# Patient Record
Sex: FEMALE | Race: BLACK OR AFRICAN AMERICAN | Employment: OTHER | URBAN - METROPOLITAN AREA
[De-identification: names, ages, dates, MRNs, and addresses within clinical notes are randomized per-mention and may not be internally consistent; named-entity substitution may affect disease eponyms.]

---

## 2018-03-12 ENCOUNTER — HOSPITAL ENCOUNTER (OUTPATIENT)
Dept: CT IMAGING | Age: 68
Discharge: HOME | End: 2018-03-14
Payer: MEDICARE

## 2018-03-12 ENCOUNTER — HOSPITAL ENCOUNTER (OUTPATIENT)
Age: 68
Discharge: HOME | End: 2018-03-12
Payer: MEDICARE

## 2018-03-12 DIAGNOSIS — C34.11 MALIGNANT NEOPLASM OF UPPER LOBE OF RIGHT LUNG (HCC): ICD-10-CM

## 2018-03-12 DIAGNOSIS — Z87.891 EX-SMOKER: Chronic | ICD-10-CM

## 2018-03-12 DIAGNOSIS — N18.1 STAGE 1 CHRONIC KIDNEY DISEASE: ICD-10-CM

## 2018-03-12 DIAGNOSIS — I26.99 OTHER ACUTE PULMONARY EMBOLISM WITHOUT ACUTE COR PULMONALE (HCC): ICD-10-CM

## 2018-03-12 DIAGNOSIS — J43.1 PANLOBULAR EMPHYSEMA (HCC): Chronic | ICD-10-CM

## 2018-03-12 DIAGNOSIS — I10 ESSENTIAL HYPERTENSION: Chronic | ICD-10-CM

## 2018-03-12 LAB
ANION GAP SERPL CALCULATED.3IONS-SCNC: 12 MMOL/L (ref 7–16)
BUN BLDV-MCNC: 24 MG/DL (ref 8–23)
CALCIUM SERPL-MCNC: 9.6 MG/DL (ref 8.6–10.2)
CHLORIDE BLD-SCNC: 104 MMOL/L (ref 98–107)
CO2: 27 MMOL/L (ref 22–29)
CREAT SERPL-MCNC: 1 MG/DL (ref 0.5–1)
GFR AFRICAN AMERICAN: >60
GFR NON-AFRICAN AMERICAN: >60 ML/MIN/1.73
GLUCOSE BLD-MCNC: 85 MG/DL (ref 74–109)
POTASSIUM SERPL-SCNC: 4 MMOL/L (ref 3.5–5)
SODIUM BLD-SCNC: 143 MMOL/L (ref 132–146)

## 2018-03-12 PROCEDURE — 36415 COLL VENOUS BLD VENIPUNCTURE: CPT

## 2018-03-12 PROCEDURE — 6360000004 HC RX CONTRAST MEDICATION: Performed by: RADIOLOGY

## 2018-03-12 PROCEDURE — 80048 BASIC METABOLIC PNL TOTAL CA: CPT

## 2018-03-12 PROCEDURE — 71260 CT THORAX DX C+: CPT

## 2018-03-12 RX ADMIN — IOPAMIDOL 90 ML: 755 INJECTION, SOLUTION INTRAVENOUS at 13:01

## 2018-04-23 ENCOUNTER — OFFICE VISIT (OUTPATIENT)
Dept: ONCOLOGY | Age: 68
End: 2018-04-23
Payer: MEDICARE

## 2018-04-23 VITALS
WEIGHT: 150.9 LBS | DIASTOLIC BLOOD PRESSURE: 65 MMHG | HEART RATE: 76 BPM | TEMPERATURE: 98.9 F | HEIGHT: 61 IN | RESPIRATION RATE: 20 BRPM | BODY MASS INDEX: 28.49 KG/M2 | SYSTOLIC BLOOD PRESSURE: 138 MMHG

## 2018-04-23 DIAGNOSIS — C34.11 MALIGNANT NEOPLASM OF UPPER LOBE OF RIGHT LUNG (HCC): Primary | ICD-10-CM

## 2018-04-23 PROCEDURE — 99214 OFFICE O/P EST MOD 30 MIN: CPT | Performed by: INTERNAL MEDICINE

## 2018-04-23 PROCEDURE — 1090F PRES/ABSN URINE INCON ASSESS: CPT | Performed by: INTERNAL MEDICINE

## 2018-04-23 PROCEDURE — 99212 OFFICE O/P EST SF 10 MIN: CPT

## 2018-04-23 PROCEDURE — G8417 CALC BMI ABV UP PARAM F/U: HCPCS | Performed by: INTERNAL MEDICINE

## 2018-04-23 PROCEDURE — G8399 PT W/DXA RESULTS DOCUMENT: HCPCS | Performed by: INTERNAL MEDICINE

## 2018-04-23 PROCEDURE — 4040F PNEUMOC VAC/ADMIN/RCVD: CPT | Performed by: INTERNAL MEDICINE

## 2018-04-23 PROCEDURE — G8427 DOCREV CUR MEDS BY ELIG CLIN: HCPCS | Performed by: INTERNAL MEDICINE

## 2018-04-23 PROCEDURE — 1036F TOBACCO NON-USER: CPT | Performed by: INTERNAL MEDICINE

## 2018-04-23 PROCEDURE — 3017F COLORECTAL CA SCREEN DOC REV: CPT | Performed by: INTERNAL MEDICINE

## 2018-04-23 PROCEDURE — 1123F ACP DISCUSS/DSCN MKR DOCD: CPT | Performed by: INTERNAL MEDICINE

## 2018-04-26 ENCOUNTER — TELEPHONE (OUTPATIENT)
Dept: ONCOLOGY | Age: 68
End: 2018-04-26

## 2018-04-26 NOTE — TELEPHONE ENCOUNTER
CALLED [facility] TO INITIATE PRIOR AUTH FOR PTS CT ABD/PELVIS ON 18, [employer] CUST[employer]ER REP WAS FAXING OVER A CLINICAL INFO SHEET ON WHAT WE NEEDED TO SEND TO THEM, THAT I NEVER RECEIVED. CALLED [facility] TODAY AND SPOKE WITH TONG YI, HE IS SUPPOSE TO BE FAXING THIS INFO OVER TO ME. HE ALSO GAVE ME INSTRUCTIONS THAT I CAN SEND ALL CLINICALS AND INCLUDE THE PTS ID 3, NAME,  AND TACKING NUMBER, WHICH I DID FAX TO [phone]. PTS TRACKING NUMBER IS [id number]    Electronically signed by Yumiko Romano MA on 2018 at 10:29 AM      18 I FAXED ALL INFO OVER TO DAVID ON , CALLED THE [phone] NUMBER TO SEE IF THE SCANS HAVE BEEN APPROVED, AS OF 18 THEY ARE STILL IN CLINICAL REVIEW. I WILL LEAVE A NOTE FOR MANUEL NARANJO TO CALL THE ABOVE NUMBER ALONG WITH THE TRACKING NUMBER SO SHE CAN KEEP UPDATED AS TO WHEN THEY ARE APPROVED.    Electronically signed by Yumiko Romano MA on 2018 at 10:02 AM

## 2018-04-30 ENCOUNTER — TELEPHONE (OUTPATIENT)
Dept: ONCOLOGY | Age: 68
End: 2018-04-30

## 2018-05-08 ENCOUNTER — HOSPITAL ENCOUNTER (OUTPATIENT)
Dept: CT IMAGING | Age: 68
Discharge: HOME | End: 2018-05-10
Payer: MEDICARE

## 2018-05-08 DIAGNOSIS — C34.11 MALIGNANT NEOPLASM OF UPPER LOBE OF RIGHT LUNG (HCC): ICD-10-CM

## 2018-05-08 PROCEDURE — 74177 CT ABD & PELVIS W/CONTRAST: CPT

## 2018-05-08 PROCEDURE — 6360000004 HC RX CONTRAST MEDICATION: Performed by: RADIOLOGY

## 2018-05-08 PROCEDURE — 2580000003 HC RX 258: Performed by: RADIOLOGY

## 2018-05-08 RX ORDER — SODIUM CHLORIDE 0.9 % (FLUSH) 0.9 %
10 SYRINGE (ML) INJECTION
Status: COMPLETED | OUTPATIENT
Start: 2018-05-08 | End: 2018-05-08

## 2018-05-08 RX ADMIN — Medication 10 ML: at 08:16

## 2018-05-08 RX ADMIN — IOPAMIDOL 110 ML: 755 INJECTION, SOLUTION INTRAVENOUS at 08:16

## 2018-05-08 RX ADMIN — IOHEXOL 50 ML: 240 INJECTION, SOLUTION INTRATHECAL; INTRAVASCULAR; INTRAVENOUS; ORAL at 08:16

## 2018-05-16 ENCOUNTER — OFFICE VISIT (OUTPATIENT)
Dept: ONCOLOGY | Age: 68
End: 2018-05-16
Payer: MEDICARE

## 2018-05-16 ENCOUNTER — HOSPITAL ENCOUNTER (OUTPATIENT)
Dept: INFUSION THERAPY | Age: 68
Discharge: HOME | End: 2018-05-16
Payer: MEDICARE

## 2018-05-16 VITALS
DIASTOLIC BLOOD PRESSURE: 70 MMHG | RESPIRATION RATE: 20 BRPM | WEIGHT: 149.4 LBS | BODY MASS INDEX: 28.21 KG/M2 | HEIGHT: 61 IN | SYSTOLIC BLOOD PRESSURE: 143 MMHG | TEMPERATURE: 98.7 F | HEART RATE: 72 BPM

## 2018-05-16 DIAGNOSIS — C34.11 MALIGNANT NEOPLASM OF UPPER LOBE OF RIGHT LUNG (HCC): Primary | ICD-10-CM

## 2018-05-16 DIAGNOSIS — C34.11 MALIGNANT NEOPLASM OF UPPER LOBE OF RIGHT LUNG (HCC): ICD-10-CM

## 2018-05-16 LAB
ALBUMIN SERPL-MCNC: 3.9 G/DL (ref 3.5–5.2)
ALP BLD-CCNC: 110 U/L (ref 35–104)
ALT SERPL-CCNC: 40 U/L (ref 0–32)
ANION GAP SERPL CALCULATED.3IONS-SCNC: 11 MMOL/L (ref 7–16)
AST SERPL-CCNC: 38 U/L (ref 0–31)
BASOPHILS ABSOLUTE: 0.02 E9/L (ref 0–0.2)
BASOPHILS RELATIVE PERCENT: 0.4 % (ref 0–2)
BILIRUB SERPL-MCNC: 0.4 MG/DL (ref 0–1.2)
BUN BLDV-MCNC: 20 MG/DL (ref 8–23)
CALCIUM SERPL-MCNC: 9.9 MG/DL (ref 8.6–10.2)
CHLORIDE BLD-SCNC: 105 MMOL/L (ref 98–107)
CO2: 30 MMOL/L (ref 22–29)
CREAT SERPL-MCNC: 1.1 MG/DL (ref 0.5–1)
EOSINOPHILS ABSOLUTE: 0.08 E9/L (ref 0.05–0.5)
EOSINOPHILS RELATIVE PERCENT: 1.5 % (ref 0–6)
GFR AFRICAN AMERICAN: 60
GFR NON-AFRICAN AMERICAN: 60 ML/MIN/1.73
GLUCOSE BLD-MCNC: 93 MG/DL (ref 74–109)
HCT VFR BLD CALC: 31.3 % (ref 34–48)
HEMOGLOBIN: 10.3 G/DL (ref 11.5–15.5)
IMMATURE GRANULOCYTES #: 0.02 E9/L
IMMATURE GRANULOCYTES %: 0.4 % (ref 0–5)
LYMPHOCYTES ABSOLUTE: 1.27 E9/L (ref 1.5–4)
LYMPHOCYTES RELATIVE PERCENT: 24.3 % (ref 20–42)
MCH RBC QN AUTO: 30.1 PG (ref 26–35)
MCHC RBC AUTO-ENTMCNC: 32.9 % (ref 32–34.5)
MCV RBC AUTO: 91.5 FL (ref 80–99.9)
MONOCYTES ABSOLUTE: 0.62 E9/L (ref 0.1–0.95)
MONOCYTES RELATIVE PERCENT: 11.9 % (ref 2–12)
NEUTROPHILS ABSOLUTE: 3.22 E9/L (ref 1.8–7.3)
NEUTROPHILS RELATIVE PERCENT: 61.5 % (ref 43–80)
PDW BLD-RTO: 13.6 FL (ref 11.5–15)
PLATELET # BLD: 171 E9/L (ref 130–450)
PMV BLD AUTO: 10.5 FL (ref 7–12)
POTASSIUM SERPL-SCNC: 4 MMOL/L (ref 3.5–5)
RBC # BLD: 3.42 E12/L (ref 3.5–5.5)
SODIUM BLD-SCNC: 146 MMOL/L (ref 132–146)
TOTAL PROTEIN: 7.7 G/DL (ref 6.4–8.3)
WBC # BLD: 5.2 E9/L (ref 4.5–11.5)

## 2018-05-16 PROCEDURE — 1090F PRES/ABSN URINE INCON ASSESS: CPT | Performed by: INTERNAL MEDICINE

## 2018-05-16 PROCEDURE — G8399 PT W/DXA RESULTS DOCUMENT: HCPCS | Performed by: INTERNAL MEDICINE

## 2018-05-16 PROCEDURE — 36415 COLL VENOUS BLD VENIPUNCTURE: CPT | Performed by: INTERNAL MEDICINE

## 2018-05-16 PROCEDURE — 99212 OFFICE O/P EST SF 10 MIN: CPT | Performed by: INTERNAL MEDICINE

## 2018-05-16 PROCEDURE — 85025 COMPLETE CBC W/AUTO DIFF WBC: CPT

## 2018-05-16 PROCEDURE — 1036F TOBACCO NON-USER: CPT | Performed by: INTERNAL MEDICINE

## 2018-05-16 PROCEDURE — G8417 CALC BMI ABV UP PARAM F/U: HCPCS | Performed by: INTERNAL MEDICINE

## 2018-05-16 PROCEDURE — 1123F ACP DISCUSS/DSCN MKR DOCD: CPT | Performed by: INTERNAL MEDICINE

## 2018-05-16 PROCEDURE — 3017F COLORECTAL CA SCREEN DOC REV: CPT | Performed by: INTERNAL MEDICINE

## 2018-05-16 PROCEDURE — G8427 DOCREV CUR MEDS BY ELIG CLIN: HCPCS | Performed by: INTERNAL MEDICINE

## 2018-05-16 PROCEDURE — 80053 COMPREHEN METABOLIC PANEL: CPT

## 2018-05-16 PROCEDURE — 99214 OFFICE O/P EST MOD 30 MIN: CPT | Performed by: INTERNAL MEDICINE

## 2018-05-16 PROCEDURE — 4040F PNEUMOC VAC/ADMIN/RCVD: CPT | Performed by: INTERNAL MEDICINE

## 2018-06-01 ENCOUNTER — HOSPITAL ENCOUNTER (OUTPATIENT)
Dept: CT IMAGING | Age: 68
Discharge: HOME | End: 2018-06-03
Payer: MEDICARE

## 2018-06-01 DIAGNOSIS — C34.11 MALIGNANT NEOPLASM OF UPPER LOBE OF RIGHT LUNG (HCC): ICD-10-CM

## 2018-06-01 DIAGNOSIS — C34.12 MALIGNANT NEOPLASM OF UPPER LOBE OF LEFT LUNG (HCC): ICD-10-CM

## 2018-06-01 DIAGNOSIS — C34.91 BILATERAL LUNG CANCER (HCC): ICD-10-CM

## 2018-06-01 DIAGNOSIS — C34.90 ADENOCARCINOMA OF LUNG, UNSPECIFIED LATERALITY (HCC): Primary | ICD-10-CM

## 2018-06-01 DIAGNOSIS — C34.90 ADENOCARCINOMA OF LUNG, UNSPECIFIED LATERALITY (HCC): ICD-10-CM

## 2018-06-01 DIAGNOSIS — C34.92 BILATERAL LUNG CANCER (HCC): ICD-10-CM

## 2018-06-01 PROCEDURE — 6360000004 HC RX CONTRAST MEDICATION: Performed by: RADIOLOGY

## 2018-06-01 PROCEDURE — 2580000003 HC RX 258: Performed by: RADIOLOGY

## 2018-06-01 PROCEDURE — 71260 CT THORAX DX C+: CPT

## 2018-06-01 RX ORDER — SODIUM CHLORIDE 0.9 % (FLUSH) 0.9 %
10 SYRINGE (ML) INJECTION
Status: COMPLETED | OUTPATIENT
Start: 2018-06-01 | End: 2018-06-01

## 2018-06-01 RX ADMIN — Medication 10 ML: at 11:27

## 2018-06-01 RX ADMIN — IOPAMIDOL 90 ML: 755 INJECTION, SOLUTION INTRAVENOUS at 11:27

## 2018-06-05 ENCOUNTER — HOSPITAL ENCOUNTER (OUTPATIENT)
Dept: NUCLEAR MEDICINE | Age: 68
Discharge: HOME | End: 2018-06-07
Payer: MEDICARE

## 2018-06-05 DIAGNOSIS — C34.11 MALIGNANT NEOPLASM OF UPPER LOBE OF RIGHT LUNG (HCC): ICD-10-CM

## 2018-06-05 PROCEDURE — 3430000000 HC RX DIAGNOSTIC RADIOPHARMACEUTICAL: Performed by: RADIOLOGY

## 2018-06-05 PROCEDURE — 78815 PET IMAGE W/CT SKULL-THIGH: CPT

## 2018-06-05 PROCEDURE — A9552 F18 FDG: HCPCS | Performed by: RADIOLOGY

## 2018-06-05 RX ORDER — FLUDEOXYGLUCOSE F 18 200 MCI/ML
10 INJECTION, SOLUTION INTRAVENOUS
Status: COMPLETED | OUTPATIENT
Start: 2018-06-05 | End: 2018-06-05

## 2018-06-05 RX ADMIN — FLUDEOXYGLUCOSE F 18 13 MILLICURIE: 200 INJECTION, SOLUTION INTRAVENOUS at 09:02

## 2018-06-07 ENCOUNTER — OFFICE VISIT (OUTPATIENT)
Dept: ONCOLOGY | Age: 68
End: 2018-06-07
Payer: MEDICARE

## 2018-06-07 VITALS
WEIGHT: 149 LBS | HEIGHT: 61 IN | HEART RATE: 75 BPM | BODY MASS INDEX: 28.13 KG/M2 | TEMPERATURE: 97.4 F | DIASTOLIC BLOOD PRESSURE: 70 MMHG | RESPIRATION RATE: 20 BRPM | SYSTOLIC BLOOD PRESSURE: 134 MMHG

## 2018-06-07 DIAGNOSIS — C34.90 ADENOCARCINOMA OF LUNG, UNSPECIFIED LATERALITY (HCC): Primary | ICD-10-CM

## 2018-06-07 PROCEDURE — 4040F PNEUMOC VAC/ADMIN/RCVD: CPT | Performed by: INTERNAL MEDICINE

## 2018-06-07 PROCEDURE — G8399 PT W/DXA RESULTS DOCUMENT: HCPCS | Performed by: INTERNAL MEDICINE

## 2018-06-07 PROCEDURE — G8417 CALC BMI ABV UP PARAM F/U: HCPCS | Performed by: INTERNAL MEDICINE

## 2018-06-07 PROCEDURE — 1123F ACP DISCUSS/DSCN MKR DOCD: CPT | Performed by: INTERNAL MEDICINE

## 2018-06-07 PROCEDURE — 1090F PRES/ABSN URINE INCON ASSESS: CPT | Performed by: INTERNAL MEDICINE

## 2018-06-07 PROCEDURE — 3017F COLORECTAL CA SCREEN DOC REV: CPT | Performed by: INTERNAL MEDICINE

## 2018-06-07 PROCEDURE — 99214 OFFICE O/P EST MOD 30 MIN: CPT | Performed by: INTERNAL MEDICINE

## 2018-06-07 PROCEDURE — 1036F TOBACCO NON-USER: CPT | Performed by: INTERNAL MEDICINE

## 2018-06-07 PROCEDURE — G8427 DOCREV CUR MEDS BY ELIG CLIN: HCPCS | Performed by: INTERNAL MEDICINE

## 2018-06-15 ENCOUNTER — OFFICE VISIT (OUTPATIENT)
Dept: PULMONOLOGY | Age: 68
End: 2018-06-15
Payer: MEDICARE

## 2018-06-15 VITALS
SYSTOLIC BLOOD PRESSURE: 134 MMHG | RESPIRATION RATE: 17 BRPM | WEIGHT: 149 LBS | BODY MASS INDEX: 27.42 KG/M2 | DIASTOLIC BLOOD PRESSURE: 64 MMHG | HEART RATE: 76 BPM | OXYGEN SATURATION: 99 % | HEIGHT: 62 IN

## 2018-06-15 DIAGNOSIS — J43.1 PANLOBULAR EMPHYSEMA (HCC): Primary | Chronic | ICD-10-CM

## 2018-06-15 DIAGNOSIS — F51.02 ADJUSTMENT INSOMNIA: ICD-10-CM

## 2018-06-15 DIAGNOSIS — C34.90 ADENOCARCINOMA OF LUNG, UNSPECIFIED LATERALITY (HCC): ICD-10-CM

## 2018-06-15 LAB
DLCO %PRED: NORMAL
DLCO/VA %PRED: NORMAL
DLCO: NORMAL ML/MMHG SEC
FEF 25-75% PRE PRED: 1.67
FEF 25-75%-PRE: 1.66
FEV1 %PRED-PRE: 113
FEV1/FVC PRED: 79
FEV1/FVC: 74 %
FEV1: 2.1 LITERS
FEV3 PRE: 2.75
FVC %PRED-PRE: 120
FVC: 2.83 LITERS
MEP: NORMAL
MIP: NORMAL
PEF %PRED-PRE: 4.76
PEF-PRE: 3.95 L/SEC
TLC %PRED: NORMAL
TLC: NORMAL LITERS

## 2018-06-15 PROCEDURE — 94010 BREATHING CAPACITY TEST: CPT | Performed by: INTERNAL MEDICINE

## 2018-06-15 PROCEDURE — 3017F COLORECTAL CA SCREEN DOC REV: CPT | Performed by: INTERNAL MEDICINE

## 2018-06-15 PROCEDURE — G8427 DOCREV CUR MEDS BY ELIG CLIN: HCPCS | Performed by: INTERNAL MEDICINE

## 2018-06-15 PROCEDURE — 99213 OFFICE O/P EST LOW 20 MIN: CPT | Performed by: INTERNAL MEDICINE

## 2018-06-15 PROCEDURE — 1036F TOBACCO NON-USER: CPT | Performed by: INTERNAL MEDICINE

## 2018-06-15 PROCEDURE — G8925 SPIR FEV1/FVC>=60% & NO COPD: HCPCS | Performed by: INTERNAL MEDICINE

## 2018-06-15 PROCEDURE — 1090F PRES/ABSN URINE INCON ASSESS: CPT | Performed by: INTERNAL MEDICINE

## 2018-06-15 PROCEDURE — 4040F PNEUMOC VAC/ADMIN/RCVD: CPT | Performed by: INTERNAL MEDICINE

## 2018-06-15 PROCEDURE — G8399 PT W/DXA RESULTS DOCUMENT: HCPCS | Performed by: INTERNAL MEDICINE

## 2018-06-15 PROCEDURE — 99214 OFFICE O/P EST MOD 30 MIN: CPT | Performed by: INTERNAL MEDICINE

## 2018-06-15 PROCEDURE — G8417 CALC BMI ABV UP PARAM F/U: HCPCS | Performed by: INTERNAL MEDICINE

## 2018-06-15 PROCEDURE — 3023F SPIROM DOC REV: CPT | Performed by: INTERNAL MEDICINE

## 2018-06-15 PROCEDURE — 1123F ACP DISCUSS/DSCN MKR DOCD: CPT | Performed by: INTERNAL MEDICINE

## 2018-06-15 RX ORDER — TEMAZEPAM 7.5 MG/1
7.5 CAPSULE ORAL NIGHTLY PRN
Qty: 7 CAPSULE | Refills: 0 | Status: SHIPPED | OUTPATIENT
Start: 2018-06-15 | End: 2018-06-28

## 2018-06-15 ASSESSMENT — PULMONARY FUNCTION TESTS
FEV1_PERCENT_PREDICTED_PRE: 113
FVC_PERCENT_PREDICTED_PRE: 120
FEV1/FVC_PREDICTED: 79
FVC: 2.83
FEV1/FVC: 74
FEV1: 2.10

## 2018-06-28 ENCOUNTER — APPOINTMENT (OUTPATIENT)
Dept: CT IMAGING | Age: 68
DRG: 419 | End: 2018-06-28
Payer: MEDICARE

## 2018-06-28 ENCOUNTER — HOSPITAL ENCOUNTER (INPATIENT)
Age: 68
LOS: 3 days | Discharge: HOME | DRG: 419 | End: 2018-07-01
Attending: EMERGENCY MEDICINE | Admitting: SURGERY
Payer: MEDICARE

## 2018-06-28 ENCOUNTER — APPOINTMENT (OUTPATIENT)
Dept: ULTRASOUND IMAGING | Age: 68
DRG: 419 | End: 2018-06-28
Payer: MEDICARE

## 2018-06-28 ENCOUNTER — APPOINTMENT (OUTPATIENT)
Dept: GENERAL RADIOLOGY | Age: 68
DRG: 419 | End: 2018-06-28
Payer: MEDICARE

## 2018-06-28 DIAGNOSIS — K80.50 BILIARY COLIC: Primary | ICD-10-CM

## 2018-06-28 PROBLEM — K81.0 ACUTE CHOLECYSTITIS: Status: ACTIVE | Noted: 2018-06-28

## 2018-06-28 LAB
ALBUMIN SERPL-MCNC: 4 G/DL (ref 3.5–5.2)
ALP BLD-CCNC: 155 U/L (ref 35–104)
ALT SERPL-CCNC: 42 U/L (ref 0–32)
ANION GAP SERPL CALCULATED.3IONS-SCNC: 13 MMOL/L (ref 7–16)
APTT: 29.3 SEC (ref 24.5–35.1)
AST SERPL-CCNC: 41 U/L (ref 0–31)
BACTERIA: ABNORMAL /HPF
BASOPHILS ABSOLUTE: 0.02 E9/L (ref 0–0.2)
BASOPHILS RELATIVE PERCENT: 0.3 % (ref 0–2)
BILIRUB SERPL-MCNC: 0.3 MG/DL (ref 0–1.2)
BILIRUBIN URINE: NEGATIVE
BLOOD, URINE: ABNORMAL
BUN BLDV-MCNC: 23 MG/DL (ref 8–23)
CALCIUM SERPL-MCNC: 9.5 MG/DL (ref 8.6–10.2)
CHLORIDE BLD-SCNC: 101 MMOL/L (ref 98–107)
CLARITY: CLEAR
CO2: 25 MMOL/L (ref 22–29)
COLOR: YELLOW
CREAT SERPL-MCNC: 1.1 MG/DL (ref 0.5–1)
EOSINOPHILS ABSOLUTE: 0 E9/L (ref 0.05–0.5)
EOSINOPHILS RELATIVE PERCENT: 0 % (ref 0–6)
EPITHELIAL CELLS, UA: ABNORMAL /HPF
GFR AFRICAN AMERICAN: 60
GFR NON-AFRICAN AMERICAN: 60 ML/MIN/1.73
GLUCOSE BLD-MCNC: 164 MG/DL (ref 74–109)
GLUCOSE URINE: NEGATIVE MG/DL
HCT VFR BLD CALC: 30.4 % (ref 34–48)
HEMOGLOBIN: 10.6 G/DL (ref 11.5–15.5)
IMMATURE GRANULOCYTES #: 0.03 E9/L
IMMATURE GRANULOCYTES %: 0.4 % (ref 0–5)
INR BLD: 1
KETONES, URINE: NEGATIVE MG/DL
LACTIC ACID: 0.8 MMOL/L (ref 0.5–2.2)
LEUKOCYTE ESTERASE, URINE: NEGATIVE
LIPASE: 86 U/L (ref 13–60)
LYMPHOCYTES ABSOLUTE: 1 E9/L (ref 1.5–4)
LYMPHOCYTES RELATIVE PERCENT: 12.9 % (ref 20–42)
MCH RBC QN AUTO: 30.7 PG (ref 26–35)
MCHC RBC AUTO-ENTMCNC: 34.9 % (ref 32–34.5)
MCV RBC AUTO: 88.1 FL (ref 80–99.9)
MONOCYTES ABSOLUTE: 0.39 E9/L (ref 0.1–0.95)
MONOCYTES RELATIVE PERCENT: 5 % (ref 2–12)
NEUTROPHILS ABSOLUTE: 6.3 E9/L (ref 1.8–7.3)
NEUTROPHILS RELATIVE PERCENT: 81.4 % (ref 43–80)
NITRITE, URINE: NEGATIVE
PDW BLD-RTO: 13.2 FL (ref 11.5–15)
PH UA: 7 (ref 5–9)
PLATELET # BLD: 169 E9/L (ref 130–450)
PMV BLD AUTO: 10.5 FL (ref 7–12)
POTASSIUM SERPL-SCNC: 3.4 MMOL/L (ref 3.5–5)
PROTEIN UA: NEGATIVE MG/DL
PROTHROMBIN TIME: 11.5 SEC (ref 9.3–12.4)
RBC # BLD: 3.45 E12/L (ref 3.5–5.5)
RBC UA: ABNORMAL /HPF (ref 0–2)
SODIUM BLD-SCNC: 139 MMOL/L (ref 132–146)
SPECIFIC GRAVITY UA: 1.01 (ref 1–1.03)
TOTAL PROTEIN: 8.3 G/DL (ref 6.4–8.3)
TROPONIN: <0.01 NG/ML (ref 0–0.03)
UROBILINOGEN, URINE: 0.2 E.U./DL
WBC # BLD: 7.7 E9/L (ref 4.5–11.5)
WBC UA: ABNORMAL /HPF (ref 0–5)

## 2018-06-28 PROCEDURE — 85025 COMPLETE CBC W/AUTO DIFF WBC: CPT

## 2018-06-28 PROCEDURE — 94761 N-INVAS EAR/PLS OXIMETRY MLT: CPT

## 2018-06-28 PROCEDURE — 36415 COLL VENOUS BLD VENIPUNCTURE: CPT

## 2018-06-28 PROCEDURE — 81001 URINALYSIS AUTO W/SCOPE: CPT

## 2018-06-28 PROCEDURE — 85610 PROTHROMBIN TIME: CPT

## 2018-06-28 PROCEDURE — 80053 COMPREHEN METABOLIC PANEL: CPT

## 2018-06-28 PROCEDURE — 2500000003 HC RX 250 WO HCPCS: Performed by: STUDENT IN AN ORGANIZED HEALTH CARE EDUCATION/TRAINING PROGRAM

## 2018-06-28 PROCEDURE — 99285 EMERGENCY DEPT VISIT HI MDM: CPT

## 2018-06-28 PROCEDURE — 2580000003 HC RX 258: Performed by: RADIOLOGY

## 2018-06-28 PROCEDURE — 83605 ASSAY OF LACTIC ACID: CPT

## 2018-06-28 PROCEDURE — 6360000002 HC RX W HCPCS: Performed by: EMERGENCY MEDICINE

## 2018-06-28 PROCEDURE — 6360000002 HC RX W HCPCS: Performed by: STUDENT IN AN ORGANIZED HEALTH CARE EDUCATION/TRAINING PROGRAM

## 2018-06-28 PROCEDURE — 2580000003 HC RX 258: Performed by: EMERGENCY MEDICINE

## 2018-06-28 PROCEDURE — 84484 ASSAY OF TROPONIN QUANT: CPT

## 2018-06-28 PROCEDURE — 2580000003 HC RX 258: Performed by: STUDENT IN AN ORGANIZED HEALTH CARE EDUCATION/TRAINING PROGRAM

## 2018-06-28 PROCEDURE — 6360000004 HC RX CONTRAST MEDICATION: Performed by: RADIOLOGY

## 2018-06-28 PROCEDURE — 1200000000 HC SEMI PRIVATE

## 2018-06-28 PROCEDURE — 74177 CT ABD & PELVIS W/CONTRAST: CPT

## 2018-06-28 PROCEDURE — 83690 ASSAY OF LIPASE: CPT

## 2018-06-28 PROCEDURE — 96375 TX/PRO/DX INJ NEW DRUG ADDON: CPT

## 2018-06-28 PROCEDURE — 76705 ECHO EXAM OF ABDOMEN: CPT

## 2018-06-28 PROCEDURE — 85730 THROMBOPLASTIN TIME PARTIAL: CPT

## 2018-06-28 PROCEDURE — 71045 X-RAY EXAM CHEST 1 VIEW: CPT

## 2018-06-28 PROCEDURE — 96376 TX/PRO/DX INJ SAME DRUG ADON: CPT

## 2018-06-28 PROCEDURE — 96374 THER/PROPH/DIAG INJ IV PUSH: CPT

## 2018-06-28 PROCEDURE — 99222 1ST HOSP IP/OBS MODERATE 55: CPT | Performed by: SURGERY

## 2018-06-28 RX ORDER — MORPHINE SULFATE 2 MG/ML
2 INJECTION, SOLUTION INTRAMUSCULAR; INTRAVENOUS
Status: DISCONTINUED | OUTPATIENT
Start: 2018-06-28 | End: 2018-06-29

## 2018-06-28 RX ORDER — FENTANYL CITRATE 50 UG/ML
50 INJECTION, SOLUTION INTRAMUSCULAR; INTRAVENOUS ONCE
Status: COMPLETED | OUTPATIENT
Start: 2018-06-28 | End: 2018-06-28

## 2018-06-28 RX ORDER — SODIUM CHLORIDE 0.9 % (FLUSH) 0.9 %
10 SYRINGE (ML) INJECTION PRN
Status: DISCONTINUED | OUTPATIENT
Start: 2018-06-28 | End: 2018-07-01 | Stop reason: HOSPADM

## 2018-06-28 RX ORDER — SODIUM CHLORIDE 0.9 % (FLUSH) 0.9 %
10 SYRINGE (ML) INJECTION
Status: COMPLETED | OUTPATIENT
Start: 2018-06-28 | End: 2018-06-28

## 2018-06-28 RX ORDER — HYDROCODONE BITARTRATE AND ACETAMINOPHEN 5; 325 MG/1; MG/1
1 TABLET ORAL EVERY 4 HOURS PRN
Status: DISCONTINUED | OUTPATIENT
Start: 2018-06-28 | End: 2018-06-29

## 2018-06-28 RX ORDER — HYDROCHLOROTHIAZIDE 25 MG/1
25 TABLET ORAL DAILY
Status: DISCONTINUED | OUTPATIENT
Start: 2018-06-28 | End: 2018-06-29

## 2018-06-28 RX ORDER — ONDANSETRON 2 MG/ML
8 INJECTION INTRAMUSCULAR; INTRAVENOUS ONCE
Status: COMPLETED | OUTPATIENT
Start: 2018-06-28 | End: 2018-06-28

## 2018-06-28 RX ORDER — 0.9 % SODIUM CHLORIDE 0.9 %
1000 INTRAVENOUS SOLUTION INTRAVENOUS ONCE
Status: COMPLETED | OUTPATIENT
Start: 2018-06-28 | End: 2018-06-28

## 2018-06-28 RX ORDER — SODIUM CHLORIDE 0.9 % (FLUSH) 0.9 %
10 SYRINGE (ML) INJECTION EVERY 12 HOURS SCHEDULED
Status: DISCONTINUED | OUTPATIENT
Start: 2018-06-28 | End: 2018-07-01 | Stop reason: HOSPADM

## 2018-06-28 RX ORDER — ACETAMINOPHEN 325 MG/1
650 TABLET ORAL EVERY 4 HOURS PRN
Status: DISCONTINUED | OUTPATIENT
Start: 2018-06-28 | End: 2018-07-01 | Stop reason: HOSPADM

## 2018-06-28 RX ORDER — LISINOPRIL 20 MG/1
40 TABLET ORAL DAILY
Status: DISCONTINUED | OUTPATIENT
Start: 2018-06-28 | End: 2018-07-01 | Stop reason: HOSPADM

## 2018-06-28 RX ORDER — MORPHINE SULFATE 4 MG/ML
4 INJECTION, SOLUTION INTRAMUSCULAR; INTRAVENOUS ONCE
Status: COMPLETED | OUTPATIENT
Start: 2018-06-28 | End: 2018-06-28

## 2018-06-28 RX ORDER — CARVEDILOL 3.12 MG/1
3.12 TABLET ORAL 2 TIMES DAILY
Status: DISCONTINUED | OUTPATIENT
Start: 2018-06-28 | End: 2018-06-29

## 2018-06-28 RX ADMIN — Medication 10 ML: at 08:35

## 2018-06-28 RX ADMIN — FENTANYL CITRATE 50 MCG: 50 INJECTION, SOLUTION INTRAMUSCULAR; INTRAVENOUS at 10:18

## 2018-06-28 RX ADMIN — WATER 1 G: 1 INJECTION INTRAMUSCULAR; INTRAVENOUS; SUBCUTANEOUS at 20:48

## 2018-06-28 RX ADMIN — FENTANYL CITRATE 50 MCG: 50 INJECTION, SOLUTION INTRAMUSCULAR; INTRAVENOUS at 07:33

## 2018-06-28 RX ADMIN — IOPAMIDOL 110 ML: 755 INJECTION, SOLUTION INTRAVENOUS at 08:35

## 2018-06-28 RX ADMIN — METRONIDAZOLE 500 MG: 500 INJECTION, SOLUTION INTRAVENOUS at 20:53

## 2018-06-28 RX ADMIN — MORPHINE SULFATE 4 MG: 4 INJECTION INTRAVENOUS at 13:48

## 2018-06-28 RX ADMIN — ONDANSETRON 8 MG: 2 INJECTION INTRAMUSCULAR; INTRAVENOUS at 07:00

## 2018-06-28 RX ADMIN — SODIUM CHLORIDE 1000 ML: 9 INJECTION, SOLUTION INTRAVENOUS at 06:59

## 2018-06-28 RX ADMIN — Medication 10 ML: at 20:49

## 2018-06-28 RX ADMIN — FENTANYL CITRATE 50 MCG: 50 INJECTION, SOLUTION INTRAMUSCULAR; INTRAVENOUS at 07:00

## 2018-06-28 ASSESSMENT — ENCOUNTER SYMPTOMS
VOMITING: 1
ABDOMINAL PAIN: 1
SHORTNESS OF BREATH: 0
NAUSEA: 1
BACK PAIN: 1

## 2018-06-28 ASSESSMENT — PAIN DESCRIPTION - PAIN TYPE
TYPE: ACUTE PAIN

## 2018-06-28 ASSESSMENT — PAIN SCALES - GENERAL
PAINLEVEL_OUTOF10: 9
PAINLEVEL_OUTOF10: 8
PAINLEVEL_OUTOF10: 9
PAINLEVEL_OUTOF10: 8
PAINLEVEL_OUTOF10: 9
PAINLEVEL_OUTOF10: 0
PAINLEVEL_OUTOF10: 0

## 2018-06-28 ASSESSMENT — PAIN DESCRIPTION - LOCATION
LOCATION: ABDOMEN
LOCATION: CHEST

## 2018-06-28 ASSESSMENT — PAIN DESCRIPTION - DESCRIPTORS
DESCRIPTORS: ACHING;THROBBING
DESCRIPTORS: OTHER (COMMENT)

## 2018-06-28 ASSESSMENT — PAIN DESCRIPTION - ORIENTATION
ORIENTATION: MID
ORIENTATION: RIGHT;MID
ORIENTATION: RIGHT;MID
ORIENTATION: MID;RIGHT

## 2018-06-28 ASSESSMENT — PAIN DESCRIPTION - FREQUENCY
FREQUENCY: INTERMITTENT
FREQUENCY: CONTINUOUS

## 2018-06-28 NOTE — ED NOTES
Still c/o midsternal CP with no relief from Fentanyl. Dr. Kassi Walter informed, orders received and initiated.       Oneida Chino RN  06/28/18 7391

## 2018-06-28 NOTE — ED NOTES
Pt states that she last ate around 5-6 PM which was [employer].      Franklin Wills, DINA  06/28/18 4628

## 2018-06-28 NOTE — ED NOTES
Pt ambulates to restroom without difficulty in upright position with steady gait     Iris Petersen RN  06/28/18 1000

## 2018-06-28 NOTE — ED PROVIDER NOTES
---------------------------------------------  Past Medical History:  has a past medical history of Abnormal chest x-ray with multiple lung nodules; Abnormal Pap smear; Adrenal adenoma; Bilateral lung cancer ([facility] Cholelithiasis; Fibroids; Hemangioma of spleen; Hepatitis C; Hyperlipidemia; Hypertension; Insomnia; Lung nodule; Lymphadenopathy; Malignant neoplasm of upper lobe of right lung ([facility] Osteoarthritis; Panlobular emphysema ([facility] PPD negative; Pulmonary embolism without acute cor pulmonale (HCC); PVD (peripheral vascular disease) ([facility] Scoliosis; and Uterine fibroid. Past Surgical History:  has a past surgical history that includes Appendectomy (1965); Endometrial biopsy; Breast lumpectomy (4/28/1999); Colonoscopy (12/21/2009); bronchoscopy (2/1/2012); Colonoscopy (8/22/2014); bronchoscopy (10/15/15); Thoracoscopy (Right, 11/11/2015); Tunneled venous port placement (Right, 12/7/15); lobectomy (Left, 3/29/2016); and other surgical history (Right, 11/04/2016). Social History:  reports that she quit smoking about 3 years ago. She smoked 0.00 packs per day for 30.00 years. She has never used smokeless tobacco. She reports that she drinks about 1.2 oz of alcohol per week . She reports that she does not use drugs. Family History: family history includes Cancer in her mother; Diabetes in her brother and sister; Heart Disease in her father and mother; High Blood Pressure in her father and mother; Kidney Disease in her father. The patients home medications have been reviewed.     Allergies: Ibuprofen    -------------------------------------------------- RESULTS -------------------------------------------------    LABS:  Results for orders placed or performed during the hospital encounter of 06/28/18   Comprehensive Metabolic Panel   Result Value Ref Range    Sodium 139 132 - 146 mmol/L    Potassium 3.4 (L) 3.5 - 5.0 mmol/L    Chloride 101 98 - 107 mmol/L    CO2 25 22 - 29 mmol/L    Anion Gap 13 7 - 16 hrs:   BP Temp Temp src Pulse Resp SpO2 Height Weight   06/28/18 1244 (!) 211/84 - - 72 17 98 % - -   06/28/18 0914 (!) 181/71 - - 69 18 100 % - -   06/28/18 0736 (!) 188/80 - - 66 15 99 % - -   06/28/18 6148 (!) 204/85 97.4 °F (36.3 °C) Oral 72 18 100 % 5' 2\" (1.575 m) 150 lb (68 kg)       Oxygen Saturation Interpretation: Normal    ------------------------------------------ PROGRESS NOTES ------------------------------------------  Re-evaluation(s):  Time: 132  Patients symptoms are improving  Repeat physical examination is improved    Counseling:  I have spoken with the patient and discussed todays results, in addition to providing specific details for the plan of care and counseling regarding the diagnosis and prognosis. Their questions are answered at this time and they are agreeable with the plan of admission.    --------------------------------- ADDITIONAL PROVIDER NOTES ---------------------------------  Consultations:  Spoke with Surgery Team Discussed case. They will admit the patient. This patient's ED course included: a personal history and physicial examination, re-evaluation prior to disposition, multiple bedside re-evaluations, IV medications, cardiac monitoring, continuous pulse oximetry and complex medical decision making and emergency management    This patient has remained hemodynamically stable during their ED course. Diagnosis:  1. Biliary colic        Disposition:  Patient's disposition: Admit to telemetry  Patient's condition is stable. EKG Interpretation. EKG: This EKG is signed and interpreted by me.     Rate: 70  Rhythm: Sinus  Interpretation: right bundle branch block  Comparison: changes compared to previous EKG       Evelio Maier DO  Resident  06/28/18 9010

## 2018-06-28 NOTE — H&P
GENERAL SURGERY  HISTORY AND PHYSICAL  6/28/2018    Chief Complaint   Patient presents with    Chest Pain     midsternal that goes straight to her back. started at 0100. also epigastric pain with N/V       HPI  Tanika Dyson is a 80 y.o. female who presents for evaluation of abdominal pain. She states it started last night after she had [employer]. She started having some sharp epigastric pain and then started having nausea and vomiting. She started having increasing pain in the epigastric area and RUQ and had diarrhea sp she came into the ED. CT scan showed distended GB with cholelithiasis and RUQ US showed pericholecystic fluid. She denies fevers or chills. Has a history of Hep C which was treated she states and had a previous appendectomy. She has a significant medical history of previous right lung cancer s/p VATS lobectomy.        Past Medical History:   Diagnosis Date    Abnormal chest x-ray with multiple lung nodules 9/8/2015    Abnormal Pap smear 1/3/2011    Alpha-1 negative    Adrenal adenoma     7 mm    Bilateral lung cancer ([address] 5/12/2016    Cholelithiasis 6/6/2011    Fibroids     Hemangioma of spleen     Hepatitis C 1/3/2011    Hyperlipidemia     Hypertension 6/6/2011    Insomnia     Lung nodule     ROMELIA     Lymphadenopathy     Cervical (-) ENT    Malignant neoplasm of upper lobe of right lung ([address] 11/18/2015    Osteoarthritis     Panlobular emphysema ([address] 11/12/2015    PPD negative 1/18/12    Pulmonary embolism without acute cor pulmonale ([address] 5/12/2016    PVD (peripheral vascular disease) ([address] 5/6/2014    Scoliosis     Uterine fibroid 5/6/2014       Past Surgical History:   Procedure Laterality Date    APPENDECTOMY  1965    BREAST LUMPECTOMY  4/28/1999    left, benign, Dr. Ti Goldman, [address]  2/1/2012    Dr. Ralf Meade, [address]  10/15/15    with EBUS - DR Antonette Taylor    COLONOSCOPY  12/21/2009    partial to distal ascending colon normal (completion BE same day normal), Dr. Nhi Amaya, [facility] COLONOSCOPY  8/22/2014    done under fluoro with sigmoid straightening device so was able to get to cecum, very poor prep, moderate proctitis, repeat 5 years,  Dr. Nhi Amaya, [facility] Left 3/29/2016    VATS [facility]    OTHER SURGICAL HISTORY Right 11/04/2016    REMOVAL MEDI-PORT - DR SHI    THORACOSCOPY Right 11/11/2015    VATS, BRONCH,RT UPPER LOBECTOMY; NODE DISECTION    TUNNELED VENOUS PORT PLACEMENT Right 12/7/15    right chest       Prior to Admission medications    Medication Sig Start Date End Date Taking? Authorizing Provider   tiotropium ([employer]) 18 MCG inhalation capsule Inhale 1 capsule into the lungs daily 2/26/18 6/28/18 Yes Juanito Rojo, DO   lisinopril (PRINIVIL;ZESTRIL) 40 MG tablet take 1 tablet by mouth once daily 2/16/18  Yes Juana Rojo, DO   carvedilol (COREG) 3.125 MG tablet Take 1 tablet by mouth 2 times daily 2/16/18 6/28/18 Yes Ye Aviles, DO   calcium citrate-vitamin D (CITRICAL + D) 315-250 MG-UNIT TABS per tablet Take 2 tablets by mouth 2 times daily 2/16/18 6/28/18 Yes Juanito Rojo, DO   hydrochlorothiazide (HYDRODIURIL) 25 MG tablet Take 1 tablet by mouth daily 2/8/18  Yes Africa Keating MD   aspirin EC 81 MG EC tablet Take 1 tablet by mouth daily 2/8/18  Yes Africa Keating MD   Multiple Vitamins-Minerals (THERAPEUTIC MULTIVITAMIN-MINERALS) tablet Take 1 tablet by mouth daily 4/18/17  Yes Carlos Alberto Jimenez MD   [facility] [place].  Devices KIT BP monitoring KIT 4/15/16   Ree Vang MD       Allergies   Allergen Reactions    Ibuprofen Palpitations and Rash       Family History   Problem Relation Age of Onset    Heart Disease Mother     High Blood Pressure Mother     Cancer Mother     Heart Disease Father     High Blood Pressure Father     Kidney Disease Father     Diabetes Sister     Diabetes Brother        Social History Substance Use Topics    Smoking status: Former Smoker     Packs/day: 0.00     Years: 30.00     Quit date: 2015    Smokeless tobacco: Never Used    Alcohol use 1.2 oz/week     2 Glasses of wine per week      Comment: x2 week         Review of Systems - History obtained from the patient  General ROS: negative  Respiratory ROS: positive for - cough  Cardiovascular ROS: negative  Gastrointestinal ROS nausea, vomiting, abdominal pain, diarrhea  Genito-Urinary ROS: negative      PHYSICAL EXAM:    Vitals:    18 1558   BP: (!) 177/59   Pulse: 77   Resp: 15   Temp: 98.2 °F (36.8 °C)   SpO2:        General Appearance:  in no acute distress  Lungs:  No chest wall tenderness. Heart:  Heart regular rate and rhythm  Abdomen:  Soft, moderate RUQ and epigastric tenderness, positive murphys sign      LABS:  CBC  Recent Labs      18   0655   WBC  7.7   HGB  10.6*   HCT  30.4*   PLT  169     BMP  Recent Labs      18   0655   NA  139   K  3.4*   CL  101   CO2  25   BUN  23   CREATININE  1.1*   CALCIUM  9.5     Liver Function  Recent Labs      18   0655   LIPASE  86*   BILITOT  0.3   AST  41*   ALT  42*   ALKPHOS  155*   PROT  8.3   LABALBU  4.0     No results for input(s): LACTATE in the last 72 hours. Recent Labs      18   0655   INR  1.0       RADIOLOGY    Ct Abdomen Pelvis W Iv Contrast Additional Contrast? None    Result Date: 2018  Patient MRN:  [id number] : 1950 Age: 80 years Gender: Female Order Date:  2018 7:00 AM EXAM: CT ABDOMEN PELVIS W IV CONTRAST NUMBER OF IMAGES:  366 INDICATION: Abdominal pain. COMPARISON: CT of the abdomen and pelvis dated 2018. PET/CT dated 2018. PET/CT dated 3/14/2016. TECHNIQUE:  Computed tomographic images of the abdomen and pelvis were acquired Images were reconstructed in the axial plane. Multiplanar reconstruction was performed in sagittal and coronal planes.  Low-dose CT acquisition technique included one of following options: (1)

## 2018-06-29 ENCOUNTER — ANESTHESIA EVENT (OUTPATIENT)
Dept: OPERATING ROOM | Age: 68
DRG: 419 | End: 2018-06-29
Payer: MEDICARE

## 2018-06-29 ENCOUNTER — ANESTHESIA (OUTPATIENT)
Dept: OPERATING ROOM | Age: 68
DRG: 419 | End: 2018-06-29
Payer: MEDICARE

## 2018-06-29 VITALS — TEMPERATURE: 97.3 F | SYSTOLIC BLOOD PRESSURE: 137 MMHG | DIASTOLIC BLOOD PRESSURE: 68 MMHG | OXYGEN SATURATION: 100 %

## 2018-06-29 PROBLEM — Z85.118 HISTORY OF LUNG CANCER: Chronic | Status: ACTIVE | Noted: 2018-06-29

## 2018-06-29 PROBLEM — I45.10 RBBB: Chronic | Status: ACTIVE | Noted: 2018-06-29

## 2018-06-29 LAB
ALBUMIN SERPL-MCNC: 3.6 G/DL (ref 3.5–5.2)
ALP BLD-CCNC: 145 U/L (ref 35–104)
ALT SERPL-CCNC: 82 U/L (ref 0–32)
ANION GAP SERPL CALCULATED.3IONS-SCNC: 15 MMOL/L (ref 7–16)
AST SERPL-CCNC: 107 U/L (ref 0–31)
BASOPHILS ABSOLUTE: 0.02 E9/L (ref 0–0.2)
BASOPHILS RELATIVE PERCENT: 0.2 % (ref 0–2)
BILIRUB SERPL-MCNC: 0.4 MG/DL (ref 0–1.2)
BUN BLDV-MCNC: 15 MG/DL (ref 8–23)
CALCIUM SERPL-MCNC: 9.2 MG/DL (ref 8.6–10.2)
CHLORIDE BLD-SCNC: 101 MMOL/L (ref 98–107)
CO2: 23 MMOL/L (ref 22–29)
CREAT SERPL-MCNC: 1.1 MG/DL (ref 0.5–1)
EOSINOPHILS ABSOLUTE: 0.03 E9/L (ref 0.05–0.5)
EOSINOPHILS RELATIVE PERCENT: 0.3 % (ref 0–6)
GFR AFRICAN AMERICAN: 60
GFR NON-AFRICAN AMERICAN: 60 ML/MIN/1.73
GLUCOSE BLD-MCNC: 108 MG/DL (ref 74–109)
HCT VFR BLD CALC: 33.2 % (ref 34–48)
HEMOGLOBIN: 11.7 G/DL (ref 11.5–15.5)
IMMATURE GRANULOCYTES #: 0.12 E9/L
IMMATURE GRANULOCYTES %: 1.2 % (ref 0–5)
LYMPHOCYTES ABSOLUTE: 1.16 E9/L (ref 1.5–4)
LYMPHOCYTES RELATIVE PERCENT: 11.5 % (ref 20–42)
MCH RBC QN AUTO: 31 PG (ref 26–35)
MCHC RBC AUTO-ENTMCNC: 35.2 % (ref 32–34.5)
MCV RBC AUTO: 87.8 FL (ref 80–99.9)
MONOCYTES ABSOLUTE: 0.9 E9/L (ref 0.1–0.95)
MONOCYTES RELATIVE PERCENT: 8.9 % (ref 2–12)
NEUTROPHILS ABSOLUTE: 7.88 E9/L (ref 1.8–7.3)
NEUTROPHILS RELATIVE PERCENT: 77.9 % (ref 43–80)
PDW BLD-RTO: 13.2 FL (ref 11.5–15)
PLATELET # BLD: 182 E9/L (ref 130–450)
PMV BLD AUTO: 10.3 FL (ref 7–12)
POTASSIUM REFLEX MAGNESIUM: 3.8 MMOL/L (ref 3.5–5)
RBC # BLD: 3.78 E12/L (ref 3.5–5.5)
SODIUM BLD-SCNC: 139 MMOL/L (ref 132–146)
TOTAL PROTEIN: 8.3 G/DL (ref 6.4–8.3)
WBC # BLD: 10.1 E9/L (ref 4.5–11.5)

## 2018-06-29 PROCEDURE — 2580000003 HC RX 258: Performed by: INTERNAL MEDICINE

## 2018-06-29 PROCEDURE — 0FT44ZZ RESECTION OF GALLBLADDER, PERCUTANEOUS ENDOSCOPIC APPROACH: ICD-10-PCS | Performed by: SURGERY

## 2018-06-29 PROCEDURE — 6360000002 HC RX W HCPCS: Performed by: ANESTHESIOLOGY

## 2018-06-29 PROCEDURE — 7100000001 HC PACU RECOVERY - ADDTL 15 MIN: Performed by: SURGERY

## 2018-06-29 PROCEDURE — 2709999900 HC NON-CHARGEABLE SUPPLY: Performed by: SURGERY

## 2018-06-29 PROCEDURE — 2580000003 HC RX 258: Performed by: STUDENT IN AN ORGANIZED HEALTH CARE EDUCATION/TRAINING PROGRAM

## 2018-06-29 PROCEDURE — 6370000000 HC RX 637 (ALT 250 FOR IP): Performed by: INTERNAL MEDICINE

## 2018-06-29 PROCEDURE — C9113 INJ PANTOPRAZOLE SODIUM, VIA: HCPCS | Performed by: INTERNAL MEDICINE

## 2018-06-29 PROCEDURE — 3600000004 HC SURGERY LEVEL 4 BASE: Performed by: SURGERY

## 2018-06-29 PROCEDURE — 6370000000 HC RX 637 (ALT 250 FOR IP): Performed by: STUDENT IN AN ORGANIZED HEALTH CARE EDUCATION/TRAINING PROGRAM

## 2018-06-29 PROCEDURE — 3700000001 HC ADD 15 MINUTES (ANESTHESIA): Performed by: SURGERY

## 2018-06-29 PROCEDURE — 2500000003 HC RX 250 WO HCPCS: Performed by: STUDENT IN AN ORGANIZED HEALTH CARE EDUCATION/TRAINING PROGRAM

## 2018-06-29 PROCEDURE — 1200000000 HC SEMI PRIVATE

## 2018-06-29 PROCEDURE — 3700000000 HC ANESTHESIA ATTENDED CARE: Performed by: SURGERY

## 2018-06-29 PROCEDURE — 47562 LAPAROSCOPIC CHOLECYSTECTOMY: CPT | Performed by: SURGERY

## 2018-06-29 PROCEDURE — 6360000002 HC RX W HCPCS: Performed by: INTERNAL MEDICINE

## 2018-06-29 PROCEDURE — 3600000014 HC SURGERY LEVEL 4 ADDTL 15MIN: Performed by: SURGERY

## 2018-06-29 PROCEDURE — 36415 COLL VENOUS BLD VENIPUNCTURE: CPT

## 2018-06-29 PROCEDURE — 85025 COMPLETE CBC W/AUTO DIFF WBC: CPT

## 2018-06-29 PROCEDURE — 7100000000 HC PACU RECOVERY - FIRST 15 MIN: Performed by: SURGERY

## 2018-06-29 PROCEDURE — 2500000003 HC RX 250 WO HCPCS: Performed by: NURSE ANESTHETIST, CERTIFIED REGISTERED

## 2018-06-29 PROCEDURE — 6360000002 HC RX W HCPCS: Performed by: NURSE ANESTHETIST, CERTIFIED REGISTERED

## 2018-06-29 PROCEDURE — 88304 TISSUE EXAM BY PATHOLOGIST: CPT

## 2018-06-29 PROCEDURE — 6360000002 HC RX W HCPCS: Performed by: STUDENT IN AN ORGANIZED HEALTH CARE EDUCATION/TRAINING PROGRAM

## 2018-06-29 PROCEDURE — 80053 COMPREHEN METABOLIC PANEL: CPT

## 2018-06-29 RX ORDER — DEXAMETHASONE SODIUM PHOSPHATE 10 MG/ML
INJECTION, SOLUTION INTRAMUSCULAR; INTRAVENOUS PRN
Status: DISCONTINUED | OUTPATIENT
Start: 2018-06-29 | End: 2018-06-29 | Stop reason: SDUPTHER

## 2018-06-29 RX ORDER — LIDOCAINE HYDROCHLORIDE 20 MG/ML
INJECTION, SOLUTION INFILTRATION; PERINEURAL PRN
Status: DISCONTINUED | OUTPATIENT
Start: 2018-06-29 | End: 2018-06-29 | Stop reason: SDUPTHER

## 2018-06-29 RX ORDER — GLYCOPYRROLATE 1 MG/5 ML
SYRINGE (ML) INTRAVENOUS PRN
Status: DISCONTINUED | OUTPATIENT
Start: 2018-06-29 | End: 2018-06-29 | Stop reason: SDUPTHER

## 2018-06-29 RX ORDER — FENTANYL CITRATE 50 UG/ML
25 INJECTION, SOLUTION INTRAMUSCULAR; INTRAVENOUS EVERY 5 MIN PRN
Status: DISCONTINUED | OUTPATIENT
Start: 2018-06-29 | End: 2018-07-01 | Stop reason: HOSPADM

## 2018-06-29 RX ORDER — OXYCODONE HYDROCHLORIDE AND ACETAMINOPHEN 5; 325 MG/1; MG/1
1 TABLET ORAL EVERY 4 HOURS PRN
Status: DISCONTINUED | OUTPATIENT
Start: 2018-06-29 | End: 2018-07-01 | Stop reason: HOSPADM

## 2018-06-29 RX ORDER — OXYCODONE HYDROCHLORIDE AND ACETAMINOPHEN 5; 325 MG/1; MG/1
2 TABLET ORAL EVERY 4 HOURS PRN
Status: DISCONTINUED | OUTPATIENT
Start: 2018-06-29 | End: 2018-07-01 | Stop reason: HOSPADM

## 2018-06-29 RX ORDER — POTASSIUM CHLORIDE 20 MEQ/1
20 TABLET, EXTENDED RELEASE ORAL ONCE
Status: COMPLETED | OUTPATIENT
Start: 2018-06-29 | End: 2018-06-29

## 2018-06-29 RX ORDER — MIDAZOLAM HYDROCHLORIDE 1 MG/ML
INJECTION INTRAMUSCULAR; INTRAVENOUS PRN
Status: DISCONTINUED | OUTPATIENT
Start: 2018-06-29 | End: 2018-06-29 | Stop reason: SDUPTHER

## 2018-06-29 RX ORDER — MORPHINE SULFATE 2 MG/ML
2 INJECTION, SOLUTION INTRAMUSCULAR; INTRAVENOUS
Status: DISCONTINUED | OUTPATIENT
Start: 2018-06-29 | End: 2018-07-01 | Stop reason: HOSPADM

## 2018-06-29 RX ORDER — MEPERIDINE HYDROCHLORIDE 50 MG/ML
12.5 INJECTION INTRAMUSCULAR; INTRAVENOUS; SUBCUTANEOUS EVERY 5 MIN PRN
Status: DISCONTINUED | OUTPATIENT
Start: 2018-06-29 | End: 2018-07-01 | Stop reason: HOSPADM

## 2018-06-29 RX ORDER — FENTANYL CITRATE 50 UG/ML
50 INJECTION, SOLUTION INTRAMUSCULAR; INTRAVENOUS EVERY 5 MIN PRN
Status: DISCONTINUED | OUTPATIENT
Start: 2018-06-29 | End: 2018-07-01 | Stop reason: HOSPADM

## 2018-06-29 RX ORDER — PROPOFOL 10 MG/ML
INJECTION, EMULSION INTRAVENOUS PRN
Status: DISCONTINUED | OUTPATIENT
Start: 2018-06-29 | End: 2018-06-29 | Stop reason: SDUPTHER

## 2018-06-29 RX ORDER — LABETALOL HYDROCHLORIDE 5 MG/ML
5 INJECTION, SOLUTION INTRAVENOUS EVERY 10 MIN PRN
Status: DISCONTINUED | OUTPATIENT
Start: 2018-06-29 | End: 2018-07-01 | Stop reason: HOSPADM

## 2018-06-29 RX ORDER — ONDANSETRON 2 MG/ML
4 INJECTION INTRAMUSCULAR; INTRAVENOUS
Status: ACTIVE | OUTPATIENT
Start: 2018-06-29 | End: 2018-06-29

## 2018-06-29 RX ORDER — FENTANYL CITRATE 50 UG/ML
INJECTION, SOLUTION INTRAMUSCULAR; INTRAVENOUS PRN
Status: DISCONTINUED | OUTPATIENT
Start: 2018-06-29 | End: 2018-06-29 | Stop reason: SDUPTHER

## 2018-06-29 RX ORDER — PANTOPRAZOLE SODIUM 40 MG/10ML
40 INJECTION, POWDER, LYOPHILIZED, FOR SOLUTION INTRAVENOUS DAILY
Status: DISCONTINUED | OUTPATIENT
Start: 2018-06-29 | End: 2018-07-01 | Stop reason: HOSPADM

## 2018-06-29 RX ORDER — METOPROLOL TARTRATE 5 MG/5ML
INJECTION INTRAVENOUS PRN
Status: DISCONTINUED | OUTPATIENT
Start: 2018-06-29 | End: 2018-06-29 | Stop reason: SDUPTHER

## 2018-06-29 RX ORDER — DIPHENHYDRAMINE HYDROCHLORIDE 50 MG/ML
12.5 INJECTION INTRAMUSCULAR; INTRAVENOUS
Status: ACTIVE | OUTPATIENT
Start: 2018-06-29 | End: 2018-06-29

## 2018-06-29 RX ORDER — 0.9 % SODIUM CHLORIDE 0.9 %
10 VIAL (ML) INJECTION DAILY
Status: DISCONTINUED | OUTPATIENT
Start: 2018-06-29 | End: 2018-07-01 | Stop reason: HOSPADM

## 2018-06-29 RX ORDER — ONDANSETRON 2 MG/ML
INJECTION INTRAMUSCULAR; INTRAVENOUS PRN
Status: DISCONTINUED | OUTPATIENT
Start: 2018-06-29 | End: 2018-06-29 | Stop reason: SDUPTHER

## 2018-06-29 RX ORDER — HYDRALAZINE HYDROCHLORIDE 20 MG/ML
INJECTION INTRAMUSCULAR; INTRAVENOUS PRN
Status: DISCONTINUED | OUTPATIENT
Start: 2018-06-29 | End: 2018-06-29 | Stop reason: SDUPTHER

## 2018-06-29 RX ORDER — MORPHINE SULFATE 4 MG/ML
4 INJECTION, SOLUTION INTRAMUSCULAR; INTRAVENOUS
Status: DISCONTINUED | OUTPATIENT
Start: 2018-06-29 | End: 2018-07-01 | Stop reason: HOSPADM

## 2018-06-29 RX ORDER — SODIUM CHLORIDE 9 MG/ML
INJECTION, SOLUTION INTRAVENOUS CONTINUOUS
Status: DISCONTINUED | OUTPATIENT
Start: 2018-06-29 | End: 2018-07-01 | Stop reason: HOSPADM

## 2018-06-29 RX ORDER — ROCURONIUM BROMIDE 10 MG/ML
INJECTION, SOLUTION INTRAVENOUS PRN
Status: DISCONTINUED | OUTPATIENT
Start: 2018-06-29 | End: 2018-06-29 | Stop reason: SDUPTHER

## 2018-06-29 RX ADMIN — WATER 1 G: 1 INJECTION INTRAMUSCULAR; INTRAVENOUS; SUBCUTANEOUS at 20:12

## 2018-06-29 RX ADMIN — FENTANYL CITRATE 50 MCG: 50 INJECTION, SOLUTION INTRAMUSCULAR; INTRAVENOUS at 15:21

## 2018-06-29 RX ADMIN — TIOTROPIUM BROMIDE 18 MCG: 18 CAPSULE ORAL; RESPIRATORY (INHALATION) at 11:23

## 2018-06-29 RX ADMIN — PROPOFOL 150 MG: 10 INJECTION, EMULSION INTRAVENOUS at 15:01

## 2018-06-29 RX ADMIN — FENTANYL CITRATE 50 MCG: 50 INJECTION INTRAMUSCULAR; INTRAVENOUS at 18:10

## 2018-06-29 RX ADMIN — MORPHINE SULFATE 4 MG: 4 INJECTION INTRAVENOUS at 20:12

## 2018-06-29 RX ADMIN — ONDANSETRON 4 MG: 2 INJECTION INTRAMUSCULAR; INTRAVENOUS at 17:17

## 2018-06-29 RX ADMIN — METOPROLOL TARTRATE 5 MG: 1 INJECTION, SOLUTION INTRAVENOUS at 15:38

## 2018-06-29 RX ADMIN — Medication 0.4 MG: at 17:18

## 2018-06-29 RX ADMIN — FENTANYL CITRATE 50 MCG: 50 INJECTION, SOLUTION INTRAMUSCULAR; INTRAVENOUS at 15:29

## 2018-06-29 RX ADMIN — ROCURONIUM BROMIDE 50 MG: 10 INJECTION, SOLUTION INTRAVENOUS at 15:01

## 2018-06-29 RX ADMIN — FENTANYL CITRATE 75 MCG: 50 INJECTION, SOLUTION INTRAMUSCULAR; INTRAVENOUS at 15:01

## 2018-06-29 RX ADMIN — HYDRALAZINE HYDROCHLORIDE 5 MG: 20 INJECTION INTRAMUSCULAR; INTRAVENOUS at 15:55

## 2018-06-29 RX ADMIN — MIDAZOLAM HYDROCHLORIDE 1 MG: 1 INJECTION, SOLUTION INTRAMUSCULAR; INTRAVENOUS at 14:55

## 2018-06-29 RX ADMIN — HYDROMORPHONE HYDROCHLORIDE 0.25 MG: 1 INJECTION, SOLUTION INTRAMUSCULAR; INTRAVENOUS; SUBCUTANEOUS at 18:30

## 2018-06-29 RX ADMIN — FENTANYL CITRATE 50 MCG: 50 INJECTION INTRAMUSCULAR; INTRAVENOUS at 18:20

## 2018-06-29 RX ADMIN — PANTOPRAZOLE SODIUM 40 MG: 40 INJECTION, POWDER, FOR SOLUTION INTRAVENOUS at 10:18

## 2018-06-29 RX ADMIN — FENTANYL CITRATE 25 MCG: 50 INJECTION, SOLUTION INTRAMUSCULAR; INTRAVENOUS at 16:55

## 2018-06-29 RX ADMIN — POTASSIUM CHLORIDE 20 MEQ: 20 TABLET, EXTENDED RELEASE ORAL at 05:01

## 2018-06-29 RX ADMIN — LIDOCAINE HYDROCHLORIDE 80 MG: 20 INJECTION, SOLUTION INFILTRATION; PERINEURAL at 15:01

## 2018-06-29 RX ADMIN — METRONIDAZOLE 500 MG: 500 INJECTION, SOLUTION INTRAVENOUS at 12:36

## 2018-06-29 RX ADMIN — SODIUM CHLORIDE: 9 INJECTION, SOLUTION INTRAVENOUS at 20:12

## 2018-06-29 RX ADMIN — FENTANYL CITRATE 50 MCG: 50 INJECTION, SOLUTION INTRAMUSCULAR; INTRAVENOUS at 17:28

## 2018-06-29 RX ADMIN — METRONIDAZOLE 500 MG: 500 INJECTION, SOLUTION INTRAVENOUS at 05:00

## 2018-06-29 RX ADMIN — METRONIDAZOLE 500 MG: 500 INJECTION, SOLUTION INTRAVENOUS at 20:11

## 2018-06-29 RX ADMIN — DEXAMETHASONE SODIUM PHOSPHATE 10 MG: 10 INJECTION, SOLUTION INTRAMUSCULAR; INTRAVENOUS at 15:01

## 2018-06-29 RX ADMIN — SODIUM CHLORIDE: 9 INJECTION, SOLUTION INTRAVENOUS at 09:44

## 2018-06-29 RX ADMIN — NEOSTIGMINE METHYLSULFATE 2 MG: 0.5 INJECTION, SOLUTION INTRAMUSCULAR; INTRAVENOUS; SUBCUTANEOUS at 17:18

## 2018-06-29 RX ADMIN — LISINOPRIL 40 MG: 20 TABLET ORAL at 10:18

## 2018-06-29 RX ADMIN — MIDAZOLAM HYDROCHLORIDE 1 MG: 1 INJECTION, SOLUTION INTRAMUSCULAR; INTRAVENOUS at 14:52

## 2018-06-29 RX ADMIN — SODIUM CHLORIDE: 9 INJECTION, SOLUTION INTRAVENOUS at 15:43

## 2018-06-29 ASSESSMENT — PULMONARY FUNCTION TESTS
PIF_VALUE: 26
PIF_VALUE: 26
PIF_VALUE: 20
PIF_VALUE: 22
PIF_VALUE: 0
PIF_VALUE: 20
PIF_VALUE: 26
PIF_VALUE: 25
PIF_VALUE: 27
PIF_VALUE: 27
PIF_VALUE: 17
PIF_VALUE: 27
PIF_VALUE: 25
PIF_VALUE: 25
PIF_VALUE: 0
PIF_VALUE: 28
PIF_VALUE: 21
PIF_VALUE: 17
PIF_VALUE: 20
PIF_VALUE: 3
PIF_VALUE: 26
PIF_VALUE: 22
PIF_VALUE: 21
PIF_VALUE: 26
PIF_VALUE: 25
PIF_VALUE: 27
PIF_VALUE: 27
PIF_VALUE: 19
PIF_VALUE: 21
PIF_VALUE: 24
PIF_VALUE: 27
PIF_VALUE: 26
PIF_VALUE: 27
PIF_VALUE: 23
PIF_VALUE: 25
PIF_VALUE: 26
PIF_VALUE: 21
PIF_VALUE: 25
PIF_VALUE: 4
PIF_VALUE: 22
PIF_VALUE: 2
PIF_VALUE: 20
PIF_VALUE: 0
PIF_VALUE: 1
PIF_VALUE: 26
PIF_VALUE: 19
PIF_VALUE: 26
PIF_VALUE: 18
PIF_VALUE: 0
PIF_VALUE: 26
PIF_VALUE: 23
PIF_VALUE: 22
PIF_VALUE: 0
PIF_VALUE: 18
PIF_VALUE: 4
PIF_VALUE: 23
PIF_VALUE: 26
PIF_VALUE: 17
PIF_VALUE: 0
PIF_VALUE: 25
PIF_VALUE: 0
PIF_VALUE: 26
PIF_VALUE: 26
PIF_VALUE: 22
PIF_VALUE: 24
PIF_VALUE: 22
PIF_VALUE: 18
PIF_VALUE: 25
PIF_VALUE: 22
PIF_VALUE: 26
PIF_VALUE: 15
PIF_VALUE: 0
PIF_VALUE: 25
PIF_VALUE: 25
PIF_VALUE: 26
PIF_VALUE: 20
PIF_VALUE: 20
PIF_VALUE: 26
PIF_VALUE: 27
PIF_VALUE: 6
PIF_VALUE: 22
PIF_VALUE: 20
PIF_VALUE: 25
PIF_VALUE: 25
PIF_VALUE: 1
PIF_VALUE: 17
PIF_VALUE: 24
PIF_VALUE: 25
PIF_VALUE: 25
PIF_VALUE: 27
PIF_VALUE: 24
PIF_VALUE: 25
PIF_VALUE: 24
PIF_VALUE: 21
PIF_VALUE: 26
PIF_VALUE: 20
PIF_VALUE: 26
PIF_VALUE: 0
PIF_VALUE: 21
PIF_VALUE: 20
PIF_VALUE: 21
PIF_VALUE: 26
PIF_VALUE: 26
PIF_VALUE: 4
PIF_VALUE: 21
PIF_VALUE: 26
PIF_VALUE: 26
PIF_VALUE: 24
PIF_VALUE: 27
PIF_VALUE: 17
PIF_VALUE: 0
PIF_VALUE: 26
PIF_VALUE: 19
PIF_VALUE: 26
PIF_VALUE: 18
PIF_VALUE: 23
PIF_VALUE: 26
PIF_VALUE: 20
PIF_VALUE: 0
PIF_VALUE: 24
PIF_VALUE: 25
PIF_VALUE: 0
PIF_VALUE: 25
PIF_VALUE: 26
PIF_VALUE: 20
PIF_VALUE: 26
PIF_VALUE: 0
PIF_VALUE: 23
PIF_VALUE: 27
PIF_VALUE: 26
PIF_VALUE: 27
PIF_VALUE: 17
PIF_VALUE: 26
PIF_VALUE: 25
PIF_VALUE: 0
PIF_VALUE: 17
PIF_VALUE: 19
PIF_VALUE: 19
PIF_VALUE: 23
PIF_VALUE: 25
PIF_VALUE: 17
PIF_VALUE: 26
PIF_VALUE: 25
PIF_VALUE: 20
PIF_VALUE: 24
PIF_VALUE: 24
PIF_VALUE: 26
PIF_VALUE: 19
PIF_VALUE: 3
PIF_VALUE: 25
PIF_VALUE: 26
PIF_VALUE: 26
PIF_VALUE: 28
PIF_VALUE: 20
PIF_VALUE: 18
PIF_VALUE: 24
PIF_VALUE: 0
PIF_VALUE: 25
PIF_VALUE: 20
PIF_VALUE: 0
PIF_VALUE: 27
PIF_VALUE: 20
PIF_VALUE: 27
PIF_VALUE: 18
PIF_VALUE: 24
PIF_VALUE: 26
PIF_VALUE: 26
PIF_VALUE: 0
PIF_VALUE: 0
PIF_VALUE: 26
PIF_VALUE: 19
PIF_VALUE: 0
PIF_VALUE: 5
PIF_VALUE: 21
PIF_VALUE: 0
PIF_VALUE: 0
PIF_VALUE: 23
PIF_VALUE: 17

## 2018-06-29 ASSESSMENT — PAIN DESCRIPTION - PAIN TYPE
TYPE: SURGICAL PAIN

## 2018-06-29 ASSESSMENT — PAIN DESCRIPTION - ORIENTATION: ORIENTATION: MID

## 2018-06-29 ASSESSMENT — PAIN SCALES - GENERAL
PAINLEVEL_OUTOF10: 8
PAINLEVEL_OUTOF10: 7
PAINLEVEL_OUTOF10: 6
PAINLEVEL_OUTOF10: 0
PAINLEVEL_OUTOF10: 7
PAINLEVEL_OUTOF10: 4
PAINLEVEL_OUTOF10: 7

## 2018-06-29 ASSESSMENT — PAIN DESCRIPTION - FREQUENCY
FREQUENCY: CONTINUOUS

## 2018-06-29 ASSESSMENT — PAIN DESCRIPTION - LOCATION
LOCATION: ABDOMEN

## 2018-06-29 ASSESSMENT — PAIN DESCRIPTION - ONSET: ONSET: ON-GOING

## 2018-06-29 ASSESSMENT — PAIN DESCRIPTION - DESCRIPTORS
DESCRIPTORS: ACHING;DISCOMFORT
DESCRIPTORS: SHARP
DESCRIPTORS: BURNING

## 2018-06-29 ASSESSMENT — LIFESTYLE VARIABLES: SMOKING_STATUS: 0

## 2018-06-30 LAB
ALBUMIN SERPL-MCNC: 3.3 G/DL (ref 3.5–5.2)
ALP BLD-CCNC: 113 U/L (ref 35–104)
ALT SERPL-CCNC: 93 U/L (ref 0–32)
ANION GAP SERPL CALCULATED.3IONS-SCNC: 14 MMOL/L (ref 7–16)
AST SERPL-CCNC: 139 U/L (ref 0–31)
BASOPHILS ABSOLUTE: 0.01 E9/L (ref 0–0.2)
BASOPHILS RELATIVE PERCENT: 0.1 % (ref 0–2)
BILIRUB SERPL-MCNC: 0.4 MG/DL (ref 0–1.2)
BUN BLDV-MCNC: 22 MG/DL (ref 8–23)
CALCIUM SERPL-MCNC: 8.2 MG/DL (ref 8.6–10.2)
CHLORIDE BLD-SCNC: 103 MMOL/L (ref 98–107)
CO2: 22 MMOL/L (ref 22–29)
CREAT SERPL-MCNC: 1.1 MG/DL (ref 0.5–1)
EOSINOPHILS ABSOLUTE: 0 E9/L (ref 0.05–0.5)
EOSINOPHILS RELATIVE PERCENT: 0 % (ref 0–6)
GFR AFRICAN AMERICAN: 60
GFR NON-AFRICAN AMERICAN: 60 ML/MIN/1.73
GLUCOSE BLD-MCNC: 114 MG/DL (ref 74–109)
HCT VFR BLD CALC: 30 % (ref 34–48)
HEMOGLOBIN: 10.2 G/DL (ref 11.5–15.5)
IMMATURE GRANULOCYTES #: 0.05 E9/L
IMMATURE GRANULOCYTES %: 0.5 % (ref 0–5)
LYMPHOCYTES ABSOLUTE: 0.63 E9/L (ref 1.5–4)
LYMPHOCYTES RELATIVE PERCENT: 6.5 % (ref 20–42)
MCH RBC QN AUTO: 30.9 PG (ref 26–35)
MCHC RBC AUTO-ENTMCNC: 34 % (ref 32–34.5)
MCV RBC AUTO: 90.9 FL (ref 80–99.9)
MONOCYTES ABSOLUTE: 0.93 E9/L (ref 0.1–0.95)
MONOCYTES RELATIVE PERCENT: 9.6 % (ref 2–12)
NEUTROPHILS ABSOLUTE: 8.07 E9/L (ref 1.8–7.3)
NEUTROPHILS RELATIVE PERCENT: 83.3 % (ref 43–80)
PDW BLD-RTO: 13.5 FL (ref 11.5–15)
PLATELET # BLD: 162 E9/L (ref 130–450)
PMV BLD AUTO: 10.8 FL (ref 7–12)
POTASSIUM REFLEX MAGNESIUM: 4.3 MMOL/L (ref 3.5–5)
RBC # BLD: 3.3 E12/L (ref 3.5–5.5)
SODIUM BLD-SCNC: 139 MMOL/L (ref 132–146)
TOTAL PROTEIN: 7.1 G/DL (ref 6.4–8.3)
WBC # BLD: 9.7 E9/L (ref 4.5–11.5)

## 2018-06-30 PROCEDURE — 6360000002 HC RX W HCPCS: Performed by: STUDENT IN AN ORGANIZED HEALTH CARE EDUCATION/TRAINING PROGRAM

## 2018-06-30 PROCEDURE — 99024 POSTOP FOLLOW-UP VISIT: CPT | Performed by: SURGERY

## 2018-06-30 PROCEDURE — 85025 COMPLETE CBC W/AUTO DIFF WBC: CPT

## 2018-06-30 PROCEDURE — 2500000003 HC RX 250 WO HCPCS: Performed by: STUDENT IN AN ORGANIZED HEALTH CARE EDUCATION/TRAINING PROGRAM

## 2018-06-30 PROCEDURE — 80053 COMPREHEN METABOLIC PANEL: CPT

## 2018-06-30 PROCEDURE — 6370000000 HC RX 637 (ALT 250 FOR IP): Performed by: STUDENT IN AN ORGANIZED HEALTH CARE EDUCATION/TRAINING PROGRAM

## 2018-06-30 PROCEDURE — 36415 COLL VENOUS BLD VENIPUNCTURE: CPT

## 2018-06-30 PROCEDURE — 1200000000 HC SEMI PRIVATE

## 2018-06-30 PROCEDURE — 2580000003 HC RX 258: Performed by: STUDENT IN AN ORGANIZED HEALTH CARE EDUCATION/TRAINING PROGRAM

## 2018-06-30 RX ADMIN — MORPHINE SULFATE 2 MG: 2 INJECTION, SOLUTION INTRAMUSCULAR; INTRAVENOUS at 19:38

## 2018-06-30 RX ADMIN — METRONIDAZOLE 500 MG: 500 INJECTION, SOLUTION INTRAVENOUS at 17:15

## 2018-06-30 RX ADMIN — MORPHINE SULFATE 2 MG: 2 INJECTION, SOLUTION INTRAMUSCULAR; INTRAVENOUS at 07:35

## 2018-06-30 RX ADMIN — LISINOPRIL 40 MG: 20 TABLET ORAL at 10:03

## 2018-06-30 RX ADMIN — TIOTROPIUM BROMIDE 18 MCG: 18 CAPSULE ORAL; RESPIRATORY (INHALATION) at 10:03

## 2018-06-30 RX ADMIN — MORPHINE SULFATE 2 MG: 2 INJECTION, SOLUTION INTRAMUSCULAR; INTRAVENOUS at 00:39

## 2018-06-30 RX ADMIN — WATER 1 G: 1 INJECTION INTRAMUSCULAR; INTRAVENOUS; SUBCUTANEOUS at 20:28

## 2018-06-30 RX ADMIN — MORPHINE SULFATE 4 MG: 4 INJECTION INTRAVENOUS at 10:03

## 2018-06-30 RX ADMIN — METRONIDAZOLE 500 MG: 500 INJECTION, SOLUTION INTRAVENOUS at 05:25

## 2018-06-30 ASSESSMENT — PAIN DESCRIPTION - PAIN TYPE
TYPE: SURGICAL PAIN
TYPE: SURGICAL PAIN
TYPE: ACUTE PAIN

## 2018-06-30 ASSESSMENT — PAIN DESCRIPTION - LOCATION
LOCATION: ABDOMEN

## 2018-06-30 ASSESSMENT — PAIN DESCRIPTION - FREQUENCY: FREQUENCY: CONTINUOUS

## 2018-06-30 ASSESSMENT — PAIN DESCRIPTION - ONSET
ONSET: ON-GOING
ONSET: ON-GOING

## 2018-06-30 ASSESSMENT — PAIN DESCRIPTION - PROGRESSION: CLINICAL_PROGRESSION: GRADUALLY IMPROVING

## 2018-06-30 ASSESSMENT — PAIN SCALES - GENERAL
PAINLEVEL_OUTOF10: 4
PAINLEVEL_OUTOF10: 8
PAINLEVEL_OUTOF10: 5
PAINLEVEL_OUTOF10: 6
PAINLEVEL_OUTOF10: 8
PAINLEVEL_OUTOF10: 5
PAINLEVEL_OUTOF10: 9
PAINLEVEL_OUTOF10: 0

## 2018-06-30 ASSESSMENT — PAIN DESCRIPTION - ORIENTATION
ORIENTATION: MID

## 2018-06-30 ASSESSMENT — PAIN DESCRIPTION - DESCRIPTORS
DESCRIPTORS: ACHING;DISCOMFORT
DESCRIPTORS: ACHING;DISCOMFORT

## 2018-06-30 NOTE — PROGRESS NOTES
PROGRESS  NOTE --Initial encounter in Coverage: Edgardo Agarwal MD, FACP                                                           Admitted with:  Acute cholecystitis, cholelithiasis, hypertension, history of  lung CA, osteopenia, right bundle branch block, history of hepatitis C,  Treated. Treatment:  Lap-cholecystectomy yesterday    SUBJECTIVE:  Elva Diaz  is alert, awake, and cooperative. Denies chest pain, nausea, dyspnea, emesis. Mild to moderate abdominal pain. Tolerating fluids    ROS:  Negative to a 10 system review except that mentioned in the HPI. Pertinent items are noted in HPI. DIET:DIET LOW FAT;     Allergies   Allergen Reactions    Ibuprofen Palpitations and Rash       MEDICATION SIDE EFFECTS:none    SCHEDULED MEDS:   Current Facility-Administered Medications   Medication Dose Route Frequency Provider Last Rate Last Dose    0.9 % sodium chloride infusion   Intravenous Continuous Ana Nesbitt  mL/hr at 06/29/18 2012      pantoprazole (PROTONIX) injection 40 mg  40 mg Intravenous Daily Ana Nesbtit DO   40 mg at 06/29/18 1018    And    sodium chloride (PF) 0.9 % injection 10 mL  10 mL Intravenous Daily Ana Nesbitt DO        tiotropium [facility]) inhalation capsule 18 mcg  18 mcg Inhalation Daily Ana Nesbitt, DO   18 mcg at 06/29/18 1123    morphine (PF) injection 2 mg  2 mg Intravenous Q2H PRN Samira Lopez MD   2 mg at 06/30/18 0735    Or    morphine (PF) injection 4 mg  4 mg Intravenous Q2H PRN Samira Lopez MD   4 mg at 06/29/18 2012    oxyCODONE-acetaminophen (PERCOCET) 5-325 MG per tablet 1 tablet  1 tablet Oral Q4H PRN [address], MD        Or    oxyCODONE-acetaminophen (PERCOCET) 5-325 MG per tablet 2 tablet  2 tablet Oral Q4H PRN [address], MD        fentaNYL (SUBLIMAZE) injection 25 mcg  25 mcg Intravenous Q5 Min PRN Denita Crum  Hep C w/o coma, chronic (HCC)    Hyperlipidemia    Warfarin anticoagulation    Bilateral lung cancer (HCC)    Bilateral lung cancer (HCC)    Pulmonary embolism without acute cor pulmonale ([address]    Port catheter in place    Acute cholecystitis    History of lung cancer    RBBB       PLAN:  As per surgery  Possible discharge today    See  Debra White  DIPLOMATE, [address] [facility]  9:47 AM  6/30/2018

## 2018-07-01 VITALS
TEMPERATURE: 99.2 F | RESPIRATION RATE: 16 BRPM | OXYGEN SATURATION: 100 % | HEIGHT: 62 IN | DIASTOLIC BLOOD PRESSURE: 60 MMHG | WEIGHT: 150 LBS | HEART RATE: 82 BPM | BODY MASS INDEX: 27.6 KG/M2 | SYSTOLIC BLOOD PRESSURE: 134 MMHG

## 2018-07-01 LAB
ALBUMIN SERPL-MCNC: 2.7 G/DL (ref 3.5–5.2)
ALP BLD-CCNC: 123 U/L (ref 35–104)
ALT SERPL-CCNC: 83 U/L (ref 0–32)
ANION GAP SERPL CALCULATED.3IONS-SCNC: 11 MMOL/L (ref 7–16)
AST SERPL-CCNC: 94 U/L (ref 0–31)
BASOPHILS ABSOLUTE: 0.01 E9/L (ref 0–0.2)
BASOPHILS RELATIVE PERCENT: 0.1 % (ref 0–2)
BILIRUB SERPL-MCNC: 0.3 MG/DL (ref 0–1.2)
BUN BLDV-MCNC: 24 MG/DL (ref 8–23)
CALCIUM SERPL-MCNC: 7.7 MG/DL (ref 8.6–10.2)
CHLORIDE BLD-SCNC: 108 MMOL/L (ref 98–107)
CO2: 21 MMOL/L (ref 22–29)
CREAT SERPL-MCNC: 1.1 MG/DL (ref 0.5–1)
EOSINOPHILS ABSOLUTE: 0.01 E9/L (ref 0.05–0.5)
EOSINOPHILS RELATIVE PERCENT: 0.1 % (ref 0–6)
GFR AFRICAN AMERICAN: 60
GFR NON-AFRICAN AMERICAN: 60 ML/MIN/1.73
GLUCOSE BLD-MCNC: 100 MG/DL (ref 74–109)
HCT VFR BLD CALC: 25.6 % (ref 34–48)
HEMOGLOBIN: 8.9 G/DL (ref 11.5–15.5)
IMMATURE GRANULOCYTES #: 0.04 E9/L
IMMATURE GRANULOCYTES %: 0.5 % (ref 0–5)
LYMPHOCYTES ABSOLUTE: 1.35 E9/L (ref 1.5–4)
LYMPHOCYTES RELATIVE PERCENT: 18.5 % (ref 20–42)
MCH RBC QN AUTO: 31.1 PG (ref 26–35)
MCHC RBC AUTO-ENTMCNC: 34.8 % (ref 32–34.5)
MCV RBC AUTO: 89.5 FL (ref 80–99.9)
MONOCYTES ABSOLUTE: 0.55 E9/L (ref 0.1–0.95)
MONOCYTES RELATIVE PERCENT: 7.5 % (ref 2–12)
NEUTROPHILS ABSOLUTE: 5.33 E9/L (ref 1.8–7.3)
NEUTROPHILS RELATIVE PERCENT: 73.3 % (ref 43–80)
PDW BLD-RTO: 13.4 FL (ref 11.5–15)
PLATELET # BLD: 155 E9/L (ref 130–450)
PMV BLD AUTO: 10.5 FL (ref 7–12)
POTASSIUM REFLEX MAGNESIUM: 3.8 MMOL/L (ref 3.5–5)
RBC # BLD: 2.86 E12/L (ref 3.5–5.5)
SODIUM BLD-SCNC: 140 MMOL/L (ref 132–146)
TOTAL PROTEIN: 6.2 G/DL (ref 6.4–8.3)
WBC # BLD: 7.3 E9/L (ref 4.5–11.5)

## 2018-07-01 PROCEDURE — 2580000003 HC RX 258: Performed by: INTERNAL MEDICINE

## 2018-07-01 PROCEDURE — 85025 COMPLETE CBC W/AUTO DIFF WBC: CPT

## 2018-07-01 PROCEDURE — 2500000003 HC RX 250 WO HCPCS: Performed by: STUDENT IN AN ORGANIZED HEALTH CARE EDUCATION/TRAINING PROGRAM

## 2018-07-01 PROCEDURE — C9113 INJ PANTOPRAZOLE SODIUM, VIA: HCPCS | Performed by: INTERNAL MEDICINE

## 2018-07-01 PROCEDURE — 36415 COLL VENOUS BLD VENIPUNCTURE: CPT

## 2018-07-01 PROCEDURE — 6370000000 HC RX 637 (ALT 250 FOR IP): Performed by: STUDENT IN AN ORGANIZED HEALTH CARE EDUCATION/TRAINING PROGRAM

## 2018-07-01 PROCEDURE — 6360000002 HC RX W HCPCS: Performed by: INTERNAL MEDICINE

## 2018-07-01 PROCEDURE — 80053 COMPREHEN METABOLIC PANEL: CPT

## 2018-07-01 RX ORDER — AMOXICILLIN AND CLAVULANATE POTASSIUM 875; 125 MG/1; MG/1
1 TABLET, FILM COATED ORAL 2 TIMES DAILY
Qty: 14 TABLET | Refills: 0 | Status: SHIPPED | OUTPATIENT
Start: 2018-07-01 | End: 2018-07-08

## 2018-07-01 RX ORDER — OXYCODONE HYDROCHLORIDE AND ACETAMINOPHEN 5; 325 MG/1; MG/1
1 TABLET ORAL EVERY 6 HOURS PRN
Qty: 28 TABLET | Refills: 0 | Status: SHIPPED | OUTPATIENT
Start: 2018-07-01 | End: 2018-07-08

## 2018-07-01 RX ADMIN — SODIUM CHLORIDE: 9 INJECTION, SOLUTION INTRAVENOUS at 06:59

## 2018-07-01 RX ADMIN — PANTOPRAZOLE SODIUM 40 MG: 40 INJECTION, POWDER, FOR SOLUTION INTRAVENOUS at 08:55

## 2018-07-01 RX ADMIN — METRONIDAZOLE 500 MG: 500 INJECTION, SOLUTION INTRAVENOUS at 08:55

## 2018-07-01 RX ADMIN — OXYCODONE HYDROCHLORIDE AND ACETAMINOPHEN 2 TABLET: 5; 325 TABLET ORAL at 11:53

## 2018-07-01 RX ADMIN — LISINOPRIL 40 MG: 20 TABLET ORAL at 08:56

## 2018-07-01 RX ADMIN — METRONIDAZOLE 500 MG: 500 INJECTION, SOLUTION INTRAVENOUS at 01:30

## 2018-07-01 RX ADMIN — Medication 10 ML: at 08:55

## 2018-07-01 RX ADMIN — TIOTROPIUM BROMIDE 18 MCG: 18 CAPSULE ORAL; RESPIRATORY (INHALATION) at 08:56

## 2018-07-01 ASSESSMENT — PAIN SCALES - GENERAL
PAINLEVEL_OUTOF10: 0
PAINLEVEL_OUTOF10: 8
PAINLEVEL_OUTOF10: 0

## 2018-07-01 NOTE — DISCHARGE SUMMARY
in the left upper   quadrant appears to be new compared to the prior study from May 2018. The nodule in the right lower quadrant appears essentially unchanged   from the prior study. 2. Indeterminate cystic lesion in the upper pole of the right kidney. Multiphase contrast-enhanced CT may be helpful for further evaluation. However, the lesion is approximately 1 cm in size and may be at the   limits of sensitivity of the study. 3. Cholelithiasis. The gallbladder appears more distended on the   current study and there is a small amount of pericholecystic fluid. Clinical correlation for acute cholecystitis is recommended. Right   upper quadrant ultrasound may be helpful for further evaluation. 4. Nonspecific slightly increased periportal edema. 5. Additional findings as described in the body of the report are   stable compared to prior studies. XR CHEST PORTABLE   Final Result   No evidence of acute pulmonary disease.                 CONSULTANT NOTES AND RECOMMENDATIONS REVIEWED:    ASSESSMENT:  Cholecystitis   Elevation of liver enzymes  S/P cholecystectomy    Patient Active Problem List   Diagnosis    Hepatitis C    Abnormal Pap smear    Dyslipidemia    Healthcare maintenance    Insomnia    Ex-smoker    PVD (peripheral vascular disease) ([address]    Uterine fibroid    Gall stone    Kidney stone    Left groin pain    Need for Tdap vaccination    Abnormal chest x-ray with multiple lung nodules    Hypertension    Panlobular emphysema ([address]    Chronic hepatitis C virus infection ([address]    Malignant neoplasm of upper lobe of right lung ([address]    Malignant neoplasm of lung (HCC)    Dehydration    Leukocytosis    Anemia    Essential hypertension    Thrombocytopenia (HCC)    Malignant neoplasm of upper lobe of left lung (HCC)    Pulmonary embolism without acute cor pulmonale (HCC)    History of lobectomy of lung    Adenocarcinoma, lung (HCC)    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

## 2018-07-01 NOTE — PROGRESS NOTES
GENERAL SURGERY  DAILY PROGRESS NOTE  7/1/2018    Subjective:  Sitting in bed, eating breakfast. States she has some soreness at her port sites     Objective:  /60   Pulse 82   Temp 99.2 °F (37.3 °C) (Temporal)   Resp 16   Ht 5' 2\" (1.575 m)   Wt 150 lb (68 kg)   SpO2 100%   BMI 27.44 kg/m²     GENERAL:  Laying in bed, awake, alert, cooperative, no apparent distress  LUNGS:  No increased work of breathing  CARDIOVASCULAR:  Regular rate   ABDOMEN:  Soft, appropriately tender at incisions, non-distended, ALEJANDRINA w/ serous output - 140 cc in 24 hrs       Assessment/Plan:  80 y.o. female s/p subtotal choleystectomy     DC drains  Ok for discharge home   DC IVF  Diet as tolerated     Electronically signed by Lawyer Gloria MD on 7/1/2018 at 9:51 AM

## 2018-07-01 NOTE — PROGRESS NOTES
Discharge summary: Edgardo Agarwal MD, [address]                                                           Admitted with:  Acute cholecystitis, cholelithiasis, hypertension, history of  lung CA, osteopenia, right bundle branch block, history of hepatitis C,  Treated. Treatment:  Lap-cholecystectomy yesterday    SUBJECTIVE:  Tanika Dyson  is alert, awake, and cooperative. Denies chest pain, nausea, dyspnea, emesis. Mild to moderate abdominal pain. Tolerating fluids    ROS:  Negative to a 10 system review except that mentioned in the HPI. Pertinent items are noted in HPI. DIET:DIET LOW FAT;     Allergies   Allergen Reactions    Ibuprofen Palpitations and Rash       MEDICATION SIDE EFFECTS:none    SCHEDULED MEDS:   Current Facility-Administered Medications   Medication Dose Route Frequency Provider Last Rate Last Dose    0.9 % sodium chloride infusion   Intravenous Continuous Cristina Nesbitt  mL/hr at 07/01/18 0659      pantoprazole (PROTONIX) injection 40 mg  40 mg Intravenous Daily Cristina eNsbitt DO   40 mg at 07/01/18 6276    And    sodium chloride (PF) 0.9 % injection 10 mL  10 mL Intravenous Daily Cristina Nesbitt DO   10 mL at 07/01/18 0855    tiotropium (SPIRIVA) inhalation capsule 18 mcg  18 mcg Inhalation Daily Cristina Nesbitt, DO   18 mcg at 07/01/18 0856    morphine (PF) injection 2 mg  2 mg Intravenous Q2H PRN Samira Page MD   2 mg at 06/30/18 1938    Or    morphine (PF) injection 4 mg  4 mg Intravenous Q2H PRN Bolivar Perez MD   4 mg at 06/30/18 1003    oxyCODONE-acetaminophen (PERCOCET) 5-325 MG per tablet 1 tablet  1 tablet Oral Q4H PRN Bolivar Perez MD        Or    oxyCODONE-acetaminophen (PERCOCET) 5-325 MG per tablet 2 tablet  2 tablet Oral Q4H PRN Bolivar Perez MD        fentaNYL (SUBLIMAZE) injection 25 mcg  25 mcg Intravenous Q5 Min PRN Kney Houston DO  fentaNYL (SUBLIMAZE) injection 50 mcg  50 mcg Intravenous Q5 Min PRN Benuel Runner, DO   50 mcg at 06/29/18 1820    HYDROmorphone (DILAUDID) injection 0.25 mg  0.25 mg Intravenous Q5 Min PRN Benuel Runner, DO   0.25 mg at 06/29/18 1830    labetalol (NORMODYNE;TRANDATE) injection 5 mg  5 mg Intravenous Q10 Min PRN Benuel Runner, DO        meperidine (DEMEROL) injection 12.5 mg  12.5 mg Intravenous Q5 Min PRN Benuel Runner, DO        lisinopril (PRINIVIL;ZESTRIL) tablet 40 mg  40 mg Oral Daily Tawnya Render, DO   40 mg at 07/01/18 8636    sodium chloride flush 0.9 % injection 10 mL  10 mL Intravenous 2 times per day Tawnya Render, DO   10 mL at 06/28/18 2049    sodium chloride flush 0.9 % injection 10 mL  10 mL Intravenous PRN Tawnya Render, DO        acetaminophen (TYLENOL) tablet 650 mg  650 mg Oral Q4H PRN Tawnya Render, DO        cefTRIAXone (ROCEPHIN) 1 g in sterile water 10 mL IV syringe  1 g Intravenous Q24H Tawnya Render, DO   1 g at 06/30/18 2028    metronidazole (FLAGYL) 500 mg in NaCl 100 mL IVPB premix  500 mg Intravenous Q8H Tawnya Render,  mL/hr at 07/01/18 0855 500 mg at 07/01/18 9618       I have reviewed the patient's medications; see Medication Reconciliation.     Current  Infusions   sodium chloride 100 mL/hr at 07/01/18 0659       Prn Medsmorphine **OR** morphine, oxyCODONE-acetaminophen **OR** oxyCODONE-acetaminophen, fentanNYL, fentanNYL, HYDROmorphone, labetalol, meperidine, sodium chloride flush, acetaminophen     OBJECTIVE:  Wt Readings from Last 3 Encounters:   06/28/18 150 lb (68 kg)   06/15/18 149 lb (67.6 kg)   06/07/18 149 lb (67.6 kg)     Temp Readings from Last 3 Encounters:   07/01/18 99.2 °F (37.3 °C) (Temporal)   06/29/18 97.3 °F (36.3 °C)   06/07/18 97.4 °F (36.3 °C) (Temporal)     BP Readings from Last 3 Encounters:   07/01/18 134/60   06/29/18 137/68   06/15/18 134/64     Pulse Readings from Last 3 Encounters:   07/01/18 82   06/15/18

## 2018-07-01 NOTE — PLAN OF CARE
Problem: Pain:  Goal: Pain level will decrease  Pain level will decrease   Outcome: Met This Shift    Goal: Control of acute pain  Control of acute pain   Outcome: Met This Shift    Goal: Control of chronic pain  Control of chronic pain   Outcome: Met This Shift      Problem: Falls - Risk of:  Goal: Will remain free from falls  Will remain free from falls   Outcome: Met This Shift    Goal: Absence of physical injury  Absence of physical injury   Outcome: Met This Shift

## 2018-07-06 ENCOUNTER — TELEPHONE (OUTPATIENT)
Dept: SURGERY | Age: 68
End: 2018-07-06

## 2018-07-06 NOTE — TELEPHONE ENCOUNTER
Patient called in asking for a post op appointment with Dr Joel Sam after laparoscopic cholecystectomy done on 6/29/18. Patient is also requesting a letter be mailed to her home address stating that patient had surgery on that date with Dr Joel Sam so she can give it to her job. HANNA Bragg explained to the patient that Dr Joel Sam does not have clinic until 7/25/18 so MA will have to talk to Dr Joel Sam and his Trg Inés Rivera to see if patient can be seen before that. Mariah Foy also informed patient that she will send the letter today. Patient verbalized understanding.     Electronically signed by Stephen Villarreal on 7/6/18 at 8:51 AM

## 2018-07-06 NOTE — TELEPHONE ENCOUNTER
Mirna Lafleur spoke to Dr Tuyet Iqbal who stated patient can come in Wednesday 7/11/18 for a post op appointment. Mirnaaissatou Lafleur discussed this add on patient with HANNA Bahena who approved patient to be added on for 7/11/18 @ 12:30pm.     Patient scheduled for Wednesday 7/11/18 @ 12:30pm in [facility] with Dr Tuyet Iqbal. HANNA Correia contacted patient to inform her of this appointment date and time. Patient accepted appointment and HANNA Correia mailed out appointment letter to patient.     Electronically signed by Jodi Acevedo on 7/6/18 at 9:18 AM

## 2018-07-07 LAB
EKG ATRIAL RATE: 70 BPM
EKG P AXIS: 58 DEGREES
EKG P-R INTERVAL: 112 MS
EKG Q-T INTERVAL: 462 MS
EKG QRS DURATION: 142 MS
EKG QTC CALCULATION (BAZETT): 498 MS
EKG R AXIS: -3 DEGREES
EKG T AXIS: 45 DEGREES
EKG VENTRICULAR RATE: 70 BPM

## 2018-07-11 ENCOUNTER — OFFICE VISIT (OUTPATIENT)
Dept: SURGERY | Age: 68
End: 2018-07-11
Payer: MEDICARE

## 2018-07-11 VITALS
BODY MASS INDEX: 26.81 KG/M2 | RESPIRATION RATE: 16 BRPM | DIASTOLIC BLOOD PRESSURE: 67 MMHG | HEART RATE: 79 BPM | SYSTOLIC BLOOD PRESSURE: 111 MMHG | OXYGEN SATURATION: 99 % | WEIGHT: 142 LBS | HEIGHT: 61 IN | TEMPERATURE: 97.6 F

## 2018-07-11 DIAGNOSIS — Z90.49 HISTORY OF LAPAROSCOPIC CHOLECYSTECTOMY: Primary | ICD-10-CM

## 2018-07-11 PROCEDURE — 99211 OFF/OP EST MAY X REQ PHY/QHP: CPT | Performed by: SURGERY

## 2018-07-11 PROCEDURE — 99024 POSTOP FOLLOW-UP VISIT: CPT | Performed by: SURGERY

## 2018-07-11 NOTE — PROGRESS NOTES
Subjective:      Patient ID: Darcie Whittington is a 80 y.o. female. HPI  Post lap carmella, doing well. Some loss of appetite. Review of Systems    Objective:   Physical Exam  Wounds healing well, no drainage  Assessment:      Post lap carmella, doing well. Wounds clean      Plan:      Return as needed.

## 2018-08-06 ENCOUNTER — TELEPHONE (OUTPATIENT)
Dept: SURGERY | Age: 68
End: 2018-08-06

## 2018-08-15 ENCOUNTER — OFFICE VISIT (OUTPATIENT)
Dept: SURGERY | Age: 68
End: 2018-08-15
Payer: MEDICARE

## 2018-08-15 VITALS
HEIGHT: 61 IN | TEMPERATURE: 97.4 F | OXYGEN SATURATION: 99 % | RESPIRATION RATE: 14 BRPM | BODY MASS INDEX: 27.38 KG/M2 | SYSTOLIC BLOOD PRESSURE: 186 MMHG | DIASTOLIC BLOOD PRESSURE: 76 MMHG | HEART RATE: 73 BPM | WEIGHT: 145 LBS

## 2018-08-15 DIAGNOSIS — Z90.49 STATUS POST LAPAROSCOPIC CHOLECYSTECTOMY: Primary | ICD-10-CM

## 2018-08-15 PROCEDURE — 99211 OFF/OP EST MAY X REQ PHY/QHP: CPT | Performed by: SURGERY

## 2018-08-15 PROCEDURE — 99024 POSTOP FOLLOW-UP VISIT: CPT | Performed by: SURGERY

## 2018-08-30 ENCOUNTER — HOSPITAL ENCOUNTER (OUTPATIENT)
Dept: GENERAL RADIOLOGY | Age: 68
Discharge: HOME | End: 2018-09-01
Payer: MEDICARE

## 2018-08-30 DIAGNOSIS — Z12.31 VISIT FOR SCREENING MAMMOGRAM: ICD-10-CM

## 2018-08-30 PROCEDURE — 77063 BREAST TOMOSYNTHESIS BI: CPT

## 2018-11-30 ENCOUNTER — OFFICE VISIT (OUTPATIENT)
Dept: PULMONOLOGY | Age: 68
End: 2018-11-30
Payer: MEDICARE

## 2018-11-30 VITALS
SYSTOLIC BLOOD PRESSURE: 143 MMHG | RESPIRATION RATE: 16 BRPM | BODY MASS INDEX: 26.68 KG/M2 | WEIGHT: 145 LBS | DIASTOLIC BLOOD PRESSURE: 67 MMHG | HEART RATE: 77 BPM | OXYGEN SATURATION: 100 % | HEIGHT: 62 IN | TEMPERATURE: 97 F

## 2018-11-30 DIAGNOSIS — C34.90 MALIGNANT NEOPLASM OF LUNG, UNSPECIFIED LATERALITY, UNSPECIFIED PART OF LUNG (HCC): ICD-10-CM

## 2018-11-30 DIAGNOSIS — I10 ESSENTIAL HYPERTENSION: Chronic | ICD-10-CM

## 2018-11-30 DIAGNOSIS — J43.1 PANLOBULAR EMPHYSEMA (HCC): Primary | Chronic | ICD-10-CM

## 2018-11-30 LAB
EXPIRATORY TIME: 7.44 SEC
FEF 25-75% %PRED-PRE: 67 L/SEC
FEF 25-75% PRED: 4.59 L/SEC
FEF 25-75%-PRE: 3.1 L/SEC
FEV1 %PRED-PRE: 115 %
FEV1 PRED: 1.84 L
FEV1/FVC %PRED-PRE: 96 %
FEV1/FVC PRED: 79 %
FEV1/FVC: 76 %
FEV1: 2.13 L
FVC %PRED-PRE: 119 %
FVC PRED: 2.33 L
FVC: 2.79 L
PEF %PRED-PRE: NORMAL L/SEC
PEF PRED: NORMAL L/SEC
PEF-PRE: NORMAL L/SEC

## 2018-11-30 PROCEDURE — 1123F ACP DISCUSS/DSCN MKR DOCD: CPT | Performed by: INTERNAL MEDICINE

## 2018-11-30 PROCEDURE — G8925 SPIR FEV1/FVC>=60% & NO COPD: HCPCS | Performed by: INTERNAL MEDICINE

## 2018-11-30 PROCEDURE — 3017F COLORECTAL CA SCREEN DOC REV: CPT | Performed by: INTERNAL MEDICINE

## 2018-11-30 PROCEDURE — G8399 PT W/DXA RESULTS DOCUMENT: HCPCS | Performed by: INTERNAL MEDICINE

## 2018-11-30 PROCEDURE — G8417 CALC BMI ABV UP PARAM F/U: HCPCS | Performed by: INTERNAL MEDICINE

## 2018-11-30 PROCEDURE — 4040F PNEUMOC VAC/ADMIN/RCVD: CPT | Performed by: INTERNAL MEDICINE

## 2018-11-30 PROCEDURE — 1036F TOBACCO NON-USER: CPT | Performed by: INTERNAL MEDICINE

## 2018-11-30 PROCEDURE — G8482 FLU IMMUNIZE ORDER/ADMIN: HCPCS | Performed by: INTERNAL MEDICINE

## 2018-11-30 PROCEDURE — G8427 DOCREV CUR MEDS BY ELIG CLIN: HCPCS | Performed by: INTERNAL MEDICINE

## 2018-11-30 PROCEDURE — 1090F PRES/ABSN URINE INCON ASSESS: CPT | Performed by: INTERNAL MEDICINE

## 2018-11-30 PROCEDURE — 99213 OFFICE O/P EST LOW 20 MIN: CPT | Performed by: INTERNAL MEDICINE

## 2018-11-30 PROCEDURE — 94010 BREATHING CAPACITY TEST: CPT | Performed by: INTERNAL MEDICINE

## 2018-11-30 PROCEDURE — 1101F PT FALLS ASSESS-DOCD LE1/YR: CPT | Performed by: INTERNAL MEDICINE

## 2018-11-30 PROCEDURE — 3023F SPIROM DOC REV: CPT | Performed by: INTERNAL MEDICINE

## 2018-11-30 ASSESSMENT — PULMONARY FUNCTION TESTS
FEV1/FVC_PERCENT_PREDICTED_PRE: 96
FEV1/FVC_PREDICTED: 79
FEV1_PREDICTED: 1.84
FEV1: 2.13
FVC_PERCENT_PREDICTED_PRE: 119
FEV1/FVC: 76
FVC_PREDICTED: 2.33
FEV1_PERCENT_PREDICTED_PRE: 115
FVC: 2.79

## 2018-12-11 DIAGNOSIS — C34.11 MALIGNANT NEOPLASM OF UPPER LOBE OF RIGHT LUNG (HCC): Primary | ICD-10-CM

## 2018-12-13 ENCOUNTER — HOSPITAL ENCOUNTER (OUTPATIENT)
Dept: INFUSION THERAPY | Age: 68
End: 2018-12-13

## 2018-12-13 ENCOUNTER — HOSPITAL ENCOUNTER (OUTPATIENT)
Age: 68
Discharge: HOME | End: 2018-12-13
Payer: MEDICARE

## 2018-12-13 ENCOUNTER — HOSPITAL ENCOUNTER (OUTPATIENT)
Dept: CT IMAGING | Age: 68
Discharge: HOME | End: 2018-12-15
Payer: MEDICARE

## 2018-12-13 DIAGNOSIS — C34.90 ADENOCARCINOMA OF LUNG, UNSPECIFIED LATERALITY (HCC): ICD-10-CM

## 2018-12-13 DIAGNOSIS — C34.11 MALIGNANT NEOPLASM OF UPPER LOBE OF RIGHT LUNG (HCC): ICD-10-CM

## 2018-12-13 LAB
BUN BLDV-MCNC: 21 MG/DL (ref 8–23)
CREAT SERPL-MCNC: 1.1 MG/DL (ref 0.5–1)
GFR AFRICAN AMERICAN: 60
GFR NON-AFRICAN AMERICAN: 60 ML/MIN/1.73

## 2018-12-13 PROCEDURE — 6360000004 HC RX CONTRAST MEDICATION: Performed by: RADIOLOGY

## 2018-12-13 PROCEDURE — 36415 COLL VENOUS BLD VENIPUNCTURE: CPT

## 2018-12-13 PROCEDURE — 2580000003 HC RX 258: Performed by: RADIOLOGY

## 2018-12-13 PROCEDURE — 71260 CT THORAX DX C+: CPT

## 2018-12-13 PROCEDURE — 82565 ASSAY OF CREATININE: CPT

## 2018-12-13 PROCEDURE — 84520 ASSAY OF UREA NITROGEN: CPT

## 2018-12-13 RX ORDER — SODIUM CHLORIDE 0.9 % (FLUSH) 0.9 %
10 SYRINGE (ML) INJECTION ONCE
Status: COMPLETED | OUTPATIENT
Start: 2018-12-13 | End: 2018-12-13

## 2018-12-13 RX ADMIN — IOPAMIDOL 90 ML: 755 INJECTION, SOLUTION INTRAVENOUS at 11:31

## 2018-12-13 RX ADMIN — Medication 10 ML: at 11:31

## 2019-01-14 ENCOUNTER — OFFICE VISIT (OUTPATIENT)
Dept: ONCOLOGY | Age: 69
End: 2019-01-14
Payer: MEDICARE

## 2019-01-14 ENCOUNTER — HOSPITAL ENCOUNTER (OUTPATIENT)
Dept: INFUSION THERAPY | Age: 69
Discharge: HOME | End: 2019-01-14
Payer: MEDICARE

## 2019-01-14 ENCOUNTER — HOSPITAL ENCOUNTER (OUTPATIENT)
Age: 69
Discharge: HOME | End: 2019-01-14
Payer: MEDICARE

## 2019-01-14 VITALS
WEIGHT: 143.8 LBS | TEMPERATURE: 97.2 F | HEIGHT: 61 IN | SYSTOLIC BLOOD PRESSURE: 153 MMHG | DIASTOLIC BLOOD PRESSURE: 72 MMHG | RESPIRATION RATE: 20 BRPM | HEART RATE: 80 BPM | BODY MASS INDEX: 27.15 KG/M2

## 2019-01-14 DIAGNOSIS — C34.11 MALIGNANT NEOPLASM OF UPPER LOBE OF RIGHT LUNG (HCC): ICD-10-CM

## 2019-01-14 DIAGNOSIS — C34.11 MALIGNANT NEOPLASM OF UPPER LOBE OF RIGHT LUNG (HCC): Primary | ICD-10-CM

## 2019-01-14 DIAGNOSIS — Z85.118 HISTORY OF LUNG CANCER: Primary | Chronic | ICD-10-CM

## 2019-01-14 LAB
ALBUMIN SERPL-MCNC: 4 G/DL (ref 3.5–5.2)
ALP BLD-CCNC: 109 U/L (ref 35–104)
ALT SERPL-CCNC: 47 U/L (ref 0–32)
ANION GAP SERPL CALCULATED.3IONS-SCNC: 11 MMOL/L (ref 7–16)
AST SERPL-CCNC: 46 U/L (ref 0–31)
BASOPHILS ABSOLUTE: 0.03 E9/L (ref 0–0.2)
BASOPHILS RELATIVE PERCENT: 0.6 % (ref 0–2)
BILIRUB SERPL-MCNC: 0.4 MG/DL (ref 0–1.2)
BUN BLDV-MCNC: 36 MG/DL (ref 8–23)
CALCIUM SERPL-MCNC: 9.8 MG/DL (ref 8.6–10.2)
CHLORIDE BLD-SCNC: 105 MMOL/L (ref 98–107)
CO2: 25 MMOL/L (ref 22–29)
CREAT SERPL-MCNC: 1 MG/DL (ref 0.5–1)
EOSINOPHILS ABSOLUTE: 0.04 E9/L (ref 0.05–0.5)
EOSINOPHILS RELATIVE PERCENT: 0.7 % (ref 0–6)
GFR AFRICAN AMERICAN: >60
GFR NON-AFRICAN AMERICAN: >60 ML/MIN/1.73
GLUCOSE BLD-MCNC: 120 MG/DL (ref 74–99)
HCT VFR BLD CALC: 31.8 % (ref 34–48)
HEMOGLOBIN: 10.8 G/DL (ref 11.5–15.5)
IMMATURE GRANULOCYTES #: 0.02 E9/L
IMMATURE GRANULOCYTES %: 0.4 % (ref 0–5)
LYMPHOCYTES ABSOLUTE: 1.27 E9/L (ref 1.5–4)
LYMPHOCYTES RELATIVE PERCENT: 23.6 % (ref 20–42)
MCH RBC QN AUTO: 30.8 PG (ref 26–35)
MCHC RBC AUTO-ENTMCNC: 34 % (ref 32–34.5)
MCV RBC AUTO: 90.6 FL (ref 80–99.9)
MONOCYTES ABSOLUTE: 0.51 E9/L (ref 0.1–0.95)
MONOCYTES RELATIVE PERCENT: 9.5 % (ref 2–12)
NEUTROPHILS ABSOLUTE: 3.5 E9/L (ref 1.8–7.3)
NEUTROPHILS RELATIVE PERCENT: 65.2 % (ref 43–80)
PDW BLD-RTO: 13.3 FL (ref 11.5–15)
PLATELET # BLD: 185 E9/L (ref 130–450)
PMV BLD AUTO: 10.5 FL (ref 7–12)
POTASSIUM SERPL-SCNC: 4.2 MMOL/L (ref 3.5–5)
RBC # BLD: 3.51 E12/L (ref 3.5–5.5)
SODIUM BLD-SCNC: 141 MMOL/L (ref 132–146)
TOTAL PROTEIN: 8.7 G/DL (ref 6.4–8.3)
WBC # BLD: 5.4 E9/L (ref 4.5–11.5)

## 2019-01-14 PROCEDURE — G8417 CALC BMI ABV UP PARAM F/U: HCPCS | Performed by: INTERNAL MEDICINE

## 2019-01-14 PROCEDURE — 1036F TOBACCO NON-USER: CPT | Performed by: INTERNAL MEDICINE

## 2019-01-14 PROCEDURE — G8482 FLU IMMUNIZE ORDER/ADMIN: HCPCS | Performed by: INTERNAL MEDICINE

## 2019-01-14 PROCEDURE — 1101F PT FALLS ASSESS-DOCD LE1/YR: CPT | Performed by: INTERNAL MEDICINE

## 2019-01-14 PROCEDURE — 80053 COMPREHEN METABOLIC PANEL: CPT

## 2019-01-14 PROCEDURE — 99214 OFFICE O/P EST MOD 30 MIN: CPT | Performed by: INTERNAL MEDICINE

## 2019-01-14 PROCEDURE — 1090F PRES/ABSN URINE INCON ASSESS: CPT | Performed by: INTERNAL MEDICINE

## 2019-01-14 PROCEDURE — G8399 PT W/DXA RESULTS DOCUMENT: HCPCS | Performed by: INTERNAL MEDICINE

## 2019-01-14 PROCEDURE — 3017F COLORECTAL CA SCREEN DOC REV: CPT | Performed by: INTERNAL MEDICINE

## 2019-01-14 PROCEDURE — 99213 OFFICE O/P EST LOW 20 MIN: CPT

## 2019-01-14 PROCEDURE — 1123F ACP DISCUSS/DSCN MKR DOCD: CPT | Performed by: INTERNAL MEDICINE

## 2019-01-14 PROCEDURE — 36415 COLL VENOUS BLD VENIPUNCTURE: CPT

## 2019-01-14 PROCEDURE — 4040F PNEUMOC VAC/ADMIN/RCVD: CPT | Performed by: INTERNAL MEDICINE

## 2019-01-14 PROCEDURE — G8427 DOCREV CUR MEDS BY ELIG CLIN: HCPCS | Performed by: INTERNAL MEDICINE

## 2019-01-14 PROCEDURE — 85025 COMPLETE CBC W/AUTO DIFF WBC: CPT

## 2019-02-21 ENCOUNTER — TELEPHONE (OUTPATIENT)
Dept: SURGERY | Age: 69
End: 2019-02-21

## 2019-03-04 ENCOUNTER — OFFICE VISIT (OUTPATIENT)
Dept: SURGERY | Age: 69
End: 2019-03-04

## 2019-03-04 VITALS
OXYGEN SATURATION: 99 % | BODY MASS INDEX: 27.19 KG/M2 | SYSTOLIC BLOOD PRESSURE: 151 MMHG | DIASTOLIC BLOOD PRESSURE: 78 MMHG | HEIGHT: 61 IN | WEIGHT: 144 LBS | HEART RATE: 78 BPM

## 2019-03-04 DIAGNOSIS — Z12.11 ENCOUNTER FOR SCREENING COLONOSCOPY: Primary | ICD-10-CM

## 2019-03-04 PROCEDURE — 99999 PR OFFICE/OUTPT VISIT,PROCEDURE ONLY: CPT | Performed by: SURGERY

## 2019-03-08 ENCOUNTER — TELEPHONE (OUTPATIENT)
Dept: SURGERY | Age: 69
End: 2019-03-08

## 2019-03-21 ENCOUNTER — TELEPHONE (OUTPATIENT)
Dept: SURGERY | Age: 69
End: 2019-03-21

## 2019-03-28 ENCOUNTER — ANESTHESIA (OUTPATIENT)
Dept: ENDOSCOPY | Age: 69
End: 2019-03-28
Payer: MEDICARE

## 2019-03-28 ENCOUNTER — ANESTHESIA EVENT (OUTPATIENT)
Dept: ENDOSCOPY | Age: 69
End: 2019-03-28
Payer: MEDICARE

## 2019-03-28 ENCOUNTER — PREP FOR PROCEDURE (OUTPATIENT)
Dept: SURGERY | Age: 69
End: 2019-03-28

## 2019-03-28 ENCOUNTER — HOSPITAL ENCOUNTER (OUTPATIENT)
Age: 69
Setting detail: OUTPATIENT SURGERY
Discharge: HOME | End: 2019-03-28
Attending: SURGERY | Admitting: SURGERY
Payer: MEDICARE

## 2019-03-28 VITALS
WEIGHT: 144 LBS | OXYGEN SATURATION: 100 % | TEMPERATURE: 97.3 F | SYSTOLIC BLOOD PRESSURE: 184 MMHG | RESPIRATION RATE: 18 BRPM | HEART RATE: 60 BPM | DIASTOLIC BLOOD PRESSURE: 79 MMHG | HEIGHT: 61 IN | BODY MASS INDEX: 27.19 KG/M2

## 2019-03-28 VITALS
RESPIRATION RATE: 15 BRPM | SYSTOLIC BLOOD PRESSURE: 82 MMHG | DIASTOLIC BLOOD PRESSURE: 50 MMHG | OXYGEN SATURATION: 100 %

## 2019-03-28 PROCEDURE — 7100000010 HC PHASE II RECOVERY - FIRST 15 MIN: Performed by: SURGERY

## 2019-03-28 PROCEDURE — 3700000001 HC ADD 15 MINUTES (ANESTHESIA): Performed by: SURGERY

## 2019-03-28 PROCEDURE — 3700000000 HC ANESTHESIA ATTENDED CARE: Performed by: SURGERY

## 2019-03-28 PROCEDURE — 2709999900 HC NON-CHARGEABLE SUPPLY: Performed by: SURGERY

## 2019-03-28 PROCEDURE — 7100000011 HC PHASE II RECOVERY - ADDTL 15 MIN: Performed by: SURGERY

## 2019-03-28 PROCEDURE — 6360000002 HC RX W HCPCS: Performed by: NURSE ANESTHETIST, CERTIFIED REGISTERED

## 2019-03-28 PROCEDURE — 88305 TISSUE EXAM BY PATHOLOGIST: CPT

## 2019-03-28 PROCEDURE — 3609010600 HC COLONOSCOPY POLYPECTOMY SNARE/COLD BIOPSY: Performed by: SURGERY

## 2019-03-28 PROCEDURE — 2580000003 HC RX 258: Performed by: SURGERY

## 2019-03-28 PROCEDURE — C1773 RET DEV, INSERTABLE: HCPCS | Performed by: SURGERY

## 2019-03-28 PROCEDURE — 45385 COLONOSCOPY W/LESION REMOVAL: CPT | Performed by: SURGERY

## 2019-03-28 RX ORDER — 0.9 % SODIUM CHLORIDE 0.9 %
10 VIAL (ML) INJECTION PRN
Status: CANCELLED | OUTPATIENT
Start: 2019-03-28

## 2019-03-28 RX ORDER — SODIUM CHLORIDE 9 MG/ML
INJECTION, SOLUTION INTRAVENOUS CONTINUOUS
Status: CANCELLED | OUTPATIENT
Start: 2019-03-28

## 2019-03-28 RX ORDER — SODIUM CHLORIDE 0.9 % (FLUSH) 0.9 %
10 SYRINGE (ML) INJECTION EVERY 12 HOURS SCHEDULED
Status: DISCONTINUED | OUTPATIENT
Start: 2019-03-28 | End: 2019-03-28 | Stop reason: HOSPADM

## 2019-03-28 RX ORDER — PROPOFOL 10 MG/ML
INJECTION, EMULSION INTRAVENOUS PRN
Status: DISCONTINUED | OUTPATIENT
Start: 2019-03-28 | End: 2019-03-28 | Stop reason: SDUPTHER

## 2019-03-28 RX ORDER — 0.9 % SODIUM CHLORIDE 0.9 %
10 VIAL (ML) INJECTION PRN
Status: DISCONTINUED | OUTPATIENT
Start: 2019-03-28 | End: 2019-03-28 | Stop reason: HOSPADM

## 2019-03-28 RX ORDER — SODIUM CHLORIDE 9 MG/ML
INJECTION, SOLUTION INTRAVENOUS CONTINUOUS
Status: DISCONTINUED | OUTPATIENT
Start: 2019-03-28 | End: 2019-03-28 | Stop reason: HOSPADM

## 2019-03-28 RX ORDER — SODIUM CHLORIDE 0.9 % (FLUSH) 0.9 %
10 SYRINGE (ML) INJECTION EVERY 12 HOURS SCHEDULED
Status: CANCELLED | OUTPATIENT
Start: 2019-03-28

## 2019-03-28 RX ADMIN — PROPOFOL 100 MG: 10 INJECTION, EMULSION INTRAVENOUS at 10:13

## 2019-03-28 RX ADMIN — SODIUM CHLORIDE: 9 INJECTION, SOLUTION INTRAVENOUS at 10:09

## 2019-03-28 RX ADMIN — PROPOFOL 50 MG: 10 INJECTION, EMULSION INTRAVENOUS at 10:17

## 2019-03-28 RX ADMIN — PROPOFOL 50 MG: 10 INJECTION, EMULSION INTRAVENOUS at 10:22

## 2019-03-28 ASSESSMENT — ENCOUNTER SYMPTOMS
NAUSEA: 0
CONSTIPATION: 0
EYE REDNESS: 0
VOMITING: 0
SHORTNESS OF BREATH: 0
COUGH: 0
DIARRHEA: 0
WHEEZING: 0
BLOOD IN STOOL: 0
SORE THROAT: 0
PHOTOPHOBIA: 0
BACK PAIN: 0
ABDOMINAL PAIN: 0

## 2019-03-28 ASSESSMENT — PAIN SCALES - GENERAL
PAINLEVEL_OUTOF10: 0

## 2019-03-28 ASSESSMENT — PAIN - FUNCTIONAL ASSESSMENT: PAIN_FUNCTIONAL_ASSESSMENT: 0-10

## 2019-04-15 ENCOUNTER — OFFICE VISIT (OUTPATIENT)
Dept: SURGERY | Age: 69
End: 2019-04-15
Payer: MEDICARE

## 2019-04-15 VITALS
HEART RATE: 90 BPM | BODY MASS INDEX: 27.11 KG/M2 | OXYGEN SATURATION: 100 % | SYSTOLIC BLOOD PRESSURE: 138 MMHG | HEIGHT: 61 IN | DIASTOLIC BLOOD PRESSURE: 74 MMHG | TEMPERATURE: 98.4 F | WEIGHT: 143.6 LBS

## 2019-04-15 DIAGNOSIS — D12.6 ADENOMATOUS POLYP OF COLON, UNSPECIFIED PART OF COLON: Primary | ICD-10-CM

## 2019-04-15 PROCEDURE — 99212 OFFICE O/P EST SF 10 MIN: CPT | Performed by: SURGERY

## 2019-04-18 NOTE — PROGRESS NOTES
Patient returns for follow-up after recent colonoscopy and polypectomy ×2. We reviewed her pathology. One was a tubular adenoma, the other hyperplastic. Next colonoscopy should be in 7 years, or sooner if alarm type symptoms develop.

## 2019-05-24 ENCOUNTER — TELEPHONE (OUTPATIENT)
Dept: ADMINISTRATIVE | Age: 69
End: 2019-05-24

## 2019-06-04 ENCOUNTER — TELEPHONE (OUTPATIENT)
Dept: ONCOLOGY | Age: 69
End: 2019-06-04

## 2019-06-04 NOTE — TELEPHONE ENCOUNTER
Spoke with Rafi Piña this afternoon and gave her scheduling's number ([phone] opt 1)to call and schedule her CT Chest prior to her 6/19 OV w/ Dr. Girma Rome. Anne conf. info.

## 2019-06-04 NOTE — TELEPHONE ENCOUNTER
Left message for Christiane Moses to return call so we can give her scheduling's number ([phone] opt 1) to call and set up CT Chest before 6/19 OV.

## 2019-06-05 DIAGNOSIS — C34.11 MALIGNANT NEOPLASM OF UPPER LOBE OF RIGHT LUNG (HCC): Primary | ICD-10-CM

## 2019-06-11 ENCOUNTER — HOSPITAL ENCOUNTER (OUTPATIENT)
Dept: CT IMAGING | Age: 69
Discharge: HOME | End: 2019-06-13
Payer: MEDICARE

## 2019-06-11 ENCOUNTER — HOSPITAL ENCOUNTER (OUTPATIENT)
Age: 69
Discharge: HOME | End: 2019-06-11
Payer: MEDICARE

## 2019-06-11 ENCOUNTER — OFFICE VISIT (OUTPATIENT)
Dept: PULMONOLOGY | Age: 69
End: 2019-06-11
Payer: MEDICARE

## 2019-06-11 VITALS
SYSTOLIC BLOOD PRESSURE: 160 MMHG | BODY MASS INDEX: 27.05 KG/M2 | HEART RATE: 76 BPM | TEMPERATURE: 98.1 F | HEIGHT: 62 IN | DIASTOLIC BLOOD PRESSURE: 71 MMHG | OXYGEN SATURATION: 100 % | WEIGHT: 147 LBS

## 2019-06-11 DIAGNOSIS — J43.1 PANLOBULAR EMPHYSEMA (HCC): ICD-10-CM

## 2019-06-11 DIAGNOSIS — Z87.891 EX-SMOKER: Primary | Chronic | ICD-10-CM

## 2019-06-11 DIAGNOSIS — C34.90 MALIGNANT NEOPLASM OF LUNG, UNSPECIFIED LATERALITY, UNSPECIFIED PART OF LUNG (HCC): ICD-10-CM

## 2019-06-11 DIAGNOSIS — C34.11 MALIGNANT NEOPLASM OF UPPER LOBE OF RIGHT LUNG (HCC): ICD-10-CM

## 2019-06-11 DIAGNOSIS — Z85.118 HISTORY OF LUNG CANCER: Chronic | ICD-10-CM

## 2019-06-11 DIAGNOSIS — R53.83 FATIGUE, UNSPECIFIED TYPE: ICD-10-CM

## 2019-06-11 DIAGNOSIS — R93.3 ABNORMAL FINDINGS ON DIAGNOSTIC IMAGING OF OTHER PARTS OF DIGESTIVE TRACT: ICD-10-CM

## 2019-06-11 LAB
BUN BLDV-MCNC: 24 MG/DL (ref 8–23)
CREAT SERPL-MCNC: 1.1 MG/DL (ref 0.5–1)
EXPIRATORY TIME: 5.77 SEC
FEF 25-75% %PRED-PRE: 105 L/SEC
FEF 25-75% PRED: 1.64 L/SEC
FEF 25-75%-PRE: 1.72 L/SEC
FEV1 %PRED-PRE: 109 %
FEV1 PRED: 1.82 L
FEV1/FVC %PRED-PRE: 96 %
FEV1/FVC PRED: 79 %
FEV1/FVC: 76 %
FEV1: 2 L
FVC %PRED-PRE: 113 %
FVC PRED: 2.32 L
FVC: 2.63 L
GFR AFRICAN AMERICAN: 60
GFR NON-AFRICAN AMERICAN: 60 ML/MIN/1.73
PEF %PRED-PRE: 70 L/SEC
PEF PRED: 4.68 L/SEC
PEF-PRE: 3.28 L/SEC

## 2019-06-11 PROCEDURE — 84520 ASSAY OF UREA NITROGEN: CPT

## 2019-06-11 PROCEDURE — 71260 CT THORAX DX C+: CPT

## 2019-06-11 PROCEDURE — 36415 COLL VENOUS BLD VENIPUNCTURE: CPT

## 2019-06-11 PROCEDURE — 82565 ASSAY OF CREATININE: CPT

## 2019-06-11 PROCEDURE — 99213 OFFICE O/P EST LOW 20 MIN: CPT | Performed by: INTERNAL MEDICINE

## 2019-06-11 PROCEDURE — 6360000004 HC RX CONTRAST MEDICATION: Performed by: RADIOLOGY

## 2019-06-11 PROCEDURE — 2580000003 HC RX 258: Performed by: RADIOLOGY

## 2019-06-11 PROCEDURE — 99214 OFFICE O/P EST MOD 30 MIN: CPT | Performed by: INTERNAL MEDICINE

## 2019-06-11 PROCEDURE — 94010 BREATHING CAPACITY TEST: CPT | Performed by: INTERNAL MEDICINE

## 2019-06-11 RX ORDER — CHOLECALCIFEROL (VITAMIN D3) 125 MCG
500 CAPSULE ORAL DAILY
Qty: 30 TABLET | Refills: 3 | Status: SHIPPED
Start: 2019-06-11 | End: 2020-04-24 | Stop reason: SDUPTHER

## 2019-06-11 RX ORDER — M-VIT,TX,IRON,MINS/CALC/FOLIC 27MG-0.4MG
1 TABLET ORAL DAILY
Qty: 30 TABLET | Refills: 11 | Status: SHIPPED
Start: 2019-06-11 | End: 2020-07-09

## 2019-06-11 RX ORDER — SODIUM CHLORIDE 0.9 % (FLUSH) 0.9 %
10 SYRINGE (ML) INJECTION
Status: COMPLETED | OUTPATIENT
Start: 2019-06-11 | End: 2019-06-11

## 2019-06-11 RX ADMIN — Medication 10 ML: at 11:17

## 2019-06-11 RX ADMIN — IOPAMIDOL 90 ML: 755 INJECTION, SOLUTION INTRAVENOUS at 11:17

## 2019-06-11 ASSESSMENT — PULMONARY FUNCTION TESTS
FVC_PREDICTED: 2.32
FEV1/FVC_PREDICTED: 79
FEV1: 2.00
FVC_PERCENT_PREDICTED_PRE: 113
FEV1/FVC: 76
FVC: 2.63
FEV1_PERCENT_PREDICTED_PRE: 109
FEV1/FVC_PERCENT_PREDICTED_PRE: 96
FEV1_PREDICTED: 1.82

## 2019-06-11 NOTE — PROGRESS NOTES
Patient to follow up with physician in 6 months. Patient given lab scripts for blood work to be done. Patient had vitamins refilled.

## 2019-06-11 NOTE — PATIENT INSTRUCTIONS
hours). You inhale most bronchodilators, so they start to act quickly. Always carry your quick-relief inhaler with you in case you need it while you are away from home. ? Corticosteroids (prednisone, budesonide). These reduce airway inflammation. They come in pill or inhaled form. You must take these medicines every day for them to work well.     · A spacer may help you get more inhaled medicine to your lungs. Ask your doctor or pharmacist if a spacer is right for you. If it is, ask how to use it properly.     · Do not take any vitamins, over-the-counter medicine, or herbal products without talking to your doctor first.     · If your doctor prescribed antibiotics, take them as directed. Do not stop taking them just because you feel better. You need to take the full course of antibiotics.     · Oxygen therapy boosts the amount of oxygen in your blood and helps you breathe easier. Use the flow rate your doctor has recommended, and do not change it without talking to your doctor first.   Activity    · Get regular exercise. Walking is an easy way to get exercise. Start out slowly, and walk a little more each day.     · Pay attention to your breathing. You are exercising too hard if you cannot talk while you are exercising.     · Take short rest breaks when doing household chores and other activities.     · Learn breathing methods--such as breathing through pursed lips--to help you become less short of breath.     · If your doctor has not set you up with a pulmonary rehabilitation program, talk to him or her about whether rehab is right for you. Rehab includes exercise programs, education about your disease and how to manage it, help with diet and other changes, and emotional support. Diet    · Eat regular, healthy meals. Use bronchodilators about 1 hour before you eat to make it easier to eat. Eat several small meals instead of three large ones.  Drink beverages at the end of the meal. Avoid foods that are hard to chew.     · Eat foods that contain protein so that you do not lose muscle mass.     · Talk with your doctor if you gain too much weight or if you lose weight without trying.    Mental health    · Talk to your family, friends, or a therapist about your feelings. It is normal to feel frightened, angry, hopeless, helpless, and even guilty. Talking openly about bad feelings can help you cope. If these feelings last, talk to your doctor. When should you call for help? Call 911 anytime you think you may need emergency care. For example, call if:    · You have severe trouble breathing.    Call your doctor now or seek immediate medical care if:    · You have new or worse trouble breathing.     · You cough up blood.     · You have a fever.    Watch closely for changes in your health, and be sure to contact your doctor if:    · You cough more deeply or more often, especially if you notice more mucus or a change in the color of your mucus.     · You have new or worse swelling in your legs or belly.     · You are not getting better as expected. Where can you learn more? Go to [email or web address]://[email or web address] org and sign in to your [employer] account. Enter E321 in the [employer] box to learn more about \"Chronic Obstructive Pulmonary Disease (COPD): Care Instructions. \"     If you do not have an account, please click on the \"Sign Up Now\" link. Current as of: September 5, 2018  Content Version: 12.0  © 0154-8505 [employer]. Care instructions adapted under license by [address] If you have questions about a medical condition or this instruction, always ask your healthcare professional. [place] [address] any warranty or liability for your use of this information.

## 2019-06-11 NOTE — PROGRESS NOTES
Pulmonary [facility]    Department of Internal Medicine  Division of Pulmonary, Critical Care & Sleep Medicine    Dear Jerome Oliva, DO    We had the pleasure of seeing Anette Braswell, in the [address] at [facility] regarding her ROMELIA Stage I NSCLC, Stage II A RULadenocarcinoma S/P bilateral resection, & COPD. HISTORY OF PRESENT ILLNESS:  Gagandeep Sheth has a past medical history of HTN, HLD, emphysema, scoliosis, OA, cholelithiasis, lung nodule, adrenal adenoma (7 mm), uterine fibroids, hemangioma of spleen, Hep C, ex-smoker (30 pack yrs, quit June, 2015), RUL 1.5 cm moderately differentiated adenocarcinoma (pT1a N1 Mx) S/p VATS right upper lobectomy & mediastinal LN dissection, S/p Mediport (12/7/15) for chemo- Terryl Neville is being followed for a persistent lung infiltrate of/nodule found on evaluation during the workup of cervical adenopathy. Access submental, mandibular adenopathy. Apparently during her routine visit in the clinic she had noticed a right sided submandibular, cervical fullness which was felt to be an enlarged lymph gland which was also confirmed by CT imaging. She was referred on to surgery for a histologic evaluation, but nothing was performed and the adenopathy was felt to be reactive. The adenopathy, however, has continued over now a 6 month period. Despite these findings, she continues to use and smokes cigarettes of at least 1 to 1-1/2 packs per day. With the adenopathy and a CT scan of the chest was done. The CT scan initially done on July 13, 2011 was reviewed and compared to the one in October 25, 2011. The CT in July reveals a 7 mm nodular lesion in the right lobe posterior segment with a focal density in the lingular area, possibly representing focal pneumonia. No mediastinal mass, adenopathy, pleural effusion, pericardial effusion, or pneumothorax were noted. An incidental finding of cholelithiasis was revealed. Over the following next few months, no additional findings were noted except for the follow-up in surgery as mentioned above. Ms. Jatinder Vidales states she has had a normal mammogram in October 2011, normal Pap and pelvic examination and a negative colonoscopy. She has a family history of cancer on both her father and mother's side. In October 2011 a repeat CT scan of the Chest was done to reevaluate the pulmonary lesions. The CT reveals persistent findings of a nodular lesion in the right lower lobe superior segment, stable when compared to previous. A continued focal density in the lingular segment, unchanged from previous with no adenopathy and continued cholelithiasis. She was referred for pulmonary evaluation due to persistent abnormalities. Ms. Jatinder Vidales denies any travel. She has multiple pets including cockatiels and dog. 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 There is no asbestos silica dust or coal exposure. No history of tuberculosis or tuberculosis exposure. During the evaluation for the lung mass. Adelso Marvin had a negative PET scan (SUV 1.7) and underwent ENB guided biopsy which was negative but poor cell sampling. She has a repeat CT of the Chest which we reviewed in conference. The CT shows no evidence of significant mediastinal lymphadenopathy. There is stable tiny right adrenal nodule measuring 7 mm, indeterminate. There is stable enhancing lesion in the spleen measuring 2.6 x 2.3 cm likely representing a hemangioma. There is stable focal opacity in the left lingula measuring 2.7 x 2.4 cm. This has not significantly changed from prior CT. The ground glass nodular lesion in the right upper lobe is unchanged. There is stable nodular lesion in the right lower lobe measuring approximately 8 mm in the superior segment. On discussion the plan is to continue to follow the lesion.      As you know, Daralyn Snellen CT of the chest showed the left lung lesion and the small peripheral lung nodules (many)  Were unchanged. However a new right hilar lesion/adenopathy was at > 1 cm without adenopathy. We sent her for a PET scan and with her birds we did hypersensitivity panel. The PET scan was positive with the 1.7 cm right paratracheal nodule that is intensely hypermetabolic with SUV max of 4.8 worrisome for tumor. Also, the previously known nodule in the lingula now measures 2.3 cm in size. SUV max has increased from 1.7 on the 2012 examination to 6.4 at this time. She underwent a ENB for the lesions. The procedures/scope went well and we did find a RUL lesion in the airway and biopsies were + for adenocarcinoma. She also underwent biopsies again of the ROMELIA (lingular) lesion with electro-navigational bronchoscopy with are inconclusive with \"atypical cells\" Doing all this she underwent a left sided FNA which lead to a pneumothorax and NO answers were found. She was admitted with a anterior chest tube and recovered. She then came back to the hospital and a week later and on 11/11/2105 she underwent VATS/VATS RUL wedge/VATS Right Upper lobectomy/Intercostal nerve block from T3-T10/Mediastinal lymph node dissection by Dr. Karma Rodriges with findings of Stage IIA adenocarcinoma of the lung. Pathology reveled Right lung, upper lobectomy: Invasive, moderately differentiated adenocarcinoma (Grade 2). Surgical margin negative for malignancy. Two of three peribronchial lymph nodes positive for adenocarcinoma. She underwent chemotherapy and on follow up PET scan for the cancer surveillance the results were The 2.1 cm left lung mass abutting the major fissure is hypermetabolic with SUV max of 5.4. Finding is worrisome for tumor with avid glucose metabolism but elsewhere there is unremarkable distribution of FDG activity without evidence of hypermetabolic metastases. Thus my recommendation would be surgical resection. We again discussed removal of her birds from the home.   Then in March 2016 she underwent Bronch/Left VATS/VATS wedge ROMELIA/VATS left upper lobectomy/mediastinal lymph node dissection/Intercostal nerve block from T3-T10 per Dr. Kelsey Andrade on 3/29/2016, final pathology revealed Stage I Adenocarcinoma of ROMELIA (morphologically different from the adenocarcinoma previously diagnosed in the right upper lobe. Therefore, synchronous primary tumors are favored). Post operative she was discharge but returned with chest pain with the findings of a pulmonary embolism at the surgical line. She is doing well on coumadin. Vaping was discouraged. Aggressive testing with CT scan of the Abd/Pelivs 05/08/2018 which reported a enhancing lesion within the spleen is relatively stable to slightly smaller when compared to April 2014 exam and likely splenic hemangioma. Other indeterminate findings (left periaortic LN, sclerotic/blastic foci in L3 and L1 reported by Dr. Norberto Litten from radiology team which was a over read. Then she underwent a PET/CT scan 06/05/2018 unremarkable. No FDG avid uptake identified. No evidence for recurrent or metastatic disease. No clinical evidence of recurrence. She will be traveling to [place]. She is feeling well and had her GB removed. On today's visit, Anette Braswell is doing well with no chest pain, worsening or declining SOB or new cough. She has no pleurisy, hemoptysis or weight loss. She denies any leg swelling. She has no compliants. We discussed her BP in the office it has been high for multiple visits. She asked about a herbal supplement for her DJD/OA and I reviewed it and told her it was ok to use. We discussed her fatigue and will consider a sleep study. CT scan 6/2019 was negative for recurrence.      ALLERGIES:  lesion is unchanged  Allergies   Allergen Reactions    Ibuprofen Palpitations and Rash       PAST MEDICAL HISTORY:       Diagnosis Date    Abnormal chest x-ray with multiple lung nodules 9/8/2015    Abnormal Pap smear 1/3/2011    Alpha-1 negative    Adrenal adenoma     7 mm    [facility]/12/2016    surgically removed - 2015     Cholelithiasis 6/6/2011    Encounter for screening colonoscopy     for OR 3-28-19     Fibroids     Hemangioma of spleen     Hepatitis C 01/03/2011    treated and cured, no current issues     Hyperlipidemia     no medications     Hypertension 6/6/2011    Insomnia     Lung nodule     ROMELIA     Lymphadenopathy     Cervical (-) ENT    Malignant neoplasm of upper lobe of right lung ([facility] 11/18/2015    Osteoarthritis     Panlobular emphysema [facility] 11/12/2015    controlled with inhalers     PPD negative 1/18/12    Pulmonary embolism without acute cor pulmonale (HCC) 5/12/2016    PVD (peripheral vascular disease) ([facility] 5/6/2014    Scoliosis     Uterine fibroid 5/6/2014        MEDICATIONS:   Current Outpatient Medications   Medication Sig Dispense Refill    tiotropium (SPIRIVA HANDIHALER) 18 MCG inhalation capsule Inhale 1 capsule into the lungs daily (Patient taking differently: Inhale 18 mcg into the lungs daily Instructed to take am of procedure) 90 capsule 3    lisinopril (PRINIVIL;ZESTRIL) 40 MG tablet take 1 tablet by mouth once daily 90 tablet 4    hydrochlorothiazide (HYDRODIURIL) 25 MG tablet Take 1 tablet by mouth daily 90 tablet 0    aspirin EC 81 MG EC tablet Take 1 tablet by mouth daily (Patient taking differently: Take 81 mg by mouth daily LD 3-23-19) 90 tablet 0    Multiple Vitamins-Minerals (THERAPEUTIC MULTIVITAMIN-MINERALS) tablet Take 1 tablet by mouth daily (Patient taking differently: Take 1 tablet by mouth daily LD 3-23-19) 90 tablet 5    Misc. Devices KIT BP monitoring KIT 1 kit 0     No current facility-administered medications for this visit. SOCIAL AND OCCUPATIONAL HEALTH:  The patient smoked 1 to 1.5 packs a cigarettes a day since the age of 14. She finished the smoking program and QUIT in 3.2012. Resumed on /off smoking. Then quit in 2016.  She is smoked over 40 years in duration. There is no history of TB or TB exposure. There is no asbestos or silica dust exposure. The patient reports no coal, foundry, quarry or [employer] exposure. There is no recent travel history noted. The patient denies a history of recreational or IV drug use. No hot tub exposure. The patient has dogs (1) and birds (2). Previous hypersensitivity panel was negative. Hobbies include reading. EtoH: The patient denies excessive alcohol intake. She works in a [employer] line. She has birds and a pit bull dog. She has no children    SOCIAL HISTORY:   Social History     Tobacco Use    Smoking status: Former Smoker     Packs/day: 0.00     Years: 30.00     Pack years: 0.00     Last attempt to quit: 2015     Years since quittin.0    Smokeless tobacco: Never Used   Substance Use Topics    Alcohol use:  Yes     Alcohol/week: 1.2 oz     Types: 2 Glasses of wine per week     Comment: x2 week    Drug use: No       SURGICAL HISTORY:   Past Surgical History:   Procedure Laterality Date    APPENDECTOMY  1965    BREAST LUMPECTOMY  1999    left, benign, Dr. Alexei Pagan, [facility] [address]  2012    Dr. Josie Olson, [facility] [address]  10/15/15    with EBUS - DR Song Caceres    CHOLECYSTECTOMY      COLONOSCOPY  2009    partial to distal ascending colon normal (completion BE same day normal), Dr. Alexei Pagan, [address] [place] [address] COLONOSCOPY  2014    done under fluoro with sigmoid straightening device so was able to get to cecum, very poor prep, moderate proctitis, repeat 5 years,  Dr. Alexei Pagan, [address] [place] [address] COLONOSCOPY N/A 3/28/2019    COLONOSCOPY POLYPECTOMY SNARE/COLD BIOPSY performed by Kati Moreno DO at [address] Left 3/29/2016    VATS WEDGE RESECTION UPPER LOBECTOMY    OTHER SURGICAL HISTORY Right 2016    REMOVAL MEDI-PORT - DR Chico Motley    OR LAP,CHOLECYSTECTOMY/GRAPH N/A 2018    CHOLECYSTECTOMY LAPAROSCOPIC, POSSIBLE OPEN,POSSIBLE GRAM ( OC 2) performed by Sophy Xavier MD at [address] Right 2015    VATS, Tru Engle UPPER LOBECTOMY; NODE DISECTION    TUNNELED VENOUS PORT PLACEMENT Right 2015    right chest, and removed        FAMILY HISTORY:   Family History   Problem Relation Age of Onset    Heart Disease Mother     High Blood Pressure Mother     Cancer Mother     Heart Disease Father     High Blood Pressure Father     Kidney Disease Father     Diabetes Sister     Diabetes Brother       Family Status   Relation Name Status    Mother adri     Father Sera Zuñiga     Sister reinaldo Alive    Brother eugenia Alive    Sister ramses Alive        REVIEW OF SYSTEMS: The patients health assessment form was reviewed. PHYSICAL EXAMINATION:   Vitals:    19 1444   BP: (!) 160/71   Site: Left Upper Arm   Position: Sitting   Pulse: 76   Temp: 98.1 °F (36.7 °C)   SpO2: 100%   Weight: 147 lb (66.7 kg)   Height: 5' 2\" (1.575 m)     Constitutional: A 77 y.o. female who is alert, oriented, cooperative and in no apparent distress. Head was normocephalic and atraumatic. EENT: EOMI DEEPTI. MMM. No icterus. No conjunctival injections. External canals are patent and no discharge. Septum was midline, mucosa was without erythema, exudates or cobblestoning. No thrush. Neck: Supple without thyromegaly. No elevated JVP. Trachea was midline. No carotid bruits. Respiratory: Bilateral VATS incisions. Right  Upper thorax subq port noted. Clear to auscultation. No wheezes, rhonchi or rales. No egophony. Cardiovascular: Regular without murmur, clicks, gallops or rubs. No ventricular heave. Pulses:  Equal bilaterally. Vascular bruit. Abdomen: Soft without organomegaly. No rebound, rigidity. Lymphatic: No lymphadenopathy. Musculoskeletal: Ambulates without assistance. Normal curvature of the spine. No gross muscle weakness. Muscle size, tone and strength are normal. No involuntary movements. Extremities:  No lower extremity edema or erythema. CDeep tendon reflexes are normal.   Skin:  Warm and dry. Good color, turgor and pigmentation. No bruises or skin rashes. Old surgical scars are noted. Neurological/Psychiatric: General behavior, level of consciousness, thought content is normal. Emotional status is normal.  Cranial nerves II-XII are intact. DATA:   The data collected below information that was obtained, reviewed, analyzed and interpreted today. Imaging test are reviewed with the radiologist during weekly conference rounds. Comparison to previous images are always explored. Office Spirometry demonstrates an FVC of 2.83 liters which is 120 % of predicted with an FEV1 of 2.10 liters which is 115 % of predicted. FEV1/FVC ratio is 77 %. Mid expiratory flow rates are 114 % of predicted. Maximum voluntary ventilation is 83 liters per minute or 97 % of predicted. Flow volume loop shows no signs of intrathoracic or extrathoracic process. Impression: Normalized lung function Post Op      CT SCAN CHEST 6/5/2019: Impression  No evidence for worrisome residual or recurrent malignancy.   No evidence for new lymphadenopathy or metastatic disease. Stable scarring and postoperative changes right upper lobe. Stable right adrenal gland nodule. PET/CT SCAN 6/05/2018: Tracer uptake is noted involving the right left kidney I suspect physiologic. Tracer uptake in the abdomen and the chest is otherwise physiologic. Previously identified lesion in the right upper lobe is no longer visible. There is what is most likely sequela of previous right upper lobe therapy or surgery with evidence to suggest scar No significant periaortic uptake is identified.    There is otherwise a normal biodistribution of radiotracer. There is no other abnormal tracer uptake seen. Unremarkable. No FDG avid uptake identified. No evidence for recurrent or metastatic disease.    No clinical evidence of recurrence.     CT SCAN CHEST 10/2017: Essentially stable bilateral pulmonary nodules measuring up to 2 mm in diameter, as detailed above. Continued follow up is suggested. 2.  Cholelithiasis and query mild pericholecystic free fluid. If clinically indicated, consider ultrasound for further evaluation. 3.  Stable 8 mm sclerotic focus within the second thoracic vertebral body suspicious for a bone island (or enostosis). However, in a patient with a known malignancy osteoblastic metastasis is also a consideration. 4.  Probable 2.7 cm splenic hemangioma, unchanged compared to prior examination from July 2011. 5.  Stable 8 mm right adrenal gland nodule. CT SCAN CHEST 7/2016:Impression: No central, segmental or subsegmental pulmonary emboli are demonstrated. In retrospect, The finding that was previously thought to represent embolism in the distal left pulmonary artery may have actually been fluid within the left upper lobe bronchus. There is no aneurysm or dissection of the thoracic aorta. Postoperative volume loss in the left chest is present after the left lingula lobectomy. No infiltrate or mass is seen within either lung. No thoracic lymphadenopathy is observed. There is no pleural or pericardial effusion. There is a right internal jugular vein Mediport. SURGICAL BIOSPY: 11/11/2015: Right lung, upper lobectomy: Invasive, moderately differentiated  adenocarcinoma (Grade 2). Surgical margin negative for malignancy. Two of three peribronchial lymph nodes positive for adenocarcinoma.  = T1a/, N1, M0: [Stage IIA] The cancer is no larger than 3 cm across, has not grown into the membranes that surround the lungs, and does not affect the main branches of the bronchi. It has spread to lymph nodes within the lung and/or around the area where the bronchus enters the lung (hilar lymph nodes). These lymph nodes are on the same side as the cancer. It has not spread to distant sites.     PET SCAN 3/2016: The 2.1 cm left lung mass abutting the major fissure is hypermetabolic with SUV max of 5.4. Finding is worrisome for tumor with avid glucose metabolism. 2. Elsewhere there is unremarkable distribution of FDG activity without evidence of hypermetabolic metastases. PREVIOUS PET 7/2015  Previously known nodule in the lingula now measures 2.3 cm in size. SUV max has increased from 1.7 on the 2012 examination to 6.4 at this time. CT SCAN CHEST 8/2015: Multiple nodular lesions are noted in the right and left lung of ground glass density as mentioned above. Some of the nodules are new when compared with the previous and some of the nodules are resolved. Continued follow up is still suggested. 2. There is a nodular lesion seen in the right perihilar space measuring 1.7 cm and new when compared with the previous. This nodule is quite suspicious, bronchoscopy or PET scan as warranted. 3. Stable larger nodule seen in the lingula, stable when compared with the previous. PET SCAN 10/2015: New since prior examination on CT is 1.7 cm right paratracheal nodule. Nodule is intensely hypermetabolic with SUV max of 4.8 worrisome for tumor. 2. Previously known nodule in the lingula now measures 2.3 cm in size. SUV max has increased from 1.7 on the 2012 examination to 6.4 at this time. At this time findings are worrisome for hypermetabolic tumor with avid glucose metabolism. CT SCAN CHEST 4/2014: Impression: 1. Multiple nodules seen in the right lung remaining stable when compared with the previous. The largest nodule seen in the lingula remains stable when compared with the previous. 2. No new nodes are noted on today's study. 3. No consolidation, pleural effusion or pneumothorax. CT SCAN CHEST 12/2013: Films were read/reviewed/discussed with radiology again demonstrates a smooth noncalcified nodular mass measuring approximately 2 cm in the lingular segment of the left lower lobe. This has not changed in size or appearance since the last examination.  The lungs demonstrate no further evidence of pulmonary nodule mass or infiltrate. The mediastinum demonstrates no pathologic adenopathy. There is no hilar adenopathy. There is no axillary anat disease. The adrenal glands are not enlarged. There are no bony abnormalities present. Impression: Stable appearance to the previously described lingular lesion with no new or acute findings present. PET SCAN: (2012)  Impression- Nodule in the lingula shows moderate FDG uptake with quantitation yielding an SUV max of 1.7. Values less than 2.5 sometimes are considered indicative of benign pulmonary pathology. I note that using visual criteria the nodule has greater activity than is seen in the heart and mediastinum which is suspicious. Biospy: ENB Procedure: (-) Malignancy (2012)    Hypersensitivity Panel (2015)   A.fumigatus #1 Abs   Negative     Micropoly. faeni Abs  Negative     Thermoa. vulgaris #1  POSITIVE (NEW)  Neg in 2012   A. pullulans Abs   Negative     Thermoact. saccharii N Negative     [place] Serum Abs   Negative            IMPRESSION:    Daralyn Snellen is a 77 y.o. female with smoking related COPD, who underwent: 11/11/2015 (1) Bronchoscopy. (2) Right VATS. (3) VATS right upper lobe wedge resection. (4) VATS right upper lobectomy. (5) Intercostal nerve block from T3 to T10. (6) Mediastinal lymph node dissection. Pathology: Right lung, upper lobectomy: Invasive, moderately differentiated adenocarcinoma (Grade 2). Tumor size 1.5 cm in greatest dimension, with two of three peribronchial lymph nodes positive for adenocarcinoma. pT1a N1 MX; (Stage IIA) She did adjuvant chemotherapy. We recommended 4 cycles of adjuvant chemotherapy consisting of Carboplatin/Alimta. Mediport placed 12/7/15.  Then in March 2016 she underwent Bronch/Left VATS/VATS wedge ROMELIA/VATS left upper lobectomy/mediastinal lymph node dissection/Intercostal nerve block from T3-T10 per Dr. Janette Reyes on 3/29/2016, final pathology revealed Stage I Adenocarcinoma of ROMELIA (morphologically different from the adenocarcinoma previously diagnosed in the right upper lobe. Therefore, synchronous primary tumors are favored). She had a post operative pulmonary embolism finished 3 months of anticoagulation. Recent CT scan in 2019 is pending. With this in mind, we would like to proceed with the following;                      PLAN:    We discussed various thing in the office. We discussed her BP. She is c/o fatigue - will considered PSG test. We reviewed that no TSH, etc. has been done in [facility] since 2015? Gerald Sin She has a visit with oncology for her lung surveillance is needed for her bilateral low stage cancer s/p resection. She has stopped  smoking/vaping as well. Her lung function was normal. A hypersensitivity panel done because of her bird (cockatiels) exposure, testing was +, PFT and CT are stable/normal.  Her dog Olamide Felton is doing well, but had to get neutered. Her PPD test was negative. Her alpha-1 antitrypsin test was negative. She is to continue her bronchodilators - as adjusted by her insurance. Samples were given. We will see her back in a 6 months for follow up. We hope this updates you on our evaluation and clinical thinking. Thank you for entrusting us to participate in [facility].        Sincerely,    Albert Velarde D.O., MPH, Nidia Villa  Professor of Medicine  Director, [facility]

## 2019-06-19 ENCOUNTER — OFFICE VISIT (OUTPATIENT)
Dept: ONCOLOGY | Age: 69
End: 2019-06-19
Payer: MEDICARE

## 2019-06-19 ENCOUNTER — HOSPITAL ENCOUNTER (OUTPATIENT)
Dept: INFUSION THERAPY | Age: 69
Discharge: HOME | End: 2019-06-19
Payer: MEDICARE

## 2019-06-19 VITALS
HEIGHT: 61 IN | WEIGHT: 142.5 LBS | TEMPERATURE: 97.3 F | SYSTOLIC BLOOD PRESSURE: 154 MMHG | DIASTOLIC BLOOD PRESSURE: 67 MMHG | BODY MASS INDEX: 26.91 KG/M2

## 2019-06-19 DIAGNOSIS — C34.11 MALIGNANT NEOPLASM OF UPPER LOBE OF RIGHT LUNG (HCC): Primary | ICD-10-CM

## 2019-06-19 DIAGNOSIS — C34.11 MALIGNANT NEOPLASM OF UPPER LOBE OF RIGHT LUNG (HCC): ICD-10-CM

## 2019-06-19 LAB
ALBUMIN SERPL-MCNC: 4 G/DL (ref 3.5–5.2)
ALP BLD-CCNC: 96 U/L (ref 35–104)
ALT SERPL-CCNC: 40 U/L (ref 0–32)
ANION GAP SERPL CALCULATED.3IONS-SCNC: 8 MMOL/L (ref 7–16)
AST SERPL-CCNC: 49 U/L (ref 0–31)
BASOPHILS ABSOLUTE: 0.03 E9/L (ref 0–0.2)
BASOPHILS RELATIVE PERCENT: 0.5 % (ref 0–2)
BILIRUB SERPL-MCNC: 0.4 MG/DL (ref 0–1.2)
BUN BLDV-MCNC: 26 MG/DL (ref 8–23)
CALCIUM SERPL-MCNC: 9.5 MG/DL (ref 8.6–10.2)
CHLORIDE BLD-SCNC: 102 MMOL/L (ref 98–107)
CO2: 29 MMOL/L (ref 22–29)
CREAT SERPL-MCNC: 1.2 MG/DL (ref 0.5–1)
EOSINOPHILS ABSOLUTE: 0.07 E9/L (ref 0.05–0.5)
EOSINOPHILS RELATIVE PERCENT: 1.2 % (ref 0–6)
GFR AFRICAN AMERICAN: 54
GFR NON-AFRICAN AMERICAN: 54 ML/MIN/1.73
GLUCOSE BLD-MCNC: 122 MG/DL (ref 74–99)
HCT VFR BLD CALC: 32.8 % (ref 34–48)
HEMOGLOBIN: 10.9 G/DL (ref 11.5–15.5)
IMMATURE GRANULOCYTES #: 0.02 E9/L
IMMATURE GRANULOCYTES %: 0.4 % (ref 0–5)
LYMPHOCYTES ABSOLUTE: 1.44 E9/L (ref 1.5–4)
LYMPHOCYTES RELATIVE PERCENT: 25.5 % (ref 20–42)
MCH RBC QN AUTO: 30.1 PG (ref 26–35)
MCHC RBC AUTO-ENTMCNC: 33.2 % (ref 32–34.5)
MCV RBC AUTO: 90.6 FL (ref 80–99.9)
MONOCYTES ABSOLUTE: 0.54 E9/L (ref 0.1–0.95)
MONOCYTES RELATIVE PERCENT: 9.6 % (ref 2–12)
NEUTROPHILS ABSOLUTE: 3.54 E9/L (ref 1.8–7.3)
NEUTROPHILS RELATIVE PERCENT: 62.8 % (ref 43–80)
PDW BLD-RTO: 13.3 FL (ref 11.5–15)
PLATELET # BLD: 174 E9/L (ref 130–450)
PMV BLD AUTO: 10.4 FL (ref 7–12)
POTASSIUM SERPL-SCNC: 4 MMOL/L (ref 3.5–5)
RBC # BLD: 3.62 E12/L (ref 3.5–5.5)
SODIUM BLD-SCNC: 139 MMOL/L (ref 132–146)
TOTAL PROTEIN: 8.7 G/DL (ref 6.4–8.3)
WBC # BLD: 5.6 E9/L (ref 4.5–11.5)

## 2019-06-19 PROCEDURE — 85025 COMPLETE CBC W/AUTO DIFF WBC: CPT

## 2019-06-19 PROCEDURE — 99214 OFFICE O/P EST MOD 30 MIN: CPT | Performed by: INTERNAL MEDICINE

## 2019-06-19 PROCEDURE — 99212 OFFICE O/P EST SF 10 MIN: CPT

## 2019-06-19 PROCEDURE — 36415 COLL VENOUS BLD VENIPUNCTURE: CPT

## 2019-06-19 PROCEDURE — 80053 COMPREHEN METABOLIC PANEL: CPT

## 2019-06-19 NOTE — PROGRESS NOTES
Department of [facility]  Attending Clinic Note    Reason for Visit: Follow-up on a patient with NSCLC    PCP:  Ann-Marie Thomson MD    History of Present Illness:   78 y/o -American female with significant history of tobacco abuse of approximately 30 pack years, quit smoking June 7, 2015. She has been followed for a left upper lobe mass by Dr. Ava Anna. Multiple biopsies in the past came back negative for malignancy. CT scan chest on 08/31/2015 showed a new right upper lobe mass. Bronchoscopy with washing RUL mass on 10/15/2015 was positive for RUL Lung Adenocarcinoma. Negative for malignancy on BAL and TBNA of Lingular lesion. PET/CT scan on 09/29/2015:  1. New since prior examination on CT is 1.7 cm right paratracheal   nodule. Nodule is intensely hypermetabolic with SUV max of 4.8   worrisome for tumor. 2. Previously known nodule in the lingula now measures 2.3 cm in   size. SUV max has increased from 1.7 on the 2012 examination to   6.4 at this time. At this time findings are worrisome for   hypermetabolic tumor with avid glucose metabolism. Therefore, she was referred to see Dr. Lenin Blackman for resection. CT guided biopsy of the left upper lobe lesion on 11/02/2015 was noted to be negative for malignancy. On 11/11/2015 She underwent: (1) Bronchoscopy. (2) Right VATS. (3) VATS right upper lobe wedge resection. (4) VATS right upper lobectomy. (5) Intercostal nerve block from T3 to T10. (6) Mediastinal lymph node dissection. Pathology:  Right lung, upper lobectomy: Invasive, moderately differentiated adenocarcinoma (Grade 2). Tumor size 1.5 cm in greatest dimension. Surgical margin negative for malignancy. Two of three peribronchial lymph nodes positive for adenocarcinoma. pT1a N1 MX  She was referred to the medical oncology clinic to discuss adjuvant chemotherapy. We recommended 4 cycles of adjuvant chemotherapy consisting of Carboplatin/Alimta. Mediport placed 12/07/2015. nausea, vomiting, abdominal pain, diarrhea/constipation. GENITOURINARY: No dysuria, urinary frequency, hematuria. NEURO: No syncope, presyncope, headache. Remainder ROS: Negative. Past Medical History:      Diagnosis Date    Hypertension 6/6/2011    Abnormal Pap smear 1/3/2011     Alpha-1 negative    Cholelithiasis 6/6/2011    Osteoarthritis     Lymphadenopathy      Cervical (-) ENT    Lung nodule      ROMELIA     Scoliosis     PPD negative 1/18/12    Adrenal adenoma      7 mm    Hemangioma of spleen     Insomnia     PVD (peripheral vascular disease) ([address] 5/6/2014    Uterine fibroid 5/6/2014    Hepatitis C 1/3/2011    Fibroids     Abnormal chest x-ray with multiple lung nodules 9/8/2015    Hyperlipidemia     Panlobular emphysema ([address] 11/12/2015    Malignant neoplasm of upper lobe of right lung ([facility] [address] 11/18/2015     Medications:  Reviewed and reconciled. Allergies: Allergies   Allergen Reactions    Ibuprofen Palpitations and Rash     Physical Exam:  BP (!) 154/67 (Site: Right Upper Arm, Position: Sitting, Cuff Size: Large Adult)   Temp 97.3 °F (36.3 °C)   Ht 5' 1\" (1.549 m)   Wt 142 lb 8 oz (64.6 kg)   BMI 26.93 kg/m²    GENERAL: Alert, oriented x 3, not in acute distress. HEENT: PERRLA; EOMI. Oropharynx clear. NECK: Supple. Without lymphadenopathy. LUNGS: No wheezing, crackles or ronchi. CARDIOVASCULAR: Regular rate. No murmurs, rubs or gallops. ABDOMEN: Soft. Non-tender, non-distended. Positive bowel sounds. EXTREMITIES: Without clubbing, cyanosis, or edema. NEUROLOGIC: No focal deficits. ECOG PS 1    Impression/Plan:  76 y/o female with Hx of smoking who underwent: (1) Bronchoscopy. (2) Right VATS. (3) VATS right upper lobe wedge resection. (4) VATS right upper lobectomy.  (5) Intercostal nerve block from T3 to T10. (6) Mediastinal lymph node dissection on 11/11/2015:   Pathology:  Right lung, upper lobectomy: Invasive, moderately differentiated adenocarcinoma (Grade 2). Tumor size 1.5 cm in greatest dimension. Visceral pleural invasion: Not identified. Visceral pleural invasion: Not identified. Surgical margin negative for malignancy. Two of three peribronchial lymph nodes positive for adenocarcinoma. pT1a N1 MX;     Being followed for a left upper lobe mass by Dr. Breanne Zaldivar. Multiple biopsies in the past came back negative for malignancy. Hypermetabolic on PET/CT scan on 09/29/2015 (2.3 cm in size with SUV of 6.4). CT guided biopsy of the left upper lobe lesion on 11/02/2015 was noted to be negative for malignancy. She was referred to the medical oncology clinic to discuss adjuvant chemotherapy. We recommended 4 cycles of adjuvant chemotherapy consisting of Carboplatin/Alimta. Mediport placed 12/7/15. -MRI Brain on 12/8/15:  Negative for metastatic disease.   -We will repeat CT chest and PET/CT after 4 cycles of Carboplatin/Alimta. To follow on Lingular lesion as well. -Molecular studies (EGFR, ALK, ROS-1) were all Not Detected. Cycle # 1 of Aubrey Failing was on 12/09/2015. Cycle # 2 of Aubrey Failing was on 01/06/2016. Cycle # 3 of Aubrey Failing was on 01/27/2016. Cycle # 4 of Aubrey Failing was on 02/24/2016. PET SCAN 3.2016: The 2.1 cm left lung mass abutting the major fissure is hypermetabolic with SUV max of 5.4. Finding is worrisome for tumor with avid glucose metabolism. 2. Elsewhere there is unremarkable distribution of FDG activity without evidence of hypermetabolic metastases. On 03/29/2016 underwent Bronch/Left VATS/VATS wedge ROMELIA/VATS Left upper lobectomy/Mediasinal lymph node dissection/Intercostal nerve block from T3-T10 per Dr. Michelle Doctor. Final pathology revealed Stage I Adenocarcinoma of ROMELIA (morphologically different from the adenocarcinoma previously diagnosed in the right upper lobe. Therefore, synchronous primary tumors are favored).     A. Left lung, upper lobe wedge resection: Invasive, well-differentiated adenocarcinoma (grade 1); Tumor size-1.4 cm in greatest dimension; Surgical margins-negative for malignancy; Lymphovascular invasion-not identified; TNM classification-pT2a N0 MX  B. AP window lymph node #1, excision: Anthracotic lymph node; negative for malignancy  C. AP window lymph node #2, excision: Anthracotic lymph node; negative for malignancy   D. Periaortic lymph node #1, excision: Fibroadipose tissue; lymph node not identified  E. Bronchial lymph node #1, excision: Anthracotic lymph node; negative for malignancy  F. Left lung, upper lobectomy: Emphysematous change; negative for malignancy  4 anthracotic lymph nodes negative for malignancy   G. Inferior pulmonary ligament lymph node, excision: Anthracotic lymph node; negative for malignancy  H. Bronchial lymph node, excision: Anthracotic lymph node; negative for malignancy. Right side Mediport was removed on 11/04/2016 by Dr. Bridget Chicas. On surveillance per NCCN guidelines. CT chest 04/12/2017 noted no convincing evidence of recurrent disease. CT chest 10/19/2017 noted no definite evidence for recurrent malignancy. CT chest 03/12/2018 negative for pulmonary parenchymal masses or enlarged mediastinal or hilar LN. ? 2 cm lesion in spleen difficult to evaluate due to the arterial phase of the study. CT Abd/Pelvis 05/08/2018  Enhancing lesion within the spleen is relatively stable to slightly smaller when compared to April 2014 exam and likely splenic hemangioma. Other indeterminate findings (left periaortic LN, sclerotic/blastic foci in L3 and L1 reported by Dr. Rosetta Sever from radiology team). PET/CT scan 06/05/2018 unremarkable. No FDG avid uptake identified. No evidence for recurrent or metastatic disease. CT Chest 12/13/2018 noted no evidence of recurrent/metastatic disease. CT chest on 06/11/2019 noted no evidence for worrisome residual or recurrent malignancy. No evidence for new lymphadenopathy or metastatic disease.   Stable scarring and postoperative changes right upper lobe.   [place]  RTC in 6 months prior CT chest.    Jaison Guaman MD   [id number]  Board Certified Medical Oncologist

## 2019-08-01 ENCOUNTER — TELEPHONE (OUTPATIENT)
Dept: SURGERY | Age: 69
End: 2019-08-01

## 2019-08-02 ENCOUNTER — TELEPHONE (OUTPATIENT)
Dept: SURGERY | Age: 69
End: 2019-08-02

## 2019-08-17 ENCOUNTER — HOSPITAL ENCOUNTER (OUTPATIENT)
Age: 69
Discharge: HOME | End: 2019-08-17
Payer: MEDICARE

## 2019-08-17 DIAGNOSIS — R53.83 FATIGUE, UNSPECIFIED TYPE: ICD-10-CM

## 2019-08-17 DIAGNOSIS — C34.90 MALIGNANT NEOPLASM OF LUNG, UNSPECIFIED LATERALITY, UNSPECIFIED PART OF LUNG (HCC): ICD-10-CM

## 2019-08-17 DIAGNOSIS — R93.3 ABNORMAL FINDINGS ON DIAGNOSTIC IMAGING OF OTHER PARTS OF DIGESTIVE TRACT: ICD-10-CM

## 2019-08-17 DIAGNOSIS — J43.1 PANLOBULAR EMPHYSEMA (HCC): ICD-10-CM

## 2019-08-17 DIAGNOSIS — Z87.891 EX-SMOKER: Chronic | ICD-10-CM

## 2019-08-17 LAB
CHOLESTEROL, FASTING: 199 MG/DL (ref 0–199)
FOLATE: >20 NG/ML (ref 4.8–24.2)
HDLC SERPL-MCNC: 83 MG/DL
LDL CHOLESTEROL CALCULATED: 103 MG/DL (ref 0–99)
TRIGLYCERIDE, FASTING: 63 MG/DL (ref 0–149)
TSH SERPL DL<=0.05 MIU/L-ACNC: 1.59 UIU/ML (ref 0.27–4.2)
VITAMIN B-12: 684 PG/ML (ref 211–946)
VLDLC SERPL CALC-MCNC: 13 MG/DL

## 2019-08-17 PROCEDURE — 82607 VITAMIN B-12: CPT

## 2019-08-17 PROCEDURE — 82746 ASSAY OF FOLIC ACID SERUM: CPT

## 2019-08-17 PROCEDURE — 36415 COLL VENOUS BLD VENIPUNCTURE: CPT

## 2019-08-17 PROCEDURE — 84443 ASSAY THYROID STIM HORMONE: CPT

## 2019-08-17 PROCEDURE — 80061 LIPID PANEL: CPT

## 2019-10-16 ENCOUNTER — APPOINTMENT (OUTPATIENT)
Dept: CT IMAGING | Age: 69
End: 2019-10-16
Payer: OTHER MISCELLANEOUS

## 2019-10-16 ENCOUNTER — HOSPITAL ENCOUNTER (EMERGENCY)
Age: 69
Discharge: HOME | End: 2019-10-16
Attending: EMERGENCY MEDICINE
Payer: OTHER MISCELLANEOUS

## 2019-10-16 VITALS
OXYGEN SATURATION: 97 % | TEMPERATURE: 97.9 F | RESPIRATION RATE: 18 BRPM | HEART RATE: 81 BPM | SYSTOLIC BLOOD PRESSURE: 226 MMHG | DIASTOLIC BLOOD PRESSURE: 92 MMHG

## 2019-10-16 DIAGNOSIS — M62.838 SPASM OF MUSCLE: Primary | ICD-10-CM

## 2019-10-16 PROCEDURE — 99283 EMERGENCY DEPT VISIT LOW MDM: CPT

## 2019-10-16 PROCEDURE — 72125 CT NECK SPINE W/O DYE: CPT

## 2019-10-16 PROCEDURE — 6370000000 HC RX 637 (ALT 250 FOR IP): Performed by: EMERGENCY MEDICINE

## 2019-10-16 RX ORDER — METHOCARBAMOL 500 MG/1
1500 TABLET, FILM COATED ORAL ONCE
Status: COMPLETED | OUTPATIENT
Start: 2019-10-16 | End: 2019-10-16

## 2019-10-16 RX ORDER — METHYLPREDNISOLONE 4 MG/1
TABLET ORAL
Qty: 1 KIT | Refills: 0 | Status: SHIPPED | OUTPATIENT
Start: 2019-10-16 | End: 2019-10-22

## 2019-10-16 RX ORDER — METHOCARBAMOL 750 MG/1
750 TABLET, FILM COATED ORAL 4 TIMES DAILY
Qty: 40 TABLET | Refills: 0 | Status: SHIPPED | OUTPATIENT
Start: 2019-10-16 | End: 2019-10-26

## 2019-10-16 RX ORDER — PREDNISONE 20 MG/1
60 TABLET ORAL ONCE
Status: COMPLETED | OUTPATIENT
Start: 2019-10-16 | End: 2019-10-16

## 2019-10-16 RX ADMIN — METHOCARBAMOL TABLETS 1500 MG: 500 TABLET, COATED ORAL at 07:54

## 2019-10-16 RX ADMIN — PREDNISONE 60 MG: 20 TABLET ORAL at 07:54

## 2019-11-04 ENCOUNTER — HOSPITAL ENCOUNTER (OUTPATIENT)
Dept: GENERAL RADIOLOGY | Age: 69
Discharge: HOME | End: 2019-11-06
Payer: MEDICARE

## 2019-11-04 DIAGNOSIS — Z12.31 VISIT FOR SCREENING MAMMOGRAM: ICD-10-CM

## 2019-11-04 PROCEDURE — 77063 BREAST TOMOSYNTHESIS BI: CPT

## 2019-11-25 DIAGNOSIS — C34.11 MALIGNANT NEOPLASM OF UPPER LOBE OF RIGHT LUNG (HCC): Primary | ICD-10-CM

## 2019-11-26 ENCOUNTER — HOSPITAL ENCOUNTER (OUTPATIENT)
Dept: CT IMAGING | Age: 69
Discharge: HOME | End: 2019-11-28
Payer: MEDICARE

## 2019-11-26 ENCOUNTER — HOSPITAL ENCOUNTER (OUTPATIENT)
Age: 69
Discharge: HOME | End: 2019-11-26
Payer: MEDICARE

## 2019-11-26 DIAGNOSIS — C34.11 MALIGNANT NEOPLASM OF UPPER LOBE OF RIGHT LUNG (HCC): ICD-10-CM

## 2019-11-26 LAB
BUN BLDV-MCNC: 23 MG/DL (ref 8–23)
CREAT SERPL-MCNC: 1.3 MG/DL (ref 0.5–1)
GFR AFRICAN AMERICAN: 49
GFR NON-AFRICAN AMERICAN: 49 ML/MIN/1.73

## 2019-11-26 PROCEDURE — 2580000003 HC RX 258: Performed by: RADIOLOGY

## 2019-11-26 PROCEDURE — 36415 COLL VENOUS BLD VENIPUNCTURE: CPT

## 2019-11-26 PROCEDURE — 82565 ASSAY OF CREATININE: CPT

## 2019-11-26 PROCEDURE — 71260 CT THORAX DX C+: CPT

## 2019-11-26 PROCEDURE — 84520 ASSAY OF UREA NITROGEN: CPT

## 2019-11-26 PROCEDURE — 6360000004 HC RX CONTRAST MEDICATION: Performed by: RADIOLOGY

## 2019-11-26 RX ORDER — SODIUM CHLORIDE 0.9 % (FLUSH) 0.9 %
10 SYRINGE (ML) INJECTION
Status: COMPLETED | OUTPATIENT
Start: 2019-11-26 | End: 2019-11-26

## 2019-11-26 RX ADMIN — Medication 10 ML: at 07:06

## 2019-11-26 RX ADMIN — IOPAMIDOL 90 ML: 755 INJECTION, SOLUTION INTRAVENOUS at 07:06

## 2019-12-19 ENCOUNTER — HOSPITAL ENCOUNTER (OUTPATIENT)
Dept: INFUSION THERAPY | Age: 69
Discharge: HOME | End: 2019-12-19
Payer: MEDICARE

## 2019-12-19 ENCOUNTER — OFFICE VISIT (OUTPATIENT)
Dept: ONCOLOGY | Age: 69
End: 2019-12-19
Payer: MEDICARE

## 2019-12-19 VITALS
OXYGEN SATURATION: 72 % | DIASTOLIC BLOOD PRESSURE: 76 MMHG | HEART RATE: 73 BPM | WEIGHT: 143.6 LBS | HEIGHT: 61 IN | TEMPERATURE: 96.9 F | SYSTOLIC BLOOD PRESSURE: 178 MMHG | BODY MASS INDEX: 27.11 KG/M2

## 2019-12-19 DIAGNOSIS — Z85.118 HISTORY OF LUNG CANCER: ICD-10-CM

## 2019-12-19 DIAGNOSIS — C34.11 MALIGNANT NEOPLASM OF UPPER LOBE OF RIGHT LUNG (HCC): Primary | ICD-10-CM

## 2019-12-19 DIAGNOSIS — C34.11 MALIGNANT NEOPLASM OF UPPER LOBE OF RIGHT LUNG (HCC): ICD-10-CM

## 2019-12-19 LAB
ALBUMIN SERPL-MCNC: 3.8 G/DL (ref 3.5–5.2)
ALP BLD-CCNC: 125 U/L (ref 35–104)
ALT SERPL-CCNC: 32 U/L (ref 0–32)
ANION GAP SERPL CALCULATED.3IONS-SCNC: 10 MMOL/L (ref 7–16)
AST SERPL-CCNC: 37 U/L (ref 0–31)
BASOPHILS ABSOLUTE: 0.03 E9/L (ref 0–0.2)
BASOPHILS RELATIVE PERCENT: 0.6 % (ref 0–2)
BILIRUB SERPL-MCNC: 0.3 MG/DL (ref 0–1.2)
BUN BLDV-MCNC: 26 MG/DL (ref 8–23)
CALCIUM SERPL-MCNC: 9.5 MG/DL (ref 8.6–10.2)
CHLORIDE BLD-SCNC: 109 MMOL/L (ref 98–107)
CO2: 29 MMOL/L (ref 22–29)
CREAT SERPL-MCNC: 1.2 MG/DL (ref 0.5–1)
EOSINOPHILS ABSOLUTE: 0.05 E9/L (ref 0.05–0.5)
EOSINOPHILS RELATIVE PERCENT: 1 % (ref 0–6)
GFR AFRICAN AMERICAN: 54
GFR NON-AFRICAN AMERICAN: 54 ML/MIN/1.73
GLUCOSE BLD-MCNC: 122 MG/DL (ref 74–99)
HCT VFR BLD CALC: 34.2 % (ref 34–48)
HEMOGLOBIN: 10.9 G/DL (ref 11.5–15.5)
IMMATURE GRANULOCYTES #: 0.02 E9/L
IMMATURE GRANULOCYTES %: 0.4 % (ref 0–5)
LYMPHOCYTES ABSOLUTE: 1.46 E9/L (ref 1.5–4)
LYMPHOCYTES RELATIVE PERCENT: 29.1 % (ref 20–42)
MCH RBC QN AUTO: 29.4 PG (ref 26–35)
MCHC RBC AUTO-ENTMCNC: 31.9 % (ref 32–34.5)
MCV RBC AUTO: 92.2 FL (ref 80–99.9)
MONOCYTES ABSOLUTE: 0.53 E9/L (ref 0.1–0.95)
MONOCYTES RELATIVE PERCENT: 10.6 % (ref 2–12)
NEUTROPHILS ABSOLUTE: 2.93 E9/L (ref 1.8–7.3)
NEUTROPHILS RELATIVE PERCENT: 58.3 % (ref 43–80)
PDW BLD-RTO: 13.4 FL (ref 11.5–15)
PLATELET # BLD: 189 E9/L (ref 130–450)
PMV BLD AUTO: 11.3 FL (ref 7–12)
POTASSIUM SERPL-SCNC: 4 MMOL/L (ref 3.5–5)
RBC # BLD: 3.71 E12/L (ref 3.5–5.5)
SODIUM BLD-SCNC: 148 MMOL/L (ref 132–146)
TOTAL PROTEIN: 8.8 G/DL (ref 6.4–8.3)
WBC # BLD: 5 E9/L (ref 4.5–11.5)

## 2019-12-19 PROCEDURE — G8417 CALC BMI ABV UP PARAM F/U: HCPCS | Performed by: INTERNAL MEDICINE

## 2019-12-19 PROCEDURE — 1090F PRES/ABSN URINE INCON ASSESS: CPT | Performed by: INTERNAL MEDICINE

## 2019-12-19 PROCEDURE — 1123F ACP DISCUSS/DSCN MKR DOCD: CPT | Performed by: INTERNAL MEDICINE

## 2019-12-19 PROCEDURE — 4040F PNEUMOC VAC/ADMIN/RCVD: CPT | Performed by: INTERNAL MEDICINE

## 2019-12-19 PROCEDURE — 36415 COLL VENOUS BLD VENIPUNCTURE: CPT

## 2019-12-19 PROCEDURE — 85025 COMPLETE CBC W/AUTO DIFF WBC: CPT

## 2019-12-19 PROCEDURE — 99212 OFFICE O/P EST SF 10 MIN: CPT

## 2019-12-19 PROCEDURE — 3017F COLORECTAL CA SCREEN DOC REV: CPT | Performed by: INTERNAL MEDICINE

## 2019-12-19 PROCEDURE — 1036F TOBACCO NON-USER: CPT | Performed by: INTERNAL MEDICINE

## 2019-12-19 PROCEDURE — 99214 OFFICE O/P EST MOD 30 MIN: CPT | Performed by: INTERNAL MEDICINE

## 2019-12-19 PROCEDURE — G8427 DOCREV CUR MEDS BY ELIG CLIN: HCPCS | Performed by: INTERNAL MEDICINE

## 2019-12-19 PROCEDURE — 80053 COMPREHEN METABOLIC PANEL: CPT

## 2019-12-19 PROCEDURE — G8399 PT W/DXA RESULTS DOCUMENT: HCPCS | Performed by: INTERNAL MEDICINE

## 2019-12-19 PROCEDURE — G8484 FLU IMMUNIZE NO ADMIN: HCPCS | Performed by: INTERNAL MEDICINE

## 2020-01-23 ENCOUNTER — OFFICE VISIT (OUTPATIENT)
Dept: PULMONOLOGY | Age: 70
End: 2020-01-23
Payer: MEDICARE

## 2020-01-23 VITALS
OXYGEN SATURATION: 98 % | SYSTOLIC BLOOD PRESSURE: 132 MMHG | HEIGHT: 62 IN | WEIGHT: 143.7 LBS | BODY MASS INDEX: 26.44 KG/M2 | DIASTOLIC BLOOD PRESSURE: 80 MMHG | HEART RATE: 70 BPM | RESPIRATION RATE: 12 BRPM | TEMPERATURE: 97.7 F

## 2020-01-23 LAB
EXPIRATORY TIME: 7.34 SEC
FEF 25-75% %PRED-PRE: 81 L/SEC
FEF 25-75% PRED: 1.62 L/SEC
FEF 25-75%-PRE: 1.33 L/SEC
FEV1 %PRED-PRE: 101 %
FEV1 PRED: 1.81 L
FEV1/FVC %PRED-PRE: 90 %
FEV1/FVC PRED: 79 %
FEV1/FVC: 71 %
FEV1: 1.84 L
FVC %PRED-PRE: 112 %
FVC PRED: 2.3 L
FVC: 2.58 L
PEF %PRED-PRE: 74 L/SEC
PEF PRED: 4.63 L/SEC
PEF-PRE: 3.44 L/SEC

## 2020-01-23 PROCEDURE — 4040F PNEUMOC VAC/ADMIN/RCVD: CPT | Performed by: INTERNAL MEDICINE

## 2020-01-23 PROCEDURE — 3017F COLORECTAL CA SCREEN DOC REV: CPT | Performed by: INTERNAL MEDICINE

## 2020-01-23 PROCEDURE — 3023F SPIROM DOC REV: CPT | Performed by: INTERNAL MEDICINE

## 2020-01-23 PROCEDURE — G8427 DOCREV CUR MEDS BY ELIG CLIN: HCPCS | Performed by: INTERNAL MEDICINE

## 2020-01-23 PROCEDURE — 99213 OFFICE O/P EST LOW 20 MIN: CPT | Performed by: INTERNAL MEDICINE

## 2020-01-23 PROCEDURE — G8925 SPIR FEV1/FVC>=60% & NO COPD: HCPCS | Performed by: INTERNAL MEDICINE

## 2020-01-23 PROCEDURE — G8399 PT W/DXA RESULTS DOCUMENT: HCPCS | Performed by: INTERNAL MEDICINE

## 2020-01-23 PROCEDURE — 1123F ACP DISCUSS/DSCN MKR DOCD: CPT | Performed by: INTERNAL MEDICINE

## 2020-01-23 PROCEDURE — 1036F TOBACCO NON-USER: CPT | Performed by: INTERNAL MEDICINE

## 2020-01-23 PROCEDURE — 1090F PRES/ABSN URINE INCON ASSESS: CPT | Performed by: INTERNAL MEDICINE

## 2020-01-23 PROCEDURE — G8417 CALC BMI ABV UP PARAM F/U: HCPCS | Performed by: INTERNAL MEDICINE

## 2020-01-23 PROCEDURE — G8482 FLU IMMUNIZE ORDER/ADMIN: HCPCS | Performed by: INTERNAL MEDICINE

## 2020-01-23 PROCEDURE — 94010 BREATHING CAPACITY TEST: CPT | Performed by: INTERNAL MEDICINE

## 2020-01-23 ASSESSMENT — PULMONARY FUNCTION TESTS
FEV1/FVC_PERCENT_PREDICTED_PRE: 90
FVC_PREDICTED: 2.30
FEV1/FVC_PREDICTED: 79
FVC: 2.58
FEV1_PREDICTED: 1.81
FEV1_PERCENT_PREDICTED_PRE: 101
FEV1: 1.84
FEV1/FVC: 71
FVC_PERCENT_PREDICTED_PRE: 112

## 2020-01-23 NOTE — PROGRESS NOTES
[facility] CT of the chest showed the left lung lesion and the small peripheral lung nodules (many)  Were unchanged. However a new right hilar lesion/adenopathy was at > 1 cm without adenopathy. We sent her for a PET scan and with her birds we did hypersensitivity panel. The PET scan was positive with the 1.7 cm right paratracheal nodule that is intensely hypermetabolic with SUV max of 4.8 worrisome for tumor. Also, the previously known nodule in the lingula now measures 2.3 cm in size. SUV max has increased from 1.7 on the 2012 examination to 6.4 at this time. She underwent a ENB for the lesions. The procedures/scope went well and we did find a RUL lesion in the airway and biopsies were + for adenocarcinoma. She also underwent biopsies again of the ROMELIA (lingular) lesion with electro-navigational bronchoscopy with are inconclusive with \"atypical cells\" Doing all this she underwent a left sided FNA which lead to a pneumothorax and NO answers were found. She was admitted with a anterior chest tube and recovered. She then came back to the hospital and a week later and on 11/11/2105 she underwent VATS/VATS RUL wedge/VATS Right Upper lobectomy/Intercostal nerve block from T3-T10/Mediastinal lymph node dissection by Dr. Bebeto Madrigal with findings of Stage IIA adenocarcinoma of the lung. Pathology reveled Right lung, upper lobectomy: Invasive, moderately differentiated adenocarcinoma (Grade 2). Surgical margin negative for malignancy. Two of three peribronchial lymph nodes positive for adenocarcinoma. She underwent chemotherapy and on follow up PET scan for the cancer surveillance the results were The 2.1 cm left lung mass abutting the major fissure is hypermetabolic with SUV max of 5.4. Finding is worrisome for tumor with avid glucose metabolism but elsewhere there is unremarkable distribution of FDG activity without evidence of hypermetabolic metastases. Thus my recommendation would be surgical resection.  We again discussed removal of her birds from the home. Then in March 2016 she underwent Bronch/Left VATS/VATS wedge ROMELIA/VATS left upper lobectomy/mediastinal lymph node dissection/Intercostal nerve block from T3-T10 per Dr. Any Roy on 3/29/2016, final pathology revealed Stage I Adenocarcinoma of ROMELIA (morphologically different from the adenocarcinoma previously diagnosed in the right upper lobe. Therefore, synchronous primary tumors are favored). Post operative she was discharge but returned with chest pain with the findings of a pulmonary embolism at the surgical line. She is doing well on coumadin. Vaping was discouraged. Aggressive testing with CT scan of the Abd/Pelivs 05/08/2018 which reported a enhancing lesion within the spleen is relatively stable to slightly smaller when compared to April 2014 exam and likely splenic hemangioma. Other indeterminate findings (left periaortic LN, sclerotic/blastic foci in L3 and L1 reported by Dr. Hanna Cruz from radiology team which was a over read. Then she underwent a PET/CT scan 06/05/2018 unremarkable. No FDG avid uptake identified. No evidence for recurrent or metastatic disease. No clinical evidence of recurrence. She will be traveling to [place]. She is feeling well and had her GB removed. On today's visit, Jose Raul Stanton is doing well with no chest pain, worsening or declining SOB or new cough. She has no pleurisy, hemoptysis or weight loss. She denies any leg swelling. She has no compliants. CT scan 6/2019 was negative for recurrence.      ALLERGIES:  lesion is unchanged  Allergies   Allergen Reactions    Ibuprofen Palpitations and Rash       PAST MEDICAL HISTORY:       Diagnosis Date    Abnormal chest x-ray with multiple lung nodules 9/8/2015    Abnormal Pap smear 1/3/2011    Alpha-1 negative    Adrenal adenoma     7 mm    Bilateral lung cancer ([facility] [address] 5/12/2016    surgically removed - 2015     Cholelithiasis 6/6/2011    Encounter for screening colonoscopy     for OR 3-28-19     Fibroids     Hemangioma of spleen     Hepatitis C 01/03/2011    treated and cured, no current issues     Hyperlipidemia     no medications     Hypertension 6/6/2011    Insomnia     Lung nodule     ROMELIA     Lymphadenopathy     Cervical (-) ENT    Malignant neoplasm of upper lobe of right lung ([facility] [address] 11/18/2015    Osteoarthritis     Panlobular emphysema ([facility] [address] 11/12/2015    controlled with inhalers     PPD negative 1/18/12    Pulmonary embolism without acute cor pulmonale (HCC) 5/12/2016    PVD (peripheral vascular disease) ([facility] [address] 5/6/2014    Scoliosis     Uterine fibroid 5/6/2014        MEDICATIONS:   Current Outpatient Medications   Medication Sig Dispense Refill    vitamin B-12 (CYANOCOBALAMIN) 500 MCG tablet Take 1 tablet by mouth daily 30 tablet 3    Multiple Vitamins-Minerals (THERAPEUTIC MULTIVITAMIN-MINERALS) tablet Take 1 tablet by mouth daily 30 tablet 11    tiotropium (SPIRIVA HANDIHALER) 18 MCG inhalation capsule Inhale 1 capsule into the lungs daily (Patient taking differently: Inhale 18 mcg into the lungs daily Instructed to take am of procedure) 90 capsule 3    lisinopril (PRINIVIL;ZESTRIL) 40 MG tablet take 1 tablet by mouth once daily 90 tablet 4    hydrochlorothiazide (HYDRODIURIL) 25 MG tablet Take 1 tablet by mouth daily 90 tablet 0    aspirin EC 81 MG EC tablet Take 1 tablet by mouth daily (Patient taking differently: Take 81 mg by mouth daily LD 3-23-19) 90 tablet 0    Multiple Vitamins-Minerals (THERAPEUTIC MULTIVITAMIN-MINERALS) tablet Take 1 tablet by mouth daily (Patient taking differently: Take 1 tablet by mouth daily LD 3-23-19) 90 tablet 5    Misc. Devices KIT BP monitoring KIT 1 kit 0     No current facility-administered medications for this visit. SOCIAL AND OCCUPATIONAL HEALTH:  The patient smoked 1 to 1.5 packs a cigarettes a day since the age of 14. She finished the smoking program and QUIT in 3.2012.  Resumed on /off 2018    CHOLECYSTECTOMY LAPAROSCOPIC, POSSIBLE OPEN,POSSIBLE GRAM ( OC 2) performed by Elton Alaniz MD at [address] Right 2015    VATS, BRONCH,RT UPPER LOBECTOMY; NODE DISECTION    TUNNELED VENOUS PORT PLACEMENT Right 2015    right chest, and removed        FAMILY HISTORY:   Family History   Problem Relation Age of Onset    Heart Disease Mother     High Blood Pressure Mother     Cancer Mother     Heart Disease Father     High Blood Pressure Father     Kidney Disease Father     Diabetes Sister     Diabetes Brother       Family Status   Relation Name Status    Mother adri     Father Lucita Stapleton     Sister reinaldo Alive    Brother eugenia Alive    Sister ramses Alive        REVIEW OF SYSTEMS: The patients health assessment form was reviewed. PHYSICAL EXAMINATION:   Vitals:    20 1431   Pulse: 70   Resp: 12   Temp: 97.7 °F (36.5 °C)   SpO2: 98%   Weight: 143 lb 11.2 oz (65.2 kg)   Height: 5' 2\" (1.575 m)     Constitutional: A 77 y.o. female who is alert, oriented, cooperative and in no apparent distress. Head was normocephalic and atraumatic. EENT: EOMI DEEPTI. MMM. No icterus. No conjunctival injections. External canals are patent and no discharge. Septum was midline, mucosa was without erythema, exudates or cobblestoning. No thrush. Neck: Supple without thyromegaly. No elevated JVP. Trachea was midline. No carotid bruits. Respiratory: Bilateral VATS incisions. Right  Upper thorax subq port noted. Clear to auscultation. No wheezes, rhonchi or rales. No egophony. Cardiovascular: Regular without murmur, clicks, gallops or rubs. No ventricular heave. Pulses:  Equal bilaterally. Vascular bruit. Abdomen: Soft without organomegaly. No rebound, rigidity. Lymphatic: No lymphadenopathy. Musculoskeletal: Ambulates without assistance. Normal curvature of the spine. No gross muscle weakness. No involuntary movements.     Extremities: No lower extremity edema or erythema. Deep tendon reflexes are normal.   Skin:  Warm and dry. Good color, turgor and pigmentation. No bruises or skin rashes. Old surgical scars. Neurological/Psychiatric: General behavior, level of consciousness, thought content is normal. Emotional status is normal.  Cranial nerves II-XII are intact. DATA:   The data collected below information that was obtained, reviewed, analyzed and interpreted today. Imaging test are reviewed with the radiologist during weekly conference rounds. Comparison to previous images are always explored. Office Spirometry demonstrates an FVC of 2.58 liters which is 112 % of predicted with an FEV1 of 1.84 liters which is 101 % of predicted. FEV1/FVC ratio is 71 %. Mid expiratory flow rates are 114 % of predicted. Maximum voluntary ventilation is 83 liters per minute or 97 % of predicted. Flow volume loop shows no signs of intrathoracic or extrathoracic process. Impression: Normalized lung function Post Op      CT SCAN CHEST 6/5/2019: Impression  No evidence for worrisome residual or recurrent malignancy.  No evidence for new lymphadenopathy or metastatic disease. Stable scarring and postoperative changes right upper lobe. Stable right adrenal gland nodule. PET/CT SCAN 6/05/2018: Tracer uptake is noted involving the right left kidney I suspect physiologic. Tracer uptake in the abdomen and the chest is otherwise physiologic. Previously identified lesion in the right upper lobe is no longer visible. There is what is most likely sequela of previous right upper lobe therapy or surgery with evidence to suggest scar No significant periaortic uptake is identified.    There is otherwise a normal biodistribution of radiotracer. There is no other abnormal tracer uptake seen. Unremarkable. No FDG avid uptake identified. No evidence for recurrent or metastatic disease.    No clinical evidence of recurrence.     CT SCAN CHEST 10/2017: Essentially hypermetabolic with SUV max of 5.4. Finding is worrisome for tumor with avid glucose metabolism. 2. Elsewhere there is unremarkable distribution of FDG activity without evidence of hypermetabolic metastases. PREVIOUS PET 7/2015  Previously known nodule in the lingula now measures 2.3 cm in size. SUV max has increased from 1.7 on the 2012 examination to 6.4 at this time. CT SCAN CHEST 8/2015: Multiple nodular lesions are noted in the right and left lung of ground glass density as mentioned above. Some of the nodules are new when compared with the previous and some of the nodules are resolved. Continued follow up is still suggested. 2. There is a nodular lesion seen in the right perihilar space measuring 1.7 cm and new when compared with the previous. This nodule is quite suspicious, bronchoscopy or PET scan as warranted. 3. Stable larger nodule seen in the lingula, stable when compared with the previous. PET SCAN 10/2015: New since prior examination on CT is 1.7 cm right paratracheal nodule. Nodule is intensely hypermetabolic with SUV max of 4.8 worrisome for tumor. 2. Previously known nodule in the lingula now measures 2.3 cm in size. SUV max has increased from 1.7 on the 2012 examination to 6.4 at this time. At this time findings are worrisome for hypermetabolic tumor with avid glucose metabolism. CT SCAN CHEST 4/2014: Impression: 1. Multiple nodules seen in the right lung remaining stable when compared with the previous. The largest nodule seen in the lingula remains stable when compared with the previous. 2. No new nodes are noted on today's study. 3. No consolidation, pleural effusion or pneumothorax. CT SCAN CHEST 12/2013: Films were read/reviewed/discussed with radiology again demonstrates a smooth noncalcified nodular mass measuring approximately 2 cm in the lingular segment of the left lower lobe. This has not changed in size or appearance since the last examination.  The lungs demonstrate no further evidence of pulmonary nodule mass or infiltrate. The mediastinum demonstrates no pathologic adenopathy. There is no hilar adenopathy. There is no axillary anat disease. The adrenal glands are not enlarged. There are no bony abnormalities present. Impression: Stable appearance to the previously described lingular lesion with no new or acute findings present. PET SCAN: (2012)  Impression- Nodule in the lingula shows moderate FDG uptake with quantitation yielding an SUV max of 1.7. Values less than 2.5 sometimes are considered indicative of benign pulmonary pathology. I note that using visual criteria the nodule has greater activity than is seen in the heart and mediastinum which is suspicious. Biospy: ENB Procedure: (-) Malignancy (2012)    Hypersensitivity Panel (2015)   A.fumigatus #1 Abs   Negative     Micropoly. faeni Abs  Negative     Thermoa. vulgaris #1  POSITIVE (NEW)  Neg in 2012   A. pullulans Abs   Negative     Thermoact. saccharii N Negative     Ind[place] Serum Abs   Negative            IMPRESSION:    Cheyenne Blood is a 77 y.o. female with smoking related COPD, who underwent: 11/11/2015 (1) Bronchoscopy. (2) Right VATS. (3) VATS right upper lobe wedge resection. (4) VATS right upper lobectomy. (5) Intercostal nerve block from T3 to T10. (6) Mediastinal lymph node dissection. Pathology: Right lung, upper lobectomy: Invasive, moderately differentiated adenocarcinoma (Grade 2). Tumor size 1.5 cm in greatest dimension, with two of three peribronchial lymph nodes positive for adenocarcinoma. pT1a N1 MX; (Stage IIA) She did adjuvant chemotherapy. We recommended 4 cycles of adjuvant chemotherapy consisting of Carboplatin/Alimta. Mediport placed 12/7/15.  Then in March 2016 she underwent Bronch/Left VATS/VATS wedge ROMELIA/VATS left upper lobectomy/mediastinal lymph node dissection/Intercostal nerve block from T3-T10 per Dr. Cielo Goodman on 3/29/2016, final pathology revealed Stage I Adenocarcinoma of ROMELIA (morphologically different from the adenocarcinoma previously diagnosed in the right upper lobe. Therefore, synchronous primary tumors are favored). She had a post operative pulmonary embolism finished 3 months of anticoagulation. Recent CT scan in 2019 is pending. With this in mind, we would like to proceed with the following;                      PLAN:    She is doing well with no complaints. She is retired but works from time to time. She is without pain. She has a visit with oncology for her lung surveillance is needed for her bilateral low stage cancer s/p resection. She has stopped  smoking/vaping as well. Her lung function was normal. A hypersensitivity panel done because of her bird (cockatiels) exposure, testing was +, PFT and CT are stable/normal.  Her dog Milo. Her PPD test was negative. Her alpha-1 antitrypsin test was negative. She is to continue her bronchodilators - as adjusted by her insurance. Samples were given. We will see her back in a 6 months for follow up. We hope this updates you on our evaluation and clinical thinking. Thank you for entrusting us to participate in [facility].        Sincerely,    Sophie Suggs D.O., MPH, Cristina Newton  Professor of Medicine  Director, [facility]

## 2020-01-23 NOTE — PROGRESS NOTES
Patient to follow up with physician in August.  Patient given samples of Spiriva during office visit under the direction of Dr. Lanny Metzger.

## 2020-01-24 NOTE — PATIENT INSTRUCTIONS
instruction, always ask your healthcare professional. [place] [id number] any warranty or liability for your use of this information.

## 2020-03-25 PROBLEM — I26.99 PULMONARY EMBOLISM WITHOUT ACUTE COR PULMONALE (HCC): Status: RESOLVED | Noted: 2020-03-25 | Resolved: 2020-03-24

## 2020-04-24 RX ORDER — CHOLECALCIFEROL (VITAMIN D3) 125 MCG
500 CAPSULE ORAL DAILY
Qty: 30 TABLET | Refills: 11 | Status: SHIPPED
Start: 2020-04-24 | End: 2021-05-03

## 2020-05-01 ENCOUNTER — TELEPHONE (OUTPATIENT)
Dept: PULMONOLOGY | Age: 70
End: 2020-05-01

## 2020-05-04 ENCOUNTER — HOSPITAL ENCOUNTER (OUTPATIENT)
Age: 70
Discharge: HOME | End: 2020-05-04
Payer: MEDICARE

## 2020-05-04 LAB
ANION GAP SERPL CALCULATED.3IONS-SCNC: 12 MMOL/L (ref 7–16)
BUN BLDV-MCNC: 26 MG/DL (ref 8–23)
CALCIUM SERPL-MCNC: 9.3 MG/DL (ref 8.6–10.2)
CHLORIDE BLD-SCNC: 106 MMOL/L (ref 98–107)
CO2: 25 MMOL/L (ref 22–29)
CREAT SERPL-MCNC: 1.1 MG/DL (ref 0.5–1)
GFR AFRICAN AMERICAN: 60
GFR NON-AFRICAN AMERICAN: 60 ML/MIN/1.73
GLUCOSE BLD-MCNC: 100 MG/DL (ref 74–99)
HCT VFR BLD CALC: 30.3 % (ref 34–48)
HEMOGLOBIN: 9.7 G/DL (ref 11.5–15.5)
MCH RBC QN AUTO: 29.1 PG (ref 26–35)
MCHC RBC AUTO-ENTMCNC: 32 % (ref 32–34.5)
MCV RBC AUTO: 91 FL (ref 80–99.9)
PDW BLD-RTO: 13.8 FL (ref 11.5–15)
PLATELET # BLD: 197 E9/L (ref 130–450)
PMV BLD AUTO: 10.4 FL (ref 7–12)
POTASSIUM SERPL-SCNC: 4.1 MMOL/L (ref 3.5–5)
RBC # BLD: 3.33 E12/L (ref 3.5–5.5)
SODIUM BLD-SCNC: 143 MMOL/L (ref 132–146)
TSH SERPL DL<=0.05 MIU/L-ACNC: 2.45 UIU/ML (ref 0.27–4.2)
WBC # BLD: 4.8 E9/L (ref 4.5–11.5)

## 2020-05-04 PROCEDURE — 36415 COLL VENOUS BLD VENIPUNCTURE: CPT

## 2020-05-04 PROCEDURE — 82552 ASSAY OF CPK IN BLOOD: CPT

## 2020-05-04 PROCEDURE — 84443 ASSAY THYROID STIM HORMONE: CPT

## 2020-05-04 PROCEDURE — 85027 COMPLETE CBC AUTOMATED: CPT

## 2020-05-04 PROCEDURE — 82550 ASSAY OF CK (CPK): CPT

## 2020-05-04 PROCEDURE — 80048 BASIC METABOLIC PNL TOTAL CA: CPT

## 2020-05-07 LAB
% CKMB: 0 % (ref 0–4)
CK-BB: 0 % (ref 0–0)
CK-MACRO TYPE I: 0 % (ref 0–0)
CK-MACRO TYPE II: 0 % (ref 0–0)
CK-MM: 100 % (ref 96–100)
TOTAL CK: 235 U/L (ref 20–180)

## 2020-06-15 ENCOUNTER — HOSPITAL ENCOUNTER (OUTPATIENT)
Dept: CT IMAGING | Age: 70
Discharge: HOME | End: 2020-06-17
Payer: MEDICARE

## 2020-06-15 PROCEDURE — 71260 CT THORAX DX C+: CPT

## 2020-06-15 PROCEDURE — 2580000003 HC RX 258: Performed by: RADIOLOGY

## 2020-06-15 PROCEDURE — 6360000004 HC RX CONTRAST MEDICATION: Performed by: RADIOLOGY

## 2020-06-15 RX ORDER — SODIUM CHLORIDE 0.9 % (FLUSH) 0.9 %
10 SYRINGE (ML) INJECTION
Status: COMPLETED | OUTPATIENT
Start: 2020-06-15 | End: 2020-06-15

## 2020-06-15 RX ADMIN — Medication 10 ML: at 08:35

## 2020-06-15 RX ADMIN — IOPAMIDOL 90 ML: 755 INJECTION, SOLUTION INTRAVENOUS at 08:35

## 2020-06-20 LAB
SARS-COV-2, PCR: NOT DETECTED
SPECIMEN SOURCE: NORMAL

## 2020-06-22 ENCOUNTER — OFFICE VISIT (OUTPATIENT)
Dept: ONCOLOGY | Age: 70
End: 2020-06-22
Payer: MEDICARE

## 2020-06-22 ENCOUNTER — HOSPITAL ENCOUNTER (OUTPATIENT)
Dept: INFUSION THERAPY | Age: 70
Discharge: HOME | End: 2020-06-22
Payer: MEDICARE

## 2020-06-22 VITALS
HEIGHT: 62 IN | OXYGEN SATURATION: 100 % | BODY MASS INDEX: 25.17 KG/M2 | HEART RATE: 97 BPM | SYSTOLIC BLOOD PRESSURE: 198 MMHG | DIASTOLIC BLOOD PRESSURE: 83 MMHG | TEMPERATURE: 97 F | WEIGHT: 136.8 LBS

## 2020-06-22 DIAGNOSIS — C34.11 MALIGNANT NEOPLASM OF UPPER LOBE OF RIGHT LUNG (HCC): ICD-10-CM

## 2020-06-22 LAB
ALBUMIN SERPL-MCNC: 3.9 G/DL (ref 3.5–5.2)
ALP BLD-CCNC: 77 U/L (ref 35–104)
ALT SERPL-CCNC: 44 U/L (ref 0–32)
ANION GAP SERPL CALCULATED.3IONS-SCNC: 10 MMOL/L (ref 7–16)
AST SERPL-CCNC: 50 U/L (ref 0–31)
BASOPHILS ABSOLUTE: 0.02 E9/L (ref 0–0.2)
BASOPHILS RELATIVE PERCENT: 0.3 % (ref 0–2)
BILIRUB SERPL-MCNC: 0.4 MG/DL (ref 0–1.2)
BUN BLDV-MCNC: 23 MG/DL (ref 8–23)
CALCIUM SERPL-MCNC: 9.9 MG/DL (ref 8.6–10.2)
CHLORIDE BLD-SCNC: 104 MMOL/L (ref 98–107)
CO2: 26 MMOL/L (ref 22–29)
CREAT SERPL-MCNC: 1.2 MG/DL (ref 0.5–1)
EOSINOPHILS ABSOLUTE: 0.05 E9/L (ref 0.05–0.5)
EOSINOPHILS RELATIVE PERCENT: 0.8 % (ref 0–6)
GFR AFRICAN AMERICAN: 54
GFR NON-AFRICAN AMERICAN: 54 ML/MIN/1.73
GLUCOSE BLD-MCNC: 153 MG/DL (ref 74–99)
HCT VFR BLD CALC: 34.2 % (ref 34–48)
HEMOGLOBIN: 11 G/DL (ref 11.5–15.5)
IMMATURE GRANULOCYTES #: 0.03 E9/L
IMMATURE GRANULOCYTES %: 0.5 % (ref 0–5)
LYMPHOCYTES ABSOLUTE: 1.15 E9/L (ref 1.5–4)
LYMPHOCYTES RELATIVE PERCENT: 17.6 % (ref 20–42)
MCH RBC QN AUTO: 29.3 PG (ref 26–35)
MCHC RBC AUTO-ENTMCNC: 32.2 % (ref 32–34.5)
MCV RBC AUTO: 91 FL (ref 80–99.9)
MONOCYTES ABSOLUTE: 0.62 E9/L (ref 0.1–0.95)
MONOCYTES RELATIVE PERCENT: 9.5 % (ref 2–12)
NEUTROPHILS ABSOLUTE: 4.65 E9/L (ref 1.8–7.3)
NEUTROPHILS RELATIVE PERCENT: 71.3 % (ref 43–80)
PDW BLD-RTO: 13.8 FL (ref 11.5–15)
PLATELET # BLD: 203 E9/L (ref 130–450)
PMV BLD AUTO: 10.4 FL (ref 7–12)
POTASSIUM SERPL-SCNC: 3.8 MMOL/L (ref 3.5–5)
RBC # BLD: 3.76 E12/L (ref 3.5–5.5)
SODIUM BLD-SCNC: 140 MMOL/L (ref 132–146)
TOTAL PROTEIN: 8.6 G/DL (ref 6.4–8.3)
WBC # BLD: 6.5 E9/L (ref 4.5–11.5)

## 2020-06-22 PROCEDURE — 80053 COMPREHEN METABOLIC PANEL: CPT

## 2020-06-22 PROCEDURE — 99213 OFFICE O/P EST LOW 20 MIN: CPT

## 2020-06-22 PROCEDURE — 1090F PRES/ABSN URINE INCON ASSESS: CPT | Performed by: INTERNAL MEDICINE

## 2020-06-22 PROCEDURE — 36415 COLL VENOUS BLD VENIPUNCTURE: CPT

## 2020-06-22 PROCEDURE — 1036F TOBACCO NON-USER: CPT | Performed by: INTERNAL MEDICINE

## 2020-06-22 PROCEDURE — G8427 DOCREV CUR MEDS BY ELIG CLIN: HCPCS | Performed by: INTERNAL MEDICINE

## 2020-06-22 PROCEDURE — 3017F COLORECTAL CA SCREEN DOC REV: CPT | Performed by: INTERNAL MEDICINE

## 2020-06-22 PROCEDURE — 4040F PNEUMOC VAC/ADMIN/RCVD: CPT | Performed by: INTERNAL MEDICINE

## 2020-06-22 PROCEDURE — 1123F ACP DISCUSS/DSCN MKR DOCD: CPT | Performed by: INTERNAL MEDICINE

## 2020-06-22 PROCEDURE — G8417 CALC BMI ABV UP PARAM F/U: HCPCS | Performed by: INTERNAL MEDICINE

## 2020-06-22 PROCEDURE — G8399 PT W/DXA RESULTS DOCUMENT: HCPCS | Performed by: INTERNAL MEDICINE

## 2020-06-22 PROCEDURE — 99214 OFFICE O/P EST MOD 30 MIN: CPT | Performed by: INTERNAL MEDICINE

## 2020-06-22 PROCEDURE — 85025 COMPLETE CBC W/AUTO DIFF WBC: CPT

## 2020-06-22 NOTE — PROGRESS NOTES
differentiated adenocarcinoma (Grade 2). Tumor size 1.5 cm in greatest dimension. Visceral pleural invasion: Not identified. Visceral pleural invasion: Not identified. Surgical margin negative for malignancy. Two of three peribronchial lymph nodes positive for adenocarcinoma. pT1a N1 MX;     Being followed for a left upper lobe mass by Dr. Noah Marino. Multiple biopsies in the past came back negative for malignancy. Hypermetabolic on PET/CT scan on 09/29/2015 (2.3 cm in size with SUV of 6.4). CT guided biopsy of the left upper lobe lesion on 11/02/2015 was noted to be negative for malignancy. She was referred to the medical oncology clinic to discuss adjuvant chemotherapy. We recommended 4 cycles of adjuvant chemotherapy consisting of Carboplatin/Alimta. Mediport placed 12/7/15. -MRI Brain on 12/8/15:  Negative for metastatic disease.   -We will repeat CT chest and PET/CT after 4 cycles of Carboplatin/Alimta. To follow on Lingular lesion as well. -Molecular studies (EGFR, ALK, ROS-1) were all Not Detected. Cycle # 1 of Rahul Doherty was on 12/09/2015. Cycle # 2 of Rahul Doherty was on 01/06/2016. Cycle # 3 of Rahul Doherty was on 01/27/2016. Cycle # 4 of Rahul Doherty was on 02/24/2016. PET SCAN 3.2016: The 2.1 cm left lung mass abutting the major fissure is hypermetabolic with SUV max of 5.4. Finding is worrisome for tumor with avid glucose metabolism. 2. Elsewhere there is unremarkable distribution of FDG activity without evidence of hypermetabolic metastases. On 03/29/2016 underwent Bronch/Left VATS/VATS wedge ROMELIA/VATS Left upper lobectomy/Mediasinal lymph node dissection/Intercostal nerve block from T3-T10 per Dr. Tierney Mg. Final pathology revealed Stage I Adenocarcinoma of ROMELIA (morphologically different from the adenocarcinoma previously diagnosed in the right upper lobe. Therefore, synchronous primary tumors are favored).     A. Left lung, upper lobe wedge resection: Invasive, well-differentiated adenocarcinoma (grade 1); Tumor size-1.4 cm in greatest dimension; Surgical margins-negative for malignancy; Lymphovascular invasion-not identified; TNM classification-pT2a N0 MX  B. AP window lymph node #1, excision: Anthracotic lymph node; negative for malignancy  C. AP window lymph node #2, excision: Anthracotic lymph node; negative for malignancy   D. Periaortic lymph node #1, excision: Fibroadipose tissue; lymph node not identified  E. Bronchial lymph node #1, excision: Anthracotic lymph node; negative for malignancy  F. Left lung, upper lobectomy: Emphysematous change; negative for malignancy  4 anthracotic lymph nodes negative for malignancy   G. Inferior pulmonary ligament lymph node, excision: Anthracotic lymph node; negative for malignancy  H. Bronchial lymph node, excision: Anthracotic lymph node; negative for malignancy. Right side Mediport was removed on 11/04/2016 by Dr. Nettie Messina. On surveillance per NCCN guidelines. CT chest 04/12/2017 noted no convincing evidence of recurrent disease. CT chest 10/19/2017 noted no definite evidence for recurrent malignancy. CT chest 03/12/2018 negative for pulmonary parenchymal masses or enlarged mediastinal or hilar LN. ? 2 cm lesion in spleen difficult to evaluate due to the arterial phase of the study. CT Abd/Pelvis 05/08/2018  Enhancing lesion within the spleen is relatively stable to slightly smaller when compared to April 2014 exam and likely splenic hemangioma. Other indeterminate findings (left periaortic LN, sclerotic/blastic foci in L3 and L1 reported by Dr. Esteban Back from radiology team). PET/CT scan 06/05/2018 unremarkable. No FDG avid uptake identified. No evidence for recurrent or metastatic disease. CT Chest 12/13/2018 noted no evidence of recurrent/metastatic disease. CT chest on 06/11/2019 noted no evidence for worrisome residual or recurrent malignancy.   No evidence for new lymphadenopathy or metastatic disease. Stable scarring and postoperative changes right upper lobe. CT chest 11/26/2019: No evidence of active neoplasm. CT chest 06/15/2020: No evidence of active neoplasm.   [place]    RTC in 6 months prior CT chest.    Cara Carter MD   [id number]  Board Certified Medical Oncologist

## 2020-07-09 RX ORDER — MULTIVIT,CALC,MINS/IRON/FOLIC 9MG-400MCG
TABLET ORAL
Qty: 30 TABLET | Refills: 11 | Status: SHIPPED
Start: 2020-07-09 | End: 2022-06-28

## 2020-08-28 ENCOUNTER — OFFICE VISIT (OUTPATIENT)
Dept: PULMONOLOGY | Age: 70
End: 2020-08-28
Payer: MEDICARE

## 2020-08-28 VITALS
RESPIRATION RATE: 16 BRPM | OXYGEN SATURATION: 99 % | SYSTOLIC BLOOD PRESSURE: 152 MMHG | TEMPERATURE: 97.7 F | BODY MASS INDEX: 25.95 KG/M2 | HEIGHT: 62 IN | HEART RATE: 73 BPM | DIASTOLIC BLOOD PRESSURE: 82 MMHG | WEIGHT: 141 LBS

## 2020-08-28 LAB
EXPIRATORY TIME: 7.53 SEC
FEF 25-75% %PRED-PRE: 97 L/SEC
FEF 25-75% PRED: 1.6 L/SEC
FEF 25-75%-PRE: 1.55 L/SEC
FEV1 %PRED-PRE: 111 %
FEV1 PRED: 1.79 L
FEV1/FVC %PRED-PRE: 94 %
FEV1/FVC PRED: 79 %
FEV1/FVC: 74 %
FEV1: 1.99 L
FVC %PRED-PRE: 116 %
FVC PRED: 2.29 L
FVC: 2.68 L
PEF %PRED-PRE: 79 L/SEC
PEF PRED: 4.58 L/SEC
PEF-PRE: 3.64 L/SEC

## 2020-08-28 PROCEDURE — 3017F COLORECTAL CA SCREEN DOC REV: CPT | Performed by: INTERNAL MEDICINE

## 2020-08-28 PROCEDURE — 99213 OFFICE O/P EST LOW 20 MIN: CPT | Performed by: INTERNAL MEDICINE

## 2020-08-28 PROCEDURE — 94010 BREATHING CAPACITY TEST: CPT | Performed by: INTERNAL MEDICINE

## 2020-08-28 PROCEDURE — G8417 CALC BMI ABV UP PARAM F/U: HCPCS | Performed by: INTERNAL MEDICINE

## 2020-08-28 PROCEDURE — G8399 PT W/DXA RESULTS DOCUMENT: HCPCS | Performed by: INTERNAL MEDICINE

## 2020-08-28 PROCEDURE — 99214 OFFICE O/P EST MOD 30 MIN: CPT | Performed by: INTERNAL MEDICINE

## 2020-08-28 PROCEDURE — 4040F PNEUMOC VAC/ADMIN/RCVD: CPT | Performed by: INTERNAL MEDICINE

## 2020-08-28 PROCEDURE — 1090F PRES/ABSN URINE INCON ASSESS: CPT | Performed by: INTERNAL MEDICINE

## 2020-08-28 PROCEDURE — G8427 DOCREV CUR MEDS BY ELIG CLIN: HCPCS | Performed by: INTERNAL MEDICINE

## 2020-08-28 PROCEDURE — 1036F TOBACCO NON-USER: CPT | Performed by: INTERNAL MEDICINE

## 2020-08-28 PROCEDURE — 1123F ACP DISCUSS/DSCN MKR DOCD: CPT | Performed by: INTERNAL MEDICINE

## 2020-08-28 ASSESSMENT — PULMONARY FUNCTION TESTS
FEV1: 1.99
FEV1_PREDICTED: 1.79
FEV1/FVC_PERCENT_PREDICTED_PRE: 94
FVC_PREDICTED: 2.29
FEV1_PERCENT_PREDICTED_PRE: 111
FEV1/FVC: 74
FVC: 2.68
FVC_PERCENT_PREDICTED_PRE: 116
FEV1/FVC_PREDICTED: 79

## 2020-08-28 NOTE — PROGRESS NOTES
[address]  Department of Internal Medicine  Division of Pulmonary, Critical Care and Sleep Medicine  Office Note      Dear Mariely Hanson, DO    We had the pleasure of seeing Denis Boss, in the [address] at [facility] regarding her ROMELIA Stage I NSCLC, Stage II A RULadenocarcinoma S/P bilateral resection, & COPD. HISTORY OF PRESENT ILLNESS:  Hadley Horton has a past medical history of HTN, HLD, emphysema, scoliosis, OA, cholelithiasis, lung nodule, adrenal adenoma (7 mm), uterine fibroids, hemangioma of spleen, Hep C, ex-smoker (30 pack yrs, quit June, 2015), RUL 1.5 cm moderately differentiated adenocarcinoma (pT1a N1 Mx) S/p VATS right upper lobectomy & mediastinal LN dissection, S/p Mediport (12/7/15) for chemo- Micki Michael is being followed for a persistent lung infiltrate of/nodule found on evaluation during the workup of cervical adenopathy. Access submental, mandibular adenopathy. Apparently during her routine visit in the clinic she had noticed a right sided submandibular, cervical fullness which was felt to be an enlarged lymph gland which was also confirmed by CT imaging. She was referred on to surgery for a histologic evaluation, but nothing was performed and the adenopathy was felt to be reactive. The adenopathy, however, has continued over now a 6 month period. Despite these findings, she continues to use and smokes cigarettes of at least 1 to 1-1/2 packs per day. With the adenopathy and a CT scan of the chest was done. The CT scan initially done on July 13, 2011 was reviewed and compared to the one in October 25, 2011. The CT in July reveals a 7 mm nodular lesion in the right lobe posterior segment with a focal density in the lingular area, possibly representing focal pneumonia.   No mediastinal mass, adenopathy, pleural effusion, pericardial effusion, or pneumothorax were noted. An incidental finding of cholelithiasis was revealed. Over the following next few months, no additional findings were noted except for the follow-up in surgery as mentioned above. Ms. Juli Dailey states she has had a normal mammogram in October 2011, normal Pap and pelvic examination and a negative colonoscopy. She has a family history of cancer on both her father and mother's side. In October 2011 a repeat CT scan of the Chest was done to reevaluate the pulmonary lesions. The CT reveals persistent findings of a nodular lesion in the right lower lobe superior segment, stable when compared to previous. A continued focal density in the lingular segment, unchanged from previous with no adenopathy and continued cholelithiasis. She was referred for pulmonary evaluation due to persistent abnormalities. Ms. Juli Dailey denies any travel. She has multiple pets including cockatiels and dog. 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 There is no asbestos silica dust or coal exposure. No history of tuberculosis or tuberculosis exposure. During the evaluation for the lung mass. Derrick Savage had a negative PET scan (SUV 1.7) and underwent ENB guided biopsy which was negative but poor cell sampling. She has a repeat CT of the Chest which we reviewed in conference. The CT shows no evidence of significant mediastinal lymphadenopathy. There is stable tiny right adrenal nodule measuring 7 mm, indeterminate. There is stable enhancing lesion in the spleen measuring 2.6 x 2.3 cm likely representing a hemangioma. There is stable focal opacity in the left lingula measuring 2.7 x 2.4 cm. This has not significantly changed from prior CT. The ground glass nodular lesion in the right upper lobe is unchanged. There is stable nodular lesion in the right lower lobe measuring approximately 8 mm in the superior segment. On discussion the plan is to continue to follow the lesion.      As you know, [facility] of the chest showed the left lung lesion and the small peripheral lung nodules (many)  Were unchanged. However a new right hilar lesion/adenopathy was at > 1 cm without adenopathy. We sent her for a PET scan and with her birds we did hypersensitivity panel. The PET scan was positive with the 1.7 cm right paratracheal nodule that is intensely hypermetabolic with SUV max of 4.8 worrisome for tumor. Also, the previously known nodule in the lingula now measures 2.3 cm in size. SUV max has increased from 1.7 on the 2012 examination to 6.4 at this time. She underwent a ENB for the lesions. The procedures/scope went well and we did find a RUL lesion in the airway and biopsies were + for adenocarcinoma. She also underwent biopsies again of the ROMELIA (lingular) lesion with electro-navigational bronchoscopy with are inconclusive with \"atypical cells\" Doing all this she underwent a left sided FNA which lead to a pneumothorax and NO answers were found. She was admitted with a anterior chest tube and recovered. She then came back to the hospital and a week later and on 11/11/2105 she underwent VATS/VATS RUL wedge/VATS Right Upper lobectomy/Intercostal nerve block from T3-T10/Mediastinal lymph node dissection by Dr. Reinaldo Burr with findings of Stage IIA adenocarcinoma of the lung. Pathology reveled Right lung, upper lobectomy: Invasive, moderately differentiated adenocarcinoma (Grade 2). Surgical margin negative for malignancy. Two of three peribronchial lymph nodes positive for adenocarcinoma. She underwent chemotherapy and on follow up PET scan for the cancer surveillance the results were The 2.1 cm left lung mass abutting the major fissure is hypermetabolic with SUV max of 5.4. Finding is worrisome for tumor with avid glucose metabolism but elsewhere there is unremarkable distribution of FDG activity without evidence of hypermetabolic metastases. Thus my recommendation would be surgical resection.  We again discussed removal of her birds from the home. Then in March 2016 she underwent Bronch/Left VATS/VATS wedge ROMELIA/VATS left upper lobectomy/mediastinal lymph node dissection/Intercostal nerve block from T3-T10 per Dr. Hazel on 3/29/2016, final pathology revealed Stage I Adenocarcinoma of ROMELIA (morphologically different from the adenocarcinoma previously diagnosed in the right upper lobe. Therefore, synchronous primary tumors are favored). Post operative she was discharge but returned with chest pain with the findings of a pulmonary embolism at the surgical line. She is doing well on coumadin. Vaping was discouraged. Aggressive testing with CT scan of the Abd/Pelivs 05/08/2018 which reported a enhancing lesion within the spleen is relatively stable to slightly smaller when compared to April 2014 exam and likely splenic hemangioma. Other indeterminate findings (left periaortic LN, sclerotic/blastic foci in L3 and L1 reported by Dr. Ezio Lewis from radiology team which was a over read. Then she underwent a PET/CT scan 06/05/2018 unremarkable. No FDG avid uptake identified. No evidence for recurrent or metastatic disease. No clinical evidence of recurrence. She will be traveling to [place]. She is feeling well and had her GB removed. On today's visit, Yuval Barillas is doing well with no chest pain, worsening or declining SOB or new cough. She has no pleurisy, hemoptysis, but was concerned about her weight loss. She denies any leg swelling. SOB from time to time but on inhalers. She has no compliants. CT scan 6/2020 was negative for recurrence. She will follow up in oncology.      ALLERGIES:  lesion is unchanged  Allergies   Allergen Reactions    Ibuprofen Palpitations and Rash       PAST MEDICAL HISTORY:       Diagnosis Date    Abnormal chest x-ray with multiple lung nodules 9/8/2015    Abnormal Pap smear 1/3/2011    Alpha-1 negative    Adrenal adenoma     7 mm    Bilateral lung cancer ([facility] [address] [address] 5/12/2016 surgically removed - 2015     Cholelithiasis 6/6/2011    Encounter for screening colonoscopy     for OR 3-28-19     Fibroids     Hemangioma of spleen     Hepatitis C 01/03/2011    treated and cured, no current issues     Hyperlipidemia     no medications     Hypertension 6/6/2011    Insomnia     Lung nodule     ROMELIA     Lymphadenopathy     Cervical (-) ENT    Malignant neoplasm of upper lobe of right lung ([facility] [address] 11/18/2015    Osteoarthritis     Panlobular emphysema ([facility] 11/12/2015    controlled with inhalers     PPD negative 1/18/12    Pulmonary embolism without acute cor pulmonale (HCC) 5/12/2016    PVD (peripheral vascular disease) ([facility] [address] 5/6/2014    Scoliosis     Uterine fibroid 5/6/2014        MEDICATIONS:   Current Outpatient Medications   Medication Sig Dispense Refill    Multiple Vitamins-Minerals (THEREMS-M) TABS take 1 tablet by mouth once daily 30 tablet 11    vitamin B-12 (CYANOCOBALAMIN) 500 MCG tablet Take 1 tablet by mouth daily 30 tablet 11    tiotropium (SPIRIVA HANDIHALER) 18 MCG inhalation capsule Inhale 1 capsule into the lungs daily (Patient taking differently: Inhale 18 mcg into the lungs daily Instructed to take am of procedure) 90 capsule 3    lisinopril (PRINIVIL;ZESTRIL) 40 MG tablet take 1 tablet by mouth once daily 90 tablet 4    hydrochlorothiazide (HYDRODIURIL) 25 MG tablet Take 1 tablet by mouth daily 90 tablet 0    aspirin EC 81 MG EC tablet Take 1 tablet by mouth daily (Patient taking differently: Take 81 mg by mouth daily LD 3-23-19) 90 tablet 0    Multiple Vitamins-Minerals (THERAPEUTIC MULTIVITAMIN-MINERALS) tablet Take 1 tablet by mouth daily (Patient taking differently: Take 1 tablet by mouth daily LD 3-23-19) 90 tablet 5    Misc. Devices KIT BP monitoring KIT 1 kit 0     No current facility-administered medications for this visit. SOCIAL AND OCCUPATIONAL HEALTH:  The patient smoked 1 to 1.5 packs a cigarettes a day since the age of 14.  She finished the smoking program and QUIT in 3.2012. Resumed on /off smoking. Then quit in 2016. She is smoked over 40 years in duration. There is no history of TB or TB exposure. There is no asbestos or silica dust exposure. The patient reports no coal, foundry, quarry or [employer] exposure. There is no recent travel history noted. The patient denies a history of recreational or IV drug use. No hot tub exposure. The patient has dogs (1) and birds (2). Previous hypersensitivity panel was negative. Hobbies include reading. EtoH: The patient denies excessive alcohol intake. She works in a plastics assembly line. She has birds and a pit bull dog. She has no children    SOCIAL HISTORY:   Social History     Tobacco Use    Smoking status: Former Smoker     Packs/day: 0.00     Years: 30.00     Pack years: 0.00     Last attempt to quit: 2015     Years since quittin.2    Smokeless tobacco: Never Used   Substance Use Topics    Alcohol use:  Yes     Alcohol/week: 2.0 standard drinks     Types: 2 Glasses of wine per week     Comment: x2 week    Drug use: No       SURGICAL HISTORY:   Past Surgical History:   Procedure Laterality Date    APPENDECTOMY  1965    BREAST LUMPECTOMY  1999    left, benign, Dr. Ray Florence, [address] [place] [address] BRONCHOSCOPY  2012    Belkis Alfredo, Dr. Maricarmen Barron, [address] [place] [address]  10/15/15    with EBUS - DR Sarai Kincaid    CHOLECYSTECTOMY      COLONOSCOPY  2009    partial to distal ascending colon normal (completion BE same day normal), Dr. Ray [address] COLONOSCOPY  2014    done under fluoro with sigmoid straightening device so was able to get to cecum, very poor prep, moderate proctitis, repeat 5 years,  Dr. Ray [address] COLONOSCOPY N/A 3/28/2019    COLONOSCOPY POLYPECTOMY SNARE/COLD BIOPSY performed by Barbara Rodriguez DO at [address] Left 3/29/2016    VATS WEDGE RESECTION UPPER LOBECTOMY    OTHER SURGICAL HISTORY Right 2016    REMOVAL MEDI-PORT - DR Anne Francis LA LAP,CHOLECYSTECTOMY/GRAPH N/A 2018    CHOLECYSTECTOMY LAPAROSCOPIC, POSSIBLE OPEN,POSSIBLE GRAM ( OC 2) performed by Pola Farooq MD at [address] Right 2015    VATS, BRONCH,RT UPPER LOBECTOMY; NODE DISECTION    TUNNELED VENOUS PORT PLACEMENT Right 2015    right chest, and removed        FAMILY HISTORY:   Family History   Problem Relation Age of Onset    Heart Disease Mother     High Blood Pressure Mother     Cancer Mother     Heart Disease Father     High Blood Pressure Father     Kidney Disease Father     Diabetes Sister     Diabetes Brother       Family Status   Relation Name Status    Mother adri     Father Donell Wilson     Sister reianldo Alive    Brother eugenia Alive    Sister ramses Alive        REVIEW OF SYSTEMS: The patients health assessment form was reviewed. PHYSICAL EXAMINATION:   Vitals:    20 1301   Pulse: 73   Resp: 16   Temp: 97.7 °F (36.5 °C)   SpO2: 99%   Weight: 141 lb (64 kg)   Height: 5' 2\" (1.575 m)     Constitutional: A 72 y.o. female who is alert, oriented, cooperative and in no apparent distress. Head was normocephalic and atraumatic. EENT: EOMI DEEPTI. MMM. No icterus. No conjunctival injections. External canals are patent and no discharge. Septum was midline, mucosa was without erythema, exudates or cobblestoning. No thrush. Neck: Supple without thyromegaly. No elevated JVP. Trachea was midline. No carotid bruits. Respiratory: Bilateral VATS incisions. Right  Upper thorax subq port noted. Clear to auscultation. No wheezes, rhonchi or rales. No egophony. Cardiovascular: Regular without murmur, clicks, gallops or rubs. No ventricular heave. Pulses:  Equal bilaterally. Vascular bruit. Abdomen: Soft without organomegaly. No rebound, rigidity. Lymphatic: No lymphadenopathy. Musculoskeletal: Ambulates without assistance. Normal curvature of the spine.   No gross muscle weakness. No involuntary movements. Extremities:  No lower extremity edema or erythema. Deep tendon reflexes are normal.   Skin:  Warm and dry. Good color, turgor and pigmentation. No bruises or skin rashes. Old surgical scars. Neurological/Psychiatric: General behavior, level of consciousness, thought content is normal. Emotional status is normal.  Cranial nerves II-XII are intact. DATA:   The data collected below information that was obtained, reviewed, analyzed and interpreted today. Imaging test are reviewed with the radiologist during weekly conference rounds. Comparison to previous images are always explored. Office Spirometry demonstrates an FVC of 2.58 liters which is 112 % of predicted with an FEV1 of 1.84 liters which is 101 % of predicted. FEV1/FVC ratio is 71 %. Mid expiratory flow rates are 114 % of predicted. Maximum voluntary ventilation is 83 liters per minute or 97 % of predicted. Flow volume loop shows no signs of intrathoracic or extrathoracic process. Impression: Normalized lung function Post Op      CT SCAN CHEST 6/2020: Impression: No hilar or mediastinal lymphadenopathy is noted. There is a moderate amount of plaque in the arch and great vessels. No thoracic aneurysm is noted. There is plaque in the upper abdominal aorta. There is a small cyst in the upper pole right kidney. Spleen is heterogeneous and may relate to the timing of the contrast bolus. There is scarring in the right upper hemithorax. There are no infiltrates, effusions or lung nodules. CT SCAN CHEST 6/5/2019: Impression  No evidence for worrisome residual or recurrent malignancy.  No evidence for new lymphadenopathy or metastatic disease. Stable scarring and postoperative changes right upper lobe. Stable right adrenal gland nodule. PET/CT SCAN 6/05/2018: Tracer uptake is noted involving the right left kidney I suspect physiologic. Tracer uptake in the abdomen and the chest is otherwise physiologic. Previously identified lesion in the right upper lobe is no longer visible. There is what is most likely sequela of previous right upper lobe therapy or surgery with evidence to suggest scar No significant periaortic uptake is identified.    There is otherwise a normal biodistribution of radiotracer. There is no other abnormal tracer uptake seen. Unremarkable. No FDG avid uptake identified. No evidence for recurrent or metastatic disease. No clinical evidence of recurrence.     CT SCAN CHEST 10/2017: Essentially stable bilateral pulmonary nodules measuring up to 2 mm in diameter, as detailed above. Continued follow up is suggested. 2.  Cholelithiasis and query mild pericholecystic free fluid. If clinically indicated, consider ultrasound for further evaluation. 3.  Stable 8 mm sclerotic focus within the second thoracic vertebral body suspicious for a bone island (or enostosis). However, in a patient with a known malignancy osteoblastic metastasis is also a consideration. 4.  Probable 2.7 cm splenic hemangioma, unchanged compared to prior examination from July 2011. 5.  Stable 8 mm right adrenal gland nodule. CT SCAN CHEST 7/2016:Impression: No central, segmental or subsegmental pulmonary emboli are demonstrated. In retrospect, The finding that was previously thought to represent embolism in the distal left pulmonary artery may have actually been fluid within the left upper lobe bronchus. There is no aneurysm or dissection of the thoracic aorta. Postoperative volume loss in the left chest is present after the left lingula lobectomy. No infiltrate or mass is seen within either lung. No thoracic lymphadenopathy is observed. There is no pleural or pericardial effusion. There is a right internal jugular vein Mediport. SURGICAL BIOSPY: 11/11/2015: Right lung, upper lobectomy: Invasive, moderately differentiated  adenocarcinoma (Grade 2). Surgical margin negative for malignancy.  Two of three peribronchial lymph greatest dimension, with two of three peribronchial lymph nodes positive for adenocarcinoma. pT1a N1 MX; (Stage IIA) She did adjuvant chemotherapy. We recommended 4 cycles of adjuvant chemotherapy consisting of Carboplatin/Alimta. Mediport placed 12/7/15. Then in March 2016 she underwent Bronch/Left VATS/VATS wedge ROMELIA/VATS left upper lobectomy/mediastinal lymph node dissection/Intercostal nerve block from T3-T10 per Dr. Emeka Wade on 3/29/2016, final pathology revealed Stage I Adenocarcinoma of ROMELIA (morphologically different from the adenocarcinoma previously diagnosed in the right upper lobe. Therefore, synchronous primary tumors are favored). She had a post operative pulmonary embolism finished 3 months of anticoagulation. CT negative in 2020. With this in mind, we would like to proceed with the following;                      PLAN:    She is doing well with no complaints. He yearly CT scan shows no signs of cancer recurrence. She is retired but works from time to time. She is without pain. She has a visit with oncology for her lung surveillance is needed for her bilateral low stage cancer s/p resection. She has stopped  smoking/vaping as well. Her lung function was normal. A hypersensitivity panel done because of her bird (cockatiels) exposure, testing was +, PFT and CT are stable/normal.  Her dog Milo. Her PPD test was negative. Her alpha-1 antitrypsin test was negative. She is to continue her bronchodilators - as adjusted by her insurance. Samples were given. We will see her back in a 6 months for follow up. We hope this updates you on our evaluation and clinical thinking. Thank you for entrusting us to participate in [facility].        Sincerely,    Vesna Colón D.O., MPH, Luc Miner  Professor of Medicine  Director, [facility]

## 2020-11-24 ENCOUNTER — HOSPITAL ENCOUNTER (OUTPATIENT)
Age: 70
Discharge: HOME | End: 2020-11-24
Payer: MEDICARE

## 2020-11-24 LAB
BUN BLDV-MCNC: 25 MG/DL (ref 8–23)
CREAT SERPL-MCNC: 1 MG/DL (ref 0.5–1)
GFR AFRICAN AMERICAN: >60
GFR NON-AFRICAN AMERICAN: >60 ML/MIN/1.73

## 2020-11-24 PROCEDURE — 36415 COLL VENOUS BLD VENIPUNCTURE: CPT

## 2020-11-24 PROCEDURE — 84520 ASSAY OF UREA NITROGEN: CPT

## 2020-11-24 PROCEDURE — 82565 ASSAY OF CREATININE: CPT

## 2020-12-18 ENCOUNTER — TELEPHONE (OUTPATIENT)
Dept: INFUSION THERAPY | Age: 70
End: 2020-12-18

## 2020-12-18 NOTE — TELEPHONE ENCOUNTER
Called and spoke to Reza harper about cancelling upcoming appointment with Dr. Ede Cherry on Monday 12-, patient did not have her CT scan done. Patient sates that she did have it scheduled in November but had to cancel it due to the fact she had to quarantine. I gave patient the number to call and reschedule her appointment [phone] option 2. Patient verbalizes understanding and I notified front office ( krista) of appointment cancellation for Monday and that patient will call us after the test for follow up appointment.  Loretta Holguin, DINA

## 2020-12-21 ENCOUNTER — HOSPITAL ENCOUNTER (OUTPATIENT)
Dept: INFUSION THERAPY | Age: 70
End: 2020-12-21
Payer: MEDICARE

## 2020-12-30 ENCOUNTER — HOSPITAL ENCOUNTER (OUTPATIENT)
Dept: CT IMAGING | Age: 70
Discharge: HOME | End: 2021-01-01
Payer: MEDICARE

## 2020-12-30 DIAGNOSIS — C34.11 MALIGNANT NEOPLASM OF UPPER LOBE OF RIGHT LUNG (HCC): ICD-10-CM

## 2020-12-30 DIAGNOSIS — Z85.118 HISTORY OF LUNG CANCER: ICD-10-CM

## 2020-12-30 PROCEDURE — 6360000004 HC RX CONTRAST MEDICATION: Performed by: STUDENT IN AN ORGANIZED HEALTH CARE EDUCATION/TRAINING PROGRAM

## 2020-12-30 PROCEDURE — 71260 CT THORAX DX C+: CPT

## 2020-12-30 PROCEDURE — 2580000003 HC RX 258: Performed by: STUDENT IN AN ORGANIZED HEALTH CARE EDUCATION/TRAINING PROGRAM

## 2020-12-30 RX ORDER — SODIUM CHLORIDE 0.9 % (FLUSH) 0.9 %
10 SYRINGE (ML) INJECTION ONCE
Status: COMPLETED | OUTPATIENT
Start: 2020-12-30 | End: 2020-12-30

## 2020-12-30 RX ADMIN — IOPAMIDOL 90 ML: 755 INJECTION, SOLUTION INTRAVENOUS at 07:32

## 2020-12-30 RX ADMIN — SODIUM CHLORIDE, PRESERVATIVE FREE 10 ML: 5 INJECTION INTRAVENOUS at 07:32

## 2021-01-25 DIAGNOSIS — C34.11 MALIGNANT NEOPLASM OF UPPER LOBE OF RIGHT LUNG (HCC): Primary | ICD-10-CM

## 2021-01-28 ENCOUNTER — OFFICE VISIT (OUTPATIENT)
Dept: ONCOLOGY | Age: 71
End: 2021-01-28
Payer: MEDICARE

## 2021-01-28 ENCOUNTER — HOSPITAL ENCOUNTER (OUTPATIENT)
Dept: INFUSION THERAPY | Age: 71
Discharge: HOME | End: 2021-01-28
Payer: MEDICARE

## 2021-01-28 VITALS
BODY MASS INDEX: 25.03 KG/M2 | HEIGHT: 62 IN | HEART RATE: 85 BPM | WEIGHT: 136 LBS | DIASTOLIC BLOOD PRESSURE: 80 MMHG | SYSTOLIC BLOOD PRESSURE: 136 MMHG | TEMPERATURE: 97.7 F | OXYGEN SATURATION: 100 %

## 2021-01-28 DIAGNOSIS — C34.11 MALIGNANT NEOPLASM OF UPPER LOBE OF RIGHT LUNG (HCC): Primary | ICD-10-CM

## 2021-01-28 DIAGNOSIS — C34.11 MALIGNANT NEOPLASM OF UPPER LOBE OF RIGHT LUNG (HCC): ICD-10-CM

## 2021-01-28 LAB
ALBUMIN SERPL-MCNC: 3.8 G/DL (ref 3.5–5.2)
ALP BLD-CCNC: 103 U/L (ref 35–104)
ALT SERPL-CCNC: 40 U/L (ref 0–32)
ANION GAP SERPL CALCULATED.3IONS-SCNC: 7 MMOL/L (ref 7–16)
AST SERPL-CCNC: 48 U/L (ref 0–31)
BASOPHILS ABSOLUTE: 0.02 E9/L (ref 0–0.2)
BASOPHILS RELATIVE PERCENT: 0.4 % (ref 0–2)
BILIRUB SERPL-MCNC: <0.2 MG/DL (ref 0–1.2)
BUN BLDV-MCNC: 37 MG/DL (ref 8–23)
CALCIUM SERPL-MCNC: 9.1 MG/DL (ref 8.6–10.2)
CHLORIDE BLD-SCNC: 107 MMOL/L (ref 98–107)
CO2: 27 MMOL/L (ref 22–29)
CREAT SERPL-MCNC: 1 MG/DL (ref 0.5–1)
EOSINOPHILS ABSOLUTE: 0.05 E9/L (ref 0.05–0.5)
EOSINOPHILS RELATIVE PERCENT: 0.9 % (ref 0–6)
GFR AFRICAN AMERICAN: >60
GFR NON-AFRICAN AMERICAN: >60 ML/MIN/1.73
GLUCOSE BLD-MCNC: 99 MG/DL (ref 74–99)
HCT VFR BLD CALC: 30.2 % (ref 34–48)
HEMOGLOBIN: 9.9 G/DL (ref 11.5–15.5)
IMMATURE GRANULOCYTES #: 0.02 E9/L
IMMATURE GRANULOCYTES %: 0.4 % (ref 0–5)
LYMPHOCYTES ABSOLUTE: 1.25 E9/L (ref 1.5–4)
LYMPHOCYTES RELATIVE PERCENT: 23.5 % (ref 20–42)
MCH RBC QN AUTO: 29.6 PG (ref 26–35)
MCHC RBC AUTO-ENTMCNC: 32.8 % (ref 32–34.5)
MCV RBC AUTO: 90.4 FL (ref 80–99.9)
MONOCYTES ABSOLUTE: 0.58 E9/L (ref 0.1–0.95)
MONOCYTES RELATIVE PERCENT: 10.9 % (ref 2–12)
NEUTROPHILS ABSOLUTE: 3.39 E9/L (ref 1.8–7.3)
NEUTROPHILS RELATIVE PERCENT: 63.9 % (ref 43–80)
PDW BLD-RTO: 14.1 FL (ref 11.5–15)
PLATELET # BLD: 176 E9/L (ref 130–450)
PMV BLD AUTO: 11 FL (ref 7–12)
POTASSIUM SERPL-SCNC: 3.7 MMOL/L (ref 3.5–5)
RBC # BLD: 3.34 E12/L (ref 3.5–5.5)
SODIUM BLD-SCNC: 141 MMOL/L (ref 132–146)
TOTAL PROTEIN: 8 G/DL (ref 6.4–8.3)
WBC # BLD: 5.3 E9/L (ref 4.5–11.5)

## 2021-01-28 PROCEDURE — 1036F TOBACCO NON-USER: CPT | Performed by: INTERNAL MEDICINE

## 2021-01-28 PROCEDURE — G8420 CALC BMI NORM PARAMETERS: HCPCS | Performed by: INTERNAL MEDICINE

## 2021-01-28 PROCEDURE — 85025 COMPLETE CBC W/AUTO DIFF WBC: CPT

## 2021-01-28 PROCEDURE — G8484 FLU IMMUNIZE NO ADMIN: HCPCS | Performed by: INTERNAL MEDICINE

## 2021-01-28 PROCEDURE — 3017F COLORECTAL CA SCREEN DOC REV: CPT | Performed by: INTERNAL MEDICINE

## 2021-01-28 PROCEDURE — 99213 OFFICE O/P EST LOW 20 MIN: CPT

## 2021-01-28 PROCEDURE — 36415 COLL VENOUS BLD VENIPUNCTURE: CPT

## 2021-01-28 PROCEDURE — G8399 PT W/DXA RESULTS DOCUMENT: HCPCS | Performed by: INTERNAL MEDICINE

## 2021-01-28 PROCEDURE — 1090F PRES/ABSN URINE INCON ASSESS: CPT | Performed by: INTERNAL MEDICINE

## 2021-01-28 PROCEDURE — G8427 DOCREV CUR MEDS BY ELIG CLIN: HCPCS | Performed by: INTERNAL MEDICINE

## 2021-01-28 PROCEDURE — 80053 COMPREHEN METABOLIC PANEL: CPT

## 2021-01-28 PROCEDURE — 4040F PNEUMOC VAC/ADMIN/RCVD: CPT | Performed by: INTERNAL MEDICINE

## 2021-01-28 PROCEDURE — 99214 OFFICE O/P EST MOD 30 MIN: CPT | Performed by: INTERNAL MEDICINE

## 2021-01-28 PROCEDURE — 1123F ACP DISCUSS/DSCN MKR DOCD: CPT | Performed by: INTERNAL MEDICINE

## 2021-01-28 RX ORDER — CHOLECALCIFEROL (VITAMIN D3) 1250 MCG
CAPSULE ORAL
COMMUNITY
End: 2022-06-28

## 2021-01-28 NOTE — PROGRESS NOTES
Department of [facility] Oncology  Attending Clinic Note    Reason for Visit: Follow-up on a patient with NSCLC    PCP:  Sherlyn Batista MD    History of Present Illness:   72 y.o. -American female with significant history of tobacco abuse of approximately 30 pack years, quit smoking June 7, 2015. She has been followed for a left upper lobe mass by Dr. Johnson Bolden. Multiple biopsies in the past came back negative for malignancy. CT scan chest on 08/31/2015 showed a new right upper lobe mass. Bronchoscopy with washing RUL mass on 10/15/2015 was positive for RUL Lung Adenocarcinoma. Negative for malignancy on BAL and TBNA of Lingular lesion. PET/CT scan on 09/29/2015:  1. New since prior examination on CT is 1.7 cm right paratracheal   nodule. Nodule is intensely hypermetabolic with SUV max of 4.8   worrisome for tumor. 2. Previously known nodule in the lingula now measures 2.3 cm in   size. SUV max has increased from 1.7 on the 2012 examination to   6.4 at this time. At this time findings are worrisome for   hypermetabolic tumor with avid glucose metabolism. Therefore, she was referred to see Dr. Ash Rose for resection. CT guided biopsy of the left upper lobe lesion on 11/02/2015 was noted to be negative for malignancy. On 11/11/2015 She underwent: (1) Bronchoscopy. (2) Right VATS. (3) VATS right upper lobe wedge resection. (4) VATS right upper lobectomy. (5) Intercostal nerve block from T3 to T10. (6) Mediastinal lymph node dissection. Pathology:  Right lung, upper lobectomy: Invasive, moderately differentiated adenocarcinoma (Grade 2). Tumor size 1.5 cm in greatest dimension. Surgical margin negative for malignancy. Two of three peribronchial lymph nodes positive for adenocarcinoma. pT1a N1 MX  She was referred to the medical oncology clinic to discuss adjuvant chemotherapy. We recommended 4 cycles of adjuvant chemotherapy consisting of Carboplatin/Alimta. Mediport placed 12/07/2015. Cycle # 1 of Lila Codding was on 12/09/2015. Cycle # 2 of Lila Codding was on 01/06/2016. Cycle # 3 of Lila Codding was on 01/27/2016. Cycle # 4 of Lila Codding was on 02/24/2016. PET SCAN 3.2016: The 2.1 cm left lung mass abutting the major fissure is hypermetabolic with SUV max of 5.4. Finding is worrisome for tumor with avid glucose metabolism. 2. Elsewhere there is unremarkable distribution of FDG activity without evidence of hypermetabolic metastases. On 03/29/2016 underwent Bronch/Left VATS/VATS wedge ROMELIA/VATS Left upper lobectomy/Mediasinal lymph node dissection/Intercostal nerve block from T3-T10 per Dr. Megan Campo. Invasive, well-differentiated adenocarcinoma (grade 1); Tumor size-1.4 cm in greatest dimension; Surgical margins-negative for malignancy; Lymphovascular invasion-not identified; TNM classification-pT2a N0 MX  B. AP window lymph node #1, excision: Anthracotic lymph node; negative for malignancy  C. AP window lymph node #2, excision: Anthracotic lymph node; negative for malignancy   D. Periaortic lymph node #1, excision: Fibroadipose tissue; lymph node not identified  E. Bronchial lymph node #1, excision: Anthracotic lymph node; negative for malignancy  F. Left lung, upper lobectomy: Emphysematous change; negative for malignancy  4 anthracotic lymph nodes negative for malignancy   G. Inferior pulmonary ligament lymph node, excision: Anthracotic lymph node; negative for malignancy  H. Bronchial lymph node, excision: Anthracotic lymph node; negative for malignancy. On surveillance per NCCN guidelines. Today 01/28/2021 for f/u and is doing well. Denies any complaints. Review of Systems;  CONSTITUTIONAL: No fever, chills. Fair appetite and energy level. ENMT: Eyes: No diplopia; Nose: No epistaxis. Mouth: No sore throat. RESPIRATORY: No hemoptysis, shortness of breath. CARDIOVASCULAR: No chest pain, palpitations.   GASTROINTESTINAL: No nausea, vomiting, abdominal pain, diarrhea/constipation. GENITOURINARY: No dysuria, urinary frequency, hematuria. NEURO: No syncope, presyncope, headache. Remainder ROS: Negative. Past Medical History:      Diagnosis Date    Hypertension 6/6/2011    Abnormal Pap smear 1/3/2011     Alpha-1 negative    Cholelithiasis 6/6/2011    Osteoarthritis     Lymphadenopathy      Cervical (-) ENT    Lung nodule      ROMELIA     Scoliosis     PPD negative 1/18/12    Adrenal adenoma      7 mm    Hemangioma of spleen     Insomnia     PVD (peripheral vascular disease) ([address] 5/6/2014    Uterine fibroid 5/6/2014    Hepatitis C 1/3/2011    Fibroids     Abnormal chest x-ray with multiple lung nodules 9/8/2015    Hyperlipidemia     Panlobular emphysema ([address] 11/12/2015    Malignant neoplasm of upper lobe of right lung ([address] 11/18/2015     Medications:  Reviewed and reconciled. Allergies: Allergies   Allergen Reactions    Ibuprofen Palpitations and Rash     Physical Exam:  Pulse 85   Temp 97.7 °F (36.5 °C)   Ht 5' 2\" (1.575 m)   Wt 136 lb (61.7 kg)   SpO2 100%   BMI 24.87 kg/m²    GENERAL: Alert, oriented x 3, not in acute distress. HEENT: PERRLA; EOMI. Oropharynx clear. NECK: Supple. Without lymphadenopathy. LUNGS: No wheezing, crackles or ronchi. CARDIOVASCULAR: Regular rate. No murmurs, rubs or gallops. ABDOMEN: Soft. Non-tender, non-distended. Positive bowel sounds. EXTREMITIES: Without clubbing, cyanosis, or edema. NEUROLOGIC: No focal deficits. ECOG PS 1    Impression/Plan:  72 y.o. female with Hx of smoking who underwent: (1) Bronchoscopy. (2) Right VATS. (3) VATS right upper lobe wedge resection. (4) VATS right upper lobectomy. (5) Intercostal nerve block from T3 to T10. (6) Mediastinal lymph node dissection on 11/11/2015:   Pathology:  Right lung, upper lobectomy: Invasive, moderately differentiated adenocarcinoma (Grade 2). Tumor size 1.5 cm in greatest dimension.  Visceral pleural invasion: Not identified. Visceral pleural invasion: Not identified. Surgical margin negative for malignancy. Two of three peribronchial lymph nodes positive for adenocarcinoma. pT1a N1 MX;     Being followed for a left upper lobe mass by Dr. Schuyler Benavides. Multiple biopsies in the past came back negative for malignancy. Hypermetabolic on PET/CT scan on 09/29/2015 (2.3 cm in size with SUV of 6.4). CT guided biopsy of the left upper lobe lesion on 11/02/2015 was noted to be negative for malignancy. She was referred to the medical oncology clinic to discuss adjuvant chemotherapy. We recommended 4 cycles of adjuvant chemotherapy consisting of Carboplatin/Alimta. Mediport placed 12/7/15. -MRI Brain on 12/8/15:  Negative for metastatic disease.   -We will repeat CT chest and PET/CT after 4 cycles of Carboplatin/Alimta. To follow on Lingular lesion as well. -Molecular studies (EGFR, ALK, ROS-1) were all Not Detected. Cycle # 1 of Radames Canary was on 12/09/2015. Cycle # 2 of Radames Canary was on 01/06/2016. Cycle # 3 of Radames Canary was on 01/27/2016. Cycle # 4 of Radames Canary was on 02/24/2016. PET SCAN 3.2016: The 2.1 cm left lung mass abutting the major fissure is hypermetabolic with SUV max of 5.4. Finding is worrisome for tumor with avid glucose metabolism. 2. Elsewhere there is unremarkable distribution of FDG activity without evidence of hypermetabolic metastases. On 03/29/2016 underwent Bronch/Left VATS/VATS wedge ROMELIA/VATS Left upper lobectomy/Mediasinal lymph node dissection/Intercostal nerve block from T3-T10 per Dr. Dee Mcneill. Final pathology revealed Stage I Adenocarcinoma of ROMELIA (morphologically different from the adenocarcinoma previously diagnosed in the right upper lobe. Therefore, synchronous primary tumors are favored). A. Left lung, upper lobe wedge resection: Invasive, well-differentiated adenocarcinoma (grade 1);  Tumor size-1.4 cm in greatest dimension; Surgical margins-negative for malignancy; Lymphovascular invasion-not identified; TNM classification-pT2a N0 MX  B. AP window lymph node #1, excision: Anthracotic lymph node; negative for malignancy  C. AP window lymph node #2, excision: Anthracotic lymph node; negative for malignancy   D. Periaortic lymph node #1, excision: Fibroadipose tissue; lymph node not identified  E. Bronchial lymph node #1, excision: Anthracotic lymph node; negative for malignancy  F. Left lung, upper lobectomy: Emphysematous change; negative for malignancy  4 anthracotic lymph nodes negative for malignancy   G. Inferior pulmonary ligament lymph node, excision: Anthracotic lymph node; negative for malignancy  H. Bronchial lymph node, excision: Anthracotic lymph node; negative for malignancy. Right side Mediport was removed on 11/04/2016 by Dr. Shayy Osborn. On surveillance per NCCN guidelines. CT chest 04/12/2017 noted no convincing evidence of recurrent disease. CT chest 10/19/2017 noted no definite evidence for recurrent malignancy. CT chest 03/12/2018 negative for pulmonary parenchymal masses or enlarged mediastinal or hilar LN. ? 2 cm lesion in spleen difficult to evaluate due to the arterial phase of the study. CT Abd/Pelvis 05/08/2018  Enhancing lesion within the spleen is relatively stable to slightly smaller when compared to April 2014 exam and likely splenic hemangioma. Other indeterminate findings (left periaortic LN, sclerotic/blastic foci in L3 and L1 reported by Dr. Darius Maldonado from radiology team). PET/CT scan 06/05/2018 unremarkable. No FDG avid uptake identified. No evidence for recurrent or metastatic disease. CT Chest 12/13/2018 noted no evidence of recurrent/metastatic disease. CT chest on 06/11/2019 noted no evidence for worrisome residual or recurrent malignancy. No evidence for new lymphadenopathy or metastatic disease. Stable scarring and postoperative changes right upper lobe.   CT chest 11/26/2019: No evidence of active neoplasm. CT chest 06/15/2020: No evidence of active neoplasm. CT chest 12/30/2020: Postsurgical changes and postsurgical scarring seen within the right lung apex.  There is no evidence of tumor recurrence. 2.1 x 2.3 cm enhancing lesion seen within the spleen  PET/CT scan ordered for further evaluation  RTC in 2 weeks.      Rosetta Calix MD   [id number]  Board Certified Medical Oncologist

## 2021-02-11 ENCOUNTER — HOSPITAL ENCOUNTER (OUTPATIENT)
Dept: INFUSION THERAPY | Age: 71
End: 2021-02-11

## 2021-02-15 DIAGNOSIS — C34.11 MALIGNANT NEOPLASM OF UPPER LOBE OF RIGHT LUNG (HCC): Primary | ICD-10-CM

## 2021-03-02 ENCOUNTER — HOSPITAL ENCOUNTER (OUTPATIENT)
Dept: NUCLEAR MEDICINE | Age: 71
Discharge: HOME | End: 2021-03-04
Payer: MEDICARE

## 2021-03-02 DIAGNOSIS — C34.11 MALIGNANT NEOPLASM OF UPPER LOBE OF RIGHT LUNG (HCC): ICD-10-CM

## 2021-03-02 PROCEDURE — 78815 PET IMAGE W/CT SKULL-THIGH: CPT

## 2021-03-02 PROCEDURE — 78815 PET IMAGE W/CT SKULL-THIGH: CPT | Performed by: RADIOLOGY

## 2021-03-02 PROCEDURE — A9552 F18 FDG: HCPCS | Performed by: RADIOLOGY

## 2021-03-02 PROCEDURE — 3430000000 HC RX DIAGNOSTIC RADIOPHARMACEUTICAL: Performed by: RADIOLOGY

## 2021-03-02 RX ORDER — FLUDEOXYGLUCOSE F 18 200 MCI/ML
16 INJECTION, SOLUTION INTRAVENOUS
Status: COMPLETED | OUTPATIENT
Start: 2021-03-02 | End: 2021-03-02

## 2021-03-02 RX ADMIN — FLUDEOXYGLUCOSE F 18 16 MILLICURIE: 200 INJECTION, SOLUTION INTRAVENOUS at 09:15

## 2021-03-04 ENCOUNTER — OFFICE VISIT (OUTPATIENT)
Dept: ONCOLOGY | Age: 71
End: 2021-03-04
Payer: MEDICARE

## 2021-03-04 ENCOUNTER — HOSPITAL ENCOUNTER (OUTPATIENT)
Dept: INFUSION THERAPY | Age: 71
Discharge: HOME | End: 2021-03-04
Payer: MEDICARE

## 2021-03-04 VITALS
BODY MASS INDEX: 25.85 KG/M2 | OXYGEN SATURATION: 98 % | DIASTOLIC BLOOD PRESSURE: 90 MMHG | SYSTOLIC BLOOD PRESSURE: 160 MMHG | RESPIRATION RATE: 18 BRPM | HEIGHT: 61 IN | TEMPERATURE: 97.6 F | WEIGHT: 136.9 LBS | HEART RATE: 77 BPM

## 2021-03-04 DIAGNOSIS — C34.90 NON-SMALL CELL LUNG CANCER, UNSPECIFIED LATERALITY (HCC): Primary | ICD-10-CM

## 2021-03-04 DIAGNOSIS — C34.11 MALIGNANT NEOPLASM OF UPPER LOBE OF RIGHT LUNG (HCC): ICD-10-CM

## 2021-03-04 PROCEDURE — G8484 FLU IMMUNIZE NO ADMIN: HCPCS | Performed by: INTERNAL MEDICINE

## 2021-03-04 PROCEDURE — 3017F COLORECTAL CA SCREEN DOC REV: CPT | Performed by: INTERNAL MEDICINE

## 2021-03-04 PROCEDURE — 4040F PNEUMOC VAC/ADMIN/RCVD: CPT | Performed by: INTERNAL MEDICINE

## 2021-03-04 PROCEDURE — 1090F PRES/ABSN URINE INCON ASSESS: CPT | Performed by: INTERNAL MEDICINE

## 2021-03-04 PROCEDURE — 1123F ACP DISCUSS/DSCN MKR DOCD: CPT | Performed by: INTERNAL MEDICINE

## 2021-03-04 PROCEDURE — G8399 PT W/DXA RESULTS DOCUMENT: HCPCS | Performed by: INTERNAL MEDICINE

## 2021-03-04 PROCEDURE — G8417 CALC BMI ABV UP PARAM F/U: HCPCS | Performed by: INTERNAL MEDICINE

## 2021-03-04 PROCEDURE — 99213 OFFICE O/P EST LOW 20 MIN: CPT

## 2021-03-04 PROCEDURE — 1036F TOBACCO NON-USER: CPT | Performed by: INTERNAL MEDICINE

## 2021-03-04 PROCEDURE — G8427 DOCREV CUR MEDS BY ELIG CLIN: HCPCS | Performed by: INTERNAL MEDICINE

## 2021-03-04 PROCEDURE — 99214 OFFICE O/P EST MOD 30 MIN: CPT | Performed by: INTERNAL MEDICINE

## 2021-03-04 NOTE — PROGRESS NOTES
Department of [facility] Oncology  Attending Clinic Note    Reason for Visit: Follow-up on a patient with NSCLC    PCP:  May Inman MD    History of Present Illness:   72 y.o. -American female with significant history of tobacco abuse of approximately 30 pack years, quit smoking June 7, 2015. She has been followed for a left upper lobe mass by Dr. Luis Fernando Esqueda. Multiple biopsies in the past came back negative for malignancy. CT scan chest on 08/31/2015 showed a new right upper lobe mass. Bronchoscopy with washing RUL mass on 10/15/2015 was positive for RUL Lung Adenocarcinoma. Negative for malignancy on BAL and TBNA of Lingular lesion. PET/CT scan on 09/29/2015:  1. New since prior examination on CT is 1.7 cm right paratracheal   nodule. Nodule is intensely hypermetabolic with SUV max of 4.8   worrisome for tumor. 2. Previously known nodule in the lingula now measures 2.3 cm in   size. SUV max has increased from 1.7 on the 2012 examination to   6.4 at this time. At this time findings are worrisome for   hypermetabolic tumor with avid glucose metabolism. Therefore, she was referred to see Dr. Derick Vázquez for resection. CT guided biopsy of the left upper lobe lesion on 11/02/2015 was noted to be negative for malignancy. On 11/11/2015 She underwent: (1) Bronchoscopy. (2) Right VATS. (3) VATS right upper lobe wedge resection. (4) VATS right upper lobectomy. (5) Intercostal nerve block from T3 to T10. (6) Mediastinal lymph node dissection. Pathology:  Right lung, upper lobectomy: Invasive, moderately differentiated adenocarcinoma (Grade 2). Tumor size 1.5 cm in greatest dimension. Surgical margin negative for malignancy. Two of three peribronchial lymph nodes positive for adenocarcinoma. pT1a N1 MX  She was referred to the medical oncology clinic to discuss adjuvant chemotherapy. We recommended 4 cycles of adjuvant chemotherapy consisting of Carboplatin/Alimta. Mediport placed 12/07/2015. Cycle # 1 of Sarthak Poke was on 12/09/2015. Cycle # 2 of Sarthak Poke was on 01/06/2016. Cycle # 3 of Sarthak Poke was on 01/27/2016. Cycle # 4 of Sarthak Poke was on 02/24/2016. PET SCAN 3.2016: The 2.1 cm left lung mass abutting the major fissure is hypermetabolic with SUV max of 5.4. Finding is worrisome for tumor with avid glucose metabolism. 2. Elsewhere there is unremarkable distribution of FDG activity without evidence of hypermetabolic metastases. On 03/29/2016 underwent Bronch/Left VATS/VATS wedge ROMELIA/VATS Left upper lobectomy/Mediasinal lymph node dissection/Intercostal nerve block from T3-T10 per Dr. Heri Magallanes. Invasive, well-differentiated adenocarcinoma (grade 1); Tumor size-1.4 cm in greatest dimension; Surgical margins-negative for malignancy; Lymphovascular invasion-not identified; TNM classification-pT2a N0 MX  B. AP window lymph node #1, excision: Anthracotic lymph node; negative for malignancy  C. AP window lymph node #2, excision: Anthracotic lymph node; negative for malignancy   D. Periaortic lymph node #1, excision: Fibroadipose tissue; lymph node not identified  E. Bronchial lymph node #1, excision: Anthracotic lymph node; negative for malignancy  F. Left lung, upper lobectomy: Emphysematous change; negative for malignancy  4 anthracotic lymph nodes negative for malignancy   G. Inferior pulmonary ligament lymph node, excision: Anthracotic lymph node; negative for malignancy  H. Bronchial lymph node, excision: Anthracotic lymph node; negative for malignancy. On surveillance per NCCN guidelines. Today 03/04/2021 for f/u and is doing well. Denies any complaints. Review of Systems;  CONSTITUTIONAL: No fever, chills. Fair appetite and energy level. ENMT: Eyes: No diplopia; Nose: No epistaxis. Mouth: No sore throat. RESPIRATORY: No hemoptysis, shortness of breath. CARDIOVASCULAR: No chest pain, palpitations.   GASTROINTESTINAL: No nausea, vomiting, abdominal pain, diarrhea/constipation. GENITOURINARY: No dysuria, urinary frequency, hematuria. NEURO: No syncope, presyncope, headache. Remainder ROS: Negative. Past Medical History:      Diagnosis Date    Hypertension 6/6/2011    Abnormal Pap smear 1/3/2011     Alpha-1 negative    Cholelithiasis 6/6/2011    Osteoarthritis     Lymphadenopathy      Cervical (-) ENT    Lung nodule      ROMELIA     Scoliosis     PPD negative 1/18/12    Adrenal adenoma      7 mm    Hemangioma of spleen     Insomnia     PVD (peripheral vascular disease) ([address] 5/6/2014    Uterine fibroid 5/6/2014    Hepatitis C 1/3/2011    Fibroids     Abnormal chest x-ray with multiple lung nodules 9/8/2015    Hyperlipidemia     Panlobular emphysema ([address] 11/12/2015    Malignant neoplasm of upper lobe of right lung ([facility] [address] 11/18/2015     Medications:  Reviewed and reconciled. Allergies: Allergies   Allergen Reactions    Ibuprofen Palpitations and Rash     Physical Exam:  BP (!) 160/90 (Site: Left Upper Arm, Position: Sitting) Comment: Patient just took BP medication this AM.  Pulse 77   Temp 97.6 °F (36.4 °C) (Infrared)   Resp 18   Ht 5' 1\" (1.549 m)   Wt 136 lb 14.4 oz (62.1 kg)   SpO2 98%   BMI 25.87 kg/m²    GENERAL: Alert, oriented x 3, not in acute distress. HEENT: PERRLA; EOMI. Oropharynx clear. NECK: Supple. Without lymphadenopathy. LUNGS: No wheezing, crackles or ronchi. CARDIOVASCULAR: Regular rate. No murmurs, rubs or gallops. ABDOMEN: Soft. Non-tender, non-distended. Positive bowel sounds. EXTREMITIES: Without clubbing, cyanosis, or edema. NEUROLOGIC: No focal deficits. ECOG PS 1    Impression/Plan:  72 y.o. female with Hx of smoking who underwent: (1) Bronchoscopy. (2) Right VATS. (3) VATS right upper lobe wedge resection. (4) VATS right upper lobectomy.  (5) Intercostal nerve block from T3 to T10. (6) Mediastinal lymph node dissection on 11/11/2015:   Pathology:  Right lung, upper lobectomy: Invasive, moderately differentiated adenocarcinoma (Grade 2). Tumor size 1.5 cm in greatest dimension. Visceral pleural invasion: Not identified. Visceral pleural invasion: Not identified. Surgical margin negative for malignancy. Two of three peribronchial lymph nodes positive for adenocarcinoma. pT1a N1 MX;     Being followed for a left upper lobe mass by Dr. Latesha Acevedo. Multiple biopsies in the past came back negative for malignancy. Hypermetabolic on PET/CT scan on 09/29/2015 (2.3 cm in size with SUV of 6.4). CT guided biopsy of the left upper lobe lesion on 11/02/2015 was noted to be negative for malignancy. She was referred to the medical oncology clinic to discuss adjuvant chemotherapy. We recommended 4 cycles of adjuvant chemotherapy consisting of Carboplatin/Alimta. Mediport placed 12/7/15. -MRI Brain on 12/8/15:  Negative for metastatic disease.   -We will repeat CT chest and PET/CT after 4 cycles of Carboplatin/Alimta. To follow on Lingular lesion as well. -Molecular studies (EGFR, ALK, ROS-1) were all Not Detected. Cycle # 1 of Izola Raspberry was on 12/09/2015. Cycle # 2 of Izola Raspberry was on 01/06/2016. Cycle # 3 of Izola Raspberry was on 01/27/2016. Cycle # 4 of Izola Raspberry was on 02/24/2016. PET SCAN 3.2016: The 2.1 cm left lung mass abutting the major fissure is hypermetabolic with SUV max of 5.4. Finding is worrisome for tumor with avid glucose metabolism. 2. Elsewhere there is unremarkable distribution of FDG activity without evidence of hypermetabolic metastases. On 03/29/2016 underwent Bronch/Left VATS/VATS wedge ROMELIA/VATS Left upper lobectomy/Mediasinal lymph node dissection/Intercostal nerve block from T3-T10 per Dr. Franc Loco. Final pathology revealed Stage I Adenocarcinoma of ROMELIA (morphologically different from the adenocarcinoma previously diagnosed in the right upper lobe. Therefore, synchronous primary tumors are favored).     A. Left lung, upper lobe wedge resection: Invasive, well-differentiated adenocarcinoma (grade 1); Tumor size-1.4 cm in greatest dimension; Surgical margins-negative for malignancy; Lymphovascular invasion-not identified; TNM classification-pT2a N0 MX  B. AP window lymph node #1, excision: Anthracotic lymph node; negative for malignancy  C. AP window lymph node #2, excision: Anthracotic lymph node; negative for malignancy   D. Periaortic lymph node #1, excision: Fibroadipose tissue; lymph node not identified  E. Bronchial lymph node #1, excision: Anthracotic lymph node; negative for malignancy  F. Left lung, upper lobectomy: Emphysematous change; negative for malignancy  4 anthracotic lymph nodes negative for malignancy   G. Inferior pulmonary ligament lymph node, excision: Anthracotic lymph node; negative for malignancy  H. Bronchial lymph node, excision: Anthracotic lymph node; negative for malignancy. Right side Mediport was removed on 11/04/2016 by Dr. Balta Ellsworth. On surveillance per NCCN guidelines. CT chest 04/12/2017 noted no convincing evidence of recurrent disease. CT chest 10/19/2017 noted no definite evidence for recurrent malignancy. CT chest 03/12/2018 negative for pulmonary parenchymal masses or enlarged mediastinal or hilar LN. ? 2 cm lesion in spleen difficult to evaluate due to the arterial phase of the study. CT Abd/Pelvis 05/08/2018  Enhancing lesion within the spleen is relatively stable to slightly smaller when compared to April 2014 exam and likely splenic hemangioma. Other indeterminate findings (left periaortic LN, sclerotic/blastic foci in L3 and L1 reported by Dr. Elizabeth Sullivan from radiology team). PET/CT scan 06/05/2018 unremarkable. No FDG avid uptake identified. No evidence for recurrent or metastatic disease. CT Chest 12/13/2018 noted no evidence of recurrent/metastatic disease. CT chest on 06/11/2019 noted no evidence for worrisome residual or recurrent malignancy.   No evidence for new lymphadenopathy or metastatic disease. Stable scarring and postoperative changes right upper lobe. CT chest 11/26/2019: No evidence of active neoplasm. CT chest 06/15/2020: No evidence of active neoplasm. CT chest 12/30/2020: Postsurgical changes and postsurgical scarring seen within the right lung apex.  There is no evidence of tumor recurrence. 2.1 x 2.3 cm enhancing lesion seen within the spleen  PET/CT scan 03/02/2021   No FDG avid uptake is identified which exceeds the threshold SUV. No convincing evidence for recurrent or metastatic disease  Imaging reviewed.     RTC in 6 months with prior CT chest    Wil Jarvis MD   [id number]  Board Certified Medical Oncologist

## 2021-03-05 ENCOUNTER — OFFICE VISIT (OUTPATIENT)
Dept: PULMONOLOGY | Age: 71
End: 2021-03-05
Payer: MEDICARE

## 2021-03-05 VITALS
WEIGHT: 136 LBS | HEART RATE: 84 BPM | HEIGHT: 62 IN | RESPIRATION RATE: 16 BRPM | BODY MASS INDEX: 25.03 KG/M2 | OXYGEN SATURATION: 97 % | TEMPERATURE: 97 F

## 2021-03-05 DIAGNOSIS — J43.1 PANLOBULAR EMPHYSEMA (HCC): Primary | Chronic | ICD-10-CM

## 2021-03-05 LAB
EXPIRATORY TIME: 7.63 SEC
FEF 25-75% %PRED-PRE: 119 L/SEC
FEF 25-75% PRED: NORMAL L/SEC
FEF 25-75%-PRE: 1.88 L/SEC
FEV1 %PRED-PRE: 108 %
FEV1 PRED: 1.78 L
FEV1/FVC %PRED-PRE: 101 %
FEV1/FVC PRED: 79 %
FEV1/FVC: 80 %
FEV1: 1.93 L
FVC %PRED-PRE: 105 %
FVC PRED: 2.27 L
FVC: 2.4 L
PEF %PRED-PRE: 63 L/SEC
PEF PRED: 4.54 L/SEC
PEF-PRE: 2.9 L/SEC

## 2021-03-05 PROCEDURE — 3017F COLORECTAL CA SCREEN DOC REV: CPT | Performed by: INTERNAL MEDICINE

## 2021-03-05 PROCEDURE — G8428 CUR MEDS NOT DOCUMENT: HCPCS | Performed by: INTERNAL MEDICINE

## 2021-03-05 PROCEDURE — 99213 OFFICE O/P EST LOW 20 MIN: CPT | Performed by: INTERNAL MEDICINE

## 2021-03-05 PROCEDURE — G8484 FLU IMMUNIZE NO ADMIN: HCPCS | Performed by: INTERNAL MEDICINE

## 2021-03-05 PROCEDURE — G8420 CALC BMI NORM PARAMETERS: HCPCS | Performed by: INTERNAL MEDICINE

## 2021-03-05 PROCEDURE — 1036F TOBACCO NON-USER: CPT | Performed by: INTERNAL MEDICINE

## 2021-03-05 PROCEDURE — 1090F PRES/ABSN URINE INCON ASSESS: CPT | Performed by: INTERNAL MEDICINE

## 2021-03-05 PROCEDURE — G8925 SPIR FEV1/FVC>=60% & NO COPD: HCPCS | Performed by: INTERNAL MEDICINE

## 2021-03-05 PROCEDURE — 1123F ACP DISCUSS/DSCN MKR DOCD: CPT | Performed by: INTERNAL MEDICINE

## 2021-03-05 PROCEDURE — G8399 PT W/DXA RESULTS DOCUMENT: HCPCS | Performed by: INTERNAL MEDICINE

## 2021-03-05 PROCEDURE — 94010 BREATHING CAPACITY TEST: CPT | Performed by: INTERNAL MEDICINE

## 2021-03-05 PROCEDURE — 4040F PNEUMOC VAC/ADMIN/RCVD: CPT | Performed by: INTERNAL MEDICINE

## 2021-03-05 PROCEDURE — 3023F SPIROM DOC REV: CPT | Performed by: INTERNAL MEDICINE

## 2021-03-05 ASSESSMENT — PULMONARY FUNCTION TESTS
FEV1/FVC_PREDICTED: 79
FVC_PREDICTED: 2.27
FEV1: 1.93
FEV1_PERCENT_PREDICTED_PRE: 108
FEV1/FVC_PERCENT_PREDICTED_PRE: 101
FVC: 2.40
FEV1/FVC: 80
FVC_PERCENT_PREDICTED_PRE: 105
FEV1_PREDICTED: 1.78

## 2021-03-05 NOTE — PROGRESS NOTES
[facility]  Department of Internal Medicine  Division of Pulmonary, Critical Care and Sleep Medicine  Office Note      Dear Aneta Rolon, DO    We had the pleasure of seeing Elijah Leavitt, in the [address] at [facility] regarding her ROMELIA Stage I NSCLC, Stage II A RULadenocarcinoma S/P bilateral resection, & COPD. HISTORY OF PRESENT ILLNESS:  Jes Ramirez has a past medical history of HTN, HLD, emphysema, scoliosis, OA, cholelithiasis, lung nodule, adrenal adenoma (7 mm), uterine fibroids, hemangioma of spleen, Hep C, ex-smoker (30 pack yrs, quit June, 2015), RUL 1.5 cm moderately differentiated adenocarcinoma (pT1a N1 Mx) S/p VATS right upper lobectomy & mediastinal LN dissection, S/p Mediport (12/7/15) for chemo- Fredick Laity is being followed for a persistent lung infiltrate of/nodule found on evaluation during the workup of cervical adenopathy. Access submental, mandibular adenopathy. Apparently during her routine visit in the clinic she had noticed a right sided submandibular, cervical fullness which was felt to be an enlarged lymph gland which was also confirmed by CT imaging. She was referred on to surgery for a histologic evaluation, but nothing was performed and the adenopathy was felt to be reactive. The adenopathy, however, has continued over now a 6 month period. Despite these findings, she continues to use and smokes cigarettes of at least 1 to 1-1/2 packs per day. With the adenopathy and a CT scan of the chest was done. The CT scan initially done on July 13, 2011 was reviewed and compared to the one in October 25, 2011. The CT in July reveals a 7 mm nodular lesion in the right lobe posterior segment with a focal density in the lingular area, possibly representing focal pneumonia.   No mediastinal mass, adenopathy, pleural effusion, pericardial effusion, or pneumothorax were noted. An incidental finding of cholelithiasis was revealed. Over the following next few months, no additional findings were noted except for the follow-up in surgery as mentioned above. Ms. Adelina To states she has had a normal mammogram in October 2011, normal Pap and pelvic examination and a negative colonoscopy. She has a family history of cancer on both her father and mother's side. In October 2011 a repeat CT scan of the Chest was done to reevaluate the pulmonary lesions. The CT reveals persistent findings of a nodular lesion in the right lower lobe superior segment, stable when compared to previous. A continued focal density in the lingular segment, unchanged from previous with no adenopathy and continued cholelithiasis. She was referred for pulmonary evaluation due to persistent abnormalities. Ms. Adelina To denies any travel. She has multiple pets including cockatiels and dog. 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 There is no asbestos silica dust or coal exposure. No history of tuberculosis or tuberculosis exposure. During the evaluation for the lung mass. Antonio Field had a negative PET scan (SUV 1.7) and underwent ENB guided biopsy which was negative but poor cell sampling. She has a repeat CT of the Chest which we reviewed in conference. The CT shows no evidence of significant mediastinal lymphadenopathy. There is stable tiny right adrenal nodule measuring 7 mm, indeterminate. There is stable enhancing lesion in the spleen measuring 2.6 x 2.3 cm likely representing a hemangioma. There is stable focal opacity in the left lingula measuring 2.7 x 2.4 cm. This has not significantly changed from prior CT. The ground glass nodular lesion in the right upper lobe is unchanged. There is stable nodular lesion in the right lower lobe measuring approximately 8 mm in the superior segment. On discussion the plan is to continue to follow the lesion.      As you know, [facility] CT of the chest showed the left lung lesion and the small peripheral lung nodules (many)  Were unchanged. However a new right hilar lesion/adenopathy was at > 1 cm without adenopathy. We sent her for a PET scan and with her birds we did hypersensitivity panel. The PET scan was positive with the 1.7 cm right paratracheal nodule that is intensely hypermetabolic with SUV max of 4.8 worrisome for tumor. Also, the previously known nodule in the lingula now measures 2.3 cm in size. SUV max has increased from 1.7 on the 2012 examination to 6.4 at this time. She underwent a ENB for the lesions. The procedures/scope went well and we did find a RUL lesion in the airway and biopsies were + for adenocarcinoma. She also underwent biopsies again of the ROMELIA (lingular) lesion with electro-navigational bronchoscopy with are inconclusive with \"atypical cells\" Doing all this she underwent a left sided FNA which lead to a pneumothorax and NO answers were found. She was admitted with a anterior chest tube and recovered. She then came back to the hospital and a week later and on 11/11/2105 she underwent VATS/VATS RUL wedge/VATS Right Upper lobectomy/Intercostal nerve block from T3-T10/Mediastinal lymph node dissection by Dr. Derick Vázquez with findings of Stage IIA adenocarcinoma of the lung. Pathology reveled Right lung, upper lobectomy: Invasive, moderately differentiated adenocarcinoma (Grade 2). Surgical margin negative for malignancy. Two of three peribronchial lymph nodes positive for adenocarcinoma. She underwent chemotherapy and on follow up PET scan for the cancer surveillance the results were The 2.1 cm left lung mass abutting the major fissure is hypermetabolic with SUV max of 5.4. Finding is worrisome for tumor with avid glucose metabolism but elsewhere there is unremarkable distribution of FDG activity without evidence of hypermetabolic metastases. Thus my recommendation would be surgical resection.  We again discussed removal of her birds from the home. Then in March 2016 she underwent Bronch/Left VATS/VATS wedge ROMELIA/VATS left upper lobectomy/mediastinal lymph node dissection/Intercostal nerve block from T3-T10 per Dr. Becki Chandra on 3/29/2016, final pathology revealed Stage I Adenocarcinoma of ROMELIA (morphologically different from the adenocarcinoma previously diagnosed in the right upper lobe. Therefore, synchronous primary tumors are favored). Post operative she was discharge but returned with chest pain with the findings of a pulmonary embolism at the surgical line. She is doing well on coumadin. Vaping was discouraged. Aggressive testing with CT scan of the Abd/Pelivs 05/08/2018 which reported a enhancing lesion within the spleen is relatively stable to slightly smaller when compared to April 2014 exam and likely splenic hemangioma. Other indeterminate findings (left periaortic LN, sclerotic/blastic foci in L3 and L1 reported by Dr. Bruce Sullivan from radiology team which was a over read. Then she underwent a PET/CT scan 06/05/2018 unremarkable. No FDG avid uptake identified. No evidence for recurrent or metastatic disease. No clinical evidence of recurrence. She will be traveling to [place]. She is feeling well and had her GB removed. On today's visit, Alhaji Russell is doing well with no chest pain, worsening or declining SOB or new cough. She has no pleurisy, hemoptysis, but was concerned about her weight loss. She denies any leg swelling. SOB from time to time but on inhalers. She has no compliants. CT scan 20[address] was was negative for recurrence. Seem to be a small abnormality reported on the CAT scan thus oncology went ahead and ordered a PET. The PET scan showed no standard uptake value and relatively clean image. She is back to work intermittently. Blood pressure remains slightly elevated. Dietary measures were discussed.   She remain on the bronchodilator of Spiriva daily and follow-up in 6 months.     ALLERGIES:  lesion is unchanged  Allergies   Allergen Reactions    Ibuprofen Palpitations and Rash       PAST MEDICAL HISTORY:       Diagnosis Date    Abnormal chest x-ray with multiple lung nodules 9/8/2015    Abnormal Pap smear 1/3/2011    Alpha-1 negative    Adrenal adenoma     7 mm    Bilateral lung cancer ([facility] [address] 5/12/2016    surgically removed - 2015     Cholelithiasis 6/6/2011    Encounter for screening colonoscopy     for OR 3-28-19     Fibroids     Hemangioma of spleen     Hepatitis C 01/03/2011    treated and cured, no current issues     Hyperlipidemia     no medications     Hypertension 6/6/2011    Insomnia     Lung nodule     ROMELIA     Lymphadenopathy     Cervical (-) ENT    Malignant neoplasm of upper lobe of right lung (HCC) 11/18/2015    Osteoarthritis     Panlobular emphysema ([facility] [address] 11/12/2015    controlled with inhalers     PPD negative 1/18/12    Pulmonary embolism without acute cor pulmonale (HCC) 5/12/2016    PVD (peripheral vascular disease) ([facility] [address] 5/6/2014    Scoliosis     Uterine fibroid 5/6/2014        MEDICATIONS:   Current Outpatient Medications   Medication Sig Dispense Refill    Cholecalciferol (VITAMIN D3) 1.25 MG (95575 UT) CAPS Take by mouth      Multiple Vitamins-Minerals (THEREMS-M) TABS take 1 tablet by mouth once daily 30 tablet 11    vitamin B-12 (CYANOCOBALAMIN) 500 MCG tablet Take 1 tablet by mouth daily 30 tablet 11    tiotropium (SPIRIVA HANDIHALER) 18 MCG inhalation capsule Inhale 1 capsule into the lungs daily (Patient taking differently: Inhale 18 mcg into the lungs daily Instructed to take am of procedure) 90 capsule 3    lisinopril (PRINIVIL;ZESTRIL) 40 MG tablet take 1 tablet by mouth once daily 90 tablet 4    hydrochlorothiazide (HYDRODIURIL) 25 MG tablet Take 1 tablet by mouth daily 90 tablet 0    aspirin EC 81 MG EC tablet Take 1 tablet by mouth daily (Patient taking differently: Take 81 mg by mouth daily LD 3-23-19) 90 tablet 0    Multiple Vitamins-Minerals (THERAPEUTIC MULTIVITAMIN-MINERALS) tablet Take 1 tablet by mouth daily (Patient taking differently: Take 1 tablet by mouth daily LD 3) 90 tablet 5    Misc. Devices KIT BP monitoring KIT 1 kit 0     No current facility-administered medications for this visit. SOCIAL AND OCCUPATIONAL HEALTH:  The patient smoked 1 to 1.5 packs a cigarettes a day since the age of 14. She finished the smoking program and QUIT in 3.2012. Resumed on /off smoking. Then quit in 2016. She is smoked over 40 years in duration. There is no history of TB or TB exposure. There is no asbestos or silica dust exposure. The patient reports no coal, foundry, quarry or [employer] exposure. There is no recent travel history noted. The patient denies a history of recreational or IV drug use. No hot tub exposure. The patient has dogs (1) and birds (2). Previous hypersensitivity panel was negative. Hobbies include reading. EtoH: The patient denies excessive alcohol intake. She works in a plastics assembly line. She has birds and a pit bull dog. She has no children    SOCIAL HISTORY:   Social History     Tobacco Use    Smoking status: Former Smoker     Packs/day: 0.00     Years: 30.00     Pack years: 0.00     Quit date: 2015     Years since quittin.7    Smokeless tobacco: Never Used   Substance Use Topics    Alcohol use:  Yes     Alcohol/week: 2.0 standard drinks     Types: 2 Glasses of wine per week     Comment: x2 week    Drug use: No       SURGICAL HISTORY:   Past Surgical History:   Procedure Laterality Date    APPENDECTOMY  1965    BREAST LUMPECTOMY  1999    left, benign, Dr. Lanier Cushing, [facility] [address]  2012    Dr. Hilary Huff, [facility] [address]  10/15/15    with EBUS - DR Lady Kc    CHOLECYSTECTOMY      COLONOSCOPY  2009    partial to distal ascending colon normal (completion BE same day normal), Dr. Lanier Cushing, [facility] COLONOSCOPY  2014    done under fluoro with sigmoid straightening device so was able to get to cecum, very poor prep, moderate proctitis, repeat 5 years,  Dr. Ivis Ramirez, [facility] COLONOSCOPY N/A 3/28/2019    COLONOSCOPY POLYPECTOMY SNARE/COLD BIOPSY performed by Al Canada DO at [address] Left 3/29/2016    VATS WEDGE RESECTION UPPER LOBECTOMY    OTHER SURGICAL HISTORY Right 2016    REMOVAL MEDI-PORT - DR Car Carlton    [place] LAP,CHOLECYSTECTOMY/GRAPH N/A 2018    CHOLECYSTECTOMY LAPAROSCOPIC, POSSIBLE OPEN,POSSIBLE GRAM ( OC 2) performed by Radha Mayo MD at [address] Right 2015    VATS, Henrry McRae UPPER LOBECTOMY; NODE DISECTION    TUNNELED VENOUS PORT PLACEMENT Right 2015    right chest, and removed        FAMILY HISTORY:   Family History   Problem Relation Age of Onset    Heart Disease Mother     High Blood Pressure Mother     Cancer Mother     Heart Disease Father     High Blood Pressure Father     Kidney Disease Father     Diabetes Sister     Diabetes Brother       Family Status   Relation Name Status    Mother adri     Father Len Epperson     Sister reinaldo Garcia Files Sister ramses Alive        REVIEW OF SYSTEMS: The patients health assessment form was reviewed. PHYSICAL EXAMINATION:   Vitals:    21 1258   Pulse: 84   Resp: 16   Temp: 97 °F (36.1 °C)   SpO2: 97%   Weight: 136 lb (61.7 kg)   Height: 5' 2\" (1.575 m)     Constitutional: A 72 y.o. female who is alert, oriented, cooperative and in no apparent distress. Head was normocephalic and atraumatic. EENT: EOMI DEEPTI. MMM. No icterus. No conjunctival injections. External canals are patent and no discharge. Septum was midline, mucosa was without erythema, exudates or cobblestoning. No thrush. Neck: Supple without thyromegaly. No elevated JVP. Trachea was midline. No carotid bruits. Respiratory: Bilateral VATS incisions. Right  Upper thorax subq port noted. Clear to auscultation. No wheezes, rhonchi or rales. No egophony. Cardiovascular: Regular without murmur, clicks, gallops or rubs. No ventricular heave. Pulses:  Equal bilaterally. Vascular bruit. Abdomen: Soft without organomegaly. No rebound, rigidity. Lymphatic: No lymphadenopathy. Musculoskeletal: Ambulates without assistance. Normal curvature of the spine. No gross muscle weakness. No involuntary movements. Extremities:  No lower extremity edema or erythema. Deep tendon reflexes are normal.   Skin:  Warm and dry. Good color, turgor and pigmentation. No bruises or skin rashes. Old surgical scars. Neurological/Psychiatric: General behavior, level of consciousness, thought content is normal. Emotional status is normal.  Cranial nerves II-XII are intact. DATA:   The data collected below information that was obtained, reviewed, analyzed and interpreted today. Imaging test are reviewed with the radiologist during weekly conference rounds. Comparison to previous images are always explored. Office Spirometry demonstrates an FVC of 2.58 liters which is 112 % of predicted with an FEV1 of 1.84 liters which is 101 % of predicted. FEV1/FVC ratio is 71 %. Mid expiratory flow rates are 114 % of predicted. Maximum voluntary ventilation is 83 liters per minute or 97 % of predicted. Flow volume loop shows no signs of intrathoracic or extrathoracic process. Impression: Normalized lung function Post Op      CT SCAN CHEST 6/2020: Impression: No hilar or mediastinal lymphadenopathy is noted. There is a moderate amount of plaque in the arch and great vessels. No thoracic aneurysm is noted. There is plaque in the upper abdominal aorta. There is a small cyst in the upper pole right kidney. Spleen is heterogeneous and may relate to the timing of the contrast bolus. There is scarring in the right upper hemithorax. There are no infiltrates, effusions or lung nodules.     CT SCAN CHEST 6/5/2019: Impression  No evidence for worrisome residual or recurrent malignancy.  No evidence for new lymphadenopathy or metastatic disease. Stable scarring and postoperative changes right upper lobe. Stable right adrenal gland nodule. PET/CT SCAN 6/05/2018: Tracer uptake is noted involving the right left kidney I suspect physiologic. Tracer uptake in the abdomen and the chest is otherwise physiologic. Previously identified lesion in the right upper lobe is no longer visible. There is what is most likely sequela of previous right upper lobe therapy or surgery with evidence to suggest scar No significant periaortic uptake is identified.    There is otherwise a normal biodistribution of radiotracer. There is no other abnormal tracer uptake seen. Unremarkable. No FDG avid uptake identified. No evidence for recurrent or metastatic disease. No clinical evidence of recurrence.     CT SCAN CHEST 10/2017: Essentially stable bilateral pulmonary nodules measuring up to 2 mm in diameter, as detailed above. Continued follow up is suggested. 2.  Cholelithiasis and query mild pericholecystic free fluid. If clinically indicated, consider ultrasound for further evaluation. 3.  Stable 8 mm sclerotic focus within the second thoracic vertebral body suspicious for a bone island (or enostosis). However, in a patient with a known malignancy osteoblastic metastasis is also a consideration. 4.  Probable 2.7 cm splenic hemangioma, unchanged compared to prior examination from July 2011. 5.  Stable 8 mm right adrenal gland nodule. CT SCAN CHEST 7/2016:Impression: No central, segmental or subsegmental pulmonary emboli are demonstrated. In retrospect, The finding that was previously thought to represent embolism in the distal left pulmonary artery may have actually been fluid within the left upper lobe bronchus. There is no aneurysm or dissection of the thoracic aorta.  Postoperative volume loss in the left chest is present after the left lingula hypermetabolic with SUV max of 4.8 worrisome for tumor. 2. Previously known nodule in the lingula now measures 2.3 cm in size. SUV max has increased from 1.7 on the 2012 examination to 6.4 at this time. At this time findings are worrisome for hypermetabolic tumor with avid glucose metabolism. CT SCAN CHEST 4/2014: Impression: 1. Multiple nodules seen in the right lung remaining stable when compared with the previous. The largest nodule seen in the lingula remains stable when compared with the previous. 2. No new nodes are noted on today's study. 3. No consolidation, pleural effusion or pneumothorax. CT SCAN CHEST 12/2013: Films were read/reviewed/discussed with radiology again demonstrates a smooth noncalcified nodular mass measuring approximately 2 cm in the lingular segment of the left lower lobe. This has not changed in size or appearance since the last examination. The lungs demonstrate no further evidence of pulmonary nodule mass or infiltrate. The mediastinum demonstrates no pathologic adenopathy. There is no hilar adenopathy. There is no axillary anat disease. The adrenal glands are not enlarged. There are no bony abnormalities present. Impression: Stable appearance to the previously described lingular lesion with no new or acute findings present. PET SCAN: (2012)  Impression- Nodule in the lingula shows moderate FDG uptake with quantitation yielding an SUV max of 1.7. Values less than 2.5 sometimes are considered indicative of benign pulmonary pathology. I note that using visual criteria the nodule has greater activity than is seen in the heart and mediastinum which is suspicious. Biospy: ENB Procedure: (-) Malignancy (2012)    Hypersensitivity Panel (2015)   A.fumigatus #1 Abs   Negative     Micropoly. faeni Abs  Negative     Thermoa. vulgaris #1  POSITIVE (NEW)  Neg in 2012   A. pullulans Abs   Negative     Thermoact.  saccharii N Negative     [place] Serum Abs   Negative            IMPRESSION: Nathan Scott is a 72 y.o. female with smoking related COPD, who underwent: 11/11/2015 (1) Bronchoscopy. (2) Right VATS. (3) VATS right upper lobe wedge resection. (4) VATS right upper lobectomy. (5) Intercostal nerve block from T3 to T10. (6) Mediastinal lymph node dissection. Pathology: Right lung, upper lobectomy: Invasive, moderately differentiated adenocarcinoma (Grade 2). Tumor size 1.5 cm in greatest dimension, with two of three peribronchial lymph nodes positive for adenocarcinoma. pT1a N1 MX; (Stage IIA) She did adjuvant chemotherapy. We recommended 4 cycles of adjuvant chemotherapy consisting of Carboplatin/Alimta. Mediport placed 12/7/15. Then in March 2016 she underwent Bronch/Left VATS/VATS wedge ROMELIA/VATS left upper lobectomy/mediastinal lymph node dissection/Intercostal nerve block from T3-T10 per Dr. Dutch Ash on 3/29/2016, final pathology revealed Stage I Adenocarcinoma of ROMELIA (morphologically different from the adenocarcinoma previously diagnosed in the right upper lobe. Therefore, synchronous primary tumors are favored). She had a post operative pulmonary embolism finished 3 months of anticoagulation. CT negative in 2020. With this in mind, we would like to proceed with the following;                      PLAN:    She is doing well with no complaints. He yearly CT scan shows no signs of cancer recurrence. However there was a small spot reported in the CAT scan which required her to undergo a PET scan which was negative. Lung surveillance is ongoing for her bilateral low stage cancer s/p resection. She has stopped  smoking/vaping as well. Her lung function was normal. A hypersensitivity panel done because of her bird (cockatiels) exposure, testing was +, PFT and CT are stable/normal.  Her dog Milo. Her PPD test was negative. Her alpha-1 antitrypsin test was negative. She is to continue her bronchodilators - as adjusted by her insurance. Samples were given.  We will see her back in a 6 months for follow up. We hope this updates you on our evaluation and clinical thinking. Thank you for entrusting us to participate in Carole Jaeger care. Sincerely,    Waqar Burger D.O., MPH, [facility]  Professor of Internal Medicine  Director, [address] [place] [address]    Note: Carole Jaeger was seen and during this encounter a minimum of 25 minutes was spent providing face-to-face patient care, including:  and coordinating care, reviewing the chart, labs, and diagnostics, as well as medical decision making. Greater than 50% of this time was spent instructing and counseling the patient face to face regarding findings and recommendations.

## 2021-04-19 ENCOUNTER — HOSPITAL ENCOUNTER (OUTPATIENT)
Dept: GENERAL RADIOLOGY | Age: 71
Discharge: HOME | End: 2021-04-21
Payer: MEDICAID

## 2021-04-19 DIAGNOSIS — Z12.31 ENCOUNTER FOR SCREENING MAMMOGRAM FOR MALIGNANT NEOPLASM OF BREAST: ICD-10-CM

## 2021-04-19 PROCEDURE — 77063 BREAST TOMOSYNTHESIS BI: CPT

## 2021-05-03 DIAGNOSIS — Z85.118 HISTORY OF LUNG CANCER: Chronic | ICD-10-CM

## 2021-05-03 DIAGNOSIS — J43.1 PANLOBULAR EMPHYSEMA (HCC): Chronic | ICD-10-CM

## 2021-05-03 RX ORDER — CHOLECALCIFEROL (VITAMIN D3) 125 MCG
500 CAPSULE ORAL DAILY
Qty: 30 TABLET | Refills: 11 | Status: ON HOLD | OUTPATIENT
Start: 2021-05-03 | End: 2022-10-31

## 2021-09-28 ENCOUNTER — HOSPITAL ENCOUNTER (OUTPATIENT)
Age: 71
Discharge: HOME | End: 2021-09-28
Payer: MEDICAID

## 2021-09-28 ENCOUNTER — HOSPITAL ENCOUNTER (OUTPATIENT)
Dept: CT IMAGING | Age: 71
Discharge: HOME | End: 2021-09-30
Payer: MEDICAID

## 2021-09-28 DIAGNOSIS — C34.90 NON-SMALL CELL LUNG CANCER, UNSPECIFIED LATERALITY (HCC): ICD-10-CM

## 2021-09-28 DIAGNOSIS — C34.11 MALIGNANT NEOPLASM OF UPPER LOBE OF RIGHT LUNG (HCC): ICD-10-CM

## 2021-09-28 LAB
ALBUMIN SERPL-MCNC: 3.4 G/DL (ref 3.5–5.2)
ALP BLD-CCNC: 100 U/L (ref 35–104)
ALT SERPL-CCNC: 32 U/L (ref 0–32)
ANION GAP SERPL CALCULATED.3IONS-SCNC: 6 MMOL/L (ref 7–16)
AST SERPL-CCNC: 38 U/L (ref 0–31)
BILIRUB SERPL-MCNC: 0.3 MG/DL (ref 0–1.2)
BUN BLDV-MCNC: 22 MG/DL (ref 6–23)
CALCIUM SERPL-MCNC: 9.2 MG/DL (ref 8.6–10.2)
CHLORIDE BLD-SCNC: 104 MMOL/L (ref 98–107)
CO2: 30 MMOL/L (ref 22–29)
CREAT SERPL-MCNC: 1.2 MG/DL (ref 0.5–1)
GFR AFRICAN AMERICAN: 54
GFR NON-AFRICAN AMERICAN: 54 ML/MIN/1.73
GLUCOSE BLD-MCNC: 99 MG/DL (ref 74–99)
POTASSIUM SERPL-SCNC: 3.6 MMOL/L (ref 3.5–5)
SODIUM BLD-SCNC: 140 MMOL/L (ref 132–146)
TOTAL PROTEIN: 7.3 G/DL (ref 6.4–8.3)

## 2021-09-28 PROCEDURE — 80053 COMPREHEN METABOLIC PANEL: CPT

## 2021-09-28 PROCEDURE — 36415 COLL VENOUS BLD VENIPUNCTURE: CPT

## 2021-09-28 PROCEDURE — 71260 CT THORAX DX C+: CPT

## 2021-09-28 PROCEDURE — 6360000004 HC RX CONTRAST MEDICATION: Performed by: RADIOLOGY

## 2021-09-28 RX ADMIN — IOPAMIDOL 90 ML: 755 INJECTION, SOLUTION INTRAVENOUS at 09:37

## 2021-09-30 ENCOUNTER — OFFICE VISIT (OUTPATIENT)
Dept: ONCOLOGY | Age: 71
End: 2021-09-30
Payer: MEDICARE

## 2021-09-30 ENCOUNTER — HOSPITAL ENCOUNTER (OUTPATIENT)
Dept: INFUSION THERAPY | Age: 71
Discharge: HOME | End: 2021-09-30
Payer: MEDICARE

## 2021-09-30 VITALS
SYSTOLIC BLOOD PRESSURE: 158 MMHG | WEIGHT: 136.9 LBS | TEMPERATURE: 97.3 F | RESPIRATION RATE: 16 BRPM | BODY MASS INDEX: 25.04 KG/M2 | DIASTOLIC BLOOD PRESSURE: 78 MMHG

## 2021-09-30 DIAGNOSIS — C34.90 NON-SMALL CELL LUNG CANCER, UNSPECIFIED LATERALITY (HCC): Primary | ICD-10-CM

## 2021-09-30 PROCEDURE — G8428 CUR MEDS NOT DOCUMENT: HCPCS | Performed by: INTERNAL MEDICINE

## 2021-09-30 PROCEDURE — 3017F COLORECTAL CA SCREEN DOC REV: CPT | Performed by: INTERNAL MEDICINE

## 2021-09-30 PROCEDURE — G8417 CALC BMI ABV UP PARAM F/U: HCPCS | Performed by: INTERNAL MEDICINE

## 2021-09-30 PROCEDURE — 4040F PNEUMOC VAC/ADMIN/RCVD: CPT | Performed by: INTERNAL MEDICINE

## 2021-09-30 PROCEDURE — 1036F TOBACCO NON-USER: CPT | Performed by: INTERNAL MEDICINE

## 2021-09-30 PROCEDURE — 1123F ACP DISCUSS/DSCN MKR DOCD: CPT | Performed by: INTERNAL MEDICINE

## 2021-09-30 PROCEDURE — G8399 PT W/DXA RESULTS DOCUMENT: HCPCS | Performed by: INTERNAL MEDICINE

## 2021-09-30 PROCEDURE — 1090F PRES/ABSN URINE INCON ASSESS: CPT | Performed by: INTERNAL MEDICINE

## 2021-09-30 PROCEDURE — 99214 OFFICE O/P EST MOD 30 MIN: CPT | Performed by: INTERNAL MEDICINE

## 2021-09-30 PROCEDURE — 99213 OFFICE O/P EST LOW 20 MIN: CPT

## 2021-10-01 VITALS
SYSTOLIC BLOOD PRESSURE: 215 MMHG | OXYGEN SATURATION: 100 % | WEIGHT: 136 LBS | HEIGHT: 61 IN | BODY MASS INDEX: 25.68 KG/M2 | HEART RATE: 97 BPM | TEMPERATURE: 97.8 F | DIASTOLIC BLOOD PRESSURE: 92 MMHG | RESPIRATION RATE: 18 BRPM

## 2021-10-01 PROCEDURE — 99282 EMERGENCY DEPT VISIT SF MDM: CPT

## 2021-10-01 ASSESSMENT — PAIN SCALES - GENERAL: PAINLEVEL_OUTOF10: 10

## 2021-10-02 ENCOUNTER — HOSPITAL ENCOUNTER (EMERGENCY)
Age: 71
Discharge: LEFT BEFORE DISCHARGE | End: 2021-10-02
Payer: MEDICARE

## 2021-10-02 DIAGNOSIS — R10.13 ABDOMINAL PAIN, EPIGASTRIC: ICD-10-CM

## 2021-10-02 DIAGNOSIS — R11.2 NAUSEA AND VOMITING, INTRACTABILITY OF VOMITING NOT SPECIFIED, UNSPECIFIED VOMITING TYPE: Primary | ICD-10-CM

## 2021-10-02 RX ORDER — PANTOPRAZOLE SODIUM 40 MG/10ML
40 INJECTION, POWDER, LYOPHILIZED, FOR SOLUTION INTRAVENOUS ONCE
Status: DISCONTINUED | OUTPATIENT
Start: 2021-10-02 | End: 2021-10-02 | Stop reason: HOSPADM

## 2021-10-02 RX ORDER — ONDANSETRON 2 MG/ML
4 INJECTION INTRAMUSCULAR; INTRAVENOUS ONCE
Status: DISCONTINUED | OUTPATIENT
Start: 2021-10-02 | End: 2021-10-02 | Stop reason: HOSPADM

## 2021-10-02 NOTE — ED PROVIDER NOTES
[facility]    HPI:  10/2/21, Time: 12:39 AM EDT         Tierney Godinez is a 80 y.o. female presenting to the ED for epigastric abdominal pain and vomiting, beginning around 1130 this afternoon. The complaint has been intermittent, mild in severity, and worsened by nothing. Patient and her  report that they believe she has food poisoning after eating bad frozen food. Denies any chest pain or shortness of breath. Denies any change in bowel movements. She has been afebrile without recent travel or sick contacts. Patient denies all other symptoms at this time. Review of Systems:   A complete review of systems was performed and pertinent positives and negatives are stated within HPI, all other systems reviewed and are negative.          --------------------------------------------- PAST HISTORY ---------------------------------------------  Past Medical History:  has a past medical history of Abnormal chest x-ray with multiple lung nodules, Abnormal Pap smear, Adrenal adenoma, Bilateral lung cancer ([address] Cholelithiasis, Encounter for screening colonoscopy, Fibroids, Hemangioma of spleen, Hepatitis C, Hyperlipidemia, Hypertension, Insomnia, Lung nodule, Lymphadenopathy, Malignant neoplasm of upper lobe of right lung ([address] Osteoarthritis, Panlobular emphysema ([address] PPD negative, Pulmonary embolism without acute cor pulmonale ([address] PVD (peripheral vascular disease) ([address] Scoliosis, and Uterine fibroid. Past Surgical History:  has a past surgical history that includes Appendectomy (1965); Endometrial biopsy; Colonoscopy (12/21/2009); bronchoscopy (2/1/2012); Colonoscopy (8/22/2014); bronchoscopy (10/15/15); Thoracoscopy (Right, 11/11/2015); Tunneled venous port placement (Right, 12/07/2015); lobectomy (Left, 3/29/2016); other surgical history (Right, 11/04/2016); pr lap,cholecystectomy/graph (N/A, 6/29/2018); Cholecystectomy; Breast lumpectomy (4/28/1999);  Colonoscopy (N/A, 3/28/2019); and Breast biopsy (Left). Social History:  reports that she quit smoking about 6 years ago. She smoked 0.00 packs per day for 30.00 years. She has never used smokeless tobacco. She reports current alcohol use of about 2.0 standard drinks of alcohol per week. She reports that she does not use drugs. Family History: family history includes Cancer in her mother; Diabetes in her brother and sister; Heart Disease in her father and mother; High Blood Pressure in her father and mother; Kidney Disease in her father. The patients home medications have been reviewed. Allergies: Ibuprofen    -------------------------------------------------- RESULTS -------------------------------------------------  All laboratory and radiology results have been personally reviewed by myself   LABS:  No results found for this visit on 10/02/21. RADIOLOGY:  Interpreted by Radiologist.  No orders to display         ------------------------- NURSING NOTES AND VITALS REVIEWED ---------------------------   The nursing notes within the ED encounter and vital signs as below have been reviewed. BP (!) 215/92   Pulse 97   Temp 97.8 °F (36.6 °C) (Temporal)   Resp 18   Ht 5' 1\" (1.549 m)   Wt 136 lb (61.7 kg)   SpO2 100%   BMI 25.70 kg/m²   Oxygen Saturation Interpretation: Normal      ---------------------------------------------------PHYSICAL EXAM--------------------------------------      Constitutional/General: Alert and oriented x3, well appearing, non toxic in NAD  Head: Normocephalic and atraumatic  Eyes: PERRL, EOMI  Mouth: Oropharynx clear, handling secretions, no trismus  Neck: Supple, full ROM,   Pulmonary: Lungs clear to auscultation bilaterally, no wheezes, rales, or rhonchi. Not in respiratory distress  Cardiovascular:  Regular rate and rhythm, no murmurs, gallops, or rubs. 2+ distal pulses  Abdomen: Soft, mild epigastric tenderness to palpation, non distended,   Extremities: Moves all extremities x 4.  Warm and well perfused  Skin: warm and dry without rash  Neurologic: GCS 15,  Psych: Normal Affect      ------------------------------ ED COURSE/MEDICAL DECISION MAKING----------------------  Medications   pantoprazole (PROTONIX) injection 40 mg (has no administration in time range)   ondansetron (ZOFRAN) injection 4 mg (has no administration in time range)         ED COURSE:  ED Course as of Oct 02 0241   Sat Oct 02, 2021   0126 Patient eloped from the emergency department at this time. [MS]      ED Course User Index  [MS] Casie Live       Medical Decision Making:    Patient is a 28-year-old female presenting emergency department for nausea vomiting, and epigastric pain. Patient eloped from the emergency department prior to any lab work or EKG being done. She was stable while she was in the emergency department.        --------------------------------- IMPRESSION AND DISPOSITION ---------------------------------    IMPRESSION  1. Nausea and vomiting, intractability of vomiting not specified, unspecified vomiting type    2. Abdominal pain, epigastric        DISPOSITION  Disposition: eloped from ED  Patient condition is stable      NOTE: This report was transcribed using voice recognition software.  Every effort was made to ensure accuracy; however, inadvertent computerized transcription errors may be present        Casie Live  10/02/21 [id number]

## 2021-10-02 NOTE — ED NOTES
In room to see patient. Patient not in room or adjoining restroom.      Yovany Rich RN  10/02/21 [id number]

## 2021-10-02 NOTE — ED TRIAGE NOTES
FIRST PROVIDER CONTACT ASSESSMENT NOTE      Department of Emergency Medicine   Admit Date: No admission date for patient encounter. Chief Complaint: Emesis (Food Poisoning Per Patient)      History of Present Illness:   Kaylin White is a 80 y.o. female who presents to the ED for vomiting since around 1130 this afternoon. Possibly food poisoning after eating bad frozen food.  Epigastric pain as well.         -----------------END OF FIRST PROVIDER CONTACT ASSESSMENT NOTE--------------  Electronically signed by WILFREDO Spear   DD: 10/1/21

## 2021-12-20 ENCOUNTER — OFFICE VISIT (OUTPATIENT)
Dept: PULMONOLOGY | Age: 71
End: 2021-12-20
Payer: MEDICARE

## 2021-12-20 VITALS
OXYGEN SATURATION: 98 % | BODY MASS INDEX: 27.09 KG/M2 | RESPIRATION RATE: 16 BRPM | WEIGHT: 138 LBS | HEIGHT: 60 IN | HEART RATE: 80 BPM | TEMPERATURE: 95.1 F

## 2021-12-20 DIAGNOSIS — C34.90 ADENOCARCINOMA OF LUNG, UNSPECIFIED LATERALITY (HCC): ICD-10-CM

## 2021-12-20 DIAGNOSIS — Z87.891 EX-SMOKER: Primary | ICD-10-CM

## 2021-12-20 LAB
EXPIRATORY TIME: 6.46 SEC
FEF 25-75% %PRED-PRE: 81 L/SEC
FEF 25-75% PRED: 1.56 L/SEC
FEF 25-75%-PRE: 1.28 L/SEC
FEV1 %PRED-PRE: 102 %
FEV1 PRED: 1.76 L
FEV1/FVC %PRED-PRE: 88 %
FEV1/FVC PRED: 79 %
FEV1/FVC: 70 %
FEV1: 1.8 L
FVC %PRED-PRE: 114 %
FVC PRED: 2.25 L
FVC: 2.58 L
PEF %PRED-PRE: 69 L/SEC
PEF PRED: 4.47 L/SEC
PEF-PRE: 3.09 L/SEC

## 2021-12-20 PROCEDURE — G8399 PT W/DXA RESULTS DOCUMENT: HCPCS | Performed by: INTERNAL MEDICINE

## 2021-12-20 PROCEDURE — G8427 DOCREV CUR MEDS BY ELIG CLIN: HCPCS | Performed by: INTERNAL MEDICINE

## 2021-12-20 PROCEDURE — 99213 OFFICE O/P EST LOW 20 MIN: CPT | Performed by: INTERNAL MEDICINE

## 2021-12-20 PROCEDURE — 1036F TOBACCO NON-USER: CPT | Performed by: INTERNAL MEDICINE

## 2021-12-20 PROCEDURE — 1090F PRES/ABSN URINE INCON ASSESS: CPT | Performed by: INTERNAL MEDICINE

## 2021-12-20 PROCEDURE — 4040F PNEUMOC VAC/ADMIN/RCVD: CPT | Performed by: INTERNAL MEDICINE

## 2021-12-20 PROCEDURE — G8417 CALC BMI ABV UP PARAM F/U: HCPCS | Performed by: INTERNAL MEDICINE

## 2021-12-20 PROCEDURE — G8484 FLU IMMUNIZE NO ADMIN: HCPCS | Performed by: INTERNAL MEDICINE

## 2021-12-20 PROCEDURE — 94010 BREATHING CAPACITY TEST: CPT | Performed by: INTERNAL MEDICINE

## 2021-12-20 PROCEDURE — 1123F ACP DISCUSS/DSCN MKR DOCD: CPT | Performed by: INTERNAL MEDICINE

## 2021-12-20 PROCEDURE — 3017F COLORECTAL CA SCREEN DOC REV: CPT | Performed by: INTERNAL MEDICINE

## 2021-12-20 ASSESSMENT — PULMONARY FUNCTION TESTS
FEV1/FVC: 70
FVC_PREDICTED: 2.25
FEV1: 1.80
FEV1/FVC_PERCENT_PREDICTED_PRE: 88
FEV1_PREDICTED: 1.76
FEV1/FVC_PREDICTED: 79
FVC: 2.58
FVC_PERCENT_PREDICTED_PRE: 114
FEV1_PERCENT_PREDICTED_PRE: 102

## 2021-12-20 NOTE — PROGRESS NOTES
discussed removal of her birds from the home. Then in March 2016 she underwent Bronch/Left VATS/VATS wedge ROMELIA/VATS left upper lobectomy/mediastinal lymph node dissection/Intercostal nerve block from T3-T10 per Dr. Anirudh Fulton on 3/29/2016, final pathology revealed Stage I Adenocarcinoma of ROMELIA (morphologically different from the adenocarcinoma previously diagnosed in the right upper lobe. Therefore, synchronous primary tumors are favored). Post operative she was discharge but returned with chest pain with the findings of a pulmonary embolism at the surgical line. She is doing well on coumadin. Vaping was discouraged. Aggressive testing with CT scan of the Abd/Pelivs 05/08/2018 which reported a enhancing lesion within the spleen is relatively stable to slightly smaller when compared to April 2014 exam and likely splenic hemangioma. Other indeterminate findings (left periaortic LN, sclerotic/blastic foci in L3 and L1 reported by Dr. Dipesh Heart from radiology team which was a over read. Then she underwent a PET/CT scan 06/05/2018 unremarkable. No FDG avid uptake identified. No evidence for recurrent or metastatic disease. No clinical evidence of recurrence. She will be traveling to [place]. She is feeling well and had her GB removed. On today's visit, Odilia Coe is doing well with no chest pain, worsening or declining SOB or new cough. She has no pleurisy, hemoptysis, but was concerned about her weight loss. She denies any leg swelling. She is back to work. Blood pressure remains slightly elevated. She remain on the bronchodilator of Spiriva daily and follow-up in 6 months.     ALLERGIES:  lesion is unchanged  Allergies   Allergen Reactions    Ibuprofen Palpitations and Rash       PAST MEDICAL HISTORY:       Diagnosis Date    Abnormal chest x-ray with multiple lung nodules 9/8/2015    Abnormal Pap smear 1/3/2011    Alpha-1 negative    Adrenal adenoma     7 mm    Bilateral lung cancer ([facility] 5/12/2016 surgically removed - 2015     Cholelithiasis 6/6/2011    Encounter for screening colonoscopy     for OR 3-28-19     Fibroids     Hemangioma of spleen     Hepatitis C 01/03/2011    treated and cured, no current issues     Hyperlipidemia     no medications     Hypertension 6/6/2011    Insomnia     Lung nodule     ROMELIA     Lymphadenopathy     Cervical (-) ENT    Malignant neoplasm of upper lobe of right lung ([facility] 11/18/2015    Osteoarthritis     Panlobular emphysema ([facility] 11/12/2015    controlled with inhalers     PPD negative 1/18/12    Pulmonary embolism without acute cor pulmonale (HCC) 5/12/2016    PVD (peripheral vascular disease) ([facility] 5/6/2014    Scoliosis     Uterine fibroid 5/6/2014        MEDICATIONS:   Current Outpatient Medications   Medication Sig Dispense Refill    vitamin B-12 (CYANOCOBALAMIN) 500 MCG tablet take 1 tablet by mouth daily 30 tablet 11    Cholecalciferol (VITAMIN D3) 1.25 MG (81991 UT) CAPS Take by mouth      Multiple Vitamins-Minerals (THEREMS-M) TABS take 1 tablet by mouth once daily 30 tablet 11    tiotropium (SPIRIVA HANDIHALER) 18 MCG inhalation capsule Inhale 1 capsule into the lungs daily (Patient taking differently: Inhale 18 mcg into the lungs daily Instructed to take am of procedure) 90 capsule 3    lisinopril (PRINIVIL;ZESTRIL) 40 MG tablet take 1 tablet by mouth once daily 90 tablet 4    hydrochlorothiazide (HYDRODIURIL) 25 MG tablet Take 1 tablet by mouth daily 90 tablet 0    aspirin EC 81 MG EC tablet Take 1 tablet by mouth daily (Patient taking differently: Take 81 mg by mouth daily LD 3-23-19) 90 tablet 0    Multiple Vitamins-Minerals (THERAPEUTIC MULTIVITAMIN-MINERALS) tablet Take 1 tablet by mouth daily (Patient taking differently: Take 1 tablet by mouth daily LD 3-23-19) 90 tablet 5    Misc. Devices KIT BP monitoring KIT 1 kit 0     No current facility-administered medications for this visit.        SOCIAL AND OCCUPATIONAL HEALTH:  The patient smoked 1 to 1.5 packs a cigarettes a day since the age of 14. She finished the smoking program and QUIT in 3.2012. Resumed on /off smoking. Then quit in 2016. She is smoked over 40 years in duration. There is no history of TB or TB exposure. There is no asbestos or silica dust exposure. The patient reports no coal, foundry, quarry or [employer] exposure. There is no recent travel history noted. The patient denies a history of recreational or IV drug use. No hot tub exposure. The patient has dogs (1) and birds (2). Previous hypersensitivity panel was negative. Hobbies include reading. EtoH: The patient denies excessive alcohol intake. She works in a plastics assembly line. She has birds and a pit bull dog. She has no children    SOCIAL HISTORY:   Social History     Tobacco Use    Smoking status: Former Smoker     Packs/day: 0.00     Years: 30.00     Pack years: 0.00     Quit date: 2015     Years since quittin.5    Smokeless tobacco: Never Used   Vaping Use    Vaping Use: Never used   Substance Use Topics    Alcohol use:  Yes     Alcohol/week: 2.0 standard drinks     Types: 2 Glasses of wine per week     Comment: x2 week    Drug use: No       SURGICAL HISTORY:   Past Surgical History:   Procedure Laterality Date    APPENDECTOMY  1965    BREAST BIOPSY Left     AGE 31'S LEFT NIPPLE REMOVED    BREAST LUMPECTOMY  1999    left, benign, Dr. Yi Vang, [facility] [address]  2012    Rajiv Alston, Dr. John Hernandez, [facility] [address]  10/15/15    with EBUS - DR Florin Taylor    CHOLECYSTECTOMY      COLONOSCOPY  2009    partial to distal ascending colon normal (completion BE same day normal), Dr. Yi Vang, [facility] COLONOSCOPY  2014    done under fluoro with sigmoid straightening device so was able to get to cecum, very poor prep, moderate proctitis, repeat 5 years,  Dr. Yi Vang, [address] [facility] COLONOSCOPY N/A 3/28/2019    COLONOSCOPY POLYPECTOMY SNARE/COLD BIOPSY performed by Alix Byrne DO at [facility] ENDOSCOPY    ENDOMETRIAL BIOPSY      LOBECTOMY Left 3/29/2016    VATS WEDGE RESECTION UPPER LOBECTOMY    OTHER SURGICAL HISTORY Right 2016    REMOVAL MEDI-PORT - DR Brian Abreu    MN LAP,CHOLECYSTECTOMY/GRAPH N/A 2018    CHOLECYSTECTOMY LAPAROSCOPIC, POSSIBLE OPEN,POSSIBLE GRAM ( OC 2) performed by Felipe Handy MD at [address] Right 2015    VATS, BRONCH,RT UPPER LOBECTOMY; NODE DISECTION    TUNNELED VENOUS PORT PLACEMENT Right 2015    right chest, and removed        FAMILY HISTORY:   Family History   Problem Relation Age of Onset    Heart Disease Mother     High Blood Pressure Mother     Cancer Mother     Heart Disease Father     High Blood Pressure Father     Kidney Disease Father     Diabetes Sister     Diabetes Brother       Family Status   Relation Name Status    Mother adri     Father Ligia Aldana     Sister reinaldo Alive    Brother eugenia Alive    Sister ramses Alive        REVIEW OF SYSTEMS: The patients health assessment form was reviewed. PHYSICAL EXAMINATION:   Vitals:    21 1623   Pulse: 80   Resp: 16   Temp: 95.1 °F (35.1 °C)   SpO2: 98%   Weight: 138 lb (62.6 kg)   Height: 5' (1.524 m)     Constitutional: A 80 y.o. female who is alert, oriented, cooperative and in no apparent distress. Head was normocephalic and atraumatic. EENT: EOMI DEEPTI. MMM. No icterus. No conjunctival injections. External canals are patent and no discharge. Septum was midline, mucosa was without erythema, exudates or cobblestoning. No thrush. Neck: Supple without thyromegaly. No elevated JVP. Trachea was midline. No carotid bruits. Respiratory: Bilateral VATS incisions. Right  Upper thorax subq port noted. Clear to auscultation. No wheezes, rhonchi or rales. No egophony. Cardiovascular: Regular without murmur, clicks, gallops or rubs. No ventricular heave. Pulses:  Equal bilaterally. Vascular bruit. Abdomen: Soft without organomegaly. No rebound, rigidity. Lymphatic: No lymphadenopathy. Musculoskeletal: Ambulates without assistance. Normal curvature of the spine. No gross muscle weakness. No involuntary movements. Extremities:  No lower extremity edema or erythema. Deep tendon reflexes are normal.   Skin:  Warm and dry. Good color, turgor and pigmentation. No bruises or skin rashes. Old surgical scars. Neurological/Psychiatric: General behavior, level of consciousness, thought content is normal. Emotional status is normal.  Cranial nerves II-XII are intact. DATA:   The data collected below information that was obtained, reviewed, analyzed and interpreted today. Imaging test are reviewed with the radiologist during weekly conference rounds. Comparison to previous images are always explored. Office Spirometry demonstrates an FVC of 2.58 liters which is 114 % of predicted with an FEV1 of 1.80 liters which is 102 % of predicted. FEV1/FVC ratio is 70 %. Mid expiratory flow rates are 81 % of predicted. Maximum voluntary ventilation is 44 liters per minute or 53 % of predicted. Flow volume loop shows no signs of intrathoracic or extrathoracic process. Impression: Normalized lung function     CT SCAN CHEST 6/2020: Impression: No hilar or mediastinal lymphadenopathy is noted. There is a moderate amount of plaque in the arch and great vessels. No thoracic aneurysm is noted. There is plaque in the upper abdominal aorta. There is a small cyst in the upper pole right kidney. Spleen is heterogeneous and may relate to the timing of the contrast bolus. There is scarring in the right upper hemithorax. There are no infiltrates, effusions or lung nodules. CT SCAN CHEST 6/5/2019: Impression  No evidence for worrisome residual or recurrent malignancy.  No evidence for new lymphadenopathy or metastatic disease. Stable scarring and postoperative changes right upper lobe. Stable right adrenal gland nodule.     PET/CT SCAN 6/05/2018: Tracer lobectomy: Invasive, moderately differentiated  adenocarcinoma (Grade 2). Surgical margin negative for malignancy. Two of three peribronchial lymph nodes positive for adenocarcinoma.  = T1a/, N1, M0: [Stage IIA] The cancer is no larger than 3 cm across, has not grown into the membranes that surround the lungs, and does not affect the main branches of the bronchi. It has spread to lymph nodes within the lung and/or around the area where the bronchus enters the lung (hilar lymph nodes). These lymph nodes are on the same side as the cancer. It has not spread to distant sites. PET SCAN 3/2016: The 2.1 cm left lung mass abutting the major fissure is hypermetabolic with SUV max of 5.4. Finding is worrisome for tumor with avid glucose metabolism. 2. Elsewhere there is unremarkable distribution of FDG activity without evidence of hypermetabolic metastases. PREVIOUS PET 7/2015  Previously known nodule in the lingula now measures 2.3 cm in size. SUV max has increased from 1.7 on the 2012 examination to 6.4 at this time. CT SCAN CHEST 8/2015: Multiple nodular lesions are noted in the right and left lung of ground glass density as mentioned above. Some of the nodules are new when compared with the previous and some of the nodules are resolved. Continued follow up is still suggested. 2. There is a nodular lesion seen in the right perihilar space measuring 1.7 cm and new when compared with the previous. This nodule is quite suspicious, bronchoscopy or PET scan as warranted. 3. Stable larger nodule seen in the lingula, stable when compared with the previous. PET SCAN 10/2015: New since prior examination on CT is 1.7 cm right paratracheal nodule. Nodule is intensely hypermetabolic with SUV max of 4.8 worrisome for tumor. 2. Previously known nodule in the lingula now measures 2.3 cm in size. SUV max has increased from 1.7 on the 2012 examination to 6.4 at this time.  At this time findings are worrisome for hypermetabolic tumor with avid glucose metabolism. CT SCAN CHEST 4/2014: Impression: 1. Multiple nodules seen in the right lung remaining stable when compared with the previous. The largest nodule seen in the lingula remains stable when compared with the previous. 2. No new nodes are noted on today's study. 3. No consolidation, pleural effusion or pneumothorax. CT SCAN CHEST 12/2013: Films were read/reviewed/discussed with radiology again demonstrates a smooth noncalcified nodular mass measuring approximately 2 cm in the lingular segment of the left lower lobe. This has not changed in size or appearance since the last examination. The lungs demonstrate no further evidence of pulmonary nodule mass or infiltrate. The mediastinum demonstrates no pathologic adenopathy. There is no hilar adenopathy. There is no axillary anat disease. The adrenal glands are not enlarged. There are no bony abnormalities present. Impression: Stable appearance to the previously described lingular lesion with no new or acute findings present. PET SCAN: (2012)  Impression- Nodule in the lingula shows moderate FDG uptake with quantitation yielding an SUV max of 1.7. Values less than 2.5 sometimes are considered indicative of benign pulmonary pathology. I note that using visual criteria the nodule has greater activity than is seen in the heart and mediastinum which is suspicious. Biospy: ENB Procedure: (-) Malignancy (2012)    Hypersensitivity Panel (2015)   A.fumigatus #1 Abs   Negative     Micropoly. faeni Abs  Negative     Thermoa. vulgaris #1  POSITIVE (NEW)  Neg in 2012   A. pullulans Abs   Negative     Thermoact. saccharii N Negative     Lithia Serum Abs   Negative            IMPRESSION:    Clarisa Vasquez is a 80 y.o. female with smoking related COPD, who underwent: 11/11/2015 (1) Bronchoscopy. (2) Right VATS. (3) VATS right upper lobe wedge resection. (4) VATS right upper lobectomy. (5) Intercostal nerve block from T3 to T10. (6) Mediastinal lymph node dissection. Pathology: Right lung, upper lobectomy: Invasive, moderately differentiated adenocarcinoma (Grade 2). Tumor size 1.5 cm in greatest dimension, with two of three peribronchial lymph nodes positive for adenocarcinoma. pT1a N1 MX; (Stage IIA) She did adjuvant chemotherapy. We recommended 4 cycles of adjuvant chemotherapy consisting of Carboplatin/Alimta. Mediport placed 12/7/15. Then in March 2016 she underwent Bronch/Left VATS/VATS wedge ROMELIA/VATS left upper lobectomy/mediastinal lymph node dissection/Intercostal nerve block from T3-T10 per Dr. James Coburn on 3/29/2016, final pathology revealed Stage I Adenocarcinoma of ROMELIA (morphologically different from the adenocarcinoma previously diagnosed in the right upper lobe. Therefore, synchronous primary tumors are favored). She had a post operative pulmonary embolism finished 3 months of anticoagulation. CT negative in 2020. With this in mind, we would like to proceed with the following;                      PLAN:    She is doing well with no complaints. He yearly CT scan shows no signs of cancer recurrence. Lung surveillance is ongoing for her bilateral low stage cancer s/p resection. She has stopped  smoking/vaping. Her lung function was normal. A hypersensitivity panel done because of her bird (cockatiels) exposure, testing was +, PFT and CT are stable/normal.  Her dog Ame Thomson has a tumor. Her PPD test was negative. Her alpha-1 antitrypsin test was negative. She is to continue her bronchodilators - as adjusted by her insurance. Samples were given. We will see her back in a 6 months for follow up. We hope this updates you on our evaluation and clinical thinking. Thank you for entrusting us to participate in Rosie Delgado care.        Sincerely,    Rose Gooden D.O., MPH, Magno Jeronimo  Professor of Internal Medicine  Director, [facility]    Note: Rosiemaco Hearns was seen and during this encounter a minimum of 25 minutes was spent providing face-to-face patient care, including:  and coordinating care, reviewing the chart, labs, and diagnostics, as well as medical decision making. Greater than 50% of this time was spent instructing and counseling the patient face to face regarding findings and recommendations.

## 2022-02-20 ENCOUNTER — HOSPITAL ENCOUNTER (EMERGENCY)
Age: 72
Discharge: HOME | End: 2022-02-20
Attending: EMERGENCY MEDICINE
Payer: MEDICARE

## 2022-02-20 ENCOUNTER — APPOINTMENT (OUTPATIENT)
Dept: CT IMAGING | Age: 72
End: 2022-02-20
Payer: MEDICARE

## 2022-02-20 VITALS
DIASTOLIC BLOOD PRESSURE: 84 MMHG | HEART RATE: 75 BPM | OXYGEN SATURATION: 98 % | RESPIRATION RATE: 18 BRPM | HEIGHT: 61 IN | SYSTOLIC BLOOD PRESSURE: 190 MMHG | WEIGHT: 128 LBS | BODY MASS INDEX: 24.17 KG/M2 | TEMPERATURE: 98.1 F

## 2022-02-20 DIAGNOSIS — M54.41 ACUTE RIGHT-SIDED LOW BACK PAIN WITH RIGHT-SIDED SCIATICA: Primary | ICD-10-CM

## 2022-02-20 DIAGNOSIS — R80.9 ASYMPTOMATIC PROTEINURIA: ICD-10-CM

## 2022-02-20 DIAGNOSIS — R93.89 ABNORMAL CT SCAN: ICD-10-CM

## 2022-02-20 LAB
ALBUMIN SERPL-MCNC: 3.4 G/DL (ref 3.5–5.2)
ALP BLD-CCNC: 80 U/L (ref 35–104)
ALT SERPL-CCNC: 41 U/L (ref 0–32)
ANION GAP SERPL CALCULATED.3IONS-SCNC: 9 MMOL/L (ref 7–16)
AST SERPL-CCNC: 50 U/L (ref 0–31)
BACTERIA: ABNORMAL /HPF
BASOPHILS ABSOLUTE: 0.03 E9/L (ref 0–0.2)
BASOPHILS RELATIVE PERCENT: 0.5 % (ref 0–2)
BILIRUB SERPL-MCNC: <0.2 MG/DL (ref 0–1.2)
BILIRUBIN URINE: NEGATIVE
BLOOD, URINE: ABNORMAL
BUN BLDV-MCNC: 27 MG/DL (ref 6–23)
C-REACTIVE PROTEIN: 0.3 MG/DL (ref 0–0.4)
CALCIUM SERPL-MCNC: 9.5 MG/DL (ref 8.6–10.2)
CHLORIDE BLD-SCNC: 110 MMOL/L (ref 98–107)
CLARITY: CLEAR
CO2: 25 MMOL/L (ref 22–29)
COLOR: YELLOW
CREAT SERPL-MCNC: 1.1 MG/DL (ref 0.5–1)
EOSINOPHILS ABSOLUTE: 0.03 E9/L (ref 0.05–0.5)
EOSINOPHILS RELATIVE PERCENT: 0.5 % (ref 0–6)
EPITHELIAL CELLS, UA: ABNORMAL /HPF
GFR AFRICAN AMERICAN: 59
GFR NON-AFRICAN AMERICAN: 59 ML/MIN/1.73
GLUCOSE BLD-MCNC: 134 MG/DL (ref 74–99)
GLUCOSE URINE: NEGATIVE MG/DL
HCT VFR BLD CALC: 30.3 % (ref 34–48)
HEMOGLOBIN: 9.7 G/DL (ref 11.5–15.5)
IMMATURE GRANULOCYTES #: 0.08 E9/L
IMMATURE GRANULOCYTES %: 1.4 % (ref 0–5)
KETONES, URINE: NEGATIVE MG/DL
LACTIC ACID, SEPSIS: 0.8 MMOL/L (ref 0.5–1.9)
LEUKOCYTE ESTERASE, URINE: NEGATIVE
LYMPHOCYTES ABSOLUTE: 0.88 E9/L (ref 1.5–4)
LYMPHOCYTES RELATIVE PERCENT: 15.2 % (ref 20–42)
MCH RBC QN AUTO: 28.5 PG (ref 26–35)
MCHC RBC AUTO-ENTMCNC: 32 % (ref 32–34.5)
MCV RBC AUTO: 89.1 FL (ref 80–99.9)
MONOCYTES ABSOLUTE: 0.54 E9/L (ref 0.1–0.95)
MONOCYTES RELATIVE PERCENT: 9.3 % (ref 2–12)
NEUTROPHILS ABSOLUTE: 4.23 E9/L (ref 1.8–7.3)
NEUTROPHILS RELATIVE PERCENT: 73.1 % (ref 43–80)
NITRITE, URINE: NEGATIVE
PDW BLD-RTO: 13.8 FL (ref 11.5–15)
PH UA: 7 (ref 5–9)
PLATELET # BLD: 287 E9/L (ref 130–450)
PMV BLD AUTO: 9.9 FL (ref 7–12)
POTASSIUM SERPL-SCNC: 3.9 MMOL/L (ref 3.5–5)
PROTEIN UA: >=300 MG/DL
RBC # BLD: 3.4 E12/L (ref 3.5–5.5)
RBC UA: ABNORMAL /HPF (ref 0–2)
SEDIMENTATION RATE, ERYTHROCYTE: 70 MM/HR (ref 0–20)
SODIUM BLD-SCNC: 144 MMOL/L (ref 132–146)
SPECIFIC GRAVITY UA: 1.02 (ref 1–1.03)
TOTAL PROTEIN: 8.2 G/DL (ref 6.4–8.3)
UROBILINOGEN, URINE: 0.2 E.U./DL
WBC # BLD: 5.8 E9/L (ref 4.5–11.5)
WBC UA: ABNORMAL /HPF (ref 0–5)

## 2022-02-20 PROCEDURE — 85651 RBC SED RATE NONAUTOMATED: CPT

## 2022-02-20 PROCEDURE — 72128 CT CHEST SPINE W/O DYE: CPT

## 2022-02-20 PROCEDURE — 6370000000 HC RX 637 (ALT 250 FOR IP): Performed by: NURSE PRACTITIONER

## 2022-02-20 PROCEDURE — 86140 C-REACTIVE PROTEIN: CPT

## 2022-02-20 PROCEDURE — 6360000002 HC RX W HCPCS: Performed by: EMERGENCY MEDICINE

## 2022-02-20 PROCEDURE — 36415 COLL VENOUS BLD VENIPUNCTURE: CPT

## 2022-02-20 PROCEDURE — 74176 CT ABD & PELVIS W/O CONTRAST: CPT

## 2022-02-20 PROCEDURE — 83605 ASSAY OF LACTIC ACID: CPT

## 2022-02-20 PROCEDURE — 96375 TX/PRO/DX INJ NEW DRUG ADDON: CPT

## 2022-02-20 PROCEDURE — 72131 CT LUMBAR SPINE W/O DYE: CPT

## 2022-02-20 PROCEDURE — 99283 EMERGENCY DEPT VISIT LOW MDM: CPT

## 2022-02-20 PROCEDURE — 85025 COMPLETE CBC W/AUTO DIFF WBC: CPT

## 2022-02-20 PROCEDURE — 80053 COMPREHEN METABOLIC PANEL: CPT

## 2022-02-20 PROCEDURE — 6360000002 HC RX W HCPCS: Performed by: NURSE PRACTITIONER

## 2022-02-20 PROCEDURE — 81001 URINALYSIS AUTO W/SCOPE: CPT

## 2022-02-20 PROCEDURE — 96374 THER/PROPH/DIAG INJ IV PUSH: CPT

## 2022-02-20 RX ORDER — DEXAMETHASONE SODIUM PHOSPHATE 10 MG/ML
10 INJECTION INTRAMUSCULAR; INTRAVENOUS ONCE
Status: COMPLETED | OUTPATIENT
Start: 2022-02-20 | End: 2022-02-20

## 2022-02-20 RX ORDER — HYDROCODONE BITARTRATE AND ACETAMINOPHEN 5; 325 MG/1; MG/1
1 TABLET ORAL ONCE
Status: COMPLETED | OUTPATIENT
Start: 2022-02-20 | End: 2022-02-20

## 2022-02-20 RX ORDER — HYDROCODONE BITARTRATE AND ACETAMINOPHEN 5; 325 MG/1; MG/1
1 TABLET ORAL EVERY 8 HOURS PRN
Qty: 12 TABLET | Refills: 0 | Status: SHIPPED | OUTPATIENT
Start: 2022-02-20 | End: 2022-02-23

## 2022-02-20 RX ORDER — KETOROLAC TROMETHAMINE 30 MG/ML
15 INJECTION, SOLUTION INTRAMUSCULAR; INTRAVENOUS ONCE
Status: COMPLETED | OUTPATIENT
Start: 2022-02-20 | End: 2022-02-20

## 2022-02-20 RX ADMIN — DEXAMETHASONE SODIUM PHOSPHATE 10 MG: 10 INJECTION INTRAMUSCULAR; INTRAVENOUS at 11:45

## 2022-02-20 RX ADMIN — HYDROCODONE BITARTRATE AND ACETAMINOPHEN 1 TABLET: 5; 325 TABLET ORAL at 10:50

## 2022-02-20 RX ADMIN — KETOROLAC TROMETHAMINE 15 MG: 30 INJECTION, SOLUTION INTRAMUSCULAR at 11:32

## 2022-02-20 ASSESSMENT — PAIN SCALES - GENERAL: PAINLEVEL_OUTOF10: 10

## 2022-02-20 NOTE — Clinical Note
Luis Barraza was seen and treated in our emergency department on 2/20/2022. She may return to work on 02/22/2022. If you have any questions or concerns, please don't hesitate to call.       Emili Storey, VERITO - CNP

## 2022-03-16 ENCOUNTER — OFFICE VISIT (OUTPATIENT)
Dept: ONCOLOGY | Age: 72
End: 2022-03-16
Payer: MEDICARE

## 2022-03-16 ENCOUNTER — HOSPITAL ENCOUNTER (OUTPATIENT)
Dept: INFUSION THERAPY | Age: 72
Discharge: HOME | End: 2022-03-16
Payer: MEDICARE

## 2022-03-16 VITALS
TEMPERATURE: 97.4 F | WEIGHT: 124 LBS | RESPIRATION RATE: 16 BRPM | HEART RATE: 80 BPM | HEIGHT: 61 IN | DIASTOLIC BLOOD PRESSURE: 65 MMHG | SYSTOLIC BLOOD PRESSURE: 110 MMHG | BODY MASS INDEX: 23.41 KG/M2

## 2022-03-16 DIAGNOSIS — C34.90 NON-SMALL CELL LUNG CANCER, UNSPECIFIED LATERALITY (HCC): ICD-10-CM

## 2022-03-16 DIAGNOSIS — R91.1 LUNG NODULE: Primary | ICD-10-CM

## 2022-03-16 DIAGNOSIS — C34.11 MALIGNANT NEOPLASM OF UPPER LOBE OF RIGHT LUNG (HCC): ICD-10-CM

## 2022-03-16 LAB
ALBUMIN SERPL-MCNC: 3.3 G/DL (ref 3.5–5.2)
ALP BLD-CCNC: 125 U/L (ref 35–104)
ALT SERPL-CCNC: 58 U/L (ref 0–32)
ANION GAP SERPL CALCULATED.3IONS-SCNC: 9 MMOL/L (ref 7–16)
AST SERPL-CCNC: 60 U/L (ref 0–31)
BASOPHILS ABSOLUTE: 0.01 E9/L (ref 0–0.2)
BASOPHILS RELATIVE PERCENT: 0.2 % (ref 0–2)
BILIRUB SERPL-MCNC: 0.3 MG/DL (ref 0–1.2)
BUN BLDV-MCNC: 35 MG/DL (ref 6–23)
CALCIUM SERPL-MCNC: 9.2 MG/DL (ref 8.6–10.2)
CEA: 12.5 NG/ML (ref 0–5.2)
CHLORIDE BLD-SCNC: 107 MMOL/L (ref 98–107)
CO2: 26 MMOL/L (ref 22–29)
CREAT SERPL-MCNC: 1.1 MG/DL (ref 0.5–1)
EOSINOPHILS ABSOLUTE: 0.04 E9/L (ref 0.05–0.5)
EOSINOPHILS RELATIVE PERCENT: 0.7 % (ref 0–6)
GFR AFRICAN AMERICAN: 59
GFR NON-AFRICAN AMERICAN: 59 ML/MIN/1.73
GLUCOSE BLD-MCNC: 161 MG/DL (ref 74–99)
HCT VFR BLD CALC: 30.5 % (ref 34–48)
HEMOGLOBIN: 10 G/DL (ref 11.5–15.5)
IMMATURE GRANULOCYTES #: 0.03 E9/L
IMMATURE GRANULOCYTES %: 0.5 % (ref 0–5)
LYMPHOCYTES ABSOLUTE: 1.05 E9/L (ref 1.5–4)
LYMPHOCYTES RELATIVE PERCENT: 19.1 % (ref 20–42)
MCH RBC QN AUTO: 29.7 PG (ref 26–35)
MCHC RBC AUTO-ENTMCNC: 32.8 % (ref 32–34.5)
MCV RBC AUTO: 90.5 FL (ref 80–99.9)
MONOCYTES ABSOLUTE: 0.42 E9/L (ref 0.1–0.95)
MONOCYTES RELATIVE PERCENT: 7.6 % (ref 2–12)
NEUTROPHILS ABSOLUTE: 3.95 E9/L (ref 1.8–7.3)
NEUTROPHILS RELATIVE PERCENT: 71.9 % (ref 43–80)
PDW BLD-RTO: 15 FL (ref 11.5–15)
PLATELET # BLD: 197 E9/L (ref 130–450)
PMV BLD AUTO: 10.7 FL (ref 7–12)
POTASSIUM SERPL-SCNC: 3.8 MMOL/L (ref 3.5–5)
RBC # BLD: 3.37 E12/L (ref 3.5–5.5)
SODIUM BLD-SCNC: 142 MMOL/L (ref 132–146)
TOTAL PROTEIN: 7.5 G/DL (ref 6.4–8.3)
WBC # BLD: 5.5 E9/L (ref 4.5–11.5)

## 2022-03-16 PROCEDURE — G8427 DOCREV CUR MEDS BY ELIG CLIN: HCPCS | Performed by: INTERNAL MEDICINE

## 2022-03-16 PROCEDURE — 99213 OFFICE O/P EST LOW 20 MIN: CPT

## 2022-03-16 PROCEDURE — 85025 COMPLETE CBC W/AUTO DIFF WBC: CPT

## 2022-03-16 PROCEDURE — 80053 COMPREHEN METABOLIC PANEL: CPT

## 2022-03-16 PROCEDURE — G8484 FLU IMMUNIZE NO ADMIN: HCPCS | Performed by: INTERNAL MEDICINE

## 2022-03-16 PROCEDURE — 4040F PNEUMOC VAC/ADMIN/RCVD: CPT | Performed by: INTERNAL MEDICINE

## 2022-03-16 PROCEDURE — 99214 OFFICE O/P EST MOD 30 MIN: CPT | Performed by: INTERNAL MEDICINE

## 2022-03-16 PROCEDURE — G8399 PT W/DXA RESULTS DOCUMENT: HCPCS | Performed by: INTERNAL MEDICINE

## 2022-03-16 PROCEDURE — 36415 COLL VENOUS BLD VENIPUNCTURE: CPT

## 2022-03-16 PROCEDURE — G8420 CALC BMI NORM PARAMETERS: HCPCS | Performed by: INTERNAL MEDICINE

## 2022-03-16 PROCEDURE — 82378 CARCINOEMBRYONIC ANTIGEN: CPT

## 2022-03-16 PROCEDURE — 1123F ACP DISCUSS/DSCN MKR DOCD: CPT | Performed by: INTERNAL MEDICINE

## 2022-03-16 PROCEDURE — 1090F PRES/ABSN URINE INCON ASSESS: CPT | Performed by: INTERNAL MEDICINE

## 2022-03-16 PROCEDURE — 1036F TOBACCO NON-USER: CPT | Performed by: INTERNAL MEDICINE

## 2022-03-16 PROCEDURE — 3017F COLORECTAL CA SCREEN DOC REV: CPT | Performed by: INTERNAL MEDICINE

## 2022-03-16 NOTE — PROGRESS NOTES
Department of [facility] Oncology  Attending Clinic Note    Reason for Visit: Follow-up on a patient with NSCLC    PCP:  Bernardo Brito MD    History of Present Illness:   80 y.o. -American female with significant history of tobacco abuse of approximately 30 pack years, quit smoking June 7, 2015. She has been followed for a left upper lobe mass by Dr. Efren Shoemaker. Multiple biopsies in the past came back negative for malignancy. CT scan chest on 08/31/2015 showed a new right upper lobe mass. Bronchoscopy with washing RUL mass on 10/15/2015 was positive for RUL Lung Adenocarcinoma. Negative for malignancy on BAL and TBNA of Lingular lesion. PET/CT scan on 09/29/2015:  1. New since prior examination on CT is 1.7 cm right paratracheal   nodule. Nodule is intensely hypermetabolic with SUV max of 4.8   worrisome for tumor. 2. Previously known nodule in the lingula now measures 2.3 cm in   size. SUV max has increased from 1.7 on the 2012 examination to   6.4 at this time. At this time findings are worrisome for   hypermetabolic tumor with avid glucose metabolism. Therefore, she was referred to see Dr. Anthony Johnson for resection. CT guided biopsy of the left upper lobe lesion on 11/02/2015 was noted to be negative for malignancy. On 11/11/2015 She underwent: (1) Bronchoscopy. (2) Right VATS. (3) VATS right upper lobe wedge resection. (4) VATS right upper lobectomy. (5) Intercostal nerve block from T3 to T10. (6) Mediastinal lymph node dissection. Pathology:  Right lung, upper lobectomy: Invasive, moderately differentiated adenocarcinoma (Grade 2). Tumor size 1.5 cm in greatest dimension. Surgical margin negative for malignancy. Two of three peribronchial lymph nodes positive for adenocarcinoma. pT1a N1 MX  She was referred to the medical oncology clinic to discuss adjuvant chemotherapy. We recommended 4 cycles of adjuvant chemotherapy consisting of Carboplatin/Alimta. Mediport placed 12/07/2015. Cycle # 1 of Olinda Antunez was on 12/09/2015. Cycle # 2 of Olinda Antunez was on 01/06/2016. Cycle # 3 of Olinda Antunez was on 01/27/2016. Cycle # 4 of Olinda Antunez was on 02/24/2016. PET SCAN 3.2016: The 2.1 cm left lung mass abutting the major fissure is hypermetabolic with SUV max of 5.4. Finding is worrisome for tumor with avid glucose metabolism. 2. Elsewhere there is unremarkable distribution of FDG activity without evidence of hypermetabolic metastases. On 03/29/2016 underwent Bronch/Left VATS/VATS wedge ROMELIA/VATS Left upper lobectomy/Mediasinal lymph node dissection/Intercostal nerve block from T3-T10 per Dr. Barrie Sagastume. Invasive, well-differentiated adenocarcinoma (grade 1); Tumor size-1.4 cm in greatest dimension; Surgical margins-negative for malignancy; Lymphovascular invasion-not identified; TNM classification-pT2a N0 MX  B. AP window lymph node #1, excision: Anthracotic lymph node; negative for malignancy  C. AP window lymph node #2, excision: Anthracotic lymph node; negative for malignancy   D. Periaortic lymph node #1, excision: Fibroadipose tissue; lymph node not identified  E. Bronchial lymph node #1, excision: Anthracotic lymph node; negative for malignancy  F. Left lung, upper lobectomy: Emphysematous change; negative for malignancy  4 anthracotic lymph nodes negative for malignancy   G. Inferior pulmonary ligament lymph node, excision: Anthracotic lymph node; negative for malignancy  H. Bronchial lymph node, excision: Anthracotic lymph node; negative for malignancy. On surveillance per NCCN guidelines. Today 03/16/2022 for f/u. Recent abdominal pain ER visit reviewed. Review of Systems;  CONSTITUTIONAL: No fever, chills. Fair appetite and energy level. ENMT: Eyes: No diplopia; Nose: No epistaxis. Mouth: No sore throat. RESPIRATORY: No hemoptysis, shortness of breath. CARDIOVASCULAR: No chest pain, palpitations.   GASTROINTESTINAL: No nausea, vomiting. Recent abdominal pain  GENITOURINARY: No dysuria, urinary frequency, hematuria. NEURO: No syncope, presyncope, headache. Remainder ROS: Negative. Past Medical History:      Diagnosis Date    Hypertension 6/6/2011    Abnormal Pap smear 1/3/2011     Alpha-1 negative    Cholelithiasis 6/6/2011    Osteoarthritis     Lymphadenopathy      Cervical (-) ENT    Lung nodule      ROMELIA     Scoliosis     PPD negative 1/18/12    Adrenal adenoma      7 mm    Hemangioma of spleen     Insomnia     PVD (peripheral vascular disease) ([address] 5/6/2014    Uterine fibroid 5/6/2014    Hepatitis C 1/3/2011    Fibroids     Abnormal chest x-ray with multiple lung nodules 9/8/2015    Hyperlipidemia     Panlobular emphysema ([address] 11/12/2015    Malignant neoplasm of upper lobe of right lung ([address] 11/18/2015     Medications:  Reviewed and reconciled. Allergies: Allergies   Allergen Reactions    Ibuprofen Palpitations and Rash     Physical Exam:  /65 (Site: Left Upper Arm, Position: Sitting, Cuff Size: Medium Adult)   Pulse 80   Temp 97.4 °F (36.3 °C) (Infrared)   Resp 16   Ht 5' 1\" (1.549 m)   Wt 124 lb (56.2 kg)   BMI 23.43 kg/m²    GENERAL: Alert, oriented x 3, not in acute distress. HEENT: PERRLA; EOMI. Oropharynx clear. NECK: Supple. Without lymphadenopathy. LUNGS: No wheezing, crackles or ronchi. CARDIOVASCULAR: Regular rate. No murmurs, rubs or gallops. ABDOMEN: Soft. Non-tender, non-distended. EXTREMITIES: Without clubbing, cyanosis, or edema. NEUROLOGIC: No focal deficits.    ECOG PS 1    Lab Results   Component Value Date    WBC 5.5 03/16/2022    HGB 10.0 (L) 03/16/2022    HCT 30.5 (L) 03/16/2022    MCV 90.5 03/16/2022     03/16/2022     Lab Results   Component Value Date     03/16/2022    K 3.8 03/16/2022     03/16/2022    CO2 26 03/16/2022    BUN 35 (H) 03/16/2022    CREATININE 1.1 (H) 03/16/2022    GLUCOSE 161 (H) 03/16/2022    CALCIUM 9.2 03/16/2022    PROT 7.5 03/16/2022    LABALBU 3.3 (L) 03/16/2022    BILITOT 0.3 03/16/2022    ALKPHOS 125 (H) 03/16/2022    AST 60 (H) 03/16/2022    ALT 58 (H) 03/16/2022    LABGLOM 59 03/16/2022    GFRAA 59 03/16/2022     Impression/Plan:  80 y.o. female with Hx of smoking who underwent: (1) Bronchoscopy. (2) Right VATS. (3) VATS right upper lobe wedge resection. (4) VATS right upper lobectomy. (5) Intercostal nerve block from T3 to T10. (6) Mediastinal lymph node dissection on 11/11/2015:   Pathology:  Right lung, upper lobectomy: Invasive, moderately differentiated adenocarcinoma (Grade 2). Tumor size 1.5 cm in greatest dimension. Visceral pleural invasion: Not identified. Visceral pleural invasion: Not identified. Surgical margin negative for malignancy. Two of three peribronchial lymph nodes positive for adenocarcinoma. pT1a N1 MX;     Being followed for a left upper lobe mass by Dr. Lavell Mcelroy. Multiple biopsies in the past came back negative for malignancy. Hypermetabolic on PET/CT scan on 09/29/2015 (2.3 cm in size with SUV of 6.4). CT guided biopsy of the left upper lobe lesion on 11/02/2015 was noted to be negative for malignancy. She was referred to the medical oncology clinic to discuss adjuvant chemotherapy. We recommended 4 cycles of adjuvant chemotherapy consisting of Carboplatin/Alimta. Mediport placed 12/7/15. -MRI Brain on 12/8/15:  Negative for metastatic disease.   -We will repeat CT chest and PET/CT after 4 cycles of Carboplatin/Alimta. To follow on Lingular lesion as well. -Molecular studies (EGFR, ALK, ROS-1) were all Not Detected. Cycle # 1 of Laurel Pock was on 12/09/2015. Cycle # 2 of Laurel Pock was on 01/06/2016. Cycle # 3 of Laurel Pock was on 01/27/2016. Cycle # 4 of Laurel Pock was on 02/24/2016. PET SCAN 3.2016: The 2.1 cm left lung mass abutting the major fissure is hypermetabolic with SUV max of 5.4.  Finding is worrisome for tumor with avid glucose metabolism. 2. Elsewhere there is unremarkable distribution of FDG activity without evidence of hypermetabolic metastases. On 03/29/2016 underwent Bronch/Left VATS/VATS wedge ROMELIA/VATS Left upper lobectomy/Mediasinal lymph node dissection/Intercostal nerve block from T3-T10 per Dr. Leisa Montes. Final pathology revealed Stage I Adenocarcinoma of ROMELIA (morphologically different from the adenocarcinoma previously diagnosed in the right upper lobe. Therefore, synchronous primary tumors are favored). A. Left lung, upper lobe wedge resection: Invasive, well-differentiated adenocarcinoma (grade 1); Tumor size-1.4 cm in greatest dimension; Surgical margins-negative for malignancy; Lymphovascular invasion-not identified; TNM classification-pT2a N0 MX  B. AP window lymph node #1, excision: Anthracotic lymph node; negative for malignancy  C. AP window lymph node #2, excision: Anthracotic lymph node; negative for malignancy   D. Periaortic lymph node #1, excision: Fibroadipose tissue; lymph node not identified  E. Bronchial lymph node #1, excision: Anthracotic lymph node; negative for malignancy  F. Left lung, upper lobectomy: Emphysematous change; negative for malignancy  4 anthracotic lymph nodes negative for malignancy   G. Inferior pulmonary ligament lymph node, excision: Anthracotic lymph node; negative for malignancy  H. Bronchial lymph node, excision: Anthracotic lymph node; negative for malignancy. Right side Mediport was removed on 11/04/2016 by Dr. Jluis Reinoso. On surveillance per NCCN guidelines. CT chest 04/12/2017 noted no convincing evidence of recurrent disease. CT chest 10/19/2017 noted no definite evidence for recurrent malignancy. CT chest 03/12/2018 negative for pulmonary parenchymal masses or enlarged mediastinal or hilar LN. ? 2 cm lesion in spleen difficult to evaluate due to the arterial phase of the study.     CT Abd/Pelvis 05/08/2018  Enhancing lesion within the spleen is relatively stable to slightly smaller when compared to April 2014 exam and likely splenic hemangioma. Other indeterminate findings (left periaortic LN, sclerotic/blastic foci in L3 and L1 reported by Dr. Amadou Batres from radiology team). PET/CT scan 06/05/2018 unremarkable. No FDG avid uptake identified. No evidence for recurrent or metastatic disease. CT Chest 12/13/2018 noted no evidence of recurrent/metastatic disease. CT chest on 06/11/2019 noted no evidence for worrisome residual or recurrent malignancy. No evidence for new lymphadenopathy or metastatic disease. Stable scarring and postoperative changes right upper lobe. CT chest 11/26/2019: No evidence of active neoplasm. CT chest 06/15/2020: No evidence of active neoplasm. CT chest 12/30/2020: Postsurgical changes and postsurgical scarring seen within the right lung apex.  There is no evidence of tumor recurrence. 2.1 x 2.3 cm enhancing lesion seen within the spleen  PET/CT scan 03/02/2021   No FDG avid uptake is identified which exceeds the threshold SUV. No convincing evidence for recurrent or metastatic disease  CT chest 09/28/2021 No evidence of tumor recurrence    Recent abdominal pain; ER visit reviewed  CT abdomen/pelvis 02/20/2022   6 mm right lower lobe pulmonary nodule  Multiple peritoneal cystic masses     Imaging reviewed. Labs reviewed.     CT chest ordered  PET/CT scan ordered  CEA ordered  Colonoscopy ordered  Biopsy peritoneal mass pending PET scan    Clayton Lewis MD   [id number]  Board Certified Medical Oncologist

## 2022-03-22 ENCOUNTER — TELEPHONE (OUTPATIENT)
Dept: SURGERY | Age: 72
End: 2022-03-22

## 2022-03-22 NOTE — TELEPHONE ENCOUNTER
MA made attempt to contact patient to schedule appointment based on referral by Dr Lawyer Baeza for colonoscopy consult. Patient confused as to why procedure was recommended, stating was not discussed at office. Requested discussion w/ Dr Lawyer Baeza' office. MA made attempt to contact Dr Mckenzie Reed office to inform of situation. No answer, MA left detailed VM requesting return call to patient. Left office number for questions.     Electronically signed by Alejandra Wiley on 3/22/22 at 2:02 PM EDT

## 2022-03-30 ENCOUNTER — TELEPHONE (OUTPATIENT)
Dept: SURGERY | Age: 72
End: 2022-03-30

## 2022-03-30 NOTE — TELEPHONE ENCOUNTER
MA made second attempt to contact Dr Nikunj Roberts' office to see if they had spoken with patient to discuss referral. No answer. MA left  requesting return call to discuss.      Electronically signed by Veena Tabares on 3/30/22 at 3:10 PM EDT

## 2022-03-31 ENCOUNTER — TELEPHONE (OUTPATIENT)
Dept: INFUSION THERAPY | Age: 72
End: 2022-03-31

## 2022-03-31 NOTE — TELEPHONE ENCOUNTER
Received message from Mustapha Sampson from Dr. Kasey Nicole office regarding referral made for patient to have colonoscopy  When she tried to schedule with patient  Patient stated she was unaware she needed one. I placed call to patient explained why Dr. Ernst Russell wanted her to have the testing.   patient was agreeable and verbalized understanding and will schedule colonoscopy when Dr. Patricia Pacheco office call to schedule

## 2022-04-05 ENCOUNTER — HOSPITAL ENCOUNTER (OUTPATIENT)
Dept: CT IMAGING | Age: 72
Discharge: HOME | End: 2022-04-07
Payer: MEDICARE

## 2022-04-05 ENCOUNTER — HOSPITAL ENCOUNTER (OUTPATIENT)
Dept: NUCLEAR MEDICINE | Age: 72
Discharge: HOME | End: 2022-04-07
Payer: MEDICARE

## 2022-04-05 DIAGNOSIS — C34.90 NON-SMALL CELL LUNG CANCER, UNSPECIFIED LATERALITY (HCC): ICD-10-CM

## 2022-04-05 DIAGNOSIS — R91.1 LUNG NODULE: ICD-10-CM

## 2022-04-05 PROCEDURE — 78815 PET IMAGE W/CT SKULL-THIGH: CPT

## 2022-04-05 PROCEDURE — 6360000004 HC RX CONTRAST MEDICATION: Performed by: RADIOLOGY

## 2022-04-05 PROCEDURE — 71260 CT THORAX DX C+: CPT

## 2022-04-05 PROCEDURE — A9552 F18 FDG: HCPCS | Performed by: RADIOLOGY

## 2022-04-05 PROCEDURE — 78815 PET IMAGE W/CT SKULL-THIGH: CPT | Performed by: RADIOLOGY

## 2022-04-05 PROCEDURE — 3430000000 HC RX DIAGNOSTIC RADIOPHARMACEUTICAL: Performed by: RADIOLOGY

## 2022-04-05 PROCEDURE — 2580000003 HC RX 258: Performed by: RADIOLOGY

## 2022-04-05 RX ORDER — SODIUM CHLORIDE 0.9 % (FLUSH) 0.9 %
10 SYRINGE (ML) INJECTION PRN
Status: DISCONTINUED | OUTPATIENT
Start: 2022-04-05 | End: 2022-04-08 | Stop reason: HOSPADM

## 2022-04-05 RX ORDER — FLUDEOXYGLUCOSE F 18 200 MCI/ML
13.82 INJECTION, SOLUTION INTRAVENOUS
Status: COMPLETED | OUTPATIENT
Start: 2022-04-05 | End: 2022-04-05

## 2022-04-05 RX ADMIN — Medication 10 ML: at 12:16

## 2022-04-05 RX ADMIN — FLUDEOXYGLUCOSE F 18 13.82 MILLICURIE: 200 INJECTION, SOLUTION INTRAVENOUS at 10:15

## 2022-04-05 RX ADMIN — IOPAMIDOL 90 ML: 755 INJECTION, SOLUTION INTRAVENOUS at 12:16

## 2022-04-12 NOTE — PROGRESS NOTES
Spoke to Dr. Juliet Bloom from [facility] Medicare for denied PET/CT. PET/CT ordered for increase in number and size of peritoneal masses on CT abd/pelvis. Radiologist unable to exclude metastatic disease based off CT abd.pelvis. PET CT recommended to rule out metastasis. Per, Dr. Dyan Greenfield, PET/CT unable to be approved due to Medicare limit of 3 PET/CT. She recommended an expedited appeal to Medicare. States office will receive fax to start process of appeal within 24 hours.

## 2022-04-14 ENCOUNTER — TELEPHONE (OUTPATIENT)
Dept: SURGERY | Age: 72
End: 2022-04-14

## 2022-04-14 ENCOUNTER — OFFICE VISIT (OUTPATIENT)
Dept: SURGERY | Age: 72
End: 2022-04-14
Payer: MEDICARE

## 2022-04-14 VITALS
HEART RATE: 85 BPM | WEIGHT: 127 LBS | OXYGEN SATURATION: 100 % | HEIGHT: 61 IN | TEMPERATURE: 97.8 F | BODY MASS INDEX: 23.98 KG/M2 | SYSTOLIC BLOOD PRESSURE: 220 MMHG | DIASTOLIC BLOOD PRESSURE: 88 MMHG

## 2022-04-14 DIAGNOSIS — K66.8 MASS OF PERITONEUM: Primary | ICD-10-CM

## 2022-04-14 PROCEDURE — G8427 DOCREV CUR MEDS BY ELIG CLIN: HCPCS | Performed by: SURGERY

## 2022-04-14 PROCEDURE — 99205 OFFICE O/P NEW HI 60 MIN: CPT | Performed by: SURGERY

## 2022-04-14 PROCEDURE — 1036F TOBACCO NON-USER: CPT | Performed by: SURGERY

## 2022-04-14 PROCEDURE — G8399 PT W/DXA RESULTS DOCUMENT: HCPCS | Performed by: SURGERY

## 2022-04-14 PROCEDURE — 1090F PRES/ABSN URINE INCON ASSESS: CPT | Performed by: SURGERY

## 2022-04-14 PROCEDURE — G8420 CALC BMI NORM PARAMETERS: HCPCS | Performed by: SURGERY

## 2022-04-14 PROCEDURE — 99202 OFFICE O/P NEW SF 15 MIN: CPT | Performed by: SURGERY

## 2022-04-14 PROCEDURE — 1123F ACP DISCUSS/DSCN MKR DOCD: CPT | Performed by: SURGERY

## 2022-04-14 PROCEDURE — 4040F PNEUMOC VAC/ADMIN/RCVD: CPT | Performed by: SURGERY

## 2022-04-14 PROCEDURE — 3017F COLORECTAL CA SCREEN DOC REV: CPT | Performed by: SURGERY

## 2022-04-14 ASSESSMENT — ENCOUNTER SYMPTOMS
GASTROINTESTINAL NEGATIVE: 1
BACK PAIN: 1
SHORTNESS OF BREATH: 1
EYES NEGATIVE: 1

## 2022-04-14 NOTE — TELEPHONE ENCOUNTER
Prior Authorization Form:      DEMOGRAPHICS:                     Patient Name:  Myla Kidney  Patient :  1950            Insurance:  Payor: Tyree Potter / Plan: Darrius Fernando COMPLETE / Product Type: *No Product type* /   Insurance ID Number:    Payor/Plan Subscr  Sex Relation Sub. Ins. ID Effective Group Num   1.  88 Edgardo Morales 1950 Female Self [id number]                                   [address]         DIAGNOSIS & PROCEDURE:                       Procedure/Operation: EGD + Colonoscopy           CPT Code: 96753, 55806    Diagnosis:  Mass of Peritoneum    ICD10 Code: K66.8    Location:  [facility]    Surgeon:  Dr Taryn Talbot INFORMATION:                          Date: 2022     Time: 1015              [facility]                                                       Status:  Outpatient        Special Comments:  NA       Electronically signed by Kandice Salinas on 2022 at 3:56 PM

## 2022-04-14 NOTE — PROGRESS NOTES
MA roomed patient. Blood pressure via machine in left upper arm measured 223/97. Retaken in left upper arm several minutes later manually and measured 196/96. Patient reported taking bloop pressure medication at 0600 and has no complaints. MA notified Dr Pearl Resendiz of blood pressures. Advised to contact Dr Golden Reed (PCP's) office for advisement if patient should be seen in office for ASAP BP follow up or if she should be seen in the ED today. MA contacted office and relayed information. Stated they would call back ASAP with advisement.      Electronically signed by Nawaf Avina on 4/14/22 at 10:36 AM EDT

## 2022-04-14 NOTE — PROGRESS NOTES
Dr Ebenezer Bui office contact patient while in office. Patient to start amlodipine and follow up in one week. Dr Coleen Mcdonough informed.      Electronically signed by Pat Seats on 4/14/22 at 11:12 AM EDT

## 2022-04-14 NOTE — PATIENT INSTRUCTIONS
Patient Information and Instructions for  Upper GI Endoscopy or Esophagogastroduodenoscopy [EGD])         Definition Upper GI Endoscopy or Esophagogastroduodenoscopy [EGD])  This is a test that uses a fiberoptic scope to examine the esophagus (throat), stomach, and upper part of the small intestines. Upper GI endoscopy may be recommended if you have:   Abdominal pain   Severe heartburn   Persistent nausea and vomiting   Difficulty swallowing   Blood in stool or vomit   Abnormal x-ray or other examinations of the gastrointestinal tract     Conditions that can be diagnosed with upper GI endoscopy include:   Ulcers   Tumors   Polyps   Abnormal narrowing   Inflammation     Possible Complications   Complications are rare, but no procedure is completely free of risk. If you are planning to have upper GI endoscopy, your doctor will review a list of possible complications, which may include:   Bleeding   Damage to the esophagus, stomach, or intestine   Infection   Respiratory depression (reduced breathing rate and/or depth)   Reaction to sedatives or anesthesia causing your blood pressure to drop    Some factors that may increase the risk of complications include:   Age: 61 or older   Pregnancy   Obesity   Smoking , alcoholism , or drug use   Malnutrition   Recent illness   Diabetes   Heart or lung problems   Bleeding disorders   Use of certain medicines     Be sure to discuss these risks with your doctor before the test.     What to Expect   Prior to test   Leading up to the test:   Arrange for a ride home after the test. Also, arrange for help at home. The night before, eat a light meal.  Do not eat or drink anything after midnight the night before the test.   Talk to your doctor about your medicines.  You may be asked to stop taking some medicines up to one week before the procedure, like:   Anti-inflammatory drugs (e.g., aspirin )   Blood thinners, like clopidogrel (Plavix) or warfarin (Coumadin)     Description of the Test   You will be asked to lie on your left side. You will have monitors tracking your breathing, heart rate, and blood oxygen levels. You will be given supplemental oxygen to breathe through your nose. A mouthpiece will be positioned to help keep your mouth open. Your throat may be sprayed with a numbing medicine. You will be given a sedative through an IV to help you relax during the test.  During the test, a small suction tube will be used to clear saliva and fluids from your mouth. The endoscope will be lubricated and placed in your mouth. You will be asked to try to swallow it. Then, it will be carefully and slowly advanced down your throat. It will be passed through your esophagus and into your stomach and intestine. While the endoscope is being advanced, your doctor will view the images on the screen. Air will be passed through the endoscope into your digestive tract. This will be done to smooth the normal folds in the tissues, allowing your doctor to view the tissue more easily. Tiny tools may be passed through the endoscope in order to take biopsies or do other tests. After Test   After the test, you will be observed for an hour. Then, you will be allowed to go home. When you return home after the test, do the following to help ensure a smooth recovery:   Rest when you get home. Ask your doctor if you can resume your normal diet. In most cases, you will be able to. Sedatives can slow your reaction time. Do not drive or use machinery for the rest of the day. Avoid alcohol for the rest of the day. Be sure to follow your doctor's instructions . How Long Will It Take? Usually about 10-15 minutes     Will It Hurt? Most people do not feel anything during the test and will not remember the test.  After the test, your throat may be sore and you may feel bloated. Results   This test gives your doctor information about the health of your digestive system.  The results can help to explain your symptoms. You and your doctor will talk about the results and your treatment plan. Call Your Doctor   After the test, call your doctor if any of the following occurs:   Signs of infection, including fever and chills   Severe abdominal pain   Hard, swollen abdomen   Difficulty swallowing or breathing   Any change or increase in your original symptoms   Bloody or black tarry colored stools   Nausea and/or vomiting   Cough, shortness of breath, or chest pain   Bleeding     In case of emergency, call 911. Patient Information and Instructions for Colonoscopy         Definition of Colonoscopy   A colonoscopy is the visual exam of the rectum and colon (large intestine). The exam is done with a tool called a colonoscope. The colonoscope is a flexible tube with a tiny camera on the end. This instrument allows the doctor to view the inside of your rectum and colon. Sigmoidoscopy is a shorter scope that views only the last one third of the colon. Reasons for Colonoscopy   It is used to examine, diagnose, and treat problems in your large intestine. The procedure is most often done for the following reasons: To determine the cause of abdominal pain, rectal bleeding, or a change in bowel habits   To detect and treat colon cancer or colon polyps   To obtain tissue samples for testing   To stop intestinal bleeding   Monitor response to treatment if you have inflammatory bowel disease     Possible Complications   Complications are rare, but no procedure is completely free of risk.  If you are planning to have a colonoscopy, your doctor will review a list of possible complications, which may include:   Bleeding   Reaction to the sedation causing drop in your blood pressure or problems breathing  Perforation or puncture of the bowel     Factors that may increase the risk of complications include:   Pre-existing heart or kidney condition   Treatment with certain medicines, including aspirin and other drugs with anticoagulant or blood-thinning properties   Prior abdominal surgery or radiation treatments   Active colitis , diverticulitis , or other acute bowel disease   Previous treatment with radiation therapy     Be sure to discuss these risks with your doctor before the procedure. What to Expect   Prior to Procedure   Your doctor will likely do the following:   Physical exam   Health history   Review of medicines   Test your stool for hidden blood (called \"occult blood\")     Your colon must be completely clean before the procedure. Any stool left in the intestine will block the view. This preparation may start several days before the procedure. Follow your doctor's instructions. Leading up to your procedure:   Talk to your doctor about your medicines. You may be asked to stop taking some medicines up to one week before the procedure, like:   Anti-inflammatory drugs (e.g., aspirin )   Blood thinners like clopidogrel (Plavix) or warfarin (Coumadin)   Iron supplements or vitamins containing iron   The day or days before your procedure, go on a clear liquid diet (clear broth, clear juice, clear jello) with no red coloring  Do not eat or drink anything after midnight. Wear comfortable clothing. If you have diabetes, ask your doctor if you need to adjust your diabetes medicine on the day prior to your procedure and the day of your procedure. Arrange for a ride home after the procedure. Anesthesia   You will receive intravenous sedation medicine for the procedure so you will not feel anything during the procedure. Description of the Procedure   You will lie on your left side with knees bent and drawn up toward your chest. The colonoscope will be slowly inserted through the rectum and into the bowel. The colonoscope will inject air into the colon. A small attached video camera will allow the doctor to view the colon's lining on a screen.  The doctor will continue guiding the tool through the bowel and assess the lining. A tissue sample or polyps may be removed during the procedure. How Long Will It Take? Usually it takes about 30 to 45 minutes     Will It Hurt? Most people do not feel anything during the procedure and will not remember the procedure. After the procedure, gas pains and cramping are common. These pains should go away with the passing of gas. Post-procedure Care   If any tissue was removed: It will be sent to a lab to be examined. It may take 1-2 weeks for results. The doctor will usually give an initial report after the scope is removed. Other tests may be recommended. A small amount of bleeding may occur during the first few days after the procedure. When you return home after the procedure, be sure to follow your doctor's instructions, which may include:   Resume medicines as instructed by your doctor. Resume normal diet, unless directed otherwise by your doctor. The sedative will make you drowsy. Avoid driving, operating machinery, or making important decisions for the rest of the day. Rest for the remainder of the day. After arriving home, contact your doctor if any of the following occurs:   Bleeding from your rectum, notify your doctor if you pass a teaspoonful of blood or more. Black, tarry stools   Severe abdominal pain   Hard, swollen abdomen   Signs of infection, including fever or chills   Inability to pass gas or stool   Coughing, shortness of breath, chest pain, severe nausea or vomiting     In case of an emergency, CALL 911 .      [facility] General Surgery  MAGNESIUM CITRATE/DULCOLAX TABLETS PREP  COLON PREP FOR COLONOSCOPY OR COLON SURGERY    It is very important that you follow all of the instructions listed on this sheet carefully (they may be slightly different than the directions on the product that you purchase at the pharmacy) to ensure that you colon is adequately cleaned out or your risk of complications could be increased. 2 Days or More Before Endoscopy:   Obtain 2 10-ounce bottle of Magnesium Citrate and 1 bottle of Dulcolax tablets from the pharmacy.  Do not eat corn, tomatoes, peas or watermelon 3 to 5 days before procedure.  If you are on INSULIN or OTHER DIABETIC MEDICATIONS, then check with your primary care physician as to how to adjust your medication while on clear liquid diet and when nothing by mouth. 1 Day Before the Endoscopy:   No solid food - only clear liquids (soup, jello, or juice that you can see through with no solid food) for breakfast, lunch and supper. DO NOT drink or eat anything that is red as it will turn the inside of the colon red and look like blood. Nothing to eat or drink after midnight.  Have at least 8 oz or more of clear liquids for breakfast (7 am to 8 am) and lunch (11:30 am to 12:30 pm).  12 Noon Drink a 10 oz bottle of Magnesium Citrate and take 4 Dulcolax tablets followed immediately by at least 8 oz of clear liquids.  1:00 pm Drink at least 8 oz of clear liquids.  2:00 pm Drink at least 8 oz of clear liquids.  3:00 pm Drink at least 8 oz of clear liquids.  4:00 pm Take 4 Dulcolax tablets and drink at least 8 oz of clear liquids.  5:00 pm Drink at least 8 oz of clear liquids.  6:00 pm Dinner - all clear liquids and at end of dinner drink a 10 oz bottle of Magnesium Citrate followed immediately by at least 8 oz of clear liquids.  7:00 pm Drink at least 8 oz of clear liquids.  8:00 pm Drink at least 8 oz of clear liquids.  Can continue to take liquids until 12 midnight then nothing to eat or drink    Day of Endoscopy:  · Nothing to eat or drink after midnight the night before the procedure. · If any blood pressure medications or heart medications are due in the morning, you should take them with a sip of water. Please contact Vikas Allison MA at [phone] with any questions or concerns.

## 2022-04-14 NOTE — PROGRESS NOTES
Terrell Kurtz  4/14/2022  [address] [facility]                History and Physical      Chief Complaint   Patient presents with   Judie Ruggiero Consultation     Colonoscopy consult - ref by Dr Chastity Walker - last colonoscopy 3/2019 w/ Dr Briana White Weight Loss     reports weight loss 135lb was average weight - 127 lb today     Other     had recent CT Scan completed 2/20 and PET 4/05 - showed \"peritoneal cystic mass\" per referral    Other     denies nausea, vomiting, abdominal pain         HISTORY OF PRESENT ILLNESS: Terrell Kurtz is a  71 y.o.  female,  who was sent to my office for an abnormal CT scan. She went to the emergency room with back pain due to-20-20 22 CT abdomen pelvis showed multiple peritoneal cystic masses that have been present to some degree dating back to 2018. The previous jejunal mesenteric mass is no longer visualized however there is an increased number and size of multiple peritoneal masses adjacent to the distal descending colon that measure up to 2.7 cm. She has a 6 mm right lower lobe pulmonary nodule at this time as well that was new from 2018. Most recently she had a CT of her chest with contrast on 4-5-2022 for some groundglass nodules right lower lobe superior lateral portion of 1 cm which was unchanged but there has been an increase in pulmonary nodule on the right lower lobe from the last CAT scan. She then had a PET scan there is physiological FDG uptake however does not exceed the threshold for SUV. She has a history of pulmonary resection, Dr. Cande Spencer in 2016 for adenocarcinoma status after multiple negative biopsies she then had a Mediport placed in the same year for chemotherapy. In 2018 she had what seemed to be a subtotal cholecystectomy.   During the cholecystectomy the cystic artery was identified and skeletonized and divided but there is inability to safely dissect the gallbladder free from them being in adhered to the common hepatic ducts therefore an Endoloop was placed over the cystic duct stump and from the op note appears only the anterior wall the gallbladder was removed. .  She had a colonoscopy done in 2019 which had a single hyperplastic polyp and a single tubular adenoma. All of the above notes from CT surgery, general surgery, pulmonology, heme-onc were all reviewed with the above plans as discussed. The patient states she has not been dieting and has lost approximately 15 pounds. Imaging Reviewed and personally interpreted CT scan abdomen pelvis, CT chest as well as PET scan agree with the above listed results.   Labs reviewed labs 3- creatinine cyst stable at 1.1, no gross electrolyte abnormalities, albumin 3.3, alk phos 125, AST 60        Past Medical History:   Diagnosis Date    Abnormal chest x-ray with multiple lung nodules 9/8/2015    Abnormal Pap smear 1/3/2011    Alpha-1 negative    Adrenal adenoma     7 mm    Bilateral lung cancer ([address] 5/12/2016    surgically removed - 2015     Cholelithiasis 6/6/2011    Encounter for screening colonoscopy     for OR 3-28-19     Fibroids     Hemangioma of spleen     Hepatitis C 01/03/2011    treated and cured, no current issues     Hyperlipidemia     no medications     Hypertension 6/6/2011    Insomnia     Lung nodule     ROMELIA     Lymphadenopathy     Cervical (-) ENT    Malignant neoplasm of upper lobe of right lung (HCC) 11/18/2015    Osteoarthritis     Panlobular emphysema ([address] 11/12/2015    controlled with inhalers     PPD negative 1/18/12    Pulmonary embolism without acute cor pulmonale ([address] 5/12/2016    PVD (peripheral vascular disease) ([address] 5/6/2014    Scoliosis     Uterine fibroid 5/6/2014      Past Surgical History:   Procedure Laterality Date    APPENDECTOMY  1965    BREAST BIOPSY Left     AGE 31'S LEFT NIPPLE REMOVED    BREAST LUMPECTOMY  4/28/1999    left, benign, Dr. Toño Mejia, [address]  2/1/2012    Dr. Annika Fernandez, [address]  10/15/15    with EBUS - DR Jessi King    CHOLECYSTECTOMY      COLONOSCOPY  12/21/2009    partial to distal ascending colon normal (completion BE same day normal), Dr. Dexter Noel, [address] [facility] COLONOSCOPY  8/22/2014    done under fluoro with sigmoid straightening device so was able to get to cecum, very poor prep, moderate proctitis, repeat 5 years,  Dr. Dexter Noel, [address] [facility] COLONOSCOPY N/A 3/28/2019    COLONOSCOPY POLYPECTOMY SNARE/COLD BIOPSY performed by Kassy Simons DO at [address] Left 3/29/2016    VATS WEDGE RESECTION UPPER LOBECTOMY    OTHER SURGICAL HISTORY Right 11/04/2016    REMOVAL MEDI-PORT - DR Dunn Ranks    VT LAP,CHOLECYSTECTOMY/GRAPH N/A 6/29/2018    CHOLECYSTECTOMY LAPAROSCOPIC, POSSIBLE OPEN,POSSIBLE GRAM ( OC 2) performed by Laila Castellano MD at [address] Right 11/11/2015    VATS, Tamela Aaron UPPER LOBECTOMY; NODE DISECTION    TUNNELED VENOUS PORT PLACEMENT Right 12/07/2015    right chest, and removed       Ibuprofen   Current Outpatient Medications   Medication Sig Dispense Refill    vitamin B-12 (CYANOCOBALAMIN) 500 MCG tablet take 1 tablet by mouth daily 30 tablet 11    Cholecalciferol (VITAMIN D3) 1.25 MG (43699 UT) CAPS Take by mouth      Multiple Vitamins-Minerals (THEREMS-M) TABS take 1 tablet by mouth once daily 30 tablet 11    tiotropium (SPIRIVA HANDIHALER) 18 MCG inhalation capsule Inhale 1 capsule into the lungs daily (Patient taking differently: Inhale 18 mcg into the lungs daily Instructed to take am of procedure) 90 capsule 3    lisinopril (PRINIVIL;ZESTRIL) 40 MG tablet take 1 tablet by mouth once daily 90 tablet 4    hydrochlorothiazide (HYDRODIURIL) 25 MG tablet Take 1 tablet by mouth daily 90 tablet 0    aspirin EC 81 MG EC tablet Take 1 tablet by mouth daily (Patient taking differently: Take 81 mg by mouth daily LD 3-23-19) 90 tablet 0    Multiple Vitamins-Minerals (THERAPEUTIC MULTIVITAMIN-MINERALS) tablet Take 1 tablet by mouth daily Skin is warm. Neurological:      General: No focal deficit present. Mental Status: She is alert. Psychiatric:         Mood and Affect: Mood normal.         Assessment:   Diffuse cystic lesions in the intraperitoneal cavity. Acute exacerbation of chronic disease with possibly metastatic disease of unknown origin. Patient with a history of adenocarcinoma status post pulmonary resections. Plan: We will send CEA, CA-125, CA 19-9, chromogranin a    Plan on diagnostic EGD and colonoscopy    I discussed the risks, benefits, and alternatives to colonoscopy with possible biopsy/cauterization/polylpectomy with deep sedation with the patient including the risks of deep sedation (hypotension, hypoxia), bleeding, and perforation (<1%). The patient understands the above and agrees to proceed. I have discussed the risks, benefits, and alternatives to esophagogastroduodenoscopy with possible biopsy with deep sedation with the patient. I have detailed the risks of deep sedation (hypotension, hypoxia) as well as complications of bleeding and perforation. The patient understands the above and agrees to proceed    Recommend follow-up with gynecology secondary to multiple calcified fibroids, unknown site for metastatic disease, and no recent follow-up.       Physician Signature: Patti Larose MD      Send copy of H&P to [facility],

## 2022-04-20 ENCOUNTER — HOSPITAL ENCOUNTER (OUTPATIENT)
Dept: GENERAL RADIOLOGY | Age: 72
Discharge: HOME | End: 2022-04-22
Payer: MEDICARE

## 2022-04-20 ENCOUNTER — OFFICE VISIT (OUTPATIENT)
Dept: ONCOLOGY | Age: 72
End: 2022-04-20
Payer: MEDICARE

## 2022-04-20 ENCOUNTER — HOSPITAL ENCOUNTER (OUTPATIENT)
Dept: INFUSION THERAPY | Age: 72
Discharge: HOME | End: 2022-04-20
Payer: MEDICARE

## 2022-04-20 VITALS
TEMPERATURE: 98.6 F | HEART RATE: 87 BPM | WEIGHT: 123 LBS | OXYGEN SATURATION: 99 % | BODY MASS INDEX: 23.22 KG/M2 | DIASTOLIC BLOOD PRESSURE: 81 MMHG | HEIGHT: 61 IN | SYSTOLIC BLOOD PRESSURE: 188 MMHG

## 2022-04-20 VITALS — BODY MASS INDEX: 23.98 KG/M2 | HEIGHT: 61 IN | WEIGHT: 127 LBS

## 2022-04-20 DIAGNOSIS — C34.90 NON-SMALL CELL LUNG CANCER, UNSPECIFIED LATERALITY (HCC): ICD-10-CM

## 2022-04-20 DIAGNOSIS — C34.11 MALIGNANT NEOPLASM OF UPPER LOBE OF RIGHT LUNG (HCC): ICD-10-CM

## 2022-04-20 DIAGNOSIS — C34.90 NON-SMALL CELL LUNG CANCER, UNSPECIFIED LATERALITY (HCC): Primary | ICD-10-CM

## 2022-04-20 DIAGNOSIS — Z12.31 VISIT FOR SCREENING MAMMOGRAM: ICD-10-CM

## 2022-04-20 DIAGNOSIS — R91.1 LUNG NODULE: ICD-10-CM

## 2022-04-20 LAB
ALBUMIN SERPL-MCNC: 3.4 G/DL (ref 3.5–5.2)
ALP BLD-CCNC: 107 U/L (ref 35–104)
ALT SERPL-CCNC: 36 U/L (ref 0–32)
ANION GAP SERPL CALCULATED.3IONS-SCNC: 7 MMOL/L (ref 7–16)
AST SERPL-CCNC: 48 U/L (ref 0–31)
BASOPHILS ABSOLUTE: 0.01 E9/L (ref 0–0.2)
BASOPHILS RELATIVE PERCENT: 0.2 % (ref 0–2)
BILIRUB SERPL-MCNC: 0.2 MG/DL (ref 0–1.2)
BUN BLDV-MCNC: 22 MG/DL (ref 6–23)
CA 125: 32.8 U/ML (ref 0–35)
CALCIUM SERPL-MCNC: 8.9 MG/DL (ref 8.6–10.2)
CEA: 12.1 NG/ML (ref 0–5.2)
CHLORIDE BLD-SCNC: 104 MMOL/L (ref 98–107)
CO2: 28 MMOL/L (ref 22–29)
CREAT SERPL-MCNC: 1.1 MG/DL (ref 0.5–1)
EOSINOPHILS ABSOLUTE: 0.03 E9/L (ref 0.05–0.5)
EOSINOPHILS RELATIVE PERCENT: 0.6 % (ref 0–6)
GFR AFRICAN AMERICAN: 59
GFR NON-AFRICAN AMERICAN: 59 ML/MIN/1.73
GLUCOSE BLD-MCNC: 98 MG/DL (ref 74–99)
HCT VFR BLD CALC: 30.7 % (ref 34–48)
HEMOGLOBIN: 10 G/DL (ref 11.5–15.5)
IMMATURE GRANULOCYTES #: 0.03 E9/L
IMMATURE GRANULOCYTES %: 0.6 % (ref 0–5)
LYMPHOCYTES ABSOLUTE: 1.04 E9/L (ref 1.5–4)
LYMPHOCYTES RELATIVE PERCENT: 20 % (ref 20–42)
MCH RBC QN AUTO: 28.8 PG (ref 26–35)
MCHC RBC AUTO-ENTMCNC: 32.6 % (ref 32–34.5)
MCV RBC AUTO: 88.5 FL (ref 80–99.9)
MONOCYTES ABSOLUTE: 0.52 E9/L (ref 0.1–0.95)
MONOCYTES RELATIVE PERCENT: 10 % (ref 2–12)
NEUTROPHILS ABSOLUTE: 3.56 E9/L (ref 1.8–7.3)
NEUTROPHILS RELATIVE PERCENT: 68.6 % (ref 43–80)
PDW BLD-RTO: 14.6 FL (ref 11.5–15)
PLATELET # BLD: 210 E9/L (ref 130–450)
PMV BLD AUTO: 10.5 FL (ref 7–12)
POTASSIUM SERPL-SCNC: 3.7 MMOL/L (ref 3.5–5)
RBC # BLD: 3.47 E12/L (ref 3.5–5.5)
SODIUM BLD-SCNC: 139 MMOL/L (ref 132–146)
TOTAL PROTEIN: 7.6 G/DL (ref 6.4–8.3)
WBC # BLD: 5.2 E9/L (ref 4.5–11.5)

## 2022-04-20 PROCEDURE — G8399 PT W/DXA RESULTS DOCUMENT: HCPCS | Performed by: INTERNAL MEDICINE

## 2022-04-20 PROCEDURE — 99215 OFFICE O/P EST HI 40 MIN: CPT | Performed by: INTERNAL MEDICINE

## 2022-04-20 PROCEDURE — 99212 OFFICE O/P EST SF 10 MIN: CPT

## 2022-04-20 PROCEDURE — 1123F ACP DISCUSS/DSCN MKR DOCD: CPT | Performed by: INTERNAL MEDICINE

## 2022-04-20 PROCEDURE — 1036F TOBACCO NON-USER: CPT | Performed by: INTERNAL MEDICINE

## 2022-04-20 PROCEDURE — 1090F PRES/ABSN URINE INCON ASSESS: CPT | Performed by: INTERNAL MEDICINE

## 2022-04-20 PROCEDURE — 4040F PNEUMOC VAC/ADMIN/RCVD: CPT | Performed by: INTERNAL MEDICINE

## 2022-04-20 PROCEDURE — 85025 COMPLETE CBC W/AUTO DIFF WBC: CPT

## 2022-04-20 PROCEDURE — G8427 DOCREV CUR MEDS BY ELIG CLIN: HCPCS | Performed by: INTERNAL MEDICINE

## 2022-04-20 PROCEDURE — 3017F COLORECTAL CA SCREEN DOC REV: CPT | Performed by: INTERNAL MEDICINE

## 2022-04-20 PROCEDURE — G8420 CALC BMI NORM PARAMETERS: HCPCS | Performed by: INTERNAL MEDICINE

## 2022-04-20 PROCEDURE — 36415 COLL VENOUS BLD VENIPUNCTURE: CPT

## 2022-04-20 PROCEDURE — 80053 COMPREHEN METABOLIC PANEL: CPT

## 2022-04-20 PROCEDURE — 82378 CARCINOEMBRYONIC ANTIGEN: CPT

## 2022-04-20 PROCEDURE — 77063 BREAST TOMOSYNTHESIS BI: CPT

## 2022-04-20 RX ORDER — AMLODIPINE BESYLATE 5 MG/1
TABLET ORAL
Status: ON HOLD | COMMUNITY
Start: 2022-04-14

## 2022-04-20 NOTE — PROGRESS NOTES
Cycle # 1 of [employer] was on 12/09/2015. Cycle # 2 of [employer] was on 01/06/2016. Cycle # 3 of [employer] was on 01/27/2016. Cycle # 4 of [employer] was on 02/24/2016. PET SCAN 3.2016: The 2.1 cm left lung mass abutting the major fissure is hypermetabolic with SUV max of 5.4. Finding is worrisome for tumor with avid glucose metabolism. 2. Elsewhere there is unremarkable distribution of FDG activity without evidence of hypermetabolic metastases. On 03/29/2016 underwent Bronch/Left VATS/VATS wedge ROMELIA/VATS Left upper lobectomy/Mediasinal lymph node dissection/Intercostal nerve block from T3-T10 per Dr. Carolin Lennox. Invasive, well-differentiated adenocarcinoma (grade 1); Tumor size-1.4 cm in greatest dimension; Surgical margins-negative for malignancy; Lymphovascular invasion-not identified; TNM classification-pT2a N0 MX  B. AP window lymph node #1, excision: Anthracotic lymph node; negative for malignancy  C. AP window lymph node #2, excision: Anthracotic lymph node; negative for malignancy   D. Periaortic lymph node #1, excision: Fibroadipose tissue; lymph node not identified  E. Bronchial lymph node #1, excision: Anthracotic lymph node; negative for malignancy  F. Left lung, upper lobectomy: Emphysematous change; negative for malignancy  4 anthracotic lymph nodes negative for malignancy   G. Inferior pulmonary ligament lymph node, excision: Anthracotic lymph node; negative for malignancy  H. Bronchial lymph node, excision: Anthracotic lymph node; negative for malignancy. On surveillance per NCCN guidelines.       Recent abdominal pain; ER visit reviewed  CT abdomen/pelvis 02/20/2022   6 mm right lower lobe pulmonary nodule  Multiple peritoneal cystic masses     CEA 12.5 on 03/16/2022    CT chest 04/05/2022 Ground-glass nodule right lower lobe superolateral portion 10 mm unchanged from prior however in the medial segment right lower lobe with pleural abutment is a 7 mm pulmonary nodule increased in size from 09/28/2021 with is barely visible at 2-3 mm; repeat CT chest in 3 mo    PET/CT scan 04/05/2022 noted No FDG avid uptake is identified which exceeds the threshold SUV    Today 04/20/2022 for f/u. No fever, chills. Fair appetite and energy level. Review of Systems;  CONSTITUTIONAL: No fever, chills. Fair appetite and energy level. ENMT: Eyes: No diplopia; Nose: No epistaxis. Mouth: No sore throat. RESPIRATORY: No hemoptysis, shortness of breath. CARDIOVASCULAR: No chest pain, palpitations. GASTROINTESTINAL: No nausea, vomiting. Recent abdominal pain  GENITOURINARY: No dysuria, urinary frequency, hematuria. NEURO: No syncope, presyncope, headache. Remainder ROS: Negative. Past Medical History:      Diagnosis Date    Hypertension 6/6/2011    Abnormal Pap smear 1/3/2011     Alpha-1 negative    Cholelithiasis 6/6/2011    Osteoarthritis     Lymphadenopathy      Cervical (-) ENT    Lung nodule      ROMELIA     Scoliosis     PPD negative 1/18/12    Adrenal adenoma      7 mm    Hemangioma of spleen     Insomnia     PVD (peripheral vascular disease) ([address] 5/6/2014    Uterine fibroid 5/6/2014    Hepatitis C 1/3/2011    Fibroids     Abnormal chest x-ray with multiple lung nodules 9/8/2015    Hyperlipidemia     Panlobular emphysema ([address] 11/12/2015    Malignant neoplasm of upper lobe of right lung ([address] 11/18/2015     Medications:  Reviewed and reconciled. Allergies: Allergies   Allergen Reactions    Ibuprofen Palpitations and Rash     Physical Exam:  BP (!) 188/81   Pulse 87   Temp 98.6 °F (37 °C)   Ht 5' 1\" (1.549 m)   Wt 123 lb (55.8 kg)   SpO2 99%   BMI 23.24 kg/m²    GENERAL: Alert, oriented x 3, not in acute distress. HEENT: PERRLA; EOMI. Oropharynx clear. NECK: Supple. Without lymphadenopathy. LUNGS: No wheezing, crackles or ronchi. CARDIOVASCULAR: Regular rate. No murmurs, rubs or gallops. ABDOMEN: Soft.  Non-tender, non-distended. EXTREMITIES: Without clubbing, cyanosis, or edema. NEUROLOGIC: No focal deficits. ECOG PS 1    Lab Results   Component Value Date    WBC 5.2 04/20/2022    HGB 10.0 (L) 04/20/2022    HCT 30.7 (L) 04/20/2022    MCV 88.5 04/20/2022     04/20/2022     Lab Results   Component Value Date     04/20/2022    K 3.7 04/20/2022     04/20/2022    CO2 28 04/20/2022    BUN 22 04/20/2022    CREATININE 1.1 (H) 04/20/2022    GLUCOSE 98 04/20/2022    CALCIUM 8.9 04/20/2022    PROT 7.6 04/20/2022    LABALBU 3.4 (L) 04/20/2022    BILITOT 0.2 04/20/2022    ALKPHOS 107 (H) 04/20/2022    AST 48 (H) 04/20/2022    ALT 36 (H) 04/20/2022    LABGLOM 59 04/20/2022    GFRAA 59 04/20/2022     Impression/Plan:  71 y.o. female with Hx of smoking who underwent: (1) Bronchoscopy. (2) Right VATS. (3) VATS right upper lobe wedge resection. (4) VATS right upper lobectomy. (5) Intercostal nerve block from T3 to T10. (6) Mediastinal lymph node dissection on 11/11/2015:   Pathology:  Right lung, upper lobectomy: Invasive, moderately differentiated adenocarcinoma (Grade 2). Tumor size 1.5 cm in greatest dimension. Visceral pleural invasion: Not identified. Visceral pleural invasion: Not identified. Surgical margin negative for malignancy. Two of three peribronchial lymph nodes positive for adenocarcinoma. pT1a N1 MX;     Being followed for a left upper lobe mass by Dr. Leidy Lieberman. Multiple biopsies in the past came back negative for malignancy. Hypermetabolic on PET/CT scan on 09/29/2015 (2.3 cm in size with SUV of 6.4). CT guided biopsy of the left upper lobe lesion on 11/02/2015 was noted to be negative for malignancy. She was referred to the medical oncology clinic to discuss adjuvant chemotherapy. We recommended 4 cycles of adjuvant chemotherapy consisting of Carboplatin/Alimta. Mediport placed 12/7/15.    -MRI Brain on 12/8/15:  Negative for metastatic disease.   -We will repeat CT chest and PET/CT after 4 cycles of Carboplatin/Alimta. To follow on Lingular lesion as well. -Molecular studies (EGFR, ALK, ROS-1) were all Not Detected. Cycle # 1 of Priscilla Fang was on 12/09/2015. Cycle # 2 of Priscilla Fang was on 01/06/2016. Cycle # 3 of Priscilla Fang was on 01/27/2016. Cycle # 4 of Priscilla Fang was on 02/24/2016. PET SCAN 3.2016: The 2.1 cm left lung mass abutting the major fissure is hypermetabolic with SUV max of 5.4. Finding is worrisome for tumor with avid glucose metabolism. 2. Elsewhere there is unremarkable distribution of FDG activity without evidence of hypermetabolic metastases. On 03/29/2016 underwent Bronch/Left VATS/VATS wedge ROMELIA/VATS Left upper lobectomy/Mediasinal lymph node dissection/Intercostal nerve block from T3-T10 per Dr. Sandy Tinajero. Final pathology revealed Stage I Adenocarcinoma of ROMELIA (morphologically different from the adenocarcinoma previously diagnosed in the right upper lobe. Therefore, synchronous primary tumors are favored). A. Left lung, upper lobe wedge resection: Invasive, well-differentiated adenocarcinoma (grade 1); Tumor size-1.4 cm in greatest dimension; Surgical margins-negative for malignancy; Lymphovascular invasion-not identified; TNM classification-pT2a N0 MX  B. AP window lymph node #1, excision: Anthracotic lymph node; negative for malignancy  C. AP window lymph node #2, excision: Anthracotic lymph node; negative for malignancy   D. Periaortic lymph node #1, excision: Fibroadipose tissue; lymph node not identified  E. Bronchial lymph node #1, excision: Anthracotic lymph node; negative for malignancy  F. Left lung, upper lobectomy: Emphysematous change; negative for malignancy  4 anthracotic lymph nodes negative for malignancy   G. Inferior pulmonary ligament lymph node, excision: Anthracotic lymph node; negative for malignancy  H. Bronchial lymph node, excision: Anthracotic lymph node; negative for malignancy.     Right side Mediport was removed on 11/04/2016 by Dr. Basilio Pabon. On surveillance per NCCN guidelines. CT chest 04/12/2017 noted no convincing evidence of recurrent disease. CT chest 10/19/2017 noted no definite evidence for recurrent malignancy. CT chest 03/12/2018 negative for pulmonary parenchymal masses or enlarged mediastinal or hilar LN. ? 2 cm lesion in spleen difficult to evaluate due to the arterial phase of the study. CT Abd/Pelvis 05/08/2018  Enhancing lesion within the spleen is relatively stable to slightly smaller when compared to April 2014 exam and likely splenic hemangioma. Other indeterminate findings (left periaortic LN, sclerotic/blastic foci in L3 and L1 reported by Dr. Paula Zamora from radiology team). PET/CT scan 06/05/2018 unremarkable. No FDG avid uptake identified. No evidence for recurrent or metastatic disease. CT Chest 12/13/2018 noted no evidence of recurrent/metastatic disease. CT chest on 06/11/2019 noted no evidence for worrisome residual or recurrent malignancy. No evidence for new lymphadenopathy or metastatic disease. Stable scarring and postoperative changes right upper lobe. CT chest 11/26/2019: No evidence of active neoplasm. CT chest 06/15/2020: No evidence of active neoplasm. CT chest 12/30/2020: Postsurgical changes and postsurgical scarring seen within the right lung apex.  There is no evidence of tumor recurrence. 2.1 x 2.3 cm enhancing lesion seen within the spleen  PET/CT scan 03/02/2021   No FDG avid uptake is identified which exceeds the threshold SUV.   No convincing evidence for recurrent or metastatic disease  CT chest 09/28/2021 No evidence of tumor recurrence    Recent abdominal pain; ER visit reviewed  CT abdomen/pelvis 02/20/2022   6 mm right lower lobe pulmonary nodule  Multiple peritoneal cystic masses     CEA 12.5 on 03/16/2022    CT chest 04/05/2022 Ground-glass nodule right lower lobe superolateral portion 10 mm unchanged from prior however in the medial segment right lower lobe with pleural abutment is a 7 mm pulmonary nodule increased in size from 09/28/2021 with is barely visible at 2-3 mm; repeat CT chest in 3 mo    PET/CT scan 04/05/2022 noted No FDG avid uptake is identified which exceeds the threshold SUV  Imaging reviewed. Labs reviewed. CA-125, , Chromogranin A ordered and drawn today 04/20/2022  Case discussed today with Dr. Andres Calhoun  EGD/Colonoscopy first   Biopsy peritoneal mass pending EGD/Colonoscopy and tumor markers    RTC 4 weeks.     Christy Willams MD   [id number]  Board Certified Medical Oncologist

## 2022-04-22 LAB — CA 19-9: <2 U/ML

## 2022-04-24 LAB — CHROMOGRANIN A: 1480 NG/ML (ref 0–103)

## 2022-05-09 NOTE — PROGRESS NOTES
[facility] PRE-ADMISSION TESTING   ENDOSCOPY/ COLONSCOPY INSTRUCTIONS  PAT- Phone Number: [phone]    ENDOSCOPY/ COLONSCOPY INSTRUCTIONS:     [x] Bowel Prep instructions reviewed. [x] Colonoscopy- The day prior: No solid foods. Clear liquids only. [x] Nothing by mouth after midnight. Including no gum, candy, mints, or water. [x] You may brush your teeth, gargle, but do NOT swallow water. [x] Do not wear makeup, lotions, powders, deodorant. [x] Arrange transportation with a responsible adult  to and from the hospital. If you do not have a responsible adult  to transport you, you will need to make arrangements with a medical transportation company. Arrange for someone to be with you for the remainder of the day and for 24 hours after your procedure due to having had anesthesia. -Who will be your  for transportation? Sister- Wilfredo Levy   -Who will be staying with you for 24 hrs after your procedure? SisterBrooklynn Levy     PARKING INSTRUCTIONS:     [x] ARRIVAL TIME: 5/13/22 @ 0915  · [x] Enter into the [employer] [facility] [employer]. Two people may accompany you. Masks are required. @   · [x] Parking Lot \"I\" is where you will park. It is located on the corner of [facility] and [facility]. The entrance is on [facility]. · To enter, press the button and the gate will lift. A free token will be provided to exit the lot. EDUCATION INSTRUCTIONS:    [x] Bring a complete list of your medications, please write the last time you took the medicine, give this list to the nurse. [x] Take the following medications the morning of surgery with 1-2 ounces of water: Norvasc   [x] Stop herbal supplements and vitamins 5 days before your surgery. [] DO NOT take any diabetic medicine the morning of surgery. Follow instructions for insulin the day before surgery.   [] If you are diabetic and your blood sugar is low or you feel symptomatic, you may drink 1-2 ounces of apple juice or take a glucose tablet. The morning of your procedure, you may call the pre-op area if you have concerns about your blood sugar [phone]. [x] Use your inhalers the morning of surgery. Bring your emergency inhaler with you day of surgery. [x] Follow physician instructions regarding any blood thinners you may be taking. WHAT TO EXPECT:    [x] The day of your procedure you will be greeted and checked in by the [employer].  In addition, you will be registered in the [place] by a Patient Access Representative. Please bring your photo ID and insurance card. A nurse will greet you in accordance to the time you are needed in the pre-op area to prepare you for surgery. Please do not be discouraged if you are not greeted in the order you arrive as there are many variables that are involved in patient preparation. Your patience is greatly appreciated as you wait for your nurse. Please bring in items such as: books, magazines, newspapers, electronics, or any other items  to occupy your time in the waiting area. [x]  Delays may occur. Staff will make a sincere effort to keep you informed of delays. If any delays occur with your procedure, we apologize ahead of time for your inconvenience as we recognize the value of your time.

## 2022-05-13 ENCOUNTER — ANESTHESIA (OUTPATIENT)
Dept: ENDOSCOPY | Age: 72
End: 2022-05-13
Payer: MEDICARE

## 2022-05-13 ENCOUNTER — ANESTHESIA EVENT (OUTPATIENT)
Dept: ENDOSCOPY | Age: 72
End: 2022-05-13
Payer: MEDICARE

## 2022-05-13 ENCOUNTER — HOSPITAL ENCOUNTER (OUTPATIENT)
Age: 72
Setting detail: OUTPATIENT SURGERY
Discharge: HOME | End: 2022-05-13
Attending: SURGERY | Admitting: SURGERY
Payer: MEDICARE

## 2022-05-13 VITALS
BODY MASS INDEX: 23.98 KG/M2 | HEART RATE: 85 BPM | RESPIRATION RATE: 16 BRPM | WEIGHT: 127 LBS | DIASTOLIC BLOOD PRESSURE: 72 MMHG | TEMPERATURE: 97.9 F | SYSTOLIC BLOOD PRESSURE: 171 MMHG | HEIGHT: 61 IN | OXYGEN SATURATION: 99 %

## 2022-05-13 VITALS
SYSTOLIC BLOOD PRESSURE: 121 MMHG | DIASTOLIC BLOOD PRESSURE: 58 MMHG | RESPIRATION RATE: 18 BRPM | OXYGEN SATURATION: 100 %

## 2022-05-13 PROCEDURE — 45380 COLONOSCOPY AND BIOPSY: CPT | Performed by: SURGERY

## 2022-05-13 PROCEDURE — 2580000003 HC RX 258: Performed by: SURGERY

## 2022-05-13 PROCEDURE — 88305 TISSUE EXAM BY PATHOLOGIST: CPT

## 2022-05-13 PROCEDURE — 3609013400 HC EGD REMOVAL LESION(S) BY HOT BIOPSY FORCEPS: Performed by: SURGERY

## 2022-05-13 PROCEDURE — 88342 IMHCHEM/IMCYTCHM 1ST ANTB: CPT

## 2022-05-13 PROCEDURE — 7100000010 HC PHASE II RECOVERY - FIRST 15 MIN: Performed by: SURGERY

## 2022-05-13 PROCEDURE — 2500000003 HC RX 250 WO HCPCS

## 2022-05-13 PROCEDURE — 3609010600 HC COLONOSCOPY POLYPECTOMY SNARE/COLD BIOPSY: Performed by: SURGERY

## 2022-05-13 PROCEDURE — 43239 EGD BIOPSY SINGLE/MULTIPLE: CPT | Performed by: SURGERY

## 2022-05-13 PROCEDURE — 43251 EGD REMOVE LESION SNARE: CPT | Performed by: SURGERY

## 2022-05-13 PROCEDURE — 3700000001 HC ADD 15 MINUTES (ANESTHESIA): Performed by: SURGERY

## 2022-05-13 PROCEDURE — 3700000000 HC ANESTHESIA ATTENDED CARE: Performed by: SURGERY

## 2022-05-13 PROCEDURE — 7100000011 HC PHASE II RECOVERY - ADDTL 15 MIN: Performed by: SURGERY

## 2022-05-13 PROCEDURE — 45385 COLONOSCOPY W/LESION REMOVAL: CPT | Performed by: SURGERY

## 2022-05-13 PROCEDURE — 6360000002 HC RX W HCPCS

## 2022-05-13 PROCEDURE — 2709999900 HC NON-CHARGEABLE SUPPLY: Performed by: SURGERY

## 2022-05-13 PROCEDURE — 6360000002 HC RX W HCPCS: Performed by: SURGERY

## 2022-05-13 RX ORDER — SODIUM CHLORIDE 9 MG/ML
INJECTION, SOLUTION INTRAVENOUS CONTINUOUS
Status: DISCONTINUED | OUTPATIENT
Start: 2022-05-13 | End: 2022-05-13 | Stop reason: HOSPADM

## 2022-05-13 RX ORDER — EPINEPHRINE 1 MG/ML(1)
AMPUL (ML) INJECTION PRN
Status: DISCONTINUED | OUTPATIENT
Start: 2022-05-13 | End: 2022-05-13 | Stop reason: ALTCHOICE

## 2022-05-13 RX ORDER — PANTOPRAZOLE SODIUM 40 MG/1
40 TABLET, DELAYED RELEASE ORAL
Status: ACTIVE | OUTPATIENT
Start: 2022-05-14

## 2022-05-13 RX ORDER — SODIUM CHLORIDE 0.9 % (FLUSH) 0.9 %
10 SYRINGE (ML) INJECTION PRN
Status: DISCONTINUED | OUTPATIENT
Start: 2022-05-13 | End: 2022-05-13 | Stop reason: HOSPADM

## 2022-05-13 RX ORDER — SODIUM CHLORIDE 9 MG/ML
25 INJECTION, SOLUTION INTRAVENOUS PRN
Status: DISCONTINUED | OUTPATIENT
Start: 2022-05-13 | End: 2022-05-13 | Stop reason: HOSPADM

## 2022-05-13 RX ORDER — GLYCOPYRROLATE 1 MG/5 ML
SYRINGE (ML) INTRAVENOUS PRN
Status: DISCONTINUED | OUTPATIENT
Start: 2022-05-13 | End: 2022-05-13 | Stop reason: SDUPTHER

## 2022-05-13 RX ORDER — SODIUM CHLORIDE 0.9 % (FLUSH) 0.9 %
10 SYRINGE (ML) INJECTION EVERY 12 HOURS SCHEDULED
Status: DISCONTINUED | OUTPATIENT
Start: 2022-05-13 | End: 2022-05-13 | Stop reason: HOSPADM

## 2022-05-13 RX ORDER — PROPOFOL 10 MG/ML
INJECTION, EMULSION INTRAVENOUS PRN
Status: DISCONTINUED | OUTPATIENT
Start: 2022-05-13 | End: 2022-05-13 | Stop reason: SDUPTHER

## 2022-05-13 RX ADMIN — SODIUM CHLORIDE: 9 INJECTION, SOLUTION INTRAVENOUS at 09:25

## 2022-05-13 RX ADMIN — PROPOFOL 750 MG: 10 INJECTION, EMULSION INTRAVENOUS at 10:22

## 2022-05-13 RX ADMIN — Medication 0.2 MG: at 10:20

## 2022-05-13 ASSESSMENT — PAIN - FUNCTIONAL ASSESSMENT: PAIN_FUNCTIONAL_ASSESSMENT: NONE - DENIES PAIN

## 2022-05-13 ASSESSMENT — PAIN SCALES - GENERAL: PAINLEVEL_OUTOF10: 0

## 2022-05-13 NOTE — H&P
Jeanie Setting  5/13/2022  [address] [facility]                 History and Physical             Chief Complaint   Patient presents with    Consultation       Colonoscopy consult - ref by Dr Heather Mane - last colonoscopy 3/2019 w/ Dr Rose Hammans Weight Loss       reports weight loss 135lb was average weight - 127 lb today     Other       had recent CT Scan completed 2/20 and PET 4/05 - showed \"peritoneal cystic mass\" per referral    Other       denies nausea, vomiting, abdominal pain            HISTORY OF PRESENT ILLNESS: Jeanie Starr is a  71 y.o.  female,  who was sent to my office for an abnormal CT scan. She went to the emergency room with back pain due to-20-20 22 CT abdomen pelvis showed multiple peritoneal cystic masses that have been present to some degree dating back to 2018. The previous jejunal mesenteric mass is no longer visualized however there is an increased number and size of multiple peritoneal masses adjacent to the distal descending colon that measure up to 2.7 cm. She has a 6 mm right lower lobe pulmonary nodule at this time as well that was new from 2018. Most recently she had a CT of her chest with contrast on 4-5-2022 for some groundglass nodules right lower lobe superior lateral portion of 1 cm which was unchanged but there has been an increase in pulmonary nodule on the right lower lobe from the last CAT scan. She then had a PET scan there is physiological FDG uptake however does not exceed the threshold for SUV. She has a history of pulmonary resection, Dr. Welsh Mt in 2016 for adenocarcinoma status after multiple negative biopsies she then had a Mediport placed in the same year for chemotherapy. In 2018 she had what seemed to be a subtotal cholecystectomy.   During the cholecystectomy the cystic artery was identified and skeletonized and divided but there is inability to safely dissect the gallbladder free from them being in adhered to the common hepatic ducts therefore an Endoloop was placed over the cystic duct stump and from the op note appears only the anterior wall the gallbladder was removed. .  She had a colonoscopy done in 2019 which had a single hyperplastic polyp and a single tubular adenoma. All of the above notes from CT surgery, general surgery, pulmonology, heme-onc were all reviewed with the above plans as discussed. The patient states she has not been dieting and has lost approximately 15 pounds.        Imaging Reviewed and personally interpreted CT scan abdomen pelvis, CT chest as well as PET scan agree with the above listed results. Labs reviewed labs 3- creatinine cyst stable at 1.1, no gross electrolyte abnormalities, albumin 3.3, alk phos 125, AST 60        Denies any changes since last seen.       Past Medical History        Past Medical History:   Diagnosis Date    Abnormal chest x-ray with multiple lung nodules 9/8/2015    Abnormal Pap smear 1/3/2011     Alpha-1 negative    Adrenal adenoma       7 mm    Bilateral lung cancer ([address] 5/12/2016     surgically removed - 2015     Cholelithiasis 6/6/2011    Encounter for screening colonoscopy       for OR 3-28-19     Fibroids      Hemangioma of spleen      Hepatitis C 01/03/2011     treated and cured, no current issues     Hyperlipidemia       no medications     Hypertension 6/6/2011    Insomnia      Lung nodule       ROMELIA     Lymphadenopathy       Cervical (-) ENT    Malignant neoplasm of upper lobe of right lung (HCC) 11/18/2015    Osteoarthritis      Panlobular emphysema ([address] 11/12/2015     controlled with inhalers     PPD negative 1/18/12    Pulmonary embolism without acute cor pulmonale (HCC) 5/12/2016    PVD (peripheral vascular disease) ([address] 5/6/2014    Scoliosis      Uterine fibroid 5/6/2014         Past Surgical History         Past Surgical History:   Procedure Laterality Date    APPENDECTOMY   1965    BREAST BIOPSY Left       AGE 30'S LEFT NIPPLE REMOVED    EC tablet Take 1 tablet by mouth daily (Patient taking differently: Take 81 mg by mouth daily LD 3-23-19) 90 tablet 0    Multiple Vitamins-Minerals (THERAPEUTIC MULTIVITAMIN-MINERALS) tablet Take 1 tablet by mouth daily (Patient taking differently: Take 1 tablet by mouth daily LD 3-23-19) 90 tablet 5    Misc. Devices KIT BP monitoring KIT 1 kit 0      No current facility-administered medications for this visit. Social History            Tobacco Use    Smoking status: Former Smoker       Packs/day: 0.00       Years: 30.00       Pack years: 0.00       Quit date: 2015       Years since quittin.8    Smokeless tobacco: Never Used   Substance Use Topics    Alcohol use: Yes       Alcohol/week: 2.0 standard drinks       Types: 2 Glasses of wine per week       Comment: x2 week      Family History         Family History   Problem Relation Age of Onset    Heart Disease Mother      High Blood Pressure Mother      Cancer Mother      Heart Disease Father      High Blood Pressure Father      Kidney Disease Father      Diabetes Sister      Diabetes Brother                 Review of Systems   Constitutional: Positive for unexpected weight change. HENT: Negative. Eyes: Negative. Respiratory: Positive for shortness of breath ( with stairs). Cardiovascular: Negative for chest pain. Gastrointestinal: Negative. Genitourinary: Negative. Musculoskeletal: Positive for back pain. Neurological: Negative for seizures and syncope. Hematological: Negative for adenopathy.            Physical Exam  HENT:      Head: Normocephalic. Mouth/Throat:      Mouth: Mucous membranes are moist.   Eyes:      Extraocular Movements: Extraocular movements intact. Pupils: Pupils are equal, round, and reactive to light. Cardiovascular:      Rate and Rhythm: Normal rate. Pulmonary:      Effort: Pulmonary effort is normal.   Abdominal:      General: Abdomen is flat. Tenderness:  There is no abdominal tenderness. There is no guarding or rebound. Comments: Scars well-healed, fullness of the right lower quadrant  slightly harder than the remaining of the abdomen. Musculoskeletal:         General: Normal range of motion. Cervical back: Neck supple. Skin:     General: Skin is warm. Neurological:      General: No focal deficit present. Mental Status: She is alert. Psychiatric:         Mood and Affect: Mood normal.            Assessment:   Diffuse cystic lesions in the intraperitoneal cavity. Acute exacerbation of chronic disease with possibly metastatic disease of unknown origin. Patient with a history of adenocarcinoma status post pulmonary resections.     Plan: We will send CEA 12.1 , CA-125, CA 19-9, chromogranin a- 1480      Plan on diagnostic EGD and colonoscopy     I discussed the risks, benefits, and alternatives to colonoscopy with possible biopsy/cauterization/polylpectomy with deep sedation with the patient including the risks of deep sedation (hypotension, hypoxia), bleeding, and perforation (<1%). The patient understands the above and agrees to proceed.     I have discussed the risks, benefits, and alternatives to esophagogastroduodenoscopy with possible biopsy with deep sedation with the patient. I have detailed the risks of deep sedation (hypotension, hypoxia) as well as complications of bleeding and perforation.   The patient understands the above and agrees to proceed        Physician Signature: Tiara Lou MD

## 2022-05-13 NOTE — ANESTHESIA PRE PROCEDURE
Department of Anesthesiology  Preprocedure Note       Name:  Ayala Zamora   Age:  71 y.o.  :  1950                                          MRN:  [id number]         Date:  2022      Surgeon: Lia Limon):  Inge Meza MD    Procedure: EGD ESOPHAGOGASTRODUODENOSCOPY (N/A )  COLONOSCOPY DIAGNOSTIC (N/A )    Medications prior to admission:   Prior to Admission medications    Medication Sig Start Date End Date Taking? Authorizing Provider   amLODIPine (NORVASC) 5 MG tablet take 1 tablet by mouth once daily 22   Historical Provider, MD   vitamin B-12 (CYANOCOBALAMIN) 500 MCG tablet take 1 tablet by mouth daily 5/3/21 5/3/22  Minerva Vazquez DO   Cholecalciferol (VITAMIN D3) 1.25 MG (51077 UT) CAPS Take by mouth    Historical Provider, MD   Multiple Vitamins-Minerals (THEREMS-M) TABS take 1 tablet by mouth once daily 20   Minerva Vazquez DO   tiotropium (SPIRIVA HANDIHALER) 18 MCG inhalation capsule Inhale 1 capsule into the lungs daily  Patient taking differently: Inhale 18 mcg into the lungs daily Instructed to take am of procedure 18  Minerva Vazquez,    lisinopril (PRINIVIL;ZESTRIL) 40 MG tablet take 1 tablet by mouth once daily 18   Minerva Vazquez DO   hydrochlorothiazide (HYDRODIURIL) 25 MG tablet Take 1 tablet by mouth daily 18   Mingo Mukherjee MD   Multiple Vitamins-Minerals (THERAPEUTIC MULTIVITAMIN-MINERALS) tablet Take 1 tablet by mouth daily  Patient taking differently: Take 1 tablet by mouth daily LD 3-23-19 4/18/17   Sol Whitehead MD   [facility] [place] 4/15/16   Ash Garcia MD       Current medications:    No current facility-administered medications for this visit. No current outpatient medications on file.      Facility-Administered Medications Ordered in Other Visits   Medication Dose Route Frequency Provider Last Rate Last Admin    0.9 % sodium chloride infusion   IntraVENous Continuous Inge Meza  mL/hr at 05/13/22 0925 New Bag at 05/13/22 0925    sodium chloride flush 0.9 % injection 10 mL  10 mL IntraVENous 2 times per day Inge Meza MD        sodium chloride flush 0.9 % injection 10 mL  10 mL IntraVENous PRN Inge Meza MD        0.9 % sodium chloride infusion  25 mL IntraVENous PRN Inge Meza MD           Allergies: Allergies   Allergen Reactions    Ibuprofen Palpitations and Rash       Problem List:    Patient Active Problem List   Diagnosis Code    Hepatitis C B19.20    Abnormal Pap smear [id number]    Dyslipidemia E78.5    Insomnia G47.00    Ex-smoker Z87.891    PVD [facility]peripheral vascular disease) [facility] I73.9    Uterine fibroid D25.9    Gall stone K80.20    Kidney stone N20.0    Left groin pain R10.32    Need for Tdap vaccination Z23    Abnormal chest x-ray with multiple lung nodules R91.8    Panlobular emphysema (HCC) J43.1    Chronic hepatitis C virus infection (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 80    RBBB I45.10    Adenomatous polyp of descending colon D12.4       Past Medical History:        Diagnosis Date    Abnormal chest x-ray with multiple lung nodules 9/8/2015    Abnormal Pap smear 1/3/2011    Alpha-1 negative    Adrenal adenoma     7 mm    Bilateral lung cancer ([facility] 5/12/2016    surgically removed - 2015     Cholelithiasis 6/6/2011    Encounter for screening colonoscopy     for OR 3-28-19     Fibroids     Hemangioma of spleen     Hepatitis C 01/03/2011    treated and cured, no current issues     Hyperlipidemia     no medications     Hypertension 6/6/2011    Insomnia     Lung nodule     ROMELIA     Lymphadenopathy     Cervical (-) ENT    Malignant neoplasm of upper lobe of right lung ([facility] 11/18/2015    Osteoarthritis     Panlobular emphysema ([address] 11/12/2015    controlled with inhalers     PPD negative 1/18/12    Pulmonary embolism without acute cor pulmonale ([address] 5/12/2016    PVD (peripheral vascular disease) ([facility] 5/6/2014    Scoliosis     Uterine fibroid 5/6/2014       Past Surgical History:        Procedure Laterality Date    APPENDECTOMY  1965    BREAST BIOPSY Left     AGE 31'S LEFT NIPPLE REMOVED    BREAST LUMPECTOMY  4/28/1999    left, benign, Dr. Kelly Merlin, [address]  2/1/2012    Dr. Ruiz Mabry, [address]  10/15/15    with EBUS - DR Nithin Pandya    CHOLECYSTECTOMY      COLONOSCOPY  12/21/2009    partial to distal ascending colon normal (completion BE same day normal), Dr. Kelly Merlin, [facility] COLONOSCOPY  8/22/2014    done under fluoro with sigmoid straightening device so was able to get to cecum, very poor prep, moderate proctitis, repeat 5 years,  Dr. Kelly Merlin, [address] [facility] COLONOSCOPY N/A 3/28/2019    COLONOSCOPY POLYPECTOMY SNARE/COLD BIOPSY performed by Jeimy Campo DO at [address] Left 3/29/2016    VATS WEDGE RESECTION UPPER LOBECTOMY    OTHER SURGICAL HISTORY Right 11/04/2016    REMOVAL MEDI-PORT - DR Sara Gill    IN LAP,CHOLECYSTECTOMY/GRAPH N/A 6/29/2018    CHOLECYSTECTOMY LAPAROSCOPIC, POSSIBLE OPEN,POSSIBLE GRAM ( OC 2) performed by Xmiena Eason MD at [address] Right 11/11/2015    VATSJose UPPER LOBECTOMY; NODE DISECTION    TUNNELED VENOUS PORT PLACEMENT Right 2015    right chest, and removed        Social History:    Social History     Tobacco Use    Smoking status: Former Smoker     Packs/day: 0.00     Years: 30.00     Pack years: 0.00     Quit date: 2015     Years since quittin.9    Smokeless tobacco: Never Used   Substance Use Topics    Alcohol use: Yes     Alcohol/week: 2.0 standard drinks     Types: 2 Glasses of wine per week     Comment: x2 week                                Counseling given: Not Answered      Vital Signs (Current): There were no vitals filed for this visit. BP Readings from Last 3 Encounters:   22 134/63   22 (!) 188/81   22 (!) 220/88       NPO Status:                                                                                 BMI:   Wt Readings from Last 3 Encounters:   22 127 lb (57.6 kg)   22 127 lb (57.6 kg)   22 123 lb (55.8 kg)     There is no height or weight on file to calculate BMI.    CBC:   Lab Results   Component Value Date    WBC 5.2 2022    RBC 3.47 2022    HGB 10.0 2022    HCT 30.7 2022    MCV 88.5 2022    RDW 14.6 2022     2022       CMP:   Lab Results   Component Value Date     2022    K 3.7 2022    K 3.8 2018     2022    CO2 28 2022    BUN 22 2022    CREATININE 1.1 2022    GFRAA 59 2022    LABGLOM 59 2022    GLUCOSE 98 2022    GLUCOSE 85 04/10/2012    PROT 7.6 2022    CALCIUM 8.9 2022    BILITOT 0.2 2022    ALKPHOS 107 2022    AST 48 2022    ALT 36 2022       POC Tests: No results for input(s): POCGLU, POCNA, POCK, POCCL, POCBUN, POCHEMO, POCHCT in the last 72 hours.     Coags:   Lab Results   Component Value Date    PROTIME 11.5 2018    PROTIME 22.6 2016    INR 1.0 2018    APTT 29.3 2018       HCG (If Applicable): No results found for: PREGTESTUR, PREGSERUM, HCG, HCGQUANT     ABGs: No results found for: PHART, PO2ART, SQG5TMV, SNX7LDJ, BEART, G2ORLOQO     Type & Screen (If Applicable):  No results found for: LABABO, LABRH    Anesthesia Evaluation    Airway: Mallampati: I  TM distance: >3 FB   Neck ROM: full  Mouth opening: > = 3 FB Dental:          Pulmonary:   (+) COPD (Breathing is unlabored. She has a past history of MOREIRA.):                             Cardiovascular:    (+) hypertension:,         Rhythm: regular  Rate: normal                    Neuro/Psych:               GI/Hepatic/Renal:   (+) hepatitis: C, liver disease:,           Endo/Other:                     Abdominal:             Vascular: Other Findings:             Anesthesia Plan      MAC     ASA 4       Induction: intravenous. Anesthetic plan and risks discussed with patient. Plan discussed with CRNA.                   Kari Andrews MD   5/13/2022

## 2022-05-13 NOTE — PROGRESS NOTES
Patient has not yet passed gas but has belched  Just had small BM   No nausea and No vomiting   Dr. Anderson Huff notified of this   Kermitt Mohair to [employer]

## 2022-05-13 NOTE — OP NOTE
Operative Note      Patient: Cheli Roman  YOB: 1950  MRN: [id number]    Date of Procedure: 5/13/2022    Pre-Op Diagnosis: MASS OF PERITONEUM    Post-Op Diagnosis: Gastric polyps , duodenal polyp , moderate gastroduodenitis        Procedure(s):  EGD Antral polypectomy snare - Duodenal biopsy forcep- bleeding cauterized and injected with epinephrine   Colonoscopy polypectomy 1 snare and 3 biopsy forceps at 18-22cm     Surgeon(s):  Mil Sibley MD    Assistant:   * No surgical staff found *    Anesthesia: Monitor Anesthesia Care    Estimated Blood Loss (mL): Minimal    Complications: None    Specimens:   ID Type Source Tests Collected by Time Destination   A : antral polyp Tissue Stomach SURGICAL PATHOLOGY Mil Sibley MD 5/13/2022 1028    B : DUODENUM Tissue Duodenum SURGICAL PATHOLOGY Mil Sibley MD 5/13/2022 1030    C : COLON POLYP 20CMS X4 Tissue Colon SURGICAL PATHOLOGY Mli Sibley MD 5/13/2022 1054        Implants:  * No implants in log *      Drains: * No LDAs found *    Findings: As above     Detailed Description of Procedure: The patient was placed on the table and sedated. The throat was numbed with Cetacaine spray. Bite block was placed. A lubricated scope was easily passed into the upper esophagus which looked  normal. . The distal esophagus looked  normal. . The scope was passed into the stomach and retroflexed. There was no hiatal hernia. The GE Junction was at 40cm. The scope was passed down toward the pylorus. The antral mucosa all looked abnormal: mild gastritis and scattered antral polyps - 1 which looked hyperemic and slightly irregular which was removed via snare polypectomy. The scope was then passed through the pylorus into the duodenal bulb which looked abnormal: mild duodenitis, then around to the distal duodenum which looked  The same.  THere was a small polyp in D1 removed with biopsy forceps- these two sites in the duodenum and the antrum bleed after removal and bleeding was stopped with cautery and epinephrine considering the patient easily bleed the remaining polyps which looked normal more hyperplastic in nature were left in situ , and the scope was then withdrawn. The patient tolerated the procedure well. In the left side down decubitus position, a digital rectal exam was performed, Internal Hemorrhoids and External Hemorrhoids  were found   The scope was lubricated and inserted into the anus. The scope was then passed under direct visualization in a retrograde fashion to the base of the cecum. The ileocecal valve was photographed. The prep was satisfactory. The scope was slowly withdraw findings below. Cecum: normal     Ascending colon:normal     Transverse colon : normal     Descending colon normal     Sigmoid colon:  normal     Rectum:abnormal: 4polyps at 20cm removed 1 via snare and 3 by biopsy forceps      The scope was retroflexed at the anal verge. Internal Hemorrhoids at the anal verge. The scope was then straightened and removed.      THE PATIENT TOLERATED THE PROCEDURE WELL    PLAN:  PPI Daily   Follow up EGD biopsies   Recommend follow up colonoscopy Pending Pathology   Follow up in the office in 2 weeks     Electronically signed by Yasmin Ibarra MD on 5/13/2022 at 11:05 AM

## 2022-05-13 NOTE — PROGRESS NOTES
Patient to [facility] & placed on appropriate monitors. Cart low, locked with siderails up. Call light within reach.    Sister called to bedside

## 2022-05-13 NOTE — ANESTHESIA POSTPROCEDURE EVALUATION
Department of Anesthesiology  Postprocedure Note    Patient: Curtistine Leaks  MRN: [id number]  YOB: 1950  Date of evaluation: 5/13/2022  Time:  12:01 PM     Procedure Summary     Date: 05/13/22 Room / Location: [address] [place] [address] / [facility]    Anesthesia Start: 1009 Anesthesia Stop: 1104    Procedures:       EGD POLYP HOT FORCEP/CAUTERY (N/A )      COLONOSCOPY POLYPECTOMY SNARE/COLD BIOPSY (N/A ) Diagnosis: (MASS OF PERITONEUM)    Surgeons: Herminio García MD Responsible Provider: Shine Pelletier MD    Anesthesia Type: MAC ASA Status: 4          Anesthesia Type: No value filed. Willow Phase I: Willow Score: 10    Willow Phase II: Willow Score: 10    Last vitals: Reviewed and per EMR flowsheets.        Anesthesia Post Evaluation    Patient location during evaluation: PACU  Patient participation: complete - patient participated  Level of consciousness: awake  Pain score: 0  Airway patency: patent  Nausea & Vomiting: no nausea  Complications: no  Cardiovascular status: hemodynamically stable  Respiratory status: acceptable  Hydration status: stable

## 2022-05-23 ENCOUNTER — TELEPHONE (OUTPATIENT)
Dept: SURGERY | Age: 72
End: 2022-05-23

## 2022-05-23 ENCOUNTER — TELEPHONE (OUTPATIENT)
Dept: HEMATOLOGY | Age: 72
End: 2022-05-23

## 2022-05-23 ENCOUNTER — OFFICE VISIT (OUTPATIENT)
Dept: ONCOLOGY | Age: 72
End: 2022-05-23
Payer: MEDICARE

## 2022-05-23 ENCOUNTER — HOSPITAL ENCOUNTER (OUTPATIENT)
Dept: INFUSION THERAPY | Age: 72
Discharge: HOME | End: 2022-05-23

## 2022-05-23 VITALS
SYSTOLIC BLOOD PRESSURE: 159 MMHG | BODY MASS INDEX: 23.88 KG/M2 | HEART RATE: 79 BPM | TEMPERATURE: 97.5 F | DIASTOLIC BLOOD PRESSURE: 72 MMHG | WEIGHT: 126.5 LBS | HEIGHT: 61 IN

## 2022-05-23 DIAGNOSIS — C34.90 NON-SMALL CELL LUNG CANCER, UNSPECIFIED LATERALITY (HCC): Primary | ICD-10-CM

## 2022-05-23 DIAGNOSIS — K66.8 MASS OF PERITONEUM: Primary | ICD-10-CM

## 2022-05-23 DIAGNOSIS — C34.11 MALIGNANT NEOPLASM OF UPPER LOBE OF RIGHT LUNG (HCC): ICD-10-CM

## 2022-05-23 PROCEDURE — 99212 OFFICE O/P EST SF 10 MIN: CPT

## 2022-05-23 PROCEDURE — 3017F COLORECTAL CA SCREEN DOC REV: CPT | Performed by: INTERNAL MEDICINE

## 2022-05-23 PROCEDURE — G8428 CUR MEDS NOT DOCUMENT: HCPCS | Performed by: INTERNAL MEDICINE

## 2022-05-23 PROCEDURE — 1036F TOBACCO NON-USER: CPT | Performed by: INTERNAL MEDICINE

## 2022-05-23 PROCEDURE — G8420 CALC BMI NORM PARAMETERS: HCPCS | Performed by: INTERNAL MEDICINE

## 2022-05-23 PROCEDURE — 1123F ACP DISCUSS/DSCN MKR DOCD: CPT | Performed by: INTERNAL MEDICINE

## 2022-05-23 PROCEDURE — 1090F PRES/ABSN URINE INCON ASSESS: CPT | Performed by: INTERNAL MEDICINE

## 2022-05-23 PROCEDURE — 99215 OFFICE O/P EST HI 40 MIN: CPT | Performed by: INTERNAL MEDICINE

## 2022-05-23 PROCEDURE — G8399 PT W/DXA RESULTS DOCUMENT: HCPCS | Performed by: INTERNAL MEDICINE

## 2022-05-23 NOTE — PROGRESS NOTES
Department of [facility] Oncology  Attending Clinic Note    Reason for Visit: Follow-up on a patient with NSCLC    PCP:  Allie Rincon MD    History of Present Illness:   71 y.o. -American female with significant history of tobacco abuse of approximately 30 pack years, quit smoking June 7, 2015. She has been followed for a left upper lobe mass by Dr. Wallis Hashimoto. Multiple biopsies in the past came back negative for malignancy. CT scan chest on 08/31/2015 showed a new right upper lobe mass. Bronchoscopy with washing RUL mass on 10/15/2015 was positive for RUL Lung Adenocarcinoma. Negative for malignancy on BAL and TBNA of Lingular lesion. PET/CT scan on 09/29/2015:  1. New since prior examination on CT is 1.7 cm right paratracheal   nodule. Nodule is intensely hypermetabolic with SUV max of 4.8   worrisome for tumor. 2. Previously known nodule in the lingula now measures 2.3 cm in   size. SUV max has increased from 1.7 on the 2012 examination to   6.4 at this time. At this time findings are worrisome for   hypermetabolic tumor with avid glucose metabolism. Therefore, she was referred to see Dr. Jax Ibarra for resection. CT guided biopsy of the left upper lobe lesion on 11/02/2015 was noted to be negative for malignancy. On 11/11/2015 She underwent: (1) Bronchoscopy. (2) Right VATS. (3) VATS right upper lobe wedge resection. (4) VATS right upper lobectomy. (5) Intercostal nerve block from T3 to T10. (6) Mediastinal lymph node dissection. Pathology:  Right lung, upper lobectomy: Invasive, moderately differentiated adenocarcinoma (Grade 2). Tumor size 1.5 cm in greatest dimension. Surgical margin negative for malignancy. Two of three peribronchial lymph nodes positive for adenocarcinoma. pT1a N1 MX  She was referred to the medical oncology clinic to discuss adjuvant chemotherapy. We recommended 4 cycles of adjuvant chemotherapy consisting of Carboplatin/Alimta. Mediport placed 12/07/2015. Cycle # 1 of Smooth Bower was on 12/09/2015. Cycle # 2 of Smooth Bower was on 01/06/2016. Cycle # 3 of Smooth Bower was on 01/27/2016. Cycle # 4 of Smooth Bower was on 02/24/2016. PET SCAN 3.2016: The 2.1 cm left lung mass abutting the major fissure is hypermetabolic with SUV max of 5.4. Finding is worrisome for tumor with avid glucose metabolism. 2. Elsewhere there is unremarkable distribution of FDG activity without evidence of hypermetabolic metastases. On 03/29/2016 underwent Bronch/Left VATS/VATS wedge ROMELIA/VATS Left upper lobectomy/Mediasinal lymph node dissection/Intercostal nerve block from T3-T10 per Dr. Cristy Moore. Invasive, well-differentiated adenocarcinoma (grade 1); Tumor size-1.4 cm in greatest dimension; Surgical margins-negative for malignancy; Lymphovascular invasion-not identified; TNM classification-pT2a N0 MX  B. AP window lymph node #1, excision: Anthracotic lymph node; negative for malignancy  C. AP window lymph node #2, excision: Anthracotic lymph node; negative for malignancy   D. Periaortic lymph node #1, excision: Fibroadipose tissue; lymph node not identified  E. Bronchial lymph node #1, excision: Anthracotic lymph node; negative for malignancy  F. Left lung, upper lobectomy: Emphysematous change; negative for malignancy  4 anthracotic lymph nodes negative for malignancy   G. Inferior pulmonary ligament lymph node, excision: Anthracotic lymph node; negative for malignancy  H. Bronchial lymph node, excision: Anthracotic lymph node; negative for malignancy. On surveillance per NCCN guidelines.       Recent abdominal pain; ER visit reviewed  CT abdomen/pelvis 02/20/2022   6 mm right lower lobe pulmonary nodule  Multiple peritoneal cystic masses     CEA 12.5 on 03/16/2022    CT chest 04/05/2022 Ground-glass nodule right lower lobe superolateral portion 10 mm unchanged from prior however in the medial segment right lower lobe with pleural abutment is a 7 mm pulmonary nodule increased in size from 09/28/2021 with is barely visible at 2-3 mm; repeat CT chest in 3 mo    PET/CT scan 04/05/2022 noted No FDG avid uptake is identified which exceeds the threshold SUV    Bilateral screening mammogram 04/20/2022: Negative for malignancy    CEA 12.1 on 04/20/2022  CA-125 32.8 on 04/20/2022   <2 on 04/20/2022  Chromogranin A 1480 on 04/20/2022. EGD/Colonoscopy 05/13/2022: Gastric polyps , duodenal polyp moderate gastroduodenitis   A.  Stomach, biopsy: Hyperplastic polyp and moderate chronic active gastritis; negative for intestinal metaplasia   Immunostain negative for Helicobacter pylori organisms   B.  Duodenum, biopsy: Chronic duodenitis with features of peptic duodenitis   C.  Colon, 20 cm biopsy: Fragments of tubular adenoma and hyperplastic polyp     Today 05/23/2022 for f/u. No fever, chills. Fair appetite and energy level. Weight loss. Review of Systems;  CONSTITUTIONAL: No fever, chills. Fair appetite and energy level. Weight loss  ENMT: Eyes: No diplopia; Nose: No epistaxis. Mouth: No sore throat. RESPIRATORY: No hemoptysis, shortness of breath. CARDIOVASCULAR: No chest pain, palpitations. GASTROINTESTINAL: No nausea, vomiting. Recent abdominal pain  GENITOURINARY: No dysuria, urinary frequency, hematuria. NEURO: No syncope, presyncope, headache. Remainder ROS: Negative.     Past Medical History:      Diagnosis Date    Hypertension 6/6/2011    Abnormal Pap smear 1/3/2011     Alpha-1 negative    Cholelithiasis 6/6/2011    Osteoarthritis     Lymphadenopathy      Cervical (-) ENT    Lung nodule      ROMELIA     Scoliosis     PPD negative 1/18/12    Adrenal adenoma      7 mm    Hemangioma of spleen     Insomnia     PVD (peripheral vascular disease) ([facility] [address] 5/6/2014    Uterine fibroid 5/6/2014    Hepatitis C 1/3/2011    Fibroids     Abnormal chest x-ray with multiple lung nodules 9/8/2015    Hyperlipidemia     Panlobular emphysema ([facility] [address] 11/12/2015    Malignant neoplasm of upper lobe of right lung ([facility] [address] 11/18/2015     Medications:  Reviewed and reconciled. Allergies: Allergies   Allergen Reactions    Ibuprofen Palpitations and Rash     Physical Exam:  BP (!) 159/72   Pulse 79   Temp 97.5 °F (36.4 °C)   Ht 5' 1\" (1.549 m)   Wt 126 lb 8 oz (57.4 kg)   BMI 23.90 kg/m²    GENERAL: Alert, oriented x 3, not in acute distress. HEENT: PERRLA; EOMI. Oropharynx clear. NECK: Supple. Without lymphadenopathy. LUNGS: No wheezing, crackles or ronchi. CARDIOVASCULAR: Regular rate. No murmurs, rubs or gallops. ABDOMEN: Soft. Non-tender, non-distended. EXTREMITIES: Without clubbing, cyanosis, or edema. NEUROLOGIC: No focal deficits. ECOG PS 1    Lab Results   Component Value Date    WBC 5.2 04/20/2022    HGB 10.0 (L) 04/20/2022    HCT 30.7 (L) 04/20/2022    MCV 88.5 04/20/2022     04/20/2022     Lab Results   Component Value Date     04/20/2022    K 3.7 04/20/2022     04/20/2022    CO2 28 04/20/2022    BUN 22 04/20/2022    CREATININE 1.1 (H) 04/20/2022    GLUCOSE 98 04/20/2022    CALCIUM 8.9 04/20/2022    PROT 7.6 04/20/2022    LABALBU 3.4 (L) 04/20/2022    BILITOT 0.2 04/20/2022    ALKPHOS 107 (H) 04/20/2022    AST 48 (H) 04/20/2022    ALT 36 (H) 04/20/2022    LABGLOM 59 04/20/2022    GFRAA 59 04/20/2022     Lab Results   Component Value Date    CEA 12.1 (H) 04/20/2022     Impression/Plan:  71 y.o. female with Hx of smoking who underwent: (1) Bronchoscopy. (2) Right VATS. (3) VATS right upper lobe wedge resection. (4) VATS right upper lobectomy. (5) Intercostal nerve block from T3 to T10. (6) Mediastinal lymph node dissection on 11/11/2015:   Pathology:  Right lung, upper lobectomy: Invasive, moderately differentiated adenocarcinoma (Grade 2). Tumor size 1.5 cm in greatest dimension. Visceral pleural invasion: Not identified. Visceral pleural invasion: Not identified. Surgical margin negative for malignancy.     Two of three peribronchial lymph nodes positive for adenocarcinoma. pT1a N1 MX;     Being followed for a left upper lobe mass by Dr. Doug Milan. Multiple biopsies in the past came back negative for malignancy. Hypermetabolic on PET/CT scan on 09/29/2015 (2.3 cm in size with SUV of 6.4). CT guided biopsy of the left upper lobe lesion on 11/02/2015 was noted to be negative for malignancy. She was referred to the medical oncology clinic to discuss adjuvant chemotherapy. We recommended 4 cycles of adjuvant chemotherapy consisting of Carboplatin/Alimta. Mediport placed 12/7/15. -MRI Brain on 12/8/15:  Negative for metastatic disease.   -We will repeat CT chest and PET/CT after 4 cycles of Carboplatin/Alimta. To follow on Lingular lesion as well. -Molecular studies (EGFR, ALK, ROS-1) were all Not Detected. Cycle # 1 of Luis Hodge was on 12/09/2015. Cycle # 2 of Camillo Mustache was on 01/06/2016. Cycle # 3 of Camillo Mustache was on 01/27/2016. Cycle # 4 of Camillo Mustache was on 02/24/2016. PET SCAN 3.2016: The 2.1 cm left lung mass abutting the major fissure is hypermetabolic with SUV max of 5.4. Finding is worrisome for tumor with avid glucose metabolism. 2. Elsewhere there is unremarkable distribution of FDG activity without evidence of hypermetabolic metastases. On 03/29/2016 underwent Bronch/Left VATS/VATS wedge ROMELIA/VATS Left upper lobectomy/Mediasinal lymph node dissection/Intercostal nerve block from T3-T10 per Dr. Blaise Bocanegra. Final pathology revealed Stage I Adenocarcinoma of ROMELIA (morphologically different from the adenocarcinoma previously diagnosed in the right upper lobe. Therefore, synchronous primary tumors are favored). A. Left lung, upper lobe wedge resection: Invasive, well-differentiated adenocarcinoma (grade 1); Tumor size-1.4 cm in greatest dimension; Surgical margins-negative for malignancy; Lymphovascular invasion-not identified; TNM classification-pT2a N0 MX  B.  AP window lymph node #1, excision: Anthracotic lymph node; negative for malignancy  C. AP window lymph node #2, excision: Anthracotic lymph node; negative for malignancy   D. Periaortic lymph node #1, excision: Fibroadipose tissue; lymph node not identified  E. Bronchial lymph node #1, excision: Anthracotic lymph node; negative for malignancy  F. Left lung, upper lobectomy: Emphysematous change; negative for malignancy  4 anthracotic lymph nodes negative for malignancy   G. Inferior pulmonary ligament lymph node, excision: Anthracotic lymph node; negative for malignancy  H. Bronchial lymph node, excision: Anthracotic lymph node; negative for malignancy. Right side Mediport was removed on 11/04/2016 by Dr. Maico Del Cid. On surveillance per NCCN guidelines. CT chest 04/12/2017 noted no convincing evidence of recurrent disease. CT chest 10/19/2017 noted no definite evidence for recurrent malignancy. CT chest 03/12/2018 negative for pulmonary parenchymal masses or enlarged mediastinal or hilar LN. ? 2 cm lesion in spleen difficult to evaluate due to the arterial phase of the study. CT Abd/Pelvis 05/08/2018  Enhancing lesion within the spleen is relatively stable to slightly smaller when compared to April 2014 exam and likely splenic hemangioma. Other indeterminate findings (left periaortic LN, sclerotic/blastic foci in L3 and L1 reported by Dr. Danita Lamar from radiology team). PET/CT scan 06/05/2018 unremarkable. No FDG avid uptake identified. No evidence for recurrent or metastatic disease. CT Chest 12/13/2018 noted no evidence of recurrent/metastatic disease. CT chest on 06/11/2019 noted no evidence for worrisome residual or recurrent malignancy. No evidence for new lymphadenopathy or metastatic disease. Stable scarring and postoperative changes right upper lobe. CT chest 11/26/2019: No evidence of active neoplasm. CT chest 06/15/2020: No evidence of active neoplasm.   CT chest 12/30/2020: Postsurgical changes and postsurgical scarring seen within the right lung apex.  There is no evidence of tumor recurrence. 2.1 x 2.3 cm enhancing lesion seen within the spleen  PET/CT scan 03/02/2021   No FDG avid uptake is identified which exceeds the threshold SUV. No convincing evidence for recurrent or metastatic disease  CT chest 09/28/2021 No evidence of tumor recurrence    Recent abdominal pain; ER visit reviewed  CT abdomen/pelvis 02/20/2022   6 mm right lower lobe pulmonary nodule  Multiple peritoneal cystic masses     CEA 12.5 on 03/16/2022    CT chest 04/05/2022 Ground-glass nodule right lower lobe superolateral portion 10 mm unchanged from prior however in the medial segment right lower lobe with pleural abutment is a 7 mm pulmonary nodule increased in size from 09/28/2021 with is barely visible at 2-3 mm; repeat CT chest in 3 mo    PET/CT scan 04/05/2022 noted No FDG avid uptake is identified which exceeds the threshold SUV     Bilateral screening mammogram 04/20/2022: Negative for malignancy    CEA 12.1 on 04/20/2022  CA-125 32.8 on 04/20/2022   <2 on 04/20/2022  Chromogranin A 1480 on 04/20/2022. EGD/Colonoscopy 05/13/2022: Gastric polyps , duodenal polyp moderate gastroduodenitis   A.  Stomach, biopsy: Hyperplastic polyp and moderate chronic active gastritis; negative for intestinal metaplasia   Immunostain negative for Helicobacter pylori organisms   B.  Duodenum, biopsy: Chronic duodenitis with features of peptic duodenitis   C.  Colon, 20 cm biopsy: Fragments of tubular adenoma and hyperplastic polyp     Pathology reviewed. Imaging reviewed. Labs reviewed. Discussed with Dr. Norman Doherty  Referred to HBP team (Dr. Alicia Brumfield) for further evaluation of peritoneal cystic masses (consider peritoneal biopsy). Appointment on 05/27/2022. RTC 2 weeks.     Negin Castillo MD   06/43/7948  Board Certified Medical Oncologist

## 2022-05-23 NOTE — TELEPHONE ENCOUNTER
Patient denies any pain states she is still loosing weight. Discussed her biopsies which showed only some chronic inflammation and hyperplastic polys. I explained that with her elevated tumor markers and intraperitoneal masses and continued weight loss  I will send her to Dr. Abram Resendiz  for consultation regarding the above. Will cancel her apt with me that was scheduled for Wednesday.      Yumi King MD

## 2022-05-24 ENCOUNTER — OFFICE VISIT (OUTPATIENT)
Dept: OBGYN | Age: 72
End: 2022-05-24
Payer: MEDICARE

## 2022-05-24 VITALS
HEART RATE: 79 BPM | WEIGHT: 125 LBS | SYSTOLIC BLOOD PRESSURE: 171 MMHG | DIASTOLIC BLOOD PRESSURE: 74 MMHG | BODY MASS INDEX: 23.62 KG/M2

## 2022-05-24 DIAGNOSIS — K66.8 MASS OF PERITONEUM: ICD-10-CM

## 2022-05-24 DIAGNOSIS — Z01.419 ENCOUNTER FOR WELL WOMAN EXAM: ICD-10-CM

## 2022-05-24 DIAGNOSIS — D25.1 INTRAMURAL LEIOMYOMA OF UTERUS: Primary | ICD-10-CM

## 2022-05-24 DIAGNOSIS — Z12.4 SCREENING FOR CERVICAL CANCER: ICD-10-CM

## 2022-05-24 PROCEDURE — 99203 OFFICE O/P NEW LOW 30 MIN: CPT | Performed by: OBSTETRICS & GYNECOLOGY

## 2022-05-24 PROCEDURE — 1123F ACP DISCUSS/DSCN MKR DOCD: CPT | Performed by: OBSTETRICS & GYNECOLOGY

## 2022-05-24 PROCEDURE — 99387 INIT PM E/M NEW PAT 65+ YRS: CPT | Performed by: OBSTETRICS & GYNECOLOGY

## 2022-05-24 PROCEDURE — G8399 PT W/DXA RESULTS DOCUMENT: HCPCS | Performed by: OBSTETRICS & GYNECOLOGY

## 2022-05-24 PROCEDURE — G8427 DOCREV CUR MEDS BY ELIG CLIN: HCPCS | Performed by: OBSTETRICS & GYNECOLOGY

## 2022-05-24 PROCEDURE — 3017F COLORECTAL CA SCREEN DOC REV: CPT | Performed by: OBSTETRICS & GYNECOLOGY

## 2022-05-24 PROCEDURE — 1090F PRES/ABSN URINE INCON ASSESS: CPT | Performed by: OBSTETRICS & GYNECOLOGY

## 2022-05-24 PROCEDURE — G8420 CALC BMI NORM PARAMETERS: HCPCS | Performed by: OBSTETRICS & GYNECOLOGY

## 2022-05-24 PROCEDURE — 1036F TOBACCO NON-USER: CPT | Performed by: OBSTETRICS & GYNECOLOGY

## 2022-05-24 NOTE — PROGRESS NOTES
Patient is new to office and here today for annual exam.  Patient was referred by Dr. Caleb Potter for peritoneal mass. Last pap was 7/19/16. Mammogram was done 4/20/22. Pelvic exam done by Dr. Ramón Peterson. No specimens obtained. Discharge instructions have been discussed with the patient by Dr. Ramón Peterson and patient voiced understanding of plan of care.

## 2022-05-24 NOTE — PROGRESS NOTES
Cheli Roman     Patient presents for annual exam.     Patient was referred due to peritoneal masses noted on CT scan in February. CT report states peritoneal masses present since 2018. Pelvis with multiple calcified fibroids, stable from prior imaging, ovaries not visualized. Patient had a normal Ca 125. Discussed that peritoneal masses are likely not gyn related. Discussed pelvic ultrasound to assess ovaries to be thorough. Patient does have an appointment with Dr. Marco A Demarco 5/27/22. Patient had a mammogram 4/20/22 that was normal. Dense breast tissue was noted. Recommend follow up screening with bilateral breast ultrasounds. Will wait at this time.      Patient last pap smear was in 2016 and was normal.     Past Medical History:   Diagnosis Date    Abnormal chest x-ray with multiple lung nodules 9/8/2015    Abnormal Pap smear 1/3/2011    Alpha-1 negative    Adrenal adenoma     7 mm    Bilateral lung cancer ([address] 5/12/2016    surgically removed - 2015     Cholelithiasis 6/6/2011    Encounter for screening colonoscopy     for OR 3-28-19     Fibroids     Hemangioma of spleen     Hepatitis C 01/03/2011    treated and cured, no current issues     Hyperlipidemia     no medications     Hypertension 6/6/2011    Insomnia     Lung nodule     ROMELIA     Lymphadenopathy     Cervical (-) ENT    Malignant neoplasm of upper lobe of right lung (HCC) 11/18/2015    Osteoarthritis     Panlobular emphysema ([address] 11/12/2015    controlled with inhalers     PPD negative 1/18/12    Pulmonary embolism without acute cor pulmonale ([address] 5/12/2016    PVD (peripheral vascular disease) ([address] 5/6/2014    Scoliosis     Uterine fibroid 5/6/2014        Past Surgical History:   Procedure Laterality Date    APPENDECTOMY  1965    BREAST BIOPSY Left     AGE 31'S LEFT NIPPLE REMOVED    BREAST LUMPECTOMY  4/28/1999    left, benign, Dr. Oskar Vidal, [address]  2/1/2012    ENB, Dr. Franc Christianson, [address] 10/15/15    with EBUS - DR Lysbeth Ormond    CHOLECYSTECTOMY      COLONOSCOPY  12/21/2009    partial to distal ascending colon normal (completion BE same day normal), Dr. Minoo Greco, [facility] COLONOSCOPY  8/22/2014    done under fluoro with sigmoid straightening device so was able to get to cecum, very poor prep, moderate proctitis, repeat 5 years,  Dr. Minoo Greco, [address] [facility] COLONOSCOPY N/A 3/28/2019    COLONOSCOPY POLYPECTOMY SNARE/COLD BIOPSY performed by Clearance Severin, DO at [address] [facility] COLONOSCOPY N/A 5/13/2022    COLONOSCOPY POLYPECTOMY SNARE/COLD BIOPSY performed by Alisa Novoa MD at [address] [facility] Left 3/29/2016    VATS WEDGE RESECTION UPPER LOBECTOMY    OTHER SURGICAL HISTORY Right 11/04/2016    REMOVAL MEDI-PORT - DR Alyse Driscoll    WA LAP,CHOLECYSTECTOMY/GRAPH N/A 6/29/2018    CHOLECYSTECTOMY LAPAROSCOPIC, POSSIBLE OPEN,POSSIBLE GRAM ( OC 2) performed by Felicia Lin MD at [address] Right 11/11/2015    VATS, Cristi Delay UPPER LOBECTOMY; NODE DISECTION    TUNNELED VENOUS PORT PLACEMENT Right 12/07/2015    right chest, and removed     UPPER GASTROINTESTINAL ENDOSCOPY N/A 5/13/2022    EGD POLYP HOT FORCEP/CAUTERY performed by Alisa Novoa MD at [facility] ENDOSCOPY        Family History   Problem Relation Age of Onset    Heart Disease Mother     High Blood Pressure Mother     Cancer Mother     Heart Disease Father     High Blood Pressure Father     Kidney Disease Father     Diabetes Sister     Diabetes Brother           Current Outpatient Medications:     amLODIPine (NORVASC) 5 MG tablet, take 1 tablet by mouth once daily, Disp: , Rfl:     Cholecalciferol (VITAMIN D3) 1.25 MG (64325 UT) CAPS, Take by mouth, Disp: , Rfl:     tiotropium (SPIRIVA HANDIHALER) 18 MCG inhalation capsule, Inhale 1 capsule into the lungs daily (Patient taking differently: Inhale 18 mcg into the lungs daily Instructed to take am of procedure), Disp: 90 capsule, Rfl: 3    lisinopril (PRINIVIL;ZESTRIL) 40 MG tablet, take 1 tablet by mouth once daily, Disp: 90 tablet, Rfl: 4    hydrochlorothiazide (HYDRODIURIL) 25 MG tablet, Take 1 tablet by mouth daily, Disp: 90 tablet, Rfl: 0    Multiple Vitamins-Minerals (THERAPEUTIC MULTIVITAMIN-MINERALS) tablet, Take 1 tablet by mouth daily (Patient taking differently: Take 1 tablet by mouth daily LD 3-23-19), Disp: 90 tablet, Rfl: 5    vitamin B-12 (CYANOCOBALAMIN) 500 MCG tablet, take 1 tablet by mouth daily, Disp: 30 tablet, Rfl: 11    Multiple Vitamins-Minerals (THEREMS-M) TABS, take 1 tablet by mouth once daily (Patient not taking: Reported on 5/24/2022), Disp: 30 tablet, Rfl: 11    Misc. Devices KIT, BP monitoring KIT, Disp: 1 kit, Rfl: 0     Allergies   Allergen Reactions    Ibuprofen Palpitations and Rash        Social History     Tobacco History     Smoking Status  Former Smoker Quit date  6/7/2015 Smoking Frequency  0 packs/day for 30 years (0 pk yrs)    Smokeless Tobacco Use  Never Used          Alcohol History     Alcohol Use Status  Yes Drinks/Week  2 Glasses of wine per week Amount  2.0 standard drinks of alcohol/wk Comment  x2 week          Drug Use     Drug Use Status  No          Sexual Activity     Sexually Active  Not Currently                 Vitals:    05/24/22 1055   BP: (!) 171/74   Pulse: 79        Physical Exam:  General: pleasant, alert     Breasts: breasts appear normal, no suspicious masses, no skin or nipple changes or axillary nodes. Pelvic exam: normal vulva. Vaginal opening severely stenotic, barely able to insert 1 finger. Limited bimanual without abnormalities. Lynsey Butts was seen today for new patient.     Diagnoses and all orders for this visit:    Intramural leiomyoma of uterus  -     US PELVIS COMPLETE; Future    Mass of peritoneum  -     US PELVIS COMPLETE; Future    Screening for cervical cancer  -     Cancel: PAP SMEAR    Encounter for well woman exam      Will call with ultrasound results. Return in about 1 year (around 5/24/2023) for Annual, or as needed.      Eufemia Stock MD

## 2022-05-27 ENCOUNTER — OFFICE VISIT (OUTPATIENT)
Dept: HEMATOLOGY | Age: 72
End: 2022-05-27
Payer: MEDICARE

## 2022-05-27 VITALS
BODY MASS INDEX: 23.79 KG/M2 | DIASTOLIC BLOOD PRESSURE: 63 MMHG | RESPIRATION RATE: 18 BRPM | HEIGHT: 61 IN | TEMPERATURE: 97.7 F | HEART RATE: 84 BPM | WEIGHT: 126 LBS | OXYGEN SATURATION: 99 % | SYSTOLIC BLOOD PRESSURE: 155 MMHG

## 2022-05-27 DIAGNOSIS — K66.8 MASS OF PERITONEUM: ICD-10-CM

## 2022-05-27 PROCEDURE — G8420 CALC BMI NORM PARAMETERS: HCPCS | Performed by: TRANSPLANT SURGERY

## 2022-05-27 PROCEDURE — 1090F PRES/ABSN URINE INCON ASSESS: CPT | Performed by: TRANSPLANT SURGERY

## 2022-05-27 PROCEDURE — 3017F COLORECTAL CA SCREEN DOC REV: CPT | Performed by: TRANSPLANT SURGERY

## 2022-05-27 PROCEDURE — 99213 OFFICE O/P EST LOW 20 MIN: CPT | Performed by: TRANSPLANT SURGERY

## 2022-05-27 PROCEDURE — 99204 OFFICE O/P NEW MOD 45 MIN: CPT | Performed by: TRANSPLANT SURGERY

## 2022-05-27 PROCEDURE — 99213 OFFICE O/P EST LOW 20 MIN: CPT

## 2022-05-27 PROCEDURE — G8427 DOCREV CUR MEDS BY ELIG CLIN: HCPCS | Performed by: TRANSPLANT SURGERY

## 2022-05-27 PROCEDURE — 1123F ACP DISCUSS/DSCN MKR DOCD: CPT | Performed by: TRANSPLANT SURGERY

## 2022-05-27 PROCEDURE — G8399 PT W/DXA RESULTS DOCUMENT: HCPCS | Performed by: TRANSPLANT SURGERY

## 2022-05-27 PROCEDURE — 1036F TOBACCO NON-USER: CPT | Performed by: TRANSPLANT SURGERY

## 2022-05-27 NOTE — PROGRESS NOTES
Hepatobiliary and Pancreatic Surgery Attending History and Physical    Patient's Name/Date of Birth: Ann Rodríguez /1950 (84 y.o.)    Date: May 27, 2022     CC:Peritoneal masses    HPI:  Patient is a sara 71year old female whom presented to the hospital for back pain. She had a ct scan and she states they started to find things. She does have a history of hepatitis C (now cured) and has had an EGD and colonoscopy. She has lost about 30 lbs over a two month time period. She does have a history of lung cancer. She denies any abdominal pain nor nausea. Her only abdominal operations have been an appendectomy and a cholecystectomy. She denies ever having pancreatitis. Her Ca 125 = 33 and her CEA = 12.       Past Medical History:   Diagnosis Date    Abnormal chest x-ray with multiple lung nodules 9/8/2015    Abnormal Pap smear 1/3/2011    Alpha-1 negative    Adrenal adenoma     7 mm    Bilateral lung cancer ([address] 5/12/2016    surgically removed - 2015     Cholelithiasis 6/6/2011    Encounter for screening colonoscopy     for OR 3-28-19     Fibroids     Hemangioma of spleen     Hepatitis C 01/03/2011    treated and cured, no current issues     Hyperlipidemia     no medications     Hypertension 6/6/2011    Insomnia     Lung nodule     ROMELIA     Lymphadenopathy     Cervical (-) ENT    Malignant neoplasm of upper lobe of right lung ([address] 11/18/2015    Osteoarthritis     Panlobular emphysema ([address] 11/12/2015    controlled with inhalers     PPD negative 1/18/12    Pulmonary embolism without acute cor pulmonale ([address] 5/12/2016    PVD (peripheral vascular disease) ([address] 5/6/2014    Scoliosis     Uterine fibroid 5/6/2014       Past Surgical History:   Procedure Laterality Date    APPENDECTOMY  1965    BREAST BIOPSY Left     AGE 31'S LEFT NIPPLE REMOVED    BREAST LUMPECTOMY  4/28/1999    left, benign, Dr. Elsie Rosales, [address]  2/1/2012    ENB, Dr. Karma Castro, [address] 10/15/15    with EBUS - DR Raymon Rodriguez    CHOLECYSTECTOMY      COLONOSCOPY  12/21/2009    partial to distal ascending colon normal (completion BE same day normal), Dr. Rafael Christianson, [facility] COLONOSCOPY  8/22/2014    done under fluoro with sigmoid straightening device so was able to get to cecum, very poor prep, moderate proctitis, repeat 5 years,  Dr. Rafael Christianson, [address] [facility] COLONOSCOPY N/A 3/28/2019    COLONOSCOPY POLYPECTOMY SNARE/COLD BIOPSY performed by Fariba Taylor DO at [address] [facility] COLONOSCOPY N/A 5/13/2022    COLONOSCOPY POLYPECTOMY SNARE/COLD BIOPSY performed by Nellie Snellen, MD at [address] Left 3/29/2016    VATS WEDGE RESECTION UPPER LOBECTOMY    OTHER SURGICAL HISTORY Right 11/04/2016    REMOVAL MEDI-PORT - DR Gildardo Peabody    TX LAP,CHOLECYSTECTOMY/GRAPH N/A 6/29/2018    CHOLECYSTECTOMY LAPAROSCOPIC, POSSIBLE OPEN,POSSIBLE GRAM ( OC 2) performed by Rocio Rader MD at [address] [place] [address] Right 11/11/2015    VATS, [facility] UPPER LOBECTOMY; NODE DISECTION    TUNNELED VENOUS PORT PLACEMENT Right 12/07/2015    right chest, and removed     UPPER GASTROINTESTINAL ENDOSCOPY N/A 5/13/2022    EGD POLYP HOT FORCEP/CAUTERY performed by Nellie Snellen, MD at [facility] ENDOSCOPY       Current Outpatient Medications   Medication Sig Dispense Refill    amLODIPine (NORVASC) 5 MG tablet take 1 tablet by mouth once daily      Cholecalciferol (VITAMIN D3) 1.25 MG (89440 UT) CAPS Take by mouth      lisinopril (PRINIVIL;ZESTRIL) 40 MG tablet take 1 tablet by mouth once daily 90 tablet 4    hydrochlorothiazide (HYDRODIURIL) 25 MG tablet Take 1 tablet by mouth daily 90 tablet 0    Multiple Vitamins-Minerals (THERAPEUTIC MULTIVITAMIN-MINERALS) tablet Take 1 tablet by mouth daily (Patient taking differently: Take 1 tablet by mouth daily LD 3-23-19) 90 tablet 5    vitamin B-12 (CYANOCOBALAMIN) 500 MCG tablet take 1 tablet by mouth daily 30 tablet 11    Multiple Vitamins-Minerals (THEREMS-M) TABS take 1 tablet by mouth once daily (Patient not taking: Reported on 2022) 30 tablet 11    tiotropium (SPIRIVA HANDIHALER) 18 MCG inhalation capsule Inhale 1 capsule into the lungs daily (Patient taking differently: Inhale 18 mcg into the lungs daily Instructed to take am of procedure) 90 capsule 3    Misc. Devices KIT BP monitoring KIT 1 kit 0     No current facility-administered medications for this visit. Facility-Administered Medications Ordered in Other Visits   Medication Dose Route Frequency Provider Last Rate Last Admin    pantoprazole (PROTONIX) tablet 40 mg  40 mg Oral QAM AC Luís Amin MD           Allergies   Allergen Reactions    Ibuprofen Palpitations and Rash       Family History   Problem Relation Age of Onset    Heart Disease Mother     High Blood Pressure Mother     Cancer Mother     Heart Disease Father     High Blood Pressure Father     Kidney Disease Father     Diabetes Sister     Diabetes Brother        Social History     Socioeconomic History    Marital status: Single     Spouse name: Not on file    Number of children: Not on file    Years of education: Not on file    Highest education level: Not on file   Occupational History    Not on file   Tobacco Use    Smoking status: Former Smoker     Packs/day: 0.00     Years: 30.00     Pack years: 0.00     Quit date: 2015     Years since quittin.9    Smokeless tobacco: Never Used   Vaping Use    Vaping Use: Never used   Substance and Sexual Activity    Alcohol use:  Yes     Alcohol/week: 2.0 standard drinks     Types: 2 Glasses of wine per week     Comment: x2 week    Drug use: No    Sexual activity: Not Currently   Other Topics Concern    Not on file   Social History Narrative    Not on file     Social Determinants of Health     Financial Resource Strain:     Difficulty of Paying Living Expenses: Not on file   Food Insecurity:     Worried About Running Out of Hyper[employer] in the Last Year: Not on file    Ran Out of Food in the Last Year: Not on file   Transportation Needs:     Lack of Transportation (Medical): Not on file    Lack of Transportation (Non-Medical): Not on file   Physical Activity:     Days of Exercise per Week: Not on file    Minutes of Exercise per Session: Not on file   Stress:     Feeling of Stress : Not on file   Social Connections:     Frequency of Communication with Friends and Family: Not on file    Frequency of Social Gatherings with Friends and Family: Not on file    Attends Gnosticism Services: Not on file    Active Member of [address] [place] [address] [employer] or Organizations: Not on file    Attends Club or Organization Meetings: Not on file    Marital Status: Not on file   Intimate Partner Violence:     Fear of Current or Ex-Partner: Not on file    Emotionally Abused: Not on file    Physically Abused: Not on file    Sexually Abused: Not on file   Housing Stability:     Unable to Pay for Housing in the Last Year: Not on file    Number of [place] in the Last Year: Not on file    Unstable Housing in the Last Year: Not on file       ROS:   Review of Systems   Constitutional: Positive for unexpected weight change. Negative for chills, diaphoresis and fever. HENT: Negative for congestion, ear discharge, ear pain, hearing loss, nosebleeds and tinnitus. Eyes: Negative for photophobia, pain and discharge. Respiratory: Negative for shortness of breath. Cardiovascular: Negative for palpitations and leg swelling. Gastrointestinal: Negative for abdominal pain, blood in stool, constipation, diarrhea, nausea and vomiting. Endocrine: Negative for polydipsia. Genitourinary: Negative for frequency, hematuria and urgency. Musculoskeletal: Negative for back pain and neck pain. Skin: Negative for rash. Allergic/Immunologic: Negative for environmental allergies. Neurological: Negative for tremors and seizures.    Psychiatric/Behavioral: Negative for hallucinations and suicidal ideas. The patient is not nervous/anxious. Physical Exam:  BP (!) 155/63   Pulse 84   Temp 97.7 °F (36.5 °C) (Temporal)   Resp 18   Ht 5' 1\" (1.549 m)   Wt 126 lb (57.2 kg)   SpO2 99%   BMI 23.81 kg/m²     PSYCH: mood and affect normal, alert and oriented x 3: No apparent distress, comfortable  EYES: Sclera white, pupils equal round and reactive to light  ENMT:  Hearing normal, trachea midline, ears externally intact  LYMPH: no obvious lympadenopathy in neck. RESP: Respiratory effort was normal with no retractions or use of accessory muscles. CV:  No pedal edema  GI/ Abdomen: Soft, nondistended, nontender, no guarding, no peritoneal signs  MSK: no clubbing/ no cyanosis/ gaitnormal       Assessment/Plan:  Peritoneal masses, possible mucinous tumor  - I reviewed their images prior to our office visit and we also reviewed them together today. I also reviewed her previous images  - we discussed that with her elevated CEA and weight loss it is worrisome  - will plan for a CT IV and Oral contrast  - possible diagnostic laparoscopy    45 Minutes of which greater than 50% was spent counseling or coordinating her care. Thank you for the consultation allowing me to take part in Ms. Tran's care. Please send a copy of my note to Dr. Tita Miles.  Isabel Hilario M.D.  5/27/2022  10:35 AM

## 2022-05-28 ASSESSMENT — ENCOUNTER SYMPTOMS
NAUSEA: 0
BACK PAIN: 0
ABDOMINAL PAIN: 0
PHOTOPHOBIA: 0
SHORTNESS OF BREATH: 0
EYE PAIN: 0
BLOOD IN STOOL: 0
DIARRHEA: 0
EYE DISCHARGE: 0
VOMITING: 0
CONSTIPATION: 0

## 2022-06-02 ENCOUNTER — OFFICE VISIT (OUTPATIENT)
Dept: PULMONOLOGY | Age: 72
End: 2022-06-02
Payer: MEDICARE

## 2022-06-02 VITALS
SYSTOLIC BLOOD PRESSURE: 147 MMHG | BODY MASS INDEX: 23.22 KG/M2 | HEIGHT: 61 IN | HEART RATE: 79 BPM | RESPIRATION RATE: 18 BRPM | WEIGHT: 123 LBS | DIASTOLIC BLOOD PRESSURE: 65 MMHG | TEMPERATURE: 97.7 F

## 2022-06-02 DIAGNOSIS — R91.8 ABNORMAL CHEST X-RAY WITH MULTIPLE LUNG NODULES: ICD-10-CM

## 2022-06-02 DIAGNOSIS — Z85.118 HISTORY OF LUNG CANCER: ICD-10-CM

## 2022-06-02 DIAGNOSIS — R91.1 LUNG NODULE: ICD-10-CM

## 2022-06-02 DIAGNOSIS — C34.90 ADENOCARCINOMA OF LUNG, UNSPECIFIED LATERALITY (HCC): ICD-10-CM

## 2022-06-02 DIAGNOSIS — Z87.891 EX-SMOKER: Primary | ICD-10-CM

## 2022-06-02 DIAGNOSIS — M62.9 DISORDER OF MUSCLE, UNSPECIFIED: ICD-10-CM

## 2022-06-02 DIAGNOSIS — J43.1 PANLOBULAR EMPHYSEMA (HCC): ICD-10-CM

## 2022-06-02 LAB
EXPIRATORY TIME: 6.95 SEC
FEF 25-75% %PRED-PRE: 92 L/SEC
FEF 25-75% PRED: 1.51 L/SEC
FEF 25-75%-PRE: 1.4 L/SEC
FEV1 %PRED-PRE: 104 %
FEV1 PRED: 1.69 L
FEV1/FVC %PRED-PRE: 92 %
FEV1/FVC PRED: 79 %
FEV1/FVC: 73 %
FEV1: 1.76 L
FVC %PRED-PRE: 112 %
FVC PRED: 2.16 L
FVC: 2.42 L
PEF %PRED-PRE: NORMAL
PEF PRED: NORMAL
PEF-PRE: NORMAL

## 2022-06-02 PROCEDURE — 99214 OFFICE O/P EST MOD 30 MIN: CPT | Performed by: INTERNAL MEDICINE

## 2022-06-02 PROCEDURE — 94010 BREATHING CAPACITY TEST: CPT | Performed by: INTERNAL MEDICINE

## 2022-06-02 PROCEDURE — G8420 CALC BMI NORM PARAMETERS: HCPCS | Performed by: INTERNAL MEDICINE

## 2022-06-02 PROCEDURE — 1090F PRES/ABSN URINE INCON ASSESS: CPT | Performed by: INTERNAL MEDICINE

## 2022-06-02 PROCEDURE — G8399 PT W/DXA RESULTS DOCUMENT: HCPCS | Performed by: INTERNAL MEDICINE

## 2022-06-02 PROCEDURE — 3017F COLORECTAL CA SCREEN DOC REV: CPT | Performed by: INTERNAL MEDICINE

## 2022-06-02 PROCEDURE — 1123F ACP DISCUSS/DSCN MKR DOCD: CPT | Performed by: INTERNAL MEDICINE

## 2022-06-02 PROCEDURE — G8427 DOCREV CUR MEDS BY ELIG CLIN: HCPCS | Performed by: INTERNAL MEDICINE

## 2022-06-02 PROCEDURE — 3023F SPIROM DOC REV: CPT | Performed by: INTERNAL MEDICINE

## 2022-06-02 PROCEDURE — 1036F TOBACCO NON-USER: CPT | Performed by: INTERNAL MEDICINE

## 2022-06-02 ASSESSMENT — PULMONARY FUNCTION TESTS
FEV1/FVC_PERCENT_PREDICTED_PRE: 92
FVC: 2.42
FEV1/FVC: 73
FEV1/FVC_PREDICTED: 79
FVC_PREDICTED: 2.16
FEV1: 1.76
FEV1_PREDICTED: 1.69
FVC_PERCENT_PREDICTED_PRE: 112
FEV1_PERCENT_PREDICTED_PRE: 104

## 2022-06-02 NOTE — PROGRESS NOTES
[address] [facility]  Department of Internal Medicine  Division of Pulmonary, Critical Care and Sleep Medicine  Office Note      Dear Adwoa Meyer, DO    We had the pleasure of seeing Conchis Carver, in the [address] at [facility] regarding her ROMELIA Stage I NSCLC, Stage II A RULadenocarcinoma S/P bilateral resection, & COPD. HISTORY OF PRESENT ILLNESS:  Fátima Oconnor has a past medical history of HTN, HLD, emphysema, scoliosis, OA, cholelithiasis, lung nodule, adrenal adenoma (7 mm), uterine fibroids, hemangioma of spleen, Hep C, ex-smoker (30 pack yrs, quit June, 2015), RUL 1.5 cm moderately differentiated adenocarcinoma (pT1a N1 Mx) S/p VATS right upper lobectomy & mediastinal LN dissection, S/p Mediport (12/7/15) for chemo- Real Nicky is being followed for a persistent lung infiltrate of/nodule found on evaluation during the workup of cervical adenopathy. Access submental, mandibular adenopathy. Apparently during her routine visit in the clinic she had noticed a right sided submandibular, cervical fullness which was felt to be an enlarged lymph gland which was also confirmed by CT imaging. She was referred on to surgery for a histologic evaluation, but nothing was performed and the adenopathy was felt to be reactive. The adenopathy, however, has continued over now a 6 month period. Despite these findings, she continues to use and smokes cigarettes of at least 1 to 1-1/2 packs per day. With the adenopathy and a CT scan of the chest was done. The CT scan initially done on July 13, 2011 was reviewed and compared to the one in October 25, 2011. The CT in July reveals a 7 mm nodular lesion in the right lobe posterior segment with a focal density in the lingular area, possibly representing focal pneumonia.   No mediastinal mass, adenopathy, pleural effusion, pericardial effusion, or pneumothorax were noted. An incidental finding of cholelithiasis was revealed. Over the following next few months, no additional findings were noted except for the follow-up in surgery as mentioned above. Ms. Sherlyn Garcia states she has had a normal mammogram in October 2011, normal Pap and pelvic examination and a negative colonoscopy. She has a family history of cancer on both her father and mother's side. In October 2011 a repeat CT scan of the Chest was done to reevaluate the pulmonary lesions. The CT reveals persistent findings of a nodular lesion in the right lower lobe superior segment, stable when compared to previous. A continued focal density in the lingular segment, unchanged from previous with no adenopathy and continued cholelithiasis. She was referred for pulmonary evaluation due to persistent abnormalities. Ms. Sherlyn Garcia denies any travel. She has multiple pets including cockatiels and dog. 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 There is no asbestos silica dust or coal exposure. No history of tuberculosis or tuberculosis exposure. During the evaluation for the lung mass. Jadyn Meek had a negative PET scan (SUV 1.7) and underwent ENB guided biopsy which was negative but poor cell sampling. She has a repeat CT of the Chest which we reviewed in conference. The CT shows no evidence of significant mediastinal lymphadenopathy. There is stable tiny right adrenal nodule measuring 7 mm, indeterminate. There is stable enhancing lesion in the spleen measuring 2.6 x 2.3 cm likely representing a hemangioma. There is stable focal opacity in the left lingula measuring 2.7 x 2.4 cm. This has not significantly changed from prior CT. The ground glass nodular lesion in the right upper lobe is unchanged. There is stable nodular lesion in the right lower lobe measuring approximately 8 mm in the superior segment. On discussion the plan is to continue to follow the lesion.      As you know, [facility] CT of the chest showed the left lung lesion and the small peripheral lung nodules (many)  Were unchanged. However a new right hilar lesion/adenopathy was at > 1 cm without adenopathy. We sent her for a PET scan and with her birds we did hypersensitivity panel. The PET scan was positive with the 1.7 cm right paratracheal nodule that is intensely hypermetabolic with SUV max of 4.8 worrisome for tumor. Also, the previously known nodule in the lingula now measures 2.3 cm in size. SUV max has increased from 1.7 on the 2012 examination to 6.4 at this time. She underwent a ENB for the lesions. The procedures/scope went well and we did find a RUL lesion in the airway and biopsies were + for adenocarcinoma. She also underwent biopsies again of the ROMELIA (lingular) lesion with electro-navigational bronchoscopy with are inconclusive with \"atypical cells\" Doing all this she underwent a left sided FNA which lead to a pneumothorax and NO answers were found. She was admitted with a anterior chest tube and recovered. She then came back to the hospital and a week later and on 11/11/2105 she underwent VATS/VATS RUL wedge/VATS Right Upper lobectomy/Intercostal nerve block from T3-T10/Mediastinal lymph node dissection by Dr. Cande Spencer with findings of Stage IIA adenocarcinoma of the lung. Pathology reveled Right lung, upper lobectomy: Invasive, moderately differentiated adenocarcinoma (Grade 2). Surgical margin negative for malignancy. Two of three peribronchial lymph nodes positive for adenocarcinoma. She underwent chemotherapy and on follow up PET scan for the cancer surveillance the results were The 2.1 cm left lung mass abutting the major fissure is hypermetabolic with SUV max of 5.4. Finding is worrisome for tumor with avid glucose metabolism but elsewhere there is unremarkable distribution of FDG activity without evidence of hypermetabolic metastases. Thus my recommendation would be surgical resection.  We again discussed removal of her birds from the home. Then in March 2016 she underwent Bronch/Left VATS/VATS wedge ROMELIA/VATS left upper lobectomy/mediastinal lymph node dissection/Intercostal nerve block from T3-T10 per Dr. Mcclure Smoker on 3/29/2016, final pathology revealed Stage I Adenocarcinoma of ROMELIA (morphologically different from the adenocarcinoma previously diagnosed in the right upper lobe. Therefore, synchronous primary tumors are favored). Post operative she was discharge but returned with chest pain with the findings of a pulmonary embolism at the surgical line. She is doing well on coumadin. Vaping was discouraged. Aggressive testing with CT scan of the Abd/Pelivs 05/08/2018 which reported a enhancing lesion within the spleen is relatively stable to slightly smaller when compared to April 2014 exam and likely splenic hemangioma. Other indeterminate findings (left periaortic LN, sclerotic/blastic foci in L3 and L1 reported by Dr. Margie Sorenson from radiology team which was a over read. Then she underwent a PET/CT scan 06/05/2018 unremarkable. No FDG avid uptake identified. No evidence for recurrent or metastatic disease. No clinical evidence of recurrence. She will be traveling to [place]. She is feeling well and had her GB removed. On today's visit, Alfonso Ferreira is doing well with no chest pain, worsening or declining SOB or new cough. She has no pleurisy, hemoptysis, but was concerned about her weight loss. She remain on the bronchodilator of Spiriva daily. Since the last visit for surveillance per NCCN guidelines. SHe was in the ER for abdominal pain, CT abdomen/pelvis 02/20/2022 showed a 6 mm right lower lobe pulmonary nodule and multiple peritoneal cystic masses.  She had CEA 12.5 on 03/16/2022,  and  markers that were normal. CT chest 04/05/2022 revealed a ground-glass nodule right lower lobe superolateral portion 10 mm unchanged from prior however in the medial segment right lower lobe with pleural abutment is a 7 mm pulmonary nodule increased in size from 09/28/2021 with is barely visible at 2-3 mm. Her PET/CT scan 04/05/2022 noted No FDG avid uptake is identified which exceeds the threshold SUV     Bilateral screening mammogram 04/20/2022: Negative for malignancy. With her abdominal pain she underwent a EGD/Colonoscopy 05/13/2022: Gastric polyps , duodenal polyp moderate gastroduodenitis with biopsy: Hyperplastic polyp and moderate chronic active gastritis; negative for intestinal metaplasia Immunostain negative for Helicobacter pylori organisms the duodenum, biopsy: Chronic duodenitis with features of peptic duodenitis and the colon 20 cm biopsy: Fragments of tubular adenoma and hyperplastic polyp. She is her for her COPD and nodule follow up. Today 05/23/2022 for f/u. No fever, chills. Fair appetite and energy level. Weight loss. She was seen by  and maybe need a laparoscopy.      ALLERGIES:  lesion is unchanged  Allergies   Allergen Reactions    Ibuprofen Palpitations and Rash       PAST MEDICAL HISTORY:       Diagnosis Date    Abnormal chest x-ray with multiple lung nodules 9/8/2015    Abnormal Pap smear 1/3/2011    Alpha-1 negative    Adrenal adenoma     7 mm    Bilateral lung cancer ([address] 5/12/2016    surgically removed - 2015     Cholelithiasis 6/6/2011    Encounter for screening colonoscopy     for OR 3-28-19     Fibroids     Hemangioma of spleen     Hepatitis C 01/03/2011    treated and cured, no current issues     Hyperlipidemia     no medications     Hypertension 6/6/2011    Insomnia     Lung nodule     ROMLEIA     Lymphadenopathy     Cervical (-) ENT    Malignant neoplasm of upper lobe of right lung ([address] 11/18/2015    Osteoarthritis     Panlobular emphysema ([address] 11/12/2015    controlled with inhalers     PPD negative 1/18/12    Pulmonary embolism without acute cor pulmonale ([address] 5/12/2016    PVD (peripheral vascular disease) ([address] 5/6/2014    Scoliosis     Uterine fibroid 5/6/2014        MEDICATIONS:   Current Outpatient Medications   Medication Sig Dispense Refill    amLODIPine (NORVASC) 5 MG tablet take 1 tablet by mouth once daily      vitamin B-12 (CYANOCOBALAMIN) 500 MCG tablet take 1 tablet by mouth daily 30 tablet 11    Cholecalciferol (VITAMIN D3) 1.25 MG (62598 UT) CAPS Take by mouth      Multiple Vitamins-Minerals (THEREMS-M) TABS take 1 tablet by mouth once daily (Patient not taking: Reported on 5/24/2022) 30 tablet 11    tiotropium (SPIRIVA HANDIHALER) 18 MCG inhalation capsule Inhale 1 capsule into the lungs daily (Patient taking differently: Inhale 18 mcg into the lungs daily Instructed to take am of procedure) 90 capsule 3    lisinopril (PRINIVIL;ZESTRIL) 40 MG tablet take 1 tablet by mouth once daily 90 tablet 4    hydrochlorothiazide (HYDRODIURIL) 25 MG tablet Take 1 tablet by mouth daily 90 tablet 0    Multiple Vitamins-Minerals (THERAPEUTIC MULTIVITAMIN-MINERALS) tablet Take 1 tablet by mouth daily (Patient taking differently: Take 1 tablet by mouth daily LD 3-23-19) 90 tablet 5    Misc. Devices KIT BP monitoring KIT 1 kit 0     No current facility-administered medications for this visit. Facility-Administered Medications Ordered in Other Visits   Medication Dose Route Frequency Provider Last Rate Last Admin    pantoprazole (PROTONIX) tablet 40 mg  40 mg Oral QAM AC Yasmin Ibarra MD           SOCIAL AND OCCUPATIONAL HEALTH:  The patient smoked 1 to 1.5 packs a cigarettes a day since the age of 14. She finished the smoking program and QUIT in 3.2012. Resumed on /off smoking. Then quit in 2016. She is smoked over 40 years in duration. There is no history of TB or TB exposure. There is no asbestos or silica dust exposure. The patient reports no coal, foundry, quarry or [employer] exposure. There is no recent travel history noted. The patient denies a history of recreational or IV drug use. No hot tub exposure.  The patient has dogs (1) and birds (2). Previous hypersensitivity panel was negative. Hobbies include reading. EtoH: The patient denies excessive alcohol intake. She works in a [employer]. She has birds and a pit bull dog. She has no children    SOCIAL HISTORY:   Social History     Tobacco Use    Smoking status: Former Smoker     Packs/day: 0.00     Years: 30.00     Pack years: 0.00     Quit date: 2015     Years since quittin.9    Smokeless tobacco: Never Used   Vaping Use    Vaping Use: Never used   Substance Use Topics    Alcohol use:  Yes     Alcohol/week: 2.0 standard drinks     Types: 2 Glasses of wine per week     Comment: x2 week    Drug use: No       SURGICAL HISTORY:   Past Surgical History:   Procedure Laterality Date    APPENDECTOMY  1965    BREAST BIOPSY Left     AGE 31'S LEFT NIPPLE REMOVED    BREAST LUMPECTOMY  1999    left, benign, Dr. Michelle Dos Santos, [facility] [address]  2012    Walters Sensor, Dr. Sugar Sifuentes, [facility] [address]  10/15/15    with EBUS - DR Tree Gaspar    CHOLECYSTECTOMY      COLONOSCOPY  2009    partial to distal ascending colon normal (completion BE same day normal), Dr. Michelle Dos Santos, [facility] COLONOSCOPY  2014    done under fluoro with sigmoid straightening device so was able to get to cecum, very poor prep, moderate proctitis, repeat 5 years,  Dr. Michelle Dos Santos, [facility] COLONOSCOPY N/A 3/28/2019    COLONOSCOPY POLYPECTOMY SNARE/COLD BIOPSY performed by Em Villanueva DO at [address] [facility] COLONOSCOPY N/A 2022    COLONOSCOPY POLYPECTOMY SNARE/COLD BIOPSY performed by Alexx Neumann MD at [address] Left 3/29/2016    VATS WEDGE RESECTION UPPER LOBECTOMY    OTHER SURGICAL HISTORY Right 2016    REMOVAL MEDI-PORT - DR Pierre Bridges    MT LAP,CHOLECYSTECTOMY/GRAPH N/A 2018    CHOLECYSTECTOMY LAPAROSCOPIC, POSSIBLE OPEN,POSSIBLE GRAM ( OC 2) performed by Pearl Abel MD at [place] [address] Right 2015 VATS, BRONCH,RT UPPER LOBECTOMY; NODE DISECTION    TUNNELED VENOUS PORT PLACEMENT Right 2015    right chest, and removed     UPPER GASTROINTESTINAL ENDOSCOPY N/A 2022    EGD POLYP HOT FORCEP/CAUTERY performed by Bobo Oquendo MD at [address]:   Family History   Problem Relation Age of Onset    Heart Disease Mother     High Blood Pressure Mother     Cancer Mother     Heart Disease Father     High Blood Pressure Father     Kidney Disease Father     Diabetes Sister     Diabetes Brother       Family Status   Relation Name Status    Mother adri     Father Naomi Rodriguez     Sister reinaldo Alive    Brother eugenia Alive    Sister ramses Alive        REVIEW OF SYSTEMS: The patients health assessment form was reviewed. PHYSICAL EXAMINATION:   Vitals:    22 1406   BP: (!) 147/65   Pulse: 79   Resp: 18   Temp: 97.7 °F (36.5 °C)   TempSrc: Infrared   Weight: 123 lb (55.8 kg)   Height: 5' 1\" (1.549 m)     Constitutional: A 71 y.o. female who is alert, oriented, cooperative and in no apparent distress. Head was normocephalic and atraumatic. EENT: EOMI DEEPTI. MMM. No icterus. No conjunctival injections. External canals are patent and no discharge. Septum was midline, mucosa was without erythema, exudates or cobblestoning. No thrush. Neck: Supple without thyromegaly. No elevated JVP. Trachea was midline. No carotid bruits. Respiratory: Bilateral VATS incisions. Right  Upper thorax subq port noted. Clear to auscultation. No wheezes, rhonchi or rales. No egophony. Cardiovascular: Regular without murmur, clicks, gallops or rubs. No ventricular heave. Pulses:  Equal bilaterally. Vascular bruit. Abdomen: Soft without organomegaly. No rebound, rigidity. Lymphatic: No lymphadenopathy. Musculoskeletal: Ambulates without assistance. Normal curvature of the spine. No gross muscle weakness. No involuntary movements.     Extremities: No lower extremity edema or erythema. Deep tendon reflexes are normal.   Skin:  Warm and dry. Good color, turgor and pigmentation. No bruises or skin rashes. Old surgical scars. Neurological/Psychiatric: General behavior, level of consciousness, thought content is normal. Emotional status is normal.  Cranial nerves II-XII are intact. DATA:   The data collected below information that was obtained, reviewed, analyzed and interpreted today. Imaging test are reviewed with the radiologist during weekly conference rounds. Comparison to previous images are always explored. Office Spirometry demonstrates an FVC of 2.42 liters which is 112 % of predicted with an FEV1 of 1.76 liters which is 104 % of predicted. FEV1/FVC ratio is 70 %. Mid expiratory flow rates are 81 % of predicted. Maximum voluntary ventilation is 44 liters per minute or 53 % of predicted. Flow volume loop shows no signs of intrathoracic or extrathoracic process. Impression: Normalized lung function     CT SCAN CHEST 4/2022: Lungs/pleura: Lungs are clear without focal opacification or consolidation. Ground-glass nodule right lower lobe superolateral portion 10 mm unchanged from prior however in the medial segment right lower lobe with pleural abutment is a 7 mm pulmonary nodule increased in size from 09/28/2021 with is barely visible at 2-3 mm.  No pleural effusion or pleural process. Upper Abdomen: Visualized portions of the upper abdomen unremarkable. PET SCAN 4/2022: No FDG avid uptake is identified which exceeds the threshold SUV     CT SCAN CHEST 6/2020: Impression: No hilar or mediastinal lymphadenopathy is noted. There is a moderate amount of plaque in the arch and great vessels. No thoracic aneurysm is noted. There is plaque in the upper abdominal aorta. There is a small cyst in the upper pole right kidney. Spleen is heterogeneous and may relate to the timing of the contrast bolus.  There is scarring in the right upper hemithorax. There are no infiltrates, effusions or lung nodules. CT SCAN CHEST 6/5/2019: Impression  No evidence for worrisome residual or recurrent malignancy.  No evidence for new lymphadenopathy or metastatic disease. Stable scarring and postoperative changes right upper lobe. Stable right adrenal gland nodule. PET/CT SCAN 6/05/2018: Tracer uptake is noted involving the right left kidney I suspect physiologic. Tracer uptake in the abdomen and the chest is otherwise physiologic. Previously identified lesion in the right upper lobe is no longer visible. There is what is most likely sequela of previous right upper lobe therapy or surgery with evidence to suggest scar No significant periaortic uptake is identified.    There is otherwise a normal biodistribution of radiotracer. There is no other abnormal tracer uptake seen. Unremarkable. No FDG avid uptake identified. No evidence for recurrent or metastatic disease. No clinical evidence of recurrence.     CT SCAN CHEST 10/2017: Essentially stable bilateral pulmonary nodules measuring up to 2 mm in diameter, as detailed above. Continued follow up is suggested. 2.  Cholelithiasis and query mild pericholecystic free fluid. If clinically indicated, consider ultrasound for further evaluation. 3.  Stable 8 mm sclerotic focus within the second thoracic vertebral body suspicious for a bone island (or enostosis). However, in a patient with a known malignancy osteoblastic metastasis is also a consideration. 4.  Probable 2.7 cm splenic hemangioma, unchanged compared to prior examination from July 2011. 5.  Stable 8 mm right adrenal gland nodule. CT SCAN CHEST 7/2016:Impression: No central, segmental or subsegmental pulmonary emboli are demonstrated. In retrospect, The finding that was previously thought to represent embolism in the distal left pulmonary artery may have actually been fluid within the left upper lobe bronchus.  There is no aneurysm or dissection of the thoracic aorta. Postoperative volume loss in the left chest is present after the left lingula lobectomy. No infiltrate or mass is seen within either lung. No thoracic lymphadenopathy is observed. There is no pleural or pericardial effusion. There is a right internal jugular vein Mediport. SURGICAL BIOSPY: 11/11/2015: Right lung, upper lobectomy: Invasive, moderately differentiated  adenocarcinoma (Grade 2). Surgical margin negative for malignancy. Two of three peribronchial lymph nodes positive for adenocarcinoma.  = T1a/, N1, M0: [Stage IIA] The cancer is no larger than 3 cm across, has not grown into the membranes that surround the lungs, and does not affect the main branches of the bronchi. It has spread to lymph nodes within the lung and/or around the area where the bronchus enters the lung (hilar lymph nodes). These lymph nodes are on the same side as the cancer. It has not spread to distant sites. PET SCAN 3/2016: The 2.1 cm left lung mass abutting the major fissure is hypermetabolic with SUV max of 5.4. Finding is worrisome for tumor with avid glucose metabolism. 2. Elsewhere there is unremarkable distribution of FDG activity without evidence of hypermetabolic metastases. PREVIOUS PET 7/2015  Previously known nodule in the lingula now measures 2.3 cm in size. SUV max has increased from 1.7 on the 2012 examination to 6.4 at this time. CT SCAN CHEST 8/2015: Multiple nodular lesions are noted in the right and left lung of ground glass density as mentioned above. Some of the nodules are new when compared with the previous and some of the nodules are resolved. Continued follow up is still suggested. 2. There is a nodular lesion seen in the right perihilar space measuring 1.7 cm and new when compared with the previous. This nodule is quite suspicious, bronchoscopy or PET scan as warranted. 3. Stable larger nodule seen in the lingula, stable when compared with the previous.       PET SCAN 10/2015: New since prior examination on CT is 1.7 cm right paratracheal nodule. Nodule is intensely hypermetabolic with SUV max of 4.8 worrisome for tumor. 2. Previously known nodule in the lingula now measures 2.3 cm in size. SUV max has increased from 1.7 on the 2012 examination to 6.4 at this time. At this time findings are worrisome for hypermetabolic tumor with avid glucose metabolism. CT SCAN CHEST 4/2014: Impression: 1. Multiple nodules seen in the right lung remaining stable when compared with the previous. The largest nodule seen in the lingula remains stable when compared with the previous. 2. No new nodes are noted on today's study. 3. No consolidation, pleural effusion or pneumothorax. CT SCAN CHEST 12/2013: Films were read/reviewed/discussed with radiology again demonstrates a smooth noncalcified nodular mass measuring approximately 2 cm in the lingular segment of the left lower lobe. This has not changed in size or appearance since the last examination. The lungs demonstrate no further evidence of pulmonary nodule mass or infiltrate. The mediastinum demonstrates no pathologic adenopathy. There is no hilar adenopathy. There is no axillary anat disease. The adrenal glands are not enlarged. There are no bony abnormalities present. Impression: Stable appearance to the previously described lingular lesion with no new or acute findings present. PET SCAN: (2012)  Impression- Nodule in the lingula shows moderate FDG uptake with quantitation yielding an SUV max of 1.7. Values less than 2.5 sometimes are considered indicative of benign pulmonary pathology. I note that using visual criteria the nodule has greater activity than is seen in the heart and mediastinum which is suspicious. Biospy: ENB Procedure: (-) Malignancy (2012)    Hypersensitivity Panel (2015)   A.fumigatus #1 Abs   Negative     Micropoly. faeni Abs  Negative     Thermoa.  vulgaris #1  POSITIVE (NEW)  Neg in 2012   A. pullulans Abs   Negative Thermoact. saccharii N Negative     [place] Serum Abs   Negative            IMPRESSION:    Kosta Prince is a 71 y.o. female with smoking related COPD, who underwent: 11/11/2015 (1) Bronchoscopy. (2) Right VATS. (3) VATS right upper lobe wedge resection. (4) VATS right upper lobectomy. (5) Intercostal nerve block from T3 to T10. (6) Mediastinal lymph node dissection. Pathology: Right lung, upper lobectomy: Invasive, moderately differentiated adenocarcinoma (Grade 2). Tumor size 1.5 cm in greatest dimension, with two of three peribronchial lymph nodes positive for adenocarcinoma. pT1a N1 MX; (Stage IIA) She did adjuvant chemotherapy. We recommended 4 cycles of adjuvant chemotherapy consisting of Carboplatin/Alimta. Mediport placed 12/7/15. Then in March 2016 she underwent Bronch/Left VATS/VATS wedge ROMELIA/VATS left upper lobectomy/mediastinal lymph node dissection/Intercostal nerve block from T3-T10 per Dr. Michael Zee on 3/29/2016, final pathology revealed Stage I Adenocarcinoma of ROMELIA (morphologically different from the adenocarcinoma previously diagnosed in the right upper lobe. Therefore, synchronous primary tumors are favored). She had a post operative pulmonary embolism finished 3 months of anticoagulation. CT negative in 2020. With this in mind, we would like to proceed with the following;                      PLAN:    She is doing well with the continued complaint of weight loss without anorexia. He yearly CT scan shows a 7 mm ground glass nodule in the peripheral right lung. Lung surveillance is ongoing for her bilateral low stage cancer s/p resection. She has stopped  smoking/vaping. Her lung function was normal. A hypersensitivity panel done because of her bird (cockatiels) exposure, testing was +, PFT and CT are stable/normal.  Her dog Faina Ellis has a tumor, but is doing ok  Her PPD test was negative. Her alpha-1 antitrypsin test was negative. She is to continue her bronchodilators - as adjusted by her insurance. Samples were given. To be complete we asked for labs testing and TSH for the weight loss. We will see her back in a 4 months for follow up. We hope this updates you on our evaluation and clinical thinking. Thank you for entrusting us to participate in Curtistine Leaks care. Sincerely,    Gerson Ordonez D.O., MPH, [facility] Bailey  Professor of Internal Medicine  Director, [facility]    Note: Bhanu Leaks was seen and during this encounter a minimum of 25 minutes was spent providing face-to-face patient care, including:  and coordinating care, reviewing the chart, labs, and diagnostics, as well as medical decision making. Greater than 50% of this time was spent instructing and counseling the patient face to face regarding findings and recommendations.

## 2022-06-02 NOTE — PROGRESS NOTES
6 mos follow up in office today. Denies any issues with her breathing. Pt to have labs done and follow up in office in 4 mos; office to mail out appt card.

## 2022-06-10 DIAGNOSIS — K66.8 MASS OF PERITONEUM: Primary | ICD-10-CM

## 2022-06-13 ENCOUNTER — TELEPHONE (OUTPATIENT)
Dept: HEMATOLOGY | Age: 72
End: 2022-06-13

## 2022-06-13 DIAGNOSIS — D12.4 ADENOMATOUS POLYP OF DESCENDING COLON: ICD-10-CM

## 2022-06-13 DIAGNOSIS — K66.8 MASS OF PERITONEUM: Primary | ICD-10-CM

## 2022-06-13 NOTE — TELEPHONE ENCOUNTER
The patients prior auth was done online and scanned into the patients chart. The patient is scheduled for her scan on 06/17/2022    ARRIVE 7:00 AM  SCAN 8:00 AM  NPO 4 HOURS PRIOR     I called the patient and gave her the above info. She stated that she has a doctor appt scheduled already for that day. So I gave her schedulings phone number so she could reschedule her scan. I made the patient aware that she will also have to call our office back so we can get her a follow up appt.  She confirmed all of the above  ,Electronically signed by Sybil Dudley MA on 6/13/2022 at 8:54 AM

## 2022-06-14 ENCOUNTER — HOSPITAL ENCOUNTER (OUTPATIENT)
Age: 72
Discharge: HOME | End: 2022-06-14
Payer: MEDICARE

## 2022-06-14 DIAGNOSIS — M62.9 DISORDER OF MUSCLE, UNSPECIFIED: ICD-10-CM

## 2022-06-14 DIAGNOSIS — C34.90 ADENOCARCINOMA OF LUNG, UNSPECIFIED LATERALITY (HCC): ICD-10-CM

## 2022-06-14 DIAGNOSIS — J43.1 PANLOBULAR EMPHYSEMA (HCC): ICD-10-CM

## 2022-06-14 DIAGNOSIS — Z85.118 HISTORY OF LUNG CANCER: ICD-10-CM

## 2022-06-14 DIAGNOSIS — R91.1 LUNG NODULE: ICD-10-CM

## 2022-06-14 DIAGNOSIS — Z87.891 EX-SMOKER: ICD-10-CM

## 2022-06-14 LAB
TSH SERPL DL<=0.05 MIU/L-ACNC: 2.23 UIU/ML (ref 0.27–4.2)
VITAMIN D 25-HYDROXY: 83 NG/ML (ref 30–100)

## 2022-06-14 PROCEDURE — 84443 ASSAY THYROID STIM HORMONE: CPT

## 2022-06-14 PROCEDURE — 36415 COLL VENOUS BLD VENIPUNCTURE: CPT

## 2022-06-14 PROCEDURE — 82306 VITAMIN D 25 HYDROXY: CPT

## 2022-06-23 ENCOUNTER — HOSPITAL ENCOUNTER (OUTPATIENT)
Dept: CT IMAGING | Age: 72
Discharge: HOME | End: 2022-06-25
Payer: MEDICARE

## 2022-06-23 ENCOUNTER — HOSPITAL ENCOUNTER (OUTPATIENT)
Age: 72
Discharge: HOME | End: 2022-06-23
Payer: MEDICARE

## 2022-06-23 DIAGNOSIS — K66.8 MASS OF PERITONEUM: ICD-10-CM

## 2022-06-23 DIAGNOSIS — D12.4 ADENOMATOUS POLYP OF DESCENDING COLON: ICD-10-CM

## 2022-06-23 LAB
ALBUMIN SERPL-MCNC: 3.8 G/DL (ref 3.5–5.2)
ALP BLD-CCNC: 110 U/L (ref 35–104)
ALT SERPL-CCNC: 46 U/L (ref 0–32)
ANION GAP SERPL CALCULATED.3IONS-SCNC: 10 MMOL/L (ref 7–16)
AST SERPL-CCNC: 58 U/L (ref 0–31)
BILIRUB SERPL-MCNC: 0.2 MG/DL (ref 0–1.2)
BUN BLDV-MCNC: 48 MG/DL (ref 6–23)
CALCIUM SERPL-MCNC: 9.2 MG/DL (ref 8.6–10.2)
CHLORIDE BLD-SCNC: 106 MMOL/L (ref 98–107)
CO2: 24 MMOL/L (ref 22–29)
CREAT SERPL-MCNC: 1.3 MG/DL (ref 0.5–1)
GFR AFRICAN AMERICAN: 49
GFR NON-AFRICAN AMERICAN: 49 ML/MIN/1.73
GLUCOSE BLD-MCNC: 107 MG/DL (ref 74–99)
POTASSIUM SERPL-SCNC: 3.9 MMOL/L (ref 3.5–5)
SODIUM BLD-SCNC: 140 MMOL/L (ref 132–146)
TOTAL PROTEIN: 8.5 G/DL (ref 6.4–8.3)

## 2022-06-23 PROCEDURE — 80053 COMPREHEN METABOLIC PANEL: CPT

## 2022-06-23 PROCEDURE — 36415 COLL VENOUS BLD VENIPUNCTURE: CPT

## 2022-06-23 PROCEDURE — 74177 CT ABD & PELVIS W/CONTRAST: CPT

## 2022-06-23 PROCEDURE — 6360000004 HC RX CONTRAST MEDICATION: Performed by: RADIOLOGY

## 2022-06-23 RX ADMIN — IOHEXOL 50 ML: 240 INJECTION, SOLUTION INTRATHECAL; INTRAVASCULAR; INTRAVENOUS; ORAL at 14:26

## 2022-06-23 RX ADMIN — IOPAMIDOL 75 ML: 755 INJECTION, SOLUTION INTRAVENOUS at 14:26

## 2022-06-24 ENCOUNTER — OFFICE VISIT (OUTPATIENT)
Dept: HEMATOLOGY | Age: 72
End: 2022-06-24

## 2022-06-24 ENCOUNTER — TELEPHONE (OUTPATIENT)
Dept: HEMATOLOGY | Age: 72
End: 2022-06-24

## 2022-06-24 VITALS
WEIGHT: 124 LBS | SYSTOLIC BLOOD PRESSURE: 150 MMHG | HEIGHT: 61 IN | BODY MASS INDEX: 23.41 KG/M2 | DIASTOLIC BLOOD PRESSURE: 60 MMHG | OXYGEN SATURATION: 97 % | HEART RATE: 74 BPM | TEMPERATURE: 97.1 F

## 2022-06-24 DIAGNOSIS — K66.8 MASS OF PERITONEUM: Primary | ICD-10-CM

## 2022-06-24 PROCEDURE — 99213 OFFICE O/P EST LOW 20 MIN: CPT | Performed by: TRANSPLANT SURGERY

## 2022-06-24 PROCEDURE — 1123F ACP DISCUSS/DSCN MKR DOCD: CPT | Performed by: TRANSPLANT SURGERY

## 2022-06-24 ASSESSMENT — ENCOUNTER SYMPTOMS
EYE PAIN: 0
CONSTIPATION: 0
EYE DISCHARGE: 0
NAUSEA: 0
SHORTNESS OF BREATH: 0
BLOOD IN STOOL: 0
ABDOMINAL PAIN: 0
VOMITING: 0
DIARRHEA: 0
BACK PAIN: 0
PHOTOPHOBIA: 0

## 2022-06-24 NOTE — TELEPHONE ENCOUNTER
I called [employer] and spoke to Jose Enrique Lucero and no Eric Ashby was required for cpt code 84036. Patient is scheduled for surgery on 7/6/22 at 7:30am at [address] [facility]. I called patient and gave her the surgery date, time and location and did tell her that PAT will be calling with further instructions. I did confirm she does not take any blood thinners. She verbalized understanding and confirmed this surgery date.     Electronically signed by Will Kay RN on 6/24/2022 at 2:13 PM

## 2022-06-24 NOTE — PROGRESS NOTES
Hepatobiliary and Pancreatic Surgery Attending History and Physical    Patient's Name/Date of Birth: Sherif Alicea /1950 (55 y.o.)    Date: June 24, 2022     CC:Peritoneal masses    HPI:  Patient is a sara 71year old female whom presented to the hospital for back pain. She had a ct scan and she states they started to find things. She does have a history of hepatitis C (now cured) and has had an EGD and colonoscopy. She has lost about 30 lbs over a two month time period. She does have a history of lung cancer. She denies any abdominal pain nor nausea. Her only abdominal operations have been an appendectomy and a cholecystectomy. She denies ever having pancreatitis. Her Ca 125 = 33 and her CEA = 12, and chromogranin A was 1480. She had a repeat CT scan. She had an appendectomy when she was 7years old. She is still losing weight.       Past Medical History:   Diagnosis Date    Abnormal chest x-ray with multiple lung nodules 9/8/2015    Abnormal Pap smear 1/3/2011    Alpha-1 negative    Adrenal adenoma     7 mm    Bilateral lung cancer ([address] 5/12/2016    surgically removed - 2015     Cholelithiasis 6/6/2011    Encounter for screening colonoscopy     for OR 3-28-19     Fibroids     Hemangioma of spleen     Hepatitis C 01/03/2011    treated and cured, no current issues     Hyperlipidemia     no medications     Hypertension 6/6/2011    Insomnia     Lung nodule     ROMELIA     Lymphadenopathy     Cervical (-) ENT    Malignant neoplasm of upper lobe of right lung ([address] 11/18/2015    Osteoarthritis     Panlobular emphysema ([address] 11/12/2015    controlled with inhalers     PPD negative 1/18/12    Pulmonary embolism without acute cor pulmonale ([address] 5/12/2016    PVD (peripheral vascular disease) ([address] 5/6/2014    Scoliosis     Uterine fibroid 5/6/2014       Past Surgical History:   Procedure Laterality Date    APPENDECTOMY  1965    BREAST BIOPSY Left     AGE 31'S LEFT NIPPLE REMOVED    BREAST LUMPECTOMY  4/28/1999    left, benign, Dr. Means Pump, [address]  2/1/2012    Kaykay Knutson, Dr. Joslyn Gillette, [address]  10/15/15    with EBUS - DR Hawley Line    CHOLECYSTECTOMY      COLONOSCOPY  12/21/2009    partial to distal ascending colon normal (completion BE same day normal), Dr. Clary Varner, [address] [facility] COLONOSCOPY  8/22/2014    done under fluoro with sigmoid straightening device so was able to get to cecum, very poor prep, moderate proctitis, repeat 5 years,  Dr. Clary Varner, [address] [facility] COLONOSCOPY N/A 3/28/2019    COLONOSCOPY POLYPECTOMY SNARE/COLD BIOPSY performed by Molinda Nissen, DO at [address] [facility] COLONOSCOPY N/A 5/13/2022    COLONOSCOPY POLYPECTOMY SNARE/COLD BIOPSY performed by Yumi King MD at [address] [facility] [address] Left 3/29/2016    VATS WEDGE RESECTION UPPER LOBECTOMY    OTHER SURGICAL HISTORY Right 11/04/2016    REMOVAL MEDI-PORT - DR Thania Quinteros    TN LAP,CHOLECYSTECTOMY/GRAPH N/A 6/29/2018    CHOLECYSTECTOMY LAPAROSCOPIC, POSSIBLE OPEN,POSSIBLE GRAM ( OC 2) performed by Alicia Araya MD at [address] Right 11/11/2015    VATS, Eren Mishra UPPER LOBECTOMY; NODE DISECTION    TUNNELED VENOUS PORT PLACEMENT Right 12/07/2015    right chest, and removed     UPPER GASTROINTESTINAL ENDOSCOPY N/A 5/13/2022    EGD POLYP HOT FORCEP/CAUTERY performed by Yumi King MD at [facility] ENDOSCOPY       Current Outpatient Medications   Medication Sig Dispense Refill    amLODIPine (NORVASC) 5 MG tablet take 1 tablet by mouth once daily      vitamin B-12 (CYANOCOBALAMIN) 500 MCG tablet take 1 tablet by mouth daily 30 tablet 11    Cholecalciferol (VITAMIN D3) 1.25 MG (80636 UT) CAPS Take by mouth      Multiple Vitamins-Minerals (THEREMS-M) TABS take 1 tablet by mouth once daily (Patient not taking: Reported on 5/24/2022) 30 tablet 11    tiotropium (SPIRIVA HANDIHALER) 18 MCG inhalation capsule Inhale 1 capsule into the lungs daily (Patient taking differently: Inhale 18 mcg into the lungs daily Instructed to take am of procedure) 90 capsule 3    lisinopril (PRINIVIL;ZESTRIL) 40 MG tablet take 1 tablet by mouth once daily 90 tablet 4    hydrochlorothiazide (HYDRODIURIL) 25 MG tablet Take 1 tablet by mouth daily 90 tablet 0    Multiple Vitamins-Minerals (THERAPEUTIC MULTIVITAMIN-MINERALS) tablet Take 1 tablet by mouth daily (Patient taking differently: Take 1 tablet by mouth daily LD 3-23-19) 90 tablet 5    Misc. Devices KIT BP monitoring KIT 1 kit 0     No current facility-administered medications for this visit. Facility-Administered Medications Ordered in Other Visits   Medication Dose Route Frequency Provider Last Rate Last Admin    pantoprazole (PROTONIX) tablet 40 mg  40 mg Oral QAM AC Luís Amin MD           Allergies   Allergen Reactions    Ibuprofen Palpitations and Rash       Family History   Problem Relation Age of Onset    Heart Disease Mother     High Blood Pressure Mother     Cancer Mother     Heart Disease Father     High Blood Pressure Father     Kidney Disease Father     Diabetes Sister     Diabetes Brother        Social History     Socioeconomic History    Marital status: Single     Spouse name: Not on file    Number of children: Not on file    Years of education: Not on file    Highest education level: Not on file   Occupational History    Not on file   Tobacco Use    Smoking status: Former Smoker     Packs/day: 0.00     Years: 30.00     Pack years: 0.00     Quit date: 2015     Years since quittin.0    Smokeless tobacco: Never Used   Vaping Use    Vaping Use: Never used   Substance and Sexual Activity    Alcohol use:  Yes     Alcohol/week: 2.0 standard drinks     Types: 2 Glasses of wine per week     Comment: x2 week    Drug use: No    Sexual activity: Not Currently   Other Topics Concern    Not on file   Social History Narrative    Not on file     Social Determinants of Health Financial Resource Strain:     Difficulty of Paying Living Expenses: Not on file   Food Insecurity:     Worried About Running Out of Food in the Last Year: Not on file    Evette of Food in the Last Year: Not on file   Transportation Needs:     Lack of Transportation (Medical): Not on file    Lack of Transportation (Non-Medical): Not on file   Physical Activity:     Days of Exercise per Week: Not on file    Minutes of Exercise per Session: Not on file   Stress:     Feeling of Stress : Not on file   Social Connections:     Frequency of Communication with Friends and Family: Not on file    Frequency of Social Gatherings with Friends and Family: Not on file    Attends [employer] Services: Not on file    Active Member of [address] [place] [address] or Organizations: Not on file    Attends Club or Organization Meetings: Not on file    Marital Status: Not on file   Intimate Partner Violence:     Fear of Current or Ex-Partner: Not on file    Emotionally Abused: Not on file    Physically Abused: Not on file    Sexually Abused: Not on file   Housing Stability:     Unable to Pay for Housing in the Last Year: Not on file    Number of [place] in the Last Year: Not on file    Unstable Housing in the Last Year: Not on file       ROS:   Review of Systems   Constitutional: Positive for unexpected weight change. Negative for chills, diaphoresis and fever. HENT: Negative for congestion, ear discharge, ear pain, hearing loss, nosebleeds and tinnitus. Eyes: Negative for photophobia, pain and discharge. Respiratory: Negative for shortness of breath. Cardiovascular: Negative for palpitations and leg swelling. Gastrointestinal: Negative for abdominal pain, blood in stool, constipation, diarrhea, nausea and vomiting. Endocrine: Negative for polydipsia. Genitourinary: Negative for frequency, hematuria and urgency. Musculoskeletal: Negative for back pain and neck pain. Skin: Negative for rash. Allergic/Immunologic: Negative for environmental allergies. Neurological: Negative for tremors and seizures. Psychiatric/Behavioral: Negative for hallucinations and suicidal ideas. The patient is not nervous/anxious. Physical Exam:  BP (!) 150/60   Pulse 74   Temp 97.1 °F (36.2 °C) (Temporal)   Ht 5' 1\" (1.549 m)   Wt 124 lb (56.2 kg)   SpO2 97%   BMI 23.43 kg/m²     PSYCH: mood and affect normal, alert and oriented x 3: No apparent distress, comfortable  EYES: Sclera white, pupils equal round and reactive to light  ENMT:  Hearing normal, trachea midline, ears externally intact  LYMPH: no obvious lympadenopathy in neck. RESP: Respiratory effort was normal with no retractions or use of accessory muscles. 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 I also reviewed her previous images  - we discussed that with her elevated CEA and chromogranin A, weight loss it is worrisome  - Patient and family were made aware of the risks, benefits, alternatives, and complications of a laparoscopic robotic peritoneal mass resection and wish to proceed with surgery. 20 Minutes of which greater than 50% was spent counseling or coordinating her care. Thank you for the consultation allowing me to take part in Ms. Tran's care. Please send a copy of my note to Dr. Ugo Peng.  Danita Martinez M.D.  6/24/2022  10:43 AM

## 2022-06-28 NOTE — PROGRESS NOTES
[address]   PRE-ADMISSION TESTING GENERAL INSTRUCTIONS  [facility] Phone Number: [phone]      GENERAL INSTRUCTIONS:    [x] Antibacterial Soap Shower Night before or AM of Surgery  [x] Deandre (CHG) wipe instruction sheet and wipes given. Do not use on face or genitals. Do not flush down toilet. [x] Do not wear makeup, lotions, powders, deodorant. [x] Nothing by mouth after midnight, including gum, candy, mints, or water. [x] You may brush your teeth, gargle, but do NOT swallow water. [x] No tobacco products, illegal drugs, or alcohol within 24 hours of your surgery. [x] Jewelry or valuables should not be brought to the hospital. All body and/or tongue piercing's must be removed prior to arriving to hospital. ALL hair pins must be removed. [x] Arrange transportation with a responsible adult  to and from the hospital.         -Who will be your  for transportation? Cristi-   [x] [employer] card and photo ID. [x] Transfusion Bracelet: Please bring with you to hospital, day of surgery       PARKING INSTRUCTIONS:     [x] ARRIVAL TIME: 7/6 at 5:30 am   · [x] Enter into the [facility]. Two people may accompany you. Masks are required. · [x] Parking Lot \"I\" is where you will park. It is located on the corner of [facility] and [facility]. The entrance is on [facility]. · To enter, press the button and the gate will lift. A free token will be provided to exit the lot. EDUCATION INSTRUCTIONS:      [x] Pre-admission Testing educational folder given  [x] Incentive Spirometry,coughing & deep breathing exercises reviewed. [x] Medication information sheet(s)   [x] Fluoroscopy-Xray used in surgery reviewed with patient. Educational pamphlet placed in chart. [x] Pain: Post-op pain is normal and to be expected. You will be asked to rate your pain from 0-10.       MEDICATION INSTRUCTIONS:    [x] Bring a complete list of your medications, please write the last time you took the medicine, give this list to the nurse. [x] Take the following medications the morning of surgery with 1-2 ounces of water:  Norvasc   [x] Stop herbal supplements, NSAIDS and vitamins 5 days before your surgery. Last dose 6/30  [x] Use your inhalers the morning of surgery. Bring your emergency inhaler with you day of surgery. Spiriva  [x] Follow physician instructions regarding any blood thinners you may be taking. WHAT TO EXPECT:    [x] The day of surgery you will be greeted and checked in by the [employer].  In addition, you will be registered in the [facility] by a Patient Access Representative. Please bring your photo ID and insurance card. A nurse will greet you in accordance to the time you are needed in the pre-op area to prepare you for surgery. Please do not be discouraged if you are not greeted in the order you arrive as there are many variables that are involved in patient preparation. Your patience is greatly appreciated as you wait for your nurse. Please bring in items such as: books, magazines, newspapers, electronics, or any other items  to occupy your time in the waiting area. [x]  Delays may occur with surgery and staff will make a sincere effort to keep you informed of delays. If any delays occur with your procedure, we apologize ahead of time for your inconvenience as we recognize the value of your time.

## 2022-06-29 ENCOUNTER — OFFICE VISIT (OUTPATIENT)
Dept: ONCOLOGY | Age: 72
End: 2022-06-29
Payer: MEDICARE

## 2022-06-29 ENCOUNTER — HOSPITAL ENCOUNTER (OUTPATIENT)
Dept: INFUSION THERAPY | Age: 72
Discharge: HOME | End: 2022-06-29

## 2022-06-29 VITALS
HEIGHT: 61 IN | HEART RATE: 79 BPM | TEMPERATURE: 97.7 F | WEIGHT: 125 LBS | SYSTOLIC BLOOD PRESSURE: 157 MMHG | BODY MASS INDEX: 23.6 KG/M2 | OXYGEN SATURATION: 99 % | DIASTOLIC BLOOD PRESSURE: 70 MMHG

## 2022-06-29 DIAGNOSIS — C34.90 NON-SMALL CELL LUNG CANCER, UNSPECIFIED LATERALITY (HCC): Primary | ICD-10-CM

## 2022-06-29 PROCEDURE — 99212 OFFICE O/P EST SF 10 MIN: CPT

## 2022-06-29 PROCEDURE — 1123F ACP DISCUSS/DSCN MKR DOCD: CPT | Performed by: INTERNAL MEDICINE

## 2022-06-29 PROCEDURE — 99214 OFFICE O/P EST MOD 30 MIN: CPT | Performed by: INTERNAL MEDICINE

## 2022-06-29 NOTE — PROGRESS NOTES
Department of [facility] Oncology  Attending Clinic Note    Reason for Visit: Follow-up on a patient with NSCLC    PCP:  Zach Tamayo MD    History of Present Illness:   71 y.o. -American female with significant history of tobacco abuse of approximately 30 pack years, quit smoking June 7, 2015. She has been followed for a left upper lobe mass by Dr. Get Rojas. Multiple biopsies in the past came back negative for malignancy. CT scan chest on 08/31/2015 showed a new right upper lobe mass. Bronchoscopy with washing RUL mass on 10/15/2015 was positive for RUL Lung Adenocarcinoma. Negative for malignancy on BAL and TBNA of Lingular lesion. PET/CT scan on 09/29/2015:  1. New since prior examination on CT is 1.7 cm right paratracheal   nodule. Nodule is intensely hypermetabolic with SUV max of 4.8   worrisome for tumor. 2. Previously known nodule in the lingula now measures 2.3 cm in   size. SUV max has increased from 1.7 on the 2012 examination to   6.4 at this time. At this time findings are worrisome for   hypermetabolic tumor with avid glucose metabolism. Therefore, she was referred to see Dr. Lisa Perez for resection. CT guided biopsy of the left upper lobe lesion on 11/02/2015 was noted to be negative for malignancy. On 11/11/2015 She underwent: (1) Bronchoscopy. (2) Right VATS. (3) VATS right upper lobe wedge resection. (4) VATS right upper lobectomy. (5) Intercostal nerve block from T3 to T10. (6) Mediastinal lymph node dissection. Pathology:  Right lung, upper lobectomy: Invasive, moderately differentiated adenocarcinoma (Grade 2). Tumor size 1.5 cm in greatest dimension. Surgical margin negative for malignancy. Two of three peribronchial lymph nodes positive for adenocarcinoma. pT1a N1 MX  She was referred to the medical oncology clinic to discuss adjuvant chemotherapy. We recommended 4 cycles of adjuvant chemotherapy consisting of Carboplatin/Alimta. Mediport placed 12/07/2015. Cycle # 1 of Jorge Hughes was on 12/09/2015. Cycle # 2 of Jorge Hughes was on 01/06/2016. Cycle # 3 of Jorge Hughes was on 01/27/2016. Cycle # 4 of Jorge Hughes was on 02/24/2016. PET SCAN 3.2016: The 2.1 cm left lung mass abutting the major fissure is hypermetabolic with SUV max of 5.4. Finding is worrisome for tumor with avid glucose metabolism. 2. Elsewhere there is unremarkable distribution of FDG activity without evidence of hypermetabolic metastases. On 03/29/2016 underwent Bronch/Left VATS/VATS wedge ROMELIA/VATS Left upper lobectomy/Mediasinal lymph node dissection/Intercostal nerve block from T3-T10 per Dr. Jose Alfredo Mahajan. Invasive, well-differentiated adenocarcinoma (grade 1); Tumor size-1.4 cm in greatest dimension; Surgical margins-negative for malignancy; Lymphovascular invasion-not identified; TNM classification-pT2a N0 MX  B. AP window lymph node #1, excision: Anthracotic lymph node; negative for malignancy  C. AP window lymph node #2, excision: Anthracotic lymph node; negative for malignancy   D. Periaortic lymph node #1, excision: Fibroadipose tissue; lymph node not identified  E. Bronchial lymph node #1, excision: Anthracotic lymph node; negative for malignancy  F. Left lung, upper lobectomy: Emphysematous change; negative for malignancy  4 anthracotic lymph nodes negative for malignancy   G. Inferior pulmonary ligament lymph node, excision: Anthracotic lymph node; negative for malignancy  H. Bronchial lymph node, excision: Anthracotic lymph node; negative for malignancy. On surveillance per NCCN guidelines.       Recent abdominal pain; ER visit reviewed  CT abdomen/pelvis 02/20/2022   6 mm right lower lobe pulmonary nodule  Multiple peritoneal cystic masses     CEA 12.5 on 03/16/2022    CT chest 04/05/2022 Ground-glass nodule right lower lobe superolateral portion 10 mm unchanged from prior however in the medial segment right lower lobe with pleural abutment is a 7 mm pulmonary nodule increased in size from 09/28/2021 with is barely visible at 2-3 mm; repeat CT chest in 3 mo    PET/CT scan 04/05/2022 noted No FDG avid uptake is identified which exceeds the threshold SUV    Bilateral Screening Mammogram 04/20/2022: Negative for malignancy    CEA 12.1 on 04/20/2022  CA-125 32.8 on 04/20/2022   <2 on 04/20/2022  Chromogranin A 1480 on 04/20/2022. EGD/Colonoscopy 05/13/2022: Gastric polyps , duodenal polyp moderate gastroduodenitis   A.  Stomach, biopsy: Hyperplastic polyp and moderate chronic active gastritis; negative for intestinal metaplasia   Immunostain negative for Helicobacter pylori organisms   B.  Duodenum, biopsy: Chronic duodenitis with features of peptic duodenitis   C.  Colon, 20 cm biopsy: Fragments of tubular adenoma and hyperplastic polyp     Referred to HBP team (Dr. Isiah Bond) for further evaluation of peritoneal cystic masses  CT abdomen/pelvis 06/23/2022 numerous cystic structures identified throughout the right flank and retroperitoneum    Today 06/29/2022 for f/u. No fever, chills. Fair appetite and energy level. Laparoscopic robotic peritoneal mass resection scheduled on 7/6/2022    Review of Systems;  CONSTITUTIONAL: No fever, chills. Fair appetite and energy level. ENMT: Eyes: No diplopia; Nose: No epistaxis. Mouth: No sore throat. RESPIRATORY: No hemoptysis, shortness of breath. CARDIOVASCULAR: No chest pain, palpitations. GASTROINTESTINAL: No nausea, vomiting. Recent abdominal pain  GENITOURINARY: No dysuria, urinary frequency, hematuria. NEURO: No syncope, presyncope, headache. Remainder ROS: Negative.     Past Medical History:      Diagnosis Date    Hypertension 6/6/2011    Abnormal Pap smear 1/3/2011     Alpha-1 negative    Cholelithiasis 6/6/2011    Osteoarthritis     Lymphadenopathy      Cervical (-) ENT    Lung nodule      ROMELIA     Scoliosis     PPD negative 1/18/12    Adrenal adenoma      7 mm    Hemangioma of spleen  Insomnia     PVD (peripheral vascular disease) ([facility] 5/6/2014    Uterine fibroid 5/6/2014    Hepatitis C 1/3/2011    Fibroids     Abnormal chest x-ray with multiple lung nodules 9/8/2015    Hyperlipidemia     [facility]ob[facility] emph[facility]se[facility] 11/12/2015    Malignant neoplasm of upper lobe of right lung ([facility] 11/18/2015     Medications:  Reviewed and reconciled. Allergies: Allergies   Allergen Reactions    Ibuprofen Palpitations and Rash     Physical Exam:  BP (!) 157/70   Pulse 79   Temp 97.7 °F (36.5 °C)   Ht 5' 1\" (1.549 m)   Wt 125 lb (56.7 kg)   SpO2 99%   BMI 23.62 kg/m²    GENERAL: Alert, oriented x 3, not in acute distress. HEENT: PERRLA; EOMI. Oropharynx clear. NECK: Supple. Without lymphadenopathy. LUNGS: No wheezing, crackles or ronchi. CARDIOVASCULAR: Regular rate. No murmurs, rubs or gallops. ABDOMEN: Soft. Non-tender, non-distended. EXTREMITIES: Without clubbing, cyanosis, or edema. NEUROLOGIC: No focal deficits. ECOG PS 1    Lab Results   Component Value Date    WBC 5.2 04/20/2022    HGB 10.0 (L) 04/20/2022    HCT 30.7 (L) 04/20/2022    MCV 88.5 04/20/2022     04/20/2022     Lab Results   Component Value Date     06/23/2022    K 3.9 06/23/2022     06/23/2022    CO2 24 06/23/2022    BUN 48 (H) 06/23/2022    CREATININE 1.3 (H) 06/23/2022    GLUCOSE 107 (H) 06/23/2022    CALCIUM 9.2 06/23/2022    PROT 8.5 (H) 06/23/2022    LABALBU 3.8 06/23/2022    BILITOT 0.2 06/23/2022    ALKPHOS 110 (H) 06/23/2022    AST 58 (H) 06/23/2022    ALT 46 (H) 06/23/2022    LABGLOM 49 06/23/2022    GFRAA 49 06/23/2022     Lab Results   Component Value Date    CEA 12.1 (H) 04/20/2022     Impression/Plan:  71 y.o. female with Hx of smoking who underwent: (1) Bronchoscopy. (2) Right VATS. (3) VATS right upper lobe wedge resection. (4) VATS right upper lobectomy.  (5) Intercostal nerve block from T3 to T10. (6) Mediastinal lymph node dissection on 11/11/2015:   Pathology:  Right lung, upper lobectomy: Invasive, moderately differentiated adenocarcinoma (Grade 2). Tumor size 1.5 cm in greatest dimension. Visceral pleural invasion: Not identified. Visceral pleural invasion: Not identified. Surgical margin negative for malignancy. Two of three peribronchial lymph nodes positive for adenocarcinoma. pT1a N1 MX;     Being followed for a left upper lobe mass by Dr. Toby Bui. Multiple biopsies in the past came back negative for malignancy. Hypermetabolic on PET/CT scan on 09/29/2015 (2.3 cm in size with SUV of 6.4). CT guided biopsy of the left upper lobe lesion on 11/02/2015 was noted to be negative for malignancy. She was referred to the medical oncology clinic to discuss adjuvant chemotherapy. We recommended 4 cycles of adjuvant chemotherapy consisting of Carboplatin/Alimta. Mediport placed 12/7/15. -MRI Brain on 12/8/15:  Negative for metastatic disease.   -We will repeat CT chest and PET/CT after 4 cycles of Carboplatin/Alimta. To follow on Lingular lesion as well. -Molecular studies (EGFR, ALK, ROS-1) were all Not Detected. Cycle # 1 of Pinky Memory was on 12/09/2015. Cycle # 2 of Pinky Memory was on 01/06/2016. Cycle # 3 of Pinky Memory was on 01/27/2016. Cycle # 4 of Pinky Memory was on 02/24/2016. PET SCAN 3.2016: The 2.1 cm left lung mass abutting the major fissure is hypermetabolic with SUV max of 5.4. Finding is worrisome for tumor with avid glucose metabolism. 2. Elsewhere there is unremarkable distribution of FDG activity without evidence of hypermetabolic metastases. On 03/29/2016 underwent Bronch/Left VATS/VATS wedge ROMELIA/VATS Left upper lobectomy/Mediasinal lymph node dissection/Intercostal nerve block from T3-T10 per Dr. Sung Hector. Final pathology revealed Stage I Adenocarcinoma of ROMELIA (morphologically different from the adenocarcinoma previously diagnosed in the right upper lobe. Therefore, synchronous primary tumors are favored). A. Left lung, upper lobe wedge resection: Invasive, well-differentiated adenocarcinoma (grade 1); Tumor size-1.4 cm in greatest dimension; Surgical margins-negative for malignancy; Lymphovascular invasion-not identified; TNM classification-pT2a N0 MX  B. AP window lymph node #1, excision: Anthracotic lymph node; negative for malignancy  C. AP window lymph node #2, excision: Anthracotic lymph node; negative for malignancy   D. Periaortic lymph node #1, excision: Fibroadipose tissue; lymph node not identified  E. Bronchial lymph node #1, excision: Anthracotic lymph node; negative for malignancy  F. Left lung, upper lobectomy: Emphysematous change; negative for malignancy  4 anthracotic lymph nodes negative for malignancy   G. Inferior pulmonary ligament lymph node, excision: Anthracotic lymph node; negative for malignancy  H. Bronchial lymph node, excision: Anthracotic lymph node; negative for malignancy. Right side Mediport was removed on 11/04/2016 by Dr. Rajat Alexis. On surveillance per NCCN guidelines. CT chest 04/12/2017 noted no convincing evidence of recurrent disease. CT chest 10/19/2017 noted no definite evidence for recurrent malignancy. CT chest 03/12/2018 negative for pulmonary parenchymal masses or enlarged mediastinal or hilar LN. ? 2 cm lesion in spleen difficult to evaluate due to the arterial phase of the study. CT Abd/Pelvis 05/08/2018  Enhancing lesion within the spleen is relatively stable to slightly smaller when compared to April 2014 exam and likely splenic hemangioma. Other indeterminate findings (left periaortic LN, sclerotic/blastic foci in L3 and L1 reported by Dr. Jony Rodriguez from radiology team). PET/CT scan 06/05/2018 unremarkable. No FDG avid uptake identified. No evidence for recurrent or metastatic disease. CT Chest 12/13/2018 noted no evidence of recurrent/metastatic disease. CT chest on 06/11/2019 noted no evidence for worrisome residual or recurrent malignancy.   No evidence for new lymphadenopathy or metastatic disease. Stable scarring and postoperative changes right upper lobe. CT chest 11/26/2019: No evidence of active neoplasm. CT chest 06/15/2020: No evidence of active neoplasm. CT chest 12/30/2020: Postsurgical changes and postsurgical scarring seen within the right lung apex.  There is no evidence of tumor recurrence. 2.1 x 2.3 cm enhancing lesion seen within the spleen  PET/CT scan 03/02/2021   No FDG avid uptake is identified which exceeds the threshold SUV. No convincing evidence for recurrent or metastatic disease  CT chest 09/28/2021 No evidence of tumor recurrence    Recent abdominal pain; ER visit reviewed  CT abdomen/pelvis 02/20/2022   6 mm right lower lobe pulmonary nodule  Multiple peritoneal cystic masses     CEA 12.5 on 03/16/2022    CT chest 04/05/2022 Ground-glass nodule right lower lobe superolateral portion 10 mm unchanged from prior however in the medial segment right lower lobe with pleural abutment is a 7 mm pulmonary nodule increased in size from 09/28/2021 with is barely visible at 2-3 mm; repeat CT chest in 3 mo    PET/CT scan 04/05/2022 noted No FDG avid uptake is identified which exceeds the threshold SUV     Bilateral screening mammogram 04/20/2022: Negative for malignancy    CEA     12.1 on 04/20/2022  CA-125 32.8 on 04/20/2022   <2 on 04/20/2022  Chromogranin A 1480 on 04/20/2022. EGD/Colonoscopy 05/13/2022: Gastric polyps , duodenal polyp moderate gastroduodenitis   A.  Stomach, biopsy: Hyperplastic polyp and moderate chronic active gastritis; negative for intestinal metaplasia   Immunostain negative for Helicobacter pylori organisms   B.  Duodenum, biopsy: Chronic duodenitis with features of peptic duodenitis   C.  Colon, 20 cm biopsy: Fragments of tubular adenoma and hyperplastic polyp   Pathology reviewed.     Referred to HBP team (Dr. Lupe Mora) for further evaluation of peritoneal cystic masses  CT abdomen/pelvis 06/23/2022 numerous cystic structures identified throughout the right flank and retroperitoneum. Imaging reviewed. Labs reviewed.    Laparoscopic robotic peritoneal mass resection scheduled on 07/06/2022    RTC 07/20/2022 to review surgical pathology    Aleida Long MD   [id number]  Board Certified Medical Oncologist

## 2022-06-30 ENCOUNTER — HOSPITAL ENCOUNTER (OUTPATIENT)
Dept: PREADMISSION TESTING | Age: 72
Discharge: HOME | End: 2022-06-30
Payer: MEDICARE

## 2022-06-30 ENCOUNTER — HOSPITAL ENCOUNTER (OUTPATIENT)
Dept: GENERAL RADIOLOGY | Age: 72
Discharge: HOME | End: 2022-07-02
Payer: MEDICARE

## 2022-06-30 VITALS
RESPIRATION RATE: 18 BRPM | WEIGHT: 125 LBS | OXYGEN SATURATION: 100 % | HEIGHT: 61 IN | HEART RATE: 76 BPM | TEMPERATURE: 97.7 F | DIASTOLIC BLOOD PRESSURE: 80 MMHG | SYSTOLIC BLOOD PRESSURE: 160 MMHG | BODY MASS INDEX: 23.6 KG/M2

## 2022-06-30 DIAGNOSIS — Z01.810 PRE-OPERATIVE CARDIOVASCULAR EXAMINATION: ICD-10-CM

## 2022-06-30 DIAGNOSIS — Z01.812 PRE-OPERATIVE LABORATORY EXAMINATION: ICD-10-CM

## 2022-06-30 DIAGNOSIS — Z01.811 PRE-OP CHEST EXAM: ICD-10-CM

## 2022-06-30 LAB
ABO/RH: NORMAL
ANION GAP SERPL CALCULATED.3IONS-SCNC: 11 MMOL/L (ref 7–16)
ANTIBODY SCREEN: NORMAL
BUN BLDV-MCNC: 30 MG/DL (ref 6–23)
CALCIUM SERPL-MCNC: 9.1 MG/DL (ref 8.6–10.2)
CHLORIDE BLD-SCNC: 107 MMOL/L (ref 98–107)
CO2: 23 MMOL/L (ref 22–29)
CREAT SERPL-MCNC: 1.2 MG/DL (ref 0.5–1)
EKG ATRIAL RATE: 71 BPM
EKG P AXIS: -12 DEGREES
EKG P-R INTERVAL: 110 MS
EKG Q-T INTERVAL: 442 MS
EKG QRS DURATION: 134 MS
EKG QTC CALCULATION (BAZETT): 480 MS
EKG R AXIS: -64 DEGREES
EKG T AXIS: 31 DEGREES
EKG VENTRICULAR RATE: 71 BPM
GFR AFRICAN AMERICAN: 54
GFR NON-AFRICAN AMERICAN: 54 ML/MIN/1.73
GLUCOSE BLD-MCNC: 94 MG/DL (ref 74–99)
HCT VFR BLD CALC: 26.2 % (ref 34–48)
HEMOGLOBIN: 8.5 G/DL (ref 11.5–15.5)
MCH RBC QN AUTO: 28.4 PG (ref 26–35)
MCHC RBC AUTO-ENTMCNC: 32.4 % (ref 32–34.5)
MCV RBC AUTO: 87.6 FL (ref 80–99.9)
PDW BLD-RTO: 13.4 FL (ref 11.5–15)
PLATELET # BLD: 213 E9/L (ref 130–450)
PMV BLD AUTO: 10.7 FL (ref 7–12)
POTASSIUM SERPL-SCNC: 4 MMOL/L (ref 3.5–5)
RBC # BLD: 2.99 E12/L (ref 3.5–5.5)
SODIUM BLD-SCNC: 141 MMOL/L (ref 132–146)
WBC # BLD: 6.1 E9/L (ref 4.5–11.5)

## 2022-06-30 PROCEDURE — 80048 BASIC METABOLIC PNL TOTAL CA: CPT

## 2022-06-30 PROCEDURE — 93005 ELECTROCARDIOGRAM TRACING: CPT | Performed by: TRANSPLANT SURGERY

## 2022-06-30 PROCEDURE — 86850 RBC ANTIBODY SCREEN: CPT

## 2022-06-30 PROCEDURE — 36415 COLL VENOUS BLD VENIPUNCTURE: CPT

## 2022-06-30 PROCEDURE — 85027 COMPLETE CBC AUTOMATED: CPT

## 2022-06-30 PROCEDURE — 86901 BLOOD TYPING SEROLOGIC RH(D): CPT

## 2022-06-30 PROCEDURE — 86900 BLOOD TYPING SEROLOGIC ABO: CPT

## 2022-06-30 PROCEDURE — 87081 CULTURE SCREEN ONLY: CPT

## 2022-06-30 PROCEDURE — 71046 X-RAY EXAM CHEST 2 VIEWS: CPT

## 2022-06-30 NOTE — PROGRESS NOTES
Dr. Brian Coello notified of EKG results. Dr. Brian Coello requests medical clearance prior to the patient having surgery.

## 2022-06-30 NOTE — PROGRESS NOTES
Left voicemail message with Pebbles Diez at Dr. Darrius Hart office regarding patient needing medical clearance prior to having surgery due to abnormal EKG per Dr. Hunt Dry request.

## 2022-07-01 ENCOUNTER — TELEPHONE (OUTPATIENT)
Dept: HEMATOLOGY | Age: 72
End: 2022-07-01

## 2022-07-01 DIAGNOSIS — D12.4 ADENOMATOUS POLYP OF DESCENDING COLON: ICD-10-CM

## 2022-07-01 DIAGNOSIS — K66.8 MASS OF PERITONEUM: Primary | ICD-10-CM

## 2022-07-01 LAB — MRSA CULTURE ONLY: NORMAL

## 2022-07-05 ENCOUNTER — OFFICE VISIT (OUTPATIENT)
Dept: CARDIOLOGY CLINIC | Age: 72
End: 2022-07-05
Payer: MEDICARE

## 2022-07-05 ENCOUNTER — HOSPITAL ENCOUNTER (OUTPATIENT)
Dept: CARDIOLOGY | Age: 72
Discharge: HOME | End: 2022-07-05
Payer: MEDICARE

## 2022-07-05 VITALS
HEART RATE: 73 BPM | DIASTOLIC BLOOD PRESSURE: 60 MMHG | SYSTOLIC BLOOD PRESSURE: 140 MMHG | HEIGHT: 61 IN | BODY MASS INDEX: 23.71 KG/M2 | RESPIRATION RATE: 16 BRPM | WEIGHT: 125.6 LBS

## 2022-07-05 DIAGNOSIS — I10 PRIMARY HYPERTENSION: ICD-10-CM

## 2022-07-05 DIAGNOSIS — E78.49 OTHER HYPERLIPIDEMIA: ICD-10-CM

## 2022-07-05 DIAGNOSIS — I73.9 PVD (PERIPHERAL VASCULAR DISEASE) (HCC): ICD-10-CM

## 2022-07-05 DIAGNOSIS — Z01.810 PREOP CARDIOVASCULAR EXAM: Primary | ICD-10-CM

## 2022-07-05 DIAGNOSIS — Z01.810 PREOP CARDIOVASCULAR EXAM: ICD-10-CM

## 2022-07-05 DIAGNOSIS — I45.10 RBBB: ICD-10-CM

## 2022-07-05 LAB
LV EF: 63 %
LVEF MODALITY: NORMAL

## 2022-07-05 PROCEDURE — 93306 TTE W/DOPPLER COMPLETE: CPT

## 2022-07-05 PROCEDURE — 93000 ELECTROCARDIOGRAM COMPLETE: CPT | Performed by: INTERNAL MEDICINE

## 2022-07-05 PROCEDURE — 1123F ACP DISCUSS/DSCN MKR DOCD: CPT | Performed by: INTERNAL MEDICINE

## 2022-07-05 PROCEDURE — 99204 OFFICE O/P NEW MOD 45 MIN: CPT | Performed by: INTERNAL MEDICINE

## 2022-07-05 ASSESSMENT — ENCOUNTER SYMPTOMS
ABDOMINAL PAIN: 0
EYE DISCHARGE: 0
CONSTIPATION: 0
DIARRHEA: 0
BACK PAIN: 0
PHOTOPHOBIA: 0
VOMITING: 0
EYE PAIN: 0
BLOOD IN STOOL: 0
NAUSEA: 0
SHORTNESS OF BREATH: 0

## 2022-07-05 NOTE — PROGRESS NOTES
CHIEF COMPLAINT:   Chief Complaint   Patient presents with    Pre-op Exam        HISTORY OF PRESENT ILLNESS: Patient is a 71 y.o. female seen at the request of Maricarmen Maradiaga DO to establish care with me. She has a history of hypertension, no prior significant cardiac history, adenocarcinoma of lung status post chemotherapy and in remission. For the last few months, she has been having unintentional weight loss. She denies any fatigue. A CT scan of the abdomen and pelvis done on 6/23/2022 showed increasing size of pulmonary nodule in the left lower lung field with numerous cystic structures identified throughout the right flank and retroperitoneum. Findings were consistent with peritoneal malignancy. She is supposed to undergo laparoscopic evaluation tomorrow. She was found to have an abnormal EKG and was referred to me for further evaluation and for preoperative management. At today's office visit, She denies any chest pain, shortness of breath, palpitations, dizziness, pedal edema. The patient is capable of activities of daily living. There is dyspnea on more than moderate exertion. There is no orthopnea or PND. She is compliant with medications as well as diet and exercise regimen. Her parents had congestive heart failure but further details are unknown. No history of coronary artery disease in the family    Prior Cardiac workup:  Nuclear perfusion scan from 10/28/2015: No evidence of ischemia. Normal EF.       Past Medical History:   Diagnosis Date    Abnormal chest x-ray with multiple lung nodules 9/8/2015    Abnormal Pap smear 1/3/2011    Alpha-1 negative    Adrenal adenoma     7 mm    Bilateral lung cancer ([facility] 5/12/2016    surgically removed - 2015     Cholelithiasis 6/6/2011    Encounter for screening colonoscopy     for OR 3-28-19     Fibroids     Hemangioma of spleen     Hepatitis C 01/03/2011    treated and cured, no current issues     Hyperlipidemia     no medications     Hypertension 2011    Insomnia     Lung nodule     ROMELIA     Lymphadenopathy     Cervical (-) ENT    Malignant neoplasm of upper lobe of right lung ([facility] 2015    Osteoarthritis     [facility]lobular emph[facility]sema ([facility] 2015    controlled with inhalers     PPD negative 12    Pulmonary embolism without acute cor pulmonale (HCC) 2016    PVD (peripheral vascular disease) (HCC) 2014    Scoliosis     Uterine fibroid 2014       Allergies   Allergen Reactions    Ibuprofen Palpitations and Rash       Current Outpatient Medications   Medication Sig Dispense Refill    amLODIPine (NORVASC) 5 MG tablet take 1 tablet by mouth once daily      lisinopril (PRINIVIL;ZESTRIL) 40 MG tablet take 1 tablet by mouth once daily 90 tablet 4    hydrochlorothiazide (HYDRODIURIL) 25 MG tablet Take 1 tablet by mouth daily 90 tablet 0    Multiple Vitamins-Minerals (THERAPEUTIC MULTIVITAMIN-MINERALS) tablet Take 1 tablet by mouth daily 90 tablet 5    Misc. Devices KIT BP monitoring KIT 1 kit 0    vitamin B-12 (CYANOCOBALAMIN) 500 MCG tablet take 1 tablet by mouth daily 30 tablet 11    tiotropium (SPIRIVA HANDIHALER) 18 MCG inhalation capsule Inhale 1 capsule into the lungs daily 90 capsule 3     No current facility-administered medications for this visit.      Facility-Administered Medications Ordered in Other Visits   Medication Dose Route Frequency Provider Last Rate Last Admin    pantoprazole (PROTONIX) tablet 40 mg  40 mg Oral QAM AC Claudean Patella, MD           Social History     Socioeconomic History    Marital status: Single     Spouse name: Not on file    Number of children: Not on file    Years of education: Not on file    Highest education level: Not on file   Occupational History    Not on file   Tobacco Use    Smoking status: Former Smoker     Packs/day: 0.00     Years: 30.00     Pack years: 0.00     Types: Cigarettes     Quit date: 2015     Years since quittin.0    Smokeless tobacco: Never Used   Vaping Use    Vaping Use: Never used   Substance and Sexual Activity    Alcohol use: Yes     Alcohol/week: 2.0 standard drinks     Types: 2 Glasses of wine per week     Comment: x2 week    Drug use: No    Sexual activity: Not Currently   Other Topics Concern    Not on file   Social History Narrative    Not on file     Social Determinants of Health     Financial Resource Strain:     Difficulty of Paying Living Expenses: Not on file   Food Insecurity:     Worried About Running Out of Food in the Last Year: Not on file    Evette of Food in the Last Year: Not on file   Transportation Needs:     Lack of Transportation (Medical): Not on file    Lack of Transportation (Non-Medical):  Not on file   Physical Activity:     Days of Exercise per Week: Not on file    Minutes of Exercise per Session: Not on file   Stress:     Feeling of Stress : Not on file   Social Connections:     Frequency of Communication with Friends and Family: Not on file    Frequency of Social Gatherings with Friends and Family: Not on file    Attends Mandaen Services: Not on file    Active Member of Clubs or Organizations: Not on file    Attends Club or Organization Meetings: Not on file    Marital Status: Not on file   Intimate Partner Violence:     Fear of Current or Ex-Partner: Not on file    Emotionally Abused: Not on file    Physically Abused: Not on file    Sexually Abused: Not on file   Housing Stability:     Unable to Pay for Housing in the Last Year: Not on file    Number of [place] in the Last Year: Not on file    Unstable Housing in the Last Year: Not on file       Family History   Problem Relation Age of Onset    Heart Disease Mother     High Blood Pressure Mother     Cancer Mother     Heart Disease Father     High Blood Pressure Father     Kidney Disease Father     Diabetes Sister     Diabetes Brother        Review of Systems  Constitutional: Negative for fever, malaise/fatigue and weight loss. HENT: Negative for sore throat and tinnitus. Eyes: Negative for blurred vision and double vision. Respiratory: Negative for shortness of breath. Negative for cough and wheezing. Cardiovascular: As mentioned in HPI. Gastrointestinal: Negative for abdominal pain, heartburn, nausea and vomiting. Genitourinary: Negative. Musculoskeletal: Negative for back pain, joint pain and myalgias. Neurological: Negative for dizziness, tremors, loss of consciousness and headaches. Endo/Heme/Allergies: Negative. Psychiatric/Behavioral: Negative for depression and suicidal ideas. Physical Exam   BP (!) 140/60   Pulse 73   Resp 16   Ht 5' 1\" (1.549 m)   Wt 125 lb 9.6 oz (57 kg)   BMI 23.73 kg/m²   Constitutional: Oriented to person, place, and time. Well-developed and well-nourished. No distress. Head: Normocephalic and atraumatic. Eyes: EOM are normal. Pupils are equal, round, and reactive to light. Neck: Normal range of motion. Neck supple. No hepatojugular reflux and no JVD present. Carotid bruit is not present. No tracheal deviation present. No thyromegaly present. Cardiovascular: Normal rate, regular rhythm, normal heart sounds and intact distal pulses. Exam reveals no gallop and no friction rub. Ejection systolic murmur best heard in the aortic area. Pulmonary/Chest: Effort normal and breath sounds normal. No respiratory distress. No wheezes. No rales. No tenderness. Abdominal: Soft. Bowel sounds are normal. No distension and no mass. No tenderness. No rebound and no guarding. Musculoskeletal: Normal range of motion. No edema and no tenderness. Lymphadenopathy:   No cervical adenopathy. Neurological: Alert and oriented to person, place, and time. Skin: Skin is warm and dry. No rash noted. Not diaphoretic. No erythema. Psychiatric: Normal mood and affect. Behavior is normal.     Procedures and Testing  EKG: Done in the office today and reviewed by me.   Shows normal sinus rhythm with right bundle branch block with a QRS interval of 136 ms. Left axis deviation consistent with bifascicular block. Prior EKG from 2018 reviewed. Similar findings. ASSESSMENT:   Diagnosis Orders   1. Preop cardiovascular exam  ECHO Complete 2D W Doppler W Color   2. PVD (peripheral vascular disease) (HCC)  EKG 12 Lead    ECHO Complete 2D W Doppler W Color   3. RBBB  ECHO Complete 2D W Doppler W Color   4. Primary hypertension  ECHO Complete 2D W Doppler W Color   5. Other hyperlipidemia  ECHO Complete 2D W Doppler W Color        Plan:  1. Preoperative evaluation: She does not have any active complaints. She has a stable functional capacity. She does have bifascicular block which appears stable since 2018 at the very least.  I will check an echocardiogram to assess biventricular function. She also has a systolic murmur on examination. Perioperative risk estimation will be provided based on the results of the echocardiogram.  Overall, she appears to be an intermediate risk candidate for an intermediate risk surgery. 2.  Hypertension: Systolic pressures above goal today. She does have low diastolic pressures. She does not check her pressures at home. Goal for her is less than 130/80. Currently, she is on Norvasc 5, lisinopril 40 and HCTZ 25 mg daily. Salt restriction counseling provided. She will monitor pressures at home. 3.  Hyperlipidemia: LDL from August 2019 was 103. She is currently not on statin therapy. Dietary counseling provided. I will check a lipid panel at next visit. 4.  Bifascicular block: As above. 5.  History of lung cancer now with increasing size of pulmonary nodules as well as imaging suggestive of peritoneal malignancy: Scheduled to undergo laparoscopic evaluation tomorrow. Follow-up with me in the office in 6 months. Hima Caldwell MD  [facility]) Cardiology     NOTE: This report was transcribed using voice recognition software.  Every effort was made to ensure accuracy; however, inadvertent computerized transcription errors may be present.

## 2022-07-05 NOTE — PATIENT INSTRUCTIONS
 Continue all your medications at current doses.  We will schedule an echocardiogram.    I will give you a letter for surgery.  Restrict sodium intake to less than 2-2.5 g/day. Restrict fluid intake to less than 2.2 L/day. Goal BP is less than 130/80.  Please try to exercise for 150 minutes a week.  I will see you back in the office in 6 months. Please call the office at ([phone], option [phone]) if you have any questions.

## 2022-07-06 ENCOUNTER — ANESTHESIA EVENT (OUTPATIENT)
Dept: OPERATING ROOM | Age: 72
End: 2022-07-06
Payer: MEDICARE

## 2022-07-06 ENCOUNTER — ANESTHESIA (OUTPATIENT)
Dept: OPERATING ROOM | Age: 72
End: 2022-07-06
Payer: MEDICARE

## 2022-07-06 ENCOUNTER — HOSPITAL ENCOUNTER (OUTPATIENT)
Age: 72
Setting detail: OUTPATIENT SURGERY
Discharge: HOME | End: 2022-07-06
Attending: TRANSPLANT SURGERY | Admitting: TRANSPLANT SURGERY
Payer: MEDICARE

## 2022-07-06 VITALS
HEIGHT: 61 IN | OXYGEN SATURATION: 97 % | SYSTOLIC BLOOD PRESSURE: 122 MMHG | RESPIRATION RATE: 20 BRPM | BODY MASS INDEX: 23.6 KG/M2 | DIASTOLIC BLOOD PRESSURE: 60 MMHG | WEIGHT: 125 LBS | HEART RATE: 82 BPM | TEMPERATURE: 97.6 F

## 2022-07-06 DIAGNOSIS — Z01.811 PRE-OP CHEST EXAM: ICD-10-CM

## 2022-07-06 DIAGNOSIS — Z01.812 PRE-OPERATIVE LABORATORY EXAMINATION: Primary | ICD-10-CM

## 2022-07-06 DIAGNOSIS — G89.18 POST-OP PAIN: ICD-10-CM

## 2022-07-06 DIAGNOSIS — K66.8 MASS OF PERITONEUM: ICD-10-CM

## 2022-07-06 DIAGNOSIS — Z01.810 PRE-OPERATIVE CARDIOVASCULAR EXAMINATION: ICD-10-CM

## 2022-07-06 PROCEDURE — 88112 CYTOPATH CELL ENHANCE TECH: CPT

## 2022-07-06 PROCEDURE — 88331 PATH CONSLTJ SURG 1 BLK 1SPC: CPT

## 2022-07-06 PROCEDURE — 2580000003 HC RX 258: Performed by: TRANSPLANT SURGERY

## 2022-07-06 PROCEDURE — 2580000003 HC RX 258: Performed by: NURSE ANESTHETIST, CERTIFIED REGISTERED

## 2022-07-06 PROCEDURE — 2580000003 HC RX 258: Performed by: SURGERY

## 2022-07-06 PROCEDURE — 3600000009 HC SURGERY ROBOT BASE: Performed by: TRANSPLANT SURGERY

## 2022-07-06 PROCEDURE — 3700000001 HC ADD 15 MINUTES (ANESTHESIA): Performed by: TRANSPLANT SURGERY

## 2022-07-06 PROCEDURE — 2500000003 HC RX 250 WO HCPCS: Performed by: NURSE ANESTHETIST, CERTIFIED REGISTERED

## 2022-07-06 PROCEDURE — 7100000010 HC PHASE II RECOVERY - FIRST 15 MIN: Performed by: TRANSPLANT SURGERY

## 2022-07-06 PROCEDURE — 88305 TISSUE EXAM BY PATHOLOGIST: CPT

## 2022-07-06 PROCEDURE — 47001 NDL BIOPSY LVR TM OTH MAJ PX: CPT | Performed by: TRANSPLANT SURGERY

## 2022-07-06 PROCEDURE — 7100000011 HC PHASE II RECOVERY - ADDTL 15 MIN: Performed by: TRANSPLANT SURGERY

## 2022-07-06 PROCEDURE — 88304 TISSUE EXAM BY PATHOLOGIST: CPT

## 2022-07-06 PROCEDURE — 3700000000 HC ANESTHESIA ATTENDED CARE: Performed by: TRANSPLANT SURGERY

## 2022-07-06 PROCEDURE — 6360000002 HC RX W HCPCS: Performed by: NURSE ANESTHETIST, CERTIFIED REGISTERED

## 2022-07-06 PROCEDURE — 6360000002 HC RX W HCPCS: Performed by: ANESTHESIOLOGY

## 2022-07-06 PROCEDURE — 2709999900 HC NON-CHARGEABLE SUPPLY: Performed by: TRANSPLANT SURGERY

## 2022-07-06 PROCEDURE — 7100000001 HC PACU RECOVERY - ADDTL 15 MIN: Performed by: TRANSPLANT SURGERY

## 2022-07-06 PROCEDURE — 2500000003 HC RX 250 WO HCPCS: Performed by: TRANSPLANT SURGERY

## 2022-07-06 PROCEDURE — 88307 TISSUE EXAM BY PATHOLOGIST: CPT

## 2022-07-06 PROCEDURE — 88313 SPECIAL STAINS GROUP 2: CPT

## 2022-07-06 PROCEDURE — S2900 ROBOTIC SURGICAL SYSTEM: HCPCS | Performed by: TRANSPLANT SURGERY

## 2022-07-06 PROCEDURE — 38747 REMOVE ABDOMINAL LYMPH NODES: CPT | Performed by: TRANSPLANT SURGERY

## 2022-07-06 PROCEDURE — 47562 LAPAROSCOPIC CHOLECYSTECTOMY: CPT | Performed by: TRANSPLANT SURGERY

## 2022-07-06 PROCEDURE — 94660 CPAP INITIATION&MGMT: CPT

## 2022-07-06 PROCEDURE — 7100000000 HC PACU RECOVERY - FIRST 15 MIN: Performed by: TRANSPLANT SURGERY

## 2022-07-06 PROCEDURE — 2720000010 HC SURG SUPPLY STERILE: Performed by: TRANSPLANT SURGERY

## 2022-07-06 PROCEDURE — 3600000019 HC SURGERY ROBOT ADDTL 15MIN: Performed by: TRANSPLANT SURGERY

## 2022-07-06 PROCEDURE — 6360000002 HC RX W HCPCS: Performed by: TRANSPLANT SURGERY

## 2022-07-06 DEVICE — CLIP LIG POLYMER L VESOLOCK: Type: IMPLANTABLE DEVICE | Status: FUNCTIONAL

## 2022-07-06 RX ORDER — MIDAZOLAM HYDROCHLORIDE 1 MG/ML
INJECTION INTRAMUSCULAR; INTRAVENOUS PRN
Status: DISCONTINUED | OUTPATIENT
Start: 2022-07-06 | End: 2022-07-06 | Stop reason: SDUPTHER

## 2022-07-06 RX ORDER — SODIUM CHLORIDE, SODIUM LACTATE, POTASSIUM CHLORIDE, CALCIUM CHLORIDE 600; 310; 30; 20 MG/100ML; MG/100ML; MG/100ML; MG/100ML
1000 INJECTION, SOLUTION INTRAVENOUS ONCE
Status: COMPLETED | OUTPATIENT
Start: 2022-07-06 | End: 2022-07-06

## 2022-07-06 RX ORDER — SODIUM CHLORIDE 0.9 % (FLUSH) 0.9 %
5-40 SYRINGE (ML) INJECTION EVERY 12 HOURS SCHEDULED
Status: DISCONTINUED | OUTPATIENT
Start: 2022-07-06 | End: 2022-07-06 | Stop reason: HOSPADM

## 2022-07-06 RX ORDER — SODIUM CHLORIDE 0.9 % (FLUSH) 0.9 %
5-40 SYRINGE (ML) INJECTION PRN
Status: DISCONTINUED | OUTPATIENT
Start: 2022-07-06 | End: 2022-07-06 | Stop reason: HOSPADM

## 2022-07-06 RX ORDER — SODIUM CHLORIDE 9 MG/ML
INJECTION, SOLUTION INTRAVENOUS PRN
Status: DISCONTINUED | OUTPATIENT
Start: 2022-07-06 | End: 2022-07-06 | Stop reason: HOSPADM

## 2022-07-06 RX ORDER — EPHEDRINE SULFATE/0.9% NACL/PF 50 MG/5 ML
SYRINGE (ML) INTRAVENOUS PRN
Status: DISCONTINUED | OUTPATIENT
Start: 2022-07-06 | End: 2022-07-06 | Stop reason: SDUPTHER

## 2022-07-06 RX ORDER — OXYCODONE HYDROCHLORIDE 5 MG/1
5 TABLET ORAL EVERY 6 HOURS PRN
Qty: 28 TABLET | Refills: 0 | Status: SHIPPED | OUTPATIENT
Start: 2022-07-06 | End: 2022-07-13

## 2022-07-06 RX ORDER — BUPIVACAINE HYDROCHLORIDE 5 MG/ML
INJECTION, SOLUTION EPIDURAL; INTRACAUDAL PRN
Status: DISCONTINUED | OUTPATIENT
Start: 2022-07-06 | End: 2022-07-06 | Stop reason: ALTCHOICE

## 2022-07-06 RX ORDER — NEOSTIGMINE METHYLSULFATE 1 MG/ML
INJECTION, SOLUTION INTRAVENOUS PRN
Status: DISCONTINUED | OUTPATIENT
Start: 2022-07-06 | End: 2022-07-06 | Stop reason: SDUPTHER

## 2022-07-06 RX ORDER — OXYCODONE HYDROCHLORIDE 5 MG/1
5 TABLET ORAL
Status: DISCONTINUED | OUTPATIENT
Start: 2022-07-06 | End: 2022-07-06 | Stop reason: HOSPADM

## 2022-07-06 RX ORDER — LIDOCAINE HYDROCHLORIDE 20 MG/ML
INJECTION, SOLUTION EPIDURAL; INFILTRATION; INTRACAUDAL; PERINEURAL PRN
Status: DISCONTINUED | OUTPATIENT
Start: 2022-07-06 | End: 2022-07-06 | Stop reason: SDUPTHER

## 2022-07-06 RX ORDER — ACETAMINOPHEN 500 MG
500 TABLET ORAL 4 TIMES DAILY PRN
Qty: 120 TABLET | Refills: 0 | Status: ON HOLD | OUTPATIENT
Start: 2022-07-06

## 2022-07-06 RX ORDER — ONDANSETRON 2 MG/ML
INJECTION INTRAMUSCULAR; INTRAVENOUS PRN
Status: DISCONTINUED | OUTPATIENT
Start: 2022-07-06 | End: 2022-07-06 | Stop reason: SDUPTHER

## 2022-07-06 RX ORDER — PROPOFOL 10 MG/ML
INJECTION, EMULSION INTRAVENOUS PRN
Status: DISCONTINUED | OUTPATIENT
Start: 2022-07-06 | End: 2022-07-06 | Stop reason: SDUPTHER

## 2022-07-06 RX ORDER — SODIUM CHLORIDE 0.9 % (FLUSH) 0.9 %
5-40 SYRINGE (ML) INJECTION EVERY 12 HOURS SCHEDULED
Status: DISCONTINUED | OUTPATIENT
Start: 2022-07-06 | End: 2022-07-06 | Stop reason: SDUPTHER

## 2022-07-06 RX ORDER — ROCURONIUM BROMIDE 10 MG/ML
INJECTION, SOLUTION INTRAVENOUS PRN
Status: DISCONTINUED | OUTPATIENT
Start: 2022-07-06 | End: 2022-07-06 | Stop reason: SDUPTHER

## 2022-07-06 RX ORDER — SODIUM CHLORIDE, SODIUM LACTATE, POTASSIUM CHLORIDE, CALCIUM CHLORIDE 600; 310; 30; 20 MG/100ML; MG/100ML; MG/100ML; MG/100ML
INJECTION, SOLUTION INTRAVENOUS CONTINUOUS PRN
Status: DISCONTINUED | OUTPATIENT
Start: 2022-07-06 | End: 2022-07-06 | Stop reason: SDUPTHER

## 2022-07-06 RX ORDER — DEXAMETHASONE SODIUM PHOSPHATE 10 MG/ML
INJECTION INTRAMUSCULAR; INTRAVENOUS PRN
Status: DISCONTINUED | OUTPATIENT
Start: 2022-07-06 | End: 2022-07-06 | Stop reason: SDUPTHER

## 2022-07-06 RX ORDER — MEPERIDINE HYDROCHLORIDE 25 MG/ML
12.5 INJECTION INTRAMUSCULAR; INTRAVENOUS; SUBCUTANEOUS
Status: DISCONTINUED | OUTPATIENT
Start: 2022-07-06 | End: 2022-07-06 | Stop reason: SDUPTHER

## 2022-07-06 RX ORDER — SODIUM CHLORIDE 0.9 % (FLUSH) 0.9 %
5-40 SYRINGE (ML) INJECTION PRN
Status: DISCONTINUED | OUTPATIENT
Start: 2022-07-06 | End: 2022-07-06 | Stop reason: SDUPTHER

## 2022-07-06 RX ORDER — ONDANSETRON 2 MG/ML
4 INJECTION INTRAMUSCULAR; INTRAVENOUS
Status: DISCONTINUED | OUTPATIENT
Start: 2022-07-06 | End: 2022-07-06 | Stop reason: SDUPTHER

## 2022-07-06 RX ORDER — MEPERIDINE HYDROCHLORIDE 25 MG/ML
12.5 INJECTION INTRAMUSCULAR; INTRAVENOUS; SUBCUTANEOUS
Status: DISCONTINUED | OUTPATIENT
Start: 2022-07-06 | End: 2022-07-06 | Stop reason: HOSPADM

## 2022-07-06 RX ORDER — FENTANYL CITRATE 50 UG/ML
INJECTION, SOLUTION INTRAMUSCULAR; INTRAVENOUS PRN
Status: DISCONTINUED | OUTPATIENT
Start: 2022-07-06 | End: 2022-07-06 | Stop reason: SDUPTHER

## 2022-07-06 RX ORDER — CYCLOBENZAPRINE HCL 5 MG
5 TABLET ORAL 2 TIMES DAILY PRN
Qty: 10 TABLET | Refills: 0 | Status: SHIPPED | OUTPATIENT
Start: 2022-07-06 | End: 2022-07-16

## 2022-07-06 RX ORDER — ONDANSETRON 2 MG/ML
4 INJECTION INTRAMUSCULAR; INTRAVENOUS
Status: DISCONTINUED | OUTPATIENT
Start: 2022-07-06 | End: 2022-07-06 | Stop reason: HOSPADM

## 2022-07-06 RX ORDER — GLYCOPYRROLATE 1 MG/5 ML
SYRINGE (ML) INTRAVENOUS PRN
Status: DISCONTINUED | OUTPATIENT
Start: 2022-07-06 | End: 2022-07-06 | Stop reason: SDUPTHER

## 2022-07-06 RX ADMIN — Medication 100 MG: at 07:42

## 2022-07-06 RX ADMIN — Medication 10 MG: at 08:37

## 2022-07-06 RX ADMIN — ONDANSETRON 4 MG: 2 INJECTION INTRAMUSCULAR; INTRAVENOUS at 07:51

## 2022-07-06 RX ADMIN — MIDAZOLAM 2 MG: 1 INJECTION INTRAMUSCULAR; INTRAVENOUS at 07:35

## 2022-07-06 RX ADMIN — SODIUM CHLORIDE, POTASSIUM CHLORIDE, SODIUM LACTATE AND CALCIUM CHLORIDE: 600; 310; 30; 20 INJECTION, SOLUTION INTRAVENOUS at 07:35

## 2022-07-06 RX ADMIN — HYDROMORPHONE HYDROCHLORIDE 0.25 MG: 1 INJECTION, SOLUTION INTRAMUSCULAR; INTRAVENOUS; SUBCUTANEOUS at 12:35

## 2022-07-06 RX ADMIN — Medication 10 MG: at 07:59

## 2022-07-06 RX ADMIN — FENTANYL CITRATE 50 MCG: 50 INJECTION, SOLUTION INTRAMUSCULAR; INTRAVENOUS at 09:28

## 2022-07-06 RX ADMIN — DEXAMETHASONE SODIUM PHOSPHATE 10 MG: 10 INJECTION INTRAMUSCULAR; INTRAVENOUS at 07:51

## 2022-07-06 RX ADMIN — Medication 3 MG: at 09:17

## 2022-07-06 RX ADMIN — PROPOFOL 150 MG: 10 INJECTION, EMULSION INTRAVENOUS at 07:42

## 2022-07-06 RX ADMIN — Medication 0.6 MG: at 09:17

## 2022-07-06 RX ADMIN — SODIUM CHLORIDE, POTASSIUM CHLORIDE, SODIUM LACTATE AND CALCIUM CHLORIDE 1000 ML: 600; 310; 30; 20 INJECTION, SOLUTION INTRAVENOUS at 18:02

## 2022-07-06 RX ADMIN — FENTANYL CITRATE 50 MCG: 50 INJECTION, SOLUTION INTRAMUSCULAR; INTRAVENOUS at 08:07

## 2022-07-06 RX ADMIN — ROCURONIUM BROMIDE 20 MG: 10 SOLUTION INTRAVENOUS at 08:16

## 2022-07-06 RX ADMIN — FENTANYL CITRATE 150 MCG: 50 INJECTION, SOLUTION INTRAMUSCULAR; INTRAVENOUS at 07:42

## 2022-07-06 RX ADMIN — ROCURONIUM BROMIDE 50 MG: 10 SOLUTION INTRAVENOUS at 07:42

## 2022-07-06 RX ADMIN — CEFAZOLIN 2000 MG: 2 INJECTION, POWDER, FOR SOLUTION INTRAMUSCULAR; INTRAVENOUS at 07:51

## 2022-07-06 RX ADMIN — SODIUM CHLORIDE, POTASSIUM CHLORIDE, SODIUM LACTATE AND CALCIUM CHLORIDE: 600; 310; 30; 20 INJECTION, SOLUTION INTRAVENOUS at 08:50

## 2022-07-06 ASSESSMENT — PAIN DESCRIPTION - PAIN TYPE: TYPE: SURGICAL PAIN

## 2022-07-06 ASSESSMENT — PAIN DESCRIPTION - LOCATION
LOCATION: ABDOMEN
LOCATION: ABDOMEN

## 2022-07-06 ASSESSMENT — PAIN - FUNCTIONAL ASSESSMENT: PAIN_FUNCTIONAL_ASSESSMENT: NONE - DENIES PAIN

## 2022-07-06 ASSESSMENT — PAIN DESCRIPTION - DESCRIPTORS
DESCRIPTORS: DULL
DESCRIPTORS: JABBING

## 2022-07-06 ASSESSMENT — PAIN DESCRIPTION - FREQUENCY: FREQUENCY: INTERMITTENT

## 2022-07-06 ASSESSMENT — PAIN SCALES - GENERAL
PAINLEVEL_OUTOF10: 6
PAINLEVEL_OUTOF10: 2

## 2022-07-06 NOTE — PROGRESS NOTES
Patient drank adequate amounts of PO & had IVF. Patient attempting to void multiple times & is unsuccessful. Abdomen not distended at this time. Patient has no urgency.

## 2022-07-06 NOTE — INTERVAL H&P NOTE
Update History & Physical    The patient's History and Physical of July 5, 2022 was reviewed with the patient and I examined the patient. There was no change. The surgical site was confirmed by the patient and me. Plan: The risks, benefits, expected outcome, and alternative to the recommended procedure have been discussed with the patient. Patient understands and wants to proceed with the procedure.      Electronically signed by Goran Babb MD on 7/6/2022 at 6:24 AM

## 2022-07-06 NOTE — ANESTHESIA POSTPROCEDURE EVALUATION
Department of Anesthesiology  Postprocedure Note    Patient: Maite Rose  MRN: [id number]  YOB: 1950  Date of evaluation: 7/6/2022      Procedure Summary     Date: 07/06/22 Room / Location: [facility] OR 05 / [facility]    Anesthesia Start: 8187 Anesthesia Stop:     Procedure: LAPAROSCOPIC ROBOTIC PERITONEAL MASS RESECTION (Right Abdomen/Perineum) Diagnosis:       Mass of peritoneum      (RIGHT PERITONEAL MASSES)    Surgeons: Carmela Nunez MD Responsible Provider: Kush Clement DO    Anesthesia Type: general ASA Status: 4          Anesthesia Type: No value filed.     Willow Phase I: Willow Score: 8    Willow Phase II:        Anesthesia Post Evaluation    Patient location during evaluation: PACU  Patient participation: complete - patient participated  Level of consciousness: awake and alert  Airway patency: patent  Nausea & Vomiting: no nausea and no vomiting  Complications: no  Cardiovascular status: blood pressure returned to baseline  Respiratory status: acceptable  Hydration status: euvolemic  Multimodal analgesia pain management approach

## 2022-07-06 NOTE — PROGRESS NOTES
INTRAOPERATIVE CONSULTATION (with FROZEN SECTION)    Periportal Lymph Nodes, Touch Prep and Frozen Section:  Lymphoid tissue. Defer for flow cytometric immunophenotypic analysis.

## 2022-07-06 NOTE — ANESTHESIA PRE PROCEDURE
Department of Anesthesiology  Preprocedure Note       Name:  Jet Urban   Age:  71 y.o.  :  1950                                          MRN:  [id number]         Date:  2022      Surgeon: Jm Francis):  Sg Ellsworth MD    Procedure: LAPAROSCOPIC ROBOTIC PERITONEAL MASS RESECTION (Right )    Medications prior to admission:   Prior to Admission medications    Medication Sig Start Date End Date Taking? Authorizing Provider   amLODIPine (NORVASC) 5 MG tablet take 1 tablet by mouth once daily 22   Historical Provider, MD   vitamin B-12 (CYANOCOBALAMIN) 500 MCG tablet take 1 tablet by mouth daily 5/3/21 5/23/22  Johnna Teran,    tiotropium (SPIRIVA HANDIHALER) 18 MCG inhalation capsule Inhale 1 capsule into the lungs daily 18  Johnna Teran DO   lisinopril (PRINIVIL;ZESTRIL) 40 MG tablet take 1 tablet by mouth once daily 18   Johnna Teran, DO   hydrochlorothiazide (HYDRODIURIL) 25 MG tablet Take 1 tablet by mouth daily 18   Shea Nicole MD   Multiple Vitamins-Minerals (THERAPEUTIC MULTIVITAMIN-MINERALS) tablet Take 1 tablet by mouth daily 17   Sterling Montes MD   [facility] KIT BP monitoring KIT 4/15/16   Arina Tan MD       Current medications:    Current Outpatient Medications   Medication Sig Dispense Refill    amLODIPine (NORVASC) 5 MG tablet take 1 tablet by mouth once daily      vitamin B-12 (CYANOCOBALAMIN) 500 MCG tablet take 1 tablet by mouth daily 30 tablet 11    tiotropium (SPIRIVA HANDIHALER) 18 MCG inhalation capsule Inhale 1 capsule into the lungs daily 90 capsule 3    lisinopril (PRINIVIL;ZESTRIL) 40 MG tablet take 1 tablet by mouth once daily 90 tablet 4    hydrochlorothiazide (HYDRODIURIL) 25 MG tablet Take 1 tablet by mouth daily 90 tablet 0    Multiple Vitamins-Minerals (THERAPEUTIC MULTIVITAMIN-MINERALS) tablet Take 1 tablet by mouth daily 90 tablet 5    Misc.  Devices KIT BP monitoring KIT 1 kit 0     No current facility-administered medications for this visit. Facility-Administered Medications Ordered in Other Visits   Medication Dose Route Frequency Provider Last Rate Last Admin    pantoprazole (PROTONIX) tablet 40 mg  40 mg Oral QAM  Lisa Liz MD           Allergies: Allergies   Allergen Reactions    Ibuprofen Palpitations and Rash       Problem List:    Patient Active Problem List   Diagnosis Code    Hepatitis C B19.20    Abnormal Pap smear [id number]    Dyslipidemia E78.5    Insomnia G47.00    Ex-smoker Z87.891    [facility] I73.9    Uterine fibroid D25.9    Gall stone K80.20    Kidney stone N20.0    Left groin pain R10.32    Need for Tdap vaccination Z23    Abnormal chest x-ray with multiple lung nodules R91.8    Panlobular emphysema (HCC) J43.1    Chronic hepatitis C virus infection (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 80    RBBB I45.10    Adenomatous polyp of descending colon D12.4       Past Medical History:        Diagnosis Date    Abnormal chest x-ray with multiple lung nodules 9/8/2015    Abnormal Pap smear 1/3/2011    Alpha-1 negative    Adrenal adenoma     7 mm    Bilateral lung cancer ([facility] 5/12/2016    surgically removed - 2015     Cholelithiasis 6/6/2011    Encounter for screening colonoscopy     for OR 3-28-19     Fibroids     Hemangioma of spleen     Hepatitis C 01/03/2011    treated and cured, no current issues     Hyperlipidemia     no medications     Hypertension 6/6/2011    Insomnia     Lung nodule     ROMELIA     Lymphadenopathy     Cervical (-) ENT    Malignant neoplasm of upper lobe of right lung ([facility] 11/18/2015    Osteoarthritis     Panlobular emphysema ([address] 11/12/2015    controlled with inhalers     PPD negative 1/18/12    Pulmonary embolism without acute cor pulmonale ([address] 5/12/2016    PVD (peripheral vascular disease) ([facility] [facility] 5/6/2014    Scoliosis     Uterine fibroid 5/6/2014       Past Surgical History:        Procedure Laterality Date    APPENDECTOMY  1965    BREAST BIOPSY Left     AGE 31'S LEFT NIPPLE REMOVED    BREAST LUMPECTOMY  4/28/1999    left, benign, Dr. Tonia Melgar, [facility] [address]  2/1/2012    Dr. Britany Canas, [address] [place] [address] Drive  10/15/15    with EBUS - DR Eileen Israel    CHOLECYSTECTOMY      COLONOSCOPY  12/21/2009    partial to distal ascending colon normal (completion BE same day normal), Dr. Tonia Melgar, [address] [facility] COLONOSCOPY  8/22/2014    done under fluoro with sigmoid straightening device so was able to get to cecum, very poor prep, moderate proctitis, repeat 5 years,  Dr. Tonia Melgar, [facility] COLONOSCOPY N/A 3/28/2019    COLONOSCOPY POLYPECTOMY SNARE/COLD BIOPSY performed by Srikanth Ibarra DO at [address] [facility] COLONOSCOPY N/A 5/13/2022    COLONOSCOPY POLYPECTOMY SNARE/COLD BIOPSY performed by Radha Deluna MD at [address] [facility] Left 3/29/2016    VATS WEDGE RESECTION UPPER LOBECTOMY    OTHER SURGICAL HISTORY Right 11/04/2016    REMOVAL MEDI-PORT - DR Jing Gilbert    LA LAP,CHOLECYSTECTOMY/GRAPH N/A 6/29/2018    CHOLECYSTECTOMY LAPAROSCOPIC, POSSIBLE OPEN,POSSIBLE GRAM ( OC 2) performed by Dex Perez MD at [address] Right 11/11/2015 VATS, BRONCH,RT UPPER LOBECTOMY; NODE DISECTION    TUNNELED VENOUS PORT PLACEMENT Right 2015    right chest, and removed     UPPER GASTROINTESTINAL ENDOSCOPY N/A 2022    EGD POLYP HOT FORCEP/CAUTERY performed by Hermelinda White MD at [address] [place] [address] Crossing History:    Social History     Tobacco Use    Smoking status: Former Smoker     Packs/day: 0.00     Years: 30.00     Pack years: 0.00     Types: Cigarettes     Quit date: 2015     Years since quittin.0    Smokeless tobacco: Never Used   Substance Use Topics    Alcohol use: Yes     Alcohol/week: 2.0 standard drinks     Types: 2 Glasses of wine per week     Comment: x2 week                                Counseling given: Not Answered      Vital Signs (Current): There were no vitals filed for this visit.                                            BP Readings from Last 3 Encounters:   22 (!) 140/60   22 (!) 160/80   22 (!) 157/70       NPO Status:                           > 8hrs                                                      BMI:   Wt Readings from Last 3 Encounters:   22 125 lb 9.6 oz (57 kg)   22 125 lb (56.7 kg)   22 125 lb (56.7 kg)     There is no height or weight on file to calculate BMI.    CBC:   Lab Results   Component Value Date/Time    WBC 6.1 2022 09:00 AM    RBC 2.99 2022 09:00 AM    HGB 8.5 2022 09:00 AM    HCT 26.2 2022 09:00 AM    MCV 87.6 2022 09:00 AM    RDW 13.4 2022 09:00 AM     2022 09:00 AM       CMP:   Lab Results   Component Value Date/Time     2022 09:00 AM    K 4.0 2022 09:00 AM    K 3.8 2018 05:14 AM     2022 09:00 AM    CO2 23 2022 09:00 AM    BUN 30 2022 09:00 AM    CREATININE 1.2 2022 09:00 AM    GFRAA 54 2022 09:00 AM    LABGLOM 54 2022 09:00 AM    GLUCOSE 94 2022 09:00 AM    GLUCOSE 85 04/10/2012 12:28 PM    PROT 8.5 2022 01:00 PM CALCIUM 9.1 06/30/2022 09:00 AM    BILITOT 0.2 06/23/2022 01:00 PM    ALKPHOS 110 06/23/2022 01:00 PM    AST 58 06/23/2022 01:00 PM    ALT 46 06/23/2022 01:00 PM       POC Tests: No results for input(s): POCGLU, POCNA, POCK, POCCL, POCBUN, POCHEMO, POCHCT in the last 72 hours. Coags:   Lab Results   Component Value Date/Time    PROTIME 11.5 06/28/2018 06:55 AM    PROTIME 22.6 07/08/2016 01:35 PM    INR 1.0 06/28/2018 06:55 AM    APTT 29.3 06/28/2018 08:09 PM       HCG (If Applicable): No results found for: PREGTESTUR, PREGSERUM, HCG, HCGQUANT     ABGs: No results found for: PHART, PO2ART, CYV5SBA, LZD1VRA, BEART, [id number]     Type & Screen (If Applicable):  No results found for: LABABO, LABRH    Anesthesia Evaluation    Airway: Mallampati: I  TM distance: >3 FB   Neck ROM: full  Mouth opening: > = 3 FB   Dental:          Pulmonary: breath sounds clear to auscultation  (+) COPD (Breathing is unlabored. She has a past history of MOREIRA.):                             Cardiovascular:    (+) hypertension:,         Rhythm: regular  Rate: normal                    Neuro/Psych:               GI/Hepatic/Renal:   (+) hepatitis: C, liver disease:,           Endo/Other:                     Abdominal:             Vascular: Other Findings:             Anesthesia Plan      general     ASA 4       Induction: intravenous. MIPS: Prophylactic antiemetics administered and Postoperative trial extubation. Anesthetic plan and risks discussed with patient. Plan discussed with CRNA.                     Bárbara Curiel DO   7/6/2022

## 2022-07-06 NOTE — H&P
Hepatobiliary and Pancreatic Surgery Attending History and Physical    Patient's Name/Date of Birth: Lucas Garrido /1950 (89 y.o.)    Date: July 5, 2022     CC:Peritoneal masses    HPI:  Patient is a sara 71year old female whom presented to the hospital for back pain. She had a ct scan and she states they started to find things. She does have a history of hepatitis C (now cured) and has had an EGD and colonoscopy. She has lost about 30 lbs over a two month time period. She does have a history of lung cancer. She denies any abdominal pain nor nausea. Her only abdominal operations have been an appendectomy and a cholecystectomy. She denies ever having pancreatitis. Her Ca 125 = 33 and her CEA = 12, and chromogranin A was 1480. She had a repeat CT scan. She had an appendectomy when she was 7years old. She is still losing weight.       Past Medical History:   Diagnosis Date    Abnormal chest x-ray with multiple lung nodules 9/8/2015    Abnormal Pap smear 1/3/2011    Alpha-1 negative    Adrenal adenoma     7 mm    Bilateral lung cancer ([address] 5/12/2016    surgically removed - 2015     Cholelithiasis 6/6/2011    Encounter for screening colonoscopy     for OR 3-28-19     Fibroids     Hemangioma of spleen     Hepatitis C 01/03/2011    treated and cured, no current issues     Hyperlipidemia     no medications     Hypertension 6/6/2011    Insomnia     Lung nodule     ROMELIA     Lymphadenopathy     Cervical (-) ENT    Malignant neoplasm of upper lobe of right lung ([address] 11/18/2015    Osteoarthritis     Panlobular emphysema ([address] 11/12/2015    controlled with inhalers     PPD negative 1/18/12    Pulmonary embolism without acute cor pulmonale ([address] 5/12/2016    PVD (peripheral vascular disease) ([address] 5/6/2014    Scoliosis     Uterine fibroid 5/6/2014       Past Surgical History:   Procedure Laterality Date    APPENDECTOMY  1965    BREAST BIOPSY Left     AGE 31'S LEFT NIPPLE REMOVED    BREAST LUMPECTOMY  4/28/1999    left, benign, Dr. Sol Hinojosa, [address]  2/1/2012    John Meza, Dr. Hema Mauricio, [address]  10/15/15    with EBUS - DR Myrna Campos    CHOLECYSTECTOMY      COLONOSCOPY  12/21/2009    partial to distal ascending colon normal (completion BE same day normal), Dr. Sol Hinojosa, [facility] COLONOSCOPY  8/22/2014    done under fluoro with sigmoid straightening device so was able to get to cecum, very poor prep, moderate proctitis, repeat 5 years,  Dr. Sol Hinojosa, [address] [facility] COLONOSCOPY N/A 3/28/2019    COLONOSCOPY POLYPECTOMY SNARE/COLD BIOPSY performed by Malik Reyes DO at [address] [facility] COLONOSCOPY N/A 5/13/2022    COLONOSCOPY POLYPECTOMY SNARE/COLD BIOPSY performed by Gladys Hanson MD at [address] Left 3/29/2016    VATS WEDGE RESECTION UPPER LOBECTOMY    OTHER SURGICAL HISTORY Right 11/04/2016    REMOVAL MEDI-PORT - DR Tio Keys    PA LAP,CHOLECYSTECTOMY/GRAPH N/A 6/29/2018    CHOLECYSTECTOMY LAPAROSCOPIC, POSSIBLE OPEN,POSSIBLE GRAM ( OC 2) performed by Michael Castillo MD at [address] Right 11/11/2015    VATS, BRONCH,RT UPPER LOBECTOMY; NODE DISECTION    TUNNELED VENOUS PORT PLACEMENT Right 12/07/2015    right chest, and removed     UPPER GASTROINTESTINAL ENDOSCOPY N/A 5/13/2022    EGD POLYP HOT FORCEP/CAUTERY performed by Gladys Hanson MD at [facility]       No current facility-administered medications for this encounter.      Current Outpatient Medications   Medication Sig Dispense Refill    amLODIPine (NORVASC) 5 MG tablet take 1 tablet by mouth once daily      vitamin B-12 (CYANOCOBALAMIN) 500 MCG tablet take 1 tablet by mouth daily 30 tablet 11    tiotropium (SPIRIVA HANDIHALER) 18 MCG inhalation capsule Inhale 1 capsule into the lungs daily 90 capsule 3    lisinopril (PRINIVIL;ZESTRIL) 40 MG tablet take 1 tablet by mouth once daily 90 tablet 4    hydrochlorothiazide (HYDRODIURIL) 25 MG tablet Take 1 tablet by mouth daily 90 tablet 0    Multiple Vitamins-Minerals (THERAPEUTIC MULTIVITAMIN-MINERALS) tablet Take 1 tablet by mouth daily 90 tablet 5    Misc. Devices KIT BP monitoring KIT 1 kit 0     Facility-Administered Medications Ordered in Other Encounters   Medication Dose Route Frequency Provider Last Rate Last Admin    pantoprazole (PROTONIX) tablet 40 mg  40 mg Oral QAM AC Luís Amin MD           Allergies   Allergen Reactions    Ibuprofen Palpitations and Rash       Family History   Problem Relation Age of Onset    Heart Disease Mother     High Blood Pressure Mother     Cancer Mother     Heart Disease Father     High Blood Pressure Father     Kidney Disease Father     Diabetes Sister     Diabetes Brother        Social History     Socioeconomic History    Marital status: Single     Spouse name: Not on file    Number of children: Not on file    Years of education: Not on file    Highest education level: Not on file   Occupational History    Not on file   Tobacco Use    Smoking status: Former Smoker     Packs/day: 0.00     Years: 30.00     Pack years: 0.00     Types: Cigarettes     Quit date: 2015     Years since quittin.0    Smokeless tobacco: Never Used   Vaping Use    Vaping Use: Never used   Substance and Sexual Activity    Alcohol use: Yes     Alcohol/week: 2.0 standard drinks     Types: 2 Glasses of wine per week     Comment: x2 week    Drug use: No    Sexual activity: Not Currently   Other Topics Concern    Not on file   Social History Narrative    Not on file     Social Determinants of Health     Financial Resource Strain:     Difficulty of Paying Living Expenses: Not on file   Food Insecurity:     Worried About Running Out of Food in the Last Year: Not on file    Evette of Food in the Last Year: Not on file   Transportation Needs:     Lack of Transportation (Medical): Not on file    Lack of Transportation (Non-Medical):  Not on file   Physical Activity:     Days of Exercise per Week: Not on file    Minutes of Exercise per Session: Not on file   Stress:     Feeling of Stress : Not on file   Social Connections:     Frequency of Communication with Friends and Family: Not on file    Frequency of Social Gatherings with Friends and Family: Not on file    Attends Baptism Services: Not on file    Active Member of [employer] or Organizations: Not on file    Attends Club or Organization Meetings: Not on file    Marital Status: Not on file   Intimate Partner Violence:     Fear of Current or Ex-Partner: Not on file    Emotionally Abused: Not on file    Physically Abused: Not on file    Sexually Abused: Not on file   Housing Stability:     Unable to Pay for Housing in the Last Year: Not on file    Number of [place] in the Last Year: Not on file    Unstable Housing in the Last Year: Not on file       ROS:   Review of Systems   Constitutional: Positive for unexpected weight change. Negative for chills, diaphoresis and fever. HENT: Negative for congestion, ear discharge, ear pain, hearing loss, nosebleeds and tinnitus. Eyes: Negative for photophobia, pain and discharge. Respiratory: Negative for shortness of breath. Cardiovascular: Negative for palpitations and leg swelling. Gastrointestinal: Negative for abdominal pain, blood in stool, constipation, diarrhea, nausea and vomiting. Endocrine: Negative for polydipsia. Genitourinary: Negative for frequency, hematuria and urgency. Musculoskeletal: Negative for back pain and neck pain. Skin: Negative for rash. Allergic/Immunologic: Negative for environmental allergies. Neurological: Negative for tremors and seizures. Psychiatric/Behavioral: Negative for hallucinations and suicidal ideas. The patient is not nervous/anxious.         Physical Exam:    PSYCH: mood and affect normal, alert and oriented x 3: No apparent distress, comfortable  EYES: Sclera white, pupils equal round and reactive to light  ENMT:  Hearing normal, trachea midline, ears externally intact  LYMPH: no obvious lympadenopathy in neck. RESP: Respiratory effort was normal with no retractions or use of accessory muscles. 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 I also reviewed her previous images  - we discussed that with her elevated CEA and chromogranin A, weight loss it is worrisome  - Patient was made aware of the risks, benefits, alternatives, and complications of a laparoscopic robotic peritoneal mass resection and wish to proceed with surgery. 20 Minutes of which greater than 50% was spent counseling or coordinating her care. Thank you for the consultation allowing me to take part in Ms. Tran's care. Please send a copy of my note to Dr. Julio C Zamora.  Elidia Pickens M.D.  7/5/2022  9:06 PM

## 2022-07-06 NOTE — PROGRESS NOTES
Patient ambulated with assist to void. Patient unable to void. Patient placed back in chair & continues to take PO.

## 2022-07-06 NOTE — PROGRESS NOTES
Pt VSS, no resp distress noted, eye flutter to sternal rub, Dr. Nikhil Campo notified, order placed for bipap

## 2022-07-06 NOTE — OP NOTE
[facility]  Hepatobiliary and Pancreatic Surgery      DATE OF PROCEDURE: 7/6/2022    SURGEON: Sam Talbert MD     ASSISTANT: Reilly Jarvis MD ([id number])    PREOPERATIVE DIAGNOSIS: abnormal weightloss, elevated CEA, cystic peritoneal masses, previous subtotal cholecystectomy, previous history of lung cancer s/p resection and chemotherapy, h/o appendectomy    POSTOPERATIVE DIAGNOSIS: serous cystic masses along the colon, periportal lymphadenopathy, fibrotic appearing liver, chronic cholecystitis    OPERATION:   1. Laparoscopic Robotic Assisted completion Cholecystectomy   2. Portal and aorto-caval lymphadenectomy  3. Cystic peritoneal mass resection (permanent and cytology)  4. Core liver biopsy x 4    ANESTHESIA: General endotracheal.    ESTIMATED BLOOD LOSS: Less than 10 mL. COMPLICATIONS: None. FLUIDS: Crystalloid. SPECIMEN: Gallbladder. DESCRIPTION OF PROCEDURE: This is a 71 y. o.female with abdominal pain and abnormal weight loss. She had a CT showing multiple abdominal cystic masses along her right colon. After being explained the risks, benefits, and alternatives of the procedure, she agreed to proceed.     She was taken to the operating room and placed supine on the operating room table, administered general anesthesia and intubated. Once the airway was secured and the patient was adequately sedated a time-out was performed to confirm the surgical site and the patient's name.     We initially made a supraumbilial incision and inserted the Veress needle. Confirmed to be in place we insufflated to 15mmHg mercury. The camera was inserted and an 8mm trocar was inserted at the umbilicus, a 4.5XO trocar to the right of the umbilicus a 4th 5mm trocar in the right lateral abdomen.  An 8-mm incision in the left side of the abomen and a robotic port was inserted.  We then inserted a camera throught the umbilical port and docked the robot.  We inspected the abdomen and there was minimal free fluid in the pelvis and some in the right upper quadrant. This was sent for cytology. The uterus was adhered to the bladder. There were multiple serous cysts along the right colon and terminal ileum and along the omentum. Multiple cysts along the omentum were removed with the hot scissors.  The omentum was adhered to the gallbladder and electrocautery was used to dissect the omentum off the remaining gallbladder. There was also significant periportal lymphadenopathy. A prograsp graspers were used grasp the remaining gallbladder and retract cephalad. Of note, the liver was fibrotic appearing. Next a portal and aortocaval lymphadenectomy were performed due to the size of the portal lymph nodes. The station 12 lymph nodes (at least 4) were taken with the hot scissors and vessel sealer. These were removed safely from the common bile duct and the portal vein exposing the portal vein. Of note, these lymph nodes are not part of a cholecystectomy. Due to the patient having a retained gallbladder from a subtotal cholecystectomy about 6 years ago, I elected to perform a dome down cholecystectomy with electrocautery exposing the cystic duct and artery. The critical view was obtained.  The Hem-o-Lock Weck clip applier was used to place 2 clips distally on the patient side and 1 proximally on the gallbladder side of the cystic duct and cystic artery.  The cystic duct and artery were then transected with laparoscopic scissors. Next, the lymph nodes, peritoneal and omental cysts, and gallbladder were placed in a laparoscopic bag, and retracted through the left upper quadrant port site. Attention was then turned back toward the liver. A stab incision was made in the subxiphoid area and under direct visualization a 14g vacuum needle biopsy was performed of the right lobe of the liver due to its fibrotic appearance.   The parenchymal bleeding was controlled with electrocautery.  Then we used 4-0 Monocryl suture to reapproximate the skin. Surgical glue was placed over the skin.     The patient was awoken and extubated in the operating room without any difficulty, and transferred to the postoperative care unit in stable condition. All instrument counts, lap counts, and needle counts were correct at the completion of the procedure.     Electronically signed by Eduardo Solomon MD on 7/6/2022 at 2:12 PM

## 2022-07-06 NOTE — PROGRESS NOTES
Patient admitted to [facility] & placed on appropriate monitors. Cart low, locked with siderails up. Call light within reach. Patient sitting up in bed taking PO. Family called bedside.

## 2022-07-06 NOTE — PROGRESS NOTES
Dr. Marie Estevez notified pt ambulated to BR, able to void approx 30cc yellow urine, ok for discharge home at this time.

## 2022-07-07 ENCOUNTER — TELEPHONE (OUTPATIENT)
Dept: CARDIOLOGY CLINIC | Age: 72
End: 2022-07-07

## 2022-07-13 LAB
Lab: NORMAL
REPORT: NORMAL
THIS TEST SENT TO: NORMAL

## 2022-07-20 ENCOUNTER — HOSPITAL ENCOUNTER (OUTPATIENT)
Dept: INFUSION THERAPY | Age: 72
Discharge: HOME | End: 2022-07-20

## 2022-07-20 ENCOUNTER — OFFICE VISIT (OUTPATIENT)
Dept: ONCOLOGY | Age: 72
End: 2022-07-20
Payer: MEDICARE

## 2022-07-20 VITALS
TEMPERATURE: 97.5 F | OXYGEN SATURATION: 98 % | SYSTOLIC BLOOD PRESSURE: 161 MMHG | WEIGHT: 123 LBS | HEIGHT: 61 IN | HEART RATE: 81 BPM | DIASTOLIC BLOOD PRESSURE: 68 MMHG | BODY MASS INDEX: 23.22 KG/M2

## 2022-07-20 DIAGNOSIS — C34.90 NON-SMALL CELL LUNG CANCER, UNSPECIFIED LATERALITY (HCC): Primary | ICD-10-CM

## 2022-07-20 DIAGNOSIS — R59.0 PERIPORTAL LYMPHADENOPATHY: ICD-10-CM

## 2022-07-20 PROCEDURE — 1123F ACP DISCUSS/DSCN MKR DOCD: CPT | Performed by: INTERNAL MEDICINE

## 2022-07-20 PROCEDURE — G8427 DOCREV CUR MEDS BY ELIG CLIN: HCPCS | Performed by: INTERNAL MEDICINE

## 2022-07-20 PROCEDURE — 99215 OFFICE O/P EST HI 40 MIN: CPT | Performed by: INTERNAL MEDICINE

## 2022-07-20 PROCEDURE — G8399 PT W/DXA RESULTS DOCUMENT: HCPCS | Performed by: INTERNAL MEDICINE

## 2022-07-20 PROCEDURE — 3017F COLORECTAL CA SCREEN DOC REV: CPT | Performed by: INTERNAL MEDICINE

## 2022-07-20 PROCEDURE — 99213 OFFICE O/P EST LOW 20 MIN: CPT

## 2022-07-20 PROCEDURE — G8420 CALC BMI NORM PARAMETERS: HCPCS | Performed by: INTERNAL MEDICINE

## 2022-07-20 PROCEDURE — 1090F PRES/ABSN URINE INCON ASSESS: CPT | Performed by: INTERNAL MEDICINE

## 2022-07-20 PROCEDURE — 1036F TOBACCO NON-USER: CPT | Performed by: INTERNAL MEDICINE

## 2022-07-20 NOTE — PROGRESS NOTES
Department of [facility] Oncology  Attending Clinic Note    Reason for Visit: Follow-up on a patient with NSCLC    PCP:  Kathy Medrano MD    History of Present Illness:   71 y.o. -American female with significant history of tobacco abuse of approximately 30 pack years, quit smoking June 7, 2015. She has been followed for a left upper lobe mass by Dr. Tennille Lopez. Multiple biopsies in the past came back negative for malignancy. CT scan chest on 08/31/2015 showed a new right upper lobe mass. Bronchoscopy with washing RUL mass on 10/15/2015 was positive for RUL Lung Adenocarcinoma. Negative for malignancy on BAL and TBNA of Lingular lesion. PET/CT scan on 09/29/2015:  1. New since prior examination on CT is 1.7 cm right paratracheal   nodule. Nodule is intensely hypermetabolic with SUV max of 4.8   worrisome for tumor. 2. Previously known nodule in the lingula now measures 2.3 cm in   size. SUV max has increased from 1.7 on the 2012 examination to   6.4 at this time. At this time findings are worrisome for   hypermetabolic tumor with avid glucose metabolism. Therefore, she was referred to see Dr. Radha John for resection. CT guided biopsy of the left upper lobe lesion on 11/02/2015 was noted to be negative for malignancy. On 11/11/2015 She underwent: (1) Bronchoscopy. (2) Right VATS. (3) VATS right upper lobe wedge resection. (4) VATS right upper lobectomy. (5) Intercostal nerve block from T3 to T10. (6) Mediastinal lymph node dissection. Pathology:  Right lung, upper lobectomy: Invasive, moderately differentiated adenocarcinoma (Grade 2). Tumor size 1.5 cm in greatest dimension. Surgical margin negative for malignancy. Two of three peribronchial lymph nodes positive for adenocarcinoma. pT1a N1 MX  She was referred to the medical oncology clinic to discuss adjuvant chemotherapy. We recommended 4 cycles of adjuvant chemotherapy consisting of Carboplatin/Alimta. Mediport placed 12/07/2015. Cycle # 1 of Francella Nail was on 12/09/2015. Cycle # 2 of Francella Nail was on 01/06/2016. Cycle # 3 of Francella Nail was on 01/27/2016. Cycle # 4 of Francella Nail was on 02/24/2016. PET SCAN 3.2016: The 2.1 cm left lung mass abutting the major fissure is hypermetabolic with SUV max of 5.4. Finding is worrisome for tumor with avid glucose metabolism. 2. Elsewhere there is unremarkable distribution of FDG activity without evidence of hypermetabolic metastases. On 03/29/2016 underwent Bronch/Left VATS/VATS wedge ROMELIA/VATS Left upper lobectomy/Mediasinal lymph node dissection/Intercostal nerve block from T3-T10 per Dr. Amy Redmond. Invasive, well-differentiated adenocarcinoma (grade 1); Tumor size-1.4 cm in greatest dimension; Surgical margins-negative for malignancy; Lymphovascular invasion-not identified; TNM classification-pT2a N0 MX  B. AP window lymph node #1, excision: Anthracotic lymph node; negative for malignancy  C. AP window lymph node #2, excision: Anthracotic lymph node; negative for malignancy   D. Periaortic lymph node #1, excision: Fibroadipose tissue; lymph node not identified  E. Bronchial lymph node #1, excision: Anthracotic lymph node; negative for malignancy  F. Left lung, upper lobectomy: Emphysematous change; negative for malignancy  4 anthracotic lymph nodes negative for malignancy   G. Inferior pulmonary ligament lymph node, excision: Anthracotic lymph node; negative for malignancy  H. Bronchial lymph node, excision: Anthracotic lymph node; negative for malignancy. On surveillance per NCCN guidelines.       Recent abdominal pain; ER visit reviewed  CT abdomen/pelvis 02/20/2022   6 mm right lower lobe pulmonary nodule  Multiple peritoneal cystic masses     CEA 12.5 on 03/16/2022    CT chest 04/05/2022 Ground-glass nodule right lower lobe superolateral portion 10 mm unchanged from prior however in the medial segment right lower lobe with pleural abutment is a 7 mm pulmonary nodule increased in size from 09/28/2021 with is barely visible at 2-3 mm; repeat CT chest in 3 mo    PET/CT scan 04/05/2022 noted No FDG avid uptake is identified which exceeds the threshold SUV    Bilateral Screening Mammogram 04/20/2022: Negative for malignancy    CEA 12.1 on 04/20/2022  CA-125 32.8 on 04/20/2022   <2 on 04/20/2022  Chromogranin A 1480 on 04/20/2022. EGD/Colonoscopy 05/13/2022: Gastric polyps , duodenal polyp moderate gastroduodenitis   A. Stomach, biopsy: Hyperplastic polyp and moderate chronic active gastritis; negative for intestinal metaplasia   Immunostain negative for Helicobacter pylori organisms   B. Duodenum, biopsy: Chronic duodenitis with features of peptic duodenitis   C. Colon, 20 cm biopsy: Fragments of tubular adenoma and hyperplastic polyp     Referred to HBP team (Dr. Marleny Nicholson) for further evaluation of peritoneal cystic masses  CT abdomen/pelvis 06/23/2022 numerous cystic structures identified throughout the right flank and retroperitoneum    Laparoscopic robotic peritoneal mass resection on 07/06/2022  Findings included: serous cystic masses along the colon, periportal lymphadenopathy, fibrotic appearing liver, chronic cholecystitis  Peritoneal fluid Negative for malignant cells. Cellblock shows reactive mesothelial cells, lymphocytes, adipose/stromal tissue, and blood. Monolayer preparation shows few reactive mesothelial cells and blood. A.  Lymph node, periportal, biopsy:   - Reactive node showing follicular hyperplasia, negative for malignancy, see comment. B.  Remnant gallbladder, cholecystectomy:   - Portion of gallbladder wall showing dense fibrosis/scar and occluded mariaelena-gallbladder vessels. C. Soft tissue, peritoneal sac, excision:   - Peritoneal inclusion cysts (benign cystic mesothelioma) and unremarkable fibroadipose tissue.    D.  Liver, needle biopsy:   - Benign hepatic parenchyma showing focal periportal and incomplete septal fibrosis (stage 2)   - Negative for significant lobular/portal necroinflammatory activity, see comment. Comment:   Sections of the lymph node in specimen A reveal normal anat architecture with secondary follicular hyperplasia. There are no cytologically atypical lymphoid cells or [place]-Marsha cells identified. Today 07/20/2022 for f/u. No fever, chills. Fair appetite and energy level. Review of Systems;  CONSTITUTIONAL: No fever, chills. Fair appetite and energy level. ENMT: Eyes: No diplopia; Nose: No epistaxis. Mouth: No sore throat. RESPIRATORY: No hemoptysis, shortness of breath. CARDIOVASCULAR: No chest pain, palpitations. GASTROINTESTINAL: No nausea, vomiting. Recent abdominal pain  GENITOURINARY: No dysuria, urinary frequency, hematuria. NEURO: No syncope, presyncope, headache. Remainder ROS: Negative. Past Medical History:      Diagnosis Date    Hypertension 6/6/2011    Abnormal Pap smear 1/3/2011     Alpha-1 negative    Cholelithiasis 6/6/2011    Osteoarthritis     Lymphadenopathy      Cervical (-) ENT    Lung nodule      ROMELIA     Scoliosis     PPD negative 1/18/12    Adrenal adenoma      7 mm    Hemangioma of spleen     Insomnia     PVD (peripheral vascular disease) ([address] 5/6/2014    Uterine fibroid 5/6/2014    Hepatitis C 1/3/2011    Fibroids     Abnormal chest x-ray with multiple lung nodules 9/8/2015    Hyperlipidemia     Panlobular emphysema ([address] 11/12/2015    Malignant neoplasm of upper lobe of right lung ([address] 11/18/2015     Medications:  Reviewed and reconciled. Allergies: Allergies   Allergen Reactions    Ibuprofen Palpitations and Rash     Physical Exam:  BP (!) 161/68   Pulse 81   Temp 97.5 °F (36.4 °C)   Ht 5' 1\" (1.549 m)   Wt 123 lb (55.8 kg)   SpO2 98%   BMI 23.24 kg/m²    GENERAL: Alert, oriented x 3, not in acute distress. HEENT: PERRLA; EOMI. Oropharynx clear. EXTREMITIES: Without clubbing, cyanosis, or edema. NEUROLOGIC: No focal deficits.    ECOG PS 1    Lab Results   Component Value Date    WBC 6.1 06/30/2022    HGB 8.5 (L) 06/30/2022    HCT 26.2 (L) 06/30/2022    MCV 87.6 06/30/2022     06/30/2022     Lab Results   Component Value Date     06/30/2022    K 4.0 06/30/2022     06/30/2022    CO2 23 06/30/2022    BUN 30 (H) 06/30/2022    CREATININE 1.2 (H) 06/30/2022    GLUCOSE 94 06/30/2022    CALCIUM 9.1 06/30/2022    PROT 8.5 (H) 06/23/2022    LABALBU 3.8 06/23/2022    BILITOT 0.2 06/23/2022    ALKPHOS 110 (H) 06/23/2022    AST 58 (H) 06/23/2022    ALT 46 (H) 06/23/2022    LABGLOM 54 06/30/2022    GFRAA 54 06/30/2022     Lab Results   Component Value Date    CEA 12.1 (H) 04/20/2022     Impression/Plan:  71 y.o. female with Hx of smoking who underwent: (1) Bronchoscopy. (2) Right VATS. (3) VATS right upper lobe wedge resection. (4) VATS right upper lobectomy. (5) Intercostal nerve block from T3 to T10. (6) Mediastinal lymph node dissection on 11/11/2015:   Pathology:  Right lung, upper lobectomy: Invasive, moderately differentiated adenocarcinoma (Grade 2). Tumor size 1.5 cm in greatest dimension. Visceral pleural invasion: Not identified. Visceral pleural invasion: Not identified. Surgical margin negative for malignancy. Two of three peribronchial lymph nodes positive for adenocarcinoma. pT1a N1 MX;     Being followed for a left upper lobe mass by Dr. Carla Vivar. Multiple biopsies in the past came back negative for malignancy. Hypermetabolic on PET/CT scan on 09/29/2015 (2.3 cm in size with SUV of 6.4). CT guided biopsy of the left upper lobe lesion on 11/02/2015 was noted to be negative for malignancy. She was referred to the medical oncology clinic to discuss adjuvant chemotherapy. We recommended 4 cycles of adjuvant chemotherapy consisting of Carboplatin/Alimta. Mediport placed 12/7/15. -MRI Brain on 12/8/15:  Negative for metastatic disease.   -We will repeat CT chest and PET/CT after 4 cycles of Carboplatin/Alimta.  To follow on Lingular lesion as well. -Molecular studies (EGFR, ALK, ROS-1) were all Not Detected. Cycle # 1 of Watson Gove was on 12/09/2015. Cycle # 2 of Watson Gove was on 01/06/2016. Cycle # 3 of Watson Gove was on 01/27/2016. Cycle # 4 of Watson Gove was on 02/24/2016. PET SCAN 3.2016: The 2.1 cm left lung mass abutting the major fissure is hypermetabolic with SUV max of 5.4. Finding is worrisome for tumor with avid glucose metabolism. 2. Elsewhere there is unremarkable distribution of FDG activity without evidence of hypermetabolic metastases. On 03/29/2016 underwent Bronch/Left VATS/VATS wedge ROMELIA/VATS Left upper lobectomy/Mediasinal lymph node dissection/Intercostal nerve block from T3-T10 per Dr. Anirudh Fulton. Final pathology revealed Stage I Adenocarcinoma of ROMELIA (morphologically different from the adenocarcinoma previously diagnosed in the right upper lobe. Therefore, synchronous primary tumors are favored). A. Left lung, upper lobe wedge resection: Invasive, well-differentiated adenocarcinoma (grade 1); Tumor size-1.4 cm in greatest dimension; Surgical margins-negative for malignancy; Lymphovascular invasion-not identified; TNM classification-pT2a N0 MX  B. AP window lymph node #1, excision: Anthracotic lymph node; negative for malignancy  C. AP window lymph node #2, excision: Anthracotic lymph node; negative for malignancy   D. Periaortic lymph node #1, excision: Fibroadipose tissue; lymph node not identified  E. Bronchial lymph node #1, excision: Anthracotic lymph node; negative for malignancy  F. Left lung, upper lobectomy: Emphysematous change; negative for malignancy  4 anthracotic lymph nodes negative for malignancy   G. Inferior pulmonary ligament lymph node, excision: Anthracotic lymph node; negative for malignancy  H. Bronchial lymph node, excision: Anthracotic lymph node; negative for malignancy.     Right side Mediport was removed on 11/04/2016 by  [place]. On surveillance per NCCN guidelines. CT chest 04/12/2017 noted no convincing evidence of recurrent disease. CT chest 10/19/2017 noted no definite evidence for recurrent malignancy. CT chest 03/12/2018 negative for pulmonary parenchymal masses or enlarged mediastinal or hilar LN. ? 2 cm lesion in spleen difficult to evaluate due to the arterial phase of the study. CT Abd/Pelvis 05/08/2018  Enhancing lesion within the spleen is relatively stable to slightly smaller when compared to April 2014 exam and likely splenic hemangioma. Other indeterminate findings (left periaortic LN, sclerotic/blastic foci in L3 and L1 reported by Dr. Tomi Yadav from radiology team). PET/CT scan 06/05/2018 unremarkable. No FDG avid uptake identified. No evidence for recurrent or metastatic disease. CT Chest 12/13/2018 noted no evidence of recurrent/metastatic disease. CT chest on 06/11/2019 noted no evidence for worrisome residual or recurrent malignancy. No evidence for new lymphadenopathy or metastatic disease. Stable scarring and postoperative changes right upper lobe. CT chest 11/26/2019: No evidence of active neoplasm. CT chest 06/15/2020: No evidence of active neoplasm. CT chest 12/30/2020: Postsurgical changes and postsurgical scarring seen within the right lung apex. There is no evidence of tumor recurrence. 2.1 x 2.3 cm enhancing lesion seen within the spleen  PET/CT scan 03/02/2021   No FDG avid uptake is identified which exceeds the threshold SUV.   No convincing evidence for recurrent or metastatic disease  CT chest 09/28/2021 No evidence of tumor recurrence    Recent abdominal pain; ER visit reviewed  CT abdomen/pelvis 02/20/2022   6 mm right lower lobe pulmonary nodule  Multiple peritoneal cystic masses     CEA 12.5 on 03/16/2022    CT chest 04/05/2022 Ground-glass nodule right lower lobe superolateral portion 10 mm unchanged from prior however in the medial segment right lower lobe with pleural abutment is a 7 mm pulmonary nodule increased in size from 09/28/2021 with is barely visible at 2-3 mm; repeat CT chest in 3 mo    PET/CT scan 04/05/2022 noted No FDG avid uptake is identified which exceeds the threshold SUV     Bilateral screening mammogram 04/20/2022: Negative for malignancy    CEA     12.1 on 04/20/2022  CA-125 32.8 on 04/20/2022   <2 on 04/20/2022  Chromogranin A 1480 on 04/20/2022. EGD/Colonoscopy 05/13/2022: Gastric polyps , duodenal polyp moderate gastroduodenitis   A. Stomach, biopsy: Hyperplastic polyp and moderate chronic active gastritis; negative for intestinal metaplasia   Immunostain negative for Helicobacter pylori organisms   B. Duodenum, biopsy: Chronic duodenitis with features of peptic duodenitis   C. Colon, 20 cm biopsy: Fragments of tubular adenoma and hyperplastic polyp   Pathology reviewed. Referred to HBP team (Dr. Elidia Pickens) for further evaluation of peritoneal cystic masses  CT abdomen/pelvis 06/23/2022 numerous cystic structures identified throughout the right flank and retroperitoneum. Laparoscopic robotic peritoneal mass resection on 07/06/2022  Findings included: serous cystic masses along the colon, periportal lymphadenopathy, fibrotic appearing liver, chronic cholecystitis    Peritoneal fluid Negative for malignant cells. Cellblock shows reactive mesothelial cells, lymphocytes, adipose/stromal tissue, and blood. Monolayer preparation shows few reactive mesothelial cells and blood. A.  Lymph node, periportal, biopsy:   - Reactive node showing follicular hyperplasia, negative for malignancy, see comment. B.  Remnant gallbladder, cholecystectomy:   - Portion of gallbladder wall showing dense fibrosis/scar and occluded mariaelena-gallbladder vessels. C. Soft tissue, peritoneal sac, excision:   - Peritoneal inclusion cysts (benign cystic mesothelioma) and unremarkable fibroadipose tissue.    D.  Liver, needle biopsy:   - Benign hepatic parenchyma showing focal periportal and incomplete septal fibrosis (stage 2)   - Negative for significant lobular/portal necroinflammatory activity, see comment. Comment:   Sections of the lymph node in specimen A reveal normal anat architecture with secondary follicular hyperplasia. There are no cytologically atypical lymphoid cells or [place]-Marsha cells identified. Periportal LN Flow Cytometry noted 4.5% monoclonal B cells detected in a predominantly polyclonal background. Monoclonal B cell population (4.5% of total cells) without detectable CD5, CD10 or CD11c expression in a predominantly polyclonal background, raising the differential between a monoclonal B-cell population of undetermined significance versus a B-cell lymphoma/leukemia. In the context of B-cell lymphoma the main differential based on immunophenotype includes, but is not limited to marginal zone lymphoma/leukemia, lymphoplasmacytic lymphoma, RL14- negative follicular lymphoma, and less likely large b cell lymphoma. In specimen D, there is no evidence of chronic hepatitis or significant steatosis. Histologic features of cirrhosis including nodules of regenerating hepatocytes and complete portal-portal bridging fibrosis are   not identified. Focal periportal fibrosis and incomplete septal fibrosis are confirmed by trichrome stain. Iron stain is also performed and is negative. Procedure note reviewed. Pathology reviewed.   Referred to [facility] team for evaluation of abnormal periportal lymph node flow cytometry showing 4.5% monoclonal B cells without detectable CD5, CD10 or CD11c expression in a predominantly polyclonal background, raising the differential between a monoclonal B-cell population of undetermined significance versus a B-cell lymphoma/leukemia    Repeat CT chest ordered to follow on history of NSCLC    Amalia Reno MD   [id number]  Board Certified Medical Oncologist

## 2022-07-22 ENCOUNTER — OFFICE VISIT (OUTPATIENT)
Dept: HEMATOLOGY | Age: 72
End: 2022-07-22
Payer: MEDICARE

## 2022-07-22 VITALS
TEMPERATURE: 97.7 F | BODY MASS INDEX: 23.41 KG/M2 | HEIGHT: 61 IN | WEIGHT: 124 LBS | DIASTOLIC BLOOD PRESSURE: 60 MMHG | SYSTOLIC BLOOD PRESSURE: 111 MMHG | RESPIRATION RATE: 16 BRPM | HEART RATE: 84 BPM

## 2022-07-22 DIAGNOSIS — Z09 POSTOP CHECK: Primary | ICD-10-CM

## 2022-07-22 PROCEDURE — 99212 OFFICE O/P EST SF 10 MIN: CPT | Performed by: TRANSPLANT SURGERY

## 2022-07-22 PROCEDURE — 99024 POSTOP FOLLOW-UP VISIT: CPT | Performed by: TRANSPLANT SURGERY

## 2022-07-22 NOTE — PROGRESS NOTES
Hepatobiliary and Pancreatic Surgery Progress Note    CC: s/p surgery    Subjective: Patient states that she is doing well. She denies any abdominal pain. She underwent a diagnostic laparoscopy where she had a completion cholecystectomy with liver biopsy showed stage 2 fibrosis. She also had benign cysts seen in the abdomen. Intraoperative she was noted to have enlarged portal lymph nodes. These lymph nodes were removed. She is maintaining her weight. OBJECTIVE      Physical    /60   Pulse 84   Temp 97.7 °F (36.5 °C) (Temporal)   Resp 16   Ht 5' 1\" (1.549 m)   Wt 124 lb (56.2 kg)   BMI 23.43 kg/m²     General appearance: appears in no acute distress  Lungs:respiratory effort normal without accessory numbers  Heart: no pedal edema  Abdomen: soft, nondistended, nontympanic, no guard- ing, no peritoneal signs, normoactive bowel sounds  Extremities: ROM normal    ASSESSMENT: possible B Cell lymphoma/leukemia vs. Undetermined significance  Portal lymphadenopathy s/p portal lymph node dissection robotically    PLAN:    - continue to follow up with Oncology as she might have a low grade lymphoma    Thank you for the consultation and allowing me to take part in Ms. Izquierdo's care.     Please send a copy of my note, operative note, pathology results and Flow cytometry results to Dr. Dario Carver MD 7/22/2022 1:53 PM

## 2022-08-04 ENCOUNTER — HOSPITAL ENCOUNTER (OUTPATIENT)
Dept: CT IMAGING | Age: 72
Discharge: HOME | End: 2022-08-06
Payer: MEDICARE

## 2022-08-04 DIAGNOSIS — C34.90 NON-SMALL CELL LUNG CANCER, UNSPECIFIED LATERALITY (HCC): ICD-10-CM

## 2022-08-04 PROCEDURE — 71260 CT THORAX DX C+: CPT

## 2022-08-04 PROCEDURE — 6360000004 HC RX CONTRAST MEDICATION: Performed by: RADIOLOGY

## 2022-08-04 RX ADMIN — IOPAMIDOL 75 ML: 755 INJECTION, SOLUTION INTRAVENOUS at 08:41

## 2022-08-22 DIAGNOSIS — C34.90 NON-SMALL CELL LUNG CANCER, UNSPECIFIED LATERALITY (HCC): Primary | ICD-10-CM

## 2022-08-24 ENCOUNTER — HOSPITAL ENCOUNTER (OUTPATIENT)
Dept: INFUSION THERAPY | Age: 72
Discharge: HOME | End: 2022-08-24

## 2022-08-24 ENCOUNTER — OFFICE VISIT (OUTPATIENT)
Dept: ONCOLOGY | Age: 72
End: 2022-08-24
Payer: MEDICARE

## 2022-08-24 VITALS
BODY MASS INDEX: 22.66 KG/M2 | HEART RATE: 80 BPM | HEIGHT: 61 IN | SYSTOLIC BLOOD PRESSURE: 168 MMHG | WEIGHT: 120 LBS | DIASTOLIC BLOOD PRESSURE: 75 MMHG | OXYGEN SATURATION: 100 % | TEMPERATURE: 97.2 F

## 2022-08-24 DIAGNOSIS — R91.1 LUNG NODULE: Primary | ICD-10-CM

## 2022-08-24 DIAGNOSIS — C34.90 NON-SMALL CELL LUNG CANCER, UNSPECIFIED LATERALITY (HCC): ICD-10-CM

## 2022-08-24 PROCEDURE — 1123F ACP DISCUSS/DSCN MKR DOCD: CPT | Performed by: INTERNAL MEDICINE

## 2022-08-24 PROCEDURE — G8399 PT W/DXA RESULTS DOCUMENT: HCPCS | Performed by: INTERNAL MEDICINE

## 2022-08-24 PROCEDURE — 3017F COLORECTAL CA SCREEN DOC REV: CPT | Performed by: INTERNAL MEDICINE

## 2022-08-24 PROCEDURE — 1036F TOBACCO NON-USER: CPT | Performed by: INTERNAL MEDICINE

## 2022-08-24 PROCEDURE — G8420 CALC BMI NORM PARAMETERS: HCPCS | Performed by: INTERNAL MEDICINE

## 2022-08-24 PROCEDURE — 1090F PRES/ABSN URINE INCON ASSESS: CPT | Performed by: INTERNAL MEDICINE

## 2022-08-24 PROCEDURE — 99215 OFFICE O/P EST HI 40 MIN: CPT | Performed by: INTERNAL MEDICINE

## 2022-08-24 PROCEDURE — G8427 DOCREV CUR MEDS BY ELIG CLIN: HCPCS | Performed by: INTERNAL MEDICINE

## 2022-08-24 PROCEDURE — 99213 OFFICE O/P EST LOW 20 MIN: CPT

## 2022-08-24 NOTE — PROGRESS NOTES
Department of [facility] Oncology  Attending Clinic Note    Reason for Visit: Follow-up on a patient with NSCLC    PCP:  Norah Mo MD    History of Present Illness:   71 y.o. -American female with significant history of tobacco abuse of approximately 30 pack years, quit smoking June 7, 2015. She has been followed for a left upper lobe mass by Dr. Jakob Claire. Multiple biopsies in the past came back negative for malignancy. CT scan chest on 08/31/2015 showed a new right upper lobe mass. Bronchoscopy with washing RUL mass on 10/15/2015 was positive for RUL Lung Adenocarcinoma. Negative for malignancy on BAL and TBNA of Lingular lesion. PET/CT scan on 09/29/2015:  1. New since prior examination on CT is 1.7 cm right paratracheal   nodule. Nodule is intensely hypermetabolic with SUV max of 4.8   worrisome for tumor. 2. Previously known nodule in the lingula now measures 2.3 cm in   size. SUV max has increased from 1.7 on the 2012 examination to   6.4 at this time. At this time findings are worrisome for   hypermetabolic tumor with avid glucose metabolism. Therefore, she was referred to see Dr. Janet Mix for resection. CT guided biopsy of the left upper lobe lesion on 11/02/2015 was noted to be negative for malignancy. On 11/11/2015 She underwent: (1) Bronchoscopy. (2) Right VATS. (3) VATS right upper lobe wedge resection. (4) VATS right upper lobectomy. (5) Intercostal nerve block from T3 to T10. (6) Mediastinal lymph node dissection. Pathology:  Right lung, upper lobectomy: Invasive, moderately differentiated adenocarcinoma (Grade 2). Tumor size 1.5 cm in greatest dimension. Surgical margin negative for malignancy. Two of three peribronchial lymph nodes positive for adenocarcinoma. pT1a N1 MX  She was referred to the medical oncology clinic to discuss adjuvant chemotherapy. We recommended 4 cycles of adjuvant chemotherapy consisting of Carboplatin/Alimta. Mediport placed 12/07/2015. Cycle # 1 of Rosevelt Bullion was on 12/09/2015. Cycle # 2 of Rosevelt Bullion was on 01/06/2016. Cycle # 3 of Rosevelt Bullion was on 01/27/2016. Cycle # 4 of Rosevelt Bullion was on 02/24/2016. PET SCAN 3.2016: The 2.1 cm left lung mass abutting the major fissure is hypermetabolic with SUV max of 5.4. Finding is worrisome for tumor with avid glucose metabolism. 2. Elsewhere there is unremarkable distribution of FDG activity without evidence of hypermetabolic metastases. On 03/29/2016 underwent Bronch/Left VATS/VATS wedge ROMELIA/VATS Left upper lobectomy/Mediasinal lymph node dissection/Intercostal nerve block from T3-T10 per Dr. Gerry Mello. Invasive, well-differentiated adenocarcinoma (grade 1); Tumor size-1.4 cm in greatest dimension; Surgical margins-negative for malignancy; Lymphovascular invasion-not identified; TNM classification-pT2a N0 MX  B. AP window lymph node #1, excision: Anthracotic lymph node; negative for malignancy  C. AP window lymph node #2, excision: Anthracotic lymph node; negative for malignancy   D. Periaortic lymph node #1, excision: Fibroadipose tissue; lymph node not identified  E. Bronchial lymph node #1, excision: Anthracotic lymph node; negative for malignancy  F. Left lung, upper lobectomy: Emphysematous change; negative for malignancy  4 anthracotic lymph nodes negative for malignancy   G. Inferior pulmonary ligament lymph node, excision: Anthracotic lymph node; negative for malignancy  H. Bronchial lymph node, excision: Anthracotic lymph node; negative for malignancy. On surveillance per NCCN guidelines.       Recent abdominal pain; ER visit reviewed  CT abdomen/pelvis 02/20/2022   6 mm right lower lobe pulmonary nodule  Multiple peritoneal cystic masses     CEA 12.5 on 03/16/2022    CT chest 04/05/2022 Ground-glass nodule right lower lobe superolateral portion 10 mm unchanged from prior however in the medial segment right lower lobe with pleural abutment is a 7 mm pulmonary nodule increased in size from 09/28/2021 with is barely visible at 2-3 mm; repeat CT chest in 3 mo    PET/CT scan 04/05/2022 noted No FDG avid uptake is identified which exceeds the threshold SUV    Bilateral Screening Mammogram 04/20/2022: Negative for malignancy    CEA 12.1 on 04/20/2022  CA-125 32.8 on 04/20/2022   <2 on 04/20/2022  Chromogranin A 1480 on 04/20/2022. EGD/Colonoscopy 05/13/2022: Gastric polyps , duodenal polyp moderate gastroduodenitis   A. Stomach, biopsy: Hyperplastic polyp and moderate chronic active gastritis; negative for intestinal metaplasia   Immunostain negative for Helicobacter pylori organisms   B. Duodenum, biopsy: Chronic duodenitis with features of peptic duodenitis   C. Colon, 20 cm biopsy: Fragments of tubular adenoma and hyperplastic polyp     Referred to HBP team (Dr. Lennie Smiley) for further evaluation of peritoneal cystic masses  CT abdomen/pelvis 06/23/2022 numerous cystic structures identified throughout the right flank and retroperitoneum    Laparoscopic robotic peritoneal mass resection on 07/06/2022  Findings included: serous cystic masses along the colon, periportal lymphadenopathy, fibrotic appearing liver, chronic cholecystitis  Peritoneal fluid Negative for malignant cells. Cellblock shows reactive mesothelial cells, lymphocytes, adipose/stromal tissue, and blood. Monolayer preparation shows few reactive mesothelial cells and blood. A.  Lymph node, periportal, biopsy:   - Reactive node showing follicular hyperplasia, negative for malignancy, see comment. B.  Remnant gallbladder, cholecystectomy:   - Portion of gallbladder wall showing dense fibrosis/scar and occluded mariaelena-gallbladder vessels. C. Soft tissue, peritoneal sac, excision:   - Peritoneal inclusion cysts (benign cystic mesothelioma) and unremarkable fibroadipose tissue.    D.  Liver, needle biopsy:   - Benign hepatic parenchyma showing focal periportal and incomplete septal fibrosis (stage 2)   - Negative for significant lobular/portal necroinflammatory activity, see comment. Comment:   Sections of the lymph node in specimen A reveal normal anat architecture with secondary follicular hyperplasia. There are no cytologically atypical lymphoid cells or [place]-Marsha cells identified. Periportal LN Flow Cytometry noted 4.5% monoclonal B cells detected in a predominantly polyclonal background. Monoclonal B cell population (4.5% of total cells) without detectable CD5, CD10 or CD11c expression in a predominantly polyclonal background, raising the differential between a monoclonal B-cell population of undetermined significance versus a B-cell lymphoma/leukemia. In the context of B-cell lymphoma the main differential based on immunophenotype includes, but is not limited to marginal zone lymphoma/leukemia, lymphoplasmacytic lymphoma, LH02- negative follicular lymphoma, and less likely large b cell lymphoma. In specimen D, there is no evidence of chronic hepatitis or significant steatosis. Histologic features of cirrhosis including nodules of regenerating hepatocytes and complete portal-portal bridging fibrosis are   not identified. Focal periportal fibrosis and incomplete septal fibrosis are confirmed by trichrome stain. Iron stain is also performed and is negative. Periportal lymph node flow cytometry showing 4.5% monoclonal B cells without detectable CD5, CD10 or CD11c expression in a predominantly polyclonal background, raising the differential between a monoclonal B-cell population of undetermined significance versus a B-cell lymphoma/leukemia    Repeat CT chest to follow on history of NSCLC on 08/04/2022    Today 08/24/2022 for f/u. No fever, chills. Fair appetite and energy level. Evaluated on 8/15/2022 by Dr. Lela Aponte at [facility] in [facility] who recommended proceeding with a biopsy of the enlarging right lower lobe lung mass.   While lymphoma certainly possible, biopsy recommended to rule out recurrent lung cancer. Periportal lymph node biopsy will be reviewed at [facility]. Even if lymphoma is concerned, likely low-grade and not causing her symptoms so observation would be recommended. Review of Systems;  CONSTITUTIONAL: No fever, chills. Fair appetite and energy level. ENMT: Eyes: No diplopia; Nose: No epistaxis. Mouth: No sore throat. RESPIRATORY: No hemoptysis, shortness of breath. CARDIOVASCULAR: No chest pain, palpitations. GASTROINTESTINAL: No nausea, vomiting. Recent abdominal pain  GENITOURINARY: No dysuria, urinary frequency, hematuria. NEURO: No syncope, presyncope, headache. Remainder ROS: Negative. Past Medical History:      Diagnosis Date    Hypertension 6/6/2011    Abnormal Pap smear 1/3/2011     Alpha-1 negative    Cholelithiasis 6/6/2011    Osteoarthritis     Lymphadenopathy      Cervical (-) ENT    Lung nodule      ROMELIA     Scoliosis     PPD negative 1/18/12    Adrenal adenoma      7 mm    Hemangioma of spleen     Insomnia     PVD (peripheral vascular disease) ([address] 5/6/2014    Uterine fibroid 5/6/2014    Hepatitis C 1/3/2011    Fibroids     Abnormal chest x-ray with multiple lung nodules 9/8/2015    Hyperlipidemia     Panlobular emphysema ([address] 11/12/2015    Malignant neoplasm of upper lobe of right lung ([address] 11/18/2015     Medications:  Reviewed and reconciled. Allergies: Allergies   Allergen Reactions    Ibuprofen Palpitations and Rash     Physical Exam:  BP (!) 168/75   Pulse 80   Temp 97.2 °F (36.2 °C)   Ht 5' 1\" (1.549 m)   Wt 120 lb (54.4 kg)   SpO2 100%   BMI 22.67 kg/m²    GENERAL: Alert, oriented x 3, not in acute distress. HEENT: PERRLA; EOMI. Oropharynx clear. EXTREMITIES: Without clubbing, cyanosis, or edema. NEUROLOGIC: No focal deficits.    ECOG PS 1    Lab Results   Component Value Date    WBC 6.1 06/30/2022    HGB 8.5 (L) 06/30/2022    HCT 26.2 (L) 06/30/2022    MCV 87.6 06/30/2022     06/30/2022 Lab Results   Component Value Date     06/30/2022    K 4.0 06/30/2022     06/30/2022    CO2 23 06/30/2022    BUN 30 (H) 06/30/2022    CREATININE 1.2 (H) 06/30/2022    GLUCOSE 94 06/30/2022    CALCIUM 9.1 06/30/2022    PROT 8.5 (H) 06/23/2022    LABALBU 3.8 06/23/2022    BILITOT 0.2 06/23/2022    ALKPHOS 110 (H) 06/23/2022    AST 58 (H) 06/23/2022    ALT 46 (H) 06/23/2022    LABGLOM 54 06/30/2022    GFRAA 54 06/30/2022     Lab Results   Component Value Date    CEA 12.1 (H) 04/20/2022     Impression/Plan:  71 y.o. female with Hx of smoking who underwent: (1) Bronchoscopy. (2) Right VATS. (3) VATS right upper lobe wedge resection. (4) VATS right upper lobectomy. (5) Intercostal nerve block from T3 to T10. (6) Mediastinal lymph node dissection on 11/11/2015:   Pathology:  Right lung, upper lobectomy: Invasive, moderately differentiated adenocarcinoma (Grade 2). Tumor size 1.5 cm in greatest dimension. Visceral pleural invasion: Not identified. Visceral pleural invasion: Not identified. Surgical margin negative for malignancy. Two of three peribronchial lymph nodes positive for adenocarcinoma. pT1a N1 MX;     Being followed for a left upper lobe mass by Dr. Wesley Robins. Multiple biopsies in the past came back negative for malignancy. Hypermetabolic on PET/CT scan on 09/29/2015 (2.3 cm in size with SUV of 6.4). CT guided biopsy of the left upper lobe lesion on 11/02/2015 was noted to be negative for malignancy. She was referred to the medical oncology clinic to discuss adjuvant chemotherapy. We recommended 4 cycles of adjuvant chemotherapy consisting of Carboplatin/Alimta. Mediport placed 12/7/15. -MRI Brain on 12/8/15:  Negative for metastatic disease.   -We will repeat CT chest and PET/CT after 4 cycles of Carboplatin/Alimta. To follow on Lingular lesion as well. -Molecular studies (EGFR, ALK, ROS-1) were all Not Detected. Cycle # 1 of Bowen Elliott was on 12/09/2015.   Cycle # 2 of Chaz Rockwell was on 01/06/2016. Cycle # 3 of Chaz Rockwell was on 01/27/2016. Cycle # 4 of Chaz Rockwell was on 02/24/2016. PET SCAN 3.2016: The 2.1 cm left lung mass abutting the major fissure is hypermetabolic with SUV max of 5.4. Finding is worrisome for tumor with avid glucose metabolism. 2. Elsewhere there is unremarkable distribution of FDG activity without evidence of hypermetabolic metastases. On 03/29/2016 underwent Bronch/Left VATS/VATS wedge ROMELIA/VATS Left upper lobectomy/Mediasinal lymph node dissection/Intercostal nerve block from T3-T10 per Dr. Bhavya Norman. Final pathology revealed Stage I Adenocarcinoma of ROMELIA (morphologically different from the adenocarcinoma previously diagnosed in the right upper lobe. Therefore, synchronous primary tumors are favored). A. Left lung, upper lobe wedge resection: Invasive, well-differentiated adenocarcinoma (grade 1); Tumor size-1.4 cm in greatest dimension; Surgical margins-negative for malignancy; Lymphovascular invasion-not identified; TNM classification-pT2a N0 MX  B. AP window lymph node #1, excision: Anthracotic lymph node; negative for malignancy  C. AP window lymph node #2, excision: Anthracotic lymph node; negative for malignancy   D. Periaortic lymph node #1, excision: Fibroadipose tissue; lymph node not identified  E. Bronchial lymph node #1, excision: Anthracotic lymph node; negative for malignancy  F. Left lung, upper lobectomy: Emphysematous change; negative for malignancy  4 anthracotic lymph nodes negative for malignancy   G. Inferior pulmonary ligament lymph node, excision: Anthracotic lymph node; negative for malignancy  H. Bronchial lymph node, excision: Anthracotic lymph node; negative for malignancy. Right side Mediport was removed on 11/04/2016 by Dr. Nubia Hughes. On surveillance per NCCN guidelines. CT chest 04/12/2017 noted no convincing evidence of recurrent disease.    CT chest 10/19/2017 noted no definite evidence for recurrent malignancy. CT chest 03/12/2018 negative for pulmonary parenchymal masses or enlarged mediastinal or hilar LN. ? 2 cm lesion in spleen difficult to evaluate due to the arterial phase of the study. CT Abd/Pelvis 05/08/2018  Enhancing lesion within the spleen is relatively stable to slightly smaller when compared to April 2014 exam and likely splenic hemangioma. Other indeterminate findings (left periaortic LN, sclerotic/blastic foci in L3 and L1 reported by Dr. Anika Pool from radiology team). PET/CT scan 06/05/2018 unremarkable. No FDG avid uptake identified. No evidence for recurrent or metastatic disease. CT Chest 12/13/2018 noted no evidence of recurrent/metastatic disease. CT chest on 06/11/2019 noted no evidence for worrisome residual or recurrent malignancy. No evidence for new lymphadenopathy or metastatic disease. Stable scarring and postoperative changes right upper lobe. CT chest 11/26/2019: No evidence of active neoplasm. CT chest 06/15/2020: No evidence of active neoplasm. CT chest 12/30/2020: Postsurgical changes and postsurgical scarring seen within the right lung apex. There is no evidence of tumor recurrence. 2.1 x 2.3 cm enhancing lesion seen within the spleen  PET/CT scan 03/02/2021   No FDG avid uptake is identified which exceeds the threshold SUV.   No convincing evidence for recurrent or metastatic disease  CT chest 09/28/2021 No evidence of tumor recurrence    Recent abdominal pain; ER visit reviewed  CT abdomen/pelvis 02/20/2022   6 mm right lower lobe pulmonary nodule  Multiple peritoneal cystic masses     CEA 12.5 on 03/16/2022    CT chest 04/05/2022 Ground-glass nodule right lower lobe superolateral portion 10 mm unchanged from prior however in the medial segment right lower lobe with pleural abutment is a 7 mm pulmonary nodule increased in size from 09/28/2021 with is barely visible at 2-3 mm; repeat CT chest in 3 mo    PET/CT scan 04/05/2022 noted No FDG avid uptake is identified which exceeds the threshold SUV     Bilateral screening mammogram 04/20/2022: Negative for malignancy    CEA     12.1 on 04/20/2022  CA-125 32.8 on 04/20/2022   <2 on 04/20/2022  Chromogranin A 1480 on 04/20/2022. EGD/Colonoscopy 05/13/2022: Gastric polyps , duodenal polyp moderate gastroduodenitis   A. Stomach, biopsy: Hyperplastic polyp and moderate chronic active gastritis; negative for intestinal metaplasia   Immunostain negative for Helicobacter pylori organisms   B. Duodenum, biopsy: Chronic duodenitis with features of peptic duodenitis   C. Colon, 20 cm biopsy: Fragments of tubular adenoma and hyperplastic polyp   Pathology reviewed. Referred to HBP team (Dr. Severo Alcala) for further evaluation of peritoneal cystic masses  CT abdomen/pelvis 06/23/2022 numerous cystic structures identified throughout the right flank and retroperitoneum. Laparoscopic robotic peritoneal mass resection on 07/06/2022  Findings included: serous cystic masses along the colon, periportal lymphadenopathy, fibrotic appearing liver, chronic cholecystitis    Peritoneal fluid Negative for malignant cells. Cellblock shows reactive mesothelial cells, lymphocytes, adipose/stromal tissue, and blood. Monolayer preparation shows few reactive mesothelial cells and blood. A.  Lymph node, periportal, biopsy:   - Reactive node showing follicular hyperplasia, negative for malignancy, see comment. B.  Remnant gallbladder, cholecystectomy:   - Portion of gallbladder wall showing dense fibrosis/scar and occluded mariaelena-gallbladder vessels. C. Soft tissue, peritoneal sac, excision:   - Peritoneal inclusion cysts (benign cystic mesothelioma) and unremarkable fibroadipose tissue. D.  Liver, needle biopsy:   - Benign hepatic parenchyma showing focal periportal and incomplete septal fibrosis (stage 2)   - Negative for significant lobular/portal necroinflammatory activity, see comment.    Comment:   Sections of the lymph node in specimen A reveal normal anat architecture with secondary follicular hyperplasia. There are no cytologically atypical lymphoid cells or [place]-Marsha cells identified. Periportal LN Flow Cytometry noted 4.5% monoclonal B cells detected in a predominantly polyclonal background. Monoclonal B cell population (4.5% of total cells) without detectable CD5, CD10 or CD11c expression in a predominantly polyclonal background, raising the differential between a monoclonal B-cell population of undetermined significance versus a B-cell lymphoma/leukemia. In the context of B-cell lymphoma the main differential based on immunophenotype includes, but is not limited to marginal zone lymphoma/leukemia, lymphoplasmacytic lymphoma, UW50- negative follicular lymphoma, and less likely large b cell lymphoma. In specimen D, there is no evidence of chronic hepatitis or significant steatosis. Histologic features of cirrhosis including nodules of regenerating hepatocytes and complete portal-portal bridging fibrosis are   not identified. Focal periportal fibrosis and incomplete septal fibrosis are confirmed by trichrome stain. Iron stain is also performed and is negative. Periportal lymph node flow cytometry showing 4.5% monoclonal B cells without detectable CD5, CD10 or CD11c expression in a predominantly polyclonal background, raising the differential between a monoclonal B-cell population of undetermined significance versus a B-cell lymphoma/leukemia    Repeat CT chest to follow on history of NSCLC on 08/04/2022  Postsurgical changes are identified in the upper lobes bilaterally. 1.1 cm ground-glass nodule in the right lower lobe and a smaller 1 measuring 0.9 cm are noted without change. There is a 1.6 x 1.2 cm soft tissue mass in the right lower lobe medially which is significantly increased since the previous examination.   Bilateral adrenal nodules are noted, larger 1 on the left side measuring 1.8 x 1.6 cm.  1.2 cm right renal cystic lesion is identified. An ill-defined hypodense lesion is identified in the spleen currently measuring 2.9 x 3.5 cm   Imaging reviewed. Labs reviewed.  records reviewed  Evaluated by Dr. Nash Castellanos on 08/15/2022 at [facility] who recommended proceeding with a biopsy of the enlarging right lower lobe lung mass. While lymphoma certainly possible, biopsy recommended to rule out recurrent lung cancer. Periportal lymph node biopsy to be reviewed at [facility]. Even if lymphoma is concerned, likely low-grade and not causing her symptoms, so observation would be recommended. PET/CT scan, CT-guided core needle biopsy of enlarging right lower lobe lung mass will be scheduled. RTC in 3 weeks to review test results.     Epi Elizabeth MD   [id number]  Board Certified Medical Oncologist

## 2022-08-26 ENCOUNTER — TELEPHONE (OUTPATIENT)
Dept: INFUSION THERAPY | Age: 72
End: 2022-08-26

## 2022-08-26 NOTE — TELEPHONE ENCOUNTER
Called and left message asking patient to call Paula Enriquez in IR scheduling  Electronically signed by Giovanna Holland on 8/26/2022 at 10:38 AM

## 2022-09-06 ENCOUNTER — HOSPITAL ENCOUNTER (OUTPATIENT)
Dept: NUCLEAR MEDICINE | Age: 72
Discharge: HOME | End: 2022-09-08
Payer: MEDICARE

## 2022-09-06 DIAGNOSIS — C34.90 NON-SMALL CELL LUNG CANCER, UNSPECIFIED LATERALITY (HCC): ICD-10-CM

## 2022-09-06 DIAGNOSIS — R91.1 LUNG NODULE: ICD-10-CM

## 2022-09-06 PROCEDURE — A9552 F18 FDG: HCPCS | Performed by: RADIOLOGY

## 2022-09-06 PROCEDURE — 78815 PET IMAGE W/CT SKULL-THIGH: CPT

## 2022-09-06 PROCEDURE — 78815 PET IMAGE W/CT SKULL-THIGH: CPT | Performed by: RADIOLOGY

## 2022-09-06 PROCEDURE — 3430000000 HC RX DIAGNOSTIC RADIOPHARMACEUTICAL: Performed by: RADIOLOGY

## 2022-09-06 RX ORDER — FLUDEOXYGLUCOSE F 18 200 MCI/ML
14 INJECTION, SOLUTION INTRAVENOUS
Status: COMPLETED | OUTPATIENT
Start: 2022-09-06 | End: 2022-09-06

## 2022-09-06 RX ADMIN — FLUDEOXYGLUCOSE F 18 14 MILLICURIE: 200 INJECTION, SOLUTION INTRAVENOUS at 12:39

## 2022-09-14 ENCOUNTER — HOSPITAL ENCOUNTER (OUTPATIENT)
Age: 72
Discharge: HOME | End: 2022-09-14
Payer: MEDICARE

## 2022-09-14 ENCOUNTER — HOSPITAL ENCOUNTER (OUTPATIENT)
Dept: INFUSION THERAPY | Age: 72
Discharge: HOME | End: 2022-09-14

## 2022-09-14 ENCOUNTER — OFFICE VISIT (OUTPATIENT)
Dept: ONCOLOGY | Age: 72
End: 2022-09-14
Payer: MEDICARE

## 2022-09-14 VITALS
HEART RATE: 81 BPM | DIASTOLIC BLOOD PRESSURE: 69 MMHG | BODY MASS INDEX: 22.84 KG/M2 | SYSTOLIC BLOOD PRESSURE: 156 MMHG | RESPIRATION RATE: 16 BRPM | TEMPERATURE: 97.3 F | WEIGHT: 121 LBS | OXYGEN SATURATION: 96 % | HEIGHT: 61 IN

## 2022-09-14 DIAGNOSIS — C34.90 NON-SMALL CELL LUNG CANCER, UNSPECIFIED LATERALITY (HCC): ICD-10-CM

## 2022-09-14 DIAGNOSIS — C34.90 NON-SMALL CELL LUNG CANCER, UNSPECIFIED LATERALITY (HCC): Primary | ICD-10-CM

## 2022-09-14 LAB
ALBUMIN SERPL-MCNC: 3.7 G/DL (ref 3.5–5.2)
ALP BLD-CCNC: 93 U/L (ref 35–104)
ALT SERPL-CCNC: 18 U/L (ref 0–32)
ANION GAP SERPL CALCULATED.3IONS-SCNC: 11 MMOL/L (ref 7–16)
AST SERPL-CCNC: 30 U/L (ref 0–31)
BASOPHILS ABSOLUTE: 0.01 E9/L (ref 0–0.2)
BASOPHILS RELATIVE PERCENT: 0.2 % (ref 0–2)
BILIRUB SERPL-MCNC: 0.2 MG/DL (ref 0–1.2)
BUN BLDV-MCNC: 29 MG/DL (ref 6–23)
CALCIUM SERPL-MCNC: 9.3 MG/DL (ref 8.6–10.2)
CHLORIDE BLD-SCNC: 111 MMOL/L (ref 98–107)
CO2: 23 MMOL/L (ref 22–29)
CREAT SERPL-MCNC: 1.3 MG/DL (ref 0.5–1)
EOSINOPHILS ABSOLUTE: 0.03 E9/L (ref 0.05–0.5)
EOSINOPHILS RELATIVE PERCENT: 0.5 % (ref 0–6)
GFR AFRICAN AMERICAN: 49
GFR NON-AFRICAN AMERICAN: 49 ML/MIN/1.73
GLUCOSE BLD-MCNC: 109 MG/DL (ref 74–99)
HCT VFR BLD CALC: 25.5 % (ref 34–48)
HEMOGLOBIN: 8.4 G/DL (ref 11.5–15.5)
IMMATURE GRANULOCYTES #: 0.02 E9/L
IMMATURE GRANULOCYTES %: 0.4 % (ref 0–5)
LYMPHOCYTES ABSOLUTE: 0.92 E9/L (ref 1.5–4)
LYMPHOCYTES RELATIVE PERCENT: 16.3 % (ref 20–42)
MCH RBC QN AUTO: 29.2 PG (ref 26–35)
MCHC RBC AUTO-ENTMCNC: 32.9 % (ref 32–34.5)
MCV RBC AUTO: 88.5 FL (ref 80–99.9)
MONOCYTES ABSOLUTE: 0.46 E9/L (ref 0.1–0.95)
MONOCYTES RELATIVE PERCENT: 8.1 % (ref 2–12)
NEUTROPHILS ABSOLUTE: 4.22 E9/L (ref 1.8–7.3)
NEUTROPHILS RELATIVE PERCENT: 74.5 % (ref 43–80)
PDW BLD-RTO: 13.9 FL (ref 11.5–15)
PLATELET # BLD: 243 E9/L (ref 130–450)
PMV BLD AUTO: 10.7 FL (ref 7–12)
POTASSIUM SERPL-SCNC: 4 MMOL/L (ref 3.5–5)
RBC # BLD: 2.88 E12/L (ref 3.5–5.5)
SODIUM BLD-SCNC: 145 MMOL/L (ref 132–146)
TOTAL PROTEIN: 8.1 G/DL (ref 6.4–8.3)
WBC # BLD: 5.7 E9/L (ref 4.5–11.5)

## 2022-09-14 PROCEDURE — 1090F PRES/ABSN URINE INCON ASSESS: CPT | Performed by: INTERNAL MEDICINE

## 2022-09-14 PROCEDURE — G8427 DOCREV CUR MEDS BY ELIG CLIN: HCPCS | Performed by: INTERNAL MEDICINE

## 2022-09-14 PROCEDURE — 3017F COLORECTAL CA SCREEN DOC REV: CPT | Performed by: INTERNAL MEDICINE

## 2022-09-14 PROCEDURE — G8399 PT W/DXA RESULTS DOCUMENT: HCPCS | Performed by: INTERNAL MEDICINE

## 2022-09-14 PROCEDURE — 99215 OFFICE O/P EST HI 40 MIN: CPT | Performed by: INTERNAL MEDICINE

## 2022-09-14 PROCEDURE — G8420 CALC BMI NORM PARAMETERS: HCPCS | Performed by: INTERNAL MEDICINE

## 2022-09-14 PROCEDURE — 80053 COMPREHEN METABOLIC PANEL: CPT

## 2022-09-14 PROCEDURE — 99212 OFFICE O/P EST SF 10 MIN: CPT

## 2022-09-14 PROCEDURE — 1123F ACP DISCUSS/DSCN MKR DOCD: CPT | Performed by: INTERNAL MEDICINE

## 2022-09-14 PROCEDURE — 1036F TOBACCO NON-USER: CPT | Performed by: INTERNAL MEDICINE

## 2022-09-14 PROCEDURE — 85025 COMPLETE CBC W/AUTO DIFF WBC: CPT

## 2022-09-14 PROCEDURE — 36415 COLL VENOUS BLD VENIPUNCTURE: CPT

## 2022-09-14 NOTE — PROGRESS NOTES
Department of [facility] Oncology  Attending Clinic Note    Reason for Visit: Follow-up on a patient with NSCLC    PCP:  Artur Dumont MD    History of Present Illness:   71 y.o. -American female with significant history of tobacco abuse of approximately 30 pack years, quit smoking June 7, 2015. She has been followed for a left upper lobe mass by Dr. Wesley Robins. Multiple biopsies in the past came back negative for malignancy. CT scan chest on 08/31/2015 showed a new right upper lobe mass. Bronchoscopy with washing RUL mass on 10/15/2015 was positive for RUL Lung Adenocarcinoma. Negative for malignancy on BAL and TBNA of Lingular lesion. PET/CT scan on 09/29/2015:  1. New since prior examination on CT is 1.7 cm right paratracheal   nodule. Nodule is intensely hypermetabolic with SUV max of 4.8   worrisome for tumor. 2. Previously known nodule in the lingula now measures 2.3 cm in   size. SUV max has increased from 1.7 on the 2012 examination to   6.4 at this time. At this time findings are worrisome for   hypermetabolic tumor with avid glucose metabolism. Therefore, she was referred to see Dr. Sam Cisneros for resection. CT guided biopsy of the left upper lobe lesion on 11/02/2015 was noted to be negative for malignancy. On 11/11/2015 She underwent: (1) Bronchoscopy. (2) Right VATS. (3) VATS right upper lobe wedge resection. (4) VATS right upper lobectomy. (5) Intercostal nerve block from T3 to T10. (6) Mediastinal lymph node dissection. Pathology:  Right lung, upper lobectomy: Invasive, moderately differentiated adenocarcinoma (Grade 2). Tumor size 1.5 cm in greatest dimension. Surgical margin negative for malignancy. Two of three peribronchial lymph nodes positive for adenocarcinoma. pT1a N1 MX  She was referred to the medical oncology clinic to discuss adjuvant chemotherapy. We recommended 4 cycles of adjuvant chemotherapy consisting of Carboplatin/Alimta. Mediport placed 12/07/2015. Cycle # 1 of Rosevelt Bullion was on 12/09/2015. Cycle # 2 of Rosevelt Bullion was on 01/06/2016. Cycle # 3 of Rosevelt Bullion was on 01/27/2016. Cycle # 4 of Rosevelt Bullion was on 02/24/2016. PET SCAN 3.2016: The 2.1 cm left lung mass abutting the major fissure is hypermetabolic with SUV max of 5.4. Finding is worrisome for tumor with avid glucose metabolism. 2. Elsewhere there is unremarkable distribution of FDG activity without evidence of hypermetabolic metastases. On 03/29/2016 underwent Bronch/Left VATS/VATS wedge ROMELIA/VATS Left upper lobectomy/Mediasinal lymph node dissection/Intercostal nerve block from T3-T10 per Dr. Gerry Mello. Invasive, well-differentiated adenocarcinoma (grade 1); Tumor size-1.4 cm in greatest dimension; Surgical margins-negative for malignancy; Lymphovascular invasion-not identified; TNM classification-pT2a N0 MX  B. AP window lymph node #1, excision: Anthracotic lymph node; negative for malignancy  C. AP window lymph node #2, excision: Anthracotic lymph node; negative for malignancy   D. Periaortic lymph node #1, excision: Fibroadipose tissue; lymph node not identified  E. Bronchial lymph node #1, excision: Anthracotic lymph node; negative for malignancy  F. Left lung, upper lobectomy: Emphysematous change; negative for malignancy  4 anthracotic lymph nodes negative for malignancy   G. Inferior pulmonary ligament lymph node, excision: Anthracotic lymph node; negative for malignancy  H. Bronchial lymph node, excision: Anthracotic lymph node; negative for malignancy. On surveillance per NCCN guidelines.       Recent abdominal pain; ER visit reviewed  CT abdomen/pelvis 02/20/2022   6 mm right lower lobe pulmonary nodule  Multiple peritoneal cystic masses     CEA 12.5 on 03/16/2022    CT chest 04/05/2022 Ground-glass nodule right lower lobe superolateral portion 10 mm unchanged from prior however in the medial segment right lower lobe with pleural abutment is a 7 fibrosis (stage 2)   - Negative for significant lobular/portal necroinflammatory activity, see comment. Comment:   Sections of the lymph node in specimen A reveal normal anat architecture with secondary follicular hyperplasia. There are no cytologically atypical lymphoid cells or Painter-Marsha cells identified. Periportal LN Flow Cytometry noted 4.5% monoclonal B cells detected in a predominantly polyclonal background. Monoclonal B cell population (4.5% of total cells) without detectable CD5, CD10 or CD11c expression in a predominantly polyclonal background, raising the differential between a monoclonal B-cell population of undetermined significance versus a B-cell lymphoma/leukemia. In the context of B-cell lymphoma the main differential based on immunophenotype includes, but is not limited to marginal zone lymphoma/leukemia, lymphoplasmacytic lymphoma, IT14- negative follicular lymphoma, and less likely large b cell lymphoma. In specimen D, there is no evidence of chronic hepatitis or significant steatosis. Histologic features of cirrhosis including nodules of regenerating hepatocytes and complete portal-portal bridging fibrosis are   not identified. Focal periportal fibrosis and incomplete septal fibrosis are confirmed by trichrome stain. Iron stain is also performed and is negative. Periportal lymph node flow cytometry showing 4.5% monoclonal B cells without detectable CD5, CD10 or CD11c expression in a predominantly polyclonal background, raising the differential between a monoclonal B-cell population of undetermined significance versus a B-cell lymphoma/leukemia    Repeat CT chest on 08/04/2022  Postsurgical changes are identified in the upper lobes bilaterally. 1.1 cm ground-glass nodule in the right lower lobe and a smaller 1 measuring 0.9 cm are noted without change.    There is a 1.6 x 1.2 cm soft tissue mass in the right lower lobe medially which is significantly increased since the previous examination. Bilateral adrenal nodules are noted, larger 1 on the left side measuring 1.8 x 1.6 cm.  1.2 cm right renal cystic lesion is identified. An ill-defined hypodense lesion is identified in the spleen currently measuring 2.9 x 3.5 cm     Evaluated on 8/15/2022 by Dr. Kaia Stearns at [facility] in [facility] who recommended proceeding with a biopsy of the enlarging right lower lobe lung mass. While lymphoma certainly possible, biopsy recommended to rule out recurrent lung cancer. Even if lymphoma is concerned, likely low-grade and not causing her symptoms so observation would be recommended. Today 09/14/2022; No fever, chills. Fair appetite and energy level. Review of Systems;  CONSTITUTIONAL: No fever, chills. Fair appetite and energy level. ENMT: Eyes: No diplopia; Nose: No epistaxis. Mouth: No sore throat. RESPIRATORY: No hemoptysis, shortness of breath. CARDIOVASCULAR: No chest pain, palpitations. GASTROINTESTINAL: No nausea, vomiting. Recent abdominal pain  GENITOURINARY: No dysuria, urinary frequency, hematuria. NEURO: No syncope, presyncope, headache. Remainder ROS: Negative. Past Medical History:      Diagnosis Date    Hypertension 6/6/2011    Abnormal Pap smear 1/3/2011     Alpha-1 negative    Cholelithiasis 6/6/2011    Osteoarthritis     Lymphadenopathy      Cervical (-) ENT    Lung nodule      ROMELIA     Scoliosis     PPD negative 1/18/12    Adrenal adenoma      7 mm    Hemangioma of spleen     Insomnia     PVD (peripheral vascular disease) ([address] 5/6/2014    Uterine fibroid 5/6/2014    Hepatitis C 1/3/2011    Fibroids     Abnormal chest x-ray with multiple lung nodules 9/8/2015    Hyperlipidemia     Panlobular emphysema ([address] 11/12/2015    Malignant neoplasm of upper lobe of right lung ([address] 11/18/2015     Medications:  Reviewed and reconciled. Allergies:   Allergies   Allergen Reactions    Ibuprofen Palpitations and Rash     Physical Exam:  BP (!) 156/69   Pulse 81 Temp 97.3 °F (36.3 °C) (Infrared)   Resp 16   Ht 5' 1\" (1.549 m)   Wt 121 lb (54.9 kg)   SpO2 96%   BMI 22.86 kg/m²    GENERAL: Alert, oriented x 3, not in acute distress. HEENT: PERRLA; EOMI. Oropharynx clear. EXTREMITIES: Without clubbing, cyanosis, or edema. NEUROLOGIC: No focal deficits. ECOG PS 1    Lab Results   Component Value Date    WBC 5.7 09/14/2022    HGB 8.4 (L) 09/14/2022    HCT 25.5 (L) 09/14/2022    MCV 88.5 09/14/2022     09/14/2022     Lab Results   Component Value Date     09/14/2022    K 4.0 09/14/2022     (H) 09/14/2022    CO2 23 09/14/2022    BUN 29 (H) 09/14/2022    CREATININE 1.3 (H) 09/14/2022    GLUCOSE 109 (H) 09/14/2022    CALCIUM 9.3 09/14/2022    PROT 8.1 09/14/2022    LABALBU 3.7 09/14/2022    BILITOT 0.2 09/14/2022    ALKPHOS 93 09/14/2022    AST 30 09/14/2022    ALT 18 09/14/2022    LABGLOM 49 09/14/2022    GFRAA 49 09/14/2022     Lab Results   Component Value Date    CEA 12.1 (H) 04/20/2022     Impression/Plan:  71 y.o. female with Hx of smoking who underwent: (1) Bronchoscopy. (2) Right VATS. (3) VATS right upper lobe wedge resection. (4) VATS right upper lobectomy. (5) Intercostal nerve block from T3 to T10. (6) Mediastinal lymph node dissection on 11/11/2015:   Pathology:  Right lung, upper lobectomy: Invasive, moderately differentiated adenocarcinoma (Grade 2). Tumor size 1.5 cm in greatest dimension. Visceral pleural invasion: Not identified. Visceral pleural invasion: Not identified. Surgical margin negative for malignancy. Two of three peribronchial lymph nodes positive for adenocarcinoma. pT1a N1 MX;     Being followed for a left upper lobe mass by Dr. Sintia Smith. Multiple biopsies in the past came back negative for malignancy. Hypermetabolic on PET/CT scan on 09/29/2015 (2.3 cm in size with SUV of 6.4). CT guided biopsy of the left upper lobe lesion on 11/02/2015 was noted to be negative for malignancy.    She was referred to the medical oncology clinic to discuss adjuvant chemotherapy. We recommended 4 cycles of adjuvant chemotherapy consisting of Carboplatin/Alimta. Mediport placed 12/7/15. -MRI Brain on 12/8/15:  Negative for metastatic disease.   -We will repeat CT chest and PET/CT after 4 cycles of Carboplatin/Alimta. To follow on Lingular lesion as well. -Molecular studies (EGFR, ALK, ROS-1) were all Not Detected. Cycle # 1 of Janelle Jump was on 12/09/2015. Cycle # 2 of Janelle Jump was on 01/06/2016. Cycle # 3 of Janelle Jump was on 01/27/2016. Cycle # 4 of Janelle Jump was on 02/24/2016. PET SCAN 3.2016: The 2.1 cm left lung mass abutting the major fissure is hypermetabolic with SUV max of 5.4. Finding is worrisome for tumor with avid glucose metabolism. 2. Elsewhere there is unremarkable distribution of FDG activity without evidence of hypermetabolic metastases. On 03/29/2016 underwent Bronch/Left VATS/VATS wedge ROMELIA/VATS Left upper lobectomy/Mediasinal lymph node dissection/Intercostal nerve block from T3-T10 per Dr. Emeka Hutson. Final pathology revealed Stage I Adenocarcinoma of ROMELIA (morphologically different from the adenocarcinoma previously diagnosed in the right upper lobe. Therefore, synchronous primary tumors are favored). A. Left lung, upper lobe wedge resection: Invasive, well-differentiated adenocarcinoma (grade 1); Tumor size-1.4 cm in greatest dimension; Surgical margins-negative for malignancy; Lymphovascular invasion-not identified; TNM classification-pT2a N0 MX  B. AP window lymph node #1, excision: Anthracotic lymph node; negative for malignancy  C. AP window lymph node #2, excision: Anthracotic lymph node; negative for malignancy   D. Periaortic lymph node #1, excision: Fibroadipose tissue; lymph node not identified  E. Bronchial lymph node #1, excision: Anthracotic lymph node; negative for malignancy  F.  Left lung, upper lobectomy: Emphysematous change; negative for malignancy  4 anthracotic lymph nodes negative for malignancy   G. Inferior pulmonary ligament lymph node, excision: Anthracotic lymph node; negative for malignancy  H. Bronchial lymph node, excision: Anthracotic lymph node; negative for malignancy. Right side Mediport was removed on 11/04/2016 by Dr. Yvette West. On surveillance per NCCN guidelines. CT chest 04/12/2017 noted no convincing evidence of recurrent disease. CT chest 10/19/2017 noted no definite evidence for recurrent malignancy. CT chest 03/12/2018 negative for pulmonary parenchymal masses or enlarged mediastinal or hilar LN. ? 2 cm lesion in spleen difficult to evaluate due to the arterial phase of the study. CT Abd/Pelvis 05/08/2018  Enhancing lesion within the spleen is relatively stable to slightly smaller when compared to April 2014 exam and likely splenic hemangioma. Other indeterminate findings (left periaortic LN, sclerotic/blastic foci in L3 and L1 reported by Dr. Leopoldo Gardner from radiology team). PET/CT scan 06/05/2018 unremarkable. No FDG avid uptake identified. No evidence for recurrent or metastatic disease. CT Chest 12/13/2018 noted no evidence of recurrent/metastatic disease. CT chest on 06/11/2019 noted no evidence for worrisome residual or recurrent malignancy. No evidence for new lymphadenopathy or metastatic disease. Stable scarring and postoperative changes right upper lobe. CT chest 11/26/2019: No evidence of active neoplasm. CT chest 06/15/2020: No evidence of active neoplasm. CT chest 12/30/2020: Postsurgical changes and postsurgical scarring seen within the right lung apex. There is no evidence of tumor recurrence. 2.1 x 2.3 cm enhancing lesion seen within the spleen  PET/CT scan 03/02/2021   No FDG avid uptake is identified which exceeds the threshold SUV.   No convincing evidence for recurrent or metastatic disease  CT chest 09/28/2021 No evidence of tumor recurrence    Recent abdominal pain; ER visit reviewed  CT abdomen/pelvis 02/20/2022   6 mm right lower lobe pulmonary nodule  Multiple peritoneal cystic masses     CEA 12.5 on 03/16/2022    CT chest 04/05/2022 Ground-glass nodule right lower lobe superolateral portion 10 mm unchanged from prior however in the medial segment right lower lobe with pleural abutment is a 7 mm pulmonary nodule increased in size from 09/28/2021 with is barely visible at 2-3 mm; repeat CT chest in 3 mo    PET/CT scan 04/05/2022 noted No FDG avid uptake is identified which exceeds the threshold SUV     Bilateral screening mammogram 04/20/2022: Negative for malignancy    CEA     12.1 on 04/20/2022  CA-125 32.8 on 04/20/2022   <2 on 04/20/2022  Chromogranin A 1480 on 04/20/2022. EGD/Colonoscopy 05/13/2022: Gastric polyps , duodenal polyp moderate gastroduodenitis   A. Stomach, biopsy: Hyperplastic polyp and moderate chronic active gastritis; negative for intestinal metaplasia   Immunostain negative for Helicobacter pylori organisms   B. Duodenum, biopsy: Chronic duodenitis with features of peptic duodenitis   C. Colon, 20 cm biopsy: Fragments of tubular adenoma and hyperplastic polyp   Pathology reviewed. Referred to HBP team (Dr. Zoltan Vila) for further evaluation of peritoneal cystic masses  CT abdomen/pelvis 06/23/2022 numerous cystic structures identified throughout the right flank and retroperitoneum. Laparoscopic robotic peritoneal mass resection on 07/06/2022  Findings included: serous cystic masses along the colon, periportal lymphadenopathy, fibrotic appearing liver, chronic cholecystitis    Peritoneal fluid Negative for malignant cells. Cellblock shows reactive mesothelial cells, lymphocytes, adipose/stromal tissue, and blood. Monolayer preparation shows few reactive mesothelial cells and blood. A.  Lymph node, periportal, biopsy:   - Reactive node showing follicular hyperplasia, negative for malignancy, see comment.    B.  Remnant gallbladder, cholecystectomy:   - Portion of gallbladder wall showing dense fibrosis/scar and occluded mariaelena-gallbladder vessels. C. Soft tissue, peritoneal sac, excision:   - Peritoneal inclusion cysts (benign cystic mesothelioma) and unremarkable fibroadipose tissue. D.  Liver, needle biopsy:   - Benign hepatic parenchyma showing focal periportal and incomplete septal fibrosis (stage 2)   - Negative for significant lobular/portal necroinflammatory activity, see comment. Comment:   Sections of the lymph node in specimen A reveal normal anat architecture with secondary follicular hyperplasia. There are no cytologically atypical lymphoid cells or Hancock-Marsha cells identified. Periportal LN Flow Cytometry noted 4.5% monoclonal B cells detected in a predominantly polyclonal background. Monoclonal B cell population (4.5% of total cells) without detectable CD5, CD10 or CD11c expression in a predominantly polyclonal background, raising the differential between a monoclonal B-cell population of undetermined significance versus a B-cell lymphoma/leukemia. In the context of B-cell lymphoma the main differential based on immunophenotype includes, but is not limited to marginal zone lymphoma/leukemia, lymphoplasmacytic lymphoma, AA06- negative follicular lymphoma, and less likely large b cell lymphoma. In specimen D, there is no evidence of chronic hepatitis or significant steatosis. Histologic features of cirrhosis including nodules of regenerating hepatocytes and complete portal-portal bridging fibrosis are   not identified. Focal periportal fibrosis and incomplete septal fibrosis are confirmed by trichrome stain. Iron stain is also performed and is negative.      Periportal lymph node flow cytometry showing 4.5% monoclonal B cells without detectable CD5, CD10 or CD11c expression in a predominantly polyclonal background, raising the differential between a monoclonal B-cell population of undetermined significance region of the lesion in the right lower lobe there is FDG avid tracer uptake peak SUV is 3.2. Imaging reviewed. Labs reviewed. Pathology [facility] reviewed.   CT guided core needle biopsy RLL lung nodule ordered and scheduled on 09/20/2022    RTC 09/28/2022 to review surgical pathology and treatment plan accordingly    Florin Spencer MD   [id number]/10/[id number]  Board Certified Medical Oncologist

## 2022-09-20 ENCOUNTER — HOSPITAL ENCOUNTER (OUTPATIENT)
Dept: CT IMAGING | Age: 72
Discharge: HOME | End: 2022-09-22
Payer: MEDICARE

## 2022-09-20 ENCOUNTER — HOSPITAL ENCOUNTER (OUTPATIENT)
Dept: GENERAL RADIOLOGY | Age: 72
Discharge: HOME | End: 2022-09-22
Payer: MEDICARE

## 2022-09-20 VITALS
TEMPERATURE: 98.2 F | RESPIRATION RATE: 16 BRPM | SYSTOLIC BLOOD PRESSURE: 186 MMHG | HEART RATE: 72 BPM | OXYGEN SATURATION: 100 % | DIASTOLIC BLOOD PRESSURE: 74 MMHG

## 2022-09-20 DIAGNOSIS — C34.90 NON-SMALL CELL LUNG CANCER, UNSPECIFIED LATERALITY (HCC): ICD-10-CM

## 2022-09-20 LAB
INR BLD: 1
PROTHROMBIN TIME: 11.7 SEC (ref 9.3–12.4)

## 2022-09-20 PROCEDURE — 88360 TUMOR IMMUNOHISTOCHEM/MANUAL: CPT

## 2022-09-20 PROCEDURE — 88342 IMHCHEM/IMCYTCHM 1ST ANTB: CPT

## 2022-09-20 PROCEDURE — 88374 M/PHMTRC ALYS ISHQUANT/SEMIQ: CPT

## 2022-09-20 PROCEDURE — 88341 IMHCHEM/IMCYTCHM EA ADD ANTB: CPT

## 2022-09-20 PROCEDURE — 88305 TISSUE EXAM BY PATHOLOGIST: CPT

## 2022-09-20 PROCEDURE — 7100000011 HC PHASE II RECOVERY - ADDTL 15 MIN

## 2022-09-20 PROCEDURE — 2709999900 CT NEEDLE BIOPSY LUNG PERCUTANEOUS W IMAGING GUIDANCE

## 2022-09-20 PROCEDURE — 36415 COLL VENOUS BLD VENIPUNCTURE: CPT

## 2022-09-20 PROCEDURE — 71045 X-RAY EXAM CHEST 1 VIEW: CPT

## 2022-09-20 PROCEDURE — 85610 PROTHROMBIN TIME: CPT

## 2022-09-20 PROCEDURE — 88377 M/PHMTRC ALYS ISHQUANT/SEMIQ: CPT

## 2022-09-20 PROCEDURE — 7100000010 HC PHASE II RECOVERY - FIRST 15 MIN

## 2022-09-20 PROCEDURE — 6360000002 HC RX W HCPCS: Performed by: RADIOLOGY

## 2022-09-20 RX ORDER — DIPHENHYDRAMINE HYDROCHLORIDE 50 MG/ML
INJECTION INTRAMUSCULAR; INTRAVENOUS
Status: COMPLETED | OUTPATIENT
Start: 2022-09-20 | End: 2022-09-20

## 2022-09-20 RX ORDER — FENTANYL CITRATE 50 UG/ML
INJECTION, SOLUTION INTRAMUSCULAR; INTRAVENOUS
Status: COMPLETED | OUTPATIENT
Start: 2022-09-20 | End: 2022-09-20

## 2022-09-20 RX ORDER — MIDAZOLAM HYDROCHLORIDE 5 MG/ML
INJECTION INTRAMUSCULAR; INTRAVENOUS
Status: COMPLETED | OUTPATIENT
Start: 2022-09-20 | End: 2022-09-20

## 2022-09-20 RX ADMIN — MIDAZOLAM HYDROCHLORIDE 0.5 MG: 5 INJECTION INTRAMUSCULAR; INTRAVENOUS at 12:45

## 2022-09-20 RX ADMIN — FENTANYL CITRATE 25 MCG: 50 INJECTION, SOLUTION INTRAMUSCULAR; INTRAVENOUS at 12:19

## 2022-09-20 RX ADMIN — FENTANYL CITRATE 25 MCG: 50 INJECTION, SOLUTION INTRAMUSCULAR; INTRAVENOUS at 12:29

## 2022-09-20 RX ADMIN — MIDAZOLAM HYDROCHLORIDE 0.5 MG: 5 INJECTION INTRAMUSCULAR; INTRAVENOUS at 12:29

## 2022-09-20 RX ADMIN — MIDAZOLAM HYDROCHLORIDE 0.5 MG: 5 INJECTION INTRAMUSCULAR; INTRAVENOUS at 12:19

## 2022-09-20 RX ADMIN — FENTANYL CITRATE 25 MCG: 50 INJECTION, SOLUTION INTRAMUSCULAR; INTRAVENOUS at 12:57

## 2022-09-20 RX ADMIN — DIPHENHYDRAMINE HYDROCHLORIDE 25 MG: 50 INJECTION, SOLUTION INTRAMUSCULAR; INTRAVENOUS at 12:19

## 2022-09-20 ASSESSMENT — ENCOUNTER SYMPTOMS
SHORTNESS OF BREATH: 1
COUGH: 1
GASTROINTESTINAL NEGATIVE: 1
ALLERGIC/IMMUNOLOGIC NEGATIVE: 1
EYES NEGATIVE: 1

## 2022-09-20 NOTE — H&P
mouth daily, Disp: 90 tablet, Rfl: 5    Misc. Devices KIT, BP monitoring KIT, Disp: 1 kit, Rfl: 0  No current facility-administered medications for this encounter.     Facility-Administered Medications Ordered in Other Encounters:     pantoprazole (PROTONIX) tablet 40 mg, 40 mg, Oral, QAM AC, Treasure Sheppard MD    Allergies   Allergen Reactions    Ibuprofen Palpitations and Rash       Past Medical History:   Diagnosis Date    Abnormal chest x-ray with multiple lung nodules 9/8/2015    Abnormal Pap smear 1/3/2011    Alpha-1 negative    Adrenal adenoma     7 mm    Bilateral lung cancer ([address] 5/12/2016    surgically removed - 2015     Cholelithiasis 6/6/2011    Encounter for screening colonoscopy     for OR 3-28-19     Fibroids     Hemangioma of spleen     Hepatitis C 01/03/2011    treated and cured, no current issues     Hyperlipidemia     no medications     Hypertension 6/6/2011    Insomnia     Lung nodule     ROMELIA     Lymphadenopathy     Cervical (-) ENT    Malignant neoplasm of upper lobe of right lung ([address] 11/18/2015    Osteoarthritis     Panlobular emphysema ([address] 11/12/2015    controlled with inhalers     PPD negative 1/18/12    Pulmonary embolism without acute cor pulmonale ([address] 5/12/2016    PVD (peripheral vascular disease) ([address] 5/6/2014    Scoliosis     Uterine fibroid 5/6/2014       Past Surgical History:   Procedure Laterality Date    APPENDECTOMY  1965    BREAST BIOPSY Left     AGE 30'S LEFT NIPPLE REMOVED    BREAST LUMPECTOMY  4/28/1999    left, benign, Jesu Orellana  2/1/2012    Dr. Puneet Nunez [place]  10/15/15    with EBUS - DR Mihir Castro    CHOLECYSTECTOMY      COLONOSCOPY  12/21/2009    partial to distal ascending colon normal (completion BE same day normal), Dr. Julieta Del Rio, [facility]    COLONOSCOPY  8/22/2014    done under fluoro with sigmoid straightening device so was able to get to cecum, very poor prep, moderate proctitis, repeat 5 years,  Dr. Julieta Del Rio, [facility] COLONOSCOPY N/A 3/28/2019    COLONOSCOPY POLYPECTOMY SNARE/COLD BIOPSY performed by Bernadette Wills DO at [facility] ENDOSCOPY    COLONOSCOPY N/A 2022    COLONOSCOPY POLYPECTOMY SNARE/COLD BIOPSY performed by Day Cartwright MD at [address] [place] [address] Right 2022    LAPAROSCOPIC ROBOTIC PERITONEAL MASS RESECTION performed by Lessie Phoenix, MD at [place] [address] Left 3/29/2016    VATS WEDGE RESECTION UPPER LOBECTOMY    OTHER SURGICAL HISTORY Right 2016    REMOVAL MEDI-PORT - DR Aguila Cordero    [place] LAP,CHOLECYSTECTOMY/GRAPH N/A 2018    CHOLECYSTECTOMY LAPAROSCOPIC, POSSIBLE OPEN,POSSIBLE GRAM ( OC 2) performed by Shamika Pierre MD at [place] [address] Right 2015    VATS, BRONCH,RT UPPER LOBECTOMY; NODE DISECTION    TUNNELED VENOUS PORT PLACEMENT Right 2015    right chest, and removed     UPPER GASTROINTESTINAL ENDOSCOPY N/A 2022    EGD POLYP HOT FORCEP/CAUTERY performed by Day Cartwright MD at [facility] ENDOSCOPY       Family History   Problem Relation Age of Onset    Heart Disease Mother     High Blood Pressure Mother     Cancer Mother     Heart Disease Father     High Blood Pressure Father     Kidney Disease Father     Diabetes Sister     Diabetes Brother        Social History     Socioeconomic History    Marital status: Single     Spouse name: Not on file    Number of children: Not on file    Years of education: Not on file    Highest education level: Not on file   Occupational History    Not on file   Tobacco Use    Smoking status: Former     Packs/day: 0.00     Years: 30.00     Pack years: 0.00     Types: Cigarettes     Quit date: 2015     Years since quittin.2    Smokeless tobacco: Never   Vaping Use    Vaping Use: Never used   Substance and Sexual Activity    Alcohol use:  Yes     Alcohol/week: 2.0 standard drinks     Types: 2 Glasses of wine per week     Comment: x2 week    Drug use: No    Sexual activity: Not Currently   Other Topics Concern    Not on file   Social History Narrative    Not on file     Social Determinants of Health     Financial Resource Strain: Not on file   Food Insecurity: Not on file   Transportation Needs: Not on file   Physical Activity: Not on file   Stress: Not on file   Social Connections: Not on file   Intimate Partner Violence: Not on file   Housing Stability: Not on file       ROS: Non-contributory other than as noted above    PHYSICAL EXAM:      Heart and Lungs:  demonstrate no contraindications to proceed    DATA:  CBC:   Lab Results   Component Value Date/Time    WBC 5.7 09/14/2022 10:12 AM    RBC 2.88 09/14/2022 10:12 AM    HGB 8.4 09/14/2022 10:12 AM    HCT 25.5 09/14/2022 10:12 AM    MCV 88.5 09/14/2022 10:12 AM    MCH 29.2 09/14/2022 10:12 AM    MCHC 32.9 09/14/2022 10:12 AM    RDW 13.9 09/14/2022 10:12 AM     09/14/2022 10:12 AM    MPV 10.7 09/14/2022 10:12 AM     CBC with Differential:    Lab Results   Component Value Date/Time    WBC 5.7 09/14/2022 10:12 AM    RBC 2.88 09/14/2022 10:12 AM    HGB 8.4 09/14/2022 10:12 AM    HCT 25.5 09/14/2022 10:12 AM     09/14/2022 10:12 AM    MCV 88.5 09/14/2022 10:12 AM    MCH 29.2 09/14/2022 10:12 AM    MCHC 32.9 09/14/2022 10:12 AM    RDW 13.9 09/14/2022 10:12 AM    SEGSPCT 61 01/16/2012 12:15 PM    LYMPHOPCT 16.3 09/14/2022 10:12 AM    MONOPCT 8.1 09/14/2022 10:12 AM    BASOPCT 0.2 09/14/2022 10:12 AM    MONOSABS 0.46 09/14/2022 10:12 AM    LYMPHSABS 0.92 09/14/2022 10:12 AM    EOSABS 0.03 09/14/2022 10:12 AM    BASOSABS 0.01 09/14/2022 10:12 AM     Platelets:    Lab Results   Component Value Date/Time     09/14/2022 10:12 AM     BUN/Creatinine:    Lab Results   Component Value Date/Time    BUN 29 09/14/2022 10:12 AM    CREATININE 1.3 09/14/2022 10:12 AM       ASSESSMENT AND PLAN:  1. Lung nodule  2. Procedure options, risks and benefits reviewed with patient. Patient expresses understanding.     Electronically signed by Fitz Torrez MD on 9/20/2022 at 3:54 PM

## 2022-09-20 NOTE — BRIEF OP NOTE
Brief Postoperative Note      Patient: Angelica Jimenes  YOB: 1950  MRN: [id number]    Date of Procedure: 9/20/2022    Pre-Op Diagnosis: * Lung neoplasm    Post-Op Diagnosis: Same    CT guided lung biopsy    REJI Godoy MD    Assistant:  * No surgical staff found *    Anesthesia: * No anesthesia type entered *    Estimated Blood Loss (mL): Minimal    Complications: None    Specimens:   * No specimens in log *    Implants:  * No implants in log *      Drains: * No LDAs found *    Findings:  20 G cores obtained    Electronically signed by Nadeem Barron MD on 9/20/2022 at 3:55 PM

## 2022-09-20 NOTE — PROGRESS NOTES
Patient arrived to radiology department for Image guided lung biopsy. Allergies, home medications, H&P and fasting instructions reviewed with patient. Vital signs taken. IV placed, blood obtained, IV flushed and prn adapter attached. Blood sample sent to lab for ordered tests. Procedural instructions given, questions answered, understanding expressed and consent signed.  Patient given fluoroscopy education, no questions at this time

## 2022-09-20 NOTE — H&P
Review of Systems   Constitutional: Negative. HENT: Negative. Eyes: Negative. Respiratory:  Positive for cough and shortness of breath. Cardiovascular: Negative. Gastrointestinal: Negative. Endocrine: Positive for cold intolerance. Genitourinary: Negative. Musculoskeletal: Negative. Allergic/Immunologic: Negative. Neurological: Negative. Hematological: Negative. Psychiatric/Behavioral: Negative.        Physical Exam

## 2022-09-20 NOTE — PROGRESS NOTES
Patient returned from CT for right lung biopsy, VS obtained, will check every 15 minutes for the first hour, 30 minutes the following hour. Biopsy site checked, no bleed through. Chest x-ray scheduled for 1500.

## 2022-09-28 ENCOUNTER — OFFICE VISIT (OUTPATIENT)
Dept: ONCOLOGY | Age: 72
End: 2022-09-28
Payer: MEDICARE

## 2022-09-28 ENCOUNTER — HOSPITAL ENCOUNTER (OUTPATIENT)
Dept: INFUSION THERAPY | Age: 72
Discharge: HOME | End: 2022-09-28

## 2022-09-28 VITALS
OXYGEN SATURATION: 99 % | WEIGHT: 121.6 LBS | HEART RATE: 81 BPM | SYSTOLIC BLOOD PRESSURE: 153 MMHG | DIASTOLIC BLOOD PRESSURE: 68 MMHG | TEMPERATURE: 97.4 F | BODY MASS INDEX: 22.96 KG/M2 | HEIGHT: 61 IN

## 2022-09-28 DIAGNOSIS — C34.90 NON-SMALL CELL LUNG CANCER, UNSPECIFIED LATERALITY (HCC): Primary | ICD-10-CM

## 2022-09-28 PROCEDURE — 1090F PRES/ABSN URINE INCON ASSESS: CPT | Performed by: INTERNAL MEDICINE

## 2022-09-28 PROCEDURE — 1123F ACP DISCUSS/DSCN MKR DOCD: CPT | Performed by: INTERNAL MEDICINE

## 2022-09-28 PROCEDURE — 1036F TOBACCO NON-USER: CPT | Performed by: INTERNAL MEDICINE

## 2022-09-28 PROCEDURE — G8399 PT W/DXA RESULTS DOCUMENT: HCPCS | Performed by: INTERNAL MEDICINE

## 2022-09-28 PROCEDURE — 3017F COLORECTAL CA SCREEN DOC REV: CPT | Performed by: INTERNAL MEDICINE

## 2022-09-28 PROCEDURE — G8420 CALC BMI NORM PARAMETERS: HCPCS | Performed by: INTERNAL MEDICINE

## 2022-09-28 PROCEDURE — G8427 DOCREV CUR MEDS BY ELIG CLIN: HCPCS | Performed by: INTERNAL MEDICINE

## 2022-09-28 PROCEDURE — 99213 OFFICE O/P EST LOW 20 MIN: CPT

## 2022-09-28 PROCEDURE — 99214 OFFICE O/P EST MOD 30 MIN: CPT | Performed by: INTERNAL MEDICINE

## 2022-09-28 NOTE — PROGRESS NOTES
Department of [facility] Oncology  Attending Clinic Note    Reason for Visit: Follow-up on a patient with NSCLC    PCP:  Keren Burnham MD    History of Present Illness:   71 y.o. -American female with significant history of tobacco abuse of approximately 30 pack years, quit smoking June 7, 2015. She has been followed for a left upper lobe mass by Dr. Vinny Pollock. Multiple biopsies in the past came back negative for malignancy. CT scan chest on 08/31/2015 showed a new right upper lobe mass. Bronchoscopy with washing RUL mass on 10/15/2015 was positive for RUL Lung Adenocarcinoma. Negative for malignancy on BAL and TBNA of Lingular lesion. PET/CT scan on 09/29/2015:  1. New since prior examination on CT is 1.7 cm right paratracheal   nodule. Nodule is intensely hypermetabolic with SUV max of 4.8   worrisome for tumor. 2. Previously known nodule in the lingula now measures 2.3 cm in   size. SUV max has increased from 1.7 on the 2012 examination to   6.4 at this time. At this time findings are worrisome for   hypermetabolic tumor with avid glucose metabolism. Therefore, she was referred to see Dr. Anirudh Fulton for resection. CT guided biopsy of the left upper lobe lesion on 11/02/2015 was noted to be negative for malignancy. On 11/11/2015 She underwent: (1) Bronchoscopy. (2) Right VATS. (3) VATS right upper lobe wedge resection. (4) VATS right upper lobectomy. (5) Intercostal nerve block from T3 to T10. (6) Mediastinal lymph node dissection. Pathology:  Right lung, upper lobectomy: Invasive, moderately differentiated adenocarcinoma (Grade 2). Tumor size 1.5 cm in greatest dimension. Surgical margin negative for malignancy. Two of three peribronchial lymph nodes positive for adenocarcinoma. pT1a N1 MX  She was referred to the medical oncology clinic to discuss adjuvant chemotherapy. We recommended 4 cycles of adjuvant chemotherapy consisting of Carboplatin/Alimta. Mediport placed 12/07/2015. Cycle # 1 of Joel Darleen was on 12/09/2015. Cycle # 2 of Tomah Darleen was on 01/06/2016. Cycle # 3 of Joelbrendan Stoveri was on 01/27/2016. Cycle # 4 of Tomah Darleen was on 02/24/2016. PET SCAN 3.2016: The 2.1 cm left lung mass abutting the major fissure is hypermetabolic with SUV max of 5.4. Finding is worrisome for tumor with avid glucose metabolism. 2. Elsewhere there is unremarkable distribution of FDG activity without evidence of hypermetabolic metastases. On 03/29/2016 underwent Bronch/Left VATS/VATS wedge ROMELIA/VATS Left upper lobectomy/Mediasinal lymph node dissection/Intercostal nerve block from T3-T10 per Dr. Teetee Vargas. Invasive, well-differentiated adenocarcinoma (grade 1); Tumor size-1.4 cm in greatest dimension; Surgical margins-negative for malignancy; Lymphovascular invasion-not identified; TNM classification-pT2a N0 MX  B. AP window lymph node #1, excision: Anthracotic lymph node; negative for malignancy  C. AP window lymph node #2, excision: Anthracotic lymph node; negative for malignancy   D. Periaortic lymph node #1, excision: Fibroadipose tissue; lymph node not identified  E. Bronchial lymph node #1, excision: Anthracotic lymph node; negative for malignancy  F. Left lung, upper lobectomy: Emphysematous change; negative for malignancy  4 anthracotic lymph nodes negative for malignancy   G. Inferior pulmonary ligament lymph node, excision: Anthracotic lymph node; negative for malignancy  H. Bronchial lymph node, excision: Anthracotic lymph node; negative for malignancy. On surveillance per NCCN guidelines.       Recent abdominal pain; ER visit reviewed  CT abdomen/pelvis 02/20/2022   6 mm right lower lobe pulmonary nodule  Multiple peritoneal cystic masses     CEA 12.5 on 03/16/2022    CT chest 04/05/2022 Ground-glass nodule right lower lobe superolateral portion 10 mm unchanged from prior however in the medial segment right lower lobe with pleural abutment is a 7 mm pulmonary nodule increased in size from 09/28/2021 with is barely visible at 2-3 mm; repeat CT chest in 3 mo    PET/CT scan 04/05/2022 noted No FDG avid uptake is identified which exceeds the threshold SUV    Bilateral Screening Mammogram 04/20/2022: Negative for malignancy    CEA 12.1 on 04/20/2022  CA-125 32.8 on 04/20/2022   <2 on 04/20/2022  Chromogranin A 1480 on 04/20/2022. EGD/Colonoscopy 05/13/2022: Gastric polyps , duodenal polyp moderate gastroduodenitis   A. Stomach, biopsy: Hyperplastic polyp and moderate chronic active gastritis; negative for intestinal metaplasia   Immunostain negative for Helicobacter pylori organisms   B. Duodenum, biopsy: Chronic duodenitis with features of peptic duodenitis   C. Colon, 20 cm biopsy: Fragments of tubular adenoma and hyperplastic polyp     Referred to HBP team (Dr. Lita Hinson) for further evaluation of peritoneal cystic masses  CT abdomen/pelvis 06/23/2022 numerous cystic structures identified throughout the right flank and retroperitoneum    Laparoscopic robotic peritoneal mass resection on 07/06/2022  Findings included: serous cystic masses along the colon, periportal lymphadenopathy, fibrotic appearing liver, chronic cholecystitis  Peritoneal fluid Negative for malignant cells. Cellblock shows reactive mesothelial cells, lymphocytes, adipose/stromal tissue, and blood. Monolayer preparation shows few reactive mesothelial cells and blood. A.  Lymph node, periportal, biopsy:   - Reactive node showing follicular hyperplasia, negative for malignancy, see comment. B.  Remnant gallbladder, cholecystectomy:   - Portion of gallbladder wall showing dense fibrosis/scar and occluded mariaelena-gallbladder vessels. C. Soft tissue, peritoneal sac, excision:   - Peritoneal inclusion cysts (benign cystic mesothelioma) and unremarkable fibroadipose tissue.    D.  Liver, needle biopsy:   - Benign hepatic parenchyma showing focal periportal and incomplete septal fibrosis (stage 2)   - Negative for significant lobular/portal necroinflammatory activity, see comment. Comment:   Sections of the lymph node in specimen A reveal normal anat architecture with secondary follicular hyperplasia. There are no cytologically atypical lymphoid cells or [place]-Marsha cells identified. Periportal LN Flow Cytometry noted 4.5% monoclonal B cells detected in a predominantly polyclonal background. Monoclonal B cell population (4.5% of total cells) without detectable CD5, CD10 or CD11c expression in a predominantly polyclonal background, raising the differential between a monoclonal B-cell population of undetermined significance versus a B-cell lymphoma/leukemia. In the context of B-cell lymphoma the main differential based on immunophenotype includes, but is not limited to marginal zone lymphoma/leukemia, lymphoplasmacytic lymphoma, RO24- negative follicular lymphoma, and less likely large b cell lymphoma. In specimen D, there is no evidence of chronic hepatitis or significant steatosis. Histologic features of cirrhosis including nodules of regenerating hepatocytes and complete portal-portal bridging fibrosis are   not identified. Focal periportal fibrosis and incomplete septal fibrosis are confirmed by trichrome stain. Iron stain is also performed and is negative. Periportal lymph node flow cytometry showing 4.5% monoclonal B cells without detectable CD5, CD10 or CD11c expression in a predominantly polyclonal background, raising the differential between a monoclonal B-cell population of undetermined significance versus a B-cell lymphoma/leukemia    Repeat CT chest on 08/04/2022  Postsurgical changes are identified in the upper lobes bilaterally. 1.1 cm ground-glass nodule in the right lower lobe and a smaller 1 measuring 0.9 cm are noted without change.    There is a 1.6 x 1.2 cm soft tissue mass in the right lower lobe medially which is significantly increased since the previous examination. Bilateral adrenal nodules are noted, larger 1 on the left side measuring 1.8 x 1.6 cm.  1.2 cm right renal cystic lesion is identified. An ill-defined hypodense lesion is identified in the spleen currently measuring 2.9 x 3.5 cm     Evaluated on 8/15/2022 by Dr. Emmanuel Dowling at [facility] in [facility] who recommended proceeding with a biopsy of the enlarging right lower lobe lung mass. While lymphoma certainly possible, biopsy recommended to rule out recurrent lung cancer. Even if lymphoma is concerned, likely low-grade and not causing her symptoms so observation would be recommended. PET/CT scan, CT-guided core needle biopsy of enlarging right lower lobe lung mass recommended. PET/CT scan 09/06/2022: In the region of the lesion in the right lower lobe there is FDG avid tracer uptake peak SUV is 3.2. CT guided core needle biopsy RLL lung nodule on 09/20/2022    Today 09/28/2022. No fever, chills. Fair appetite and energy level. No chest pain or SOB. Anxious about the biopsy results. Review of Systems;  CONSTITUTIONAL: No fever, chills. Fair appetite and energy level. ENMT: Eyes: No diplopia; Nose: No epistaxis. Mouth: No sore throat. RESPIRATORY: No hemoptysis, shortness of breath. CARDIOVASCULAR: No chest pain, palpitations. GASTROINTESTINAL: No nausea, vomiting. Recent abdominal pain  GENITOURINARY: No dysuria, urinary frequency, hematuria. NEURO: No syncope, presyncope, headache. Remainder ROS: Negative.     Past Medical History:      Diagnosis Date    Hypertension 6/6/2011    Abnormal Pap smear 1/3/2011     Alpha-1 negative    Cholelithiasis 6/6/2011    Osteoarthritis     Lymphadenopathy      Cervical (-) ENT    Lung nodule      ROMELIA     Scoliosis     PPD negative 1/18/12    Adrenal adenoma      7 mm    Hemangioma of spleen     Insomnia     PVD (peripheral vascular disease) ([facility] [address] 5/6/2014    Uterine fibroid 5/6/2014    Hepatitis C 1/3/2011    Fibroids     Abnormal chest x-ray with multiple lung nodules 9/8/2015    Hyperlipidemia     Panlobular emphysema ([facility] [address] 11/12/2015    Malignant neoplasm of upper lobe of right lung ([facility] 11/18/2015     Medications:  Reviewed and reconciled. Allergies: Allergies   Allergen Reactions    Ibuprofen Palpitations and Rash     Physical Exam:  BP (!) 153/68   Pulse 81   Temp 97.4 °F (36.3 °C)   Ht 5' 1\" (1.549 m)   Wt 121 lb 9.6 oz (55.2 kg)   SpO2 99%   BMI 22.98 kg/m²    GENERAL: Alert, oriented x 3, not in acute distress. HEENT: PERRLA; EOMI. Oropharynx clear. LUNGS: CTA Pérez  CVS: RRR  EXTREMITIES: Without clubbing, cyanosis, or edema. NEUROLOGIC: No focal deficits. ECOG PS 1    Lab Results   Component Value Date    WBC 5.7 09/14/2022    HGB 8.4 (L) 09/14/2022    HCT 25.5 (L) 09/14/2022    MCV 88.5 09/14/2022     09/14/2022     Lab Results   Component Value Date     09/14/2022    K 4.0 09/14/2022     (H) 09/14/2022    CO2 23 09/14/2022    BUN 29 (H) 09/14/2022    CREATININE 1.3 (H) 09/14/2022    GLUCOSE 109 (H) 09/14/2022    CALCIUM 9.3 09/14/2022    PROT 8.1 09/14/2022    LABALBU 3.7 09/14/2022    BILITOT 0.2 09/14/2022    ALKPHOS 93 09/14/2022    AST 30 09/14/2022    ALT 18 09/14/2022    LABGLOM 49 09/14/2022    GFRAA 49 09/14/2022     Lab Results   Component Value Date    CEA 12.1 (H) 04/20/2022     Impression/Plan:  71 y.o. female with Hx of smoking who underwent: (1) Bronchoscopy. (2) Right VATS. (3) VATS right upper lobe wedge resection. (4) VATS right upper lobectomy. (5) Intercostal nerve block from T3 to T10. (6) Mediastinal lymph node dissection on 11/11/2015:   Pathology:  Right lung, upper lobectomy: Invasive, moderately differentiated adenocarcinoma (Grade 2). Tumor size 1.5 cm in greatest dimension. Visceral pleural invasion: Not identified. Visceral pleural invasion: Not identified. Surgical margin negative for malignancy.     Two of three peribronchial lymph nodes positive for adenocarcinoma. pT1a N1 MX;     Being followed for a left upper lobe mass by Dr. Micah Pan. Multiple biopsies in the past came back negative for malignancy. Hypermetabolic on PET/CT scan on 09/29/2015 (2.3 cm in size with SUV of 6.4). CT guided biopsy of the left upper lobe lesion on 11/02/2015 was noted to be negative for malignancy. She was referred to the medical oncology clinic to discuss adjuvant chemotherapy. We recommended 4 cycles of adjuvant chemotherapy consisting of Carboplatin/Alimta. Mediport placed 12/7/15. -MRI Brain on 12/8/15:  Negative for metastatic disease.   -We will repeat CT chest and PET/CT after 4 cycles of Carboplatin/Alimta. To follow on Lingular lesion as well. -Molecular studies (EGFR, ALK, ROS-1) were all Not Detected. Cycle # 1 of Berniece Savin was on 12/09/2015. Cycle # 2 of Berniece Savin was on 01/06/2016. Cycle # 3 of Berniece Savin was on 01/27/2016. Cycle # 4 of Berniece Savin was on 02/24/2016. PET SCAN 3.2016: The 2.1 cm left lung mass abutting the major fissure is hypermetabolic with SUV max of 5.4. Finding is worrisome for tumor with avid glucose metabolism. 2. Elsewhere there is unremarkable distribution of FDG activity without evidence of hypermetabolic metastases. On 03/29/2016 underwent Bronch/Left VATS/VATS wedge ROMELIA/VATS Left upper lobectomy/Mediasinal lymph node dissection/Intercostal nerve block from T3-T10 per Dr. Yuriy Xie. Final pathology revealed Stage I Adenocarcinoma of ROMELIA (morphologically different from the adenocarcinoma previously diagnosed in the right upper lobe. Therefore, synchronous primary tumors are favored). A. Left lung, upper lobe wedge resection: Invasive, well-differentiated adenocarcinoma (grade 1); Tumor size-1.4 cm in greatest dimension; Surgical margins-negative for malignancy; Lymphovascular invasion-not identified; TNM classification-pT2a N0 MX  B.  AP window lymph node #1, excision: Anthracotic lymph right lung apex. There is no evidence of tumor recurrence. 2.1 x 2.3 cm enhancing lesion seen within the spleen  PET/CT scan 03/02/2021   No FDG avid uptake is identified which exceeds the threshold SUV. No convincing evidence for recurrent or metastatic disease  CT chest 09/28/2021 No evidence of tumor recurrence    Recent abdominal pain; ER visit reviewed  CT abdomen/pelvis 02/20/2022   6 mm right lower lobe pulmonary nodule  Multiple peritoneal cystic masses     CEA 12.5 on 03/16/2022    CT chest 04/05/2022 Ground-glass nodule right lower lobe superolateral portion 10 mm unchanged from prior however in the medial segment right lower lobe with pleural abutment is a 7 mm pulmonary nodule increased in size from 09/28/2021 with is barely visible at 2-3 mm; repeat CT chest in 3 mo    PET/CT scan 04/05/2022 noted No FDG avid uptake is identified which exceeds the threshold SUV     Bilateral screening mammogram 04/20/2022: Negative for malignancy    CEA     12.1 on 04/20/2022  CA-125 32.8 on 04/20/2022   <2 on 04/20/2022  Chromogranin A 1480 on 04/20/2022. EGD/Colonoscopy 05/13/2022: Gastric polyps , duodenal polyp moderate gastroduodenitis   A. Stomach, biopsy: Hyperplastic polyp and moderate chronic active gastritis; negative for intestinal metaplasia   Immunostain negative for Helicobacter pylori organisms   B. Duodenum, biopsy: Chronic duodenitis with features of peptic duodenitis   C. Colon, 20 cm biopsy: Fragments of tubular adenoma and hyperplastic polyp   Pathology reviewed. Referred to HBP team (Dr. Godfrey Ricketts) for further evaluation of peritoneal cystic masses  CT abdomen/pelvis 06/23/2022 numerous cystic structures identified throughout the right flank and retroperitoneum.      Laparoscopic robotic peritoneal mass resection on 07/06/2022  Findings included: serous cystic masses along the colon, periportal lymphadenopathy, fibrotic appearing liver, chronic cholecystitis    Peritoneal fluid Negative for malignant cells. Cellblock shows reactive mesothelial cells, lymphocytes, adipose/stromal tissue, and blood. Monolayer preparation shows few reactive mesothelial cells and blood. A.  Lymph node, periportal, biopsy:   - Reactive node showing follicular hyperplasia, negative for malignancy, see comment. B.  Remnant gallbladder, cholecystectomy:   - Portion of gallbladder wall showing dense fibrosis/scar and occluded mariaelena-gallbladder vessels. C. Soft tissue, peritoneal sac, excision:   - Peritoneal inclusion cysts (benign cystic mesothelioma) and unremarkable fibroadipose tissue. D.  Liver, needle biopsy:   - Benign hepatic parenchyma showing focal periportal and incomplete septal fibrosis (stage 2)   - Negative for significant lobular/portal necroinflammatory activity, see comment. Comment:   Sections of the lymph node in specimen A reveal normal anat architecture with secondary follicular hyperplasia. There are no cytologically atypical lymphoid cells or [place]-Marsha cells identified. Periportal LN Flow Cytometry noted 4.5% monoclonal B cells detected in a predominantly polyclonal background. Monoclonal B cell population (4.5% of total cells) without detectable CD5, CD10 or CD11c expression in a predominantly polyclonal background, raising the differential between a monoclonal B-cell population of undetermined significance versus a B-cell lymphoma/leukemia. In the context of B-cell lymphoma the main differential based on immunophenotype includes, but is not limited to marginal zone lymphoma/leukemia, lymphoplasmacytic lymphoma, HG43- negative follicular lymphoma, and less likely large b cell lymphoma. In specimen D, there is no evidence of chronic hepatitis or significant steatosis. Histologic features of cirrhosis including nodules of regenerating hepatocytes and complete portal-portal bridging fibrosis are   not identified.   Focal periportal fibrosis and incomplete septal fibrosis are confirmed by trichrome stain. Iron stain is also performed and is negative. Periportal lymph node flow cytometry showing 4.5% monoclonal B cells without detectable CD5, CD10 or CD11c expression in a predominantly polyclonal background, raising the differential between a monoclonal B-cell population of undetermined significance versus a B-cell lymphoma/leukemia    Repeat CT chest to follow on history of NSCLC on 08/04/2022  Postsurgical changes are identified in the upper lobes bilaterally. 1.1 cm ground-glass nodule in the right lower lobe and a smaller 1 measuring 0.9 cm are noted without change. There is a 1.6 x 1.2 cm soft tissue mass in the right lower lobe medially which is significantly increased since the previous examination. Bilateral adrenal nodules are noted, larger 1 on the left side measuring 1.8 x 1.6 cm.  1.2 cm right renal cystic lesion is identified. An ill-defined hypodense lesion is identified in the spleen currently measuring 2.9 x 3.5 cm     Evaluated by Dr. Robe Malone on 08/15/2022 at [facility] who recommended proceeding with a biopsy of the enlarging right lower lobe lung mass. While lymphoma certainly possible, biopsy recommended to rule out recurrent lung cancer. Periportal lymph node biopsy reviewed at [facility]. Lymph node with lipid lymphadenopathy  Small clonal B-cell population detected by flow cytometry and molecular analysis  Negative for carcinoma  Flow cytometry showed a small clonal CD5 -/CD10- B-cell population (4.5% of all events) and a background of polyclonal B cells. B-cell receptor clonality assay was positive for a clonal B-cell population. However there is no morphologic evidence of lymphoma in the lymph node. The detection of a clonal B-cell population despite negative morphologic evidence of lymphoma might represent monoclonal B-cell lymphocytosis process in the lymph node.   An alternative consideration is peripheral blood involvement by B-cell clone (MBL-like process) that was picked up by the tissue flow cytometry and molecular tests. Even if lymphoma is concerned, likely low-grade and not causing her symptoms, so observation would be recommended. PET/CT scan, CT-guided core needle biopsy of enlarging right lower lobe lung mass recommended. PET/CT scan 09/06/2022: In the region of the lesion in the right lower lobe there is FDG avid tracer uptake peak SUV is 3.2. CT guided core needle biopsy RLL lung nodule on 09/20/2022  Right lung, lower lobe core needle biopsy: Adenocarcinoma (see comment)   Comment: The prior history of right upper lobe adenocarcinoma (HES ) and left upper lobe adenocarcinoma (HES ) is noted. The electronic medical record is also reviewed. The adenocarcinoma in the current specimen is weakly immunoreactive with GATA3 and CDX2. The TTF-1 immunostain is negative. Pathology report reviewed. Labs reviewed.    Thoracic Surgery team consulted for surgical evaluation   If unresectable, then consider SBRT by Rad Onc team    Patel Deluca MD   [id number]  Board Certified Medical Oncologist

## 2022-10-05 ENCOUNTER — HOSPITAL ENCOUNTER (OUTPATIENT)
Dept: INFUSION THERAPY | Age: 72
Discharge: HOME | End: 2022-10-05

## 2022-10-05 ENCOUNTER — OFFICE VISIT (OUTPATIENT)
Dept: ONCOLOGY | Age: 72
End: 2022-10-05
Payer: MEDICARE

## 2022-10-05 VITALS
DIASTOLIC BLOOD PRESSURE: 68 MMHG | BODY MASS INDEX: 22.84 KG/M2 | WEIGHT: 121 LBS | OXYGEN SATURATION: 98 % | RESPIRATION RATE: 16 BRPM | SYSTOLIC BLOOD PRESSURE: 158 MMHG | HEIGHT: 61 IN | TEMPERATURE: 97.5 F | HEART RATE: 88 BPM

## 2022-10-05 DIAGNOSIS — C34.90 NON-SMALL CELL LUNG CANCER, UNSPECIFIED LATERALITY (HCC): Primary | ICD-10-CM

## 2022-10-05 DIAGNOSIS — R91.1 LUNG NODULE: ICD-10-CM

## 2022-10-05 PROCEDURE — 3017F COLORECTAL CA SCREEN DOC REV: CPT | Performed by: INTERNAL MEDICINE

## 2022-10-05 PROCEDURE — 1036F TOBACCO NON-USER: CPT | Performed by: INTERNAL MEDICINE

## 2022-10-05 PROCEDURE — 99212 OFFICE O/P EST SF 10 MIN: CPT

## 2022-10-05 PROCEDURE — 99214 OFFICE O/P EST MOD 30 MIN: CPT | Performed by: INTERNAL MEDICINE

## 2022-10-05 PROCEDURE — G8484 FLU IMMUNIZE NO ADMIN: HCPCS | Performed by: INTERNAL MEDICINE

## 2022-10-05 PROCEDURE — G8399 PT W/DXA RESULTS DOCUMENT: HCPCS | Performed by: INTERNAL MEDICINE

## 2022-10-05 PROCEDURE — G8427 DOCREV CUR MEDS BY ELIG CLIN: HCPCS | Performed by: INTERNAL MEDICINE

## 2022-10-05 PROCEDURE — G8420 CALC BMI NORM PARAMETERS: HCPCS | Performed by: INTERNAL MEDICINE

## 2022-10-05 PROCEDURE — 1123F ACP DISCUSS/DSCN MKR DOCD: CPT | Performed by: INTERNAL MEDICINE

## 2022-10-05 PROCEDURE — 1090F PRES/ABSN URINE INCON ASSESS: CPT | Performed by: INTERNAL MEDICINE

## 2022-10-05 NOTE — PROGRESS NOTES
Cycle # 1 of Pvael Alves was on 12/09/2015. Cycle # 2 of Pavel Alves was on 01/06/2016. Cycle # 3 of Pavel Alves was on 01/27/2016. Cycle # 4 of Pavel Alves was on 02/24/2016. PET SCAN 3.2016: The 2.1 cm left lung mass abutting the major fissure is hypermetabolic with SUV max of 5.4. Finding is worrisome for tumor with avid glucose metabolism. 2. Elsewhere there is unremarkable distribution of FDG activity without evidence of hypermetabolic metastases. On 03/29/2016 underwent Bronch/Left VATS/VATS wedge ROMELIA/VATS Left upper lobectomy/Mediasinal lymph node dissection/Intercostal nerve block from T3-T10 per Dr. Ralph Kelley. Invasive, well-differentiated adenocarcinoma (grade 1); Tumor size-1.4 cm in greatest dimension; Surgical margins-negative for malignancy; Lymphovascular invasion-not identified; TNM classification-pT2a N0 MX  B. AP window lymph node #1, excision: Anthracotic lymph node; negative for malignancy  C. AP window lymph node #2, excision: Anthracotic lymph node; negative for malignancy   D. Periaortic lymph node #1, excision: Fibroadipose tissue; lymph node not identified  E. Bronchial lymph node #1, excision: Anthracotic lymph node; negative for malignancy  F. Left lung, upper lobectomy: Emphysematous change; negative for malignancy  4 anthracotic lymph nodes negative for malignancy   G. Inferior pulmonary ligament lymph node, excision: Anthracotic lymph node; negative for malignancy  H. Bronchial lymph node, excision: Anthracotic lymph node; negative for malignancy. On surveillance per NCCN guidelines.       Recent abdominal pain; ER visit reviewed  CT abdomen/pelvis 02/20/2022   6 mm right lower lobe pulmonary nodule  Multiple peritoneal cystic masses     CEA 12.5 on 03/16/2022    CT chest 04/05/2022 Ground-glass nodule right lower lobe superolateral portion 10 mm unchanged from prior however in the medial segment right lower lobe with pleural abutment is a 7 mm pulmonary nodule increased in size from 09/28/2021 with is barely visible at 2-3 mm; repeat CT chest in 3 mo    PET/CT scan 04/05/2022 noted No FDG avid uptake is identified which exceeds the threshold SUV    Bilateral Screening Mammogram 04/20/2022: Negative for malignancy    CEA 12.1 on 04/20/2022  CA-125 32.8 on 04/20/2022   <2 on 04/20/2022  Chromogranin A 1480 on 04/20/2022. EGD/Colonoscopy 05/13/2022: Gastric polyps , duodenal polyp moderate gastroduodenitis   A. Stomach, biopsy: Hyperplastic polyp and moderate chronic active gastritis; negative for intestinal metaplasia   Immunostain negative for Helicobacter pylori organisms   B. Duodenum, biopsy: Chronic duodenitis with features of peptic duodenitis   C. Colon, 20 cm biopsy: Fragments of tubular adenoma and hyperplastic polyp     Referred to HBP team (Dr. Izabel Dupree) for further evaluation of peritoneal cystic masses  CT abdomen/pelvis 06/23/2022 numerous cystic structures identified throughout the right flank and retroperitoneum    Laparoscopic robotic peritoneal mass resection on 07/06/2022  Findings included: serous cystic masses along the colon, periportal lymphadenopathy, fibrotic appearing liver, chronic cholecystitis  Peritoneal fluid Negative for malignant cells. Cellblock shows reactive mesothelial cells, lymphocytes, adipose/stromal tissue, and blood. Monolayer preparation shows few reactive mesothelial cells and blood. A.  Lymph node, periportal, biopsy:   - Reactive node showing follicular hyperplasia, negative for malignancy, see comment. B.  Remnant gallbladder, cholecystectomy:   - Portion of gallbladder wall showing dense fibrosis/scar and occluded mariaelena-gallbladder vessels. C. Soft tissue, peritoneal sac, excision:   - Peritoneal inclusion cysts (benign cystic mesothelioma) and unremarkable fibroadipose tissue.    D.  Liver, needle biopsy:   - Benign hepatic parenchyma showing focal periportal and incomplete septal fibrosis (stage 2)   - Negative for significant lobular/portal necroinflammatory activity, see comment. Comment:   Sections of the lymph node in specimen A reveal normal anat architecture with secondary follicular hyperplasia. There are no cytologically atypical lymphoid cells or [place]-Marsha cells identified. Periportal LN Flow Cytometry noted 4.5% monoclonal B cells detected in a predominantly polyclonal background. Monoclonal B cell population (4.5% of total cells) without detectable CD5, CD10 or CD11c expression in a predominantly polyclonal background, raising the differential between a monoclonal B-cell population of undetermined significance versus a B-cell lymphoma/leukemia. In the context of B-cell lymphoma the main differential based on immunophenotype includes, but is not limited to marginal zone lymphoma/leukemia, lymphoplasmacytic lymphoma, VG77- negative follicular lymphoma, and less likely large b cell lymphoma. In specimen D, there is no evidence of chronic hepatitis or significant steatosis. Histologic features of cirrhosis including nodules of regenerating hepatocytes and complete portal-portal bridging fibrosis are   not identified. Focal periportal fibrosis and incomplete septal fibrosis are confirmed by trichrome stain. Iron stain is also performed and is negative. Periportal lymph node flow cytometry showing 4.5% monoclonal B cells without detectable CD5, CD10 or CD11c expression in a predominantly polyclonal background, raising the differential between a monoclonal B-cell population of undetermined significance versus a B-cell lymphoma/leukemia    Repeat CT chest on 08/04/2022  Postsurgical changes are identified in the upper lobes bilaterally. 1.1 cm ground-glass nodule in the right lower lobe and a smaller 1 measuring 0.9 cm are noted without change.    There is a 1.6 x 1.2 cm soft tissue mass in the right lower lobe medially which is significantly increased since the previous examination. Bilateral adrenal nodules are noted, larger 1 on the left side measuring 1.8 x 1.6 cm.  1.2 cm right renal cystic lesion is identified. An ill-defined hypodense lesion is identified in the spleen currently measuring 2.9 x 3.5 cm     Evaluated on 8/15/2022 by Dr. Neo Singletary at [facility] in [facility] who recommended proceeding with a biopsy of the enlarging right lower lobe lung mass. While lymphoma certainly possible, biopsy recommended to rule out recurrent lung cancer. Even if lymphoma is concerned, likely low-grade and not causing her symptoms so observation would be recommended. PET/CT scan, CT-guided core needle biopsy of enlarging right lower lobe lung mass recommended. PET/CT scan 09/06/2022: In the region of the lesion in the right lower lobe there is FDG avid tracer uptake peak SUV is 3.2. CT guided core needle biopsy RLL lung nodule on 09/20/2022  Right lung, lower lobe core needle biopsy: Adenocarcinoma (see comment)   Comment: The prior history of right upper lobe adenocarcinoma (HES ) and left upper lobe adenocarcinoma (HES ) is noted. The electronic medical record is also reviewed. The adenocarcinoma in the current specimen is weakly immunoreactive with GATA3 and CDX2. The TTF-1 immunostain is negative. Today 10/05/2022; No fever chills. Fair appetite and energy level. No nausea vomiting    Review of Systems;  CONSTITUTIONAL: No fever, chills. Fair appetite and energy level. ENMT: Eyes: No diplopia; Nose: No epistaxis. Mouth: No sore throat. RESPIRATORY: No hemoptysis, shortness of breath. CARDIOVASCULAR: No chest pain, palpitations. GASTROINTESTINAL: No nausea, vomiting. Recent abdominal pain  GENITOURINARY: No dysuria, urinary frequency, hematuria. NEURO: No syncope, presyncope, headache. Remainder ROS: Negative.     Past Medical History:      Diagnosis Date    Hypertension 6/6/2011    Abnormal Pap smear 1/3/2011     Alpha-1 negative    Cholelithiasis 6/6/2011    Osteoarthritis     Lymphadenopathy      Cervical (-) ENT    Lung nodule      ROMELIA     Scoliosis     PPD negative 1/18/12    Adrenal adenoma      7 mm    Hemangioma of spleen     Insomnia     PVD (peripheral vascular disease) ([facility] [address] 5/6/2014    Uterine fibroid 5/6/2014    Hepatitis C 1/3/2011    Fibroids     Abnormal chest x-ray with multiple lung nodules 9/8/2015    Hyperlipidemia     Panlobular emphysema ([facility] [address] 11/12/2015    Malignant neoplasm of upper lobe of right lung ([facility] [address] 11/18/2015     Medications:  Reviewed and reconciled. Allergies: Allergies   Allergen Reactions    Ibuprofen Palpitations and Rash     Physical Exam:  BP (!) 158/68   Pulse 88   Temp 97.5 °F (36.4 °C) (Infrared)   Resp 16   Ht 5' 1\" (1.549 m)   Wt 121 lb (54.9 kg)   SpO2 98%   BMI 22.86 kg/m²    GENERAL: Alert, oriented x 3, not in acute distress. HEENT: PERRLA; EOMI. Oropharynx clear. LUNGS: CTA Pérez  CVS: RRR  EXTREMITIES: Without clubbing, cyanosis, or edema. NEUROLOGIC: No focal deficits. ECOG PS 1    Impression/Plan:  71 y.o. female with Hx of smoking who underwent: (1) Bronchoscopy. (2) Right VATS. (3) VATS right upper lobe wedge resection. (4) VATS right upper lobectomy. (5) Intercostal nerve block from T3 to T10. (6) Mediastinal lymph node dissection on 11/11/2015:   Pathology:  Right lung, upper lobectomy: Invasive, moderately differentiated adenocarcinoma (Grade 2). Tumor size 1.5 cm in greatest dimension. Visceral pleural invasion: Not identified. Visceral pleural invasion: Not identified. Surgical margin negative for malignancy. Two of three peribronchial lymph nodes positive for adenocarcinoma. pT1a N1 MX;     Being followed for a left upper lobe mass by Dr. Arti Gomez. Multiple biopsies in the past came back negative for malignancy. Hypermetabolic on PET/CT scan on 09/29/2015 (2.3 cm in size with SUV of 6.4).  CT guided biopsy of the left upper lobe lesion on 11/02/2015 was noted to be negative for malignancy. She was referred to the medical oncology clinic to discuss adjuvant chemotherapy. We recommended 4 cycles of adjuvant chemotherapy consisting of Carboplatin/Alimta. Mediport placed 12/7/15. -MRI Brain on 12/8/15:  Negative for metastatic disease.   -We will repeat CT chest and PET/CT after 4 cycles of Carboplatin/Alimta. To follow on Lingular lesion as well. -Molecular studies (EGFR, ALK, ROS-1) were all Not Detected. Cycle # 1 of Adelso Zuniga was on 12/09/2015. Cycle # 2 of Adelso Zuniga was on 01/06/2016. Cycle # 3 of Adelso Zuniga was on 01/27/2016. Cycle # 4 of Adelso Zuniga was on 02/24/2016. PET SCAN 3.2016: The 2.1 cm left lung mass abutting the major fissure is hypermetabolic with SUV max of 5.4. Finding is worrisome for tumor with avid glucose metabolism. 2. Elsewhere there is unremarkable distribution of FDG activity without evidence of hypermetabolic metastases. On 03/29/2016 underwent Bronch/Left VATS/VATS wedge ROMELIA/VATS Left upper lobectomy/Mediasinal lymph node dissection/Intercostal nerve block from T3-T10 per Dr. Bill Caceres. Final pathology revealed Stage I Adenocarcinoma of ROMELIA (morphologically different from the adenocarcinoma previously diagnosed in the right upper lobe. Therefore, synchronous primary tumors are favored). A. Left lung, upper lobe wedge resection: Invasive, well-differentiated adenocarcinoma (grade 1); Tumor size-1.4 cm in greatest dimension; Surgical margins-negative for malignancy; Lymphovascular invasion-not identified; TNM classification-pT2a N0 MX  B. AP window lymph node #1, excision: Anthracotic lymph node; negative for malignancy  C. AP window lymph node #2, excision: Anthracotic lymph node; negative for malignancy   D. Periaortic lymph node #1, excision: Fibroadipose tissue; lymph node not identified  E. Bronchial lymph node #1, excision: Anthracotic lymph node; negative for malignancy  F.  Left lung, upper lobectomy: Emphysematous change; negative for malignancy  4 anthracotic lymph nodes negative for malignancy   G. Inferior pulmonary ligament lymph node, excision: Anthracotic lymph node; negative for malignancy  H. Bronchial lymph node, excision: Anthracotic lymph node; negative for malignancy. Right side Mediport was removed on 11/04/2016 by Dr. Estiven Rangel On surveillance per NCCN guidelines. CT chest 04/12/2017 noted no convincing evidence of recurrent disease. CT chest 10/19/2017 noted no definite evidence for recurrent malignancy. CT chest 03/12/2018 negative for pulmonary parenchymal masses or enlarged mediastinal or hilar LN. ? 2 cm lesion in spleen difficult to evaluate due to the arterial phase of the study. CT Abd/Pelvis 05/08/2018  Enhancing lesion within the spleen is relatively stable to slightly smaller when compared to April 2014 exam and likely splenic hemangioma. Other indeterminate findings (left periaortic LN, sclerotic/blastic foci in L3 and L1 reported by Dr. Radha Orosco from radiology team). PET/CT scan 06/05/2018 unremarkable. No FDG avid uptake identified. No evidence for recurrent or metastatic disease. CT Chest 12/13/2018 noted no evidence of recurrent/metastatic disease. CT chest on 06/11/2019 noted no evidence for worrisome residual or recurrent malignancy. No evidence for new lymphadenopathy or metastatic disease. Stable scarring and postoperative changes right upper lobe. CT chest 11/26/2019: No evidence of active neoplasm. CT chest 06/15/2020: No evidence of active neoplasm. CT chest 12/30/2020: Postsurgical changes and postsurgical scarring seen within the right lung apex. There is no evidence of tumor recurrence. 2.1 x 2.3 cm enhancing lesion seen within the spleen  PET/CT scan 03/02/2021   No FDG avid uptake is identified which exceeds the threshold SUV.   No convincing evidence for recurrent or metastatic disease  CT chest 09/28/2021 No evidence of tumor recurrence    Recent abdominal pain; ER visit reviewed  CT abdomen/pelvis 02/20/2022   6 mm right lower lobe pulmonary nodule  Multiple peritoneal cystic masses     CEA 12.5 on 03/16/2022    CT chest 04/05/2022 Ground-glass nodule right lower lobe superolateral portion 10 mm unchanged from prior however in the medial segment right lower lobe with pleural abutment is a 7 mm pulmonary nodule increased in size from 09/28/2021 with is barely visible at 2-3 mm; repeat CT chest in 3 mo    PET/CT scan 04/05/2022 noted No FDG avid uptake is identified which exceeds the threshold SUV     Bilateral screening mammogram 04/20/2022: Negative for malignancy    CEA     12.1 on 04/20/2022  CA-125 32.8 on 04/20/2022   <2 on 04/20/2022  Chromogranin A 1480 on 04/20/2022. EGD/Colonoscopy 05/13/2022: Gastric polyps , duodenal polyp moderate gastroduodenitis   A. Stomach, biopsy: Hyperplastic polyp and moderate chronic active gastritis; negative for intestinal metaplasia   Immunostain negative for Helicobacter pylori organisms   B. Duodenum, biopsy: Chronic duodenitis with features of peptic duodenitis   C. Colon, 20 cm biopsy: Fragments of tubular adenoma and hyperplastic polyp   Pathology reviewed. Referred to HBP team (Dr. Rasta Novak) for further evaluation of peritoneal cystic masses  CT abdomen/pelvis 06/23/2022 numerous cystic structures identified throughout the right flank and retroperitoneum. Laparoscopic robotic peritoneal mass resection on 07/06/2022  Findings included: serous cystic masses along the colon, periportal lymphadenopathy, fibrotic appearing liver, chronic cholecystitis    Peritoneal fluid Negative for malignant cells. Cellblock shows reactive mesothelial cells, lymphocytes, adipose/stromal tissue, and blood. Monolayer preparation shows few reactive mesothelial cells and blood.    A.  Lymph node, periportal, biopsy:   - Reactive node showing follicular hyperplasia, negative for malignancy, see comment. B.  Remnant gallbladder, cholecystectomy:   - Portion of gallbladder wall showing dense fibrosis/scar and occluded mariaelena-gallbladder vessels. C. Soft tissue, peritoneal sac, excision:   - Peritoneal inclusion cysts (benign cystic mesothelioma) and unremarkable fibroadipose tissue. D.  Liver, needle biopsy:   - Benign hepatic parenchyma showing focal periportal and incomplete septal fibrosis (stage 2)   - Negative for significant lobular/portal necroinflammatory activity, see comment. Comment:   Sections of the lymph node in specimen A reveal normal anat architecture with secondary follicular hyperplasia. There are no cytologically atypical lymphoid cells or [place]-Marsha cells identified. Periportal LN Flow Cytometry noted 4.5% monoclonal B cells detected in a predominantly polyclonal background. Monoclonal B cell population (4.5% of total cells) without detectable CD5, CD10 or CD11c expression in a predominantly polyclonal background, raising the differential between a monoclonal B-cell population of undetermined significance versus a B-cell lymphoma/leukemia. In the context of B-cell lymphoma the main differential based on immunophenotype includes, but is not limited to marginal zone lymphoma/leukemia, lymphoplasmacytic lymphoma, XG07- negative follicular lymphoma, and less likely large b cell lymphoma. In specimen D, there is no evidence of chronic hepatitis or significant steatosis. Histologic features of cirrhosis including nodules of regenerating hepatocytes and complete portal-portal bridging fibrosis are   not identified. Focal periportal fibrosis and incomplete septal fibrosis are confirmed by trichrome stain. Iron stain is also performed and is negative.      Periportal lymph node flow cytometry showing 4.5% monoclonal B cells without detectable CD5, CD10 or CD11c expression in a predominantly polyclonal background, raising the differential between a monoclonal B-cell population of undetermined significance versus a B-cell lymphoma/leukemia    Repeat CT chest to follow on history of NSCLC on 08/04/2022  Postsurgical changes are identified in the upper lobes bilaterally. 1.1 cm ground-glass nodule in the right lower lobe and a smaller 1 measuring 0.9 cm are noted without change. There is a 1.6 x 1.2 cm soft tissue mass in the right lower lobe medially which is significantly increased since the previous examination. Bilateral adrenal nodules are noted, larger 1 on the left side measuring 1.8 x 1.6 cm.  1.2 cm right renal cystic lesion is identified. An ill-defined hypodense lesion is identified in the spleen currently measuring 2.9 x 3.5 cm     Evaluated by Dr. Daniela Reich on 08/15/2022 at [facility] who recommended proceeding with a biopsy of the enlarging right lower lobe lung mass. While lymphoma certainly possible, biopsy recommended to rule out recurrent lung cancer. Periportal lymph node biopsy reviewed at [facility]. Lymph node with lipid lymphadenopathy  Small clonal B-cell population detected by flow cytometry and molecular analysis  Negative for carcinoma  Flow cytometry showed a small clonal CD5 -/CD10- B-cell population (4.5% of all events) and a background of polyclonal B cells. B-cell receptor clonality assay was positive for a clonal B-cell population. However there is no morphologic evidence of lymphoma in the lymph node. The detection of a clonal B-cell population despite negative morphologic evidence of lymphoma might represent monoclonal B-cell lymphocytosis process in the lymph node. An alternative consideration is peripheral blood involvement by B-cell clone (MBL-like process) that was picked up by the tissue flow cytometry and molecular tests. Even if lymphoma is concerned, likely low-grade and not causing her symptoms, so observation would be recommended.     PET/CT scan, CT-guided core needle biopsy of enlarging right lower lobe lung mass recommended. PET/CT scan 09/06/2022: In the region of the lesion in the right lower lobe there is FDG avid tracer uptake peak SUV is 3.2. imaging reviewed. CT guided core needle biopsy RLL lung nodule on 09/20/2022  Right lung, lower lobe core needle biopsy: Adenocarcinoma (see comment)   Comment: The prior history of right upper lobe adenocarcinoma (HES ) and left upper lobe adenocarcinoma (HES ) is noted. The electronic medical record is also reviewed. The adenocarcinoma in the current specimen is weakly immunoreactive with GATA3 and CDX2. The TTF-1 immunostain is negative. Pathology reviewed. Molecular testing pending.   Thoracic Surgery team (Dr. Fabiano Michaels) consulted for surgical evaluation   If unresectable, then consider SBRT by Rad Onc team (appointment on 10/10/2022)  RTC 1 month    Oneida Bergman MD   [id number]/[id number]  Board Certified Medical Oncologist

## 2022-10-05 NOTE — LETTER
[facility]  [address] [place] [id number]  Phone: [phone]  Fax: [phone]    Lili Escobar MD         October 5, 2022     Patient: Warden Trammell   YOB: 1950   Date of Visit: 10/5/2022       To Whom It May Concern: It is my medical opinion that Warden Trammell requires a disability parking placard for the following reasons: Unable to walk 200 feet or more without stopping to rest.    Duration of need: 5 years    If you have any questions or concerns, please don't hesitate to call.     Sincerely,        Lili Escobar MD

## 2022-10-10 ENCOUNTER — TELEPHONE (OUTPATIENT)
Dept: CASE MANAGEMENT | Age: 72
End: 2022-10-10

## 2022-10-10 ENCOUNTER — HOSPITAL ENCOUNTER (OUTPATIENT)
Dept: RADIATION ONCOLOGY | Age: 72
Discharge: HOME | End: 2022-10-10
Payer: MEDICARE

## 2022-10-10 ENCOUNTER — TELEPHONE (OUTPATIENT)
Dept: RADIATION ONCOLOGY | Age: 72
End: 2022-10-10

## 2022-10-10 VITALS
BODY MASS INDEX: 22.11 KG/M2 | RESPIRATION RATE: 18 BRPM | WEIGHT: 117 LBS | DIASTOLIC BLOOD PRESSURE: 66 MMHG | TEMPERATURE: 97 F | HEART RATE: 97 BPM | SYSTOLIC BLOOD PRESSURE: 134 MMHG

## 2022-10-10 DIAGNOSIS — C34.90 MALIGNANT NEOPLASM OF LUNG, UNSPECIFIED LATERALITY, UNSPECIFIED PART OF LUNG (HCC): Primary | ICD-10-CM

## 2022-10-10 PROCEDURE — 99205 OFFICE O/P NEW HI 60 MIN: CPT | Performed by: RADIOLOGY

## 2022-10-10 PROCEDURE — 99205 OFFICE O/P NEW HI 60 MIN: CPT

## 2022-10-10 PROCEDURE — 77470 SPECIAL RADIATION TREATMENT: CPT | Performed by: RADIOLOGY

## 2022-10-10 SDOH — ECONOMIC STABILITY: HOUSING INSECURITY: PLEASE ASSESS YOUR PATIENT'S LEVEL OF DISTRESS CONCERNING HOUSING (SCALE FROM 1-10): 0 (NONE)

## 2022-10-10 NOTE — PATIENT INSTRUCTIONS
SIM pending surgery recs      Agnes Steiner. MD Yus[facility] Cornejo  Oncology  Cell: [phone]    [facility] [address]:  [phone]   FAX: [phone]  [facility]:   [address] [place] [address] Avenue:    [phone]  [address] [place] [address] Road:  [phone]   FAX:  [phone]    Email: [email or web address]

## 2022-10-10 NOTE — TELEPHONE ENCOUNTER
Nikita Guevara  10/10/2022  Wt Readings from Last 10 Encounters:   10/10/22 117 lb (53.1 kg)   10/05/22 121 lb (54.9 kg)   09/28/22 121 lb 9.6 oz (55.2 kg)   09/14/22 121 lb (54.9 kg)   08/24/22 120 lb (54.4 kg)   07/22/22 124 lb (56.2 kg)   07/20/22 123 lb (55.8 kg)   07/06/22 125 lb (56.7 kg)   07/05/22 125 lb 9.6 oz (57 kg)   06/30/22 125 lb (56.7 kg)     Ht Readings from Last 1 Encounters:   10/05/22 5' 1\" (1.549 m)     BMI: 22.11    Assessment: Met w/ pt and sister in law, Herrera Casillas, for introduction during initial consult for lung CA. Pt reports gradual wt loss, anorexia. She admits she sometimes goes \"days without eating\" but she is aware of the importance of nutrition. Herrera Casillas reports she recently purchased protein powder for pt, using this usually once daily in a smoothie. Provided pt w/ multiple resources for increasing nutrient consumption, encouraging smaller more frequent meals. Discussed goal of eating at least every 2 hours, setting reminders as needed. Provided suggestions for meals and snacks per pt's food preferences. Provided w/ samples and coupons for Ensure Complete. Pt appreciative of assistance. Encouraged to contact this clinician as needed. Weight change: -8# x3 months (6.4%)  Appetite: poor  Nutritional Side Effects: anorexia  Calculated Needs: 30-32 kcal/kg CBW =  kcal, 1.3-1.5 gm/kg CBW = 70-80 gm pro, 1 ml/kcal =  ml fluids  Malnutrition Status: Moderate  Nutrition Diagnosis: Moderate malnutrition r/t lung CA AEB moderate fat and muscle wasting, <75% nutrient intake x >1 month, 6.4% wt loss x1 month.     Recommendations: Pt to consume 6 small meals/day + ONS of choice at least BID    Gordo Sullivan, MS, RD, LD

## 2022-10-10 NOTE — PROGRESS NOTES
Subjective:      Patient ID: Negrita Rios is a 71 y.o. female. HPI  This is a 71year old female with significant PMH of HTN, HLD, emphysema with former tobacco abuse (quit in 2015), OA, adrenal adenoma, Hep C, bilateral lung cancer s/p RULobectomy and LULobectomy. She is known to me in the sense that I performed a right upper lobectomy in 2015 for +adenocarcinoma with 2-3 positive peribronchial positive lymph nodes and a left upper lobectomy in 2016 for stage I NSCLC. She has followed with Dr. Peggy Burgess since then. Now in 2022, the patient presented to the ED with abdominal pain. Abdominal CT in Feb 2022 revealed 6mm right lower lube pulmonary nodule and multiple peritoneal cystic masses. Chest CT April 2022 revealed Ground-glass nodule right lower lobe superolateral portion 10 mm unchanged from prior however in the medial segment right lower lobe with pleural abutment is a 7 mm pulmonary nodule increased in size from 09/28/2021 with is barely visible at 2-3 mm. Pet scan showed no avid uptake. 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 Additionally, the patient was seen by Dr. Roberta Sprague for cholecystectomy, cystic peritoneal mass resection and core liver biopsys x4. Pathology came back with stage 2 fibrosis and benign cysts. The patient admits to fatigue and weakness. She denies SOB, CP, hemoptysis. Review of Systems    Objective:   Physical Exam    Assessment:      S/P RUL, ROMELIA, new RLL mass      Plan:      She has already had lobes taken out from each side, and if we took her RLL from the right all that would be left would be RML. She certainly would not tolerate that. She is losing weight aggressively and I encouraged her to inquire about an appetite supplement. She is best served with SBRT +/- chemo.

## 2022-10-10 NOTE — TELEPHONE ENCOUNTER
Met with patient during her initial consultation with Dr Tae Bland for her Lung cancer diagnosis. Introduced myself and explained my role with patients receiving treatment at our center. Patient was friendly and receptive. She is doing ok but is just tired of being so fatigued. She does have appetite issues and would like information on things she can do to build her appetite and get the best calories for the things she is able to eat. Referred to Deb Silva, [address] [place]. She has a consult with Dr Jacob Eason tomorrow to discuss if she is a surgical candidate. Next steps include consult with Dr Jacob Eason, CT simulation and Radiation treatments pending surgical evaluation per Dr River Colin recommendations and follow up care. Provided patient with literature  on ACS Radiation Therapy for Non Small Cell Lung Cancer, [facility] What is Radiation Therapy, [employer] [facility] and [employer]. Reviewed resources available including Social Work and Dietician. Provided with my contact information and instructed patient to call me with questions or concerns. Verbalizes understanding. Patient appreciative of visit. Will continue to follow.

## 2022-10-10 NOTE — PROGRESS NOTES
Radiation Oncology      [facility]. [address]ian [address] 51      Referring Physician: Dr. Monica Sandoval      Primary Care Physician:Carlos Nesbitt DO   Primary Oncologist: Dr. Monica Sandoval      Diagnosis: RLL NSCLC SG IA1-2   -HO RUL lung cancer s/p lobectomy and adjuvant systemic Tx      Service:  Radiation Oncology consultation performed on 10/10/22        HPI:        Amrit Phillips is a pleasant 71year old with a new RLL lung cancer (HO RUL lung cancer). Patient had presented to the ED back in February for abdominal pain and weight loss. CT of A/P was done on 02/20/22 which showed no obvious etiology for the patient's abdominal pain. Multiple peritoneal cystic masses which have been present to one degree or another dating back to 2018. The previously described jejunal mesenteric mass is no longer visualized. However, there are increased number and size of multiple peritoneal masses adjacent to the distal ascending colon. These measure up to 2.7 cm. These are indeterminate. Cystic metastatic disease is felt to be unlikely but not excluded. 6 mm right lower lobe pulmonary nodule, new from 2018. A CT of the Chest done on 04/05/22 showed Ground-glass nodule right lower lobe superolateral portion 10 mm unchanged from prior however in the medial segment right lower lobe with pleural abutment is a 7 mm pulmonary nodule increased in size from 09/28/2021 with is barely visible at 2-3 mm. On 04/05/22 patient had a PET Scan done which showed No evidence of abnormal FDG uptake is seen in the lungs. No evidence of abnormal FDG uptake is seen in the lymph nodes in the chest. CT of A/P done on 06/23/22 showed Increasing size of the pulmonary nodule within the left lower lung field suggesting progression of disease with numerous cystic structures identified throughout the right flank and retroperitoneum. Findings concerning for peritoneal malignancy. The large calcified fibroid and heterogeneous uterus within the left posterior pelvis is stable and unchanged. CT of Chest done on 08/04/22 showed Postsurgical changes in the lungs with the significant increase in a previously noted mass in the right lower lobe concerning for progressive malignancy with additional ground-glass nodules. There may be metastatic involvement in the adrenals and possibly spleen. PET Scan done on 09/06/22 showed  In the interval there is now a new FDG avid tracer uptake in the right lower lobe lung mass with a peak SUV of 3.2 suspicious for neoplasm. There is no other convincing evidence for metastatic disease. On 09/20/22 Patient underwent a RLL Biopsy for which pathology revealed adenocarcinoma. Patient does have a history of RUL Adenocarcinoma from 2015 in which the patient states they were treated with chemotherapy, carboplatin/Alimta from 12/09/15-02/24/16. Patient had underwent a Bronch/ Left VATS, Left Upper Lobectomy, Mediastinal LN dissection with Dr. Jeremías Kendall on 03/29/16 for which pathology came back as invasive adenocarcinoma (grade 1) [id number]. Patient is following with Dr. Reg Geronimo for pulmonology. Patient states she see's Dr. Jeremías Kendall tomorrow with the understanding he will decide if patient is a surgical candidate or receive RT. Patient is following with Dr. Anita Long for Med Onc, who refers patient here today, and last seen on 10/05/22 who recommends Thoracic Surgery team (Dr. Jeremías Kendall) consulted for surgical evaluation, If unresectable, then consider SBRT by Rad Onc team.  The patient presents today to discuss fractionated external beam radiation therapy as a component of multidisciplinary, definative management. We reviewed the available medical records including the complete medical history of this pt today prior to consultation. Epic -CE and available scanned documents per the Epic Media tab were reviewed PRN. A complete ROS was also performed today and is noted below.   During consultation today I personally discussed the pts workup to date; including but not limited to applicable imaging studies, Pathology reports, and interventions. The NCCN guidelines, as pertaining to the above diagnosis were also recapped for the pt today in brief. Today, Yi Kuhn  notes Sx that include fatigue, weight loss, SOB. KPS 60-70.        -----    Per [address]:      10/5/22:      Impression/Plan:  71 y.o. female with Hx of smoking who underwent: (1) Bronchoscopy. (2) Right VATS. (3) VATS right upper lobe wedge resection. (4) VATS right upper lobectomy. (5) Intercostal nerve block from T3 to T10. (6) Mediastinal lymph node dissection on 11/11/2015:   Pathology:  Right lung, upper lobectomy: Invasive, moderately differentiated adenocarcinoma (Grade 2). Tumor size 1.5 cm in greatest dimension. Visceral pleural invasion: Not identified. Visceral pleural invasion: Not identified. Surgical margin negative for malignancy. Two of three peribronchial lymph nodes positive for adenocarcinoma. pT1a N1 MX;      Being followed for a left upper lobe mass by Dr. Mago Joy. Multiple biopsies in the past came back negative for malignancy. Hypermetabolic on PET/CT scan on 09/29/2015 (2.3 cm in size with SUV of 6.4). CT guided biopsy of the left upper lobe lesion on 11/02/2015 was noted to be negative for malignancy. She was referred to the medical oncology clinic to discuss adjuvant chemotherapy. We recommended 4 cycles of adjuvant chemotherapy consisting of Carboplatin/Alimta. Mediport placed 12/7/15. -MRI Brain on 12/8/15:  Negative for metastatic disease.   -We will repeat CT chest and PET/CT after 4 cycles of Carboplatin/Alimta. To follow on Lingular lesion as well. -Molecular studies (EGFR, ALK, ROS-1) were all Not Detected. Cycle # 1 of Mary Michael was on 12/09/2015. Cycle # 2 of Mary Michael was on 01/06/2016. Cycle # 3 of Mary Michael was on 01/27/2016.   Cycle # 4 of Hardik Bar was on 02/24/2016. PET SCAN 3.2016: The 2.1 cm left lung mass abutting the major fissure is hypermetabolic with SUV max of 5.4. Finding is worrisome for tumor with avid glucose metabolism. 2. Elsewhere there is unremarkable distribution of FDG activity without evidence of hypermetabolic metastases. On 03/29/2016 underwent Bronch/Left VATS/VATS wedge ROMELIA/VATS Left upper lobectomy/Mediasinal lymph node dissection/Intercostal nerve block from T3-T10 per Dr. Fabiano Michaels. Final pathology revealed Stage I Adenocarcinoma of ROMELIA (morphologically different from the adenocarcinoma previously diagnosed in the right upper lobe. Therefore, synchronous primary tumors are favored). A. Left lung, upper lobe wedge resection: Invasive, well-differentiated adenocarcinoma (grade 1); Tumor size-1.4 cm in greatest dimension; Surgical margins-negative for malignancy; Lymphovascular invasion-not identified; TNM classification-pT2a N0 MX  B. AP window lymph node #1, excision: Anthracotic lymph node; negative for malignancy  C. AP window lymph node #2, excision: Anthracotic lymph node; negative for malignancy   D. Periaortic lymph node #1, excision: Fibroadipose tissue; lymph node not identified  E. Bronchial lymph node #1, excision: Anthracotic lymph node; negative for malignancy  F. Left lung, upper lobectomy: Emphysematous change; negative for malignancy  4 anthracotic lymph nodes negative for malignancy   G. Inferior pulmonary ligament lymph node, excision: Anthracotic lymph node; negative for malignancy  H. Bronchial lymph node, excision: Anthracotic lymph node; negative for malignancy. Right side Mediport was removed on 11/04/2016 by Dr. Julianne Torres. On surveillance per NCCN guidelines. CT chest 04/12/2017 noted no convincing evidence of recurrent disease. CT chest 10/19/2017 noted no definite evidence for recurrent malignancy.   CT chest 03/12/2018 negative for pulmonary parenchymal masses or enlarged mediastinal or hilar LN. ? 2 cm lesion in spleen difficult to evaluate due to the arterial phase of the study. CT Abd/Pelvis 05/08/2018  Enhancing lesion within the spleen is relatively stable to slightly smaller when compared to April 2014 exam and likely splenic hemangioma. Other indeterminate findings (left periaortic LN, sclerotic/blastic foci in L3 and L1 reported by Dr. Adenike Collazo from radiology team). PET/CT scan 06/05/2018 unremarkable. No FDG avid uptake identified. No evidence for recurrent or metastatic disease. CT Chest 12/13/2018 noted no evidence of recurrent/metastatic disease. CT chest on 06/11/2019 noted no evidence for worrisome residual or recurrent malignancy. No evidence for new lymphadenopathy or metastatic disease. Stable scarring and postoperative changes right upper lobe. CT chest 11/26/2019: No evidence of active neoplasm. CT chest 06/15/2020: No evidence of active neoplasm. CT chest 12/30/2020: Postsurgical changes and postsurgical scarring seen within the right lung apex. There is no evidence of tumor recurrence. 2.1 x 2.3 cm enhancing lesion seen within the spleen  PET/CT scan 03/02/2021   No FDG avid uptake is identified which exceeds the threshold SUV.   No convincing evidence for recurrent or metastatic disease  CT chest 09/28/2021 No evidence of tumor recurrence     Recent abdominal pain; ER visit reviewed  CT abdomen/pelvis 02/20/2022   6 mm right lower lobe pulmonary nodule  Multiple peritoneal cystic masses      CEA 12.5 on 03/16/2022     CT chest 04/05/2022 Ground-glass nodule right lower lobe superolateral portion 10 mm unchanged from prior however in the medial segment right lower lobe with pleural abutment is a 7 mm pulmonary nodule increased in size from 09/28/2021 with is barely visible at 2-3 mm; repeat CT chest in 3 mo     PET/CT scan 04/05/2022 noted No FDG avid uptake is identified which exceeds the threshold SUV      Bilateral screening mammogram 04/20/2022: Negative for malignancy     CEA     12.1 on 04/20/2022  CA-125 32.8 on 04/20/2022   <2 on 04/20/2022  Chromogranin A 1480 on 04/20/2022. EGD/Colonoscopy 05/13/2022: Gastric polyps , duodenal polyp moderate gastroduodenitis   A. Stomach, biopsy: Hyperplastic polyp and moderate chronic active gastritis; negative for intestinal metaplasia   Immunostain negative for Helicobacter pylori organisms   B. Duodenum, biopsy: Chronic duodenitis with features of peptic duodenitis   C. Colon, 20 cm biopsy: Fragments of tubular adenoma and hyperplastic polyp   Pathology reviewed. Referred to HBP team (Dr. Benuel Lesches) for further evaluation of peritoneal cystic masses  CT abdomen/pelvis 06/23/2022 numerous cystic structures identified throughout the right flank and retroperitoneum. Laparoscopic robotic peritoneal mass resection on 07/06/2022  Findings included: serous cystic masses along the colon, periportal lymphadenopathy, fibrotic appearing liver, chronic cholecystitis     Peritoneal fluid Negative for malignant cells. Cellblock shows reactive mesothelial cells, lymphocytes, adipose/stromal tissue, and blood. Monolayer preparation shows few reactive mesothelial cells and blood. A.  Lymph node, periportal, biopsy:   - Reactive node showing follicular hyperplasia, negative for malignancy, see comment. B.  Remnant gallbladder, cholecystectomy:   - Portion of gallbladder wall showing dense fibrosis/scar and occluded mariaelena-gallbladder vessels. C. Soft tissue, peritoneal sac, excision:   - Peritoneal inclusion cysts (benign cystic mesothelioma) and unremarkable fibroadipose tissue. D.  Liver, needle biopsy:   - Benign hepatic parenchyma showing focal periportal and incomplete septal fibrosis (stage 2)   - Negative for significant lobular/portal necroinflammatory activity, see comment.    Comment:   Sections of the lymph node in specimen A reveal normal anat architecture with secondary follicular hyperplasia. There are no cytologically atypical lymphoid cells or [place] cells identified. Periportal LN Flow Cytometry noted 4.5% monoclonal B cells detected in a predominantly polyclonal background. Monoclonal B cell population (4.5% of total cells) without detectable CD5, CD10 or CD11c expression in a predominantly polyclonal background, raising the differential between a monoclonal B-cell population of undetermined significance versus a B-cell lymphoma/leukemia. In the context of B-cell lymphoma the main differential based on immunophenotype includes, but is not limited to marginal zone lymphoma/leukemia, lymphoplasmacytic lymphoma, MW11- negative follicular lymphoma, and less likely large b cell lymphoma. In specimen D, there is no evidence of chronic hepatitis or significant steatosis. Histologic features of cirrhosis including nodules of regenerating hepatocytes and complete portal-portal bridging fibrosis are   not identified. Focal periportal fibrosis and incomplete septal fibrosis are confirmed by trichrome stain. Iron stain is also performed and is negative. Periportal lymph node flow cytometry showing 4.5% monoclonal B cells without detectable CD5, CD10 or CD11c expression in a predominantly polyclonal background, raising the differential between a monoclonal B-cell population of undetermined significance versus a B-cell lymphoma/leukemia     Repeat CT chest to follow on history of NSCLC on 08/04/2022  Postsurgical changes are identified in the upper lobes bilaterally. 1.1 cm ground-glass nodule in the right lower lobe and a smaller 1 measuring 0.9 cm are noted without change. There is a 1.6 x 1.2 cm soft tissue mass in the right lower lobe medially which is significantly increased since the previous examination. Bilateral adrenal nodules are noted, larger 1 on the left side measuring 1.8 x 1.6 cm.  1.2 cm right renal cystic lesion is identified.   An ill-defined hypodense lesion is identified in the spleen currently measuring 2.9 x 3.5 cm      Evaluated by Dr. Ryanne Mora on 08/15/2022 at [facility] who recommended proceeding with a biopsy of the enlarging right lower lobe lung mass. While lymphoma certainly possible, biopsy recommended to rule out recurrent lung cancer. Periportal lymph node biopsy reviewed at [facility]. Lymph node with lipid lymphadenopathy  Small clonal B-cell population detected by flow cytometry and molecular analysis  Negative for carcinoma  Flow cytometry showed a small clonal CD5 -/CD10- B-cell population (4.5% of all events) and a background of polyclonal B cells. B-cell receptor clonality assay was positive for a clonal B-cell population. However there is no morphologic evidence of lymphoma in the lymph node. The detection of a clonal B-cell population despite negative morphologic evidence of lymphoma might represent monoclonal B-cell lymphocytosis process in the lymph node. An alternative consideration is peripheral blood involvement by B-cell clone (MBL-like process) that was picked up by the tissue flow cytometry and molecular tests. Even if lymphoma is concerned, likely low-grade and not causing her symptoms, so observation would be recommended. PET/CT scan, CT-guided core needle biopsy of enlarging right lower lobe lung mass recommended. PET/CT scan 09/06/2022: In the region of the lesion in the right lower lobe there is FDG avid tracer uptake peak SUV is 3.2. imaging reviewed. CT guided core needle biopsy RLL lung nodule on 09/20/2022  Right lung, lower lobe core needle biopsy: Adenocarcinoma (see comment)   Comment: The prior history of right upper lobe adenocarcinoma (HES ) and left upper lobe adenocarcinoma (HES ) is noted. The electronic medical record is also reviewed. The adenocarcinoma in the current specimen is weakly immunoreactive with GATA3 and CDX2.   The TTF-1 immunostain is negative. Pathology reviewed. Molecular testing pending.   Thoracic Surgery team (Dr. Sally Dumont) consulted for surgical evaluation   If unresectable, then consider SBRT by Rad Onc team (appointment on 10/10/2022)  RTC 1 month       -----    Pathology reviewed:      9/20/22: NSCLC adeno      -----      Past Medical History:   Diagnosis Date    Abnormal chest x-ray with multiple lung nodules 9/8/2015    Abnormal Pap smear 1/3/2011    Alpha-1 negative    Adrenal adenoma     7 mm    Bilateral lung cancer ([address] 5/12/2016    surgically removed - 2015     Cholelithiasis 6/6/2011    Encounter for screening colonoscopy     for OR 3-28-19     Fibroids     Hemangioma of spleen     Hepatitis C 01/03/2011    treated and cured, no current issues     Hyperlipidemia     no medications     Hypertension 6/6/2011    Insomnia     Lung nodule     ROMELIA     Lymphadenopathy     Cervical (-) ENT    Malignant neoplasm of upper lobe of right lung ([address] 11/18/2015    Osteoarthritis     Panlobular emphysema ([address] 11/12/2015    controlled with inhalers     PPD negative 1/18/12    Pulmonary embolism without acute cor pulmonale ([address] 5/12/2016    PVD (peripheral vascular disease) ([address] 5/6/2014    Scoliosis     Uterine fibroid 5/6/2014       Past Surgical History:   Procedure Laterality Date    APPENDECTOMY  1965    BREAST BIOPSY Left     AGE 31'S LEFT NIPPLE REMOVED    BREAST LUMPECTOMY  4/28/1999    left, benign, Dr. Janna Scales, [place]  2/1/2012    Blaire White, Dr. Zeke Oscar, [place]  10/15/15    with EBUS - DR Chris Ceja    CHOLECYSTECTOMY      COLONOSCOPY  12/21/2009    partial to distal ascending colon normal (completion BE same day normal), Dr. Janna Scales, [facility]    COLONOSCOPY  8/22/2014    done under fluoro with sigmoid straightening device so was able to get to cecum, very poor prep, moderate proctitis, repeat 5 years,  Dr. Janna Scales, [facility]    COLONOSCOPY N/A 3/28/2019    COLONOSCOPY POLYPECTOMY SNARE/COLD BIOPSY performed by Elaina Carroll, DO at [facility] ENDOSCOPY    COLONOSCOPY N/A 5/13/2022    COLONOSCOPY POLYPECTOMY SNARE/COLD BIOPSY performed by Smiley Guan MD at [address] [place] [address] LUNG PERCUTANEOUS  9/20/2022    CT NEEDLE BIOPSY LUNG PERCUTANEOUS 9/20/2022 DEANGELO Kunz MD [facility] CT    ENDOMETRIAL BIOPSY      LIVER RESECTION Right 7/6/2022    LAPAROSCOPIC ROBOTIC PERITONEAL MASS RESECTION performed by Hunter Sy MD at [place] [address] Left 3/29/2016    VATS WEDGE RESECTION UPPER LOBECTOMY    OTHER SURGICAL HISTORY Right 11/04/2016    REMOVAL MEDI-PORT - DR Luciana Galo    RI LAP,CHOLECYSTECTOMY/GRAPH N/A 6/29/2018    CHOLECYSTECTOMY LAPAROSCOPIC, POSSIBLE OPEN,POSSIBLE GRAM ( OC 2) performed by Violeta Atwood MD at [place] [address] Right 11/11/2015    VATS, BRONCH,RT UPPER LOBECTOMY; NODE DISECTION    TUNNELED VENOUS PORT PLACEMENT Right 12/07/2015    right chest, and removed     UPPER GASTROINTESTINAL ENDOSCOPY N/A 5/13/2022    EGD POLYP HOT FORCEP/CAUTERY performed by Smiley Guan MD at [place] [address] History   Problem Relation Age of Onset    Heart Disease Mother     High Blood Pressure Mother     Cancer Mother         ovarian    Cancer Father     Heart Disease Father     High Blood Pressure Father     Kidney Disease Father     Diabetes Sister     Breast Cancer Sister     Diabetes Brother        Current Outpatient Medications   Medication Sig Dispense Refill    acetaminophen (TYLENOL) 500 MG tablet Take 1 tablet by mouth 4 times daily as needed for Pain 120 tablet 0    amLODIPine (NORVASC) 5 MG tablet take 1 tablet by mouth once daily      vitamin B-12 (CYANOCOBALAMIN) 500 MCG tablet take 1 tablet by mouth daily 30 tablet 11    tiotropium (SPIRIVA HANDIHALER) 18 MCG inhalation capsule Inhale 1 capsule into the lungs daily 90 capsule 3    lisinopril (PRINIVIL;ZESTRIL) 40 MG tablet take 1 tablet by mouth once daily 90 tablet 4    hydrochlorothiazide (HYDRODIURIL) 25 MG tablet Take 1 tablet by mouth daily 90 tablet 0    Multiple Vitamins-Minerals (THERAPEUTIC MULTIVITAMIN-MINERALS) tablet Take 1 tablet by mouth daily 90 tablet 5    Misc. Devices KIT BP monitoring KIT 1 kit 0     No current facility-administered medications for this encounter. Facility-Administered Medications Ordered in Other Encounters   Medication Dose Route Frequency Provider Last Rate Last Admin    pantoprazole (PROTONIX) tablet 40 mg  40 mg Oral QAM AC Luís Amin MD           Allergies   Allergen Reactions    Ibuprofen Palpitations and Rash       Social History     Socioeconomic History    Marital status: Single     Spouse name: None    Number of children: 0    Years of education: None    Highest education level: None   Tobacco Use    Smoking status: Former     Packs/day: 0.00     Years: 30.00     Pack years: 0.00     Types: Cigarettes     Quit date: 2015     Years since quittin.3    Smokeless tobacco: Never   Vaping Use    Vaping Use: Never used   Substance and Sexual Activity    Alcohol use: Not Currently     Alcohol/week: 2.0 standard drinks     Types: 2 Glasses of wine per week     Comment: x2 week    Drug use: No    Sexual activity: Not Currently           Review of Systems - History obtained from chart review and the patient  General ROS: positive for  - fatigue  Psychological ROS: negative  Ophthalmic ROS: negative  ENT ROS: negative  Allergy and Immunology ROS: negative  Hematological and Lymphatic ROS: negative  Endocrine ROS: negative  Breast ROS: negative for breast lumps  Respiratory ROS: positive for - shortness of breath  Cardiovascular ROS: no chest pain or dyspnea on exertion  Gastrointestinal ROS: positive for - WL  Genito-Urinary ROS: no dysuria, trouble voiding, or hematuria  Musculoskeletal ROS: negative  Neurological ROS: no TIA or stroke symptoms  Dermatological ROS: negative        Physical Exam  HENT:      Head: Normocephalic and atraumatic.       Right Ear: External ear normal.      Left Ear: External ear normal.      Nose: Nose normal.      Mouth/Throat:      Mouth: Mucous membranes are moist.   Eyes:      Pupils: Pupils are equal, round, and reactive to light. Cardiovascular:      Rate and Rhythm: Normal rate and regular rhythm. Pulses: Normal pulses. Heart sounds: Normal heart sounds. Pulmonary:      Effort: Pulmonary effort is normal.   Abdominal:      General: Abdomen is flat. Musculoskeletal:         General: Normal range of motion. Cervical back: Normal range of motion. Skin:     General: Skin is warm. Neurological:      General: No focal deficit present. Mental Status: She is alert and oriented to person, place, and time. Psychiatric:         Mood and Affect: Mood normal.         Behavior: Behavior normal.         Thought Content: Thought content normal.         Judgment: Judgment normal.           Imaging reviewed:      PET 9/6/22:  Impression   1. In the interval there is now a new FDG avid tracer uptake in the   right lower lobe lung mass with a peak SUV of 3.2 suspicious for   neoplasm. There is no other convincing evidence for metastatic   disease. Radiation Safety and Treatment Support:  -previous Radiation history: No  -history of connective tissue disease: No  -history of autoimmune disease: No  -pregnant: no  -fertility conservation and /or contraception discussed: no  -nutrition consult prior to [address]: Yes  -PEG: No  -Dental evaluation prior to treatment:No  -Social Work requested: Yes  -Oncology Nurse Navigator requested: Yes  -pre + post treatment PT / Rehab / PM+R evaluation considered: Yes  -ICD: No   -ICD brand: -  -[facility] patient navigator: Flores Singer  -Nurse Practitioners for Radiation Oncology:    ---Dg Garcia, RICKIE, RN, FNP-C   ---RICKIE Grace, RN, FNP-BC        Assessment and Plan: Epifanio Dancer is a pleasant and cooperative 71year old with a recent diagnosis of AJCC stage group I RLL NSCLC.    If pt is medically unresectable, we recommend definitive intent fractionated external beam radiation therapy with a 3-5 fraction SBRT approach [NCCN NSCLC 4.2019 NSCLC-C 7/10, 3/10 ]. The risks, benefits, alternatives, process and logistics of stereotactic body radiation therapy (SBRT / SABR) were reviewed and specifically discussed in great detail - (risks discussed today include but are not limited to radiation pneumonitis, worsening PULM function, hemoptysis, lack of response, rib fracture, chest wall pain, oxygen dependence, esophagitis, esophageal structure, perforation, radiation induced neuropathies, vascular injury, bleeding and death, paralysis, second malignancy). SBRT is typically considered safe in terms of acute and chronic pulmonary toxicity, even for patients with severe PULM comobidities [Maksim et al. Jake Kirkpatrick. 2012 Mar; 7(3): 126-842 / Int J Radiat Oncol Biol Phys. 2018 Feb 1;100(2):462-469 ]. A second opinion was offered to this pt and was declined today at consult. Guidelines applicable to this pt reviewed and applied to ALEXANDER and medical decision making as available and applicable [Pract Radiat Oncol. 2017 Sep - Oct;7(5):295-301]. We answered all of the patient's questions to the best of our ability. The patient verbalized understanding and seemed satisfied, desiring definitive intent SBRT. Radiation planning will commence within 7-14 days; the next step in management being the simulation scan, with external beam radiation to commence in a timely fashion thereafter. Aramis Cordova declines OBS and a/another biopsy attempt [Lung Cancer. 2014 Sep;85(3):390-4] although the standard of care was discussed as noted above per NCCN and PRO. Please reference PFTs per the Media tab / PULM documentation. It was a pleasure meeting Aramis Cordova today and we appreciate the referral and opportunity to be involved in her care.   We had an extensive discussion today regarding the course to date (including a focused review of the applicable radiographic and laboratory information), multidisciplinary approach to cancer care, and indications for external beam radiation therapy as a component therein. A literature review and multidisciplinary discussion was performed Keli  seeing this patient due to the complexity of the medical decision making in this case. I personally spent greater than 80 minutes on this case and with this patient. I performed the complete history and physical as above at today's visit, at least 45 minutes was in direct discussion and  regarding disease management. -recommend definative intent SBRT if this pt is medically unresectable  -NUT  -SW  -PT/OT (ordered)  -PALLMED (ordered)        Naima Segura. Maddy Miles MD [place] [address] Oncology  Cell: [phone]    [facility]:  [facility]: [phone]  [facility]:  [phone]   FAX:    [phone]  [facility]:  [phone]   FAX:  [phone]        NOTE: This report was transcribed using voice recognition software. Every effort was made to ensure accuracy; however, inadvertent computerized transcription errors may be present.

## 2022-10-10 NOTE — PROGRESS NOTES
Rachel Marie  1950 71 y.o. Referring Physician: Dr. Evangelina Jacobsen    PCP: Jaiden Wallis,      Vitals:    10/10/22 1012   BP: 134/66   Pulse: 97   Resp: 18   Temp: 97 °F (36.1 °C)        Wt Readings from Last 3 Encounters:   10/10/22 117 lb (53.1 kg)   10/05/22 121 lb (54.9 kg)   22 121 lb 9.6 oz (55.2 kg)        Body mass index is 22.11 kg/m². Chief Complaint: No chief complaint on file. Cancer Staging  Malignant neoplasm of upper lobe of left lung ([facility]  Staging form: Lung, AJCC 7th Edition  - Clinical stage from 2016: Stage IA (T1b, N0, M0) - Signed by Juwan Flores MD on 2016  - Pathologic stage from 2016: Stage IB (T2a, N0, cM0) - Signed by Juwan Flores MD on 2016    Malignant neoplasm of upper lobe of right lung [facility])  Staging form: Lung, AJCC 7th Edition  - Clinical stage from 2015: Stage IA (T1a, N0, M0) - Signed by Juwan Flores MD on 2015  - Pathologic stage from 2015: Stage IIA (T1a, N1, cM0) - Signed by Juwan Flores MD on 2015      Prior Radiation Therapy? NO    Concurrent Chemo/radiation? NO    Prior Chemotherapy? YES: Site Treated: Right Lung          Facility: [facility] On[facility]          Date: 12/09/15-16    Prior Hormonal Therapy? NO    Head and Neck Cancer? No, patient does NOT have HN cancer.       LMP: Late 43's    Age at first Menses: 16    : 0    Para: 0        Current Outpatient Medications   Medication Sig Dispense Refill    acetaminophen (TYLENOL) 500 MG tablet Take 1 tablet by mouth 4 times daily as needed for Pain 120 tablet 0    amLODIPine (NORVASC) 5 MG tablet take 1 tablet by mouth once daily      vitamin B-12 (CYANOCOBALAMIN) 500 MCG tablet take 1 tablet by mouth daily 30 tablet 11    tiotropium (SPIRIVA HANDIHALER) 18 MCG inhalation capsule Inhale 1 capsule into the lungs daily 90 capsule 3    lisinopril (PRINIVIL;ZESTRIL) 40 MG tablet take 1 tablet by mouth once daily 90 tablet 4 hydrochlorothiazide (HYDRODIURIL) 25 MG tablet Take 1 tablet by mouth daily 90 tablet 0    Multiple Vitamins-Minerals (THERAPEUTIC MULTIVITAMIN-MINERALS) tablet Take 1 tablet by mouth daily 90 tablet 5    Misc. Devices KIT BP monitoring KIT 1 kit 0     No current facility-administered medications for this encounter.      Facility-Administered Medications Ordered in Other Encounters   Medication Dose Route Frequency Provider Last Rate Last Admin    pantoprazole (PROTONIX) tablet 40 mg  40 mg Oral QAM AC Alin Santiago MD           Past Medical History:   Diagnosis Date    Abnormal chest x-ray with multiple lung nodules 9/8/2015    Abnormal Pap smear 1/3/2011    Alpha-1 negative    Adrenal adenoma     7 mm    Bilateral lung cancer ([address] 5/12/2016    surgically removed - 2015     Cholelithiasis 6/6/2011    Encounter for screening colonoscopy     for OR 3-28-19     Fibroids     Hemangioma of spleen     Hepatitis C 01/03/2011    treated and cured, no current issues     Hyperlipidemia     no medications     Hypertension 6/6/2011    Insomnia     Lung nodule     ROMELIA     Lymphadenopathy     Cervical (-) ENT    Malignant neoplasm of upper lobe of right lung ([address] 11/18/2015    Osteoarthritis     Panlobular emphysema ([address] 11/12/2015    controlled with inhalers     PPD negative 1/18/12    Pulmonary embolism without acute cor pulmonale ([address] 5/12/2016    PVD (peripheral vascular disease) ([address] 5/6/2014    Scoliosis     Uterine fibroid 5/6/2014       Past Surgical History:   Procedure Laterality Date    [facility] Left     AGE 31'S LEFT NIPPLE REMOVED    BREAST LUMPECTOMY  4/28/1999    left, benign, Jesu Avlaos  2/1/2012    Michelle Bueno, Jesu Marie  10/15/15    with EBUS - DR Balbina Gamboa    CHOLECYSTECTOMY      COLONOSCOPY  12/21/2009    partial to distal ascending colon normal (completion BE same day normal), Dr. Uriah Quintero, [facility]    COLONOSCOPY  8/22/2014    done under fluoro with sigmoid straightening device so was able to get to cecum, very poor prep, moderate proctitis, repeat 5 years,  Dr. Dino Figueroa, [facility]    COLONOSCOPY N/A 3/28/2019    COLONOSCOPY POLYPECTOMY SNARE/COLD BIOPSY performed by Carli Cline DO at [facility] ENDOSCOPY    COLONOSCOPY N/A 5/13/2022    COLONOSCOPY POLYPECTOMY SNARE/COLD BIOPSY performed by Lucita Uriarte MD at [address] [place] [address]  9/20/2022    CT NEEDLE BIOPSY LUNG PERCUTANEOUS 9/20/2022 L Allie Diane MD [facility]    ENDOMETRIAL BIOPSY      LIVER RESECTION Right 7/6/2022    LAPAROSCOPIC ROBOTIC PERITONEAL MASS RESECTION performed by Libby Brown MD at [place] [address] Left 3/29/2016    VATS WEDGE RESECTION UPPER LOBECTOMY    OTHER SURGICAL HISTORY Right 11/04/2016    REMOVAL MEDI-PORT - DR Reeder Look    WI LAP,CHOLECYSTECTOMY/GRAPH N/A 6/29/2018    CHOLECYSTECTOMY LAPAROSCOPIC, POSSIBLE OPEN,POSSIBLE GRAM ( OC 2) performed by Tyrel Easley MD at [place] [address] Right 11/11/2015    VATS, BRONCH,RT UPPER LOBECTOMY; NODE DISECTION    TUNNELED VENOUS PORT PLACEMENT Right 12/07/2015    right chest, and removed     UPPER GASTROINTESTINAL ENDOSCOPY N/A 5/13/2022    EGD POLYP HOT FORCEP/CAUTERY performed by Lucita Uriarte MD at [place] [address] History   Problem Relation Age of Onset    Heart Disease Mother     High Blood Pressure Mother     Cancer Mother         ovarian    Cancer Father     Heart Disease Father     High Blood Pressure Father     Kidney Disease Father     Diabetes Sister     Breast Cancer Sister     Diabetes Brother        Social History     Socioeconomic History    Marital status: Single     Spouse name: Not on file    Number of children: 0    Years of education: Not on file    Highest education level: Not on file   Occupational History    Not on file   Tobacco Use    Smoking status: Former     Packs/day: 0.00     Years: 30.00     Pack years: 0.00     Types: Cigarettes     Quit date: 2015     Years since quittin.3    Smokeless tobacco: Never   Vaping Use    Vaping Use: Never used   Substance and Sexual Activity    Alcohol use: Not Currently     Alcohol/week: 2.0 standard drinks     Types: 2 Glasses of wine per week     Comment: x2 week    Drug use: No    Sexual activity: Not Currently   Other Topics Concern    Not on file   Social History Narrative    Not on file     Social Determinants of Health     Financial Resource Strain: Not on file   Food Insecurity: Not on file   Transportation Needs: Not on file   Physical Activity: Not on file   Stress: Not on file   Social Connections: Not on file   Intimate Partner Violence: Not on file   Housing Stability: Not on file           Occupation: Retired  Retired:  YES: Patient is retired from [employer]. REVIEW OF SYSTEMS: <<For Level 5, 10 or more systems>>   Approximately >20 minutes spent with patient and her sister Geri Diaz, today to discuss SBRT to her Chest for her RLL cancer. Patient had presented to the ED back in February for abdominal pain and weight loss. CT of A/P was done on 22 which showed nutilizino obvious etiology for the patient's abdominal pain. Multiple peritoneal cystic masses which have been present to one degree or another dating back to 2018. The previously described jejunal mesenteric mass is no longer visualized. However, there are increased number and size of multiple peritoneal masses adjacent to the distal ascending colon. These measure up to 2.7 cm. These are indeterminate. Cystic metastatic disease is felt to be unlikely but not excluded. 6 mm right lower lobe pulmonary nodule, new from 2018.  A CT of the Chest done on 22 showed Ground-glass nodule right lower lobe superolateral portion 10 mm unchanged from prior however in the medial segment right lower lobe with pleural abutment is a 7 mm pulmonary nodule increased in size from 2021 with is barely visible at 2-3 mm. On 04/05/22 patient had a PET Scan done which showed No evidence of abnormal FDG uptake is seen in the lungs. No evidence of abnormal FDG uptake uptake is seen in the lymph nodes in the chest. CT of A/P done on 06/23/22 showed Increasing size of the pulmonary nodule within the left lower lung field suggesting progression of disease with numerous cystic structures identified throughout the right flank and retroperitoneum. Findings concerning for peritoneal malignancy. The large calcified fibroid and heterogeneous uterus within the left posterior pelvis is stable and unchanged. CT of Chest done on 08/04/22 showed Postsurgical changes in the lungs with the significant increase in a previously noted mass in the right lower lobe concerning for progressive malignancy with additional ground-glass nodules. There may be metastatic involvement in the adrenals and possibly spleen. PET Scan done on 09/06/22 showed  In the interval there is now a new FDG avid tracer uptake in the right lower lobe lung mass with a peak SUV of 3.2 suspicious for neoplasm. There is no other convincing evidence for metastatic disease. On 09/20/22 Patient underwent a RLL Biopsy for which pathology revealed adenocarcinoma. Patient does have a history of RUL Adenocarcinoma from 2015 in which the patient states they were treated with chemotherapy, carboplatin/Alimta from 12/09/15-02/24/16. Patient had underwent a Bronch/ Left VATS, Left Upper Lobectomy, Mediastinal LN dissection with Dr. Dory Franco on 03/29/16 for which pathology came back as invasive adenocarcinoma (grade 1) [id number]. Patient is following with Dr. Dominique Aadms for pulmomology. Patient states she see's Dr. Dory Franco tomorrow with the understanding he will decide if patient is a surgical candidate or receive RT.  Patient is following with Dr. Luma Carroll for Med Onc, who refers patient here today, and last seen on 10/05/22 who recommends Thoracic Surgery team (Dr. Dory Franco) consulted for surgical evaluation, If unresectable, then consider SBRT by Rad Onc team. Education given on RT to the Chest utilizing handouts and slides. Patient verbalizes understanding of care and all questions were answered from a nursing perspective. Consent for SBRT to the Lung signed and scanned into [employer]. Pacemaker/Defibulator/ICD:  No    Mediport: No        FALLS RISK SCREENING ASSESSMENT    Instructions:  Assess the patient and enter the appropriate indicators that are present for fall risk identification. Total the numbers entered and assign a fall risk score from Table 2.  Reassess patient at a minimum every 12 weeks or with status change. Assessment   Date  10/10/2022     1. Mental Ability: confusion/cognitively impaired No - 0       2. Elimination Issues: incontinence, frequency No - 0       3. Ambulatory: use of assistive devices (walker, cane, off-loading devices), attached to equipment (IV pole, oxygen) Yes - 2     4. Sensory Limitations: dizziness, vertigo, impaired vision No - 0       5. Age 72 years or greater - 1       10. Medication: diuretics, strong analgesics, hypnotics, sedatives, antihypertensive agents   Yes - 3   7. Falls:  recent history of falls within the last 3 months (not to include slipping or tripping)   No - 0   TOTAL 6    If score of 4 or greater was education given? Yes       TABLE 2   Risk Score Risk Level Plan of Care   0-3 Little or  No Risk 1. Provide assistance as indicated for ambulation activities  2. Reorient confused/cognitively impaired patient  3. Call-light/bell within patient's reach  4. Chair/bed in low position, stretcher/bed with siderails up except when performing patient care activities  5. Educate patient/family/caregiver on falls prevention  6.  Reassess in 12 weeks or with any noted change in patient condition which places them at a risk for a fall   4-6 Moderate Risk 1. Provide assistance as indicated for ambulation activities  2. Reorient confused/cognitively impaired patient  3. Call-light/bell within patient's reach  4. Chair/bed in low position, stretcher/bed with siderails up except when performing patient care activities  5. Educate patient/family/caregiver on falls prevention  6. Falls risk precaution (Yellow sticker Level II) placed on patient chart   7 or   Higher High Risk 1. Place patient in easily observable treatment room  2. Patient attended at all times by family member or staff  3. Provide assistance as indicated for ambulation activities  4. Reorient confused/cognitively impaired patient  5. Call-light/bell within patient's reach  6. Chair/bed in low position, stretcher/bed with siderails up except when performing patient care activities  7. Educate patient/family/caregiver on falls prevention  8. Falls risk precaution (Yellow sticker Level III) placed on patient chart           MALNUTRITION RISK SCREENING ASSESSMENT    Instructions:  Assess the patient and enter the appropriate indicators that are present for nutrition risk identification. Total the numbers entered and assign a risk score. Follow the appropriate action for total score listed below. Assessment   Date  10/10/2022     Have you lost weight without trying? 3- Yes, 10.1 kg to 15 kg     Have you been eating poorly because of a decreased appetite? 1- Yes   3. Do you have a diagnosis of head and neck cancer? 0- No                                                                                    TOTAL 4          Score of 0-1: No action  Score 2 or greater:   For Non-Diabetic Patient: Recommend adding Ensure Complete 2 x daily and provide patient with Ensure wellness bag with coupons  For Diabetic Patient: Recommend adding Glucerna Shake 2 x daily and provide patient with Glucerna Wellness bag with coupons  Route to the dietitian via [address]    Are you having difficulty performing daily routine tasks due to fatigue or weakness (ie: bathing/showering, dressing, housework, meal prep, work, , etc): Yes     Do you have any arm flexibility/ROM restrictions, swelling or pain that limit activity: No     Any changes in memory, attention/focus that impact daily activities: No     Do you avoid participation in leisure/social activity due to weakness, fatigue or pain: Yes     ARE ANY OF THE ABOVE ARE ANSWERED YES: Yes - but NO OT referral request sent due to patient refusal.          PT ASSESSMENT FOR REFERRAL    Have you had any recent falls in the past 2 months: No     Do you have difficulty going up/down stairs: Yes     Are you having difficulty walking: Yes     Do you often hold onto furniture/environmental supports or feel off balance when you are walking: Yes     Do you need to take rest breaks when you are walking: Yes     Any pain on a scale of 1-10 that limits your mobility: Yes 8/10    ARE ANY OF THE ABOVE ARE ANSWERED YES: Yes - but NO PT referral request sent due to patient refusal.           LYMPHEDEMA SCREENING ASSESSMENT FOR PATIENTS WITH BREAST CANCER    The patient reports the following signs/symptoms of lymphedema: None    Please ask the provider to assess patient for lymphedema for any reported signs or symptoms so a referral to Lymphedema Therapy can be considered. PELVIC FLOOR DYSFUNCTION SCREENING ASSESSMENT    - Do you have any pelvic pain? No     - Do you have any fecal constipation or fecal incontinence? No     - Do you have any urinary incontinence or difficulty starting to urinate? No     ARE ANY OF THE ABOVE ARE ANSWERED YES: No          PREHAB AUDIOLOGY REFERRAL    - Is patient planned to receive Cisplatin? No. This patient is not planned to start Cisplatin. - Is patient planned to receive radiation therapy that may be directed toward auditory canals or nerves? No. Patient is not planned to start radiation therapy to auditory canals or nerves.     - Is patient complaining of new onset hearing loss? No. Patient is not complaining of new onset hearing loss. Patient education given on SBRT to the Lung. The patient expresses understanding and acceptance of instructions.  Praneeth Buckner RN 10/10/2022 10:20 AM           Praneeth Buckner RN

## 2022-10-11 ENCOUNTER — TELEPHONE (OUTPATIENT)
Dept: PALLATIVE CARE | Age: 72
End: 2022-10-11

## 2022-10-11 ENCOUNTER — INITIAL CONSULT (OUTPATIENT)
Dept: CARDIOTHORACIC SURGERY | Age: 72
End: 2022-10-11
Payer: MEDICARE

## 2022-10-11 VITALS
HEART RATE: 95 BPM | WEIGHT: 117 LBS | HEIGHT: 61 IN | BODY MASS INDEX: 22.09 KG/M2 | SYSTOLIC BLOOD PRESSURE: 125 MMHG | DIASTOLIC BLOOD PRESSURE: 61 MMHG

## 2022-10-11 DIAGNOSIS — Z90.2 S/P LOBECTOMY OF LUNG: Primary | ICD-10-CM

## 2022-10-11 DIAGNOSIS — R91.8 RIGHT LOWER LOBE LUNG MASS: ICD-10-CM

## 2022-10-11 PROCEDURE — G8427 DOCREV CUR MEDS BY ELIG CLIN: HCPCS | Performed by: THORACIC SURGERY (CARDIOTHORACIC VASCULAR SURGERY)

## 2022-10-11 PROCEDURE — G8484 FLU IMMUNIZE NO ADMIN: HCPCS | Performed by: THORACIC SURGERY (CARDIOTHORACIC VASCULAR SURGERY)

## 2022-10-11 PROCEDURE — 1090F PRES/ABSN URINE INCON ASSESS: CPT | Performed by: THORACIC SURGERY (CARDIOTHORACIC VASCULAR SURGERY)

## 2022-10-11 PROCEDURE — G8420 CALC BMI NORM PARAMETERS: HCPCS | Performed by: THORACIC SURGERY (CARDIOTHORACIC VASCULAR SURGERY)

## 2022-10-11 PROCEDURE — 3017F COLORECTAL CA SCREEN DOC REV: CPT | Performed by: THORACIC SURGERY (CARDIOTHORACIC VASCULAR SURGERY)

## 2022-10-11 PROCEDURE — G8399 PT W/DXA RESULTS DOCUMENT: HCPCS | Performed by: THORACIC SURGERY (CARDIOTHORACIC VASCULAR SURGERY)

## 2022-10-11 PROCEDURE — 1036F TOBACCO NON-USER: CPT | Performed by: THORACIC SURGERY (CARDIOTHORACIC VASCULAR SURGERY)

## 2022-10-11 PROCEDURE — 1123F ACP DISCUSS/DSCN MKR DOCD: CPT | Performed by: THORACIC SURGERY (CARDIOTHORACIC VASCULAR SURGERY)

## 2022-10-11 PROCEDURE — 99203 OFFICE O/P NEW LOW 30 MIN: CPT | Performed by: THORACIC SURGERY (CARDIOTHORACIC VASCULAR SURGERY)

## 2022-10-11 NOTE — TELEPHONE ENCOUNTER
SW called pt regarding Palliative medicine referral from Dr. Willa Jain, no answer, left message with contact information provided.       Kathryn Peng MSW,LSW  Palliative Medicine

## 2022-10-12 DIAGNOSIS — C34.90 NON-SMALL CELL LUNG CANCER, UNSPECIFIED LATERALITY (HCC): Primary | ICD-10-CM

## 2022-10-12 RX ORDER — PREDNISONE 1 MG/1
5 TABLET ORAL DAILY
Qty: 14 TABLET | Refills: 0 | Status: SHIPPED | OUTPATIENT
Start: 2022-10-12 | End: 2022-10-26

## 2022-10-12 NOTE — PROGRESS NOTES
Patient called in requesting a medication to help her appetite. Patient stated that Dr. Sally Dumont recommended her to have medication plus Ensure. Per Dr. Cleotha Crigler, verbal order for prednisone 5 mg daily x 2 weeks was placed. Patient called for notification.

## 2022-10-18 ENCOUNTER — TELEPHONE (OUTPATIENT)
Dept: PALLATIVE CARE | Age: 72
End: 2022-10-18

## 2022-10-18 NOTE — TELEPHONE ENCOUNTER
Called to pt regarding referral to Palliative medicine, no answer left message.  This was Palliative medicine 3rd attempt to reach pt to schedule appt regarding new patient referral.      Robyn Brito MSW,LSW  Palliative Medicine

## 2022-10-19 ENCOUNTER — HOSPITAL ENCOUNTER (OUTPATIENT)
Dept: RADIATION ONCOLOGY | Age: 72
Discharge: HOME | End: 2022-10-19
Payer: MEDICARE

## 2022-10-19 PROCEDURE — 77334 RADIATION TREATMENT AID(S): CPT | Performed by: RADIOLOGY

## 2022-10-21 ENCOUNTER — HOSPITAL ENCOUNTER (OUTPATIENT)
Dept: PHYSICAL THERAPY | Age: 72
Setting detail: THERAPIES SERIES
Discharge: HOME | End: 2022-10-21

## 2022-10-21 NOTE — PROGRESS NOTES
[facility]                Phone: [phone]  Fax: [phone]    Physical Therapy  Cancellation/No-show Note  Patient Name:  Eloy Stafford  :  1950   Date:  10/21/2022    For today's appointment patient:  []  Cancelled  []  Rescheduled appointment  [x]  No-show     Reason given by patient:  []  Patient ill  []  Conflicting appointment  []  No transportation    []  Conflict with work  [x]  No reason given  []  Other:     Comments:  pt was a no-show for initial evaluation. Electronically signed by:   Collin Freed PT

## 2022-10-24 ENCOUNTER — HOSPITAL ENCOUNTER (EMERGENCY)
Age: 72
Discharge: LEFT BEFORE DISCHARGE | End: 2022-10-24
Payer: MEDICARE

## 2022-10-24 ENCOUNTER — TELEPHONE (OUTPATIENT)
Dept: CASE MANAGEMENT | Age: 72
End: 2022-10-24

## 2022-10-24 ENCOUNTER — APPOINTMENT (OUTPATIENT)
Dept: ULTRASOUND IMAGING | Age: 72
End: 2022-10-24
Payer: MEDICARE

## 2022-10-24 VITALS
DIASTOLIC BLOOD PRESSURE: 72 MMHG | TEMPERATURE: 99.6 F | OXYGEN SATURATION: 100 % | SYSTOLIC BLOOD PRESSURE: 153 MMHG | BODY MASS INDEX: 22.09 KG/M2 | RESPIRATION RATE: 16 BRPM | WEIGHT: 117 LBS | HEIGHT: 61 IN | HEART RATE: 87 BPM

## 2022-10-24 LAB
ALBUMIN SERPL-MCNC: 3.2 G/DL (ref 3.5–5.2)
ALP BLD-CCNC: 101 U/L (ref 35–104)
ALT SERPL-CCNC: 28 U/L (ref 0–32)
ANION GAP SERPL CALCULATED.3IONS-SCNC: 12 MMOL/L (ref 7–16)
AST SERPL-CCNC: 39 U/L (ref 0–31)
BACTERIA: ABNORMAL /HPF
BASOPHILS ABSOLUTE: 0.02 E9/L (ref 0–0.2)
BASOPHILS RELATIVE PERCENT: 0.2 % (ref 0–2)
BILIRUB SERPL-MCNC: <0.2 MG/DL (ref 0–1.2)
BILIRUBIN URINE: NEGATIVE
BLOOD, URINE: ABNORMAL
BUN BLDV-MCNC: 40 MG/DL (ref 6–23)
CALCIUM SERPL-MCNC: 9.8 MG/DL (ref 8.6–10.2)
CHLORIDE BLD-SCNC: 110 MMOL/L (ref 98–107)
CLARITY: CLEAR
CO2: 21 MMOL/L (ref 22–29)
COLOR: YELLOW
CREAT SERPL-MCNC: 1.4 MG/DL (ref 0.5–1)
EOSINOPHILS ABSOLUTE: 0.02 E9/L (ref 0.05–0.5)
EOSINOPHILS RELATIVE PERCENT: 0.2 % (ref 0–6)
EPITHELIAL CELLS, UA: ABNORMAL /HPF
GFR SERPL CREATININE-BSD FRML MDRD: 40 ML/MIN/1.73
GLUCOSE BLD-MCNC: 134 MG/DL (ref 74–99)
GLUCOSE URINE: NEGATIVE MG/DL
HCT VFR BLD CALC: 25.5 % (ref 34–48)
HEMOGLOBIN: 8.3 G/DL (ref 11.5–15.5)
IMMATURE GRANULOCYTES #: 0.08 E9/L
IMMATURE GRANULOCYTES %: 1 % (ref 0–5)
KETONES, URINE: NEGATIVE MG/DL
LACTIC ACID, SEPSIS: 1.1 MMOL/L (ref 0.5–1.9)
LACTIC ACID, SEPSIS: 1.1 MMOL/L (ref 0.5–1.9)
LEUKOCYTE ESTERASE, URINE: ABNORMAL
LIPASE: 210 U/L (ref 13–60)
LYMPHOCYTES ABSOLUTE: 0.83 E9/L (ref 1.5–4)
LYMPHOCYTES RELATIVE PERCENT: 10.1 % (ref 20–42)
MCH RBC QN AUTO: 28.3 PG (ref 26–35)
MCHC RBC AUTO-ENTMCNC: 32.5 % (ref 32–34.5)
MCV RBC AUTO: 87 FL (ref 80–99.9)
MONOCYTES ABSOLUTE: 0.64 E9/L (ref 0.1–0.95)
MONOCYTES RELATIVE PERCENT: 7.8 % (ref 2–12)
NEUTROPHILS ABSOLUTE: 6.63 E9/L (ref 1.8–7.3)
NEUTROPHILS RELATIVE PERCENT: 80.7 % (ref 43–80)
NITRITE, URINE: NEGATIVE
PDW BLD-RTO: 14.5 FL (ref 11.5–15)
PH UA: 5.5 (ref 5–9)
PLATELET # BLD: 391 E9/L (ref 130–450)
PMV BLD AUTO: 10.1 FL (ref 7–12)
POTASSIUM SERPL-SCNC: 5.7 MMOL/L (ref 3.5–5)
PROTEIN UA: 100 MG/DL
RBC # BLD: 2.93 E12/L (ref 3.5–5.5)
RBC UA: ABNORMAL /HPF (ref 0–2)
SODIUM BLD-SCNC: 143 MMOL/L (ref 132–146)
SPECIFIC GRAVITY UA: 1.02 (ref 1–1.03)
TOTAL PROTEIN: 8.5 G/DL (ref 6.4–8.3)
UROBILINOGEN, URINE: 0.2 E.U./DL
WBC # BLD: 8.2 E9/L (ref 4.5–11.5)
WBC UA: ABNORMAL /HPF (ref 0–5)

## 2022-10-24 PROCEDURE — 93970 EXTREMITY STUDY: CPT

## 2022-10-24 PROCEDURE — 83690 ASSAY OF LIPASE: CPT

## 2022-10-24 PROCEDURE — 81001 URINALYSIS AUTO W/SCOPE: CPT

## 2022-10-24 PROCEDURE — 83605 ASSAY OF LACTIC ACID: CPT

## 2022-10-24 PROCEDURE — 99284 EMERGENCY DEPT VISIT MOD MDM: CPT

## 2022-10-24 PROCEDURE — 93971 EXTREMITY STUDY: CPT | Performed by: RADIOLOGY

## 2022-10-24 PROCEDURE — 80053 COMPREHEN METABOLIC PANEL: CPT

## 2022-10-24 PROCEDURE — 85025 COMPLETE CBC W/AUTO DIFF WBC: CPT

## 2022-10-24 PROCEDURE — 4500000002 HC ER NO CHARGE

## 2022-10-24 ASSESSMENT — PAIN DESCRIPTION - ORIENTATION: ORIENTATION: MID

## 2022-10-24 ASSESSMENT — PAIN DESCRIPTION - DESCRIPTORS: DESCRIPTORS: CRAMPING

## 2022-10-24 ASSESSMENT — PAIN SCALES - GENERAL: PAINLEVEL_OUTOF10: 7

## 2022-10-24 ASSESSMENT — PAIN DESCRIPTION - LOCATION: LOCATION: ABDOMEN

## 2022-10-24 ASSESSMENT — PAIN DESCRIPTION - PAIN TYPE: TYPE: ACUTE PAIN

## 2022-10-24 ASSESSMENT — PAIN - FUNCTIONAL ASSESSMENT: PAIN_FUNCTIONAL_ASSESSMENT: 0-10

## 2022-10-24 NOTE — ED NOTES
Department of Emergency Medicine  FIRST PROVIDER TRIAGE NOTE             Independent MLP           10/24/22  1:27 PM EDT    Date of Encounter: 10/24/22   MRN: [id number]      HPI: Max Sauceda is a 71 y.o. female who presents to the ED for Abdominal Pain (Sent by  for abd pain for 3 days), Nausea & Vomiting, and Diarrhea      ROS: Negative for cp, sob, fever, or rash. PE: Gen Appearance/Constitutional: alert     Initial Plan of Care: All treatment areas with department are currently occupied. Plan to order/Initiate the following while awaiting opening in ED: labs.   Initiate Treatment-Testing, Proceed toTreatment Area When Bed Available for ED Attending/MLP to Continue Care    Electronically signed by Reg Galloway PA-C   DD: 10/24/22       Reg Galloway PA-C  10/24/22 9821

## 2022-10-31 ENCOUNTER — HOSPITAL ENCOUNTER (INPATIENT)
Age: 72
LOS: 9 days | Discharge: NURSING, REHAB OR LONG-TERM CARE | DRG: 948 | End: 2022-11-09
Attending: EMERGENCY MEDICINE | Admitting: INTERNAL MEDICINE
Payer: MEDICARE

## 2022-10-31 ENCOUNTER — HOSPITAL ENCOUNTER (OUTPATIENT)
Dept: RADIATION ONCOLOGY | Age: 72
Discharge: HOME | End: 2022-10-31
Payer: MEDICARE

## 2022-10-31 ENCOUNTER — APPOINTMENT (OUTPATIENT)
Dept: CT IMAGING | Age: 72
DRG: 948 | End: 2022-10-31
Payer: MEDICARE

## 2022-10-31 DIAGNOSIS — R10.84 GENERALIZED ABDOMINAL PAIN: ICD-10-CM

## 2022-10-31 DIAGNOSIS — R18.8 OTHER ASCITES: Primary | ICD-10-CM

## 2022-10-31 PROBLEM — R10.9 ABDOMINAL PAIN: Status: ACTIVE | Noted: 2022-10-31

## 2022-10-31 LAB
ALBUMIN SERPL-MCNC: 2.9 G/DL (ref 3.5–5.2)
ALP BLD-CCNC: 63 U/L (ref 35–104)
ALT SERPL-CCNC: 12 U/L (ref 0–32)
ANION GAP SERPL CALCULATED.3IONS-SCNC: 13 MMOL/L (ref 7–16)
AST SERPL-CCNC: 20 U/L (ref 0–31)
BACTERIA: ABNORMAL /HPF
BASOPHILS ABSOLUTE: 0.02 E9/L (ref 0–0.2)
BASOPHILS RELATIVE PERCENT: 0.2 % (ref 0–2)
BILIRUB SERPL-MCNC: 0.3 MG/DL (ref 0–1.2)
BILIRUBIN URINE: NEGATIVE
BLOOD, URINE: ABNORMAL
BUN BLDV-MCNC: 23 MG/DL (ref 6–23)
CALCIUM SERPL-MCNC: 9.1 MG/DL (ref 8.6–10.2)
CHLORIDE BLD-SCNC: 106 MMOL/L (ref 98–107)
CLARITY: CLEAR
CO2: 19 MMOL/L (ref 22–29)
COLOR: YELLOW
CREAT SERPL-MCNC: 1.4 MG/DL (ref 0.5–1)
EOSINOPHILS ABSOLUTE: 0 E9/L (ref 0.05–0.5)
EOSINOPHILS RELATIVE PERCENT: 0 % (ref 0–6)
EPITHELIAL CELLS, UA: ABNORMAL /HPF
GFR SERPL CREATININE-BSD FRML MDRD: 40 ML/MIN/1.73
GLUCOSE BLD-MCNC: 144 MG/DL (ref 74–99)
GLUCOSE URINE: NEGATIVE MG/DL
HCT VFR BLD CALC: 24.6 % (ref 34–48)
HEMOGLOBIN: 8 G/DL (ref 11.5–15.5)
HYALINE CASTS: ABNORMAL /LPF (ref 0–2)
IMMATURE GRANULOCYTES #: 0.05 E9/L
IMMATURE GRANULOCYTES %: 0.5 % (ref 0–5)
KETONES, URINE: NEGATIVE MG/DL
LACTIC ACID: 2.4 MMOL/L (ref 0.5–2.2)
LEUKOCYTE ESTERASE, URINE: NEGATIVE
LIPASE: 130 U/L (ref 13–60)
LYMPHOCYTES ABSOLUTE: 0.99 E9/L (ref 1.5–4)
LYMPHOCYTES RELATIVE PERCENT: 10 % (ref 20–42)
MAGNESIUM: 1.7 MG/DL (ref 1.6–2.6)
MCH RBC QN AUTO: 28 PG (ref 26–35)
MCHC RBC AUTO-ENTMCNC: 32.5 % (ref 32–34.5)
MCV RBC AUTO: 86 FL (ref 80–99.9)
METER GLUCOSE: 115 MG/DL (ref 74–99)
MONOCYTES ABSOLUTE: 0.76 E9/L (ref 0.1–0.95)
MONOCYTES RELATIVE PERCENT: 7.7 % (ref 2–12)
NEUTROPHILS ABSOLUTE: 8.06 E9/L (ref 1.8–7.3)
NEUTROPHILS RELATIVE PERCENT: 81.6 % (ref 43–80)
NITRITE, URINE: NEGATIVE
PDW BLD-RTO: 15.2 FL (ref 11.5–15)
PH UA: 6 (ref 5–9)
PLATELET # BLD: 341 E9/L (ref 130–450)
PMV BLD AUTO: 10.7 FL (ref 7–12)
POTASSIUM SERPL-SCNC: 4.4 MMOL/L (ref 3.5–5)
PRO-BNP: 1122 PG/ML (ref 0–125)
PROTEIN UA: 100 MG/DL
RBC # BLD: 2.86 E12/L (ref 3.5–5.5)
RBC UA: ABNORMAL /HPF (ref 0–2)
SODIUM BLD-SCNC: 138 MMOL/L (ref 132–146)
SPECIFIC GRAVITY UA: 1.01 (ref 1–1.03)
TOTAL PROTEIN: 7.3 G/DL (ref 6.4–8.3)
TROPONIN, HIGH SENSITIVITY: 49 NG/L (ref 0–9)
TROPONIN, HIGH SENSITIVITY: 54 NG/L (ref 0–9)
UROBILINOGEN, URINE: 1 E.U./DL
WBC # BLD: 9.9 E9/L (ref 4.5–11.5)
WBC UA: ABNORMAL /HPF (ref 0–5)

## 2022-10-31 PROCEDURE — 6370000000 HC RX 637 (ALT 250 FOR IP): Performed by: INTERNAL MEDICINE

## 2022-10-31 PROCEDURE — 99285 EMERGENCY DEPT VISIT HI MDM: CPT

## 2022-10-31 PROCEDURE — 83690 ASSAY OF LIPASE: CPT

## 2022-10-31 PROCEDURE — 77338 DESIGN MLC DEVICE FOR IMRT: CPT | Performed by: RADIOLOGY

## 2022-10-31 PROCEDURE — 80053 COMPREHEN METABOLIC PANEL: CPT

## 2022-10-31 PROCEDURE — 74177 CT ABD & PELVIS W/CONTRAST: CPT

## 2022-10-31 PROCEDURE — 83605 ASSAY OF LACTIC ACID: CPT

## 2022-10-31 PROCEDURE — 81001 URINALYSIS AUTO W/SCOPE: CPT

## 2022-10-31 PROCEDURE — 83735 ASSAY OF MAGNESIUM: CPT

## 2022-10-31 PROCEDURE — 83880 ASSAY OF NATRIURETIC PEPTIDE: CPT

## 2022-10-31 PROCEDURE — 6360000002 HC RX W HCPCS: Performed by: EMERGENCY MEDICINE

## 2022-10-31 PROCEDURE — 77301 RADIOTHERAPY DOSE PLAN IMRT: CPT | Performed by: RADIOLOGY

## 2022-10-31 PROCEDURE — 77293 RESPIRATOR MOTION MGMT SIMUL: CPT | Performed by: RADIOLOGY

## 2022-10-31 PROCEDURE — 84484 ASSAY OF TROPONIN QUANT: CPT

## 2022-10-31 PROCEDURE — 1200000000 HC SEMI PRIVATE

## 2022-10-31 PROCEDURE — 99222 1ST HOSP IP/OBS MODERATE 55: CPT | Performed by: INTERNAL MEDICINE

## 2022-10-31 PROCEDURE — 77300 RADIATION THERAPY DOSE PLAN: CPT | Performed by: RADIOLOGY

## 2022-10-31 PROCEDURE — 2580000003 HC RX 258: Performed by: INTERNAL MEDICINE

## 2022-10-31 PROCEDURE — 6360000004 HC RX CONTRAST MEDICATION: Performed by: RADIOLOGY

## 2022-10-31 PROCEDURE — 85025 COMPLETE CBC W/AUTO DIFF WBC: CPT

## 2022-10-31 PROCEDURE — 99999 PR OFFICE/OUTPT VISIT,PROCEDURE ONLY: CPT | Performed by: RADIOLOGY

## 2022-10-31 PROCEDURE — 96374 THER/PROPH/DIAG INJ IV PUSH: CPT

## 2022-10-31 RX ORDER — SODIUM CHLORIDE 0.9 % (FLUSH) 0.9 %
10 SYRINGE (ML) INJECTION PRN
Status: DISCONTINUED | OUTPATIENT
Start: 2022-10-31 | End: 2022-10-31 | Stop reason: SDUPTHER

## 2022-10-31 RX ORDER — ACETAMINOPHEN 650 MG/1
650 SUPPOSITORY RECTAL EVERY 6 HOURS PRN
Status: DISCONTINUED | OUTPATIENT
Start: 2022-10-31 | End: 2022-11-09 | Stop reason: HOSPADM

## 2022-10-31 RX ORDER — ACETAMINOPHEN 325 MG/1
650 TABLET ORAL EVERY 6 HOURS PRN
Status: DISCONTINUED | OUTPATIENT
Start: 2022-10-31 | End: 2022-11-09 | Stop reason: HOSPADM

## 2022-10-31 RX ORDER — SODIUM CHLORIDE 9 MG/ML
INJECTION, SOLUTION INTRAVENOUS PRN
Status: DISCONTINUED | OUTPATIENT
Start: 2022-10-31 | End: 2022-11-09 | Stop reason: HOSPADM

## 2022-10-31 RX ORDER — SODIUM CHLORIDE 0.9 % (FLUSH) 0.9 %
5-40 SYRINGE (ML) INJECTION PRN
Status: DISCONTINUED | OUTPATIENT
Start: 2022-10-31 | End: 2022-11-09 | Stop reason: HOSPADM

## 2022-10-31 RX ORDER — ONDANSETRON 4 MG/1
4 TABLET, ORALLY DISINTEGRATING ORAL EVERY 8 HOURS PRN
Status: DISCONTINUED | OUTPATIENT
Start: 2022-10-31 | End: 2022-11-09 | Stop reason: HOSPADM

## 2022-10-31 RX ORDER — ONDANSETRON 2 MG/ML
4 INJECTION INTRAMUSCULAR; INTRAVENOUS EVERY 6 HOURS PRN
Status: DISCONTINUED | OUTPATIENT
Start: 2022-10-31 | End: 2022-11-09 | Stop reason: HOSPADM

## 2022-10-31 RX ORDER — SODIUM CHLORIDE 0.9 % (FLUSH) 0.9 %
5-40 SYRINGE (ML) INJECTION EVERY 12 HOURS SCHEDULED
Status: DISCONTINUED | OUTPATIENT
Start: 2022-10-31 | End: 2022-11-09 | Stop reason: HOSPADM

## 2022-10-31 RX ORDER — MORPHINE SULFATE 4 MG/ML
4 INJECTION, SOLUTION INTRAMUSCULAR; INTRAVENOUS
Status: DISCONTINUED | OUTPATIENT
Start: 2022-10-31 | End: 2022-11-09 | Stop reason: HOSPADM

## 2022-10-31 RX ORDER — MORPHINE SULFATE 2 MG/ML
2 INJECTION, SOLUTION INTRAMUSCULAR; INTRAVENOUS
Status: DISCONTINUED | OUTPATIENT
Start: 2022-10-31 | End: 2022-11-09 | Stop reason: HOSPADM

## 2022-10-31 RX ORDER — POLYETHYLENE GLYCOL 3350 17 G/17G
17 POWDER, FOR SOLUTION ORAL DAILY PRN
Status: DISCONTINUED | OUTPATIENT
Start: 2022-10-31 | End: 2022-11-09 | Stop reason: HOSPADM

## 2022-10-31 RX ORDER — AMLODIPINE BESYLATE 5 MG/1
5 TABLET ORAL DAILY
Status: DISCONTINUED | OUTPATIENT
Start: 2022-11-01 | End: 2022-11-09 | Stop reason: HOSPADM

## 2022-10-31 RX ORDER — FENTANYL CITRATE 50 UG/ML
50 INJECTION, SOLUTION INTRAMUSCULAR; INTRAVENOUS ONCE
Status: COMPLETED | OUTPATIENT
Start: 2022-10-31 | End: 2022-10-31

## 2022-10-31 RX ORDER — TRAZODONE HYDROCHLORIDE 50 MG/1
50 TABLET ORAL NIGHTLY
Status: DISCONTINUED | OUTPATIENT
Start: 2022-10-31 | End: 2022-11-09 | Stop reason: HOSPADM

## 2022-10-31 RX ORDER — LISINOPRIL 20 MG/1
40 TABLET ORAL DAILY
Status: DISCONTINUED | OUTPATIENT
Start: 2022-11-01 | End: 2022-11-09 | Stop reason: HOSPADM

## 2022-10-31 RX ORDER — HYDROCHLOROTHIAZIDE 25 MG/1
25 TABLET ORAL DAILY
Status: DISCONTINUED | OUTPATIENT
Start: 2022-11-01 | End: 2022-11-09 | Stop reason: HOSPADM

## 2022-10-31 RX ADMIN — FENTANYL CITRATE 50 MCG: 50 INJECTION, SOLUTION INTRAMUSCULAR; INTRAVENOUS at 13:37

## 2022-10-31 RX ADMIN — TRAZODONE HYDROCHLORIDE 50 MG: 50 TABLET ORAL at 20:29

## 2022-10-31 RX ADMIN — ACETAMINOPHEN 650 MG: 325 TABLET ORAL at 19:31

## 2022-10-31 RX ADMIN — IOPAMIDOL 65 ML: 755 INJECTION, SOLUTION INTRAVENOUS at 14:23

## 2022-10-31 RX ADMIN — Medication 10 ML: at 20:29

## 2022-10-31 ASSESSMENT — ENCOUNTER SYMPTOMS
SHORTNESS OF BREATH: 0
SINUS PRESSURE: 0
DIARRHEA: 0
VOMITING: 0
NAUSEA: 0
EYE PAIN: 0
SORE THROAT: 0
EYE DISCHARGE: 0
ABDOMINAL PAIN: 1
COUGH: 0
CONSTIPATION: 0
EYE REDNESS: 0
ABDOMINAL DISTENTION: 0
BACK PAIN: 0
WHEEZING: 0

## 2022-10-31 ASSESSMENT — PAIN DESCRIPTION - LOCATION: LOCATION: ABDOMEN;BACK

## 2022-10-31 ASSESSMENT — PAIN SCALES - GENERAL
PAINLEVEL_OUTOF10: 7
PAINLEVEL_OUTOF10: 8
PAINLEVEL_OUTOF10: 8
PAINLEVEL_OUTOF10: 2

## 2022-10-31 ASSESSMENT — PAIN - FUNCTIONAL ASSESSMENT: PAIN_FUNCTIONAL_ASSESSMENT: ACTIVITIES ARE NOT PREVENTED

## 2022-10-31 ASSESSMENT — PAIN DESCRIPTION - ORIENTATION: ORIENTATION: LOWER

## 2022-10-31 ASSESSMENT — PAIN DESCRIPTION - DESCRIPTORS: DESCRIPTORS: ACHING;DISCOMFORT

## 2022-10-31 NOTE — PROGRESS NOTES
Database initiated pharmacy and medications verified with the patient. She is A&O from home with . She states she uses a walker to ambulate and is RA at baseline. She requires help from her  with showering and dressing.

## 2022-10-31 NOTE — ED NOTES
Pt had brief loc in lobby. spo2 98% hr 101. Lasted less than a min. A&O once back awake.       Cassia Martinez RN  10/31/22 1211

## 2022-10-31 NOTE — PROGRESS NOTES
Per requesting something to sleep. Dr. Joanna Courtney called, see new order.  Electronically signed by Kuldeep Jerry RN on 10/31/2022 at 7:00 PM

## 2022-10-31 NOTE — ED PROVIDER NOTES
Patient with history of lung cancer. She reports 2 weeks of increasing abdominal swelling and distention and now accompanied by pain. She also reports lower extremity edema. No chest pain or shortness of breath. The history is provided by the patient. Abdominal Pain  Pain location:  Generalized  Pain quality: aching    Pain radiates to:  Does not radiate  Pain severity:  Mild  Onset quality:  Gradual  Duration:  2 weeks  Timing:  Constant  Progression:  Worsening  Chronicity:  New  Relieved by:  None tried  Worsened by:  Nothing  Ineffective treatments:  None tried  Associated symptoms: no chest pain, no chills, no constipation, no cough, no diarrhea, no dysuria, no fever, no melena, no nausea, no shortness of breath, no sore throat and no vomiting       Review of Systems   Constitutional:  Negative for chills and fever. HENT:  Negative for sinus pressure and sore throat. Eyes:  Negative for pain, discharge and redness. Respiratory:  Negative for cough, shortness of breath and wheezing. Cardiovascular:  Positive for leg swelling. Negative for chest pain. Gastrointestinal:  Positive for abdominal pain. Negative for abdominal distention, constipation, diarrhea, melena, nausea and vomiting. Genitourinary:  Negative for dysuria and frequency. Musculoskeletal:  Negative for arthralgias and back pain. Skin:  Negative for rash and wound. Neurological:  Negative for weakness and headaches. Hematological:  Negative for adenopathy. All other systems reviewed and are negative. Physical Exam  Vitals and nursing note reviewed. Constitutional:       Appearance: She is well-developed. HENT:      Head: Normocephalic and atraumatic. Eyes:      Pupils: Pupils are equal, round, and reactive to light. Cardiovascular:      Rate and Rhythm: Regular rhythm. Tachycardia present. Heart sounds: Normal heart sounds. No murmur heard.   Pulmonary:      Effort: Pulmonary effort is normal. No respiratory distress. Breath sounds: Normal breath sounds. No wheezing or rales. Abdominal:      General: Bowel sounds are normal. There is distension. Palpations: Abdomen is rigid. Tenderness: There is generalized abdominal tenderness. There is no right CVA tenderness, left CVA tenderness, guarding or rebound. Hernia: No hernia is present. Musculoskeletal:         General: No swelling. Cervical back: Normal range of motion and neck supple. Comments: No leg or pedal edema is noted during today's exam   Skin:     General: Skin is warm and dry. Neurological:      Mental Status: She is alert and oriented to person, place, and time. Cranial Nerves: No cranial nerve deficit. Coordination: Coordination normal.        Procedures     MDM       4:56 PM EDT  Discussed with Dr. Nicky Torres. He will admit.         --------------------------------------------- PAST HISTORY ---------------------------------------------  Past Medical History:  has a past medical history of Abnormal chest x-ray with multiple lung nodules, Abnormal Pap smear, Adrenal adenoma, Bilateral lung cancer ([address] Cholelithiasis, Encounter for screening colonoscopy, Fibroids, Hemangioma of spleen, Hepatitis C, Hyperlipidemia, Hypertension, Insomnia, Lung nodule, Lymphadenopathy, Malignant neoplasm of upper lobe of right lung ([address] Osteoarthritis, Panlobular emphysema ([address] PPD negative, Pulmonary embolism without acute cor pulmonale ([address] PVD (peripheral vascular disease) ([address] Scoliosis, and Uterine fibroid. Past Surgical History:  has a past surgical history that includes Appendectomy (1965); Endometrial biopsy; Colonoscopy (12/21/2009); bronchoscopy (2/1/2012); Colonoscopy (8/22/2014); bronchoscopy (10/15/15); Thoracoscopy (Right, 11/11/2015); Tunneled venous port placement (Right, 12/07/2015);  Lung lobectomy (Left, 3/29/2016); other surgical history (Right, 11/04/2016); pr lap,cholecystectomy/graph (N/A, 6/29/2018); Cholecystectomy; Breast lumpectomy (4/28/1999); Colonoscopy (N/A, 3/28/2019); Breast biopsy (Left); Upper gastrointestinal endoscopy (N/A, 5/13/2022); Colonoscopy (N/A, 5/13/2022); Liver Resection (Right, 7/6/2022); and CT NEEDLE BIOPSY LUNG PERCUTANEOUS (9/20/2022). Social History:  reports that she quit smoking about 7 years ago. Her smoking use included cigarettes. She has never used smokeless tobacco. She reports that she does not currently use alcohol after a past usage of about 2.0 standard drinks per week. She reports that she does not use drugs. Family History: family history includes Breast Cancer in her sister; Cancer in her father and mother; Diabetes in her brother and sister; Heart Disease in her father and mother; High Blood Pressure in her father and mother; Kidney Disease in her father. The patients home medications have been reviewed.     Allergies: Ibuprofen    -------------------------------------------------- RESULTS -------------------------------------------------    LABS:  Results for orders placed or performed during the hospital encounter of 10/31/22   CBC with Auto Differential   Result Value Ref Range    WBC 9.9 4.5 - 11.5 E9/L    RBC 2.86 (L) 3.50 - 5.50 E12/L    Hemoglobin 8.0 (L) 11.5 - 15.5 g/dL    Hematocrit 24.6 (L) 34.0 - 48.0 %    MCV 86.0 80.0 - 99.9 fL    MCH 28.0 26.0 - 35.0 pg    MCHC 32.5 32.0 - 34.5 %    RDW 15.2 (H) 11.5 - 15.0 fL    Platelets 856 606 - 394 E9/L    MPV 10.7 7.0 - 12.0 fL    Neutrophils % 81.6 (H) 43.0 - 80.0 %    Immature Granulocytes % 0.5 0.0 - 5.0 %    Lymphocytes % 10.0 (L) 20.0 - 42.0 %    Monocytes % 7.7 2.0 - 12.0 %    Eosinophils % 0.0 0.0 - 6.0 %    Basophils % 0.2 0.0 - 2.0 %    Neutrophils Absolute 8.06 (H) 1.80 - 7.30 E9/L    Immature Granulocytes # 0.05 E9/L    Lymphocytes Absolute 0.99 (L) 1.50 - 4.00 E9/L    Monocytes Absolute 0.76 0.10 - 0.95 E9/L    Eosinophils Absolute 0.00 (L) 0.05 - 0.50 E9/L    Basophils Absolute 0. 02 0.00 - 0.20 E9/L   Comprehensive Metabolic Panel   Result Value Ref Range    Sodium 138 132 - 146 mmol/L    Potassium 4.4 3.5 - 5.0 mmol/L    Chloride 106 98 - 107 mmol/L    CO2 19 (L) 22 - 29 mmol/L    Anion Gap 13 7 - 16 mmol/L    Glucose 144 (H) 74 - 99 mg/dL    BUN 23 6 - 23 mg/dL    Creatinine 1.4 (H) 0.5 - 1.0 mg/dL    Est, Glom Filt Rate 40 >=60 mL/min/1.73    Calcium 9.1 8.6 - 10.2 mg/dL    Total Protein 7.3 6.4 - 8.3 g/dL    Albumin 2.9 (L) 3.5 - 5.2 g/dL    Total Bilirubin 0.3 0.0 - 1.2 mg/dL    Alkaline Phosphatase 63 35 - 104 U/L    ALT 12 0 - 32 U/L    AST 20 0 - 31 U/L   Lipase   Result Value Ref Range    Lipase 130 (H) 13 - 60 U/L   Lactic Acid   Result Value Ref Range    Lactic Acid 2.4 (H) 0.5 - 2.2 mmol/L   Urinalysis   Result Value Ref Range    Color, UA Yellow Straw/Yellow    Clarity, UA Clear Clear    Glucose, Ur Negative Negative mg/dL    Bilirubin Urine Negative Negative    Ketones, Urine Negative Negative mg/dL    Specific Gravity, UA 1.015 1.005 - 1.030    Blood, Urine SMALL (A) Negative    pH, UA 6.0 5.0 - 9.0    Protein,  (A) Negative mg/dL    Urobilinogen, Urine 1.0 <2.0 E.U./dL    Nitrite, Urine Negative Negative    Leukocyte Esterase, Urine Negative Negative   Brain Natriuretic Peptide   Result Value Ref Range    Pro-BNP 1,122 (H) 0 - 125 pg/mL   Troponin   Result Value Ref Range    Troponin, High Sensitivity 54 (H) 0 - 9 ng/L   Magnesium   Result Value Ref Range    Magnesium 1.7 1.6 - 2.6 mg/dL   Microscopic Urinalysis   Result Value Ref Range    Hyaline Casts, UA 0-2 0 - 2 /LPF    WBC, UA 2-5 0 - 5 /HPF    RBC, UA 2-5 0 - 2 /HPF    Epithelial Cells, UA FEW /HPF    Bacteria, UA MODERATE (A) None Seen /HPF   Troponin   Result Value Ref Range    Troponin, High Sensitivity 49 (H) 0 - 9 ng/L   Comprehensive Metabolic Panel   Result Value Ref Range    Sodium 137 132 - 146 mmol/L    Potassium 4.2 3.5 - 5.0 mmol/L    Chloride 106 98 - 107 mmol/L    CO2 21 (L) 22 - 29 mmol/L Anion Gap 10 7 - 16 mmol/L    Glucose 95 74 - 99 mg/dL    BUN 24 (H) 6 - 23 mg/dL    Creatinine 1.6 (H) 0.5 - 1.0 mg/dL    Est, Glom Filt Rate 34 >=60 mL/min/1.73    Calcium 8.5 (L) 8.6 - 10.2 mg/dL    Total Protein 6.5 6.4 - 8.3 g/dL    Albumin 2.4 (L) 3.5 - 5.2 g/dL    Total Bilirubin 0.2 0.0 - 1.2 mg/dL    Alkaline Phosphatase 51 35 - 104 U/L    ALT 7 0 - 32 U/L    AST 16 0 - 31 U/L   Lipase   Result Value Ref Range    Lipase 89 (H) 13 - 60 U/L   Lactic Acid   Result Value Ref Range    Lactic Acid 0.5 0.5 - 2.2 mmol/L   SPECIMEN REJECTION   Result Value Ref Range    Rejected Test cbcnd     Reason for Rejection see below    Protime-INR   Result Value Ref Range    Protime 12.7 (H) 9.3 - 12.4 sec    INR 1.2    CBC with Auto Differential   Result Value Ref Range    WBC 9.0 4.5 - 11.5 E9/L    RBC 2.52 (L) 3.50 - 5.50 E12/L    Hemoglobin 7.1 (L) 11.5 - 15.5 g/dL    Hematocrit 22.0 (L) 34.0 - 48.0 %    MCV 87.3 80.0 - 99.9 fL    MCH 28.2 26.0 - 35.0 pg    MCHC 32.3 32.0 - 34.5 %    RDW 15.0 11.5 - 15.0 fL    Platelets 056 055 - 833 E9/L    MPV 10.4 7.0 - 12.0 fL   POCT Glucose   Result Value Ref Range    Meter Glucose 115 (H) 74 - 99 mg/dL       RADIOLOGY:  CT ABDOMEN PELVIS W IV CONTRAST Additional Contrast? None   Final Result   1. Mild interval increase in a soft tissue nodule in the medial right lower   lobe of the lungs, when compared to the previous study performed 06/23/2022. It is now necrotic. 2.  Small, partially seen right pleural effusion. Trace left pleural   effusion. 3.  Suggestion of small, slightly hyperdense nodule in the right subareolar   region. Clinical correlation is recommended. Correlation should be made   with mammography and breast ultrasound. 4.  No significant interval change in the ill-defined hypodensity within the   spleen. 5.  Right renal cortical cysts again noted. 6.  Left nephrolithiasis again noted. 7.  Mild, nonspecific periportal edema.       8. Mildly distended loops of fluid-filled small bowel in the mid to upper   abdomen, which also exhibit mild thickening of the walls. The thickening   could be secondary to the ascites present. A gastroenteritis cannot be   excluded. Clinical correlation is recommended. 9.  Leiomyomatous uterus again noted. 10.  Large amount of abdominal and pelvic ascites. 11.  Previously noted cystic nodules in the abdomen/small-bowel pes enteric   not definitely appreciated on this study due to the amount of ascites present. 12.  Small left lower para-aortic retroperitoneal lymph nodes again present. 13.  No significant interval change in a nodules between the left adrenal   gland and the aorta. These could represent celiac ganglia. Unchanged lymph   nodes cannot be excluded. IR Interventional Radiology Procedure Request    (Results Pending)           ------------------------- NURSING NOTES AND VITALS REVIEWED ---------------------------  Date / Time Roomed:  10/31/2022 12:08 PM  ED Bed Assignment:  [id number]    The nursing notes within the ED encounter and vital signs as below have been reviewed.      Patient Vitals for the past 24 hrs:   BP Temp Temp src Pulse Resp SpO2 Height Weight   11/01/22 0747 (!) 144/65 98.9 °F (37.2 °C) Oral 89 16 98 % -- --   10/31/22 1846 (!) 146/72 100 °F (37.8 °C) Oral (!) 101 16 93 % -- --   10/31/22 1745 (!) 162/74 98.7 °F (37.1 °C) Oral (!) 104 16 98 % 5' 1\" (1.549 m) 123 lb 1.6 oz (55.8 kg)   10/31/22 1716 -- 97.8 °F (36.6 °C) -- 97 -- -- -- --   10/31/22 1220 (!) 166/80 -- -- (!) 108 -- -- -- --   10/31/22 1211 (!) 142/66 97.4 °F (36.3 °C) Temporal (!) 101 14 98 % 5' 1\" (1.549 m) 115 lb (52.2 kg)       Oxygen Saturation Interpretation: Normal    ------------------------------------------ PROGRESS NOTES ------------------------------------------  Re-evaluation(s):  Time: 1602  Patients symptoms show no change  Repeat physical examination is not changed    Counseling:  I have spoken with the patient and discussed todays results, in addition to providing specific details for the plan of care and counseling regarding the diagnosis and prognosis. Their questions are answered at this time and they are agreeable with the plan of admission.    --------------------------------- ADDITIONAL PROVIDER NOTES ---------------------------------  Consultations:  Spoke with Dr. Car Gupta. Discussed case. They will admit the patient. This patient's ED course included: a personal history and physicial examination, multiple bedside re-evaluations, cardiac monitoring, and continuous pulse oximetry    This patient has remained hemodynamically stable during their ED course. Diagnosis:  1. Other ascites    2. Generalized abdominal pain        Disposition:  Patient's disposition: Admit to med/surg floor  Patient's condition is stable.            Bibiana Medellin, DO  10/31/22 2323 [facility],   11/01/22 1034

## 2022-10-31 NOTE — H&P
(just above baseline), lipase 130, lactic 2.4, BNP 1100. Imaging studies included CT of the abdomen and pelvis, which showed a number of abnormalities outlined in the objective section of the note. US of the LEs was negative for DVT. Pt was given Fentanyl in the ED and admitted for further evaluation and treatment.     Review of Systems - 12-point review of systems has been reviewed and is otherwise negative except as listed in the HPI    Past Medical History:   Diagnosis Date    Abnormal chest x-ray with multiple lung nodules 9/8/2015    Abnormal Pap smear 1/3/2011    Alpha-1 negative    Adrenal adenoma     7 mm    Bilateral lung cancer ([address] 5/12/2016    surgically removed - 2015     Cholelithiasis 6/6/2011    Encounter for screening colonoscopy     for OR 3-28-19     Fibroids     Hemangioma of spleen     Hepatitis C 01/03/2011    treated and cured, no current issues     Hyperlipidemia     no medications     Hypertension 6/6/2011    Insomnia     Lung nodule     ROMELIA     Lymphadenopathy     Cervical (-) ENT    Malignant neoplasm of upper lobe of right lung ([address] 11/18/2015    Osteoarthritis     Panlobular emphysema ([address] 11/12/2015    controlled with inhalers     PPD negative 1/18/12    Pulmonary embolism without acute cor pulmonale ([address] 5/12/2016    PVD (peripheral vascular disease) ([address] 5/6/2014    Scoliosis     Uterine fibroid 5/6/2014     Past Surgical History:   Procedure Laterality Date    APPENDECTOMY  1965    BREAST BIOPSY Left     AGE 31'S LEFT NIPPLE REMOVED    BREAST LUMPECTOMY  4/28/1999    left, benign, [facility]  2/1/2012    Dr. Henrry Morris, [facility]  10/15/15    with EBUS - DR Rosalino Kang    CHOLECYSTECTOMY      COLONOSCOPY  12/21/2009    partial to distal ascending colon normal (completion BE same day normal), Dr. Jair Morrell, [facility]    COLONOSCOPY  8/22/2014    done under fluoro with sigmoid straightening device so was able to get to cecum, very poor prep, moderate proctitis, repeat 5 years,  Dr. Mckay Young, [facility]    COLONOSCOPY N/A 3/28/2019    COLONOSCOPY POLYPECTOMY SNARE/COLD BIOPSY performed by Katya Suarez DO at [facility] ENDOSCOPY    COLONOSCOPY N/A 5/13/2022    COLONOSCOPY POLYPECTOMY SNARE/COLD BIOPSY performed by Ethan Guzmán MD at [address] [place] [address]  9/20/2022    CT NEEDLE BIOPSY LUNG PERCUTANEOUS 9/20/2022 MD JS Banuelos CT    ENDOMETRIAL BIOPSY      LIVER RESECTION Right 7/6/2022    LAPAROSCOPIC ROBOTIC PERITONEAL MASS RESECTION performed by Jimmy Pulliam MD at [place] [id number] Left 3/29/2016    VATS WEDGE RESECTION UPPER LOBECTOMY    OTHER SURGICAL HISTORY Right 11/04/2016    REMOVAL MEDI-PORT - DR SHI    MO LAP,CHOLECYSTECTOMY/GRAPH N/A 6/29/2018    CHOLECYSTECTOMY LAPAROSCOPIC, POSSIBLE OPEN,POSSIBLE GRAM ( OC 2) performed by Dotty Mcduffie MD at [place] [address] Right 11/11/2015    VATS, BRONCH,RT UPPER LOBECTOMY; NODE DISECTION    TUNNELED VENOUS PORT PLACEMENT Right 12/07/2015    right chest, and removed     UPPER GASTROINTESTINAL ENDOSCOPY N/A 5/13/2022    EGD POLYP HOT FORCEP/CAUTERY performed by Ethan Guzmán MD at [address] [place] [address]     Prior to Admission medications    Medication Sig Start Date End Date Taking?  Authorizing Provider   acetaminophen (TYLENOL) 500 MG tablet Take 1 tablet by mouth 4 times daily as needed for Pain 7/6/22   Lindsey Verdugo MD   amLODIPine (NORVASC) 5 MG tablet take 1 tablet by mouth once daily 4/14/22   Historical Provider, MD   vitamin B-12 (CYANOCOBALAMIN) 500 MCG tablet take 1 tablet by mouth daily 5/3/21 5/23/22  Remy Jay DO   tiotropium (SPIRIVA HANDIHALER) 18 MCG inhalation capsule Inhale 1 capsule into the lungs daily 2/26/18 9/20/22  Remy Jay DO   lisinopril (PRINIVIL;ZESTRIL) 40 MG tablet take 1 tablet by mouth once daily 2/16/18   Rommel Rojo DO   hydrochlorothiazide (HYDRODIURIL) 25 MG tablet Take 1 tablet by mouth daily 2/8/18   Liliam Molina MD   Multiple Vitamins-Minerals (THERAPEUTIC MULTIVITAMIN-MINERALS) tablet Take 1 tablet by mouth daily 4/18/17   Kelsi Tabares MD   [facility]. Devices KIT BP monitoring KIT 4/15/16   Leandro Victoria MD     Allergies  Ibuprofen    Social History   reports that she quit smoking about 7 years ago. Her smoking use included cigarettes. She has never used smokeless tobacco. She reports that she does not currently use alcohol after a past usage of about 2.0 standard drinks per week. She reports that she does not use drugs. Family History  family history includes Breast Cancer in her sister; Cancer in her father and mother; Diabetes in her brother and sister; Heart Disease in her father and mother; High Blood Pressure in her father and mother; Kidney Disease in her father. Objective  Physical Exam   Vitals: BP (!) 166/80   Pulse (!) 108   Temp 97.4 °F (36.3 °C) (Temporal)   Resp 14   Ht 5' 1\" (1.549 m)   Wt 115 lb (52.2 kg)   SpO2 98%   BMI 21.73 kg/m²   General: well-developed, well-nourished, no acute distress, cooperative  Skin: generally warm, dry, and intact, with normal color  HEENT: normocephalic, atraumatic, no gross abnormalities  Respiratory: clear to auscultation bilaterally without respiratory distress  Cardiovascular: regular rate and rhythm without murmur / rub / gallop  Abdominal: soft, nontender, nondistended, normoactive bowel sounds  Extremities: no obvious edema or deformity  Neurologic: awake, alert, no gross deficits  Psychiatric: normal affect, cooperative    CT abdomen / pelvis 10/31  Mild interval increase in a soft tissue nodule in the medial right lower lobe of the lungs, when compared to the previous study performed 06/23/2022. It is now necrotic. Small, partially seen right pleural effusion. Trace left pleural effusion. Suggestion of small, slightly hyperdense nodule in the right subareolar region.   Clinical correlation is recommended. Correlation should be made with mammography and breast ultrasound. No significant interval change in the ill-defined hypodensity within the spleen. Right renal cortical cysts again noted. Left nephrolithiasis again noted. Mild, nonspecific periportal edema. Mildly distended loops of fluid-filled small bowel in the mid to upper abdomen, which also exhibit mild thickening of the walls. The thickening could be secondary to the ascites present. A gastroenteritis cannot be excluded. Clinical correlation is recommended. Leiomyomatous uterus again noted. Large amount of abdominal and pelvic ascites. Previously noted cystic nodules in the abdomen/small-bowel pes enteric not definitely appreciated on this study due to the amount of ascites present. Small left lower para-aortic retroperitoneal lymph nodes again present. No significant interval change in a nodules between the left adrenal gland and the aorta. These could represent celiac ganglia. Unchanged lymph nodes cannot be excluded.      *Available labs, imaging studies, microbiologic studies, cardiac studies have been reviewed    Electronically signed by Gunjan Melendez DO on 10/31/2022 at 5:14 PM

## 2022-11-01 ENCOUNTER — APPOINTMENT (OUTPATIENT)
Dept: ULTRASOUND IMAGING | Age: 72
DRG: 948 | End: 2022-11-01
Payer: MEDICARE

## 2022-11-01 PROBLEM — R18.0 ASCITES, MALIGNANT: Status: ACTIVE | Noted: 2022-11-01

## 2022-11-01 LAB
ACANTHOCYTES: ABNORMAL
ALBUMIN SERPL-MCNC: 2.4 G/DL (ref 3.5–5.2)
ALP BLD-CCNC: 51 U/L (ref 35–104)
ALT SERPL-CCNC: 7 U/L (ref 0–32)
ANION GAP SERPL CALCULATED.3IONS-SCNC: 10 MMOL/L (ref 7–16)
ANISOCYTOSIS: ABNORMAL
AST SERPL-CCNC: 16 U/L (ref 0–31)
BASOPHILS ABSOLUTE: 0.02 E9/L (ref 0–0.2)
BASOPHILS ABSOLUTE: 0.03 E9/L (ref 0–0.2)
BASOPHILS RELATIVE PERCENT: 0.2 % (ref 0–2)
BASOPHILS RELATIVE PERCENT: 0.3 % (ref 0–2)
BILIRUB SERPL-MCNC: 0.2 MG/DL (ref 0–1.2)
BUN BLDV-MCNC: 24 MG/DL (ref 6–23)
CA 125: 1063 U/ML (ref 0–35)
CALCIUM SERPL-MCNC: 8.5 MG/DL (ref 8.6–10.2)
CEA: 8.5 NG/ML (ref 0–5.2)
CHLORIDE BLD-SCNC: 106 MMOL/L (ref 98–107)
CO2: 21 MMOL/L (ref 22–29)
CREAT SERPL-MCNC: 1.6 MG/DL (ref 0.5–1)
DAT POLYSPECIFIC: NORMAL
EOSINOPHILS ABSOLUTE: 0.05 E9/L (ref 0.05–0.5)
EOSINOPHILS ABSOLUTE: 0.07 E9/L (ref 0.05–0.5)
EOSINOPHILS RELATIVE PERCENT: 0.6 % (ref 0–6)
EOSINOPHILS RELATIVE PERCENT: 0.8 % (ref 0–6)
FERRITIN: 1173 NG/ML
FOLATE: >20 NG/ML (ref 4.8–24.2)
GFR SERPL CREATININE-BSD FRML MDRD: 34 ML/MIN/1.73
GLUCOSE BLD-MCNC: 95 MG/DL (ref 74–99)
HAPTOGLOBIN: 304 MG/DL (ref 30–200)
HCT VFR BLD CALC: 21.8 % (ref 34–48)
HCT VFR BLD CALC: 22 % (ref 34–48)
HEMOGLOBIN: 7.1 G/DL (ref 11.5–15.5)
HEMOGLOBIN: 7.2 G/DL (ref 11.5–15.5)
IMMATURE GRANULOCYTES #: 0.05 E9/L
IMMATURE GRANULOCYTES #: 0.05 E9/L
IMMATURE GRANULOCYTES %: 0.6 % (ref 0–5)
IMMATURE GRANULOCYTES %: 0.6 % (ref 0–5)
IMMATURE RETIC FRACT: 10.1 % (ref 3–15.9)
INR BLD: 1.2
IRON SATURATION: 12 % (ref 15–50)
IRON: 28 MCG/DL (ref 37–145)
LACTATE DEHYDROGENASE: 433 U/L (ref 135–214)
LACTIC ACID: 0.5 MMOL/L (ref 0.5–2.2)
LIPASE: 89 U/L (ref 13–60)
LYMPHOCYTES ABSOLUTE: 0.51 E9/L (ref 1.5–4)
LYMPHOCYTES ABSOLUTE: 0.6 E9/L (ref 1.5–4)
LYMPHOCYTES RELATIVE PERCENT: 5.7 % (ref 20–42)
LYMPHOCYTES RELATIVE PERCENT: 6.9 % (ref 20–42)
MCH RBC QN AUTO: 28.2 PG (ref 26–35)
MCH RBC QN AUTO: 28.3 PG (ref 26–35)
MCHC RBC AUTO-ENTMCNC: 32.3 % (ref 32–34.5)
MCHC RBC AUTO-ENTMCNC: 33 % (ref 32–34.5)
MCV RBC AUTO: 85.8 FL (ref 80–99.9)
MCV RBC AUTO: 87.3 FL (ref 80–99.9)
MONOCYTES ABSOLUTE: 0.49 E9/L (ref 0.1–0.95)
MONOCYTES ABSOLUTE: 0.64 E9/L (ref 0.1–0.95)
MONOCYTES RELATIVE PERCENT: 5.7 % (ref 2–12)
MONOCYTES RELATIVE PERCENT: 7.1 % (ref 2–12)
NEUTROPHILS ABSOLUTE: 7.43 E9/L (ref 1.8–7.3)
NEUTROPHILS ABSOLUTE: 7.68 E9/L (ref 1.8–7.3)
NEUTROPHILS RELATIVE PERCENT: 85.6 % (ref 43–80)
NEUTROPHILS RELATIVE PERCENT: 85.9 % (ref 43–80)
OVALOCYTES: ABNORMAL
PATHOLOGIST REVIEW: NORMAL
PDW BLD-RTO: 15 FL (ref 11.5–15)
PDW BLD-RTO: 15.1 FL (ref 11.5–15)
PLATELET # BLD: 292 E9/L (ref 130–450)
PLATELET # BLD: 302 E9/L (ref 130–450)
PMV BLD AUTO: 10.4 FL (ref 7–12)
PMV BLD AUTO: 10.9 FL (ref 7–12)
POIKILOCYTES: ABNORMAL
POLYCHROMASIA: ABNORMAL
POTASSIUM SERPL-SCNC: 4.2 MMOL/L (ref 3.5–5)
PROTHROMBIN TIME: 12.7 SEC (ref 9.3–12.4)
RBC # BLD: 2.52 E12/L (ref 3.5–5.5)
RBC # BLD: 2.54 E12/L (ref 3.5–5.5)
REASON FOR REJECTION: NORMAL
REJECTED TEST: NORMAL
RETIC HGB EQUIVALENT: 29.3 PG (ref 28.2–36.6)
RETICULOCYTE ABSOLUTE COUNT: 0.03 E12/L
RETICULOCYTE COUNT PCT: 1.3 % (ref 0.4–1.9)
SCHISTOCYTES: ABNORMAL
SODIUM BLD-SCNC: 137 MMOL/L (ref 132–146)
TOTAL IRON BINDING CAPACITY: 227 MCG/DL (ref 250–450)
TOTAL PROTEIN: 6.5 G/DL (ref 6.4–8.3)
VITAMIN B-12: >2000 PG/ML (ref 211–946)
WBC # BLD: 8.7 E9/L (ref 4.5–11.5)
WBC # BLD: 9 E9/L (ref 4.5–11.5)

## 2022-11-01 PROCEDURE — 82607 VITAMIN B-12: CPT

## 2022-11-01 PROCEDURE — 82728 ASSAY OF FERRITIN: CPT

## 2022-11-01 PROCEDURE — 80053 COMPREHEN METABOLIC PANEL: CPT

## 2022-11-01 PROCEDURE — 85025 COMPLETE CBC W/AUTO DIFF WBC: CPT

## 2022-11-01 PROCEDURE — 6370000000 HC RX 637 (ALT 250 FOR IP): Performed by: INTERNAL MEDICINE

## 2022-11-01 PROCEDURE — 1200000000 HC SEMI PRIVATE

## 2022-11-01 PROCEDURE — 6360000002 HC RX W HCPCS

## 2022-11-01 PROCEDURE — 99222 1ST HOSP IP/OBS MODERATE 55: CPT | Performed by: STUDENT IN AN ORGANIZED HEALTH CARE EDUCATION/TRAINING PROGRAM

## 2022-11-01 PROCEDURE — 86301 IMMUNOASSAY TUMOR CA 19-9: CPT

## 2022-11-01 PROCEDURE — 83540 ASSAY OF IRON: CPT

## 2022-11-01 PROCEDURE — 86880 COOMBS TEST DIRECT: CPT

## 2022-11-01 PROCEDURE — 6360000002 HC RX W HCPCS: Performed by: INTERNAL MEDICINE

## 2022-11-01 PROCEDURE — 85610 PROTHROMBIN TIME: CPT

## 2022-11-01 PROCEDURE — 82746 ASSAY OF FOLIC ACID SERUM: CPT

## 2022-11-01 PROCEDURE — 83605 ASSAY OF LACTIC ACID: CPT

## 2022-11-01 PROCEDURE — 2709999900 US GUIDED PARACENTESIS

## 2022-11-01 PROCEDURE — 83550 IRON BINDING TEST: CPT

## 2022-11-01 PROCEDURE — 85045 AUTOMATED RETICULOCYTE COUNT: CPT

## 2022-11-01 PROCEDURE — 83010 ASSAY OF HAPTOGLOBIN QUANT: CPT

## 2022-11-01 PROCEDURE — 2500000003 HC RX 250 WO HCPCS: Performed by: RADIOLOGY

## 2022-11-01 PROCEDURE — 86304 IMMUNOASSAY TUMOR CA 125: CPT

## 2022-11-01 PROCEDURE — 83615 LACTATE (LD) (LDH) ENZYME: CPT

## 2022-11-01 PROCEDURE — 99233 SBSQ HOSP IP/OBS HIGH 50: CPT | Performed by: INTERNAL MEDICINE

## 2022-11-01 PROCEDURE — 82378 CARCINOEMBRYONIC ANTIGEN: CPT

## 2022-11-01 PROCEDURE — 86300 IMMUNOASSAY TUMOR CA 15-3: CPT

## 2022-11-01 PROCEDURE — 36415 COLL VENOUS BLD VENIPUNCTURE: CPT

## 2022-11-01 PROCEDURE — 83690 ASSAY OF LIPASE: CPT

## 2022-11-01 PROCEDURE — 2580000003 HC RX 258: Performed by: INTERNAL MEDICINE

## 2022-11-01 PROCEDURE — 0W9G3ZZ DRAINAGE OF PERITONEAL CAVITY, PERCUTANEOUS APPROACH: ICD-10-PCS | Performed by: RADIOLOGY

## 2022-11-01 RX ORDER — LIDOCAINE HYDROCHLORIDE 10 MG/ML
INJECTION, SOLUTION INFILTRATION; PERINEURAL
Status: COMPLETED | OUTPATIENT
Start: 2022-11-01 | End: 2022-11-01

## 2022-11-01 RX ORDER — ENOXAPARIN SODIUM 100 MG/ML
30 INJECTION SUBCUTANEOUS DAILY
Status: DISCONTINUED | OUTPATIENT
Start: 2022-11-01 | End: 2022-11-09 | Stop reason: HOSPADM

## 2022-11-01 RX ADMIN — SODIUM CHLORIDE, PRESERVATIVE FREE 20 ML: 5 INJECTION INTRAVENOUS at 11:46

## 2022-11-01 RX ADMIN — ENOXAPARIN SODIUM 30 MG: 100 INJECTION SUBCUTANEOUS at 17:58

## 2022-11-01 RX ADMIN — MORPHINE SULFATE 4 MG: 4 INJECTION, SOLUTION INTRAMUSCULAR; INTRAVENOUS at 13:57

## 2022-11-01 RX ADMIN — Medication 10 ML: at 20:32

## 2022-11-01 RX ADMIN — LISINOPRIL 40 MG: 20 TABLET ORAL at 08:57

## 2022-11-01 RX ADMIN — MORPHINE SULFATE 2 MG: 2 INJECTION, SOLUTION INTRAMUSCULAR; INTRAVENOUS at 08:58

## 2022-11-01 RX ADMIN — Medication 10 ML: at 09:00

## 2022-11-01 RX ADMIN — AMLODIPINE BESYLATE 5 MG: 5 TABLET ORAL at 08:57

## 2022-11-01 RX ADMIN — LIDOCAINE HYDROCHLORIDE 10 ML: 10 INJECTION, SOLUTION INFILTRATION; PERINEURAL at 12:54

## 2022-11-01 RX ADMIN — TRAZODONE HYDROCHLORIDE 50 MG: 50 TABLET ORAL at 20:31

## 2022-11-01 RX ADMIN — HYDROCHLOROTHIAZIDE 25 MG: 25 TABLET ORAL at 08:57

## 2022-11-01 ASSESSMENT — ENCOUNTER SYMPTOMS
ABDOMINAL DISTENTION: 1
ABDOMINAL PAIN: 1
CHEST TIGHTNESS: 0
BLOOD IN STOOL: 0
CONSTIPATION: 0
DIARRHEA: 0
CHOKING: 0
NAUSEA: 0
COUGH: 0
EYE REDNESS: 0
SHORTNESS OF BREATH: 0
VOMITING: 0
COLOR CHANGE: 0

## 2022-11-01 NOTE — PROGRESS NOTES
[address]  Progress Note      Chief complaint :  Chief Complaint   Patient presents with    Bloated     X 2 wks pt has lung cancer    Leg Swelling       Subjective:  Harsha Burnett was seen and examined resting comfortably in bed this morning with her sister and sister-in-law present in the room as well. The patient states that she has fullness in her belly and bilateral pains of the soles of her feet. She denies any other acute concerns. She denies fever/chills, chest pain or pressure, shortness of breath, constipation, diarrhea, nausea, vomiting, bleeding, new rashes or leg swelling. The patient is planned for IR paracentesis today. Review of Systems   Constitutional:  Negative for chills and fever. Eyes:  Negative for redness and visual disturbance. Respiratory:  Negative for chest tightness and shortness of breath. Cardiovascular:  Negative for chest pain and palpitations. Gastrointestinal:  Positive for abdominal distention and abdominal pain. Negative for blood in stool, constipation, diarrhea, nausea and vomiting. Endocrine: Negative for cold intolerance and heat intolerance. Genitourinary:  Negative for difficulty urinating and hematuria. Musculoskeletal:  Positive for myalgias (of feet). Negative for gait problem and neck pain. Skin:  Negative for color change and rash. Neurological:  Negative for dizziness, light-headedness and headaches. Objective:  BP (!) 145/75   Pulse 86   Temp 98.6 °F (37 °C) (Oral)   Resp 18   Ht 5' 1\" (1.549 m)   Wt 123 lb 1.6 oz (55.8 kg)   SpO2 95%   BMI 23.26 kg/m²     Physical Exam  Vitals reviewed. Constitutional:       General: She is not in acute distress. Appearance: Normal appearance. HENT:      Head: Normocephalic and atraumatic.       Right Ear: External ear normal.      Left Ear: External ear normal.      Nose: Nose normal.      Mouth/Throat:      Mouth: Mucous membranes are moist. Eyes:      General: No scleral icterus. Extraocular Movements: Extraocular movements intact. Cardiovascular:      Rate and Rhythm: Normal rate and regular rhythm. Heart sounds: Normal heart sounds. No murmur heard. No friction rub. No gallop. Pulmonary:      Effort: Pulmonary effort is normal. No respiratory distress. Breath sounds: Decreased breath sounds present. No wheezing or rales. Abdominal:      General: There is distension. Palpations: Abdomen is soft. There is fluid wave. There is no mass. Tenderness: There is no abdominal tenderness. There is no guarding. Musculoskeletal:         General: Normal range of motion. Cervical back: Normal range of motion and neck supple. Comments: Some nonpitting edema of the bilateral plantar surfaces of the feet   Skin:     General: Skin is warm and dry. Neurological:      General: No focal deficit present. Mental Status: She is alert.    Psychiatric:         Mood and Affect: Mood normal.       Labs:  Recent Results (from the past 24 hour(s))   Urinalysis    Collection Time: 10/31/22  3:16 PM   Result Value Ref Range    Color, UA Yellow Straw/Yellow    Clarity, UA Clear Clear    Glucose, Ur Negative Negative mg/dL    Bilirubin Urine Negative Negative    Ketones, Urine Negative Negative mg/dL    Specific Gravity, UA 1.015 1.005 - 1.030    Blood, Urine SMALL (A) Negative    pH, UA 6.0 5.0 - 9.0    Protein,  (A) Negative mg/dL    Urobilinogen, Urine 1.0 <2.0 E.U./dL    Nitrite, Urine Negative Negative    Leukocyte Esterase, Urine Negative Negative   Microscopic Urinalysis    Collection Time: 10/31/22  3:16 PM   Result Value Ref Range    Hyaline Casts, UA 0-2 0 - 2 /LPF    WBC, UA 2-5 0 - 5 /HPF    RBC, UA 2-5 0 - 2 /HPF    Epithelial Cells, UA FEW /HPF    Bacteria, UA MODERATE (A) None Seen /HPF   Troponin    Collection Time: 10/31/22  5:01 PM   Result Value Ref Range    Troponin, High Sensitivity 49 (H) 0 - 9 ng/L Comprehensive Metabolic Panel    Collection Time: 11/01/22  1:20 AM   Result Value Ref Range    Sodium 137 132 - 146 mmol/L    Potassium 4.2 3.5 - 5.0 mmol/L    Chloride 106 98 - 107 mmol/L    CO2 21 (L) 22 - 29 mmol/L    Anion Gap 10 7 - 16 mmol/L    Glucose 95 74 - 99 mg/dL    BUN 24 (H) 6 - 23 mg/dL    Creatinine 1.6 (H) 0.5 - 1.0 mg/dL    Est, Glom Filt Rate 34 >=60 mL/min/1.73    Calcium 8.5 (L) 8.6 - 10.2 mg/dL    Total Protein 6.5 6.4 - 8.3 g/dL    Albumin 2.4 (L) 3.5 - 5.2 g/dL    Total Bilirubin 0.2 0.0 - 1.2 mg/dL    Alkaline Phosphatase 51 35 - 104 U/L    ALT 7 0 - 32 U/L    AST 16 0 - 31 U/L   Lipase    Collection Time: 11/01/22  1:20 AM   Result Value Ref Range    Lipase 89 (H) 13 - 60 U/L   Lactic Acid    Collection Time: 11/01/22  1:20 AM   Result Value Ref Range    Lactic Acid 0.5 0.5 - 2.2 mmol/L   SPECIMEN REJECTION    Collection Time: 11/01/22  2:02 AM   Result Value Ref Range    Rejected Test cbcnd     Reason for Rejection see below    Protime-INR    Collection Time: 11/01/22  9:46 AM   Result Value Ref Range    Protime 12.7 (H) 9.3 - 12.4 sec    INR 1.2    CBC with Auto Differential    Collection Time: 11/01/22  9:46 AM   Result Value Ref Range    WBC 9.0 4.5 - 11.5 E9/L    RBC 2.52 (L) 3.50 - 5.50 E12/L    Hemoglobin 7.1 (L) 11.5 - 15.5 g/dL    Hematocrit 22.0 (L) 34.0 - 48.0 %    MCV 87.3 80.0 - 99.9 fL    MCH 28.2 26.0 - 35.0 pg    MCHC 32.3 32.0 - 34.5 %    RDW 15.0 11.5 - 15.0 fL    Platelets 647 543 - 567 E9/L    MPV 10.4 7.0 - 12.0 fL    Neutrophils % 85.6 (H) 43.0 - 80.0 %    Immature Granulocytes % 0.6 0.0 - 5.0 %    Lymphocytes % 5.7 (L) 20.0 - 42.0 %    Monocytes % 7.1 2.0 - 12.0 %    Eosinophils % 0.8 0.0 - 6.0 %    Basophils % 0.2 0.0 - 2.0 %    Neutrophils Absolute 7.68 (H) 1.80 - 7.30 E9/L    Immature Granulocytes # 0.05 E9/L    Lymphocytes Absolute 0.51 (L) 1.50 - 4.00 E9/L    Monocytes Absolute 0.64 0.10 - 0.95 E9/L    Eosinophils Absolute 0.07 0.05 - 0.50 E9/L    Basophils Absolute 0.02 0.00 - 0.20 E9/L    Anisocytosis 1+     Polychromasia 1+     Poikilocytes 2+     Schistocytes 1+     Acanthocytes 1+     Ovalocytes 1+    Lactate Dehydrogenase    Collection Time: 11/01/22 11:45 AM   Result Value Ref Range     (H) 135 - 214 U/L   Reticulocytes    Collection Time: 11/01/22 11:45 AM   Result Value Ref Range    Retic Ct Pct 1.3 0.4 - 1.9 %    Retic Ct Abs 0.033 E12/L    Immature Retic Fract 10.1 3.0 - 15.9 %    Hematocrit 21.8 (L) 34.0 - 48.0 %    Retic HGB Equivalent 29.3 28.2 - 36.6 pg   DIRECT ANTIGLOBULIN TEST    Collection Time: 11/01/22 11:45 AM   Result Value Ref Range    Polyspecific Jewels NEG    CBC with Auto Differential    Collection Time: 11/01/22 11:45 AM   Result Value Ref Range    WBC 8.7 4.5 - 11.5 E9/L    RBC 2.54 (L) 3.50 - 5.50 E12/L    Hemoglobin 7.2 (L) 11.5 - 15.5 g/dL    MCV 85.8 80.0 - 99.9 fL    MCH 28.3 26.0 - 35.0 pg    MCHC 33.0 32.0 - 34.5 %    RDW 15.1 (H) 11.5 - 15.0 fL    Platelets 920 463 - 918 E9/L    MPV 10.9 7.0 - 12.0 fL    Neutrophils % 85.9 (H) 43.0 - 80.0 %    Immature Granulocytes % 0.6 0.0 - 5.0 %    Lymphocytes % 6.9 (L) 20.0 - 42.0 %    Monocytes % 5.7 2.0 - 12.0 %    Eosinophils % 0.6 0.0 - 6.0 %    Basophils % 0.3 0.0 - 2.0 %    Neutrophils Absolute 7.43 (H) 1.80 - 7.30 E9/L    Immature Granulocytes # 0.05 E9/L    Lymphocytes Absolute 0.60 (L) 1.50 - 4.00 E9/L    Monocytes Absolute 0.49 0.10 - 0.95 E9/L    Eosinophils Absolute 0.05 0.05 - 0.50 E9/L    Basophils Absolute 0.03 0.00 - 0.20 E9/L       Radiology and other tests reviewed:  CT ABDOMEN PELVIS W IV CONTRAST Additional Contrast? None   Final Result   1. Mild interval increase in a soft tissue nodule in the medial right lower   lobe of the lungs, when compared to the previous study performed 06/23/2022. It is now necrotic. 2.  Small, partially seen right pleural effusion. Trace left pleural   effusion.       3.  Suggestion of small, slightly hyperdense nodule in the right subareolar   region. Clinical correlation is recommended. Correlation should be made   with mammography and breast ultrasound. 4.  No significant interval change in the ill-defined hypodensity within the   spleen. 5.  Right renal cortical cysts again noted. 6.  Left nephrolithiasis again noted. 7.  Mild, nonspecific periportal edema. 8.  Mildly distended loops of fluid-filled small bowel in the mid to upper   abdomen, which also exhibit mild thickening of the walls. The thickening   could be secondary to the ascites present. A gastroenteritis cannot be   excluded. Clinical correlation is recommended. 9.  Leiomyomatous uterus again noted. 10.  Large amount of abdominal and pelvic ascites. 11.  Previously noted cystic nodules in the abdomen/small-bowel pes enteric   not definitely appreciated on this study due to the amount of ascites present. 12.  Small left lower para-aortic retroperitoneal lymph nodes again present. 13.  No significant interval change in a nodules between the left adrenal   gland and the aorta. These could represent celiac ganglia. Unchanged lymph   nodes cannot be excluded. US BREAST LIMITED RIGHT    (Results Pending)   US GUIDED PARACENTESIS    (Results Pending)       Assessment:  Active Hospital Problems    Diagnosis Date Noted    Abdominal pain [R10.9] 10/31/2022     Priority: Medium       Plan:    Ascites and bilateral pleural effusions in the setting of lung cancer with suspected peritoneal metastasis  Paracentesis by interventional radiology today yielded 2450 mL clear yellow ascitic fluid. Fluid sent for cytology. Consider albumin supplementation of ascites begins to reform  Hematology/oncology following, appreciate input: Ordered recheck of tumor markers, potentially will order ultrasound for right breast lesion during this inpatient course    2. Lactic acidosis, resolved    3.   Elevated lipase, improving  130--> 89  Continue to monitor    4. History of anemia  Hematology oncology following, appreciate input: Ordered peripheral smear, LDH, vitamin B12/folate, iron studies including ferritin, haptoglobin, and DAMIÁN  Hemoglobin 7.2 today, transfuse to maintain hemoglobin > 7    5.   Hypertension  Continue home Norvasc, hydrochlorothiazide, lisinopril    Pain Control:  Tylenol 650 mg Q6HR PRN for mild pain, morphine 2 mg IV every 3 hours as needed moderate pain, morphine 4 mg IV every 3 hours as needed for severe pain  DVT ppx:  Held for paracentesis today, consider restarting tonight  GI ppx: None  Code Status: Full  Diet: General diet      Disposition: Discharge to pending clinical course    Gary Osorio MD   Family Medicine Resident PGY-1  11/01/22   2:29 PM

## 2022-11-01 NOTE — PATIENT CARE CONFERENCE
P Quality Flow/Interdisciplinary Rounds Progress Note        Quality Flow Rounds held on November 1, 2022    Disciplines Attending:  Bedside Nurse, , , and Nursing Unit Leadership    Robin Avery was admitted on 10/31/2022 12:08 PM    Anticipated Discharge Date:       Disposition:    Ishan Score:  Ishan Scale Score: 21    Readmission Risk              Risk of Unplanned Readmission:  18           Discussed patient goal for the day, patient clinical progression, and barriers to discharge.   The following Goal(s) of the Day/Commitment(s) have been identified:  Diagnostics - Report Results      Lillie Lucero RN  November 1, 2022

## 2022-11-01 NOTE — OR NURSING
Patient brought into room, discussed procedure. Dr. Moses answered all questions and concerns related to the procedure. Treatment consent signed. Patient positioned and sterile prepped for the procedure. 1% Lidocaine administered by Dr. Moses.  A total of 2450 mL clear yellow ascitic fluid was taken. Patient tolerated procedure well. Site cleaned and dressed with a bandage. Vitals monitored and remained stable throughout. Discharge papers reviewed and signed. Patient escorted out of department with all belongings and without distress.

## 2022-11-01 NOTE — CONSULTS
Inpatient Medical Oncology/Hematology Consult Note    Patient Name: Sary Casanova  YOB: 1950    DATE OF ADMISSION: 10/31/2022  DATE OF CONSULTATION: 11/1/2022  REASON FOR CONSULTATION: \"lung Ca / massive ascites / established pt\"  CONSULTING PROVIDER: Conchita Whiting DO  PCP: Leonila Baeza    Room: [address] [place] [address]      CHIEF COMPLAINT:  Abdominal distention and leg swelling    HISTORY OF PRESENT ILLNESS:   Patient is a 71 y.o. female with background history of non-small cell lung cancer at least on 2 occasions, status post definitive treatment, is admitted due to abdominal distention and ascites. He reports having 2 weeks duration of abdominal distention. She has never had this issue before. Also of note, please refer to oncologic history outlined below. Furthermore on history, she has had cystic lesions that were noted on imaging earlier this year, in which she underwent resection of these lesions on 7/6/2022 under the care of Dr. Izabel Dupree, in which findings were not suggestive of malignancy. There is also reports of a small population (4.5%) of monoclonal B cells within a periportal lymph node. Furthermore, patient was recently diagnosed with adenocarcinoma after a lung biopsy, which showed TTF-1-1 negative, weakly positive GATA3 and CDX-2. She recently met with medical oncology and radiation oncology, with plans to proceed with definitive RT sometime soon. She did mention that she has family history of uterine cancer in mother, and sister had breast cancer around her 45s. Oncologic History:  CT scan chest on 08/31/2015 showed a new right upper lobe mass. Bronchoscopy with washing RUL mass on 10/15/2015 was positive for RUL Lung Adenocarcinoma. Negative for malignancy on BAL and TBNA of Lingular lesion. PET/CT scan on 09/29/2015:  1. New since prior examination on CT is 1.7 cm right paratracheal   nodule.  Nodule is intensely hypermetabolic with SUV max of 4.8 worrisome for tumor. 2. Previously known nodule in the lingula now measures 2.3 cm in   size. SUV max has increased from 1.7 on the 2012 examination to   6.4 at this time. At this time findings are worrisome for   hypermetabolic tumor with avid glucose metabolism. Therefore, she was referred to see Dr. Caroline Taylor for resection. CT guided biopsy of the left upper lobe lesion on 11/02/2015 was noted to be negative for malignancy. On 11/11/2015 She underwent: (1) Bronchoscopy. (2) Right VATS. (3) VATS right upper lobe wedge resection. (4) VATS right upper lobectomy. (5) Intercostal nerve block from T3 to T10. (6) Mediastinal lymph node dissection. Pathology:  Right lung, upper lobectomy: Invasive, moderately differentiated adenocarcinoma (Grade 2). Tumor size 1.5 cm in greatest dimension. Surgical margin negative for malignancy. Two of three peribronchial lymph nodes positive for adenocarcinoma. pT1a N1 MX  She was referred to the medical oncology clinic to discuss adjuvant chemotherapy. We recommended 4 cycles of adjuvant chemotherapy consisting of Carboplatin/Alimta. Mediport placed 12/07/2015. Cycle # 1 of Nona Frames was on 12/09/2015. Cycle # 2 of Nona Frames was on 01/06/2016. Cycle # 3 of Nona Frames was on 01/27/2016. Cycle # 4 of Nona Frames was on 02/24/2016. PET SCAN 3.2016: The 2.1 cm left lung mass abutting the major fissure is hypermetabolic with SUV max of 5.4. Finding is worrisome for tumor with avid glucose metabolism. 2. Elsewhere there is unremarkable distribution of FDG activity without evidence of hypermetabolic metastases. On 03/29/2016 underwent Bronch/Left VATS/VATS wedge ROMELIA/VATS Left upper lobectomy/Mediasinal lymph node dissection/Intercostal nerve block from T3-T10 per Dr. Caroline Taylor. Invasive, well-differentiated adenocarcinoma (grade 1);  Tumor size-1.4 cm in greatest dimension; Surgical margins-negative for malignancy; Lymphovascular invasion-not identified; TNM classification-pT2a N0 MX  B. AP window lymph node #1, excision: Anthracotic lymph node; negative for malignancy  C. AP window lymph node #2, excision: Anthracotic lymph node; negative for malignancy   D. Periaortic lymph node #1, excision: Fibroadipose tissue; lymph node not identified  E. Bronchial lymph node #1, excision: Anthracotic lymph node; negative for malignancy  F. Left lung, upper lobectomy: Emphysematous change; negative for malignancy  4 anthracotic lymph nodes negative for malignancy   G. Inferior pulmonary ligament lymph node, excision: Anthracotic lymph node; negative for malignancy  H. Bronchial lymph node, excision: Anthracotic lymph node; negative for malignancy. On surveillance per NCCN guidelines. Recent abdominal pain; ER visit reviewed  CT abdomen/pelvis 02/20/2022   6 mm right lower lobe pulmonary nodule  Multiple peritoneal cystic masses      CEA 12.5 on 03/16/2022     CT chest 04/05/2022 Ground-glass nodule right lower lobe superolateral portion 10 mm unchanged from prior however in the medial segment right lower lobe with pleural abutment is a 7 mm pulmonary nodule increased in size from 09/28/2021 with is barely visible at 2-3 mm; repeat CT chest in 3 mo     PET/CT scan 04/05/2022 noted No FDG avid uptake is identified which exceeds the threshold SUV     Bilateral Screening Mammogram 04/20/2022: Negative for malignancy     CEA 12.1 on 04/20/2022  CA-125 32.8 on 04/20/2022   <2 on 04/20/2022  Chromogranin A 1480 on 04/20/2022. EGD/Colonoscopy 05/13/2022: Gastric polyps , duodenal polyp moderate gastroduodenitis   A. Stomach, biopsy: Hyperplastic polyp and moderate chronic active gastritis; negative for intestinal metaplasia   Immunostain negative for Helicobacter pylori organisms   B. Duodenum, biopsy: Chronic duodenitis with features of peptic duodenitis   C.   Colon, 20 cm biopsy: Fragments of tubular adenoma and hyperplastic polyp Referred to P team (Dr. Antonette James) for further evaluation of peritoneal cystic masses  CT abdomen/pelvis 06/23/2022 numerous cystic structures identified throughout the right flank and retroperitoneum     Laparoscopic robotic peritoneal mass resection on 07/06/2022  Findings included: serous cystic masses along the colon, periportal lymphadenopathy, fibrotic appearing liver, chronic cholecystitis  Peritoneal fluid Negative for malignant cells. Cellblock shows reactive mesothelial cells, lymphocytes, adipose/stromal tissue, and blood. Monolayer preparation shows few reactive mesothelial cells and blood. A.  Lymph node, periportal, biopsy:   - Reactive node showing follicular hyperplasia, negative for malignancy, see comment. B.  Remnant gallbladder, cholecystectomy:   - Portion of gallbladder wall showing dense fibrosis/scar and occluded mariaelena-gallbladder vessels. C. Soft tissue, peritoneal sac, excision:   - Peritoneal inclusion cysts (benign cystic mesothelioma) and unremarkable fibroadipose tissue. D.  Liver, needle biopsy:   - Benign hepatic parenchyma showing focal periportal and incomplete septal fibrosis (stage 2)   - Negative for significant lobular/portal necroinflammatory activity, see comment. Comment:   Sections of the lymph node in specimen A reveal normal anat architecture with secondary follicular hyperplasia. There are no cytologically atypical lymphoid cells or Irene-Marsha cells identified. Periportal LN Flow Cytometry noted 4.5% monoclonal B cells detected in a predominantly polyclonal background. Monoclonal B cell population (4.5% of total cells) without detectable CD5, CD10 or CD11c expression in a predominantly polyclonal background, raising the differential between a monoclonal B-cell population of undetermined significance versus a B-cell lymphoma/leukemia.  In the context of B-cell lymphoma the main differential based on immunophenotype includes, but is not limited to marginal zone lymphoma/leukemia, lymphoplasmacytic lymphoma, JS80- negative follicular lymphoma, and less likely large b cell lymphoma. In specimen D, there is no evidence of chronic hepatitis or significant steatosis. Histologic features of cirrhosis including nodules of regenerating hepatocytes and complete portal-portal bridging fibrosis are   not identified. Focal periportal fibrosis and incomplete septal fibrosis are confirmed by trichrome stain. Iron stain is also performed and is negative. Periportal lymph node flow cytometry showing 4.5% monoclonal B cells without detectable CD5, CD10 or CD11c expression in a predominantly polyclonal background, raising the differential between a monoclonal B-cell population of undetermined significance versus a B-cell lymphoma/leukemia     Repeat CT chest on 08/04/2022  Postsurgical changes are identified in the upper lobes bilaterally. 1.1 cm ground-glass nodule in the right lower lobe and a smaller 1 measuring 0.9 cm are noted without change. There is a 1.6 x 1.2 cm soft tissue mass in the right lower lobe medially which is significantly increased since the previous examination. Bilateral adrenal nodules are noted, larger 1 on the left side measuring 1.8 x 1.6 cm.  1.2 cm right renal cystic lesion is identified. An ill-defined hypodense lesion is identified in the spleen currently measuring 2.9 x 3.5 cm      Evaluated on 8/15/2022 by Dr. Darwin Keys at [facility] in [facility] who recommended proceeding with a biopsy of the enlarging right lower lobe lung mass. While lymphoma certainly possible, biopsy recommended to rule out recurrent lung cancer. Even if lymphoma is concerned, likely low-grade and not causing her symptoms so observation would be recommended. PET/CT scan, CT-guided core needle biopsy of enlarging right lower lobe lung mass recommended. PET/CT scan 09/06/2022:  In the region of the lesion in the right lower lobe there is FDG avid tracer uptake peak SUV is 3.2. CT guided core needle biopsy RLL lung nodule on 09/20/2022  Right lung, lower lobe core needle biopsy: Adenocarcinoma (see comment)   Comment: The prior history of right upper lobe adenocarcinoma (HES ) and left upper lobe adenocarcinoma (HES ) is noted. The electronic medical record is also reviewed. The adenocarcinoma in the current specimen is weakly immunoreactive with GATA3 and CDX2. The TTF-1 immunostain is negative. REVIEW OF SYSTEMS:  Review of Systems   Constitutional:  Negative for chills and fever. HENT: Negative. Eyes:  Negative for visual disturbance. Respiratory:  Negative for cough and choking. Cardiovascular:  Positive for leg swelling. Negative for chest pain. Gastrointestinal:  Positive for abdominal distention and abdominal pain. Genitourinary: Negative. Musculoskeletal: Negative. Neurological:  Negative for headaches. Hematological:  Negative for adenopathy. Does not bruise/bleed easily. Psychiatric/Behavioral: Negative.           PAST MEDICAL HISTORY:  Past Medical History:   Diagnosis Date    Abnormal chest x-ray with multiple lung nodules 9/8/2015    Abnormal Pap smear 1/3/2011    Alpha-1 negative    Adrenal adenoma     7 mm    Bilateral lung cancer ([address] 5/12/2016    surgically removed - 2015     Cholelithiasis 6/6/2011    Encounter for screening colonoscopy     for OR 3-28-19     Fibroids     Hemangioma of spleen     Hepatitis C 01/03/2011    treated and cured, no current issues     Hyperlipidemia     no medications     Hypertension 6/6/2011    Insomnia     Lung nodule     ROMELIA     Lymphadenopathy     Cervical (-) ENT    Malignant neoplasm of upper lobe of right lung ([address] 11/18/2015    Osteoarthritis     Panlobular emphysema ([address] 11/12/2015    controlled with inhalers     PPD negative 1/18/12    Pulmonary embolism without acute cor pulmonale ([address] 5/12/2016    PVD (peripheral vascular disease) ([address] 5/6/2014    Scoliosis     Uterine fibroid 5/6/2014       PAST SURGICAL HISTORY:  Past Surgical History:   Procedure Laterality Date    APPENDECTOMY  1965    BREAST BIOPSY Left     AGE 31'S LEFT NIPPLE REMOVED    BREAST LUMPECTOMY  4/28/1999    left, benign, [facility]  2/1/2012    Ashly Javed, Dr. Minnie Real, [facility]  10/15/15    with EBUS - DR Diego Sanchez      COLONOSCOPY  12/21/2009    partial to distal ascending colon normal (completion BE same day normal), Dr. Alba Moritz, [facility]    COLONOSCOPY  8/22/2014    done under fluoro with sigmoid straightening device so was able to get to cecum, very poor prep, moderate proctitis, repeat 5 years,  Dr. Alba Moritz, [facility]    COLONOSCOPY N/A 3/28/2019    COLONOSCOPY POLYPECTOMY SNARE/COLD BIOPSY performed by Alyson Rodriguez DO at [address] [place] [address] N/A 5/13/2022    COLONOSCOPY POLYPECTOMY SNARE/COLD BIOPSY performed by Uday Rubio MD at [address] [place] [address]  9/20/2022    CT NEEDLE BIOPSY LUNG PERCUTANEOUS 9/20/2022 DEANGELO Townsend MD [facility]    ENDOMETRIAL BIOPSY      LIVER RESECTION Right 7/6/2022    LAPAROSCOPIC ROBOTIC PERITONEAL MASS RESECTION performed by Von Reddy MD at [place] [address] Left 3/29/2016    VATS WEDGE RESECTION UPPER LOBECTOMY    OTHER SURGICAL HISTORY Right 11/04/2016    REMOVAL MEDI-PORT - DR YURIDIA BEY LAP,CHOLECYSTECTOMY/GRAPH N/A 6/29/2018    CHOLECYSTECTOMY LAPAROSCOPIC, POSSIBLE OPEN,POSSIBLE GRAM ( OC 2) performed by Judie Atkinson MD at [place] [address] Right 11/11/2015    VATS, BRONCH,RT UPPER LOBECTOMY; NODE DISECTION    TUNNELED VENOUS PORT PLACEMENT Right 12/07/2015    right chest, and removed     UPPER GASTROINTESTINAL ENDOSCOPY N/A 5/13/2022    EGD POLYP HOT FORCEP/CAUTERY performed by Uday Rubio MD at [facility]:  Current Facility-Administered Medications   Medication Dose Route Frequency Provider Last Rate Last Admin    lisinopril (PRINIVIL;ZESTRIL) tablet 40 mg  40 mg Oral Daily Juanito R Lockso, DO   40 mg at 11/01/22 0857    hydroCHLOROthiazide (HYDRODIURIL) tablet 25 mg  25 mg Oral Daily Juanito R Lockso, DO   25 mg at 11/01/22 0857    amLODIPine (NORVASC) tablet 5 mg  5 mg Oral Daily Juanito R Lockso, DO   5 mg at 11/01/22 0857    sodium chloride flush 0.9 % injection 5-40 mL  5-40 mL IntraVENous 2 times per day Jose Feil Lockso, DO   10 mL at 11/01/22 0900    sodium chloride flush 0.9 % injection 5-40 mL  5-40 mL IntraVENous PRN Juanito R Lockso, DO   20 mL at 11/01/22 1146    0.9 % sodium chloride infusion   IntraVENous PRN Juanito R Lockso, DO        ondansetron (ZOFRAN-ODT) disintegrating tablet 4 mg  4 mg Oral Q8H PRN Juanito R Lockso, DO        Or    ondansetron (ZOFRAN) injection 4 mg  4 mg IntraVENous Q6H PRN Juanito R Lockso, DO        polyethylene glycol (GLYCOLAX) packet 17 g  17 g Oral Daily PRN Juanito R Lockso, DO        acetaminophen (TYLENOL) tablet 650 mg  650 mg Oral Q6H PRN Jose Feil Lockso, DO   650 mg at 10/31/22 1931    Or    acetaminophen (TYLENOL) suppository 650 mg  650 mg Rectal Q6H PRN Jose Feil Lockso, DO        morphine (PF) injection 2 mg  2 mg IntraVENous Q3H PRN Jose Feil Lockso, DO   2 mg at 11/01/22 1346    Or    morphine sulfate (PF) injection 4 mg  4 mg IntraVENous Q3H PRN Jose Feil Lockso, DO        traZODone (DESYREL) tablet 50 mg  50 mg Oral Nightly Juanito R Lockso, DO   50 mg at 10/31/22 2029     Facility-Administered Medications Ordered in Other Encounters   Medication Dose Route Frequency Provider Last Rate Last Admin    pantoprazole (PROTONIX) tablet 40 mg  40 mg Oral QAM AC Luís Amin MD           ALLERGIES:   Allergies   Allergen Reactions    Ibuprofen Palpitations and Rash        SOCIAL HISTORY:   Social History     Socioeconomic History    Marital status: Single    Number of children: 0   Tobacco Use    Smoking status: Former Packs/day: 0.00     Years: 30.00     Pack years: 0.00     Types: Cigarettes     Quit date: 2015     Years since quittin.4    Smokeless tobacco: Never   Vaping Use    Vaping Use: Never used   Substance and Sexual Activity    Alcohol use: Not Currently     Alcohol/week: 2.0 standard drinks     Types: 2 Glasses of wine per week     Comment: x2 week    Drug use: No    Sexual activity: Not Currently       FAMILY HISTORY:  Family History   Problem Relation Age of Onset    Heart Disease Mother     High Blood Pressure Mother     Cancer Mother         ovarian    Cancer Father     Heart Disease Father     High Blood Pressure Father     Kidney Disease Father     Diabetes Sister     Breast Cancer Sister     Diabetes Brother        PHYSICAL EXAM:   VITALS:  BP (!) 144/65   Pulse 89   Temp 98.9 °F (37.2 °C) (Oral)   Resp 16   Ht 5' 1\" (1.549 m)   Wt 123 lb 1.6 oz (55.8 kg)   SpO2 98%   BMI 23.26 kg/m²   Physical Exam  Constitutional:       General: She is not in acute distress. Appearance: She is ill-appearing. She is not toxic-appearing. HENT:      Head: Normocephalic. Nose: Nose normal.      Mouth/Throat:      Mouth: Mucous membranes are moist.   Eyes:      General: No scleral icterus. Cardiovascular:      Rate and Rhythm: Normal rate and regular rhythm. Heart sounds: No murmur heard. Pulmonary:      Effort: Pulmonary effort is normal. No respiratory distress. Abdominal:      General: There is distension. Tenderness: There is abdominal tenderness. Musculoskeletal:         General: No swelling. Cervical back: Normal range of motion. No rigidity. Lymphadenopathy:      Cervical: No cervical adenopathy. Skin:     Coloration: Skin is not jaundiced or pale. Findings: No lesion or rash. Neurological:      General: No focal deficit present. Mental Status: She is alert and oriented to person, place, and time.    Psychiatric:         Behavior: Behavior normal. Thought Content: Thought content normal.        ECOG PS: 1      Intake/Output Summary (Last 24 hours) at 11/1/2022 1248  Last data filed at 11/1/2022 0320  Gross per 24 hour   Intake 10 ml   Output --   Net 10 ml       DIAGNOSTIC DATA:       Lab Results   Component Value Date    WBC 8.7 11/01/2022    HGB 7.2 (L) 11/01/2022    HCT 21.8 (L) 11/01/2022    MCV 85.8 11/01/2022     11/01/2022     Lab Results   Component Value Date    NEUTROABS 7.43 (H) 11/01/2022     Lab Results   Component Value Date    ALT 7 11/01/2022    AST 16 11/01/2022    ALKPHOS 51 11/01/2022    BILITOT 0.2 11/01/2022     Lab Results   Component Value Date    CREATININE 1.6 (H) 11/01/2022    BUN 24 (H) 11/01/2022     11/01/2022    K 4.2 11/01/2022     11/01/2022    CO2 21 (L) 11/01/2022       Pathology:     Radiology:      ASSESSMENT & PLAN:  The patient is a 71 y.o. female with background history of lung cancer, was admitted on 10/31/2022 for abdominal distention and lower extremity swelling. The patient has an extensive lung cancer history outlined above. But in short, patient has had multiple instances of interventions and miscellaneous findings:    First Instance of Lung Cancer:  >>> In 2015 she was found to have RUL mass, which confirmed moderately differentiated adenocarcinoma on bronchoscopy. She was found to have a 1.6 cm right paratracheal nodule, intensely hypermetabolic. And then underwent right VATS with RUL with wedge resection then completion lobectomy and mediastinal node dissection, staging suggestive with a pT1 aN1 MX disease. Now she underwent 4 cycles of carboplatin/pemetrexed completed in 2/24/2016. Second Lung Cancer:  >>>  PET scan was done a month later in 2016 revealed a 2.1 cm left lung mass concerning for malignancy, and she underwent left VATS with wedge and completion left upper lobe lobectomy/mediastinal lymph node dissection, confirming well-differentiated adenocarcinoma of the lung.   Nodes were negative she proceeded with surveillance protocol. Abdominal Cystic Lesions  >>> Patient was found to have multiple peritoneal cystic masses on CT abdomen on 2/20/2022. CEA was 12.5 on 3/16/2022, mammogram done on 4/20/2022 which was negative. On 4/20/2022, other tumor markers checked including , CEA 19-9, and chromogranin A which were 32.8, <2, and 1480, respectively. EGD/colonoscopy on 5/13/2022 found gastric and duodenal polyps. There is a hyper plastic gastric polyp. H. pylori was negative. And colonoscopy found fragments of tubular adenoma and hyperplastic polyp. She was then seen by [facility] surgery with Dr. Rasta Novak in which repeat CT abdomen on 6/23/2022, found numerous cystic structures throughout the right flank and retroperitoneum. Then on 7/6/2022, laparoscopic robotic peritoneal resection was done, revealing serous cystic masses along colon, periportal lymphadenopathy, fibrotic appearing liver, chronic cholecystitis, peritoneal fluid negative for malignancy. Periportal lymph node flow cytometry showed 4.5% monoclonal B cells, without detectable CD5, CD10, or CD11c, but otherwise predominantly polyclonal background. Suspected Lung Cancer #3:  >>> Repeat CT chest on 4/5/2022 found groundglass nodule of right lower lobe, approximately 10 mm in size, unchanged from before. But there was a right lower lobe 7 mm nodule with pleural abutment, increased from 9/20/2021. PET scan showed no significant FDG uptake. Then on 8/4/2022, repeat chest showed increase of RLL nodule size, 1.6 x 1.2 cm from previous study. There is also bilateral adrenal nodules, left being larger, at 1.8 x 1.6 cm with right cystic lesion being 1.2 cm, and a hypodense splenic lesion, 2.9 x 3.5 cm. This was further evaluated at [facility] on 8/15/2022, recommended biopsy of the RLL mass, some consideration of possible lymphoma.   PET on 9/6/2022 of the RLL mass showed SUV 3.2, and then CT-guided biopsy on 9/20/2022 confirm adenocarcinoma, however weakly immunoreactive for GATA3, CDX2, but TTF-1 negative. Molecular studies done, showing PD-L1 10%, and KRAS exon 2 (G12 C) positive but rest of studies negative. Patient was considered not  a good candidate for further resection by CT surgery, and met with radiation oncology for definitive SBRT on 10/10/2022, which is to be started at this time. CURRENT HOSPITALIZATION:  The patient is a 71 y.o. female who was admitted for ascites and lower extremity swelling on 10/31/2022. He saw patient in consultation 11/1/2022. She has had 2 weeks duration of ascites. Abdominal imaging this admission confirms this on 10/31/2022. CT reports a small increase in her right lower lobe lung lesion, now measuring 2.4 x 1.7 cm (previously 1.3 cm). Abdominal organs such as liver, biliary tree, pancreas, and left adrenal gland are unremarkable. Again there were mentions of ill-defined hypodensity in spleen without significant change. Both ovaries appear normal.  There is calcified uterine leiomyomata. Furthermore there is some mild thickening of small bowel walls, which could be secondary to ascites. Furthermore there is also a 1.5 cm right subareolar breast lesion. Patient did have mammogram earlier this year which was negative. She has had EGD and colonoscopy this year as well showing gastric polyp; tubular adenoma and hyperplastic polyp in colon. Further more, regarding patient's ascites, she denies of kidney or liver issues. I did review the pathology report from her 7/6/2022 surgery, in which there was mention of fibrosis in the liver focal periportal and incomplete septal fibrosis (stage II) but no evidence of chronic hepatitis/steatosis, or cirrhosis.       Right lung mass positive for Adenocarcinoma, weakly positive for GATA3 and CDX2, TTF-1 negative; KRAS (G12C) positive, PDL1 with 10% expression  Previously described multiple peritoneal cystic masses, s/p resection in 7/6/2022, not well visualized on 10/31/22 CT abdomen  Normocytic anemia  Mild small bowel thickening  Right hyperdense subareolar lesion of the breast, 1.5 cm  Monoclonal B-cell population from periportal lymph node resection from 7/6/2022, in the setting of follicular hyperplasia  Small left para-aortic retroperitoneal lymph node  Small right pleural effusion with trace left pleural effusion  Mild small bowel thickening, possibly due to ascites  Acute kidney injury  Known adrenal nodules  Previous right sided lung adenocarcinoma in 2015  Previous left-sided lung adenocarcinoma 2016  Family history of uterine cancer in mother  Family history of early age breast cancer in sister (Diagnosed around early 42's)    The site is could be concerning for metastatic disease given her lung cancer history. However for non-small cell lung cancer, this is a less common presentation, and would consider benign cause and consider a second primary malignancy during our workup. Agree with paracentesis. Send for fluid studies including cytology  Will also recheck tumor markers. I have addressed her family cancer history  For her anemia, will check peripheral blood smear, LDH, vitamin B12/folate, iron studies including ferritin, haptoglobin, and DAMIÁN  I have disclosed the right breast lesion noted on CT. If able to do inpatient, will order ultrasound. 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 If we confirm arrival of stage IV adenocarcinoma, will consider MRI brain and change in treatment, but will await for workup   was also present in the room today       Approximately spent 65 minutes with patient, discussing the laboratory, imaging, and clinical findings, and I have discussed the clinical implications and recommendations. More than 50% of time was spent counseling patient. The patient verbalized understanding.       Joanna Viera MD  [address] [facility]  11/1/2022    [facility]) Office  P: [phone]  F: [phone]    [address] [facility]) Office  P: [phone]  F: [phone]

## 2022-11-02 PROBLEM — R18.8 OTHER ASCITES: Status: ACTIVE | Noted: 2022-11-01

## 2022-11-02 LAB
ALBUMIN SERPL-MCNC: 2.5 G/DL (ref 3.5–5.2)
ALP BLD-CCNC: 364 U/L (ref 35–104)
ALT SERPL-CCNC: 41 U/L (ref 0–32)
ANION GAP SERPL CALCULATED.3IONS-SCNC: 12 MMOL/L (ref 7–16)
AST SERPL-CCNC: 114 U/L (ref 0–31)
BILIRUB SERPL-MCNC: 0.7 MG/DL (ref 0–1.2)
BUN BLDV-MCNC: 29 MG/DL (ref 6–23)
CALCIUM SERPL-MCNC: 8.6 MG/DL (ref 8.6–10.2)
CHLORIDE BLD-SCNC: 107 MMOL/L (ref 98–107)
CO2: 19 MMOL/L (ref 22–29)
CREAT SERPL-MCNC: 1.8 MG/DL (ref 0.5–1)
CREATININE URINE: 216 MG/DL (ref 29–226)
GFR SERPL CREATININE-BSD FRML MDRD: 30 ML/MIN/1.73
GLUCOSE BLD-MCNC: 66 MG/DL (ref 74–99)
HCT VFR BLD CALC: 22.7 % (ref 34–48)
HEMOGLOBIN: 7.2 G/DL (ref 11.5–15.5)
LACTIC ACID: 0.6 MMOL/L (ref 0.5–2.2)
LIPASE: 176 U/L (ref 13–60)
MCH RBC QN AUTO: 27.9 PG (ref 26–35)
MCHC RBC AUTO-ENTMCNC: 31.7 % (ref 32–34.5)
MCV RBC AUTO: 88 FL (ref 80–99.9)
METER GLUCOSE: 170 MG/DL (ref 74–99)
PDW BLD-RTO: 14.9 FL (ref 11.5–15)
PLATELET # BLD: 294 E9/L (ref 130–450)
PMV BLD AUTO: 10.6 FL (ref 7–12)
POTASSIUM SERPL-SCNC: 4.3 MMOL/L (ref 3.5–5)
RBC # BLD: 2.58 E12/L (ref 3.5–5.5)
SODIUM BLD-SCNC: 138 MMOL/L (ref 132–146)
SODIUM URINE: 34 MMOL/L
TOTAL PROTEIN: 6.3 G/DL (ref 6.4–8.3)
UREA NITROGEN, UR: 549 MG/DL (ref 800–1666)
WBC # BLD: 6.7 E9/L (ref 4.5–11.5)

## 2022-11-02 PROCEDURE — 84300 ASSAY OF URINE SODIUM: CPT

## 2022-11-02 PROCEDURE — 99233 SBSQ HOSP IP/OBS HIGH 50: CPT | Performed by: INTERNAL MEDICINE

## 2022-11-02 PROCEDURE — 6360000002 HC RX W HCPCS: Performed by: INTERNAL MEDICINE

## 2022-11-02 PROCEDURE — 6370000000 HC RX 637 (ALT 250 FOR IP): Performed by: INTERNAL MEDICINE

## 2022-11-02 PROCEDURE — 36415 COLL VENOUS BLD VENIPUNCTURE: CPT

## 2022-11-02 PROCEDURE — 83690 ASSAY OF LIPASE: CPT

## 2022-11-02 PROCEDURE — 84540 ASSAY OF URINE/UREA-N: CPT

## 2022-11-02 PROCEDURE — 80053 COMPREHEN METABOLIC PANEL: CPT

## 2022-11-02 PROCEDURE — 1200000000 HC SEMI PRIVATE

## 2022-11-02 PROCEDURE — 97161 PT EVAL LOW COMPLEX 20 MIN: CPT

## 2022-11-02 PROCEDURE — 83605 ASSAY OF LACTIC ACID: CPT

## 2022-11-02 PROCEDURE — 51798 US URINE CAPACITY MEASURE: CPT

## 2022-11-02 PROCEDURE — 2580000003 HC RX 258: Performed by: INTERNAL MEDICINE

## 2022-11-02 PROCEDURE — 82570 ASSAY OF URINE CREATININE: CPT

## 2022-11-02 PROCEDURE — 97165 OT EVAL LOW COMPLEX 30 MIN: CPT

## 2022-11-02 PROCEDURE — 82962 GLUCOSE BLOOD TEST: CPT

## 2022-11-02 PROCEDURE — 85027 COMPLETE CBC AUTOMATED: CPT

## 2022-11-02 PROCEDURE — 97530 THERAPEUTIC ACTIVITIES: CPT

## 2022-11-02 PROCEDURE — 6360000002 HC RX W HCPCS

## 2022-11-02 RX ORDER — DEXTROSE MONOHYDRATE 100 MG/ML
INJECTION, SOLUTION INTRAVENOUS CONTINUOUS PRN
Status: DISCONTINUED | OUTPATIENT
Start: 2022-11-02 | End: 2022-11-09 | Stop reason: HOSPADM

## 2022-11-02 RX ADMIN — Medication 10 ML: at 20:23

## 2022-11-02 RX ADMIN — MORPHINE SULFATE 4 MG: 4 INJECTION, SOLUTION INTRAMUSCULAR; INTRAVENOUS at 13:50

## 2022-11-02 RX ADMIN — Medication 10 ML: at 13:50

## 2022-11-02 RX ADMIN — TRAZODONE HYDROCHLORIDE 50 MG: 50 TABLET ORAL at 20:23

## 2022-11-02 RX ADMIN — MORPHINE SULFATE 2 MG: 2 INJECTION, SOLUTION INTRAMUSCULAR; INTRAVENOUS at 02:06

## 2022-11-02 RX ADMIN — LISINOPRIL 40 MG: 20 TABLET ORAL at 08:25

## 2022-11-02 RX ADMIN — AMLODIPINE BESYLATE 5 MG: 5 TABLET ORAL at 08:25

## 2022-11-02 RX ADMIN — ENOXAPARIN SODIUM 30 MG: 100 INJECTION SUBCUTANEOUS at 08:25

## 2022-11-02 ASSESSMENT — PAIN DESCRIPTION - LOCATION
LOCATION: BACK
LOCATION: BACK

## 2022-11-02 ASSESSMENT — PAIN DESCRIPTION - ORIENTATION
ORIENTATION: RIGHT;LEFT
ORIENTATION: LOWER

## 2022-11-02 ASSESSMENT — PAIN - FUNCTIONAL ASSESSMENT: PAIN_FUNCTIONAL_ASSESSMENT: PREVENTS OR INTERFERES SOME ACTIVE ACTIVITIES AND ADLS

## 2022-11-02 ASSESSMENT — PAIN SCALES - GENERAL
PAINLEVEL_OUTOF10: 10
PAINLEVEL_OUTOF10: 2
PAINLEVEL_OUTOF10: 8

## 2022-11-02 ASSESSMENT — PAIN DESCRIPTION - DESCRIPTORS
DESCRIPTORS: ACHING
DESCRIPTORS: ACHING;DISCOMFORT;SORE

## 2022-11-02 NOTE — PROGRESS NOTES
Physical Therapy  Facility/Department: [facility] SURG  Physical Therapy Initial Assessment    Name: Kayla Del Angel  : 1950  MRN: [id number]  Date of Service: 2022    Attending Provider:  Luis M Alfredo DO    Evaluating PT:  Ayo Jain, P.T. Room #:  6174/3324-B  Diagnosis:  Abdominal pain [R10.9]  Pertinent PMHx/PSHx:  Lung CA, B pleural effusions  Procedure/Surgery:  22 Paracentesis  Precautions:  Falls, bed/chair alarm    SUBJECTIVE:    Pt lives with  in a bi-level home with 3 stairs and 1 rail to enter. There are 5 steps and 2 rails to the main level. Pt ambulated with a ww or cane PTA. OBJECTIVE:   Initial Evaluation  Date: 22 Treatment Short Term/ Long Term   Goals   Was pt agreeable to Eval/treatment? yes     Does pt have pain? No c/o pain     Bed Mobility  Rolling: MIN A  Supine to sit: MOD A  Sit to supine: NA  Scooting: MOD A  supervision   Transfers Sit to stand: MOD A  Stand to sit: MOD A  Stand pivot: MOD A with ww  SBA   Ambulation   3 feet with ww MOD A  100 feet with ww SBA   Stair negotiation: ascended and descended NA, pt fatigued with ww and to weak at this time to attempt  5 steps with 1 rail SBA   AM-PAC 6 Clicks 80/52       BLE ROM is WFL. BLE strength is grossly 3-/5 to 4-/5.    Sensation:  Pt denies numbness and tingling to extremities  Balance: sitting is SBA and standing with ww is MIN A  Endurance: fair-    Patient education  Pt educated on bed mobility, transfers, and gait with ww    Patient response to education:   Pt verbalized understanding Pt demonstrated skill Pt requires further education in this area   yes Yes with VCs and assist yes     ASSESSMENT:    Conditions Requiring Skilled Therapeutic Intervention:    [x]Decreased strength     []Decreased ROM  [x]Decreased functional mobility  [x]Decreased balance   [x]Decreased endurance   []Decreased posture  []Decreased sensation  []Decreased coordination   []Decreased vision  []Decreased safety awareness   []Increased pain       Comments:  Pt was in bed and agreeable to PT. She has decreased strength and endurance and moved slowly requiring increased time to complete functional mobility. She sat on EOB and stood with ww and c/o light headedness and with time light headedness improved. She had difficulty advancing her feet and walking using ww as could not clear her feet from the floor and again required increased time to move 3 feet toward chair. Pt is a fall risk at this time. Treatment:  Patient practiced and was instructed in the following treatment:    Bed mobility, transfers, sitting on EOB, standing with ww, and gait with ww to improve functional strength, balance, safety, and endurance. Pt was left sitting up in chair with call light left by patient. Chair/bed alarm: chair alarm was activated. Pt's/ family goals   1. To get feeling better and go home. Patient and or family understand(s) diagnosis, prognosis, and plan of care. PHYSICAL THERAPY PLAN OF CARE:    PT POC is established based on physician order and patient diagnosis     Referring provider/PT Order:  PT eval and treat  Diagnosis:  Abdominal pain [R10.9]  Specific instructions for next treatment:  progress functional mobility as pt is able.      Current Treatment Recommendations:     [x] Strengthening to improve independence with functional mobility   [] ROM to improve ROM and decrease spasm and pain which will help promote independence with functional mobility   [x] Balance Training to improve static/dynamic balance and to reduce fall risk  [x] Endurance Training to improve activity tolerance during functional mobility   [x] Transfer Training to improve safety and independence with all functional transfers   [x] Gait Training to improve gait mechanics, endurance and assess need for appropriate assistive device  [x] Stair Training in preparation for safe discharge home and/or into the community   [] Positioning to prevent skin breakdown and contractures  [] Safety and Education Training   [x] Patient/Caregiver Education   [] HEP  [] Other     PT long term treatment goals are located in above grid    Frequency of treatments: 2-5x/week x 1-2 weeks. Time in  08:45  Time out  09:10    Total Treatment Time  12 minutes     Evaluation Time includes thorough review of current medical information, gathering information on past medical history/social history and prior level of function, completion of standardized testing/informal observation of tasks, assessment of data and education on plan of care and goals. CPT codes:  [x] Low Complexity PT evaluation 29467  [] Moderate Complexity PT evaluation 86262  [] High Complexity PT evaluation 36523  [] PT Re-evaluation 85195  [] Gait training 23367 ** minutes  [] Manual therapy 51638 ** minutes  [x] Therapeutic activities 03487 12 minutes  [] Therapeutic exercises 08633 ** minutes  [] Neuromuscular reeducation 25960 ** minutes     Juanito Neri., P.T.   License Number: [id number]

## 2022-11-02 NOTE — PROGRESS NOTES
Reviewed labs. It appears the fluid studies have not started processing. I called lab in the afternoon and again moments ago. At this time, they do not have ascites fluid sample and are not able to see ascites fluid analysis labs. I have communicated with RN on unit. We will further investigate.

## 2022-11-02 NOTE — CONSULTS
Hematology/oncology inpatient follow-up    Patient Name: Dakota Moran  YOB: 1950    DATE OF ADMISSION: 10/31/2022  DATE OF CONSULTATION: 11/1/2022  REASON FOR CONSULTATION: \"lung Ca / massive ascites / established pt\"  CONSULTING PROVIDER: Elena Paredes DO  PCP: Miguel Royal    Room: [address] [place], [place] [id number]      CHIEF COMPLAINT:  Abdominal distention and leg swelling    Subjective: The patient is status post paracentesis yesterday 11/2/2022, total of 245 0 mL of ascitic fluid was removed, D/W RN, apparently none of the studies on the fluid were done, including cytology. The patient is feeling slightly better. Oncologic History:  CT scan chest on 08/31/2015 showed a new right upper lobe mass. Bronchoscopy with washing RUL mass on 10/15/2015 was positive for RUL Lung Adenocarcinoma. Negative for malignancy on BAL and TBNA of Lingular lesion. PET/CT scan on 09/29/2015:  1. New since prior examination on CT is 1.7 cm right paratracheal   nodule. Nodule is intensely hypermetabolic with SUV max of 4.8   worrisome for tumor. 2. Previously known nodule in the lingula now measures 2.3 cm in   size. SUV max has increased from 1.7 on the 2012 examination to   6.4 at this time. At this time findings are worrisome for   hypermetabolic tumor with avid glucose metabolism. Therefore, she was referred to see Dr. Oneta Primrose for resection. CT guided biopsy of the left upper lobe lesion on 11/02/2015 was noted to be negative for malignancy. On 11/11/2015 She underwent: (1) Bronchoscopy. (2) Right VATS. (3) VATS right upper lobe wedge resection. (4) VATS right upper lobectomy. (5) Intercostal nerve block from T3 to T10. (6) Mediastinal lymph node dissection. Pathology:  Right lung, upper lobectomy: Invasive, moderately differentiated adenocarcinoma (Grade 2). Tumor size 1.5 cm in greatest dimension. Surgical margin negative for malignancy.     Two of three peribronchial lymph nodes positive for adenocarcinoma. pT1a N1 MX  She was referred to the medical oncology clinic to discuss adjuvant chemotherapy. We recommended 4 cycles of adjuvant chemotherapy consisting of Carboplatin/Alimta. Javi placed 12/07/2015. Cycle # 1 of Emmie Bush was on 12/09/2015. Cycle # 2 of Emmie Bush was on 01/06/2016. Cycle # 3 of Emmie Bush was on 01/27/2016. Cycle # 4 of Emmie Bush was on 02/24/2016. PET SCAN 3.2016: The 2.1 cm left lung mass abutting the major fissure is hypermetabolic with SUV max of 5.4. Finding is worrisome for tumor with avid glucose metabolism. 2. Elsewhere there is unremarkable distribution of FDG activity without evidence of hypermetabolic metastases. On 03/29/2016 underwent Bronch/Left VATS/VATS wedge ROMELIA/VATS Left upper lobectomy/Mediasinal lymph node dissection/Intercostal nerve block from T3-T10 per Dr. Malathi Aguilera. Invasive, well-differentiated adenocarcinoma (grade 1); Tumor size-1.4 cm in greatest dimension; Surgical margins-negative for malignancy; Lymphovascular invasion-not identified; TNM classification-pT2a N0 MX  B. AP window lymph node #1, excision: Anthracotic lymph node; negative for malignancy  C. AP window lymph node #2, excision: Anthracotic lymph node; negative for malignancy   D. Periaortic lymph node #1, excision: Fibroadipose tissue; lymph node not identified  E. Bronchial lymph node #1, excision: Anthracotic lymph node; negative for malignancy  F. Left lung, upper lobectomy: Emphysematous change; negative for malignancy  4 anthracotic lymph nodes negative for malignancy   G. Inferior pulmonary ligament lymph node, excision: Anthracotic lymph node; negative for malignancy  H. Bronchial lymph node, excision: Anthracotic lymph node; negative for malignancy. On surveillance per NCCN guidelines.        Recent abdominal pain; ER visit reviewed  CT abdomen/pelvis 02/20/2022   6 mm right lower lobe pulmonary nodule  Multiple peritoneal cystic masses      CEA 12.5 on 03/16/2022     CT chest 04/05/2022 Ground-glass nodule right lower lobe superolateral portion 10 mm unchanged from prior however in the medial segment right lower lobe with pleural abutment is a 7 mm pulmonary nodule increased in size from 09/28/2021 with is barely visible at 2-3 mm; repeat CT chest in 3 mo     PET/CT scan 04/05/2022 noted No FDG avid uptake is identified which exceeds the threshold SUV     Bilateral Screening Mammogram 04/20/2022: Negative for malignancy     CEA 12.1 on 04/20/2022  CA-125 32.8 on 04/20/2022   <2 on 04/20/2022  Chromogranin A 1480 on 04/20/2022. EGD/Colonoscopy 05/13/2022: Gastric polyps , duodenal polyp moderate gastroduodenitis   A. Stomach, biopsy: Hyperplastic polyp and moderate chronic active gastritis; negative for intestinal metaplasia   Immunostain negative for Helicobacter pylori organisms   B. Duodenum, biopsy: Chronic duodenitis with features of peptic duodenitis   C. Colon, 20 cm biopsy: Fragments of tubular adenoma and hyperplastic polyp      Referred to HBP team (Dr. Jas Saleem) for further evaluation of peritoneal cystic masses  CT abdomen/pelvis 06/23/2022 numerous cystic structures identified throughout the right flank and retroperitoneum     Laparoscopic robotic peritoneal mass resection on 07/06/2022  Findings included: serous cystic masses along the colon, periportal lymphadenopathy, fibrotic appearing liver, chronic cholecystitis  Peritoneal fluid Negative for malignant cells. Cellblock shows reactive mesothelial cells, lymphocytes, adipose/stromal tissue, and blood. Monolayer preparation shows few reactive mesothelial cells and blood. A.  Lymph node, periportal, biopsy:   - Reactive node showing follicular hyperplasia, negative for malignancy, see comment. B.  Remnant gallbladder, cholecystectomy:   - Portion of gallbladder wall showing dense fibrosis/scar and occluded mariaelena-gallbladder vessels. C.   Soft tissue, peritoneal sac, excision:   - Peritoneal inclusion cysts (benign cystic mesothelioma) and unremarkable fibroadipose tissue. D.  Liver, needle biopsy:   - Benign hepatic parenchyma showing focal periportal and incomplete septal fibrosis (stage 2)   - Negative for significant lobular/portal necroinflammatory activity, see comment. Comment:   Sections of the lymph node in specimen A reveal normal anat architecture with secondary follicular hyperplasia. There are no cytologically atypical lymphoid cells or [place] cells identified. Periportal LN Flow Cytometry noted 4.5% monoclonal B cells detected in a predominantly polyclonal background. Monoclonal B cell population (4.5% of total cells) without detectable CD5, CD10 or CD11c expression in a predominantly polyclonal background, raising the differential between a monoclonal B-cell population of undetermined significance versus a B-cell lymphoma/leukemia. In the context of B-cell lymphoma the main differential based on immunophenotype includes, but is not limited to marginal zone lymphoma/leukemia, lymphoplasmacytic lymphoma, ER13- negative follicular lymphoma, and less likely large b cell lymphoma. In specimen D, there is no evidence of chronic hepatitis or significant steatosis. Histologic features of cirrhosis including nodules of regenerating hepatocytes and complete portal-portal bridging fibrosis are   not identified. Focal periportal fibrosis and incomplete septal fibrosis are confirmed by trichrome stain. Iron stain is also performed and is negative.       Periportal lymph node flow cytometry showing 4.5% monoclonal B cells without detectable CD5, CD10 or CD11c expression in a predominantly polyclonal background, raising the differential between a monoclonal B-cell population of undetermined significance versus a B-cell lymphoma/leukemia     Repeat CT chest on 08/04/2022  Postsurgical changes are identified in the upper lobes bilaterally. 1.1 cm ground-glass nodule in the right lower lobe and a smaller 1 measuring 0.9 cm are noted without change. There is a 1.6 x 1.2 cm soft tissue mass in the right lower lobe medially which is significantly increased since the previous examination. Bilateral adrenal nodules are noted, larger 1 on the left side measuring 1.8 x 1.6 cm.  1.2 cm right renal cystic lesion is identified. An ill-defined hypodense lesion is identified in the spleen currently measuring 2.9 x 3.5 cm      Evaluated on 8/15/2022 by Dr. Ying Zurita at [address] [facility] who recommended proceeding with a biopsy of the enlarging right lower lobe lung mass. While lymphoma certainly possible, biopsy recommended to rule out recurrent lung cancer. Even if lymphoma is concerned, likely low-grade and not causing her symptoms so observation would be recommended. PET/CT scan, CT-guided core needle biopsy of enlarging right lower lobe lung mass recommended. PET/CT scan 09/06/2022: In the region of the lesion in the right lower lobe there is FDG avid tracer uptake peak SUV is 3.2. CT guided core needle biopsy RLL lung nodule on 09/20/2022  Right lung, lower lobe core needle biopsy: Adenocarcinoma (see comment)   Comment: The prior history of right upper lobe adenocarcinoma (HES ) and left upper lobe adenocarcinoma (HES ) is noted. The electronic medical record is also reviewed. The adenocarcinoma in the current specimen is weakly immunoreactive with GATA3 and CDX2. The TTF-1 immunostain is negative. PHYSICAL EXAM:   VITALS:  /65   Pulse 75   Temp 98.6 °F (37 °C) (Oral)   Resp 16   Ht 5' 1\" (1.549 m)   Wt 116 lb (52.6 kg)   SpO2 98%   BMI 21.92 kg/m²   Physical Exam  Constitutional:       General: She is not in acute distress. Appearance: She is ill-appearing. She is not toxic-appearing. HENT:      Head: Normocephalic.       Nose: Nose normal.      Mouth/Throat:      Mouth: Mucous membranes are moist.   Eyes:      General: No scleral icterus. Cardiovascular:      Rate and Rhythm: Normal rate and regular rhythm. Heart sounds: No murmur heard. Pulmonary:      Effort: Pulmonary effort is normal. No respiratory distress. Abdominal:      General: There is distension. Tenderness: There is abdominal tenderness. Musculoskeletal:         General: No swelling. Cervical back: Normal range of motion. No rigidity. Lymphadenopathy:      Cervical: No cervical adenopathy. Skin:     Coloration: Skin is not jaundiced or pale. Findings: No lesion or rash. Neurological:      General: No focal deficit present. Mental Status: She is alert and oriented to person, place, and time. Psychiatric:         Behavior: Behavior normal.         Thought Content: Thought content normal.        ECOG PS: 1      Intake/Output Summary (Last 24 hours) at 11/2/2022 1830  Last data filed at 11/2/2022 0735  Gross per 24 hour   Intake 130 ml   Output 400 ml   Net -270 ml         DIAGNOSTIC DATA:       Lab Results   Component Value Date    WBC 6.7 11/02/2022    HGB 7.2 (L) 11/02/2022    HCT 22.7 (L) 11/02/2022    MCV 88.0 11/02/2022     11/02/2022     Lab Results   Component Value Date    NEUTROABS 7.43 (H) 11/01/2022     Lab Results   Component Value Date    ALT 41 (H) 11/02/2022     (H) 11/02/2022    ALKPHOS 364 (H) 11/02/2022    BILITOT 0.7 11/02/2022     Lab Results   Component Value Date    CREATININE 1.8 (H) 11/02/2022    BUN 29 (H) 11/02/2022     11/02/2022    K 4.3 11/02/2022     11/02/2022    CO2 19 (L) 11/02/2022       Pathology:     Radiology:      ASSESSMENT & PLAN:  The patient is a 71 y.o. female with background history of lung cancer, was admitted on 10/31/2022 for abdominal distention and lower extremity swelling. The patient has an extensive lung cancer history outlined above.   But in short, patient has had multiple instances of interventions and miscellaneous findings:    First Instance of Lung Cancer:  >>> In 2015 she was found to have RUL mass, which confirmed moderately differentiated adenocarcinoma on bronchoscopy. She was found to have a 1.6 cm right paratracheal nodule, intensely hypermetabolic. And then underwent right VATS with RUL with wedge resection then completion lobectomy and mediastinal node dissection, staging suggestive with a pT1 aN1 MX disease. Now she underwent 4 cycles of carboplatin/pemetrexed completed in 2/24/2016. Second Lung Cancer:  >>>  PET scan was done a month later in 2016 revealed a 2.1 cm left lung mass concerning for malignancy, and she underwent left VATS with wedge and completion left upper lobe lobectomy/mediastinal lymph node dissection, confirming well-differentiated adenocarcinoma of the lung. Nodes were negative she proceeded with surveillance protocol. Abdominal Cystic Lesions  >>> Patient was found to have multiple peritoneal cystic masses on CT abdomen on 2/20/2022. CEA was 12.5 on 3/16/2022, mammogram done on 4/20/2022 which was negative. On 4/20/2022, other tumor markers checked including , CEA 19-9, and chromogranin A which were 32.8, <2, and 1480, respectively. EGD/colonoscopy on 5/13/2022 found gastric and duodenal polyps. There is a hyper plastic gastric polyp. H. pylori was negative. And colonoscopy found fragments of tubular adenoma and hyperplastic polyp. She was then seen by [facility] surgery with Dr. Izabel Dupree in which repeat CT abdomen on 6/23/2022, found numerous cystic structures throughout the right flank and retroperitoneum. Then on 7/6/2022, laparoscopic robotic peritoneal resection was done, revealing serous cystic masses along colon, periportal lymphadenopathy, fibrotic appearing liver, chronic cholecystitis, peritoneal fluid negative for malignancy.   Periportal lymph node flow cytometry showed 4.5% monoclonal B cells, without detectable CD5, CD10, or CD11c, but otherwise predominantly polyclonal background. Suspected Lung Cancer #3:  >>> Repeat CT chest on 4/5/2022 found groundglass nodule of right lower lobe, approximately 10 mm in size, unchanged from before. But there was a right lower lobe 7 mm nodule with pleural abutment, increased from 9/20/2021. PET scan showed no significant FDG uptake. Then on 8/4/2022, repeat chest showed increase of RLL nodule size, 1.6 x 1.2 cm from previous study. There is also bilateral adrenal nodules, left being larger, at 1.8 x 1.6 cm with right cystic lesion being 1.2 cm, and a hypodense splenic lesion, 2.9 x 3.5 cm. This was further evaluated at [address] [place] [address] on 8/15/2022, recommended biopsy of the RLL mass, some consideration of possible lymphoma. PET on 9/6/2022 of the RLL mass showed SUV 3.2, and then CT-guided biopsy on 9/20/2022 confirm adenocarcinoma, however weakly immunoreactive for GATA3, CDX2, but TTF-1 negative. Molecular studies done, showing PD-L1 10%, and KRAS exon 2 (G12 C) positive but rest of studies negative. Patient was considered not  a good candidate for further resection by CT surgery, and met with radiation oncology for definitive SBRT on 10/10/2022, which is to be started at this time. CURRENT HOSPITALIZATION:  The patient is a 71 y.o. female who was admitted for ascites and lower extremity swelling on 10/31/2022. He saw patient in consultation 11/1/2022. She has had 2 weeks duration of ascites. Abdominal imaging this admission confirms this on 10/31/2022. CT reports a small increase in her right lower lobe lung lesion, now measuring 2.4 x 1.7 cm (previously 1.3 cm). Abdominal organs such as liver, biliary tree, pancreas, and left adrenal gland are unremarkable. Again there were mentions of ill-defined hypodensity in spleen without significant change. Both ovaries appear normal.  There is calcified uterine leiomyomata.   Furthermore there is some mild thickening of small bowel walls, which could be secondary to ascites. Furthermore there is also a 1.5 cm right subareolar breast lesion. Patient did have mammogram earlier this year which was negative. She has had EGD and colonoscopy this year as well showing gastric polyp; tubular adenoma and hyperplastic polyp in colon. Further more, regarding patient's ascites, she denies of kidney or liver issues. I did review the pathology report from her 7/6/2022 surgery, in which there was mention of fibrosis in the liver focal periportal and incomplete septal fibrosis (stage II) but no evidence of chronic hepatitis/steatosis, or cirrhosis. Right lung mass positive for Adenocarcinoma, weakly positive for GATA3 and CDX2, TTF-1 negative; KRAS (G12C) positive, PDL1 with 10% expression  Previously described multiple peritoneal cystic masses, s/p resection in 7/6/2022, not well visualized on 10/31/22 CT abdomen  Normocytic anemia  Mild small bowel thickening  Right hyperdense subareolar lesion of the breast, 1.5 cm  Monoclonal B-cell population from periportal lymph node resection from 7/6/2022, in the setting of follicular hyperplasia  Small left para-aortic retroperitoneal lymph node  Small right pleural effusion with trace left pleural effusion  Mild small bowel thickening, possibly due to ascites  Acute kidney injury  Known adrenal nodules  Previous right sided lung adenocarcinoma in 2015  Previous left-sided lung adenocarcinoma 2016  Family history of uterine cancer in mother  Family history of early age breast cancer in sister (Diagnosed around early 42's)    The site is could be concerning for metastatic disease given her lung cancer history. However for non-small cell lung cancer, this is a less common presentation, and would consider benign cause and consider a second primary malignancy during our workup.   The patient is status post paracentesis yesterday 11/2/2022, total of 2450 mL of ascitic fluid was removed, D/W RN, apparently none of the studies on the fluid were done, including cytology. Will consult Dr. Maye Monsalve. CEA is 8.5,  is elevated at 1063, CA 19-9 is pending. Anemia, likely secondary to malignancy, chronic inflammation, and iron deficiency. We will order Ferrlecit. Right breast ultrasound ordered. 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   If we confirm arrival of stage IV adenocarcinoma, will consider MRI brain and change in treatment, but will await for workup      Dulce Maria Kimball MD  HEMATOLOGY/MEDICAL [address] [place] [address]  11/2/2022

## 2022-11-02 NOTE — PATIENT CARE CONFERENCE
P Quality Flow/Interdisciplinary Rounds Progress Note        Quality Flow Rounds held on November 2, 2022    Disciplines Attending:  Bedside Nurse, , , and Nursing Unit Leadership    Layla Fernando was admitted on 10/31/2022 12:08 PM    Anticipated Discharge Date:       Disposition:    Ishan Score:  Ishan Scale Score: 20    Readmission Risk              Risk of Unplanned Readmission:  19           Discussed patient goal for the day, patient clinical progression, and barriers to discharge.   The following Goal(s) of the Day/Commitment(s) have been identified:  Labs - Report Results      Idris Langley RN  November 2, 2022

## 2022-11-02 NOTE — PROGRESS NOTES
OCCUPATIONAL THERAPY INITIAL EVALUATION    CLAUDETTE Galvez [place] [address]      Date:2022                                                  Patient Name: Dorcas Herrera  MRN: [id number]  : 1950  Room: [address] [place] [address]    Evaluating OT: YELITZA Pena, OTR/L [id number]  Referring Provider: Zachariah Carroll DO  Specific Provider Orders: OT eval and treat   Recommended Adaptive Equipment: shower chair if home     Diagnosis: abdominal pain   Surgery: none   Pertinent Medical History: HTN, lung CA, Hep C, PVD, scoliosis   Precautions:  Fall Risk    Assessment of current deficits   [x] Functional mobility  [x]ADLs  [x] Strength               [x]Cognition   [x] Functional transfers   [x] IADLs         [x] Safety Awareness   [x]Endurance   [] Fine Coordination              [x] Balance      [] Vision/perception   [x]Sensation    []Gross Motor Coordination  [] ROM  [] Delirium                   [] Motor Control     OT PLAN OF CARE   OT POC based on physician orders, patient diagnosis and results of clinical assessment    Frequency/Duration: 2-3 days/wk for 2 weeks PRN   Specific OT Treatment to include:   * Instruction/training on adapted ADL techniques and AE recommendations to increase functional independence within precautions       * Training on energy conservation strategies, correct breathing pattern and techniques to improve independence/tolerance for self-care routine  * Functional transfer/mobility training/DME recommendations for increased independence, safety, and fall prevention  * Patient/Family education to increase follow through with safety techniques and functional independence  * Recommendation of environmental modifications for increased safety with functional transfers/mobility and ADLs  * Therapeutic exercise to improve motor endurance, ROM, and functional strength for ADLs/functional transfers  * Therapeutic activities to facilitate/challenge dynamic balance, stand tolerance for increased safety and independence with ADLs  * Neuro-muscular re-education: facilitation of righting/equilibrium reactions, midline orientation, scapular stability/mobility, normalization of muscle tone, and facilitation of volitional active controled movement    Home Living: Pt lives with  in a bi-level home; bed/bath on 2nd level   Bathroom setup: walk in shower   Equipment owned: shower chair, cane  Prior Level of Function: assist with ADLs/IADLs; using ww for functional mobility     Pain Level: 0/10  Cognition: A&O: 3/4; Follows 1 step directions, with repetition and increased time   Memory:  fair    Sequencing:  fair    Problem solving:  fair    Judgement/safety:  fair     Functional Assessment:  AM-PAC Daily Activity Raw Score: 15/24   Initial Eval Status  Date: 11/2/22 Treatment Status  Date: STGs=LTGs  Time Frame: 10-14 days   Feeding SUP (seated in chair)   IND   Grooming SBA (seated)   IND   UB Dressing SBA   IND   LB Dressing Max A (assist with socks)  Mod A    Bathing Max A (simulated)  Mod A    Toileting Max A  Mod A   Bed Mobility  Log roll: NT  Supine to sit: NT   Sit to supine: NT   Log roll IND  Supine to sit: IND   Sit to supine: IND   Functional Transfers Sit to stand:Min A   Stand to sit:Min A  Commode: Min A  SBA   Functional Mobility Min A (using ww, to/from bathroom, pt shuffles)  SBA   Balance Sitting: SBA  Standing: Min A     Activity Tolerance fair     Visual/  Perceptual Glasses: yes              UE ROM: RUE:  WFL  LUE:  WFL  Strength: RUE: grossly 3+/5 LUE: grossly 3+/5   Strength: B WFL  Fine Motor Coordination:  fair    Hearing: WFL  Sensation:  No c/o numbness/tingling   Tone:  WFL  Edema: none noted                            Comments:Cleared by RN to see pt. Upon arrival, patient sitting in chair and agreeable to OT session. Famil present. At end of session, patient sitting in chair with call light and phone within reach, all lines and tubes intact.  Pt would benefit from continued OT to increase functional independence and quality of life. Treatment:  Pt required vc's and physical assist for proper technique/safety with hand placement/body mechanics/posture for bed mobility/ADLs/functional tranfers/mobility/ww management. Pt required vc's for sequencing/initiation of ADLs/functional transfers. Pt appears to shuffle BLEs and has difficulty advancing feet. Pt also appears to do better with going straight vs turns with mobility. Pt required rest breaks during session. Pt appeared to have tolerated session fairly and appears cooperative/pleasant . Pt instructed on use of call light for assistance and fall prevention. Pt demo'ing fair understanding of education provided. Continue to educate. Eval Complexity: Low    Rehab Potential: Good for established goals, pt. assisted in establishment of goals. LTG: maximize independence with ADLs to return to PLOF    Patient and/or family were instructed on diagnosis, prognosis/goals and plan of care. Demonstrated fair understanding. [] Malnutrition indicators have been identified and nursing has been notified to ensure a dietitian consult is ordered. Evaluation time includes thorough review of current medical information, gathering information on past medical & social history & PLOF, completion of standardized testing, informal observation of tasks, consultation with other medical professions/disciplines, assessment of data & development of POC/goals.      Time In: 1010       Time Out: 1025     Total treatment time: 0       Treatment Charges: Mins Units   OT Eval Low 73860 X    OT Eval Medium 03470     OT Eval High [id number]     OT Re-Eval [id number]     Ther Ex  25926       Manual Therapy 64947       Thera Activities 61004       ADL/Home Mgt 12802     Neuro Re-ed 77706       Group Therapy        Orthotic manage/training  95899       Non-Billable Time           Edouard Miller, OTR/L [id number]

## 2022-11-02 NOTE — PROGRESS NOTES
Spoke to Plyeni in IR about the fluid cultures from yesterdays paracentesis. She stated that no cultures were taken because they started the procedure at 1253 and there were no orders placed at that time. The orders came in at [phone] and they had finished the procedure at 1324. Only the cytology had been ordered prior to the procedure at 1108, but must have been missed because no cultures were taken.

## 2022-11-02 NOTE — PROGRESS NOTES
"Pt has PMH significant for alcoholic cirrhosis complicated by hepatic encephalopathy and ascites. Pt evaluated for liver transplant and was declined due to concern of non reversible dementia and frailty. Pt had virtual appointment with Dr. Kovacs on 6/18, and per Dr. Kovacs, pt  may be appropriate for neuropsych reevaluation in the outpatient setting. Pt has hepatology follow up on 7/27/2020.     Called pt to introduce self and RN care coordinator role. Pt lives with spouse (Eron), daughter (Connie), and son (Satinder). Pt stated that Connie sets up pt's medications in a pill box. Attempted to review medication list with pt but pt was unable to find pill bottles for medications. Pt takes lactulose \"multiple times a day\" and writer asked pt if she is measuring out lactulose each time she takes it. Pt stated that sometimes she does and other times she drinks directly from the bottle. Reiterate the importance of pt measuring out 30 ml each time and titrating lactulose to achieve 3-5 BMs/day. Pt stated that she is having 3 small loose BMs per day. Encouraged pt to keep track of number of BMs to ensure she is taking an adequate amount of lactulose each day.     Pt reported mild left lower extremity edema but stated that she wears a compression stocking to help with this. Pt has ascites and requires a paracentesis every 2 weeks ([facility]). Discussed watching salt intake and the correlation between increased salt and fluid accumulation.     Pt has poor appetite and stated that she has to force herself to eat. Pt is trying to drink protein shakes and has atkins shakes at home. Pt experiences intermittent nausea with eating and writer encouraged pt to take prescribed zofran as needed.     Discussed primary care ordering home care evaluation for nursing and physical therapy. Pt stated that she has had an evaluation before and pt does not qualify. Per pt, she is not considered homebound and insurance will not pay for " Spoke with Dr Virgen Ho re ascites fluid studies not in lab. Called clinical manager and said to call radiology. Called radiology spoke with [employer] she said she will look for ascites fluid from procedure. [employer] called back said she could not find the fluid. She said she will put a note for day shift to look for the fluid in the morning. services.     Plan to connect with Connie to discuss medication regimen (pt gave verbal consent for writer to call Connie). Will also check in with pt in with pt in 2 week. Provided pt with writer's contact information. Pt verbalized understanding and is agreeable to plan.

## 2022-11-02 NOTE — CARE COORDINATION
Attempted to meet with patient to discuss CM role, dc planning. She is sleeping soundly, did not disturb. Call placed to patient's partner Varun Duty. He requests follow up discussion around 10 AM 11/3/22 with himself, patient and other family member Dion Palomino. Will plan on meeting with patient and family at that time. Lucero Jackson.  Payam, MSN RN  [facility] Case Management  [phone]

## 2022-11-02 NOTE — PROGRESS NOTES
[facility] Progress Note    --------------------------------------------------------------------------------------  Assessment / Plan  Massive ascites and bilateral pleural effusions in the setting of lung Ca w suspected peritoneal metastasis, s/p paracentesis 11/1 but looks like fluid discarded so no analysis able to be completed. Hem/onc input appreciated - checking tumor markers, US of R breast lesion, will need cytology at some point to eval for whether or not these represent metastases. To start radiation tomorrow but would need transported downtown - ok w me, call out to rad onc department there     Renal dysfunction - baseline ~1.2-1.3, here trending up, 1.4-1.6-1.8.   Suspect volume-mediated but have ordered urine studies to eval FENa and FEUN    Lactic acidosis - resolved this AM     Elevated lipase - again, avoid IVF given the above but would at least trend lipase - up this AM to 176 but pt c/o no pain     Bowel thickening seen on CT, effect of ascites vs gastroenteritis     Hx anemia (stable at 7.2), renal dysfunction, HCV, HPL, HTN, OA, PVD, chronic gastroduodenitis, past tobacco abuse    Please see orders for further plan of care  Code status  Full Code  DVT prophylaxis Lovenox  Disposition  To be determined  --------------------------------------------------------------------------------------    Admission Date  10/31/2022 12:08 PM  Chief Complaint Bloating / leg swelling    Subjective  History of Present Illness  Seen at bedside  Multiple family members present and updated  Sister frustrated that \"none of the docs are on the same page\"  Pt herself tolerated paracentesis yesterday  Much more comfortable  She does feel that the fluid is already starting to re-accumulate  Family reports she is to start radiation treatments tomorrow  D/w charge nurse to see if we could make sure she gets to start these  Otherwise noted that Cr creeping up a bit, d/w family, no hx kidney issues  No new issues otherwise    Review of Systems - 12-point review of systems has been reviewed and is otherwise negative except as listed in the HPI    Objective  Physical Exam  Vitals: BP (!) 141/63   Pulse 77   Temp 98.5 °F (36.9 °C) (Oral)   Resp 16   Ht 5' 1\" (1.549 m)   Wt 116 lb (52.6 kg)   SpO2 98%   BMI 21.92 kg/m²   General: well-developed, well-nourished, no acute distress, cooperative  Skin: generally warm, dry, and intact, with normal color  HEENT: normocephalic, atraumatic, no gross abnormalities  Respiratory: clear to auscultation bilaterally without respiratory distress  Cardiovascular: regular rate and rhythm without murmur / rub / gallop  Abdominal: soft, nontender, nondistended, normoactive bowel sounds  Extremities: no obvious edema or deformity  Neurologic: awake, alert, no gross deficits  Psychiatric: normal affect, cooperative    Electronically signed by Edi Price DO on 11/2/2022 at 1:32 PM

## 2022-11-03 ENCOUNTER — CLINICAL DOCUMENTATION (OUTPATIENT)
Dept: RADIATION ONCOLOGY | Age: 72
End: 2022-11-03

## 2022-11-03 ENCOUNTER — HOSPITAL ENCOUNTER (OUTPATIENT)
Dept: RADIATION ONCOLOGY | Age: 72
Discharge: HOME | End: 2022-11-03
Payer: MEDICARE

## 2022-11-03 ENCOUNTER — APPOINTMENT (OUTPATIENT)
Dept: ULTRASOUND IMAGING | Age: 72
DRG: 948 | End: 2022-11-03
Payer: MEDICARE

## 2022-11-03 DIAGNOSIS — C34.12 MALIGNANT NEOPLASM OF UPPER LOBE OF LEFT LUNG (HCC): ICD-10-CM

## 2022-11-03 DIAGNOSIS — C34.11 MALIGNANT NEOPLASM OF UPPER LOBE OF RIGHT LUNG (HCC): Primary | ICD-10-CM

## 2022-11-03 LAB
ABO/RH: NORMAL
ALBUMIN SERPL-MCNC: 2.3 G/DL (ref 3.5–5.2)
ALP BLD-CCNC: 346 U/L (ref 35–104)
ALT SERPL-CCNC: 45 U/L (ref 0–32)
ANION GAP SERPL CALCULATED.3IONS-SCNC: 10 MMOL/L (ref 7–16)
ANTIBODY SCREEN: NORMAL
AST SERPL-CCNC: 112 U/L (ref 0–31)
BILIRUB SERPL-MCNC: 0.3 MG/DL (ref 0–1.2)
BLOOD BANK DISPENSE STATUS: NORMAL
BLOOD BANK PRODUCT CODE: NORMAL
BPU ID: NORMAL
BUN BLDV-MCNC: 36 MG/DL (ref 6–23)
CALCIUM SERPL-MCNC: 8.3 MG/DL (ref 8.6–10.2)
CHLORIDE BLD-SCNC: 105 MMOL/L (ref 98–107)
CO2: 21 MMOL/L (ref 22–29)
CREAT SERPL-MCNC: 2 MG/DL (ref 0.5–1)
DESCRIPTION BLOOD BANK: NORMAL
GFR SERPL CREATININE-BSD FRML MDRD: 26 ML/MIN/1.73
GLUCOSE BLD-MCNC: 88 MG/DL (ref 74–99)
HCT VFR BLD CALC: 19.3 % (ref 34–48)
HCT VFR BLD CALC: 27.8 % (ref 34–48)
HEMOGLOBIN: 6.3 G/DL (ref 11.5–15.5)
HEMOGLOBIN: 9.1 G/DL (ref 11.5–15.5)
LACTIC ACID: 0.5 MMOL/L (ref 0.5–2.2)
LIPASE: 106 U/L (ref 13–60)
MCH RBC QN AUTO: 27.9 PG (ref 26–35)
MCHC RBC AUTO-ENTMCNC: 32.6 % (ref 32–34.5)
MCV RBC AUTO: 85.4 FL (ref 80–99.9)
METER GLUCOSE: 106 MG/DL (ref 74–99)
METER GLUCOSE: 107 MG/DL (ref 74–99)
METER GLUCOSE: 125 MG/DL (ref 74–99)
PDW BLD-RTO: 14.9 FL (ref 11.5–15)
PLATELET # BLD: 262 E9/L (ref 130–450)
PMV BLD AUTO: 10.1 FL (ref 7–12)
POTASSIUM SERPL-SCNC: 4.6 MMOL/L (ref 3.5–5)
RBC # BLD: 2.26 E12/L (ref 3.5–5.5)
SODIUM BLD-SCNC: 136 MMOL/L (ref 132–146)
TOTAL PROTEIN: 5.9 G/DL (ref 6.4–8.3)
WBC # BLD: 5.6 E9/L (ref 4.5–11.5)

## 2022-11-03 PROCEDURE — 86900 BLOOD TYPING SEROLOGIC ABO: CPT

## 2022-11-03 PROCEDURE — 6370000000 HC RX 637 (ALT 250 FOR IP): Performed by: INTERNAL MEDICINE

## 2022-11-03 PROCEDURE — 83690 ASSAY OF LIPASE: CPT

## 2022-11-03 PROCEDURE — 2580000003 HC RX 258: Performed by: INTERNAL MEDICINE

## 2022-11-03 PROCEDURE — 85018 HEMOGLOBIN: CPT

## 2022-11-03 PROCEDURE — APPSS30 APP SPLIT SHARED TIME 16-30 MINUTES: Performed by: NURSE PRACTITIONER

## 2022-11-03 PROCEDURE — 36430 TRANSFUSION BLD/BLD COMPNT: CPT

## 2022-11-03 PROCEDURE — P9016 RBC LEUKOCYTES REDUCED: HCPCS

## 2022-11-03 PROCEDURE — 85027 COMPLETE CBC AUTOMATED: CPT

## 2022-11-03 PROCEDURE — 99233 SBSQ HOSP IP/OBS HIGH 50: CPT | Performed by: INTERNAL MEDICINE

## 2022-11-03 PROCEDURE — 99222 1ST HOSP IP/OBS MODERATE 55: CPT | Performed by: TRANSPLANT SURGERY

## 2022-11-03 PROCEDURE — 82962 GLUCOSE BLOOD TEST: CPT

## 2022-11-03 PROCEDURE — 99222 1ST HOSP IP/OBS MODERATE 55: CPT | Performed by: RADIOLOGY

## 2022-11-03 PROCEDURE — 83605 ASSAY OF LACTIC ACID: CPT

## 2022-11-03 PROCEDURE — 6360000002 HC RX W HCPCS: Performed by: INTERNAL MEDICINE

## 2022-11-03 PROCEDURE — 86923 COMPATIBILITY TEST ELECTRIC: CPT

## 2022-11-03 PROCEDURE — 80053 COMPREHEN METABOLIC PANEL: CPT

## 2022-11-03 PROCEDURE — 36415 COLL VENOUS BLD VENIPUNCTURE: CPT

## 2022-11-03 PROCEDURE — 86850 RBC ANTIBODY SCREEN: CPT

## 2022-11-03 PROCEDURE — 86901 BLOOD TYPING SEROLOGIC RH(D): CPT

## 2022-11-03 PROCEDURE — 76856 US EXAM PELVIC COMPLETE: CPT

## 2022-11-03 PROCEDURE — 76830 TRANSVAGINAL US NON-OB: CPT

## 2022-11-03 PROCEDURE — 85014 HEMATOCRIT: CPT

## 2022-11-03 PROCEDURE — 1200000000 HC SEMI PRIVATE

## 2022-11-03 RX ORDER — SODIUM CHLORIDE 9 MG/ML
INJECTION, SOLUTION INTRAVENOUS PRN
Status: DISCONTINUED | OUTPATIENT
Start: 2022-11-03 | End: 2022-11-09 | Stop reason: HOSPADM

## 2022-11-03 RX ADMIN — TRAZODONE HYDROCHLORIDE 50 MG: 50 TABLET ORAL at 20:35

## 2022-11-03 RX ADMIN — AMLODIPINE BESYLATE 5 MG: 5 TABLET ORAL at 10:15

## 2022-11-03 RX ADMIN — Medication 10 ML: at 20:35

## 2022-11-03 RX ADMIN — Medication 10 ML: at 09:30

## 2022-11-03 RX ADMIN — MORPHINE SULFATE 4 MG: 4 INJECTION, SOLUTION INTRAMUSCULAR; INTRAVENOUS at 17:44

## 2022-11-03 RX ADMIN — SODIUM CHLORIDE 100 MG: 9 INJECTION, SOLUTION INTRAVENOUS at 12:08

## 2022-11-03 RX ADMIN — SODIUM CHLORIDE 25 MG: 9 INJECTION, SOLUTION INTRAVENOUS at 10:21

## 2022-11-03 ASSESSMENT — ENCOUNTER SYMPTOMS
SORE THROAT: 0
COLOR CHANGE: 0
NAUSEA: 0
SHORTNESS OF BREATH: 0
VOMITING: 0
ABDOMINAL PAIN: 1
COUGH: 0
ABDOMINAL DISTENTION: 1

## 2022-11-03 ASSESSMENT — PAIN DESCRIPTION - DESCRIPTORS: DESCRIPTORS: ACHING

## 2022-11-03 ASSESSMENT — PAIN DESCRIPTION - ORIENTATION: ORIENTATION: LOWER

## 2022-11-03 ASSESSMENT — PAIN DESCRIPTION - LOCATION: LOCATION: ABDOMEN

## 2022-11-03 ASSESSMENT — PAIN SCALES - GENERAL: PAINLEVEL_OUTOF10: 8

## 2022-11-03 NOTE — PROGRESS NOTES
[facility] Progress Note    Admitting Date and Time: 10/31/2022 12:08 PM  Admit Dx: Abdominal pain [R10.9]    Subjective:  Patient is being followed for Abdominal pain [R10.9]       Patient sitting up in chair in room in no acute distress  Reporting pressure in abdomen and feels that fluid may be coming back   Reporting that she keeps getting called regarding outpatient radiation supposed to start this afternoon at [place]. Per nursing attempting to get in touch with radiation oncology about radiation while hospitalized  Sister at bedside       ROS: denies fever, chills, cp, sob, n/v, HA unless stated above.       ferric gluconate (FERRLECIT) test dose IVPB  25 mg IntraVENous Once    Followed by    ferric gluconate (FERRLECIT) IVPB  100 mg IntraVENous Once    enoxaparin  30 mg SubCUTAneous Daily    lisinopril  40 mg Oral Daily    [Held by provider] hydroCHLOROthiazide  25 mg Oral Daily    amLODIPine  5 mg Oral Daily    sodium chloride flush  5-40 mL IntraVENous 2 times per day    traZODone  50 mg Oral Nightly     sodium chloride, , PRN  glucose, 4 tablet, PRN  dextrose bolus, 125 mL, PRN   Or  dextrose bolus, 250 mL, PRN  glucagon (rDNA), 1 mg, PRN  dextrose, , Continuous PRN  sodium chloride flush, 5-40 mL, PRN  sodium chloride, , PRN  ondansetron, 4 mg, Q8H PRN   Or  ondansetron, 4 mg, Q6H PRN  polyethylene glycol, 17 g, Daily PRN  acetaminophen, 650 mg, Q6H PRN   Or  acetaminophen, 650 mg, Q6H PRN  morphine, 2 mg, Q3H PRN   Or  morphine, 4 mg, Q3H PRN         Objective:    BP (!) 156/71   Pulse 79   Temp 98.8 °F (37.1 °C) (Oral)   Resp 18   Ht 5' 1\" (1.549 m)   Wt 116 lb (52.6 kg)   SpO2 94%   BMI 21.92 kg/m²     General Appearance: alert and oriented to person, place and time and in no acute distress  Skin: warm and dry  Head: normocephalic and atraumatic  Neck: neck supple and non tender without mass   Pulmonary/Chest: clear to auscultation bilaterally-  Cardiovascular: normal rate, normal S1 and S2 and no carotid bruits  Abdomen: soft, non-tender, distended, normal bowel sounds  Extremities: no cyanosis, no clubbing and no edema  Neurologic: speech normal         Recent Labs     11/01/22  0120 11/02/22  0532 11/03/22  0206    138 136   K 4.2 4.3 4.6    107 105   CO2 21* 19* 21*   BUN 24* 29* 36*   CREATININE 1.6* 1.8* 2.0*   GLUCOSE 95 66* 88   CALCIUM 8.5* 8.6 8.3*       Recent Labs     11/01/22  1145 11/02/22  0532 11/03/22  0206   WBC 8.7 6.7 5.6   RBC 2.54* 2.58* 2.26*   HGB 7.2* 7.2* 6.3*   HCT 21.8* 22.7* 19.3*   MCV 85.8 88.0 85.4   MCH 28.3 27.9 27.9   MCHC 33.0 31.7* 32.6   RDW 15.1* 14.9 14.9    294 262   MPV 10.9 10.6 10.1           Assessment:    Principal Problem:    Abdominal pain  Active Problems:    Other ascites  Resolved Problems:    * No resolved hospital problems. *      Plan:  1. Massive ascites and bilateral pleural effusions in the setting of lung cancer with suspected peritoneal metastasis: pt presented to the ER with 2 weeks of increasing abdominal swelling and distention with associated pain. S/p paracentesis on 11/1 with 2,450 ml off- but appears fluid was discarded and no analysis was completed. Hem/onc input appreciated. Checking tumor markers. US of right breast lesion. Will need cytology at some point to eval whether or not represents metastasis vs ? Secondary primary malignancy. Plan to start radiation ? Today. Attempting to set up with radiation oncology. Discussed with nursing. 2. ANASTASIA:  creatinine 1.4 on admission. Noted to be trending up. Creatinine 2.0. May be volume mediated. Pt did have IV contrast. Urine studies ordered. Avoid nephrotoxic meds. HCTZ on hold. On an ace - hold today. 3. Lactic acidosis: resolved    4. Elevated lipase: avoid IVF given the above. Trend lipase. 106- trending down. Pt denies pain    5. Bowel thickening seen on CT- effect of ascites vs gastroenterotitis    6. Normocytic anemia: hg 6.3 today. US has been ordered.  is elevated. Patient has acute on chronic anemia with iron deficiency present. Continue ferrlecit. Patient did receive 1 unit PRBC. Peripheral blood smear was essentially unremarkable. NOTE: This report was transcribed using voice recognition software. Every effort was made to ensure accuracy; however, inadvertent computerized transcription errors may be present.   Electronically signed by Shaheed Rose DO on 11/3/2022 at 10:12 AM

## 2022-11-03 NOTE — PATIENT CARE CONFERENCE
P Quality Flow/Interdisciplinary Rounds Progress Note        Quality Flow Rounds held on November 3, 2022    Disciplines Attending:  Bedside Nurse, , , and Nursing Unit Leadership    Orin Roman was admitted on 10/31/2022 12:08 PM    Anticipated Discharge Date:       Disposition:    Ishan Score:  Ishan Scale Score: 13    Readmission Risk              Risk of Unplanned Readmission:  21           Discussed patient goal for the day, patient clinical progression, and barriers to discharge.   The following Goal(s) of the Day/Commitment(s) have been identified:  Labs - Report Results      Fabian Hurd RN  November 3, 2022

## 2022-11-03 NOTE — PROGRESS NOTES
Left message with radiation oncology. This is third time radiation oncology has been called. Asked for someone from the office to please call this unit asap.     Electronically signed by Afshin Amador RN on 11/3/2022 at 11:51 AM

## 2022-11-03 NOTE — PROGRESS NOTES
Spoke with [employer], APRN regarding patient not having any urine output from [id number] and patient not being able to void and she stated that she had not urinated since yesterday and that bladder scan showed apprx 550 but patient has ascites. See order for straight cath once. Patient had 250ml ivan clear urine output with straight catheter, updated VERITO Patrick regarding results.

## 2022-11-03 NOTE — CONSULTS
Hepatobiliary and Pancreatic Surgery    History and Physical      Patient's Name/Date of Birth: Carmela  /1950 (48 y.o.)    Date: November 3, 2022     CC:abdominal distention, ascites    HPI:  80yo female with hx of right lung adenocarcinoma who presents with abdominal distention and new ascites. She underwent paracentesis yesterday, reportedly 2L removed and she states she feels much better. She had been having increased abdominal distension and pain for the weeks leading up to her hospitalization. She denies any nausea, vomiting or fevers/chills. She has not received radiation therapy yet. She is not currently on any oral anticoagulation.      Past Medical History:   Diagnosis Date    Abnormal chest x-ray with multiple lung nodules 9/8/2015    Abnormal Pap smear 1/3/2011    Alpha-1 negative    Adrenal adenoma     7 mm    Bilateral lung cancer ([address] 5/12/2016    surgically removed - 2015     Cholelithiasis 6/6/2011    Encounter for screening colonoscopy     for OR 3-28-19     Fibroids     Hemangioma of spleen     Hepatitis C 01/03/2011    treated and cured, no current issues     Hyperlipidemia     no medications     Hypertension 6/6/2011    Insomnia     Lung nodule     ROMELIA     Lymphadenopathy     Cervical (-) ENT    Malignant neoplasm of upper lobe of right lung ([address] 11/18/2015    Osteoarthritis     Panlobular emphysema ([address] 11/12/2015    controlled with inhalers     PPD negative 1/18/12    Pulmonary embolism without acute cor pulmonale ([address] 5/12/2016    PVD (peripheral vascular disease) ([address] 5/6/2014    Scoliosis     Uterine fibroid 5/6/2014       Past Surgical History:   Procedure Laterality Date    [facility] [place] Left     AGE 30'S LEFT NIPPLE REMOVED    BREAST LUMPECTOMY  4/28/1999    left, benign, Jesu Carlos  2/1/2012    Suzette Diamond, Jesu Ponce  10/15/15    with EBUS - DR Fuad Mcelroy    CHOLECYSTECTOMY      COLONOSCOPY  12/21/2009    partial to distal ascending colon normal (completion BE same day normal), Dr. Mckay Young, [facility]    COLONOSCOPY  8/22/2014    done under fluoro with sigmoid straightening device so was able to get to cecum, very poor prep, moderate proctitis, repeat 5 years,  Dr. Mckay Young, [facility]    COLONOSCOPY N/A 3/28/2019    COLONOSCOPY POLYPECTOMY SNARE/COLD BIOPSY performed by Katya Suarez DO at [address] N/A 5/13/2022    COLONOSCOPY POLYPECTOMY SNARE/COLD BIOPSY performed by Ethan Guzmán MD at [facility]  9/20/2022    CT NEEDLE BIOPSY LUNG PERCUTANEOUS 9/20/2022 L Yaquelin Martines MD [facility]    ENDOMETRIAL BIOPSY      LIVER RESECTION Right 7/6/2022    LAPAROSCOPIC ROBOTIC PERITONEAL MASS RESECTION performed by Jimmy Pulliam MD at [place] [address] Left 3/29/2016    VATS WEDGE RESECTION UPPER LOBECTOMY    OTHER SURGICAL HISTORY Right 11/04/2016    REMOVAL MEDI-PORT - DR Garcia Singletary    IL LAP,CHOLECYSTECTOMY/GRAPH N/A 6/29/2018    CHOLECYSTECTOMY LAPAROSCOPIC, POSSIBLE OPEN,POSSIBLE GRAM ( OC 2) performed by Dotty Mcduffie MD at [place] [id number] Right 11/11/2015    VATS, BRONCH,RT UPPER LOBECTOMY; NODE DISECTION    TUNNELED VENOUS PORT PLACEMENT Right 12/07/2015    right chest, and removed     UPPER GASTROINTESTINAL ENDOSCOPY N/A 5/13/2022    EGD POLYP HOT FORCEP/CAUTERY performed by Ethan Guzmán MD at [facility]       Current Facility-Administered Medications   Medication Dose Route Frequency Provider Last Rate Last Admin    0.9 % sodium chloride infusion   IntraVENous PRN Priya Valle, APRN - CNP        glucose chewable tablet 16 g  4 tablet Oral PRN Juanito R Lockso, DO        dextrose bolus 10% 125 mL  125 mL IntraVENous PRN Juanito R Lockso, DO        Or    dextrose bolus 10% 250 mL  250 mL IntraVENous PRN Juanito R Lockso, DO        glucagon (rDNA) injection 1 mg  1 mg SubCUTAneous PRN Juanito R Lockso, DO        dextrose 10 % infusion   IntraVENous Continuous PRN Pleas [place], DO        [Held by provider] enoxaparin Sodium (LOVENOX) injection 30 mg  30 mg SubCUTAneous Daily Naima Mark MD   30 mg at 11/02/22 0825    [Held by provider] lisinopril (PRINIVIL;ZESTRIL) tablet 40 mg  40 mg Oral Daily Juanito R Lockso, DO   40 mg at 11/02/22 0825    [Held by provider] hydroCHLOROthiazide (HYDRODIURIL) tablet 25 mg  25 mg Oral Daily Juanito R Lockso, DO   25 mg at 11/01/22 0857    amLODIPine (NORVASC) tablet 5 mg  5 mg Oral Daily Juanito R Lockso, DO   5 mg at 11/03/22 1015    sodium chloride flush 0.9 % injection 5-40 mL  5-40 mL IntraVENous 2 times per day Terressa [place] Lockso, DO   10 mL at 11/03/22 0930    sodium chloride flush 0.9 % injection 5-40 mL  5-40 mL IntraVENous PRN Juanito R Lockso, DO   20 mL at 11/01/22 1146    0.9 % sodium chloride infusion   IntraVENous PRN Juanito R Lockso, DO        ondansetron (ZOFRAN-ODT) disintegrating tablet 4 mg  4 mg Oral Q8H PRN Juanito R Lockso, DO        Or    ondansetron (ZOFRAN) injection 4 mg  4 mg IntraVENous Q6H PRN Juanito R Lockso, DO        polyethylene glycol (GLYCOLAX) packet 17 g  17 g Oral Daily PRN Juanito R Lockso, DO        acetaminophen (TYLENOL) tablet 650 mg  650 mg Oral Q6H PRN Terressa [place] Lockso, DO   650 mg at 10/31/22 1931    Or    acetaminophen (TYLENOL) suppository 650 mg  650 mg Rectal Q6H PRN Terressa [place] Lockso, DO        morphine (PF) injection 2 mg  2 mg IntraVENous Q3H PRN Terressa [place] Lockso, DO   2 mg at 11/02/22 0206    Or    morphine sulfate (PF) injection 4 mg  4 mg IntraVENous Q3H PRN Terressa [place] Lockso, DO   4 mg at 11/02/22 1350    traZODone (DESYREL) tablet 50 mg  50 mg Oral Nightly Juanito R Lockso, DO   50 mg at 11/02/22 2023     Facility-Administered Medications Ordered in Other Encounters   Medication Dose Route Frequency Provider Last Rate Last Admin    pantoprazole (PROTONIX) tablet 40 mg  40 mg Oral QAM AC Johana Bentley MD           Allergies Allergen Reactions    Ibuprofen Palpitations and Rash       Family History   Problem Relation Age of Onset    Heart Disease Mother     High Blood Pressure Mother     Cancer Mother         ovarian    Cancer Father     Heart Disease Father     High Blood Pressure Father     Kidney Disease Father     Diabetes Sister     Breast Cancer Sister     Diabetes Brother        Social History     Socioeconomic History    Marital status: Single     Spouse name: Not on file    Number of children: 0    Years of education: Not on file    Highest education level: Not on file   Occupational History    Not on file   Tobacco Use    Smoking status: Former     Packs/day: 0.00     Years: 30.00     Pack years: 0.00     Types: Cigarettes     Quit date: 2015     Years since quittin.4    Smokeless tobacco: Never   Vaping Use    Vaping Use: Never used   Substance and Sexual Activity    Alcohol use: Not Currently     Alcohol/week: 2.0 standard drinks     Types: 2 Glasses of wine per week     Comment: x2 week    Drug use: No    Sexual activity: Not Currently   Other Topics Concern    Not on file   Social History Narrative    Not on file     Social Determinants of Health     Financial Resource Strain: Not on file   Food Insecurity: Not on file   Transportation Needs: Not on file   Physical Activity: Not on file   Stress: Not on file   Social Connections: Not on file   Intimate Partner Violence: Not on file   Housing Stability: Not on file       ROS:   Review of Systems   Constitutional:  Negative for chills, fatigue and fever. HENT:  Negative for sore throat. Respiratory:  Negative for cough and shortness of breath. Cardiovascular:  Negative for chest pain. Gastrointestinal:  Positive for abdominal distention and abdominal pain. Negative for nausea and vomiting. Skin:  Negative for color change.      Physical Exam:  /61   Pulse 92   Temp 98.9 °F (37.2 °C) (Oral)   Resp 20   Ht 5' 1\" (1.549 m)   Wt 116 lb (52.6 kg) SpO2 100%   BMI 21.92 kg/m²       PSYCH: mood and affect normal, alert and oriented x 3: No apparent distress, comfortable  EYES: Sclera white, pupils equal round and reactive to light  ENMT:  Hearing normal, trachea midline, ears externally intact  LYMPH: no lympadenopathy in neck. Nolympadenopathy in groins  RESP: Breath sounds were clear and equal with no rales, wheezes, or rhonchi. Respiratory effort was normal with no retractions or use of accessory muscles. CV: Heart soundswere normal with a regular rate and rhythm. No pedal edema  GI/ Abdomen: The abdomen was soft and non distended. There was no tenderness, guarding, rebound, or rigidity. There was no, hepatosplenomegaly, or hernias. No inguinal hernias were noted on coughing and straining. Rectal -Sphincter tone was normal.  There were no rectal masses. 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 Suspecty ascites is related to underlying neoplastic process rather than liver disease. Follow up cytology studies. Hx of Hep C treated, prior liver biopsy showing focal periportal fibrosis (stage 2 - which should not be causing her ascites)  -  Hematology/Oncology following, appreciate recs - Breast US ordered, anemia studies pending  - She is to have radiation for lung mass, known to Dr. Allan Morning.      Electronically signed by Denae Cox MD on 11/3/2022 at 5:04 PM

## 2022-11-03 NOTE — CONSULTS
Radiation Oncology Consult Note         11/3/2022    Phoebe Carrera  71 y.o.   1950    REFERRING PROVIDER: Zaid Diaz    PCP:  Kylee Braxton DO    CHIEF COMPLAINT: My lung cancer came back, but I also have a lot of fluid in my abdomen    DIAGNOSIS: 1) pathologic stage IIa (pT1a, pN1, M0)non-small cell lung cancer of the right upper lobe SP surgical resection and systemic chemotherapy in 2015  2) pathological stage Ib (pT2, pN0, M0) non-small cell lung cancer of the left upper lobe, SP surgical resection back in 2016  3) non-small cell lung cancer of the right lower lobe diagnosis October 2022    STAGING: Cancer Staging  Malignant neoplasm of upper lobe of left lung ([facility] [address]  Staging form: Lung, AJCC 7th Edition  - Clinical stage from 4/6/2016: Stage IA (T1b, N0, M0) - Signed by Rubens Grande MD on 4/6/2016  - Pathologic stage from 4/6/2016: Stage IB (T2a, N0, cM0) - Signed by Rubens Grande MD on 4/6/2016    Malignant neoplasm of upper lobe of right lung [facility])  Staging form: Lung, AJCC 7th Edition  - Clinical stage from 11/18/2015: Stage IA (T1a, N0, M0) - Signed by Rubens Grande MD on 11/18/2015  - Pathologic stage from 11/18/2015: Stage IIA (T1a, N1, cM0) - Signed by Rubens Grande MD on 11/18/2015      HISTORY OF PRESENT ILLNESS: Ms. Phoebe Carrera  is a 71y.o. year old female with a social history positive for being a smoker according to her 1 pack of cigarettes per day for more than 20 years. The social history is also positive for drinking 1 L of white Zuinfandel most every day for more than 20 years according to the patient. She also has past medical history positive for hepatitis C diagnosis about 7 years ago in 2015. At that time the patient had a diagnosis of non-small cell lung cancer of the right upper lobe for which she underwent wedge resection and mediastinal lymph node dissection back in November 2015 under the care of Dr. Lilian Jordan. It was stage pT1A pN1, M0.   She was then treated adjuvantly with 4 cycles of carboplatin and Alimta. On 3/29/2026 about 1 month upon completion of her chemotherapy she underwent wedge resection of a left upper lobe mass with mediastinal lymph node dissection again by Dr. Debi Liao. It did showed a well-differentiated adenocarcinoma grade 1, Its stage iwas pT2a, pN0, M0. On follow-up imaging in September 2022 and FDG lesion appeared on the PET/CT with the an SUV of 3.2, at the level of the right lower lobe. Core biopsy of the lesion showed adenocarcinoma. That point the patient was evaluated by my partner Dr. Willa Jain in October 2022 who gave the indication for possible SBRT of this new nodule. Unfortunately the patient about 2 weeks ago started to develop ascites, fatigue, lack of appetite, use to try and was admitted to [facility] where Tuesday of last week she underwent a drainage of the about 2.5 l of abdominal fluid. She presented also with  elevated CEA at 8.5 and elevated  at 1063, he also presented with elevated chromogranin a at 1480. She underwent ultrasound of the pelvis today that unfortunately did not show any meaningful finding at the level of the pelvis due to the presence of abdominal fluid. Suggestion was given about MRI of the abdomen. I was called in consultation at due to the previous indication to radiation therapy. When I went to the floor the patient was just back from radiology where she had undergone ultrasound of the pelvis. She was resting comfortably in bed. She was complaining of lack of appetite, tiredness, she refers of being able to walk with a walker. She seemed a bit forgetful and confused and was used to repeat her answers several times.     PAST MEDICAL HISTORY:      Diagnosis Date    Abnormal chest x-ray with multiple lung nodules 9/8/2015    Abnormal Pap smear 1/3/2011    Alpha-1 negative    Adrenal adenoma     7 mm    Bilateral lung cancer ([facility] [address] 5/12/2016    surgically removed - 2015     Cholelithiasis 6/6/2011    Encounter for screening colonoscopy     for OR 3-28-19     Fibroids     Hemangioma of spleen     Hepatitis C 01/03/2011    treated and cured, no current issues     Hyperlipidemia     no medications     Hypertension 6/6/2011    Insomnia     Lung nodule     ROMELIA     Lymphadenopathy     Cervical (-) ENT    Malignant neoplasm of upper lobe of right lung ([address] 11/18/2015    Osteoarthritis     Panlobular emphysema ([address] 11/12/2015    controlled with inhalers     PPD negative 1/18/12    Pulmonary embolism without acute cor pulmonale ([address] 5/12/2016    PVD (peripheral vascular disease) ([facility] [address] 5/6/2014    Scoliosis     Uterine fibroid 5/6/2014       PAST SURGICAL HISTORY:      Procedure Laterality Date    APPENDECTOMY  1965    BREAST BIOPSY Left     AGE 31'S LEFT NIPPLE REMOVED    BREAST LUMPECTOMY  4/28/1999    left, benign, [facility]  2/1/2012    Rubi Meade, Dr. Yun Alonso, [facility]  10/15/15    with EBUS - DR Adelina Gonzalez    CHOLECYSTECTOMY      COLONOSCOPY  12/21/2009    partial to distal ascending colon normal (completion BE same day normal), Dr. Frank Dunham, [facility]    COLONOSCOPY  8/22/2014    done under fluoro with sigmoid straightening device so was able to get to cecum, very poor prep, moderate proctitis, repeat 5 years,  Dr. Frank Dunham, [facility]    COLONOSCOPY N/A 3/28/2019    COLONOSCOPY POLYPECTOMY SNARE/COLD BIOPSY performed by Jose Chowdary DO at [address] [place] [address] N/A 5/13/2022    COLONOSCOPY POLYPECTOMY SNARE/COLD BIOPSY performed by Yaquelin Nagel MD at [address] [facility]  9/20/2022    CT NEEDLE BIOPSY LUNG PERCUTANEOUS 9/20/2022 L Ngoc Adams MD [facility]    ENDOMETRIAL BIOPSY      LIVER RESECTION Right 7/6/2022    LAPAROSCOPIC ROBOTIC PERITONEAL MASS RESECTION performed by Pierce Toledo MD at [place] [address] Left 3/29/2016    VATS WEDGE RESECTION UPPER LOBECTOMY    OTHER SURGICAL HISTORY Right 11/04/2016    REMOVAL MEDI-PORT - DR YURIDIA BEY LAP,CHOLECYSTECTOMY/GRAPH N/A 6/29/2018    CHOLECYSTECTOMY LAPAROSCOPIC, POSSIBLE OPEN,POSSIBLE GRAM ( OC 2) performed by Sudha Santos MD at [facility] Right 11/11/2015    VATS, BRONCH,RT UPPER LOBECTOMY; NODE DISECTION    TUNNELED VENOUS PORT PLACEMENT Right 12/07/2015    right chest, and removed     UPPER GASTROINTESTINAL ENDOSCOPY N/A 5/13/2022    EGD POLYP HOT FORCEP/CAUTERY performed by Magda Rodriguez MD at [facility] ENDOSCOPY       Allergies   Allergen Reactions    Ibuprofen Palpitations and Rash       MEDICATIONS:  Medications reviewed and reconciled. No current facility-administered medications for this encounter. No current outpatient medications on file.      Facility-Administered Medications Ordered in Other Encounters   Medication Dose Route Frequency Provider Last Rate Last Admin    0.9 % sodium chloride infusion   IntraVENous PRN Priya Valle APRN - CNP        glucose chewable tablet 16 g  4 tablet Oral PRN Juanito R Lockso, DO        dextrose bolus 10% 125 mL  125 mL IntraVENous PRN Juanito R Lockso, DO        Or    dextrose bolus 10% 250 mL  250 mL IntraVENous PRN Juanito R Lockso, DO        glucagon (rDNA) injection 1 mg  1 mg SubCUTAneous PRN Juanito R Lockso, DO        dextrose 10 % infusion   IntraVENous Continuous PRN Juanito R Lockso, DO        [Held by provider] enoxaparin Sodium (LOVENOX) injection 30 mg  30 mg SubCUTAneous Daily Judy Tyler MD   30 mg at 11/02/22 0825    [Held by provider] lisinopril (PRINIVIL;ZESTRIL) tablet 40 mg  40 mg Oral Daily Juanito R Lockso, DO   40 mg at 11/02/22 0825    [Held by provider] hydroCHLOROthiazide (HYDRODIURIL) tablet 25 mg  25 mg Oral Daily Juanito R Lockso, DO   25 mg at 11/01/22 0857    amLODIPine (NORVASC) tablet 5 mg  5 mg Oral Daily Juanito R Lockso, DO   5 mg at 11/03/22 1015    sodium chloride flush 0.9 % injection 5-40 mL  5-40 mL IntraVENous 2 times per day Helen Spicer Lockso, DO   10 mL at 11/03/22 0930 sodium chloride flush 0.9 % injection 5-40 mL  5-40 mL IntraVENous PRN Juanito R Lockso, DO   20 mL at 11/01/22 1146    0.9 % sodium chloride infusion   IntraVENous PRN Juanito R Lockso, DO        ondansetron (ZOFRAN-ODT) disintegrating tablet 4 mg  4 mg Oral Q8H PRN Juanito R Lockso, DO        Or    ondansetron (ZOFRAN) injection 4 mg  4 mg IntraVENous Q6H PRN Juanito R Lockso, DO        polyethylene glycol (GLYCOLAX) packet 17 g  17 g Oral Daily PRN Juanito R Lockso, DO        acetaminophen (TYLENOL) tablet 650 mg  650 mg Oral Q6H PRN Rambo Balo Lockso, DO   650 mg at 10/31/22 1931    Or    acetaminophen (TYLENOL) suppository 650 mg  650 mg Rectal Q6H PRN Rambo Balo Lockso, DO        morphine (PF) injection 2 mg  2 mg IntraVENous Q3H PRN Rambo Balo Lockso, DO   2 mg at 11/02/22 0206    Or    morphine sulfate (PF) injection 4 mg  4 mg IntraVENous Q3H PRN Rambo Balo Lockso, DO   4 mg at 11/02/22 1350    traZODone (DESYREL) tablet 50 mg  50 mg Oral Nightly Juanito R Lockso, DO   50 mg at 11/02/22 2023    pantoprazole (PROTONIX) tablet 40 mg  40 mg Oral QAM AC Karo Lauren MD           FAMILY HISTORY:  Family History   Problem Relation Age of Onset    Heart Disease Mother     High Blood Pressure Mother     Cancer Mother         ovarian    Cancer Father     Heart Disease Father     High Blood Pressure Father     Kidney Disease Father     Diabetes Sister     Breast Cancer Sister     Diabetes Brother        SOCIAL HISTORY:       reports that she quit smoking about 7 years ago. Her smoking use included cigarettes. She has never used smokeless tobacco..   reports that she does not currently use alcohol after a past usage of about 2.0 standard drinks per week. .   reports no history of drug use. REVIEW OF SYSTEMS:  Obtained from the patient, chart review and nursing assessment. General ROS: positive for  - fatigue  This refer to the HPI, the review of systems is otherwise negative.     PHYSICAL EXAMINATION: There were no vitals filed for this visit. Wt Readings from Last 3 Encounters:   11/03/22 116 lb (52.6 kg)   10/11/22 117 lb (53.1 kg)   10/10/22 117 lb (53.1 kg)       KARNOSKY PERFORMANCE STATUS:  60    Constitutional: A well developed,mal-nourished 71 y.o. female who is alert, oriented, cooperative and in no apparent distress. EENT: EOMI DEEPTI. No icterus. No conjunctival injections. External canals are patent and no discharge was appreciated. .    Neck: Supple without thyromegaly  Respiratory: Chest was symmetrical without dullness to percussion. Breath sounds bilaterally were clear to auscultation. No wheezes, rhonchi or rales. Breasts: Deferred    Cardiovascular: Regular without murmur. Abdomen: tense, rounded and without apparent organomegaly, with abundant ascites    Lymphatic: No lymphadenopathy. Musculoskeletal: Ambulates with assistance. Normal curvature of the spine. Generic gross muscle weakness. Muscle size, tone and strength are sub-normal. No involuntary movements. Extremities:  No lower extremity edema, ulcerations, tenderness, varicosities or erythema. Coordination appears adequate. Sensory function appears intact. Skin:  Warm and dry. Examination of color, turgor and pigmentation was relatively normal. No bruises or skin rashes. Neurological/Psychiatric: General behavior, level of consciousness, thought content is normal, speech is a bit garbled. The patient's emotional status is normal.  Cranial nerves II-XII are grossly intact.         RADIATION SAFETY AND ONCOLOGIC TREATMENT SUPPORT:    - Previous radiation history:  No  - History of autoimmune or connective tissue disease:  No  - Nutritional support/ PEG:  Not applicable  - Dental evaluation:  Not applicable  -  requested:  Not asked for.  - Oncology Nurse navigator requested:  - Transportation for daily treatment:  Self    TUMOR MARKERS:   Lab Results   Component Value Date/Time    CEA 8.5 11/01/2022 11:45 AM     1063.0 11/01/2022 11:45 AM     <2 04/20/2022 10:23 AM       IMAGING REVIEWED:  US NON OB TRANSVAGINAL    Result Date: 11/3/2022  1. Virtually nondiagnostic evaluation of the pelvis. There is a large calcified uterine mass which severely limits evaluation of the uterus and endometrium. 2.  The ovaries were not definitively identified. 3.  large volume simple appearing intra-ascites. RECOMMENDATIONS: Suggest screening pelvic MRI with and without gadolinium for further evaluation. US PELVIS COMPLETE    Result Date: 11/3/2022  1. Virtually nondiagnostic evaluation of the pelvis. There is a large calcified uterine mass which severely limits evaluation of the uterus and endometrium. 2.  The ovaries were not definitively identified. 3.  large volume simple appearing intra-ascites. RECOMMENDATIONS: Suggest screening pelvic MRI with and without gadolinium for further evaluation. CT ABDOMEN PELVIS W IV CONTRAST Additional Contrast? None    Result Date: 10/31/2022  1. Mild interval increase in a soft tissue nodule in the medial right lower lobe of the lungs, when compared to the previous study performed 06/23/2022. It is now necrotic. 2.  Small, partially seen right pleural effusion. Trace left pleural effusion. 3.  Suggestion of small, slightly hyperdense nodule in the right subareolar region. Clinical correlation is recommended. Correlation should be made with mammography and breast ultrasound. 4.  No significant interval change in the ill-defined hypodensity within the spleen. 5.  Right renal cortical cysts again noted. 6.  Left nephrolithiasis again noted. 7.  Mild, nonspecific periportal edema. 8.  Mildly distended loops of fluid-filled small bowel in the mid to upper abdomen, which also exhibit mild thickening of the walls. The thickening could be secondary to the ascites present. A gastroenteritis cannot be excluded. Clinical correlation is recommended.  9.  Leiomyomatous uterus again noted. 10.  Large amount of abdominal and pelvic ascites. 11.  Previously noted cystic nodules in the abdomen/small-bowel pes enteric not definitely appreciated on this study due to the amount of ascites present. 12.  Small left lower para-aortic retroperitoneal lymph nodes again present. 13.  No significant interval change in a nodules between the left adrenal gland and the aorta. These could represent celiac ganglia. Unchanged lymph nodes cannot be excluded. US GUIDED PARACENTESIS    Result Date: 11/1/2022  Successful paracentesis. PATHOLOGY REVIEWED:      IMPRESSION:   The patient with a past medical history positive for right upper lobe non-small cell lung cancer SP wedge resection and systemic chemotherapy and for left upper lobe non-small cell lung cancer SP wedge resection and with a diagnosis of a new primary adenocarcinoma of the right lower lobe, presents at this time with massive ascites for which she underwent drainage 1 week ago with evacuation of about 2.5 L of fluid. She now presents with new ascites and she is being worked up to evaluate if the ascites is related to the lung cancer or if it is dependent on non-oncologic causes. DISCUSSION/PLAN:     I have discussed with the patient that she is probably going to need  more tests to come up with a  diagnosis regarding her present status. I believe that the differential diagnosis in terms of non oncologic causes of the ascites is between cirrothic development subsequent to C-hepatitis and alcohol abuse, versus liver decompensation due to right heart disease. I believe that probably the first diagnosis is the most probable given her past medical history of hepatitis C with a count of 450,000 at this time, and her social history positive for important alcoholic intake. Also, on physical examination, she appears to be compensated from a cardiovascular and pulmonary standpoint.   From an oncologic standpoint of view and from radiology point of view I believe that the patient needs a new evaluation of the right lower lobe nodule, but I also believe that at this time the abdominal situation takes priority over the lung situation. I think that a liver biopsy might be taken into consideration to better define the stage of the liver disease, I agree with an MRI of the pelvis given the fact that the ultrasound was not significant because of the presence of ascites. Will be following along with her her other physicians. I have spent 1 hour and a half reviewing all the data, personally reviewing the images, talking with Dr. Gatito Malone about her case, and examining the patient. NCCN guidelines, version 2020 is used to help review treatment recommendations. Procedures and process involved with CT simulation and daily radiation treatments were explained. Toxicities, both expected and less common, were reviewed in detail. Questions were answered to their apparent satisfaction. Thank you for the opportunity to participate in multidisciplinary management of this remarkable and pleasant patient.       Edith Haro MD    Department of Radiation Oncology  [facility]) [facility]: [phone] ([facility]: [phone])  [facility]: [phone] ([facility]: [phone])  [facility]) [facility]:  [phone] ([facility]:  [phone])

## 2022-11-03 NOTE — PROGRESS NOTES
Hematology/oncology inpatient follow-up    Patient Name: Yi Kuhn  YOB: 1950    DATE OF ADMISSION: 10/31/2022  DATE OF CONSULTATION: 11/1/2022  REASON FOR CONSULTATION: \"lung Ca / massive ascites / established pt\"  CONSULTING PROVIDER: Karolina Zelaya DO  PCP: Chuy Vasquez    Room: [id number]      CHIEF COMPLAINT:  Abdominal distention and leg swelling    Subjective:   Patient seen this afternoon. She went down for pelvic u/s earlier. She feels better after her paracentesis but feels ascites may be recurring. She appears more comfortable otherwise. Oncologic History:  CT scan chest on 08/31/2015 showed a new right upper lobe mass. Bronchoscopy with washing RUL mass on 10/15/2015 was positive for RUL Lung Adenocarcinoma. Negative for malignancy on BAL and TBNA of Lingular lesion. PET/CT scan on 09/29/2015:  1. New since prior examination on CT is 1.7 cm right paratracheal   nodule. Nodule is intensely hypermetabolic with SUV max of 4.8   worrisome for tumor. 2. Previously known nodule in the lingula now measures 2.3 cm in   size. SUV max has increased from 1.7 on the 2012 examination to   6.4 at this time. At this time findings are worrisome for   hypermetabolic tumor with avid glucose metabolism. Therefore, she was referred to see Dr. Milka Munguia for resection. CT guided biopsy of the left upper lobe lesion on 11/02/2015 was noted to be negative for malignancy. On 11/11/2015 She underwent: (1) Bronchoscopy. (2) Right VATS. (3) VATS right upper lobe wedge resection. (4) VATS right upper lobectomy. (5) Intercostal nerve block from T3 to T10. (6) Mediastinal lymph node dissection. Pathology:  Right lung, upper lobectomy: Invasive, moderately differentiated adenocarcinoma (Grade 2). Tumor size 1.5 cm in greatest dimension. Surgical margin negative for malignancy. Two of three peribronchial lymph nodes positive for adenocarcinoma.   pT1a N1 MX  She was referred to the medical oncology clinic to discuss adjuvant chemotherapy. We recommended 4 cycles of adjuvant chemotherapy consisting of Carboplatin/Alimta. Mediport placed 12/07/2015. Cycle # 1 of Nona Frames was on 12/09/2015. Cycle # 2 of Nona Frames was on 01/06/2016. Cycle # 3 of Nona Frames was on 01/27/2016. Cycle # 4 of Nona Frames was on 02/24/2016. PET SCAN 3.2016: The 2.1 cm left lung mass abutting the major fissure is hypermetabolic with SUV max of 5.4. Finding is worrisome for tumor with avid glucose metabolism. 2. Elsewhere there is unremarkable distribution of FDG activity without evidence of hypermetabolic metastases. On 03/29/2016 underwent Bronch/Left VATS/VATS wedge ROMELIA/VATS Left upper lobectomy/Mediasinal lymph node dissection/Intercostal nerve block from T3-T10 per Dr. Caroline Taylor. Invasive, well-differentiated adenocarcinoma (grade 1); Tumor size-1.4 cm in greatest dimension; Surgical margins-negative for malignancy; Lymphovascular invasion-not identified; TNM classification-pT2a N0 MX  B. AP window lymph node #1, excision: Anthracotic lymph node; negative for malignancy  C. AP window lymph node #2, excision: Anthracotic lymph node; negative for malignancy   D. Periaortic lymph node #1, excision: Fibroadipose tissue; lymph node not identified  E. Bronchial lymph node #1, excision: Anthracotic lymph node; negative for malignancy  F. Left lung, upper lobectomy: Emphysematous change; negative for malignancy  4 anthracotic lymph nodes negative for malignancy   G. Inferior pulmonary ligament lymph node, excision: Anthracotic lymph node; negative for malignancy  H. Bronchial lymph node, excision: Anthracotic lymph node; negative for malignancy. On surveillance per NCCN guidelines.        Recent abdominal pain; ER visit reviewed  CT abdomen/pelvis 02/20/2022   6 mm right lower lobe pulmonary nodule  Multiple peritoneal cystic masses      CEA 12.5 on 03/16/2022     CT chest 04/05/2022 Ground-glass nodule right lower lobe superolateral portion 10 mm unchanged from prior however in the medial segment right lower lobe with pleural abutment is a 7 mm pulmonary nodule increased in size from 09/28/2021 with is barely visible at 2-3 mm; repeat CT chest in 3 mo     PET/CT scan 04/05/2022 noted No FDG avid uptake is identified which exceeds the threshold SUV     Bilateral Screening Mammogram 04/20/2022: Negative for malignancy     CEA 12.1 on 04/20/2022  CA-125 32.8 on 04/20/2022   <2 on 04/20/2022  Chromogranin A 1480 on 04/20/2022. EGD/Colonoscopy 05/13/2022: Gastric polyps , duodenal polyp moderate gastroduodenitis   A. Stomach, biopsy: Hyperplastic polyp and moderate chronic active gastritis; negative for intestinal metaplasia   Immunostain negative for Helicobacter pylori organisms   B. Duodenum, biopsy: Chronic duodenitis with features of peptic duodenitis   C. Colon, 20 cm biopsy: Fragments of tubular adenoma and hyperplastic polyp      Referred to HBP team (Dr. Benuel Lesches) for further evaluation of peritoneal cystic masses  CT abdomen/pelvis 06/23/2022 numerous cystic structures identified throughout the right flank and retroperitoneum     Laparoscopic robotic peritoneal mass resection on 07/06/2022  Findings included: serous cystic masses along the colon, periportal lymphadenopathy, fibrotic appearing liver, chronic cholecystitis  Peritoneal fluid Negative for malignant cells. Cellblock shows reactive mesothelial cells, lymphocytes, adipose/stromal tissue, and blood. Monolayer preparation shows few reactive mesothelial cells and blood. A.  Lymph node, periportal, biopsy:   - Reactive node showing follicular hyperplasia, negative for malignancy, see comment. B.  Remnant gallbladder, cholecystectomy:   - Portion of gallbladder wall showing dense fibrosis/scar and occluded mariaelena-gallbladder vessels. C.   Soft tissue, peritoneal sac, excision:   - Peritoneal inclusion cysts (benign cystic mesothelioma) and unremarkable fibroadipose tissue. D.  Liver, needle biopsy:   - Benign hepatic parenchyma showing focal periportal and incomplete septal fibrosis (stage 2)   - Negative for significant lobular/portal necroinflammatory activity, see comment. Comment:   Sections of the lymph node in specimen A reveal normal anat architecture with secondary follicular hyperplasia. There are no cytologically atypical lymphoid cells or Philadelphia-Marsha cells identified. Periportal LN Flow Cytometry noted 4.5% monoclonal B cells detected in a predominantly polyclonal background. Monoclonal B cell population (4.5% of total cells) without detectable CD5, CD10 or CD11c expression in a predominantly polyclonal background, raising the differential between a monoclonal B-cell population of undetermined significance versus a B-cell lymphoma/leukemia. In the context of B-cell lymphoma the main differential based on immunophenotype includes, but is not limited to marginal zone lymphoma/leukemia, lymphoplasmacytic lymphoma, FW58- negative follicular lymphoma, and less likely large b cell lymphoma. In specimen D, there is no evidence of chronic hepatitis or significant steatosis. Histologic features of cirrhosis including nodules of regenerating hepatocytes and complete portal-portal bridging fibrosis are   not identified. Focal periportal fibrosis and incomplete septal fibrosis are confirmed by trichrome stain. Iron stain is also performed and is negative. Periportal lymph node flow cytometry showing 4.5% monoclonal B cells without detectable CD5, CD10 or CD11c expression in a predominantly polyclonal background, raising the differential between a monoclonal B-cell population of undetermined significance versus a B-cell lymphoma/leukemia     Repeat CT chest on 08/04/2022  Postsurgical changes are identified in the upper lobes bilaterally.     1.1 cm ground-glass nodule in the right lower lobe and a smaller 1 measuring 0.9 cm are noted without change. There is a 1.6 x 1.2 cm soft tissue mass in the right lower lobe medially which is significantly increased since the previous examination. Bilateral adrenal nodules are noted, larger 1 on the left side measuring 1.8 x 1.6 cm.  1.2 cm right renal cystic lesion is identified. An ill-defined hypodense lesion is identified in the spleen currently measuring 2.9 x 3.5 cm      Evaluated on 8/15/2022 by Dr. Steve Lowery at [facility] in [facility] who recommended proceeding with a biopsy of the enlarging right lower lobe lung mass. While lymphoma certainly possible, biopsy recommended to rule out recurrent lung cancer. Even if lymphoma is concerned, likely low-grade and not causing her symptoms so observation would be recommended. PET/CT scan, CT-guided core needle biopsy of enlarging right lower lobe lung mass recommended. PET/CT scan 09/06/2022: In the region of the lesion in the right lower lobe there is FDG avid tracer uptake peak SUV is 3.2. CT guided core needle biopsy RLL lung nodule on 09/20/2022  Right lung, lower lobe core needle biopsy: Adenocarcinoma (see comment)   Comment: The prior history of right upper lobe adenocarcinoma (HES ) and left upper lobe adenocarcinoma (HES ) is noted. The electronic medical record is also reviewed. The adenocarcinoma in the current specimen is weakly immunoreactive with GATA3 and CDX2. The TTF-1 immunostain is negative. PHYSICAL EXAM:   VITALS:  /61   Pulse 92   Temp 98.9 °F (37.2 °C) (Oral)   Resp 20   Ht 5' 1\" (1.549 m)   Wt 116 lb (52.6 kg)   SpO2 100%   BMI 21.92 kg/m²   Physical Exam  Constitutional:       General: She is not in acute distress. Appearance: She is not toxic-appearing. HENT:      Head: Normocephalic.       Nose: Nose normal.      Mouth/Throat:      Mouth: Mucous membranes are moist.   Eyes:      General: No scleral icterus. Cardiovascular:      Rate and Rhythm: Normal rate and regular rhythm. Heart sounds: No murmur heard. Pulmonary:      Effort: Pulmonary effort is normal. No respiratory distress. Breath sounds: No stridor. No wheezing. Abdominal:      General: There is distension (less distended since paracenteis). Palpations: There is no mass. Tenderness: There is abdominal tenderness. Hernia: No hernia is present. Musculoskeletal:         General: No swelling. Cervical back: Normal range of motion. No rigidity. Lymphadenopathy:      Cervical: No cervical adenopathy. Skin:     Coloration: Skin is not jaundiced or pale. Findings: No lesion or rash. Neurological:      General: No focal deficit present. Mental Status: She is alert and oriented to person, place, and time. Psychiatric:         Behavior: Behavior normal.         Thought Content: Thought content normal.        ECOG PS: 1      Intake/Output Summary (Last 24 hours) at 11/3/2022 1722  Last data filed at 11/3/2022 1203  Gross per 24 hour   Intake 1112.5 ml   Output 250 ml   Net 862.5 ml       DIAGNOSTIC DATA:       Lab Results   Component Value Date    WBC 5.6 11/03/2022    HGB 9.1 (L) 11/03/2022    HCT 27.8 (L) 11/03/2022    MCV 85.4 11/03/2022     11/03/2022     Lab Results   Component Value Date    NEUTROABS 7.43 (H) 11/01/2022     Lab Results   Component Value Date    ALT 45 (H) 11/03/2022     (H) 11/03/2022    ALKPHOS 346 (H) 11/03/2022    BILITOT 0.3 11/03/2022     Lab Results   Component Value Date    CREATININE 2.0 (H) 11/03/2022    BUN 36 (H) 11/03/2022     11/03/2022    K 4.6 11/03/2022     11/03/2022    CO2 21 (L) 11/03/2022       Pathology:     Radiology:      ASSESSMENT & PLAN:  The patient is a 71 y.o. female with background history of lung cancer, was admitted on 10/31/2022 for abdominal distention and lower extremity swelling.   The patient has an extensive lung cancer history outlined above. But in short, patient has had multiple instances of interventions and miscellaneous findings:    First Instance of Lung Cancer:  >>> In 2015 she was found to have RUL mass, which confirmed moderately differentiated adenocarcinoma on bronchoscopy. She was found to have a 1.6 cm right paratracheal nodule, intensely hypermetabolic. And then underwent right VATS with RUL with wedge resection then completion lobectomy and mediastinal node dissection, staging suggestive with a pT1 aN1 MX disease. Now she underwent 4 cycles of carboplatin/pemetrexed completed in 2/24/2016. Second Lung Cancer:  >>>  PET scan was done a month later in 2016 revealed a 2.1 cm left lung mass concerning for malignancy, and she underwent left VATS with wedge and completion left upper lobe lobectomy/mediastinal lymph node dissection, confirming well-differentiated adenocarcinoma of the lung. Nodes were negative she proceeded with surveillance protocol. Abdominal Cystic Lesions  >>> Patient was found to have multiple peritoneal cystic masses on CT abdomen on 2/20/2022. CEA was 12.5 on 3/16/2022, mammogram done on 4/20/2022 which was negative. On 4/20/2022, other tumor markers checked including , CEA 19-9, and chromogranin A which were 32.8, <2, and 1480, respectively. EGD/colonoscopy on 5/13/2022 found gastric and duodenal polyps. There is a hyper plastic gastric polyp. H. pylori was negative. And colonoscopy found fragments of tubular adenoma and hyperplastic polyp. She was then seen by [facility] surgery with Dr. Angelic Laird in which repeat CT abdomen on 6/23/2022, found numerous cystic structures throughout the right flank and retroperitoneum. Then on 7/6/2022, laparoscopic robotic peritoneal resection was done, revealing serous cystic masses along colon, periportal lymphadenopathy, fibrotic appearing liver, chronic cholecystitis, peritoneal fluid negative for malignancy.   Periportal lymph node flow cytometry showed 4.5% monoclonal B cells, without detectable CD5, CD10, or CD11c, but otherwise predominantly polyclonal background. Suspected Lung Cancer #3:  >>> Repeat CT chest on 4/5/2022 found groundglass nodule of right lower lobe, approximately 10 mm in size, unchanged from before. But there was a right lower lobe 7 mm nodule with pleural abutment, increased from 9/20/2021. PET scan showed no significant FDG uptake. Then on 8/4/2022, repeat chest showed increase of RLL nodule size, 1.6 x 1.2 cm from previous study. There is also bilateral adrenal nodules, left being larger, at 1.8 x 1.6 cm with right cystic lesion being 1.2 cm, and a hypodense splenic lesion, 2.9 x 3.5 cm. This was further evaluated at [facility] on 8/15/2022, recommended biopsy of the RLL mass, some consideration of possible lymphoma. PET on 9/6/2022 of the RLL mass showed SUV 3.2, and then CT-guided biopsy on 9/20/2022 confirm adenocarcinoma, however weakly immunoreactive for GATA3, CDX2, but TTF-1 negative. Molecular studies done, showing PD-L1 10%, and KRAS exon 2 (G12 C) positive but rest of studies negative. Patient was considered not  a good candidate for further resection by CT surgery, and met with radiation oncology for definitive SBRT on 10/10/2022, which is to be started at this time. CURRENT HOSPITALIZATION:  The patient is a 71 y.o. female who was admitted for ascites and lower extremity swelling on 10/31/2022. He saw patient in consultation 11/1/2022. She has had 2 weeks duration of ascites. Abdominal imaging this admission confirms this on 10/31/2022. CT reports a small increase in her right lower lobe lung lesion, now measuring 2.4 x 1.7 cm (previously 1.3 cm). Abdominal organs such as liver, biliary tree, pancreas, and left adrenal gland are unremarkable. Again there were mentions of ill-defined hypodensity in spleen without significant change.   Both ovaries appear normal.  There is calcified uterine leiomyomata. Furthermore there is some mild thickening of small bowel walls, which could be secondary to ascites. Furthermore there is also a 1.5 cm right subareolar breast lesion. Patient did have mammogram earlier this year which was negative. She has had EGD and colonoscopy this year as well showing gastric polyp; tubular adenoma and hyperplastic polyp in colon. Further more, regarding patient's ascites, she denies of kidney or liver issues. I did review the pathology report from her 7/6/2022 surgery, in which there was mention of fibrosis in the liver focal periportal and incomplete septal fibrosis (stage II) but no evidence of chronic hepatitis/steatosis, or cirrhosis. The patient is status post paracentesis yesterday 11/2/2022, total of 2450 mL of ascitic fluid was removed    Right lung mass positive for Adenocarcinoma, weakly positive for GATA3 and CDX2, TTF-1 negative; KRAS (G12C) positive, PDL1 with 10% expression  Previously described multiple peritoneal cystic masses, s/p resection in 7/6/2022, not well visualized on 10/31/22 CT abdomen  Normocytic anemia  Mild small bowel thickening  Right hyperdense subareolar lesion of the breast, 1.5 cm  Monoclonal B-cell population from periportal lymph node resection from 7/6/2022, in the setting of follicular hyperplasia  Small left para-aortic retroperitoneal lymph node  Small right pleural effusion with trace left pleural effusion  Mild small bowel thickening, possibly due to ascites  Acute kidney injury  Known adrenal nodules  Previous right sided lung adenocarcinoma in 2015  Previous left-sided lung adenocarcinoma 2016  Family history of uterine cancer in mother  Family history of early age breast cancer in sister (Diagnosed around early 42's)  Hepatitis C    Will consider both malignant and benign causes of ascites  Unfortunately none of the studies on the ascites fluid were done including cytology.    CEA is 8.5,  is elevated at 1063, CA 19-9 is pending. LFTs and creatinine increased but in the setting of paracentesis. Continue to monitor. Anemia, likely secondary to malignancy, chronic inflammation, and iron deficiency. Ferrlecit ordered  Hgb dropped to 6.3 this morning. Primary ordered blood transfusion which she received today  Consider myeloma panel  Right breast ultrasound ordered. HBP surgery following, ordered pelvic u/s. Awaiting for results  Rad Onc consulted, already know patient and saw pt in the hospital. Definitive RT had been planned for her adenocarcinoma lung mass. If we confirm arrival of stage IV adenocarcinoma, will consider MRI brain and change in treatment, but will await for workup  Had previous positive viral loads in 2015 and 2012. Patient states she was treated by Dr. Jhonatan Savage 20-30 years ago for Hep C. Will check HCV PCR.  Consider possible association with underlying malignancy      Brett Pleitez MD  HEMATOLOGY/MEDICAL [address] [place] [address]  11/3/2022

## 2022-11-03 NOTE — CARE COORDINATION
Met with patient, domestic partner and his sister at bedside. Patient agreeable to engage in discharge planning discussion. Patient resides at home with her partner. She does have a wheeled walker in the home. Partner does work as a . Patient has no hx of SNF nor C. She acknowledges that she is now requiring more assistance than she did previously. Together, patient, her partner and his sister are uncertain as to discharge plan. They are considering [address] short term rehab stay. A list of [facility] is presented and reviewed. Asked for 4 SNF choices as soon as able. Family also awaiting clarification on if/when radiation therapy sessions will commence. Will follow along with  and assist with discharge planning as necessary. Marni Barraza.  Payam, MSN RN  [facility] Case Management  [phone]

## 2022-11-04 ENCOUNTER — APPOINTMENT (OUTPATIENT)
Dept: ULTRASOUND IMAGING | Age: 72
DRG: 948 | End: 2022-11-04
Payer: MEDICARE

## 2022-11-04 LAB
ALBUMIN SERPL-MCNC: 2.3 G/DL (ref 3.5–5.2)
ALP BLD-CCNC: 472 U/L (ref 35–104)
ALT SERPL-CCNC: 55 U/L (ref 0–32)
ANION GAP SERPL CALCULATED.3IONS-SCNC: 9 MMOL/L (ref 7–16)
AST SERPL-CCNC: 149 U/L (ref 0–31)
BILIRUB SERPL-MCNC: 0.4 MG/DL (ref 0–1.2)
BUN BLDV-MCNC: 40 MG/DL (ref 6–23)
CA 15-3: 68 U/ML (ref 0–31)
CA 19-9: <2 U/ML
CALCIUM SERPL-MCNC: 8.4 MG/DL (ref 8.6–10.2)
CHLORIDE BLD-SCNC: 103 MMOL/L (ref 98–107)
CO2: 22 MMOL/L (ref 22–29)
CREAT SERPL-MCNC: 1.9 MG/DL (ref 0.5–1)
FERRITIN: 1521 NG/ML
GFR SERPL CREATININE-BSD FRML MDRD: 28 ML/MIN/1.73
GLUCOSE BLD-MCNC: 103 MG/DL (ref 74–99)
HCT VFR BLD CALC: 26.6 % (ref 34–48)
HEMOGLOBIN: 8.7 G/DL (ref 11.5–15.5)
IRON SATURATION: 86 % (ref 15–50)
IRON: 149 MCG/DL (ref 37–145)
LIPASE: 310 U/L (ref 13–60)
MCH RBC QN AUTO: 28.2 PG (ref 26–35)
MCHC RBC AUTO-ENTMCNC: 32.7 % (ref 32–34.5)
MCV RBC AUTO: 86.1 FL (ref 80–99.9)
METER GLUCOSE: 105 MG/DL (ref 74–99)
METER GLUCOSE: 79 MG/DL (ref 74–99)
PDW BLD-RTO: 14.7 FL (ref 11.5–15)
PLATELET # BLD: 291 E9/L (ref 130–450)
PMV BLD AUTO: 10.5 FL (ref 7–12)
POTASSIUM SERPL-SCNC: 4.3 MMOL/L (ref 3.5–5)
RBC # BLD: 3.09 E12/L (ref 3.5–5.5)
SODIUM BLD-SCNC: 134 MMOL/L (ref 132–146)
TOTAL IRON BINDING CAPACITY: 174 MCG/DL (ref 250–450)
TOTAL PROTEIN: 6.2 G/DL (ref 6.4–8.3)
WBC # BLD: 5.6 E9/L (ref 4.5–11.5)

## 2022-11-04 PROCEDURE — 99233 SBSQ HOSP IP/OBS HIGH 50: CPT | Performed by: INTERNAL MEDICINE

## 2022-11-04 PROCEDURE — 36415 COLL VENOUS BLD VENIPUNCTURE: CPT

## 2022-11-04 PROCEDURE — 97535 SELF CARE MNGMENT TRAINING: CPT

## 2022-11-04 PROCEDURE — 99232 SBSQ HOSP IP/OBS MODERATE 35: CPT | Performed by: INTERNAL MEDICINE

## 2022-11-04 PROCEDURE — 51798 US URINE CAPACITY MEASURE: CPT

## 2022-11-04 PROCEDURE — 97530 THERAPEUTIC ACTIVITIES: CPT

## 2022-11-04 PROCEDURE — 83550 IRON BINDING TEST: CPT

## 2022-11-04 PROCEDURE — 80053 COMPREHEN METABOLIC PANEL: CPT

## 2022-11-04 PROCEDURE — 82728 ASSAY OF FERRITIN: CPT

## 2022-11-04 PROCEDURE — 82962 GLUCOSE BLOOD TEST: CPT

## 2022-11-04 PROCEDURE — 87902 NFCT AGT GNTYP ALYS HEP C: CPT

## 2022-11-04 PROCEDURE — 6370000000 HC RX 637 (ALT 250 FOR IP): Performed by: INTERNAL MEDICINE

## 2022-11-04 PROCEDURE — 83540 ASSAY OF IRON: CPT

## 2022-11-04 PROCEDURE — 76705 ECHO EXAM OF ABDOMEN: CPT

## 2022-11-04 PROCEDURE — 83690 ASSAY OF LIPASE: CPT

## 2022-11-04 PROCEDURE — 85027 COMPLETE CBC AUTOMATED: CPT

## 2022-11-04 PROCEDURE — 6370000000 HC RX 637 (ALT 250 FOR IP): Performed by: NURSE PRACTITIONER

## 2022-11-04 PROCEDURE — 87522 HEPATITIS C REVRS TRNSCRPJ: CPT

## 2022-11-04 PROCEDURE — 2580000003 HC RX 258: Performed by: INTERNAL MEDICINE

## 2022-11-04 PROCEDURE — 1200000000 HC SEMI PRIVATE

## 2022-11-04 RX ORDER — LIDOCAINE 4 G/G
1 PATCH TOPICAL DAILY
Status: DISCONTINUED | OUTPATIENT
Start: 2022-11-04 | End: 2022-11-09 | Stop reason: HOSPADM

## 2022-11-04 RX ORDER — DOCUSATE SODIUM 100 MG/1
100 CAPSULE, LIQUID FILLED ORAL DAILY
Status: DISCONTINUED | OUTPATIENT
Start: 2022-11-04 | End: 2022-11-05

## 2022-11-04 RX ADMIN — TRAZODONE HYDROCHLORIDE 50 MG: 50 TABLET ORAL at 21:06

## 2022-11-04 RX ADMIN — AMLODIPINE BESYLATE 5 MG: 5 TABLET ORAL at 08:02

## 2022-11-04 RX ADMIN — POLYETHYLENE GLYCOL 3350 17 G: 17 POWDER, FOR SOLUTION ORAL at 12:39

## 2022-11-04 RX ADMIN — DOCUSATE SODIUM 100 MG: 100 CAPSULE, LIQUID FILLED ORAL at 12:40

## 2022-11-04 RX ADMIN — Medication 10 ML: at 21:06

## 2022-11-04 RX ADMIN — Medication 10 ML: at 08:02

## 2022-11-04 NOTE — PROGRESS NOTES
[facility] Progress Note    Admitting Date and Time: 10/31/2022 12:08 PM  Admit Dx: Abdominal pain [R10.9]    Subjective:  Patient is being followed for Abdominal pain [R10.9]       Patient awake sitting up in chair eating lunch  Sister at bedside  Plans for pelvic MRI this afternoon  Feels ok  Abdomen slightly bloated  Has not had a BM in 2 days  C/o back pain today      ROS: denies fever, chills, cp, sob, n/v, HA unless stated above.       [Held by provider] enoxaparin  30 mg SubCUTAneous Daily    [Held by provider] lisinopril  40 mg Oral Daily    [Held by provider] hydroCHLOROthiazide  25 mg Oral Daily    amLODIPine  5 mg Oral Daily    sodium chloride flush  5-40 mL IntraVENous 2 times per day    traZODone  50 mg Oral Nightly     sodium chloride, , PRN  glucose, 4 tablet, PRN  dextrose bolus, 125 mL, PRN   Or  dextrose bolus, 250 mL, PRN  glucagon (rDNA), 1 mg, PRN  dextrose, , Continuous PRN  sodium chloride flush, 5-40 mL, PRN  sodium chloride, , PRN  ondansetron, 4 mg, Q8H PRN   Or  ondansetron, 4 mg, Q6H PRN  polyethylene glycol, 17 g, Daily PRN  acetaminophen, 650 mg, Q6H PRN   Or  acetaminophen, 650 mg, Q6H PRN  morphine, 2 mg, Q3H PRN   Or  morphine, 4 mg, Q3H PRN         Objective:    /60   Pulse 79   Temp 99.6 °F (37.6 °C) (Oral)   Resp 18   Ht 5' 1\" (1.549 m)   Wt 133 lb (60.3 kg)   SpO2 98%   BMI 25.13 kg/m²     General Appearance: alert and oriented to person, place and time and in no acute distress  Skin: warm and dry  Head: normocephalic and atraumatic  Neck: neck supple and non tender without mass   Pulmonary/Chest: clear to auscultation bilaterally-  Cardiovascular: normal rate, normal S1 and S2 and no carotid bruits  Abdomen: soft, non-tender, distended, normal bowel sounds  Extremities: no cyanosis, no clubbing and no edema  Neurologic: speech normal               Recent Labs     11/02/22  0532 11/03/22  0206 11/04/22  0309    136 134   K 4.3 4.6 4.3   CL 107 105 103   CO2 19* 21* 22   BUN 29* 36* 40*   CREATININE 1.8* 2.0* 1.9*   GLUCOSE 66* 88 103*   CALCIUM 8.6 8.3* 8.4*       Recent Labs     11/02/22  0532 11/03/22  0206 11/03/22  1055 11/04/22  0309   WBC 6.7 5.6  --  5.6   RBC 2.58* 2.26*  --  3.09*   HGB 7.2* 6.3* 9.1* 8.7*   HCT 22.7* 19.3* 27.8* 26.6*   MCV 88.0 85.4  --  86.1   MCH 27.9 27.9  --  28.2   MCHC 31.7* 32.6  --  32.7   RDW 14.9 14.9  --  14.7    262  --  291   MPV 10.6 10.1  --  10.5         Assessment:    Principal Problem:    Abdominal pain  Active Problems:    Other ascites  Resolved Problems:    * No resolved hospital problems. *      Plan:  1. Massive ascites and bilateral pleural effusions in the setting of lung cancer with suspected peritoneal metastasis vs underlying liver disease: pt presented to the ER with 2 weeks of increasing abdominal swelling and distention with associated pain. S/p paracentesis on 11/1 with 2,450 ml off- but appears fluid was discarded and no analysis was completed. Hem/onc input appreciated. Checking tumor markers. US of right breast lesion- pending. Will need cytology at some point to eval whether or not represents metastasis vs ? Secondary primary malignancy. Plan to start radiation soon. Currently attempting to look into cause of ascites. Pelvis US reviewed- showing large calcified uterine mass. Pelvic MRI pending to further evaluate. C/o back pain- trial of lidocaine patch. May be able to attempt paracentesis again to get fluid evaluation- will await pelvic mri. 2. ANASTASIA:  creatinine 1.4 on admission. Noted to be trending up. Creatinine peaked at 2.0. May be volume mediated. Pt did have IV contrast. Urine studies ordered. Avoid nephrotoxic meds. HCTZ on hold. Hold ace today. Creatinine 1.9 today. 3. Lactic acidosis: resolved     4. Elevated lipase: avoid IVF given the above. Trend lipase. 106- trending down. Pt denies pain     5.  Bowel thickening seen on CT- effect of ascites vs gastroenterotitis     6. Normocytic anemia: hg 6.3 11/3 Appears hg has been running 7-8 range. No s/s bleeding. May be due to underlying cancer. ? KESHAWN- hem onc started on IV iron. Check occult stool/ anemia panel. 1 unit PRBC ordered. Lovenox placed on hold. 7. HTN: continue meds     8. Lung cancer: adenocarcinoma: hem/ onc following     9. Deconditioning: PT/OT- am pac 13 - ? [place]- social work looking into      10. Right breast lesion: US ordered- has not been completed yet/ nursing looking into. 11. Constipation: bowel regimen. Time spent reviewing chart, clinical exam, discussing case and answering questions with staff/consultants/patient/family = 20 minutes          NOTE: This report was transcribed using voice recognition software. Every effort was made to ensure accuracy; however, inadvertent computerized transcription errors may be present. Electronically signed by STEPHANIE Montez on 11/4/2022 at 7:25 AM    Addendum: I have personally participated in the history, exam, medical decision making with Lisa Cui on the date of service and I agree with all of the pertinent clinical information unless otherwise noted. I have also reviewed and agree with the past medical, family, and social history unless otherwise noted. Patient was admitted with      PHYSICAL EXAM:  Vitals:  /61   Pulse 79   Temp 98.5 °F (36.9 °C) (Oral)   Resp 16   Ht 5' 1\" (1.549 m)   Wt 133 lb (60.3 kg)   SpO2 97%   BMI 25.13 kg/m²   Gen: awake, alert, NAD  Lungs: clear to auscultation bilaterally no crackles no wheezing. Heart: RRR, no murmur   Abdomen: soft nontender nondistended positive bowel sounds. Extremities: full range of motion no peripheral edema. Impression:  Principal Problem:    Abdominal pain  Active Problems:    Other ascites  Resolved Problems:    * No resolved hospital problems.  *        My findings/plan include:     71year old female with stage IV adenocarcinoma of the lungs with peritoneal mass. She has 1.5 cm right subareolar breast lesion with US outpatient. Elevated . There is large uterine mass with MRI of the pelvis pending. US does show presence of ascites still. As previously noted, there was no diagnostic studies done and so this time we will have diagnostic studies. NOTE: This report was transcribed using voice recognition software. Every effort was made to ensure accuracy; however, inadvertent computerized transcription errors may be present.      Electronically signed by Percy Goodwin DO on 11/4/2022 at 10:55 AM

## 2022-11-04 NOTE — FLOWSHEET NOTE
Patient brought down for paracentesis. After discussing procedure with Dr. Magdalene Aggarwal, patient refused paracentesis.

## 2022-11-04 NOTE — PROGRESS NOTES
Spoke with US- they do not do in-patient Breast US unless the concern is for an abscess. These have to be done at [facility].   Electronically signed by Jocy Holland RN on 11/4/2022 at 1:18 PM

## 2022-11-04 NOTE — PROGRESS NOTES
Physical Therapy  Facility/Department: [facility]  Physical Therapy Treatment Note    Name: Angelica Alejo  : 1950  MRN: [id number]  Date of Service: 2022    Attending Provider:  Isma Mujica DO    Evaluating PT:  Asad Adams, P.T. Room #:  5450/6097-R  Diagnosis:  Abdominal pain [R10.9]  Pertinent PMHx/PSHx:  Lung CA, B pleural effusions  Procedure/Surgery:  22 Paracentesis  Precautions:  Falls, bed/chair alarm    SUBJECTIVE:    Pt lives with  in a bi-level home with 3 stairs and 1 rail to enter. There are 5 steps and 2 rails to the main level. Pt ambulated with a ww or cane PTA. OBJECTIVE:   Initial Evaluation  Date: 22 Treatment  2022 Short Term/ Long Term   Goals   Was pt agreeable to Eval/treatment? yes yes    Does pt have pain? No c/o pain Back pain, tender buttocks    Bed Mobility  Rolling: MIN A  Supine to sit: MOD A  Sit to supine: NA  Scooting: MOD A Rolling: Min A   Supine <> sit: Mod A  Scooting: Mod A seated to EOB supervision   Transfers Sit to stand: MOD A  Stand to sit: MOD A  Stand pivot: MOD A with ww Sit  <> stand: Mod A  Stand Pivot: Mod A using [employer] for support SBA   Ambulation   3 feet with ww MOD A 30 feet x 1 using WW for support Min/Mod A for balance 100 feet with ww SBA   Stair negotiation: ascended and descended NA, pt fatigued with ww and to weak at this time to attempt NT 5 steps with 1 rail SBA   AM-PAC 6 Clicks         Pt is alert and able to follow instruction, at times hard to understand Pt speech  Balance: poor dynamic using [employer] for support    Pt performed therapeutic exercise of the following: NT    Patient education/treatment  Pt was educated on UE usage to assist with transfer safety, gait mechanics promoting increased step length and upright posture.      Patient response to education:   Pt verbalized understanding Pt demonstrated skill Pt requires further education in this area   yes With prompt for transfer safety yes     ASSESSMENT:   Comments: Nurse ok with Rx. Pt sat EOB SBA for balance. Gait very slow, inconsistent and laboring. Pt displayed shuffle gait throughout, was unsteady, required constant hands on assist for balance and safety. Pt assisted on and off the bedside commode, stood using [employer] for support Min/Mod A for balance while receiving hygiene care. Pt was left in bed per request with call light in reach, states already OOB today    Chair/bed alarm: bed alarm active    Time in 1339   Time out 1405   Total Treatment Time 26 minutes   CPT codes:     Therapeutic activities 51601 26 minutes   Therapeutic exercises 24806 0 minutes       Pt is making fair progress toward established Physical Therapy goals. Continue with physical therapy current plan of care.     Mello Roque PTA   License Number: [id number]

## 2022-11-04 NOTE — PROGRESS NOTES
Occupational Therapy  OT BEDSIDE TREATMENT NOTE      Date:2022  Patient Name: Aston Chong  MRN: [id number]  : 1950  Room: [address] [place], [place] [address]     Evaluating OT: YELITZA Berkowitz, OTR/L [id number]  Referring Provider: Ena Starr DO  Specific Provider Orders: OT eval and treat   Recommended Adaptive Equipment: shower chair if home      Diagnosis: abdominal pain   Surgery: none   Pertinent Medical History: HTN, lung CA, Hep C, PVD, scoliosis   Precautions:  Fall Risk     Assessment of current deficits   [x] Functional mobility           [x]ADLs           [x] Strength                  [x]Cognition   [x] Functional transfers         [x] IADLs         [x] Safety Awareness   [x]Endurance   [] Fine Coordination                         [x] Balance      [] Vision/perception   [x]Sensation     []Gross Motor Coordination             [] ROM           [] Delirium                   [] Motor Control      OT PLAN OF CARE   OT POC based on physician orders, patient diagnosis and results of clinical assessment     Frequency/Duration: 2-3 days/wk for 2 weeks PRN   Specific OT Treatment to include:   * Instruction/training on adapted ADL techniques and AE recommendations to increase functional independence within precautions       * Training on energy conservation strategies, correct breathing pattern and techniques to improve independence/tolerance for self-care routine  * Functional transfer/mobility training/DME recommendations for increased independence, safety, and fall prevention  * Patient/Family education to increase follow through with safety techniques and functional independence  * Recommendation of environmental modifications for increased safety with functional transfers/mobility and ADLs  * Therapeutic exercise to improve motor endurance, ROM, and functional strength for ADLs/functional transfers  * Therapeutic activities to facilitate/challenge dynamic balance, stand tolerance for increased safety and independence with ADLs  * Neuro-muscular re-education: facilitation of righting/equilibrium reactions, midline orientation, scapular stability/mobility, normalization of muscle tone, and facilitation of volitional active controled movement     Home Living: Pt lives with  in a bi-level home; bed/bath on 2nd level   Bathroom setup: walk in shower   Equipment owned: shower chair, cane  Prior Level of Function: assist with ADLs/IADLs; using ww for functional mobility      Pain Level: back pain  Cognition: A&O: 3/4; Follows 1 step directions, with repetition and increased time             Memory:  fair              Sequencing:  fair              Problem solving:  fair              Judgement/safety:  fair      Functional Assessment:  AM-PAC Daily Activity Raw Score: 15/24    Initial Eval Status  Date: 11/2/22 Treatment Status  Date: 11/4/22 STGs=LTGs  Time Frame: 10-14 days   Feeding SUP (seated in chair)    IND   Grooming SBA (seated)    IND   UB Dressing SBA  Min A  IND   LB Dressing Max A (assist with socks) Max A to don brief  Mod A    Bathing Max A (simulated)   Mod A    Toileting Max A  max A on BSC Mod A   Bed Mobility  Log roll: NT  Supine to sit: NT   Sit to supine: NT  Supine to sit <> mod   Log roll IND  Supine to sit: IND   Sit to supine: IND   Functional Transfers Sit to stand:Min A   Stand to sit:Min A  Commode: Min A Sit <> stand mod   SBA   Functional Mobility Min A (using ww, to/from bathroom, pt shuffles) Min/mod A with [employer]  SBA   Balance Sitting: SBA  Standing: Min A Min A with [employer] standing      Activity Tolerance fair  fair     Visual/  Perceptual Glasses: yes         Comments:  patient cleared with nursing and agreeable to session. Patient fatigued but good effort demonstrated    Education/treatment: ADL and functional transfer/activity performed to increase safety and independence during self care tasks.  Education provided on safety awareness, adl reeducation, functional transfer training    Pt has made progress towards set goals.      Time In: 1:39  Time Out: 2:05     Min Units   Therapeutic Ex 32143     Therapeutic Activities 45440 97 1   ADL/Self Care 63510 11 1   Orthotic Management 64712     Neuro Re-Ed 62011     Non-Billable Time     TOTAL TIMED TREATMENT [address] [facility] [address] [place]/L [id number]

## 2022-11-04 NOTE — PROGRESS NOTES
Inpatient Medical Oncology Progress Note    Subjective:  Abdominal bloating. No fever. Fatigue. Fair appetite. Objective:  /61   Pulse 79   Temp 98.5 °F (36.9 °C) (Oral)   Resp 16   Ht 5' 1\" (1.549 m)   Wt 133 lb (60.3 kg)   SpO2 97%   BMI 25.13 kg/m²   GENERAL: Alert, oriented x 3, tired  HEENT: PERRLA; EOMI. Oropharynx clear. LUNGS: CTA Pérez  CVS: RRR  GI: Distended  EXTREMITIES: Without clubbing, cyanosis, or edema. NEUROLOGIC: No focal deficits. ECOG PS 2    Diagnostics:  Lab Results   Component Value Date    WBC 5.6 11/04/2022    HGB 8.7 (L) 11/04/2022    HCT 26.6 (L) 11/04/2022    MCV 86.1 11/04/2022     11/04/2022     Lab Results   Component Value Date     11/04/2022    K 4.3 11/04/2022     11/04/2022    CO2 22 11/04/2022    BUN 40 (H) 11/04/2022    CREATININE 1.9 (H) 11/04/2022    GLUCOSE 103 (H) 11/04/2022    CALCIUM 8.4 (L) 11/04/2022    PROT 6.2 (L) 11/04/2022    LABALBU 2.3 (L) 11/04/2022    BILITOT 0.4 11/04/2022    ALKPHOS 472 (H) 11/04/2022     (H) 11/04/2022    ALT 55 (H) 11/04/2022    LABGLOM 28 11/04/2022    GFRAA 49 09/14/2022     Impression/Plan:  71 y.o. female with Hx of smoking who underwent: (1) Bronchoscopy. (2) Right VATS. (3) VATS right upper lobe wedge resection. (4) VATS right upper lobectomy. (5) Intercostal nerve block from T3 to T10. (6) Mediastinal lymph node dissection on 11/11/2015:   Pathology:  Right lung, upper lobectomy: Invasive, moderately differentiated adenocarcinoma (Grade 2). Tumor size 1.5 cm in greatest dimension. Visceral pleural invasion: Not identified. Visceral pleural invasion: Not identified. Surgical margin negative for malignancy. Two of three peribronchial lymph nodes positive for adenocarcinoma. pT1a N1 MX;      Being followed for a left upper lobe mass by Dr. Dionne Canavan. Multiple biopsies in the past came back negative for malignancy.  Hypermetabolic on PET/CT scan on 09/29/2015 (2.3 cm in size with SUV of 6. 4). CT guided biopsy of the left upper lobe lesion on 11/02/2015 was noted to be negative for malignancy. She was referred to the medical oncology clinic to discuss adjuvant chemotherapy. We recommended 4 cycles of adjuvant chemotherapy consisting of Carboplatin/Alimta. Mediport placed 12/7/15. -MRI Brain on 12/8/15:  Negative for metastatic disease.   -We will repeat CT chest and PET/CT after 4 cycles of Carboplatin/Alimta. To follow on Lingular lesion as well. -Molecular studies (EGFR, ALK, ROS-1) were all Not Detected. Cycle # 1 of Dorothyann Jerrell was on 12/09/2015. Cycle # 2 of Dorothyann Jerrell was on 01/06/2016. Cycle # 3 of Dorothyann Jerrell was on 01/27/2016. Cycle # 4 of Dorothyann Jerrell was on 02/24/2016. PET SCAN 3.2016: The 2.1 cm left lung mass abutting the major fissure is hypermetabolic with SUV max of 5.4. Finding is worrisome for tumor with avid glucose metabolism. 2. Elsewhere there is unremarkable distribution of FDG activity without evidence of hypermetabolic metastases. On 03/29/2016 underwent Bronch/Left VATS/VATS wedge ROMELIA/VATS Left upper lobectomy/Mediasinal lymph node dissection/Intercostal nerve block from T3-T10 per Dr. Kapil Ibarra. Final pathology revealed Stage I Adenocarcinoma of ROMELIA (morphologically different from the adenocarcinoma previously diagnosed in the right upper lobe. Therefore, synchronous primary tumors are favored). A. Left lung, upper lobe wedge resection: Invasive, well-differentiated adenocarcinoma (grade 1); Tumor size-1.4 cm in greatest dimension; Surgical margins-negative for malignancy; Lymphovascular invasion-not identified; TNM classification-pT2a N0 MX  B. AP window lymph node #1, excision: Anthracotic lymph node; negative for malignancy  C. AP window lymph node #2, excision: Anthracotic lymph node; negative for malignancy   D.  Periaortic lymph node #1, excision: Fibroadipose tissue; lymph node not identified  E. Bronchial lymph node #1, excision: Anthracotic lymph node; negative for malignancy  F. Left lung, upper lobectomy: Emphysematous change; negative for malignancy  4 anthracotic lymph nodes negative for malignancy   G. Inferior pulmonary ligament lymph node, excision: Anthracotic lymph node; negative for malignancy  H. Bronchial lymph node, excision: Anthracotic lymph node; negative for malignancy. Right side Mediport was removed on 11/04/2016 by Dr. Severo Castano. On surveillance per NCCN guidelines. CT chest 04/12/2017 noted no convincing evidence of recurrent disease. CT chest 10/19/2017 noted no definite evidence for recurrent malignancy. CT chest 03/12/2018 negative for pulmonary parenchymal masses or enlarged mediastinal or hilar LN. ? 2 cm lesion in spleen difficult to evaluate due to the arterial phase of the study. CT Abd/Pelvis 05/08/2018  Enhancing lesion within the spleen is relatively stable to slightly smaller when compared to April 2014 exam and likely splenic hemangioma. Other indeterminate findings (left periaortic LN, sclerotic/blastic foci in L3 and L1 reported by Dr. Pratima Guaman from radiology team). PET/CT scan 06/05/2018 unremarkable. No FDG avid uptake identified. No evidence for recurrent or metastatic disease. CT Chest 12/13/2018 noted no evidence of recurrent/metastatic disease. CT chest on 06/11/2019 noted no evidence for worrisome residual or recurrent malignancy. No evidence for new lymphadenopathy or metastatic disease. Stable scarring and postoperative changes right upper lobe. CT chest 11/26/2019: No evidence of active neoplasm. CT chest 06/15/2020: No evidence of active neoplasm. CT chest 12/30/2020: Postsurgical changes and postsurgical scarring seen within the right lung apex. There is no evidence of tumor recurrence. 2.1 x 2.3 cm enhancing lesion seen within the spleen  PET/CT scan 03/02/2021   No FDG avid uptake is identified which exceeds the threshold SUV.   No convincing evidence for recurrent or metastatic disease  CT chest 09/28/2021 No evidence of tumor recurrence     Recent abdominal pain; ER visit reviewed  CT abdomen/pelvis 02/20/2022   6 mm right lower lobe pulmonary nodule  Multiple peritoneal cystic masses      CEA 12.5 on 03/16/2022     CT chest 04/05/2022 Ground-glass nodule right lower lobe superolateral portion 10 mm unchanged from prior however in the medial segment right lower lobe with pleural abutment is a 7 mm pulmonary nodule increased in size from 09/28/2021 with is barely visible at 2-3 mm; repeat CT chest in 3 mo     PET/CT scan 04/05/2022 noted No FDG avid uptake is identified which exceeds the threshold SUV      Bilateral screening mammogram 04/20/2022: Negative for malignancy     CEA     12.1 on 04/20/2022  CA-125 32.8 on 04/20/2022   <2 on 04/20/2022  Chromogranin A 1480 on 04/20/2022. EGD/Colonoscopy 05/13/2022: Gastric polyps , duodenal polyp moderate gastroduodenitis   A. Stomach, biopsy: Hyperplastic polyp and moderate chronic active gastritis; negative for intestinal metaplasia   Immunostain negative for Helicobacter pylori organisms   B. Duodenum, biopsy: Chronic duodenitis with features of peptic duodenitis   C. Colon, 20 cm biopsy: Fragments of tubular adenoma and hyperplastic polyp   Pathology reviewed. Referred to [facility]BP team (Dr. Emanuel Aschoff) for further evaluation of peritoneal cystic masses  CT abdomen/pelvis 06/23/2022 numerous cystic structures identified throughout the right flank and retroperitoneum. Laparoscopic robotic peritoneal mass resection on 07/06/2022  Findings included: serous cystic masses along the colon, periportal lymphadenopathy, fibrotic appearing liver, chronic cholecystitis     Peritoneal fluid Negative for malignant cells. Cellblock shows reactive mesothelial cells, lymphocytes, adipose/stromal tissue, and blood. Monolayer preparation shows few reactive mesothelial cells and blood.    A.  Lymph node, periportal, biopsy:   - Reactive node showing follicular hyperplasia, negative for malignancy, see comment. B.  Remnant gallbladder, cholecystectomy:   - Portion of gallbladder wall showing dense fibrosis/scar and occluded mariaelena-gallbladder vessels. C. Soft tissue, peritoneal sac, excision:   - Peritoneal inclusion cysts (benign cystic mesothelioma) and unremarkable fibroadipose tissue. D.  Liver, needle biopsy:   - Benign hepatic parenchyma showing focal periportal and incomplete septal fibrosis (stage 2)   - Negative for significant lobular/portal necroinflammatory activity, see comment. Comment:   Sections of the lymph node in specimen A reveal normal anat architecture with secondary follicular hyperplasia. There are no cytologically atypical lymphoid cells or Runnells-Marsha cells identified. Periportal LN Flow Cytometry noted 4.5% monoclonal B cells detected in a predominantly polyclonal background. Monoclonal B cell population (4.5% of total cells) without detectable CD5, CD10 or CD11c expression in a predominantly polyclonal background, raising the differential between a monoclonal B-cell population of undetermined significance versus a B-cell lymphoma/leukemia. In the context of B-cell lymphoma the main differential based on immunophenotype includes, but is not limited to marginal zone lymphoma/leukemia, lymphoplasmacytic lymphoma, YM37- negative follicular lymphoma, and less likely large b cell lymphoma. In specimen D, there is no evidence of chronic hepatitis or significant steatosis. Histologic features of cirrhosis including nodules of regenerating hepatocytes and complete portal-portal bridging fibrosis are   not identified. Focal periportal fibrosis and incomplete septal fibrosis are confirmed by trichrome stain. Iron stain is also performed and is negative.       Periportal lymph node flow cytometry showing 4.5% monoclonal B cells without detectable CD5, CD10 or CD11c expression in a predominantly polyclonal background, raising the differential between a monoclonal B-cell population of undetermined significance versus a B-cell lymphoma/leukemia     Repeat CT chest to follow on history of NSCLC on 08/04/2022  Postsurgical changes are identified in the upper lobes bilaterally. 1.1 cm ground-glass nodule in the right lower lobe and a smaller 1 measuring 0.9 cm are noted without change. There is a 1.6 x 1.2 cm soft tissue mass in the right lower lobe medially which is significantly increased since the previous examination. Bilateral adrenal nodules are noted, larger 1 on the left side measuring 1.8 x 1.6 cm.  1.2 cm right renal cystic lesion is identified. An ill-defined hypodense lesion is identified in the spleen currently measuring 2.9 x 3.5 cm      Evaluated by Dr. Steve Lowery on 08/15/2022 at [facility] who recommended proceeding with a biopsy of the enlarging right lower lobe lung mass. While lymphoma certainly possible, biopsy recommended to rule out recurrent lung cancer. Periportal lymph node biopsy reviewed at [facility]. Lymph node with lipid lymphadenopathy  Small clonal B-cell population detected by flow cytometry and molecular analysis  Negative for carcinoma  Flow cytometry showed a small clonal CD5 -/CD10- B-cell population (4.5% of all events) and a background of polyclonal B cells. B-cell receptor clonality assay was positive for a clonal B-cell population. However there is no morphologic evidence of lymphoma in the lymph node. The detection of a clonal B-cell population despite negative morphologic evidence of lymphoma might represent monoclonal B-cell lymphocytosis process in the lymph node. An alternative consideration is peripheral blood involvement by B-cell clone (MBL-like process) that was picked up by the tissue flow cytometry and molecular tests.      Even if lymphoma is concerned, likely low-grade and not causing her symptoms, so observation would be recommended. PET/CT scan, CT-guided core needle biopsy of enlarging right lower lobe lung mass recommended. PET/CT scan 09/06/2022: In the region of the lesion in the right lower lobe there is FDG avid tracer uptake peak SUV is 3.2. imaging reviewed. CT guided core needle biopsy RLL lung nodule on 09/20/2022  Right lung, lower lobe core needle biopsy: Adenocarcinoma (see comment)   Comment: The prior history of right upper lobe adenocarcinoma (HES ) and left upper lobe adenocarcinoma (HES ) is noted. The electronic medical record is also reviewed. The adenocarcinoma in the current specimen is weakly immunoreactive with GATA3 and CDX2. The TTF-1 immunostain is negative. 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 Status paracentesis 11/2/2022, total of 2450 mL of ascitic fluid was removed  Cytology pending  CEA 8.5   1063  CA 19-9 <2  Abdominal U/S 11/04/2022 small amount of ascites in the right right lower quadrant. Imaging reviewed. Paracentesis was not performed as the patient refused to give consent. Pelvic U/S large calcified uterine mass; ovaries not identified. Large volume ascites. MRI Pelvis ordered to evaluate uterine mass   Anemia, likely secondary to malignancy, chronic inflammation, and iron deficiency  Ferrlecit infusion was given; Lovenox on hold.   Hb 8.7 today 11/04/2022; Transfuse if Hb <7.0  Rad Onc for definitive RT for her adenocarcinoma lung mass. ANASTASIA; HCTZ on hold.  Hold ACE   Creat 1.9 today 11/04/2022 11/04/2022  Leonidas Mayberry MD

## 2022-11-04 NOTE — PATIENT CARE CONFERENCE
P Quality Flow/Interdisciplinary Rounds Progress Note        Quality Flow Rounds held on November 4, 2022    Disciplines Attending:  Bedside Nurse, , , and Nursing Unit Leadership    Rhonda Stephenson was admitted on 10/31/2022 12:08 PM    Anticipated Discharge Date:       Disposition:    Ishan Score:  Ishan Scale Score: 14    Readmission Risk              Risk of Unplanned Readmission:  21           Discussed patient goal for the day, patient clinical progression, and barriers to discharge.   The following Goal(s) of the Day/Commitment(s) have been identified:  Diagnostics - Report Results      Oneida Sidhu RN  November 4, 2022

## 2022-11-05 ENCOUNTER — APPOINTMENT (OUTPATIENT)
Dept: MRI IMAGING | Age: 72
DRG: 948 | End: 2022-11-05
Payer: MEDICARE

## 2022-11-05 LAB
ALBUMIN SERPL-MCNC: 2.1 G/DL (ref 3.5–5.2)
ALP BLD-CCNC: 314 U/L (ref 35–104)
ALT SERPL-CCNC: 29 U/L (ref 0–32)
ANION GAP SERPL CALCULATED.3IONS-SCNC: 9 MMOL/L (ref 7–16)
AST SERPL-CCNC: 41 U/L (ref 0–31)
BILIRUB SERPL-MCNC: <0.2 MG/DL (ref 0–1.2)
BUN BLDV-MCNC: 39 MG/DL (ref 6–23)
CA 27.29: 85 U/ML
CALCIUM SERPL-MCNC: 8 MG/DL (ref 8.6–10.2)
CHLORIDE BLD-SCNC: 104 MMOL/L (ref 98–107)
CO2: 21 MMOL/L (ref 22–29)
CREAT SERPL-MCNC: 1.7 MG/DL (ref 0.5–1)
GFR SERPL CREATININE-BSD FRML MDRD: 32 ML/MIN/1.73
GLUCOSE BLD-MCNC: 93 MG/DL (ref 74–99)
HCT VFR BLD CALC: 26.3 % (ref 34–48)
HEMOGLOBIN: 8.6 G/DL (ref 11.5–15.5)
LIPASE: 197 U/L (ref 13–60)
MCH RBC QN AUTO: 28.3 PG (ref 26–35)
MCHC RBC AUTO-ENTMCNC: 32.7 % (ref 32–34.5)
MCV RBC AUTO: 86.5 FL (ref 80–99.9)
METER GLUCOSE: 152 MG/DL (ref 74–99)
METER GLUCOSE: 168 MG/DL (ref 74–99)
METER GLUCOSE: 169 MG/DL (ref 74–99)
PDW BLD-RTO: 15.1 FL (ref 11.5–15)
PLATELET # BLD: 310 E9/L (ref 130–450)
PMV BLD AUTO: 10.3 FL (ref 7–12)
POTASSIUM SERPL-SCNC: 4.5 MMOL/L (ref 3.5–5)
RBC # BLD: 3.04 E12/L (ref 3.5–5.5)
SODIUM BLD-SCNC: 134 MMOL/L (ref 132–146)
TOTAL PROTEIN: 5.8 G/DL (ref 6.4–8.3)
WBC # BLD: 7.1 E9/L (ref 4.5–11.5)

## 2022-11-05 PROCEDURE — 85027 COMPLETE CBC AUTOMATED: CPT

## 2022-11-05 PROCEDURE — 6370000000 HC RX 637 (ALT 250 FOR IP): Performed by: INTERNAL MEDICINE

## 2022-11-05 PROCEDURE — 80053 COMPREHEN METABOLIC PANEL: CPT

## 2022-11-05 PROCEDURE — 83690 ASSAY OF LIPASE: CPT

## 2022-11-05 PROCEDURE — 2580000003 HC RX 258: Performed by: INTERNAL MEDICINE

## 2022-11-05 PROCEDURE — 1200000000 HC SEMI PRIVATE

## 2022-11-05 PROCEDURE — 82962 GLUCOSE BLOOD TEST: CPT

## 2022-11-05 PROCEDURE — 6370000000 HC RX 637 (ALT 250 FOR IP): Performed by: NURSE PRACTITIONER

## 2022-11-05 PROCEDURE — 99233 SBSQ HOSP IP/OBS HIGH 50: CPT | Performed by: STUDENT IN AN ORGANIZED HEALTH CARE EDUCATION/TRAINING PROGRAM

## 2022-11-05 PROCEDURE — 72195 MRI PELVIS W/O DYE: CPT

## 2022-11-05 PROCEDURE — 36415 COLL VENOUS BLD VENIPUNCTURE: CPT

## 2022-11-05 PROCEDURE — 99233 SBSQ HOSP IP/OBS HIGH 50: CPT | Performed by: INTERNAL MEDICINE

## 2022-11-05 RX ORDER — DOCUSATE SODIUM 100 MG/1
100 CAPSULE, LIQUID FILLED ORAL 2 TIMES DAILY
Status: DISCONTINUED | OUTPATIENT
Start: 2022-11-05 | End: 2022-11-09 | Stop reason: HOSPADM

## 2022-11-05 RX ADMIN — TRAZODONE HYDROCHLORIDE 50 MG: 50 TABLET ORAL at 21:43

## 2022-11-05 RX ADMIN — DOCUSATE SODIUM 100 MG: 100 CAPSULE, LIQUID FILLED ORAL at 09:45

## 2022-11-05 RX ADMIN — Medication 10 ML: at 09:46

## 2022-11-05 RX ADMIN — AMLODIPINE BESYLATE 5 MG: 5 TABLET ORAL at 09:45

## 2022-11-05 RX ADMIN — DOCUSATE SODIUM 100 MG: 100 CAPSULE, LIQUID FILLED ORAL at 21:43

## 2022-11-05 RX ADMIN — Medication 10 ML: at 21:44

## 2022-11-05 ASSESSMENT — PAIN SCALES - GENERAL: PAINLEVEL_OUTOF10: 0

## 2022-11-05 NOTE — PROGRESS NOTES
Notified Dr. Toy Scheuermann - pt requesting something to move her bowels. See orders.     Electronically signed by Mirella Aggarwal RN on 11/5/2022 at 5:26 PM

## 2022-11-05 NOTE — PLAN OF CARE
Problem: Discharge Planning  Goal: Discharge to home or other facility with appropriate resources  11/5/2022 1047 by Vikas Loyola RN  Outcome: Progressing     Problem: Pain  Goal: Verbalizes/displays adequate comfort level or baseline comfort level  11/5/2022 1047 by Vikas Loyola RN  Outcome: Progressing     Problem: Safety - Adult  Goal: Free from fall injury  11/5/2022 1047 by Vikas Loyola RN  Outcome: Progressing     Problem: Skin/Tissue Integrity  Goal: Absence of new skin breakdown  Description: 1. Monitor for areas of redness and/or skin breakdown  2. Assess vascular access sites hourly  3. Every 4-6 hours minimum:  Change oxygen saturation probe site  4. Every 4-6 hours:  If on nasal continuous positive airway pressure, respiratory therapy assess nares and determine need for appliance change or resting period.   11/5/2022 1047 by Vikas Loyola RN  Outcome: Progressing     Problem: ABCDS Injury Assessment  Goal: Absence of physical injury  11/5/2022 1047 by Vikas Loyola RN  Outcome: Progressing

## 2022-11-05 NOTE — PROGRESS NOTES
Inpatient Medical Oncology Progress Note    Subjective:  Was asleep upon my arrival but easily able to awaken. No major events overnight. Pt appeared largely comfortable. Objective:  /60   Pulse 77   Temp 98.9 °F (37.2 °C) (Oral)   Resp 18   Ht 5' 1\" (1.549 m)   Wt 133 lb (60.3 kg)   SpO2 98%   BMI 25.13 kg/m²   GENERAL: Alert, oriented x 3, tired  HEENT: PERRLA; EOMI. Oropharynx clear. LUNGS: CTA Pérez  CVS: RRR, no rubs/gallops/murmurs  GI: Distended, mildly tender  EXTREMITIES: Without clubbing, cyanosis, or edema. NEUROLOGIC: No focal deficits. ECOG PS 2    Diagnostics:  Lab Results   Component Value Date    WBC 7.1 11/05/2022    HGB 8.6 (L) 11/05/2022    HCT 26.3 (L) 11/05/2022    MCV 86.5 11/05/2022     11/05/2022     Lab Results   Component Value Date     11/05/2022    K 4.5 11/05/2022     11/05/2022    CO2 21 (L) 11/05/2022    BUN 39 (H) 11/05/2022    CREATININE 1.7 (H) 11/05/2022    GLUCOSE 93 11/05/2022    CALCIUM 8.0 (L) 11/05/2022    PROT 5.8 (L) 11/05/2022    LABALBU 2.1 (L) 11/05/2022    BILITOT <0.2 11/05/2022    ALKPHOS 314 (H) 11/05/2022    AST 41 (H) 11/05/2022    ALT 29 11/05/2022    LABGLOM 32 11/05/2022    GFRAA 49 09/14/2022     Impression/Plan:  71 y.o. female with Hx of smoking who underwent: (1) Bronchoscopy. (2) Right VATS. (3) VATS right upper lobe wedge resection. (4) VATS right upper lobectomy. (5) Intercostal nerve block from T3 to T10. (6) Mediastinal lymph node dissection on 11/11/2015:   Pathology:  Right lung, upper lobectomy: Invasive, moderately differentiated adenocarcinoma (Grade 2). Tumor size 1.5 cm in greatest dimension. Visceral pleural invasion: Not identified. Visceral pleural invasion: Not identified. Surgical margin negative for malignancy. Two of three peribronchial lymph nodes positive for adenocarcinoma. pT1a N1 MX;      Being followed for a left upper lobe mass by Dr. Dionne Canavan.  Multiple biopsies in the past came back negative for malignancy. Hypermetabolic on PET/CT scan on 09/29/2015 (2.3 cm in size with SUV of 6.4). CT guided biopsy of the left upper lobe lesion on 11/02/2015 was noted to be negative for malignancy. She was referred to the medical oncology clinic to discuss adjuvant chemotherapy. We recommended 4 cycles of adjuvant chemotherapy consisting of Carboplatin/Alimta. Mediport placed 12/7/15. -MRI Brain on 12/8/15:  Negative for metastatic disease.   -We will repeat CT chest and PET/CT after 4 cycles of Carboplatin/Alimta. To follow on Lingular lesion as well. -Molecular studies (EGFR, ALK, ROS-1) were all Not Detected. Cycle # 1 of Dulce Jimenez was on 12/09/2015. Cycle # 2 of Dulce Jimenez was on 01/06/2016. Cycle # 3 of Dulce Jimenez was on 01/27/2016. Cycle # 4 of Dulce Jimenez was on 02/24/2016. PET SCAN 3.2016: The 2.1 cm left lung mass abutting the major fissure is hypermetabolic with SUV max of 5.4. Finding is worrisome for tumor with avid glucose metabolism. 2. Elsewhere there is unremarkable distribution of FDG activity without evidence of hypermetabolic metastases. On 03/29/2016 underwent Bronch/Left VATS/VATS wedge ROMELIA/VATS Left upper lobectomy/Mediasinal lymph node dissection/Intercostal nerve block from T3-T10 per Dr. Leann Coleman. Final pathology revealed Stage I Adenocarcinoma of ROMELIA (morphologically different from the adenocarcinoma previously diagnosed in the right upper lobe. Therefore, synchronous primary tumors are favored). A. Left lung, upper lobe wedge resection: Invasive, well-differentiated adenocarcinoma (grade 1); Tumor size-1.4 cm in greatest dimension; Surgical margins-negative for malignancy; Lymphovascular invasion-not identified; TNM classification-pT2a N0 MX  B. AP window lymph node #1, excision: Anthracotic lymph node; negative for malignancy  C. AP window lymph node #2, excision: Anthracotic lymph node; negative for malignancy   D.  Periaortic lymph node #1, excision: Fibroadipose tissue; lymph node not identified  E. Bronchial lymph node #1, excision: Anthracotic lymph node; negative for malignancy  F. Left lung, upper lobectomy: Emphysematous change; negative for malignancy  4 anthracotic lymph nodes negative for malignancy   G. Inferior pulmonary ligament lymph node, excision: Anthracotic lymph node; negative for malignancy  H. Bronchial lymph node, excision: Anthracotic lymph node; negative for malignancy. Right side Mediport was removed on 11/04/2016 by Dr. Vladimir Farmer. On surveillance per NCCN guidelines. CT chest 04/12/2017 noted no convincing evidence of recurrent disease. CT chest 10/19/2017 noted no definite evidence for recurrent malignancy. CT chest 03/12/2018 negative for pulmonary parenchymal masses or enlarged mediastinal or hilar LN. ? 2 cm lesion in spleen difficult to evaluate due to the arterial phase of the study. CT Abd/Pelvis 05/08/2018  Enhancing lesion within the spleen is relatively stable to slightly smaller when compared to April 2014 exam and likely splenic hemangioma. Other indeterminate findings (left periaortic LN, sclerotic/blastic foci in L3 and L1 reported by Dr. Anabelle Bennett from radiology team). PET/CT scan 06/05/2018 unremarkable. No FDG avid uptake identified. No evidence for recurrent or metastatic disease. CT Chest 12/13/2018 noted no evidence of recurrent/metastatic disease. CT chest on 06/11/2019 noted no evidence for worrisome residual or recurrent malignancy. No evidence for new lymphadenopathy or metastatic disease. Stable scarring and postoperative changes right upper lobe. CT chest 11/26/2019: No evidence of active neoplasm. CT chest 06/15/2020: No evidence of active neoplasm. CT chest 12/30/2020: Postsurgical changes and postsurgical scarring seen within the right lung apex. There is no evidence of tumor recurrence.   2.1 x 2.3 cm enhancing lesion seen within the spleen  PET/CT scan 03/02/2021   No FDG avid uptake is identified which exceeds the threshold SUV. No convincing evidence for recurrent or metastatic disease  CT chest 09/28/2021 No evidence of tumor recurrence     Recent abdominal pain; ER visit reviewed  CT abdomen/pelvis 02/20/2022   6 mm right lower lobe pulmonary nodule  Multiple peritoneal cystic masses      CEA 12.5 on 03/16/2022     CT chest 04/05/2022 Ground-glass nodule right lower lobe superolateral portion 10 mm unchanged from prior however in the medial segment right lower lobe with pleural abutment is a 7 mm pulmonary nodule increased in size from 09/28/2021 with is barely visible at 2-3 mm; repeat CT chest in 3 mo     PET/CT scan 04/05/2022 noted No FDG avid uptake is identified which exceeds the threshold SUV      Bilateral screening mammogram 04/20/2022: Negative for malignancy     CEA     12.1 on 04/20/2022  CA-125 32.8 on 04/20/2022   <2 on 04/20/2022  Chromogranin A 1480 on 04/20/2022. EGD/Colonoscopy 05/13/2022: Gastric polyps , duodenal polyp moderate gastroduodenitis   A. Stomach, biopsy: Hyperplastic polyp and moderate chronic active gastritis; negative for intestinal metaplasia   Immunostain negative for Helicobacter pylori organisms   B. Duodenum, biopsy: Chronic duodenitis with features of peptic duodenitis   C. Colon, 20 cm biopsy: Fragments of tubular adenoma and hyperplastic polyp   Pathology reviewed. Referred to HBP team (Dr. Emanuel Aschoff) for further evaluation of peritoneal cystic masses  CT abdomen/pelvis 06/23/2022 numerous cystic structures identified throughout the right flank and retroperitoneum. Laparoscopic robotic peritoneal mass resection on 07/06/2022  Findings included: serous cystic masses along the colon, periportal lymphadenopathy, fibrotic appearing liver, chronic cholecystitis     Peritoneal fluid Negative for malignant cells.  Cellblock shows reactive mesothelial cells, lymphocytes, adipose/stromal tissue, and blood. Monolayer preparation shows few reactive mesothelial cells and blood. A.  Lymph node, periportal, biopsy:   - Reactive node showing follicular hyperplasia, negative for malignancy, see comment. B.  Remnant gallbladder, cholecystectomy:   - Portion of gallbladder wall showing dense fibrosis/scar and occluded mariaelena-gallbladder vessels. C. Soft tissue, peritoneal sac, excision:   - Peritoneal inclusion cysts (benign cystic mesothelioma) and unremarkable fibroadipose tissue. D.  Liver, needle biopsy:   - Benign hepatic parenchyma showing focal periportal and incomplete septal fibrosis (stage 2)   - Negative for significant lobular/portal necroinflammatory activity, see comment. Comment:   Sections of the lymph node in specimen A reveal normal anat architecture with secondary follicular hyperplasia. There are no cytologically atypical lymphoid cells or [place]-M[place]ha cells identified. Periportal LN Flow Cytometry noted 4.5% monoclonal B cells detected in a predominantly polyclonal background. Monoclonal B cell population (4.5% of total cells) without detectable CD5, CD10 or CD11c expression in a predominantly polyclonal background, raising the differential between a monoclonal B-cell population of undetermined significance versus a B-cell lymphoma/leukemia. In the context of B-cell lymphoma the main differential based on immunophenotype includes, but is not limited to marginal zone lymphoma/leukemia, lymphoplasmacytic lymphoma, SJ23- negative follicular lymphoma, and less likely large b cell lymphoma. In specimen D, there is no evidence of chronic hepatitis or significant steatosis. Histologic features of cirrhosis including nodules of regenerating hepatocytes and complete portal-portal bridging fibrosis are   not identified. Focal periportal fibrosis and incomplete septal fibrosis are confirmed by trichrome stain. Iron stain is also performed and is negative.       Periportal lymph node flow cytometry showing 4.5% monoclonal B cells without detectable CD5, CD10 or CD11c expression in a predominantly polyclonal background, raising the differential between a monoclonal B-cell population of undetermined significance versus a B-cell lymphoma/leukemia     Repeat CT chest to follow on history of NSCLC on 08/04/2022  Postsurgical changes are identified in the upper lobes bilaterally. 1.1 cm ground-glass nodule in the right lower lobe and a smaller 1 measuring 0.9 cm are noted without change. There is a 1.6 x 1.2 cm soft tissue mass in the right lower lobe medially which is significantly increased since the previous examination. Bilateral adrenal nodules are noted, larger 1 on the left side measuring 1.8 x 1.6 cm.  1.2 cm right renal cystic lesion is identified. An ill-defined hypodense lesion is identified in the spleen currently measuring 2.9 x 3.5 cm      Evaluated by Dr. Oliva Lara on 08/15/2022 at [facility] who recommended proceeding with a biopsy of the enlarging right lower lobe lung mass. While lymphoma certainly possible, biopsy recommended to rule out recurrent lung cancer. Periportal lymph node biopsy reviewed at [facility]. Lymph node with lipid lymphadenopathy  Small clonal B-cell population detected by flow cytometry and molecular analysis  Negative for carcinoma  Flow cytometry showed a small clonal CD5 -/CD10- B-cell population (4.5% of all events) and a background of polyclonal B cells. B-cell receptor clonality assay was positive for a clonal B-cell population. However there is no morphologic evidence of lymphoma in the lymph node. The detection of a clonal B-cell population despite negative morphologic evidence of lymphoma might represent monoclonal B-cell lymphocytosis process in the lymph node. An alternative consideration is peripheral blood involvement by B-cell clone (MBL-like process) that was picked up by the tissue flow cytometry and molecular tests.      Even if lymphoma is concerned, likely low-grade and not causing her symptoms, so observation would be recommended. PET/CT scan, CT-guided core needle biopsy of enlarging right lower lobe lung mass recommended. PET/CT scan 09/06/2022: In the region of the lesion in the right lower lobe there is FDG avid tracer uptake peak SUV is 3.2. imaging reviewed. CT guided core needle biopsy RLL lung nodule on 09/20/2022  Right lung, lower lobe core needle biopsy: Adenocarcinoma (see comment)   Comment: The prior history of right upper lobe adenocarcinoma (HES ) and left upper lobe adenocarcinoma (HES ) is noted. The electronic medical record is also reviewed. The adenocarcinoma in the current specimen is weakly immunoreactive with GATA3 and CDX2. The TTF-1 immunostain is negative. 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 Status paracentesis 11/2/2022, total of 2450 mL of ascitic fluid was removed  Cytology pending  CEA 8.5  =3503  CA 19-9 <2  Both  and CA 27.29 elevated at 68 and 85, respectively; breast nodule was detect on CT abd/pelvis  Abdominal U/S 11/04/2022 small amount of ascites in the right right lower quadrant. Paracentesis was not performed as the patient refused to give consent. Pelvic U/S large calcified uterine mass; ovaries not identified. Large volume ascites.    MRI Pelvis ordered to evaluate uterine mass   Anemia, likely secondary to malignancy, chronic inflammation, and iron deficiency  Ferrlecit infusion was given; Lovenox on hold. Hb 8.6 today 11/04/2022; Transfuse if Hb <7.0  Rad Onc for definitive RT for her adenocarcinoma lung mass. ANASTASIA; HCTZ on hold.  Hold ACE   Creat 1.7 today 11/05/2022   Review ascites fluid studies including cytology collected/processing    Carmelo Nieves MD  [address]   11/05/22 9:08 AM

## 2022-11-05 NOTE — PROGRESS NOTES
[facility] Progress Note    Admitting Date and Time: 10/31/2022 12:08 PM  Admit Dx: Abdominal pain [R10.9]    Subjective:  Patient is being followed for Abdominal pain [R10.9]     Patient states that the lidocaine patch did help with the pain    ROS: denies fever, chills, cp, sob, n/v, HA unless stated above.       docusate sodium  100 mg Oral Daily    lidocaine  1 patch TransDERmal Daily    [Held by provider] enoxaparin  30 mg SubCUTAneous Daily    [Held by provider] lisinopril  40 mg Oral Daily    [Held by provider] hydroCHLOROthiazide  25 mg Oral Daily    amLODIPine  5 mg Oral Daily    sodium chloride flush  5-40 mL IntraVENous 2 times per day    traZODone  50 mg Oral Nightly     sodium chloride, , PRN  glucose, 4 tablet, PRN  dextrose bolus, 125 mL, PRN   Or  dextrose bolus, 250 mL, PRN  glucagon (rDNA), 1 mg, PRN  dextrose, , Continuous PRN  sodium chloride flush, 5-40 mL, PRN  sodium chloride, , PRN  ondansetron, 4 mg, Q8H PRN   Or  ondansetron, 4 mg, Q6H PRN  polyethylene glycol, 17 g, Daily PRN  acetaminophen, 650 mg, Q6H PRN   Or  acetaminophen, 650 mg, Q6H PRN  morphine, 2 mg, Q3H PRN   Or  morphine, 4 mg, Q3H PRN         Objective:    /60   Pulse 77   Temp 98.9 °F (37.2 °C) (Oral)   Resp 18   Ht 5' 1\" (1.549 m)   Wt 133 lb (60.3 kg)   SpO2 98%   BMI 25.13 kg/m²     General Appearance: alert and oriented to person, place and time and in no acute distress  Skin: warm and dry  Head: normocephalic and atraumatic  Eyes: pupils equal, round, and reactive to light, extraocular eye movements intact, conjunctivae normal  Neck: neck supple and non tender without mass   Pulmonary/Chest: clear to auscultation bilaterally- no wheezes, rales or rhonchi, normal air movement, no respiratory distress  Cardiovascular: normal rate, normal S1 and S2 and no carotid bruits  Abdomen: soft, non-tender, non-distended, normal bowel sounds, no masses or organomegaly  Extremities: no cyanosis, no

## 2022-11-06 ENCOUNTER — APPOINTMENT (OUTPATIENT)
Dept: GENERAL RADIOLOGY | Age: 72
DRG: 948 | End: 2022-11-06
Payer: MEDICARE

## 2022-11-06 LAB
ALBUMIN SERPL-MCNC: 2 G/DL (ref 3.5–5.2)
ALP BLD-CCNC: 226 U/L (ref 35–104)
ALT SERPL-CCNC: 21 U/L (ref 0–32)
ANION GAP SERPL CALCULATED.3IONS-SCNC: 7 MMOL/L (ref 7–16)
AST SERPL-CCNC: 23 U/L (ref 0–31)
BILIRUB SERPL-MCNC: 0.2 MG/DL (ref 0–1.2)
BUN BLDV-MCNC: 41 MG/DL (ref 6–23)
CALCIUM SERPL-MCNC: 8.1 MG/DL (ref 8.6–10.2)
CHLORIDE BLD-SCNC: 108 MMOL/L (ref 98–107)
CO2: 21 MMOL/L (ref 22–29)
CREAT SERPL-MCNC: 1.8 MG/DL (ref 0.5–1)
GFR SERPL CREATININE-BSD FRML MDRD: 30 ML/MIN/1.73
GLUCOSE BLD-MCNC: 96 MG/DL (ref 74–99)
HCT VFR BLD CALC: 24.9 % (ref 34–48)
HEMOGLOBIN: 8.2 G/DL (ref 11.5–15.5)
LIPASE: 250 U/L (ref 13–60)
MCH RBC QN AUTO: 28.6 PG (ref 26–35)
MCHC RBC AUTO-ENTMCNC: 32.9 % (ref 32–34.5)
MCV RBC AUTO: 86.8 FL (ref 80–99.9)
METER GLUCOSE: 113 MG/DL (ref 74–99)
METER GLUCOSE: 254 MG/DL (ref 74–99)
METER GLUCOSE: 360 MG/DL (ref 74–99)
METER GLUCOSE: 87 MG/DL (ref 74–99)
METER GLUCOSE: 92 MG/DL (ref 74–99)
PDW BLD-RTO: 15.1 FL (ref 11.5–15)
PLATELET # BLD: 311 E9/L (ref 130–450)
PMV BLD AUTO: 10.3 FL (ref 7–12)
POTASSIUM SERPL-SCNC: 4.6 MMOL/L (ref 3.5–5)
RBC # BLD: 2.87 E12/L (ref 3.5–5.5)
SODIUM BLD-SCNC: 136 MMOL/L (ref 132–146)
TOTAL PROTEIN: 5.8 G/DL (ref 6.4–8.3)
WBC # BLD: 7.3 E9/L (ref 4.5–11.5)

## 2022-11-06 PROCEDURE — APPSS30 APP SPLIT SHARED TIME 16-30 MINUTES: Performed by: NURSE PRACTITIONER

## 2022-11-06 PROCEDURE — 6370000000 HC RX 637 (ALT 250 FOR IP): Performed by: INTERNAL MEDICINE

## 2022-11-06 PROCEDURE — 82962 GLUCOSE BLOOD TEST: CPT

## 2022-11-06 PROCEDURE — 72100 X-RAY EXAM L-S SPINE 2/3 VWS: CPT

## 2022-11-06 PROCEDURE — 6360000002 HC RX W HCPCS: Performed by: INTERNAL MEDICINE

## 2022-11-06 PROCEDURE — 2580000003 HC RX 258: Performed by: INTERNAL MEDICINE

## 2022-11-06 PROCEDURE — 85027 COMPLETE CBC AUTOMATED: CPT

## 2022-11-06 PROCEDURE — 99233 SBSQ HOSP IP/OBS HIGH 50: CPT | Performed by: STUDENT IN AN ORGANIZED HEALTH CARE EDUCATION/TRAINING PROGRAM

## 2022-11-06 PROCEDURE — 83690 ASSAY OF LIPASE: CPT

## 2022-11-06 PROCEDURE — 36415 COLL VENOUS BLD VENIPUNCTURE: CPT

## 2022-11-06 PROCEDURE — 80053 COMPREHEN METABOLIC PANEL: CPT

## 2022-11-06 PROCEDURE — 1200000000 HC SEMI PRIVATE

## 2022-11-06 PROCEDURE — 72072 X-RAY EXAM THORAC SPINE 3VWS: CPT

## 2022-11-06 PROCEDURE — 99232 SBSQ HOSP IP/OBS MODERATE 35: CPT | Performed by: INTERNAL MEDICINE

## 2022-11-06 RX ORDER — INSULIN LISPRO 100 [IU]/ML
0-8 INJECTION, SOLUTION INTRAVENOUS; SUBCUTANEOUS
Status: DISCONTINUED | OUTPATIENT
Start: 2022-11-06 | End: 2022-11-09 | Stop reason: HOSPADM

## 2022-11-06 RX ORDER — INSULIN LISPRO 100 [IU]/ML
0-4 INJECTION, SOLUTION INTRAVENOUS; SUBCUTANEOUS NIGHTLY
Status: DISCONTINUED | OUTPATIENT
Start: 2022-11-06 | End: 2022-11-09 | Stop reason: HOSPADM

## 2022-11-06 RX ADMIN — ACETAMINOPHEN 650 MG: 325 TABLET ORAL at 20:26

## 2022-11-06 RX ADMIN — Medication 10 ML: at 09:40

## 2022-11-06 RX ADMIN — POLYETHYLENE GLYCOL 3350 17 G: 17 POWDER, FOR SOLUTION ORAL at 06:58

## 2022-11-06 RX ADMIN — AMLODIPINE BESYLATE 5 MG: 5 TABLET ORAL at 09:39

## 2022-11-06 RX ADMIN — Medication 10 ML: at 20:27

## 2022-11-06 RX ADMIN — DOCUSATE SODIUM 100 MG: 100 CAPSULE, LIQUID FILLED ORAL at 20:26

## 2022-11-06 RX ADMIN — DOCUSATE SODIUM 100 MG: 100 CAPSULE, LIQUID FILLED ORAL at 09:39

## 2022-11-06 RX ADMIN — INSULIN LISPRO 4 UNITS: 100 INJECTION, SOLUTION INTRAVENOUS; SUBCUTANEOUS at 11:28

## 2022-11-06 RX ADMIN — TRAZODONE HYDROCHLORIDE 50 MG: 50 TABLET ORAL at 20:26

## 2022-11-06 RX ADMIN — MORPHINE SULFATE 2 MG: 2 INJECTION, SOLUTION INTRAMUSCULAR; INTRAVENOUS at 11:02

## 2022-11-06 ASSESSMENT — PAIN DESCRIPTION - DESCRIPTORS: DESCRIPTORS: ACHING;GNAWING;SORE

## 2022-11-06 ASSESSMENT — PAIN SCALES - GENERAL
PAINLEVEL_OUTOF10: 9
PAINLEVEL_OUTOF10: 0
PAINLEVEL_OUTOF10: 0

## 2022-11-06 ASSESSMENT — PAIN DESCRIPTION - ORIENTATION: ORIENTATION: LOWER

## 2022-11-06 ASSESSMENT — PAIN - FUNCTIONAL ASSESSMENT: PAIN_FUNCTIONAL_ASSESSMENT: PREVENTS OR INTERFERES WITH MANY ACTIVE NOT PASSIVE ACTIVITIES

## 2022-11-06 ASSESSMENT — PAIN DESCRIPTION - LOCATION: LOCATION: BACK

## 2022-11-06 NOTE — PROGRESS NOTES
Inpatient Medical Oncology Progress Note    Subjective:  No major events overnight. Friend was present at bedside. She doesn't feel her abdomen is becoming more distended. Objective:  BP (!) 156/70   Pulse 84   Temp 98.8 °F (37.1 °C) (Oral)   Resp 18   Ht 5' 1\" (1.549 m)   Wt 133 lb (60.3 kg)   SpO2 96%   BMI 25.13 kg/m²   GENERAL: Alert, oriented x 3, tired  HEENT: PERRLA; EOMI. Oropharynx clear. LUNGS: CTA Pérez  CVS: RRR, no rubs/gallops/murmurs  GI: Distended, mildly tender  EXTREMITIES: Without clubbing, cyanosis, or edema. NEUROLOGIC: No focal deficits. BREAST: 2cm right subareolar nodule palpated. Chaperone/RN present  ECOG PS 2    Diagnostics:  Lab Results   Component Value Date    WBC 7.3 11/06/2022    HGB 8.2 (L) 11/06/2022    HCT 24.9 (L) 11/06/2022    MCV 86.8 11/06/2022     11/06/2022     Lab Results   Component Value Date     11/06/2022    K 4.6 11/06/2022     (H) 11/06/2022    CO2 21 (L) 11/06/2022    BUN 41 (H) 11/06/2022    CREATININE 1.8 (H) 11/06/2022    GLUCOSE 96 11/06/2022    CALCIUM 8.1 (L) 11/06/2022    PROT 5.8 (L) 11/06/2022    LABALBU 2.0 (L) 11/06/2022    BILITOT 0.2 11/06/2022    ALKPHOS 226 (H) 11/06/2022    AST 23 11/06/2022    ALT 21 11/06/2022    LABGLOM 30 11/06/2022    GFRAA 49 09/14/2022     Impression/Plan:  71 y.o. female with Hx of smoking who underwent: (1) Bronchoscopy. (2) Right VATS. (3) VATS right upper lobe wedge resection. (4) VATS right upper lobectomy. (5) Intercostal nerve block from T3 to T10. (6) Mediastinal lymph node dissection on 11/11/2015:   Pathology:  Right lung, upper lobectomy: Invasive, moderately differentiated adenocarcinoma (Grade 2). Tumor size 1.5 cm in greatest dimension. Visceral pleural invasion: Not identified. Visceral pleural invasion: Not identified. Surgical margin negative for malignancy. Two of three peribronchial lymph nodes positive for adenocarcinoma.     pT1a N1 MX;      Being followed for a left upper lobe mass by Dr. Ra Nunez. Multiple biopsies in the past came back negative for malignancy. Hypermetabolic on PET/CT scan on 09/29/2015 (2.3 cm in size with SUV of 6.4). CT guided biopsy of the left upper lobe lesion on 11/02/2015 was noted to be negative for malignancy. She was referred to the medical oncology clinic to discuss adjuvant chemotherapy. We recommended 4 cycles of adjuvant chemotherapy consisting of Carboplatin/Alimta. Mediport placed 12/7/15. -MRI Brain on 12/8/15:  Negative for metastatic disease.   -We will repeat CT chest and PET/CT after 4 cycles of Carboplatin/Alimta. To follow on Lingular lesion as well. -Molecular studies (EGFR, ALK, ROS-1) were all Not Detected. Cycle # 1 of Sherral Pimple was on 12/09/2015. Cycle # 2 of Sherral Pimple was on 01/06/2016. Cycle # 3 of Sherral Pimple was on 01/27/2016. Cycle # 4 of Sherral Pimple was on 02/24/2016. PET SCAN 3.2016: The 2.1 cm left lung mass abutting the major fissure is hypermetabolic with SUV max of 5.4. Finding is worrisome for tumor with avid glucose metabolism. 2. Elsewhere there is unremarkable distribution of FDG activity without evidence of hypermetabolic metastases. On 03/29/2016 underwent Bronch/Left VATS/VATS wedge ROMELIA/VATS Left upper lobectomy/Mediasinal lymph node dissection/Intercostal nerve block from T3-T10 per Dr. Melissa Khan. Final pathology revealed Stage I Adenocarcinoma of ROMELIA (morphologically different from the adenocarcinoma previously diagnosed in the right upper lobe. Therefore, synchronous primary tumors are favored). A. Left lung, upper lobe wedge resection: Invasive, well-differentiated adenocarcinoma (grade 1); Tumor size-1.4 cm in greatest dimension; Surgical margins-negative for malignancy; Lymphovascular invasion-not identified; TNM classification-pT2a N0 MX  B. AP window lymph node #1, excision: Anthracotic lymph node; negative for malignancy  C.  AP window lymph node #2, excision: Anthracotic lymph node; negative for malignancy   D. Periaortic lymph node #1, excision: Fibroadipose tissue; lymph node not identified  E. Bronchial lymph node #1, excision: Anthracotic lymph node; negative for malignancy  F. Left lung, upper lobectomy: Emphysematous change; negative for malignancy  4 anthracotic lymph nodes negative for malignancy   G. Inferior pulmonary ligament lymph node, excision: Anthracotic lymph node; negative for malignancy  H. Bronchial lymph node, excision: Anthracotic lymph node; negative for malignancy. Right side Mediport was removed on 11/04/2016 by Dr. Cassius Jiang On surveillance per NCCN guidelines. CT chest 04/12/2017 noted no convincing evidence of recurrent disease. CT chest 10/19/2017 noted no definite evidence for recurrent malignancy. CT chest 03/12/2018 negative for pulmonary parenchymal masses or enlarged mediastinal or hilar LN. ? 2 cm lesion in spleen difficult to evaluate due to the arterial phase of the study. CT Abd/Pelvis 05/08/2018  Enhancing lesion within the spleen is relatively stable to slightly smaller when compared to April 2014 exam and likely splenic hemangioma. Other indeterminate findings (left periaortic LN, sclerotic/blastic foci in L3 and L1 reported by Dr. King Grayson from radiology team). PET/CT scan 06/05/2018 unremarkable. No FDG avid uptake identified. No evidence for recurrent or metastatic disease. CT Chest 12/13/2018 noted no evidence of recurrent/metastatic disease. CT chest on 06/11/2019 noted no evidence for worrisome residual or recurrent malignancy. No evidence for new lymphadenopathy or metastatic disease. Stable scarring and postoperative changes right upper lobe. CT chest 11/26/2019: No evidence of active neoplasm. CT chest 06/15/2020: No evidence of active neoplasm. CT chest 12/30/2020: Postsurgical changes and postsurgical scarring seen within the right lung apex.   There is no evidence of tumor recurrence. 2.1 x 2.3 cm enhancing lesion seen within the spleen  PET/CT scan 03/02/2021   No FDG avid uptake is identified which exceeds the threshold SUV. No convincing evidence for recurrent or metastatic disease  CT chest 09/28/2021 No evidence of tumor recurrence     Recent abdominal pain; ER visit reviewed  CT abdomen/pelvis 02/20/2022   6 mm right lower lobe pulmonary nodule  Multiple peritoneal cystic masses      CEA 12.5 on 03/16/2022     CT chest 04/05/2022 Ground-glass nodule right lower lobe superolateral portion 10 mm unchanged from prior however in the medial segment right lower lobe with pleural abutment is a 7 mm pulmonary nodule increased in size from 09/28/2021 with is barely visible at 2-3 mm; repeat CT chest in 3 mo     PET/CT scan 04/05/2022 noted No FDG avid uptake is identified which exceeds the threshold SUV      Bilateral screening mammogram 04/20/2022: Negative for malignancy     CEA     12.1 on 04/20/2022  CA-125 32.8 on 04/20/2022   <2 on 04/20/2022  Chromogranin A 1480 on 04/20/2022. EGD/Colonoscopy 05/13/2022: Gastric polyps , duodenal polyp moderate gastroduodenitis   A. Stomach, biopsy: Hyperplastic polyp and moderate chronic active gastritis; negative for intestinal metaplasia   Immunostain negative for Helicobacter pylori organisms   B. Duodenum, biopsy: Chronic duodenitis with features of peptic duodenitis   C. Colon, 20 cm biopsy: Fragments of tubular adenoma and hyperplastic polyp   Pathology reviewed. Referred to HBP team (Dr. Jas Saleem) for further evaluation of peritoneal cystic masses  CT abdomen/pelvis 06/23/2022 numerous cystic structures identified throughout the right flank and retroperitoneum. Laparoscopic robotic peritoneal mass resection on 07/06/2022  Findings included: serous cystic masses along the colon, periportal lymphadenopathy, fibrotic appearing liver, chronic cholecystitis     Peritoneal fluid Negative for malignant cells.  Cellblock shows reactive mesothelial cells, lymphocytes, adipose/stromal tissue, and blood. Monolayer preparation shows few reactive mesothelial cells and blood. A.  Lymph node, periportal, biopsy:   - Reactive node showing follicular hyperplasia, negative for malignancy, see comment. B.  Remnant gallbladder, cholecystectomy:   - Portion of gallbladder wall showing dense fibrosis/scar and occluded mariaelena-gallbladder vessels. C. Soft tissue, peritoneal sac, excision:   - Peritoneal inclusion cysts (benign cystic mesothelioma) and unremarkable fibroadipose tissue. D.  Liver, needle biopsy:   - Benign hepatic parenchyma showing focal periportal and incomplete septal fibrosis (stage 2)   - Negative for significant lobular/portal necroinflammatory activity, see comment. Comment:   Sections of the lymph node in specimen A reveal normal anat architecture with secondary follicular hyperplasia. There are no cytologically atypical lymphoid cells or Loretto-Marsha cells identified. Periportal LN Flow Cytometry noted 4.5% monoclonal B cells detected in a predominantly polyclonal background. Monoclonal B cell population (4.5% of total cells) without detectable CD5, CD10 or CD11c expression in a predominantly polyclonal background, raising the differential between a monoclonal B-cell population of undetermined significance versus a B-cell lymphoma/leukemia. In the context of B-cell lymphoma the main differential based on immunophenotype includes, but is not limited to marginal zone lymphoma/leukemia, lymphoplasmacytic lymphoma, YE21- negative follicular lymphoma, and less likely large b cell lymphoma. In specimen D, there is no evidence of chronic hepatitis or significant steatosis. Histologic features of cirrhosis including nodules of regenerating hepatocytes and complete portal-portal bridging fibrosis are   not identified. Focal periportal fibrosis and incomplete septal fibrosis are confirmed by trichrome stain. Iron stain is also performed and is negative. Periportal lymph node flow cytometry showing 4.5% monoclonal B cells without detectable CD5, CD10 or CD11c expression in a predominantly polyclonal background, raising the differential between a monoclonal B-cell population of undetermined significance versus a B-cell lymphoma/leukemia     Repeat CT chest to follow on history of NSCLC on 08/04/2022  Postsurgical changes are identified in the upper lobes bilaterally. 1.1 cm ground-glass nodule in the right lower lobe and a smaller 1 measuring 0.9 cm are noted without change. There is a 1.6 x 1.2 cm soft tissue mass in the right lower lobe medially which is significantly increased since the previous examination. Bilateral adrenal nodules are noted, larger 1 on the left side measuring 1.8 x 1.6 cm.  1.2 cm right renal cystic lesion is identified. An ill-defined hypodense lesion is identified in the spleen currently measuring 2.9 x 3.5 cm      Evaluated by Dr. Ryanne Mora on 08/15/2022 at [facility] who recommended proceeding with a biopsy of the enlarging right lower lobe lung mass. While lymphoma certainly possible, biopsy recommended to rule out recurrent lung cancer. Periportal lymph node biopsy reviewed at [facility]. Lymph node with lipid lymphadenopathy  Small clonal B-cell population detected by flow cytometry and molecular analysis  Negative for carcinoma  Flow cytometry showed a small clonal CD5 -/CD10- B-cell population (4.5% of all events) and a background of polyclonal B cells. B-cell receptor clonality assay was positive for a clonal B-cell population. However there is no morphologic evidence of lymphoma in the lymph node. The detection of a clonal B-cell population despite negative morphologic evidence of lymphoma might represent monoclonal B-cell lymphocytosis process in the lymph node.   An alternative consideration is peripheral blood involvement by B-cell clone (MBL-like process) that was picked up by the tissue flow cytometry and molecular tests. Even if lymphoma is concerned, likely low-grade and not causing her symptoms, so observation would be recommended. PET/CT scan, CT-guided core needle biopsy of enlarging right lower lobe lung mass recommended. PET/CT scan 09/06/2022: In the region of the lesion in the right lower lobe there is FDG avid tracer uptake peak SUV is 3.2. imaging reviewed. CT guided core needle biopsy RLL lung nodule on 09/20/2022  Right lung, lower lobe core needle biopsy: Adenocarcinoma (see comment)   Comment: The prior history of right upper lobe adenocarcinoma (HES ) and left upper lobe adenocarcinoma (HES ) is noted. The electronic medical record is also reviewed. The adenocarcinoma in the current specimen is weakly immunoreactive with GATA3 and CDX2. The TTF-1 immunostain is negative. 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 Status paracentesis 11/2/2022, total of 2450 mL of ascitic fluid was removed  Cytology pending  CEA 8.5  =1321  CA 19-9 <2  Anemia, likely secondary to malignancy, chronic inflammation, and iron deficiency    Pelvic U/S large calcified uterine mass; ovaries not identified. Large volume ascites.    MRI Pelvis w/out contrast done and I reviewed results    Hb 8.2 today ; Transfuse if Hb <7.0    Right breast breast nodule was detect on CT abd/pelvis  Breast exam done on 11/6/22 and a 2 cm nodule subareolar was palpated on right breast. Chaperone/RN present  Both  and CA 27.29 elevated at 68 and 85, respectively  It appears breast u/s was cancelled, so will tentatively need to do this as an outpatient    Rad Onc for definitive RT for her adenocarcinoma lung mass. Seen by Dr. Bruce Holloway on 11/4. I reviewed assessment/recommendations. Review ascites fluid studies including cytology collected/processing  Consulted GYN. Appreciate recs.   I discussed with patient and her friend regarding our evaluation and plan  Will discuss with pathology regarding case      Jeremy Currie MD  Medical Oncology  [address] [facility]  11/06/22 8:44 AM

## 2022-11-06 NOTE — PLAN OF CARE
Problem: Discharge Planning  Goal: Discharge to home or other facility with appropriate resources  11/6/2022 1104 by Beatriz Yousif RN  Outcome: Progressing     Problem: Pain  Goal: Verbalizes/displays adequate comfort level or baseline comfort level  11/6/2022 1104 by Beatriz Yousif RN  Outcome: Progressing     Problem: Safety - Adult  Goal: Free from fall injury  11/6/2022 1104 by Beatriz Yousif RN  Outcome: Progressing     Problem: Skin/Tissue Integrity  Goal: Absence of new skin breakdown  Description: 1. Monitor for areas of redness and/or skin breakdown  2. Assess vascular access sites hourly  3. Every 4-6 hours minimum:  Change oxygen saturation probe site  4. Every 4-6 hours:  If on nasal continuous positive airway pressure, respiratory therapy assess nares and determine need for appliance change or resting period.   11/6/2022 1104 by Beatriz Yousif RN  Outcome: Progressing     Problem: ABCDS Injury Assessment  Goal: Absence of physical injury  11/6/2022 1104 by Beatriz Yousif RN  Outcome: Progressing

## 2022-11-06 NOTE — PROGRESS NOTES
[facility] Progress Note    Admitting Date and Time: 10/31/2022 12:08 PM  Admit Dx: Abdominal pain [R10.9]    Subjective:  Patient is being followed for Abdominal pain [R10.9]     Patient sitting up in chair in room in no acute distress  Had BM this am  Reporting biggest complaint is her lower back  Reporting she did have a fall at home onto her side prior to ER   Reporting she does not normally have back pain        ROS: denies fever, chills, cp, sob, n/v, HA unless stated above.       docusate sodium  100 mg Oral BID    lidocaine  1 patch TransDERmal Daily    [Held by provider] enoxaparin  30 mg SubCUTAneous Daily    [Held by provider] lisinopril  40 mg Oral Daily    [Held by provider] hydroCHLOROthiazide  25 mg Oral Daily    amLODIPine  5 mg Oral Daily    sodium chloride flush  5-40 mL IntraVENous 2 times per day    traZODone  50 mg Oral Nightly     sodium chloride, , PRN  glucose, 4 tablet, PRN  dextrose bolus, 125 mL, PRN   Or  dextrose bolus, 250 mL, PRN  glucagon (rDNA), 1 mg, PRN  dextrose, , Continuous PRN  sodium chloride flush, 5-40 mL, PRN  sodium chloride, , PRN  ondansetron, 4 mg, Q8H PRN   Or  ondansetron, 4 mg, Q6H PRN  polyethylene glycol, 17 g, Daily PRN  acetaminophen, 650 mg, Q6H PRN   Or  acetaminophen, 650 mg, Q6H PRN  morphine, 2 mg, Q3H PRN   Or  morphine, 4 mg, Q3H PRN         Objective:    /66   Pulse 86   Temp 99.2 °F (37.3 °C) (Oral)   Resp 18   Ht 5' 1\" (1.549 m)   Wt 133 lb (60.3 kg)   SpO2 97%   BMI 25.13 kg/m²     General Appearance: alert and oriented to person, place and time and in no acute distress  Skin: warm and dry  Head: normocephalic and atraumatic  Neck: neck supple and non tender without mass   Pulmonary/Chest: clear to auscultation bilaterally-  Cardiovascular: normal rate, normal S1 and S2 and no carotid bruits  Abdomen: soft, non-tender, distended, normal bowel sounds  Extremities: no cyanosis, no clubbing and no edema  Neurologic: speech normal               Recent Labs     11/04/22  0309 11/05/22  0530 11/06/22  0241    134 136   K 4.3 4.5 4.6    104 108*   CO2 22 21* 21*   BUN 40* 39* 41*   CREATININE 1.9* 1.7* 1.8*   GLUCOSE 103* 93 96   CALCIUM 8.4* 8.0* 8.1*       Recent Labs     11/04/22  0309 11/05/22  0530 11/06/22  0241   WBC 5.6 7.1 7.3   RBC 3.09* 3.04* 2.87*   HGB 8.7* 8.6* 8.2*   HCT 26.6* 26.3* 24.9*   MCV 86.1 86.5 86.8   MCH 28.2 28.3 28.6   MCHC 32.7 32.7 32.9   RDW 14.7 15.1* 15.1*    310 311   MPV 10.5 10.3 10.3         Assessment:    Principal Problem:    Abdominal pain  Active Problems:    Other ascites  Resolved Problems:    * No resolved hospital problems. *      Plan:  1. Massive ascites and bilateral pleural effusions in the setting of lung cancer with suspected peritoneal metastasis vs underlying liver disease: pt presented to the ER with 2 weeks of increasing abdominal swelling and distention with associated pain. S/p paracentesis on 11/1 with 2,450 ml off- but appears fluid was discarded and no analysis was completed. Hem/onc input appreciated. Checking tumor markers. US of right breast lesion unable to be completed at this facility- will need to be done outpt. Will need cytology at some point to eval whether or not represents metastasis vs ? Secondary primary malignancy. Plan to start radiation soon. Currently attempting to look into cause of ascites. Pelvis US reviewed- showing large calcified uterine mass. Pelvic MRI reviewed- showing no suspicious uterine or endometrial mass. Multiple calcified uterine leoimyomas. C/o back pain- trial of lidocaine patch. May be able to attempt paracentesis again to get fluid evaluation- not enough fluid/ pt refused consent on 11/4. Biggest complaint today is back pain- will get imaging of back. 2. ANASTASIA:  creatinine 1.4 on admission. Noted to be trending up. Creatinine peaked at 2.0. May be volume mediated. Pt did have IV contrast. Urine studies ordered.  Avoid nephrotoxic meds. HCTZ on hold. Hold ace today. Creatinine 1.8 today. 3. Lactic acidosis: resolved     4. Elevated lipase: avoid IVF given the above. Lipase noted to be fluctuating. 250.      5. Bowel thickening seen on CT- effect of ascites vs gastroenterotitis     6. Normocytic anemia: hg 6.3 11/3 Appears hg has been running 7-8 range. No s/s bleeding. May be due to underlying cancer. ? KESHAWN- hem onc started on IV iron. Check occult stool/ anemia panel. 1 unit PRBC ordered. Lovenox placed on hold. Hg 8.2     7. HTN: continue meds- ace on hold. 8. Lung cancer: adenocarcinoma: hem/ onc following     9. Deconditioning: PT/OT- am pac 13 - ? [place]- social work looking into      10. Right breast lesion: US ordered. Unable to be completed at this facility. Will need outpatient breast US     11. Constipation: bowel regimen. Continue stool softener. Dispo: am pac score 13. ? [place]. Will need to discuss further with social work in AM             Time spent reviewing chart, clinical exam, discussing case and answering questions with staff/consultants/patient/family = 20 minutes          NOTE: This report was transcribed using voice recognition software. Every effort was made to ensure accuracy; however, inadvertent computerized transcription errors may be present. Electronically signed by STEPHANIE Carpenter on 11/6/2022 at 7:51 AM     Addendum: I have personally participated in the history, exam, medical decision making with Kiran Gonzalez on the date of service and I agree with all of the pertinent clinical information unless otherwise noted. I have also reviewed and agree with the past medical, family, and social history unless otherwise noted. Patient was admitted with weakness.      PHYSICAL EXAM:  Vitals:  BP (!) 156/70   Pulse 84   Temp 98.8 °F (37.1 °C) (Oral)   Resp 18   Ht 5' 1\" (1.549 m)   Wt 133 lb (60.3 kg)   SpO2 96%   BMI 25.13 kg/m²   Gen: awake, alert, NAD  Lungs: clear to auscultation bilaterally no crackles no wheezing. Heart: RRR, no murmur   Abdomen: soft nontender nondistended positive bowel sounds. Extremities: full range of motion no peripheral edema. Impression:  Principal Problem:    Abdominal pain  Active Problems:    Other ascites  Resolved Problems:    * No resolved hospital problems. *      My findings/plan include:    MRI pelvis is negative for uterine mass. PT/OT have been consulted. Pending discharge planning. NOTE: This report was transcribed using voice recognition software. Every effort was made to ensure accuracy; however, inadvertent computerized transcription errors may be present.     Electronically signed by Mega Carver DO on 11/6/2022 at 12:36 PM

## 2022-11-06 NOTE — PROGRESS NOTES
Notified Lisa Shay NP that pt. Is agreeable to paracentesis if procedure is warranted.     Electronically signed by Valdo Gamino RN on 11/6/2022 at 1:39 PM

## 2022-11-06 NOTE — PROGRESS NOTES
Notified Dr. Henry Glover of  - medium dose slide scale ordered.     Electronically signed by Kate Jurado RN on 11/6/2022 at 11:09 AM

## 2022-11-07 ENCOUNTER — TELEPHONE (OUTPATIENT)
Dept: PULMONOLOGY | Age: 72
End: 2022-11-07

## 2022-11-07 PROBLEM — E44.0 MODERATE PROTEIN-CALORIE MALNUTRITION (HCC): Status: ACTIVE | Noted: 2022-01-01

## 2022-11-07 LAB
ALBUMIN SERPL-MCNC: 2.2 G/DL (ref 3.5–5.2)
ALP BLD-CCNC: 196 U/L (ref 35–104)
ALT SERPL-CCNC: 16 U/L (ref 0–32)
ANION GAP SERPL CALCULATED.3IONS-SCNC: 9 MMOL/L (ref 7–16)
AST SERPL-CCNC: 22 U/L (ref 0–31)
BILIRUB SERPL-MCNC: <0.2 MG/DL (ref 0–1.2)
BUN BLDV-MCNC: 40 MG/DL (ref 6–23)
CALCIUM SERPL-MCNC: 8.3 MG/DL (ref 8.6–10.2)
CHLORIDE BLD-SCNC: 104 MMOL/L (ref 98–107)
CO2: 20 MMOL/L (ref 22–29)
CREAT SERPL-MCNC: 1.6 MG/DL (ref 0.5–1)
GFR SERPL CREATININE-BSD FRML MDRD: 34 ML/MIN/1.73
GLUCOSE BLD-MCNC: 83 MG/DL (ref 74–99)
HCT VFR BLD CALC: 25 % (ref 34–48)
HEMOGLOBIN: 8.1 G/DL (ref 11.5–15.5)
LIPASE: 266 U/L (ref 13–60)
MCH RBC QN AUTO: 28 PG (ref 26–35)
MCHC RBC AUTO-ENTMCNC: 32.4 % (ref 32–34.5)
MCV RBC AUTO: 86.5 FL (ref 80–99.9)
METER GLUCOSE: 79 MG/DL (ref 74–99)
METER GLUCOSE: 81 MG/DL (ref 74–99)
METER GLUCOSE: 91 MG/DL (ref 74–99)
METER GLUCOSE: 95 MG/DL (ref 74–99)
PDW BLD-RTO: 15.4 FL (ref 11.5–15)
PLATELET # BLD: 335 E9/L (ref 130–450)
PMV BLD AUTO: 10 FL (ref 7–12)
POTASSIUM SERPL-SCNC: 4.6 MMOL/L (ref 3.5–5)
RBC # BLD: 2.89 E12/L (ref 3.5–5.5)
SODIUM BLD-SCNC: 133 MMOL/L (ref 132–146)
TOTAL PROTEIN: 5.9 G/DL (ref 6.4–8.3)
WBC # BLD: 6.2 E9/L (ref 4.5–11.5)

## 2022-11-07 PROCEDURE — 97535 SELF CARE MNGMENT TRAINING: CPT

## 2022-11-07 PROCEDURE — 99233 SBSQ HOSP IP/OBS HIGH 50: CPT | Performed by: STUDENT IN AN ORGANIZED HEALTH CARE EDUCATION/TRAINING PROGRAM

## 2022-11-07 PROCEDURE — 99233 SBSQ HOSP IP/OBS HIGH 50: CPT | Performed by: INTERNAL MEDICINE

## 2022-11-07 PROCEDURE — 89051 BODY FLUID CELL COUNT: CPT

## 2022-11-07 PROCEDURE — 80053 COMPREHEN METABOLIC PANEL: CPT

## 2022-11-07 PROCEDURE — 97530 THERAPEUTIC ACTIVITIES: CPT

## 2022-11-07 PROCEDURE — 6370000000 HC RX 637 (ALT 250 FOR IP): Performed by: INTERNAL MEDICINE

## 2022-11-07 PROCEDURE — 36415 COLL VENOUS BLD VENIPUNCTURE: CPT

## 2022-11-07 PROCEDURE — 2580000003 HC RX 258: Performed by: INTERNAL MEDICINE

## 2022-11-07 PROCEDURE — 1200000000 HC SEMI PRIVATE

## 2022-11-07 PROCEDURE — 83690 ASSAY OF LIPASE: CPT

## 2022-11-07 PROCEDURE — 85027 COMPLETE CBC AUTOMATED: CPT

## 2022-11-07 PROCEDURE — 82962 GLUCOSE BLOOD TEST: CPT

## 2022-11-07 RX ADMIN — AMLODIPINE BESYLATE 5 MG: 5 TABLET ORAL at 09:25

## 2022-11-07 RX ADMIN — DOCUSATE SODIUM 100 MG: 100 CAPSULE, LIQUID FILLED ORAL at 09:25

## 2022-11-07 RX ADMIN — DOCUSATE SODIUM 100 MG: 100 CAPSULE, LIQUID FILLED ORAL at 22:18

## 2022-11-07 RX ADMIN — POLYETHYLENE GLYCOL 3350 17 G: 17 POWDER, FOR SOLUTION ORAL at 13:19

## 2022-11-07 RX ADMIN — Medication 10 ML: at 09:26

## 2022-11-07 RX ADMIN — TRAZODONE HYDROCHLORIDE 50 MG: 50 TABLET ORAL at 22:18

## 2022-11-07 RX ADMIN — Medication 10 ML: at 22:18

## 2022-11-07 ASSESSMENT — PAIN SCALES - GENERAL: PAINLEVEL_OUTOF10: 0

## 2022-11-07 NOTE — CARE COORDINATION
Follow up bedside visit. Snf list reviewed again. Patient and family voiced selection of [facility] at [facility]. Referral called to Elisabeth Medellin at same. Will await her review and response regarding bed availability/acceptance. Fermin Glover. Payam, MSN RN  [facility] Case Management  [phone]    Per Elisabeth Medellin, need clarification on commencement of radiation therapy. Facility cannot accept if sessions are to begin while in rehab. Charge nurse aware and has reached out to rad/onc for defined plan. Will follow along with  and assist with discharge planning as necessary. Marni Barraza.  Payam, MSN RN  [facility] Case Management  [phone]

## 2022-11-07 NOTE — PLAN OF CARE
Problem: Discharge Planning  Goal: Discharge to home or other facility with appropriate resources  11/7/2022 1203 by Juanito Thomas RN  Outcome: Progressing     Problem: Pain  Goal: Verbalizes/displays adequate comfort level or baseline comfort level  11/7/2022 1203 by Juanito Thomas RN  Outcome: Progressing     Problem: Safety - Adult  Goal: Free from fall injury  11/7/2022 1203 by Juanito Thomas RN  Outcome: Progressing     Problem: Skin/Tissue Integrity  Goal: Absence of new skin breakdown  Description: 1. Monitor for areas of redness and/or skin breakdown  2. Assess vascular access sites hourly  3. Every 4-6 hours minimum:  Change oxygen saturation probe site  4. Every 4-6 hours:  If on nasal continuous positive airway pressure, respiratory therapy assess nares and determine need for appliance change or resting period.   11/7/2022 1203 by Juanito Thomas RN  Outcome: Progressing     Problem: ABCDS Injury Assessment  Goal: Absence of physical injury  11/7/2022 1203 by Juanito Thomas RN  Outcome: Progressing

## 2022-11-07 NOTE — TELEPHONE ENCOUNTER
A message was left with the patient regarding her admission and her appointment scheduled for Thursday. I called to check on how she was doing and that since she was in the hospital we are going to cancel her appointment and reschedule her. Patient was encouraged to call the office if she needed anything.

## 2022-11-07 NOTE — PROGRESS NOTES
[facility] Progress Note    Admitting Date and Time: 10/31/2022 12:08 PM  Admit Dx: Abdominal pain [R10.9]    Subjective:  Patient is being followed for Abdominal pain [R10.9]     Patient resting in bed and willing to get paracentesis done. ROS: denies fever, chills, cp, sob, n/v, HA unless stated above.       insulin lispro  0-8 Units SubCUTAneous TID WC    insulin lispro  0-4 Units SubCUTAneous Nightly    docusate sodium  100 mg Oral BID    lidocaine  1 patch TransDERmal Daily    [Held by provider] enoxaparin  30 mg SubCUTAneous Daily    [Held by provider] lisinopril  40 mg Oral Daily    [Held by provider] hydroCHLOROthiazide  25 mg Oral Daily    amLODIPine  5 mg Oral Daily    sodium chloride flush  5-40 mL IntraVENous 2 times per day    traZODone  50 mg Oral Nightly     sodium chloride, , PRN  glucose, 4 tablet, PRN  dextrose bolus, 125 mL, PRN   Or  dextrose bolus, 250 mL, PRN  glucagon (rDNA), 1 mg, PRN  dextrose, , Continuous PRN  sodium chloride flush, 5-40 mL, PRN  sodium chloride, , PRN  ondansetron, 4 mg, Q8H PRN   Or  ondansetron, 4 mg, Q6H PRN  polyethylene glycol, 17 g, Daily PRN  acetaminophen, 650 mg, Q6H PRN   Or  acetaminophen, 650 mg, Q6H PRN  morphine, 2 mg, Q3H PRN   Or  morphine, 4 mg, Q3H PRN         Objective:    BP (!) 154/78   Pulse 74   Temp 98.4 °F (36.9 °C) (Oral)   Resp 18   Ht 5' 1\" (1.549 m)   Wt 127 lb (57.6 kg)   SpO2 98%   BMI 24.00 kg/m²     General Appearance: alert and oriented to person, place and time and in no acute distress  Skin: warm and dry  Head: normocephalic and atraumatic  Eyes: pupils equal, round, and reactive to light, extraocular eye movements intact, conjunctivae normal  Neck: neck supple and non tender without mass   Pulmonary/Chest: clear to auscultation bilaterally- no wheezes, rales or rhonchi, normal air movement, no respiratory distress  Cardiovascular: normal rate, normal S1 and S2 and no carotid bruits  Abdomen: soft, non-tender, non-distended, normal bowel sounds, no masses or organomegaly  Extremities: no cyanosis, no clubbing and no edema  Neurologic: no cranial nerve deficit and speech normal        Recent Labs     11/05/22  0530 11/06/22  0241 11/07/22  0105    136 133   K 4.5 4.6 4.6    108* 104   CO2 21* 21* 20*   BUN 39* 41* 40*   CREATININE 1.7* 1.8* 1.6*   GLUCOSE 93 96 83   CALCIUM 8.0* 8.1* 8.3*       Recent Labs     11/05/22  0530 11/06/22  0241 11/07/22  0105   WBC 7.1 7.3 6.2   RBC 3.04* 2.87* 2.89*   HGB 8.6* 8.2* 8.1*   HCT 26.3* 24.9* 25.0*   MCV 86.5 86.8 86.5   MCH 28.3 28.6 28.0   MCHC 32.7 32.9 32.4   RDW 15.1* 15.1* 15.4*    311 335   MPV 10.3 10.3 10.0         Assessment:    Principal Problem:    Abdominal pain  Active Problems:    Other ascites  Resolved Problems:    * No resolved hospital problems. 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 Patient had a previous paracentesis done however no diagnostic studies were performed. Today patient is willing to get a diagnostic paracentesis and so will follow up with cytology. Patient does have a 1.5 cm right subareolar breast lump that will need outpatient breast US. She will be medically ready for discharge after paracentesis. Plan for discharge to [facility]. NOTE: This report was transcribed using voice recognition software. Every effort was made to ensure accuracy; however, inadvertent computerized transcription errors may be present.   Electronically signed by Heather Gilbert DO on 11/7/2022 at 2:52 PM

## 2022-11-07 NOTE — PROGRESS NOTES
Inpatient Medical Oncology Progress Note    Subjective:  No major events overnight. She does have some back pain, in which x-rays were done, showing a changes. Denies of fevers, Chills, bleed, nausea or vomiting. Objective:  BP (!) 154/78   Pulse 74   Temp 98.4 °F (36.9 °C) (Oral)   Resp 18   Ht 5' 1\" (1.549 m)   Wt 127 lb (57.6 kg)   SpO2 98%   BMI 24.00 kg/m²   GENERAL: Alert, oriented x 3, tired  HEENT: PERRLA; EOMI. Oropharynx clear. LUNGS: Lung sounds distant, without overt wheezes. CVS: RRR, no rubs/gallops/murmurs  GI: More distended today, mildly tender  EXTREMITIES: Without clubbing, cyanosis, or edema. NEUROLOGIC: No focal deficits. ECOG PS 2    Diagnostics:  Lab Results   Component Value Date    WBC 6.2 11/07/2022    HGB 8.1 (L) 11/07/2022    HCT 25.0 (L) 11/07/2022    MCV 86.5 11/07/2022     11/07/2022     Lab Results   Component Value Date     11/07/2022    K 4.6 11/07/2022     11/07/2022    CO2 20 (L) 11/07/2022    BUN 40 (H) 11/07/2022    CREATININE 1.6 (H) 11/07/2022    GLUCOSE 83 11/07/2022    CALCIUM 8.3 (L) 11/07/2022    PROT 5.9 (L) 11/07/2022    LABALBU 2.2 (L) 11/07/2022    BILITOT <0.2 11/07/2022    ALKPHOS 196 (H) 11/07/2022    AST 22 11/07/2022    ALT 16 11/07/2022    LABGLOM 34 11/07/2022    GFRAA 49 09/14/2022     Impression/Plan:  71 y.o. female with Hx of smoking who underwent: (1) Bronchoscopy. (2) Right VATS. (3) VATS right upper lobe wedge resection. (4) VATS right upper lobectomy. (5) Intercostal nerve block from T3 to T10. (6) Mediastinal lymph node dissection on 11/11/2015:   Pathology:  Right lung, upper lobectomy: Invasive, moderately differentiated adenocarcinoma (Grade 2). Tumor size 1.5 cm in greatest dimension. Visceral pleural invasion: Not identified. Visceral pleural invasion: Not identified. Surgical margin negative for malignancy. Two of three peribronchial lymph nodes positive for adenocarcinoma.     pT1a N1 MX;      Being followed for a left upper lobe mass by Dr. Reg Geronimo. Multiple biopsies in the past came back negative for malignancy. Hypermetabolic on PET/CT scan on 09/29/2015 (2.3 cm in size with SUV of 6.4). CT guided biopsy of the left upper lobe lesion on 11/02/2015 was noted to be negative for malignancy. She was referred to the medical oncology clinic to discuss adjuvant chemotherapy. We recommended 4 cycles of adjuvant chemotherapy consisting of Carboplatin/Alimta. Mediport placed 12/7/15. -MRI Brain on 12/8/15:  Negative for metastatic disease.   -We will repeat CT chest and PET/CT after 4 cycles of Carboplatin/Alimta. To follow on Lingular lesion as well. -Molecular studies (EGFR, ALK, ROS-1) were all Not Detected. Cycle # 1 of Ryan Specter was on 12/09/2015. Cycle # 2 of Ryan Specter was on 01/06/2016. Cycle # 3 of Ryan Specter was on 01/27/2016. Cycle # 4 of Ryan Specter was on 02/24/2016. PET SCAN 3.2016: The 2.1 cm left lung mass abutting the major fissure is hypermetabolic with SUV max of 5.4. Finding is worrisome for tumor with avid glucose metabolism. 2. Elsewhere there is unremarkable distribution of FDG activity without evidence of hypermetabolic metastases. On 03/29/2016 underwent Bronch/Left VATS/VATS wedge ROMELIA/VATS Left upper lobectomy/Mediasinal lymph node dissection/Intercostal nerve block from T3-T10 per Dr. Jeremías Kendall. Final pathology revealed Stage I Adenocarcinoma of ROMELIA (morphologically different from the adenocarcinoma previously diagnosed in the right upper lobe. Therefore, synchronous primary tumors are favored). A. Left lung, upper lobe wedge resection: Invasive, well-differentiated adenocarcinoma (grade 1); Tumor size-1.4 cm in greatest dimension; Surgical margins-negative for malignancy; Lymphovascular invasion-not identified; TNM classification-pT2a N0 MX  B. AP window lymph node #1, excision: Anthracotic lymph node; negative for malignancy  C.  AP window lymph node #2, excision: Anthracotic lymph node; negative for malignancy   D. Periaortic lymph node #1, excision: Fibroadipose tissue; lymph node not identified  E. Bronchial lymph node #1, excision: Anthracotic lymph node; negative for malignancy  F. Left lung, upper lobectomy: Emphysematous change; negative for malignancy  4 anthracotic lymph nodes negative for malignancy   G. Inferior pulmonary ligament lymph node, excision: Anthracotic lymph node; negative for malignancy  H. Bronchial lymph node, excision: Anthracotic lymph node; negative for malignancy. Right side Mediport was removed on 11/04/2016 by Dr. Lou Xiong. On surveillance per NCCN guidelines. CT chest 04/12/2017 noted no convincing evidence of recurrent disease. CT chest 10/19/2017 noted no definite evidence for recurrent malignancy. CT chest 03/12/2018 negative for pulmonary parenchymal masses or enlarged mediastinal or hilar LN. ? 2 cm lesion in spleen difficult to evaluate due to the arterial phase of the study. CT Abd/Pelvis 05/08/2018  Enhancing lesion within the spleen is relatively stable to slightly smaller when compared to April 2014 exam and likely splenic hemangioma. Other indeterminate findings (left periaortic LN, sclerotic/blastic foci in L3 and L1 reported by Dr. Dwain Zimmer from radiology team). PET/CT scan 06/05/2018 unremarkable. No FDG avid uptake identified. No evidence for recurrent or metastatic disease. CT Chest 12/13/2018 noted no evidence of recurrent/metastatic disease. CT chest on 06/11/2019 noted no evidence for worrisome residual or recurrent malignancy. No evidence for new lymphadenopathy or metastatic disease. Stable scarring and postoperative changes right upper lobe. CT chest 11/26/2019: No evidence of active neoplasm. CT chest 06/15/2020: No evidence of active neoplasm. CT chest 12/30/2020: Postsurgical changes and postsurgical scarring seen within the right lung apex.   There is no evidence of tumor recurrence. 2.1 x 2.3 cm enhancing lesion seen within the spleen  PET/CT scan 03/02/2021   No FDG avid uptake is identified which exceeds the threshold SUV. No convincing evidence for recurrent or metastatic disease  CT chest 09/28/2021 No evidence of tumor recurrence     Recent abdominal pain; ER visit reviewed  CT abdomen/pelvis 02/20/2022   6 mm right lower lobe pulmonary nodule  Multiple peritoneal cystic masses      CEA 12.5 on 03/16/2022     CT chest 04/05/2022 Ground-glass nodule right lower lobe superolateral portion 10 mm unchanged from prior however in the medial segment right lower lobe with pleural abutment is a 7 mm pulmonary nodule increased in size from 09/28/2021 with is barely visible at 2-3 mm; repeat CT chest in 3 mo     PET/CT scan 04/05/2022 noted No FDG avid uptake is identified which exceeds the threshold SUV      Bilateral screening mammogram 04/20/2022: Negative for malignancy     CEA     12.1 on 04/20/2022  CA-125 32.8 on 04/20/2022   <2 on 04/20/2022  Chromogranin A 1480 on 04/20/2022. EGD/Colonoscopy 05/13/2022: Gastric polyps , duodenal polyp moderate gastroduodenitis   A. Stomach, biopsy: Hyperplastic polyp and moderate chronic active gastritis; negative for intestinal metaplasia   Immunostain negative for Helicobacter pylori organisms   B. Duodenum, biopsy: Chronic duodenitis with features of peptic duodenitis   C. Colon, 20 cm biopsy: Fragments of tubular adenoma and hyperplastic polyp   Pathology reviewed. Referred to HBP team (Dr. Izabel Dupree) for further evaluation of peritoneal cystic masses  CT abdomen/pelvis 06/23/2022 numerous cystic structures identified throughout the right flank and retroperitoneum.       Laparoscopic robotic peritoneal mass resection on 07/06/2022  Findings included: serous cystic masses along the colon, periportal lymphadenopathy, fibrotic appearing liver, chronic cholecystitis     Peritoneal fluid Negative for malignant cells. Cellblock shows reactive mesothelial cells, lymphocytes, adipose/stromal tissue, and blood. Monolayer preparation shows few reactive mesothelial cells and blood. A.  Lymph node, periportal, biopsy:   - Reactive node showing follicular hyperplasia, negative for malignancy, see comment. B.  Remnant gallbladder, cholecystectomy:   - Portion of gallbladder wall showing dense fibrosis/scar and occluded mariaelena-gallbladder vessels. C. Soft tissue, peritoneal sac, excision:   - Peritoneal inclusion cysts (benign cystic mesothelioma) and unremarkable fibroadipose tissue. D.  Liver, needle biopsy:   - Benign hepatic parenchyma showing focal periportal and incomplete septal fibrosis (stage 2)   - Negative for significant lobular/portal necroinflammatory activity, see comment. Comment:   Sections of the lymph node in specimen A reveal normal anat architecture with secondary follicular hyperplasia. There are no cytologically atypical lymphoid cells or [place] cells identified. Periportal LN Flow Cytometry noted 4.5% monoclonal B cells detected in a predominantly polyclonal background. Monoclonal B cell population (4.5% of total cells) without detectable CD5, CD10 or CD11c expression in a predominantly polyclonal background, raising the differential between a monoclonal B-cell population of undetermined significance versus a B-cell lymphoma/leukemia. In the context of B-cell lymphoma the main differential based on immunophenotype includes, but is not limited to marginal zone lymphoma/leukemia, lymphoplasmacytic lymphoma, LW65- negative follicular lymphoma, and less likely large b cell lymphoma. In specimen D, there is no evidence of chronic hepatitis or significant steatosis. Histologic features of cirrhosis including nodules of regenerating hepatocytes and complete portal-portal bridging fibrosis are   not identified.   Focal periportal fibrosis and incomplete septal fibrosis are confirmed by trichrome stain. Iron stain is also performed and is negative. Periportal lymph node flow cytometry showing 4.5% monoclonal B cells without detectable CD5, CD10 or CD11c expression in a predominantly polyclonal background, raising the differential between a monoclonal B-cell population of undetermined significance versus a B-cell lymphoma/leukemia     Repeat CT chest to follow on history of NSCLC on 08/04/2022  Postsurgical changes are identified in the upper lobes bilaterally. 1.1 cm ground-glass nodule in the right lower lobe and a smaller 1 measuring 0.9 cm are noted without change. There is a 1.6 x 1.2 cm soft tissue mass in the right lower lobe medially which is significantly increased since the previous examination. Bilateral adrenal nodules are noted, larger 1 on the left side measuring 1.8 x 1.6 cm.  1.2 cm right renal cystic lesion is identified. An ill-defined hypodense lesion is identified in the spleen currently measuring 2.9 x 3.5 cm      Evaluated by Dr. Shabbir Zarate on 08/15/2022 at [facility] who recommended proceeding with a biopsy of the enlarging right lower lobe lung mass. While lymphoma certainly possible, biopsy recommended to rule out recurrent lung cancer. Periportal lymph node biopsy reviewed at [facility]. Lymph node with lipid lymphadenopathy  Small clonal B-cell population detected by flow cytometry and molecular analysis  Negative for carcinoma  Flow cytometry showed a small clonal CD5 -/CD10- B-cell population (4.5% of all events) and a background of polyclonal B cells. B-cell receptor clonality assay was positive for a clonal B-cell population. However there is no morphologic evidence of lymphoma in the lymph node. The detection of a clonal B-cell population despite negative morphologic evidence of lymphoma might represent monoclonal B-cell lymphocytosis process in the lymph node.   An alternative consideration is peripheral blood involvement by B-cell clone (MBL-like process) that was picked up by the tissue flow cytometry and molecular tests. Even if lymphoma is concerned, likely low-grade and not causing her symptoms, so observation would be recommended. PET/CT scan, CT-guided core needle biopsy of enlarging right lower lobe lung mass recommended. PET/CT scan 09/06/2022: In the region of the lesion in the right lower lobe there is FDG avid tracer uptake peak SUV is 3.2. imaging reviewed. CT guided core needle biopsy RLL lung nodule on 09/20/2022  Right lung, lower lobe core needle biopsy: Adenocarcinoma (see comment)   Comment: The prior history of right upper lobe adenocarcinoma (HES ) and left upper lobe adenocarcinoma (HES ) is noted. The electronic medical record is also reviewed. The adenocarcinoma in the current specimen is weakly immunoreactive with GATA3 and CDX2. The TTF-1 immunostain is negative. 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 Status paracentesis 11/2/2022, total of 2450 mL of ascitic fluid was removed  Cytology pending  CEA 8.5  =2049  CA 19-9 <2  Anemia, likely secondary to malignancy, chronic inflammation, and iron deficiency    Pelvic U/S large calcified uterine mass; ovaries not identified. Large volume ascites.    MRI Pelvis w/out contrast done and I reviewed results    Hb 8.2 today ; Transfuse if Hb <7.0    Right breast breast nodule was detect on CT abd/pelvis  Breast exam done on 11/6/22 and a 2 cm nodule subareolar was palpated on right breast. Chaperone/RN present  Both  and CA 27.29 elevated at 68 and 85, respectively  It appears breast u/s was cancelled, so will tentatively need to do this as an outpatient    I reviewed PET scan myself, noted the right breast nodule present with small amount of uptake. I called Dr. Kamaljit Rojas with radiology, and noted an SUV estimate of 1.5 but does not overtly suggest malignancy. He otherwise suggested breast ultrasound as well. Rad Onc for definitive RT for her adenocarcinoma lung mass. Seen by Dr. Aidan Whyte on 11/4. I reviewed assessment/recommendations. Called lab again and confirmed ascites fluid specimen has not been received. Likely has been disposed. Gyn saw patient. I reviewed consultation. Also discussed with pathology today regarding her lung biopsy. No new stains at this time  Pt reports abdomen becoming more distended, planning on getting another paracentesis done today  D/w RN.          Carmelo Nieves MD  Medical Oncology  [address] [place] [address],[address]  11/07/22 1:32 PM

## 2022-11-07 NOTE — PROGRESS NOTES
Comprehensive Nutrition Assessment    Type and Reason for Visit:  Initial, RD Nutrition Re-Screen/LOS    Nutrition Recommendations/Plan:   Continue current diet  Will add Ensure BID (pt prefers vanilla or strawberry)     Malnutrition Assessment:  Malnutrition Status: Moderate malnutrition (11/07/22 1047)    Context:  Chronic Illness     Findings of the 6 clinical characteristics of malnutrition:  Energy Intake:  75% or less estimated energy requirements for 1 month or longer  Weight Loss:  Unable to assess (hx of wt loss, difficult to assess at this time d/t ascites)     Body Fat Loss:   (moderate) Buccal region, Triceps   Muscle Mass Loss:   (moderate) Hand (interosseous)  Fluid Accumulation:  Unable to assess (multifactorial)     Strength:  Not Performed    Nutrition Assessment:    Pt w/ ascites & b/l pleural effusions in the setting of lung CA w/ suspected paritoneal metastatis vs underlying liver disease s/p paracentesis 11/1. Note ANASTASIA, R breast lesion, uterine fibroids. Hx hep C, lung CA (multiple occasions) s/p chemo/XRT w/ plan for XRT. Will add ONS & monitor. Nutrition Related Findings:    A&O, I&Os WDL, RUE +1 edema, abd distended/bloating, +BS, constipation, LFTs trending down, paracentesis 11/1 2.45 L removed Wound Type: None       Current Nutrition Intake & Therapies:    Average Meal Intake: 51-75% (more variable at times)  Average Supplements Intake: None Ordered  ADULT DIET; Regular    Anthropometric Measures:  Height: 5' 1\" (154.9 cm)  Ideal Body Weight (IBW): 105 lbs (48 kg)    Admission Body Weight: 123 lb (55.8 kg) (10/31 bed)  Current Body Weight: 133 lb (60.3 kg), 126.7 % IBW.  Weight Source: Bed Scale (11/4 elevated)  Current BMI (kg/m2): 25.1  Usual Body Weight: 117 lb (53.1 kg) (10/2022 standing per EMR, 6/2022 125#)  % Weight Change (Calculated): 13.7  Weight Adjustment For: No Adjustment                 BMI Categories: Underweight (BMI less than 22) age over 72 (# X 1 month d/t ascites)    Estimated Daily Nutrient Needs:  Energy Requirements Based On: Kcal/kg  Weight Used for Energy Requirements: Usual (d/t ascites)  Energy (kcal/day):   Weight Used for Protein Requirements: Usual  Protein (g/day): 65-75 (1.2-1.4 as tolerated w/ ANASTASIA)  Method Used for Fluid Requirements: 1 ml/kcal  Fluid (ml/day):     Nutrition Diagnosis:   Moderate malnutrition, In context of chronic illness related to catabolic illness as evidenced by Criteria as identified in malnutrition assessment    Nutrition Interventions:   Food and/or Nutrient Delivery: Continue Current Diet, Start Oral Nutrition Supplement  Nutrition Education/Counseling: Education initiated (discussed appetite/ONS w/ pt)  Coordination of Nutrition Care: Continue to monitor while inpatient       Goals:     Goals: PO intake 75% or greater, by next RD assessment       Nutrition Monitoring and Evaluation:      Food/Nutrient Intake Outcomes: Food and Nutrient Intake, Supplement Intake  Physical Signs/Symptoms Outcomes: Biochemical Data, Constipation, GI Status, Fluid Status or Edema, Nutrition Focused Physical Findings, Skin, Weight    Discharge Planning:    Continue Oral Nutrition Supplement     Elvin Byrne RD, LD  Contact: [phone]

## 2022-11-07 NOTE — PROGRESS NOTES
Physical Therapy  Facility/Department: [facility] MED SURG  Physical Therapy Treatment Note    Name: Rhonda Stephenson  : 1950  MRN: [id number]  Date of Service: 2022    Attending Provider:  Ye Voss DO    Evaluating PT:  Kimi Del Cid P.T. Room #:  5230/1362-D  Diagnosis:  Abdominal pain [R10.9]  Pertinent PMHx/PSHx:  Lung CA, B pleural effusions  Procedure/Surgery:  22 Paracentesis  Precautions:  Falls, bed/chair alarm    SUBJECTIVE:    Pt lives with  in a bi-level home with 3 stairs and 1 rail to enter. There are 5 steps and 2 rails to the main level. Pt ambulated with a ww or cane PTA. OBJECTIVE:   Initial Evaluation  Date: 22 Treatment  2022 Short Term/ Long Term   Goals   Was pt agreeable to Eval/treatment? yes yes    Does pt have pain? No c/o pain Back pain, R LE tightness during gait    Bed Mobility  Rolling: MIN A  Supine to sit: MOD A  Sit to supine: NA  Scooting: MOD A Rolling: Min A   Supine <> sit: Mod A  Scooting: Mod A seated to EOB supervision   Transfers Sit to stand: MOD A  Stand to sit: MOD A  Stand pivot: MOD A with ww Sit  <> stand: Mod A  Stand Pivot: NT SBA   Ambulation   3 feet with ww MOD A 30 feet x 1 using WW for support Mod A for balance 100 feet with ww SBA   Stair negotiation: ascended and descended NA, pt fatigued with ww and to weak at this time to attempt NT 5 steps with 1 rail SBA   AM-PAC 6 Clicks 10/19 16/86        Pt is alert and able to follow instruction, at times hard to understand Pt speech  Balance: poor dynamic using [address] for support    Pt performed therapeutic exercise of the following: NT    Patient education/treatment  Pt was educated on UE usage to assist with transfer safety, gait mechanics promoting increased step length and upright posture.      Patient response to education:   Pt verbalized understanding Pt demonstrated skill Pt requires further education in this area   yes With prompt for transfer safety yes ASSESSMENT:   Comments: Nurse ok with Rx. Pt sat EOB SBA for balance. Gait very slow, inconsistent and laboring. Pt displayed shuffle gait throughout with L LE short stepping. Pt unsteady throughout, required constant hands on assist for balance and safety. Pt fatigued after activity, requested back to bed. Pt remained in bed after Rx per request, call light in reach. Chair/bed alarm: bed alarm active    Time in 1410  Time out 1430   Total Treatment Time 20 minutes   CPT codes:     Therapeutic activities 14948 20 minutes   Therapeutic exercises 11016 0 minutes       Pt is making consistent progress toward established Physical Therapy goals. Continue with physical therapy current plan of care.     Joyce Zaragoza PTA   License Number: [id number]

## 2022-11-07 NOTE — CONSULTS
Department of Gynecology  Attending Consult Note      Reason for Consult:  Ascites      CHIEF COMPLAINT:   abdominal pain    History obtained from electronic medical record    HISTORY OF PRESENT ILLNESS:                   The patient is a 71 y.o. female who I was asked to consult concerning ascites and uterine fibroids. She had a recent paracentesis and the cytology is currently pending. She had an MRI which was not remarkable. She has a 5 cm heavily calcified uterine fibroid. These of been present upon chart review for the past several years. There are no changes noted. No findings concerning for any ovarian or fallopian tube malignancy.     Past Medical History:        Diagnosis Date    Abnormal chest x-ray with multiple lung nodules 9/8/2015    Abnormal Pap smear 1/3/2011    Alpha-1 negative    Adrenal adenoma     7 mm    Bilateral lung cancer ([address] 5/12/2016    surgically removed - 2015     Cholelithiasis 6/6/2011    Encounter for screening colonoscopy     for OR 3-28-19     Fibroids     Hemangioma of spleen     Hepatitis C 01/03/2011    treated and cured, no current issues     Hyperlipidemia     no medications     Hypertension 6/6/2011    Insomnia     Lung nodule     ROMELIA     Lymphadenopathy     Cervical (-) ENT    Malignant neoplasm of upper lobe of right lung ([address] 11/18/2015    Osteoarthritis     Panlobular emphysema ([address] 11/12/2015    controlled with inhalers     PPD negative 1/18/12    Pulmonary embolism without acute cor pulmonale ([address] 5/12/2016    PVD (peripheral vascular disease) ([address] 5/6/2014    Scoliosis     Uterine fibroid 5/6/2014     Past Surgical History:        Procedure Laterality Date    [address] [place] [address] Left     AGE 31'S LEFT NIPPLE REMOVED    BREAST LUMPECTOMY  4/28/1999    left, benign, Jesu Rainey  2/1/2012    Dr. Anaya Champagne [place]  10/15/15    with EBUS - DR Kati Guzman    CHOLECYSTECTOMY      COLONOSCOPY  12/21/2009    partial to distal ascending colon normal (completion BE same day normal), Dr. Carli Wang, [facility]    COLONOSCOPY  2014    done under fluoro with sigmoid straightening device so was able to get to cecum, very poor prep, moderate proctitis, repeat 5 years,  Dr. Carli Wang, [facility]    COLONOSCOPY N/A 3/28/2019    COLONOSCOPY POLYPECTOMY SNARE/COLD BIOPSY performed by Bhavesh Brtio DO at [facility] N/A 2022    COLONOSCOPY POLYPECTOMY SNARE/COLD BIOPSY performed by Betty Smith MD at [address] [place] [address]  2022    CT NEEDLE BIOPSY LUNG PERCUTANEOUS 2022 L Omero Conklin MD [facility]    ENDOMETRIAL BIOPSY      LIVER RESECTION Right 2022    LAPAROSCOPIC ROBOTIC PERITONEAL MASS RESECTION performed by Emy Greenberg MD at [place] [address] Left 3/29/2016    VATS WEDGE RESECTION UPPER LOBECTOMY    OTHER SURGICAL HISTORY Right 2016    REMOVAL MEDI-PORT - DR SHI    CO LAP,CHOLECYSTECTOMY/GRAPH N/A 2018    CHOLECYSTECTOMY LAPAROSCOPIC, POSSIBLE OPEN,POSSIBLE GRAM ( OC 2) performed by Naif Aldrich MD at [place] [address] Right 2015    VATS, BRONCH,RT UPPER LOBECTOMY; NODE DISECTION    TUNNELED VENOUS PORT PLACEMENT Right 2015    right chest, and removed     UPPER GASTROINTESTINAL ENDOSCOPY N/A 2022    EGD POLYP HOT FORCEP/CAUTERY performed by Betty Smith MD at [address] [place] [address]         OB History    Para Term  AB Living   0 0 0 0 0 0   SAB IAB Ectopic Molar Multiple Live Births   0 0 0   0       Allergies:  Ibuprofen     Social History:  TOBACCO:   reports that she quit smoking about 7 years ago. Her smoking use included cigarettes. She has never used smokeless tobacco.  ETOH:   reports that she does not currently use alcohol after a past usage of about 2.0 standard drinks per week. DRUGS:   reports no history of drug use.     Family History:       Problem Relation Age of Onset    Heart Disease

## 2022-11-08 ENCOUNTER — APPOINTMENT (OUTPATIENT)
Dept: ULTRASOUND IMAGING | Age: 72
DRG: 948 | End: 2022-11-08
Payer: MEDICARE

## 2022-11-08 LAB
ALBUMIN SERPL-MCNC: 2.1 G/DL (ref 3.5–5.2)
ALP BLD-CCNC: 186 U/L (ref 35–104)
ALT SERPL-CCNC: 15 U/L (ref 0–32)
ANION GAP SERPL CALCULATED.3IONS-SCNC: 8 MMOL/L (ref 7–16)
AST SERPL-CCNC: 20 U/L (ref 0–31)
BILIRUB SERPL-MCNC: <0.2 MG/DL (ref 0–1.2)
BUN BLDV-MCNC: 39 MG/DL (ref 6–23)
CALCIUM SERPL-MCNC: 8.3 MG/DL (ref 8.6–10.2)
CHLORIDE BLD-SCNC: 104 MMOL/L (ref 98–107)
CO2: 20 MMOL/L (ref 22–29)
CREAT SERPL-MCNC: 1.4 MG/DL (ref 0.5–1)
GFR SERPL CREATININE-BSD FRML MDRD: 40 ML/MIN/1.73
GLUCOSE BLD-MCNC: 90 MG/DL (ref 74–99)
HCT VFR BLD CALC: 24.6 % (ref 34–48)
HCV QNT BY NAAT IU/ML: ABNORMAL IU/ML
HCV QNT BY NAAT LOG IU/ML: 5.57 LOG IU/ML
HEMOGLOBIN: 7.8 G/DL (ref 11.5–15.5)
INTERPRETATION: DETECTED
LIPASE: 294 U/L (ref 13–60)
MCH RBC QN AUTO: 27.4 PG (ref 26–35)
MCHC RBC AUTO-ENTMCNC: 31.7 % (ref 32–34.5)
MCV RBC AUTO: 86.3 FL (ref 80–99.9)
METER GLUCOSE: 115 MG/DL (ref 74–99)
METER GLUCOSE: 99 MG/DL (ref 74–99)
PDW BLD-RTO: 15.6 FL (ref 11.5–15)
PLATELET # BLD: 357 E9/L (ref 130–450)
PMV BLD AUTO: 10 FL (ref 7–12)
POTASSIUM SERPL-SCNC: 4.4 MMOL/L (ref 3.5–5)
RBC # BLD: 2.85 E12/L (ref 3.5–5.5)
SODIUM BLD-SCNC: 132 MMOL/L (ref 132–146)
TOTAL PROTEIN: 6 G/DL (ref 6.4–8.3)
WBC # BLD: 6.4 E9/L (ref 4.5–11.5)

## 2022-11-08 PROCEDURE — 1200000000 HC SEMI PRIVATE

## 2022-11-08 PROCEDURE — 83690 ASSAY OF LIPASE: CPT

## 2022-11-08 PROCEDURE — 6370000000 HC RX 637 (ALT 250 FOR IP): Performed by: INTERNAL MEDICINE

## 2022-11-08 PROCEDURE — 97530 THERAPEUTIC ACTIVITIES: CPT

## 2022-11-08 PROCEDURE — 2580000003 HC RX 258: Performed by: INTERNAL MEDICINE

## 2022-11-08 PROCEDURE — 80053 COMPREHEN METABOLIC PANEL: CPT

## 2022-11-08 PROCEDURE — 82962 GLUCOSE BLOOD TEST: CPT

## 2022-11-08 PROCEDURE — 99233 SBSQ HOSP IP/OBS HIGH 50: CPT | Performed by: STUDENT IN AN ORGANIZED HEALTH CARE EDUCATION/TRAINING PROGRAM

## 2022-11-08 PROCEDURE — 99233 SBSQ HOSP IP/OBS HIGH 50: CPT | Performed by: INTERNAL MEDICINE

## 2022-11-08 PROCEDURE — 97110 THERAPEUTIC EXERCISES: CPT

## 2022-11-08 PROCEDURE — 85027 COMPLETE CBC AUTOMATED: CPT

## 2022-11-08 PROCEDURE — 36415 COLL VENOUS BLD VENIPUNCTURE: CPT

## 2022-11-08 RX ADMIN — TRAZODONE HYDROCHLORIDE 50 MG: 50 TABLET ORAL at 20:25

## 2022-11-08 RX ADMIN — DOCUSATE SODIUM 100 MG: 100 CAPSULE, LIQUID FILLED ORAL at 20:25

## 2022-11-08 RX ADMIN — DOCUSATE SODIUM 100 MG: 100 CAPSULE, LIQUID FILLED ORAL at 09:43

## 2022-11-08 RX ADMIN — Medication 10 ML: at 09:44

## 2022-11-08 RX ADMIN — Medication 10 ML: at 20:25

## 2022-11-08 RX ADMIN — AMLODIPINE BESYLATE 5 MG: 5 TABLET ORAL at 09:43

## 2022-11-08 ASSESSMENT — PAIN SCALES - GENERAL: PAINLEVEL_OUTOF10: 0

## 2022-11-08 NOTE — PROGRESS NOTES
Physical Therapy  Facility/Department: [facility] SURG  Physical Therapy Treatment Note    Name: Muriel Henriquez  : 1950  MRN: [id number]  Date of Service: 2022    Attending Provider:  Alphonse Jarrell DO    Evaluating PT:  Ara Mercy Elgie Moritz., P.T. Room #:  1914/9589-L  Diagnosis:  Abdominal pain [R10.9]  Pertinent PMHx/PSHx:  Lung CA, B pleural effusions  Procedure/Surgery:  22 Paracentesis  Precautions:  Falls, bed/chair alarm    SUBJECTIVE:    Pt lives with  in a bi-level home with 3 stairs and 1 rail to enter. There are 5 steps and 2 rails to the main level. Pt ambulated with a ww or cane PTA. OBJECTIVE:   Initial Evaluation  Date: 22 Treatment  2022 Short Term/ Long Term   Goals   Was pt agreeable to Eval/treatment? yes yes    Does pt have pain? No c/o pain Back pain, buttocks pain    Bed Mobility  Rolling: MIN A  Supine to sit: MOD A  Sit to supine: NA  Scooting: MOD A Rolling: Min A   Supine to sit: Mod A  Scooting: Mod A seated to EOB supervision   Transfers Sit to stand: MOD A  Stand to sit: MOD A  Stand pivot: MOD A with ww Sit  <> stand: Mod A  Stand Pivot: Mod A using [employer] for support SBA   Ambulation   3 feet with ww MOD A 35 feet x 1 using WW for support Min/Mod A for balance 100 feet with ww SBA   Stair negotiation: ascended and descended NA, pt fatigued with ww and to weak at this time to attempt NT 5 steps with 1 rail SBA   AM-PAC 6 Clicks 49/91 63/05        Pt is alert and able to follow instruction, at times hard to understand Pt speech  Balance: poor dynamic using [employer] for support    Pt performed therapeutic exercise of the following:supine B ankle pumps, heel slides, hip ABd/ADd A/AAROM x 20. Patient education/treatment  Pt was educated on therapeutic exercise promoting circulation, ROM and strengthening, UE usage to assist with transfer safety, gait mechanics promoting increased step length and upright posture.      Patient response to education:   Pt verbalized understanding Pt demonstrated skill Pt requires further education in this area   yes With prompt for transfer safety yes     ASSESSMENT:   Comments: Nurse ok with Rx. Pt sat EOB SBA for balance. Gait remains very slow, inconsistent and laboring. Pt displayed shuffle gait throughout with L LE short stepping. Pt able to take larger steps with instruction but unable to maintain this consistently, required repeated instruction and wt shift. Pt unsteady throughout, required constant hands on assist for balance and safety. Pt fatigued after activity     Pt remained on a bedside commode after rx, staff notified call light in reach. Chair/bed alarm: NA    Time in 1354  Time out 1420   Total Treatment Time 26 minutes   CPT codes:     Therapeutic activities 65380 15 minutes   Therapeutic exercises 22162 11 minutes       Pt is making consistent progress toward established Physical Therapy goals. Continue with physical therapy current plan of care.     Heather Edwards PTA   License Number: [id number]

## 2022-11-08 NOTE — CARE COORDINATION
Received notification from Priscilla Saunders at [facility] that facility is out of network - patient has \"Dual\" product. Met with patient, sister and sister in law at bedside. Explained same. SNF list again reviewed. Selections of [address] [place] [address] and [facility] were named. Referral called to Shannan Cohen with [address] [place] [address].  Will await her review and response regarding bed availability/acceptance. RICKIE Carvalho RN  [facility] Case Management  [phone]      Received notice from Shannan Cohen at [address] [place] [address] that facility cannot accept. Subsequent referral called to Carol with [facility]. Will await her review and response regarding bed availability/acceptance. RICKIE Carvalho RN  [facility] Case Management  [phone]    Received notification from Carol at [facility] that facility has received authorization from [facility] and can accept patient for SNF stay. Nursing staff reports that paracentesis is delayed until 11/9/22. Will follow for discharge clearance and arrange transportation at that time. RICKIE Carvalho RN  [facility] Case Management  [phone]

## 2022-11-08 NOTE — DISCHARGE INSTR - COC
Continuity of Care Form    Patient Name: Nikita Guevara   :  1950  MRN:  [id number]    Admit date:  10/31/2022  Discharge date:  2022    Code Status Order: Full Code   Advance Directives:     Admitting Physician:  Gunjan Melendez DO  PCP: Sravanthi Sprgaue DO    [facility] Unit/Room#: 7338/1340-F  Discharging Unit Phone Number: [phone]    Emergency Contact:   Extended Emergency Contact Information  Primary Emergency Contact: Venice Nicholson  Address: [address] [place] [address] [address] [facility] Phone: [phone]  Mobile Phone: [phone]  Relation: Brother/Sister  Secondary Emergency Contact: [facility]  Address: [address] [place] [address], [address] [place] [address] [address] [facility] Phone: [phone]  Mobile Phone: [phone]  Relation: Domestic Partner    Past Surgical History:  Past Surgical History:   Procedure Laterality Date    [facility] Left     AGE 30'S LEFT NIPPLE REMOVED    BREAST LUMPECTOMY  1999    left, benign, Jesu Royal  2012    Cj Chavarria, Dr. Elda Marques, Jesu  10/15/15    with EBUS - DR Isabel Norton    CHOLECYSTECTOMY      COLONOSCOPY  2009    partial to distal ascending colon normal (completion BE same day normal), Dr. Emily Eldridge, [facility]    COLONOSCOPY  2014    done under fluoro with sigmoid straightening device so was able to get to cecum, very poor prep, moderate proctitis, repeat 5 years,  Dr. Emily Eldridge, [facility]    COLONOSCOPY N/A 3/28/2019    COLONOSCOPY POLYPECTOMY SNARE/COLD BIOPSY performed by Kalpana Reynolds DO at [facility] ENDOSCOPY    COLONOSCOPY N/A 2022    COLONOSCOPY POLYPECTOMY SNARE/COLD BIOPSY performed by Smiley Guan MD at [address] [place] [address]  2022    CT NEEDLE BIOPSY LUNG PERCUTANEOUS 2022 L Carson Kunz MD [facility]    ENDOMETRIAL BIOPSY      LIVER RESECTION Right 2022 LAPAROSCOPIC ROBOTIC PERITONEAL MASS RESECTION performed by Angle Lobo MD at [place] [address] Left 3/29/2016    VATS WEDGE RESECTION UPPER LOBECTOMY    OTHER SURGICAL HISTORY Right 11/04/2016    REMOVAL MEDI-PORT - DR Maranda Parr    MI LAP,CHOLECYSTECTOMY/GRAPH N/A 6/29/2018    CHOLECYSTECTOMY LAPAROSCOPIC, POSSIBLE OPEN,POSSIBLE GRAM ( OC 2) performed by Gely Souza MD at [place] [address] Right 11/11/2015    VATS, BRONCH,RT UPPER LOBECTOMY; NODE DISECTION    TUNNELED VENOUS PORT PLACEMENT Right 12/07/2015    right chest, and removed     UPPER GASTROINTESTINAL ENDOSCOPY N/A 5/13/2022    EGD POLYP HOT FORCEP/CAUTERY performed by Karo Lauren MD at [facility] History:   Immunization History   Administered Date(s) Administered    COVID-19, PFIZER PURPLE top, DILUTE for use, (age 15 y+), 30mcg/0.3mL 03/16/2021, 04/06/2021, 12/07/2021    Influenza Virus Vaccine 10/16/2014, 09/28/2015    Influenza, AFLURIA (age 1 yrs+), FLUZONE, (age 10 mo+), MDV, 0.5mL 10/03/2017    Influenza, High Dose (Fluzone 65 yrs and older) 11/30/2018, 10/23/2019    Influenza, Triv, 3 Years and older, IM (Afluria (5 yrs and older) 10/04/2016    PPD Test 01/16/2012, 01/16/2012    Pneumococcal Conjugate 13-valent ([id number]) 03/09/2015    Pneumococcal Polysaccharide (Deyvheqwa53) 01/10/2012, 04/18/2017    Tdap (Boostrix, Adacel) 01/13/2015       Active Problems:  Patient Active Problem List   Diagnosis Code    Hepatitis C B19.20    Abnormal Pap smear [id number]    Dyslipidemia E78.5    Insomnia G47.00    Ex-smoker Z87.891    PVD (peripheral vascular disease) ([facility] I73.9    Uterine fibroid D25.9    Gall stone K80.20    Kidney stone N20.0    Left groin pain R10.32    Need for Tdap vaccination Z23    Abnormal chest x-ray with multiple lung nodules R91.8    Panlobular emphysema ([facility] J43.1    Chronic hepatitis C virus infection ([facility] B18.2    Malignant neoplasm of upper lobe of right lung ([facility] C34.11    Malignant neoplasm of lung (HCC) C34.90    Leukocytosis D72.829    Anemia D64.9    Essential hypertension I10    Thrombocytopenia (HCC) D69.6    Malignant neoplasm of upper lobe of left lung (HCC) C34.12    History of lobectomy of lung Z90.2    Adenocarcinoma, lung (HCC) C34.90    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) D61.9    Hep C w/o coma, chronic (HCC) B18.2    Hyperlipidemia E78.5    Warfarin anticoagulation Z79.01    Bilateral lung cancer (HCC) C34.91, C34.92    Pulmonary embolism without acute cor pulmonale (HCC) I26.99    Port-A-Cath in place Z95.828    Acute cholecystitis K81.0    History of lung cancer Z85.118    RBBB I45.10    Adenomatous polyp of descending colon D12.4    Abdominal pain R10.9    Other ascites R18.8    Moderate protein-calorie malnutrition (HCC) E44.0       Isolation/Infection:   Isolation            No Isolation          Patient Infection Status       None to display            Nurse Assessment:  Last Vital Signs: BP (!) 142/64   Pulse 73   Temp 98.4 °F (36.9 °C) (Oral)   Resp 18   Ht 5' 1\" (1.549 m)   Wt 127 lb (57.6 kg)   SpO2 98%   BMI 24.00 kg/m²     Last documented pain score (0-10 scale): Pain Level: 0  Last Weight:   Wt Readings from Last 1 Encounters:   11/08/22 127 lb (57.6 kg)     Mental Status:  oriented, alert, and coherent    IV Access:  - None    Nursing Mobility/ADLs:  Walking   Assisted  Transfer  Assisted  Bathing  Assisted  Dressing  Assisted  Toileting  Assisted  Feeding  Independent  Med Admin  Independent  Med Delivery   whole    Wound Care Documentation and Therapy:  Incision 07/06/22 Abdomen Lower;Medial (Active)   Number of days: 125        Elimination:  Continence:    Bowel: Yes  Bladder: Yes  Urinary Catheter: None   Colostomy/Ileostomy/Ileal Conduit: No       Date of Last BM: 11/8/2022    Intake/Output Summary (Last 24 hours) at 11/8/2022 1600  Last data filed at 11/8/2022 0931  Gross per 24 hour   Intake --   Output 500 ml   Net -500 ml I/O last 3 completed shifts:  In: -   Out: 150 [Urine:150]    Safety Concerns: At Risk for Falls    Impairments/Disabilities:      None    Nutrition Therapy:  Current Nutrition Therapy:   - Oral Diet:  General    Routes of Feeding: Oral  Liquids: No Restrictions  Daily Fluid Restriction: no  Last Modified Barium Swallow with Video (Video Swallowing Test): not done    Treatments at the Time of Hospital Discharge:   Respiratory Treatments: ***  Oxygen Therapy:  is not on home oxygen therapy. Ventilator:    - No ventilator support    Rehab Therapies: Physical Therapy and Occupational Therapy  Weight Bearing Status/Restrictions: No weight bearing restrictions  Other Medical Equipment (for information only, NOT a DME order):  walker  Other Treatments: ***    Patient's personal belongings (please select all that are sent with patient):  None    RN SIGNATURE: Electronically signed by Anette Perea RN on 11/9/22 at 12:13 PM EST    CASE MANAGEMENT/SOCIAL WORK SECTION    Inpatient Status Date: 10/31/2022    Readmission Risk Assessment Score:  Readmission Risk              Risk of Unplanned Readmission:  24           Discharging to Facility/ Agency   Name: [facility]  Address: [address] [place] [address] Way  Phone: [phone]  Fax: [phone]    Dialysis Facility (if applicable)   Name:  Address:  Dialysis Schedule:  Phone:  Fax:    / signature: Electronically signed by Deep Nunez RN on 11/8/22 at 4:01 PM EST    PHYSICIAN SECTION    Prognosis: {Prognosis:8347359575}    Condition at Discharge: Ysabel Clay Patient Condition:714334808}    Rehab Potential (if transferring to Rehab): {Prognosis:1423768396}    Recommended Labs or Other Treatments After Discharge: ***    Physician Certification: I certify the above information and transfer of Negrita Rios  is necessary for the continuing treatment of the diagnosis listed and that she requires {Admit to Appropriate Level of Care:55059} for {GREATER/LESS:654626141} 30 days.      Update Admission H&P: {[facility]4324[facility]}     PHYSICIAN SIGNATURE:  Electronically signed by Shaheed Rose DO on 11/9/2022 at 11:46 PM

## 2022-11-08 NOTE — PATIENT CARE CONFERENCE
P Quality Flow/Interdisciplinary Rounds Progress Note        Quality Flow Rounds held on November 8, 2022    Disciplines Attending:  Bedside Nurse, , , and Nursing Unit Leadership    Misty Diallo was admitted on 10/31/2022 12:08 PM    Anticipated Discharge Date:       Disposition:    Ishan Score:  Ishan Scale Score: 16    Readmission Risk              Risk of Unplanned Readmission:  24           Discussed patient goal for the day, patient clinical progression, and barriers to discharge.   The following Goal(s) of the Day/Commitment(s) have been identified:  Diagnostics - Report Results IR josh Mota RN  November 8, 2022

## 2022-11-08 NOTE — PROGRESS NOTES
[facility] Progress Note    Admitting Date and Time: 10/31/2022 12:08 PM  Admit Dx: Abdominal pain [R10.9]    Subjective:  Patient is being followed for Abdominal pain [R10.9]     Patient has no complaints at this time    ROS: denies fever, chills, cp, sob, n/v, HA unless stated above.       insulin lispro  0-8 Units SubCUTAneous TID WC    insulin lispro  0-4 Units SubCUTAneous Nightly    docusate sodium  100 mg Oral BID    lidocaine  1 patch TransDERmal Daily    [Held by provider] enoxaparin  30 mg SubCUTAneous Daily    [Held by provider] lisinopril  40 mg Oral Daily    [Held by provider] hydroCHLOROthiazide  25 mg Oral Daily    amLODIPine  5 mg Oral Daily    sodium chloride flush  5-40 mL IntraVENous 2 times per day    traZODone  50 mg Oral Nightly     sodium chloride, , PRN  glucose, 4 tablet, PRN  dextrose bolus, 125 mL, PRN   Or  dextrose bolus, 250 mL, PRN  glucagon (rDNA), 1 mg, PRN  dextrose, , Continuous PRN  sodium chloride flush, 5-40 mL, PRN  sodium chloride, , PRN  ondansetron, 4 mg, Q8H PRN   Or  ondansetron, 4 mg, Q6H PRN  polyethylene glycol, 17 g, Daily PRN  acetaminophen, 650 mg, Q6H PRN   Or  acetaminophen, 650 mg, Q6H PRN  morphine, 2 mg, Q3H PRN   Or  morphine, 4 mg, Q3H PRN         Objective:    BP (!) 142/64   Pulse 73   Temp 98.4 °F (36.9 °C) (Oral)   Resp 18   Ht 5' 1\" (1.549 m)   Wt 127 lb (57.6 kg)   SpO2 98%   BMI 24.00 kg/m²     General Appearance: alert and oriented to person, place and time and in no acute distress  Skin: warm and dry  Head: normocephalic and atraumatic  Eyes: pupils equal, round, and reactive to light, extraocular eye movements intact, conjunctivae normal  Neck: neck supple and non tender without mass   Pulmonary/Chest: clear to auscultation bilaterally- no wheezes, rales or rhonchi, normal air movement, no respiratory distress  Cardiovascular: normal rate, normal S1 and S2 and no carotid bruits  Abdomen: soft, non-tender, non-distended, normal bowel sounds, no masses or organomegaly  Extremities: no cyanosis, no clubbing and no edema  Neurologic: no cranial nerve deficit and speech normal        Recent Labs     11/06/22  0241 11/07/22  0105 11/08/22  0300    133 132   K 4.6 4.6 4.4   * 104 104   CO2 21* 20* 20*   BUN 41* 40* 39*   CREATININE 1.8* 1.6* 1.4*   GLUCOSE 96 83 90   CALCIUM 8.1* 8.3* 8.3*       Recent Labs     11/06/22  0241 11/07/22  0105 11/08/22  0300   WBC 7.3 6.2 6.4   RBC 2.87* 2.89* 2.85*   HGB 8.2* 8.1* 7.8*   HCT 24.9* 25.0* 24.6*   MCV 86.8 86.5 86.3   MCH 28.6 28.0 27.4   MCHC 32.9 32.4 31.7*   RDW 15.1* 15.4* 15.6*    335 357   MPV 10.3 10.0 10.0     Assessment:    Principal Problem:    Abdominal pain  Active Problems:    Other ascites    Moderate protein-calorie malnutrition (HCC)  Resolved Problems:    * No resolved hospital problems. 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 Planning to get diagnostic paracentesis and so will follow up with cytology. Patient does have a 1.5 cm right subareolar breast lump that will need outpatient breast US. She will be medically ready for discharge after paracentesis. Plan [facility]. I have perfect served oncology to determine when treatment will begin because as per  rehab facility cannot take her if treatments have begun. AM PAC score is 13    NOTE: This report was transcribed using voice recognition software. Every effort was made to ensure accuracy; however, inadvertent computerized transcription errors may be present.     Electronically signed by Lillian Chamorro DO on 11/8/2022 at 9:03 AM

## 2022-11-09 ENCOUNTER — CLINICAL DOCUMENTATION (OUTPATIENT)
Dept: INFUSION THERAPY | Age: 72
End: 2022-11-09

## 2022-11-09 ENCOUNTER — APPOINTMENT (OUTPATIENT)
Dept: ULTRASOUND IMAGING | Age: 72
DRG: 948 | End: 2022-11-09
Payer: MEDICARE

## 2022-11-09 VITALS
OXYGEN SATURATION: 98 % | WEIGHT: 130 LBS | HEART RATE: 81 BPM | RESPIRATION RATE: 18 BRPM | HEIGHT: 61 IN | BODY MASS INDEX: 24.55 KG/M2 | DIASTOLIC BLOOD PRESSURE: 75 MMHG | SYSTOLIC BLOOD PRESSURE: 177 MMHG | TEMPERATURE: 99.5 F

## 2022-11-09 LAB
ALBUMIN FLUID: 1.7 G/DL
ALBUMIN SERPL-MCNC: 2.2 G/DL (ref 3.5–5.2)
ALP BLD-CCNC: 168 U/L (ref 35–104)
ALT SERPL-CCNC: 13 U/L (ref 0–32)
AMYLASE FLUID: 183 U/L
ANION GAP SERPL CALCULATED.3IONS-SCNC: 8 MMOL/L (ref 7–16)
APPEARANCE FLUID: CLEAR
APPEARANCE FLUID: CLEAR
AST SERPL-CCNC: 21 U/L (ref 0–31)
BILIRUB SERPL-MCNC: <0.2 MG/DL (ref 0–1.2)
BUN BLDV-MCNC: 35 MG/DL (ref 6–23)
CALCIUM SERPL-MCNC: 8.5 MG/DL (ref 8.6–10.2)
CELL COUNT FLUID TYPE: NORMAL
CHLORIDE BLD-SCNC: 106 MMOL/L (ref 98–107)
CO2: 20 MMOL/L (ref 22–29)
COLOR FLUID: NORMAL
CREAT SERPL-MCNC: 1.4 MG/DL (ref 0.5–1)
FLUID TYPE: NORMAL
GFR SERPL CREATININE-BSD FRML MDRD: 40 ML/MIN/1.73
GLUCOSE BLD-MCNC: 87 MG/DL (ref 74–99)
GLUCOSE, FLUID: 104 MG/DL
HCT VFR BLD CALC: 24.9 % (ref 34–48)
HEMOGLOBIN: 7.9 G/DL (ref 11.5–15.5)
LIPASE: 286 U/L (ref 13–60)
MCH RBC QN AUTO: 27.5 PG (ref 26–35)
MCHC RBC AUTO-ENTMCNC: 31.7 % (ref 32–34.5)
MCV RBC AUTO: 86.8 FL (ref 80–99.9)
METER GLUCOSE: 115 MG/DL (ref 74–99)
MONOCYTE, FLUID: 58 %
NEUTROPHIL, FLUID: 42 %
NUCLEATED CELLS FLUID: 597 /UL
PDW BLD-RTO: 15.7 FL (ref 11.5–15)
PLATELET # BLD: 379 E9/L (ref 130–450)
PMV BLD AUTO: 10.2 FL (ref 7–12)
POTASSIUM SERPL-SCNC: 4.5 MMOL/L (ref 3.5–5)
PROTEIN FLUID: 4.1 G/DL
RBC # BLD: 2.87 E12/L (ref 3.5–5.5)
RBC FLUID: <2000 /UL
SODIUM BLD-SCNC: 134 MMOL/L (ref 132–146)
TOTAL PROTEIN: 6 G/DL (ref 6.4–8.3)
WBC # BLD: 6.3 E9/L (ref 4.5–11.5)

## 2022-11-09 PROCEDURE — 87205 SMEAR GRAM STAIN: CPT

## 2022-11-09 PROCEDURE — 6370000000 HC RX 637 (ALT 250 FOR IP): Performed by: INTERNAL MEDICINE

## 2022-11-09 PROCEDURE — 80053 COMPREHEN METABOLIC PANEL: CPT

## 2022-11-09 PROCEDURE — 88112 CYTOPATH CELL ENHANCE TECH: CPT

## 2022-11-09 PROCEDURE — 82947 ASSAY GLUCOSE BLOOD QUANT: CPT

## 2022-11-09 PROCEDURE — 85027 COMPLETE CBC AUTOMATED: CPT

## 2022-11-09 PROCEDURE — 36415 COLL VENOUS BLD VENIPUNCTURE: CPT

## 2022-11-09 PROCEDURE — 83690 ASSAY OF LIPASE: CPT

## 2022-11-09 PROCEDURE — 99239 HOSP IP/OBS DSCHRG MGMT >30: CPT | Performed by: INTERNAL MEDICINE

## 2022-11-09 PROCEDURE — 0W9G3ZZ DRAINAGE OF PERITONEAL CAVITY, PERCUTANEOUS APPROACH: ICD-10-PCS | Performed by: RADIOLOGY

## 2022-11-09 PROCEDURE — 82042 OTHER SOURCE ALBUMIN QUAN EA: CPT

## 2022-11-09 PROCEDURE — 83615 LACTATE (LD) (LDH) ENZYME: CPT

## 2022-11-09 PROCEDURE — 88305 TISSUE EXAM BY PATHOLOGIST: CPT

## 2022-11-09 PROCEDURE — 99233 SBSQ HOSP IP/OBS HIGH 50: CPT | Performed by: INTERNAL MEDICINE

## 2022-11-09 PROCEDURE — 87070 CULTURE OTHR SPECIMN AEROBIC: CPT

## 2022-11-09 PROCEDURE — 84157 ASSAY OF PROTEIN OTHER: CPT

## 2022-11-09 PROCEDURE — 82150 ASSAY OF AMYLASE: CPT

## 2022-11-09 PROCEDURE — 82962 GLUCOSE BLOOD TEST: CPT

## 2022-11-09 PROCEDURE — 2500000003 HC RX 250 WO HCPCS: Performed by: RADIOLOGY

## 2022-11-09 PROCEDURE — 2709999900 US GUIDED PARACENTESIS

## 2022-11-09 RX ORDER — LIDOCAINE HYDROCHLORIDE 10 MG/ML
INJECTION, SOLUTION INFILTRATION; PERINEURAL
Status: COMPLETED | OUTPATIENT
Start: 2022-11-09 | End: 2022-11-09

## 2022-11-09 RX ADMIN — LIDOCAINE HYDROCHLORIDE 5 ML: 10 INJECTION, SOLUTION INFILTRATION; PERINEURAL at 08:49

## 2022-11-09 RX ADMIN — AMLODIPINE BESYLATE 5 MG: 5 TABLET ORAL at 08:09

## 2022-11-09 RX ADMIN — DOCUSATE SODIUM 100 MG: 100 CAPSULE, LIQUID FILLED ORAL at 08:10

## 2022-11-09 NOTE — OR NURSING
Pt via transport to IR room for paracentesis. Pt is alert and oriented, denies any pain prior to procedure. Ultrasound images taken prior to procedure. Procedure is explained to patient, including possible risks. Pt verbalizes understanding and consent from signed. Procedure done under sterile technique and guidance of ultrasound imaging. 1% Lidocaine is used during procedure for comfort measures. A total of 2510ml's of clear yellow colored ascitic fluid drained during procedure. Catheter removed and op-site dressing applied to site with no bleeding noted. Specimen collected and sent to lab for ordered testing. Pt tolerated procedure well and denies any pain after. Discharge instructions placed in chart. Report called to floor nurse, Anayeli Parish RN. Pt out of department via transport.

## 2022-11-09 NOTE — CARE COORDINATION
Received notification from Dipti Langston at [facility] that facility has received authorization from [facility] and can accept patient for SNF stay today. Non emergency transportation has been arranged with Farzad Kendall w/[facility],  time 2 PM.  Patient, facility liaison and bedside nurse notified of scheduled  time. No covid testing is required per Dipti Langston at facility. Shelbie Saeed.  Payam, MSN RN  [facility] Case Management  [phone]

## 2022-11-09 NOTE — DISCHARGE SUMMARY
[facility] Discharge Summary       [address] [address] [address]  [phone]          Activity level: As tolerated     Dispo: ECF      Condition on discharge: Stable     Patient ID:  Dakota Moran  [id number]  81 y.o.  1950    Admit date: 10/31/2022    Discharge date and time:  11/9/2022  11:46 AM    Admission Diagnoses: Principal Problem:    Abdominal pain  Active Problems:    Other ascites    Moderate protein-calorie malnutrition ([address]  Resolved Problems:    * No resolved hospital problems. *      Discharge Diagnoses: Principal Problem:    Abdominal pain  Active Problems:    Other ascites    Moderate protein-calorie malnutrition (HCC)  Resolved Problems:    * No resolved hospital problems. *      Consults:  IP CONSULT TO IV TEAM  IP CONSULT TO ONCOLOGY  IP CONSULT TO IV TEAM  IP CONSULT TO IV TEAM  IP CONSULT TO RADIATION ONCOLOGY  IP CONSULT TO GENERAL SURGERY  IP CONSULT TO OB GYN    Procedures: Diagnostic and therapeutic paracentesis    Hospital Course:   Patient Dakota Moran is a 71 y.o. presented with Abdominal pain [R89]    71year old female presents to the hospital with bilateral pleural effusions as well as massive ascites with past medical history of lung cancer. Patient was seen by oncology as well as general surgery. 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 She did have a paracentesis today. Will need outpatient follow-up of cytology results. She also has a 1.5 cm right subareolar breast lump and will need outpatient breast ultrasound for this. Patient has been seen by oncology as well as radiation oncology and will continue to follow-up as outpatient.     Discharge Exam:    General Appearance: alert and oriented to person, place and time and in no acute distress  Skin: warm and dry  Head: normocephalic and atraumatic  Eyes: pupils equal, round, and reactive to light, extraocular eye movements intact, conjunctivae normal  Neck: neck supple and non tender without mass   Pulmonary/Chest: clear to auscultation bilaterally- no wheezes, rales or rhonchi, normal air movement, no respiratory distress  Cardiovascular: normal rate, normal S1 and S2 and no carotid bruits  Abdomen: soft, non-tender, non-distended, normal bowel sounds, no masses or organomegaly  Extremities: no cyanosis, no clubbing and no edema  Neurologic: no cranial nerve deficit and speech normal    I/O last 3 completed shifts:  In: -   Out: 500 [Urine:500]  I/O this shift:  In: 180 [P.O.:180]  Out: -       LABS:  Recent Labs     11/07/22 0105 11/08/22 0300 11/09/22 0319    132 134   K 4.6 4.4 4.5    104 106   CO2 20* 20* 20*   BUN 40* 39* 35*   CREATININE 1.6* 1.4* 1.4*   GLUCOSE 83 90 87   CALCIUM 8.3* 8.3* 8.5*       Recent Labs     11/07/22 0105 11/08/22 0300 11/09/22 0319   WBC 6.2 6.4 6.3   RBC 2.89* 2.85* 2.87*   HGB 8.1* 7.8* 7.9*   HCT 25.0* 24.6* 24.9*   MCV 86.5 86.3 86.8   MCH 28.0 27.4 27.5   MCHC 32.4 31.7* 31.7*   RDW 15.4* 15.6* 15.7*    357 379   MPV 10.0 10.0 10.2       No results for input(s): POCGLU in the last 72 hours. Imaging:  US NON OB TRANSVAGINAL    Result Date: 11/3/2022  EXAMINATION: PELVIC ULTRASOUND 11/3/2022 TECHNIQUE: Transvaginal pelvic ultrasound duplex ultrasound using a combination of grayscale and color Doppler technique. COMPARISON: CT abdomen and pelvis from October 31, 2022 HISTORY: ORDERING SYSTEM PROVIDED HISTORY: elevated CEA, ascites; uterine leiomyoma. please perform abdominal and transvaginal US to evaluate uterus and ovaries TECHNOLOGIST PROVIDED HISTORY: Reason for exam:->elevated CEA, ascites; uterine leiomyoma.   please perform abdominal and transvaginal US to evaluate uterus and ovaries What reading provider will be dictating this exam?->CRC FINDINGS: Measurements: Uterus: 5.8 cm Endometrial stripe: Not clearly identified Right Ovary:Not seen Left Ovary: Not seen Ultrasound Findings: Uterus: Virtually nondiagnostic evaluation of the uterus given the presence of a large calcified mass. This measures approximately 4.4 cm. There is posterior shadowing which severely limits evaluation. Endometrial stripe: Not seen secondary to the presence of the large calcified mass. The ovaries were not definitively identified on this exam secondary to the presence of intra-ascites, anatomic position, and overlying bowel gas. The area measured by  the sonographer in the right adnexa is not definitively ovarian based on appearance. Nonvisualization of either ovary secondary to the presence of intra-ascites, anatomic position, and overlying bowel gas. No definite abnormal adnexal vascularity seen on color Doppler evaluation. Free Fluid: Large volume simple appearing intra-abdominal ascites. 1.  Virtually nondiagnostic evaluation of the pelvis. There is a large calcified uterine mass which severely limits evaluation of the uterus and endometrium. 2.  The ovaries were not definitively identified. 3.  large volume simple appearing intra-ascites. RECOMMENDATIONS: Suggest screening pelvic MRI with and without gadolinium for further evaluation. US PELVIS COMPLETE    Result Date: 11/3/2022  EXAMINATION: PELVIC ULTRASOUND 11/3/2022 TECHNIQUE: Transvaginal pelvic ultrasound duplex ultrasound using a combination of grayscale and color Doppler technique. COMPARISON: CT abdomen and pelvis from October 31, 2022 HISTORY: ORDERING SYSTEM PROVIDED HISTORY: elevated CEA, ascites; uterine leiomyoma. please perform abdominal and transvaginal US to evaluate uterus and ovaries TECHNOLOGIST PROVIDED HISTORY: Reason for exam:->elevated CEA, ascites; uterine leiomyoma.   please perform abdominal and transvaginal US to evaluate uterus and ovaries What reading provider will be dictating this exam?->CRC FINDINGS: Measurements: Uterus: 5.8 cm Endometrial stripe: Not clearly identified Right Ovary:Not seen Left Ovary: Not seen Ultrasound Findings: Uterus: Virtually nondiagnostic evaluation of the uterus given the presence of a large calcified mass. This measures approximately 4.4 cm. There is posterior shadowing which severely limits evaluation. Endometrial stripe: Not seen secondary to the presence of the large calcified mass. The ovaries were not definitively identified on this exam secondary to the presence of intra-ascites, anatomic position, and overlying bowel gas. The area measured by  the sonographer in the right adnexa is not definitively ovarian based on appearance. Nonvisualization of either ovary secondary to the presence of intra-ascites, anatomic position, and overlying bowel gas. No definite abnormal adnexal vascularity seen on color Doppler evaluation. Free Fluid: Large volume simple appearing intra-abdominal ascites. 1.  Virtually nondiagnostic evaluation of the pelvis. There is a large calcified uterine mass which severely limits evaluation of the uterus and endometrium. 2.  The ovaries were not definitively identified. 3.  large volume simple appearing intra-ascites. RECOMMENDATIONS: Suggest screening pelvic MRI with and without gadolinium for further evaluation. US ABDOMEN LIMITED    Result Date: 11/4/2022  EXAMINATION: RIGHT UPPER QUADRANT ULTRASOUND 11/4/2022 3:07 pm COMPARISON: 11/01/2022 HISTORY: ORDERING SYSTEM PROVIDED HISTORY: diagnostic and therapeutic paracentesis TECHNOLOGIST PROVIDED HISTORY: Reason for exam:->diagnostic and therapeutic paracentesis What reading provider will be dictating this exam?->CRC Patient is scheduled for ultrasound-guided paracentesis. FINDINGS: Sonographic examination of the right and left lower quadrants was performed for paracentesis. Small amount of ascites is seen in the right lower quadrant. No significant fluid was seen in the left lower quadrant. There was felt to be enough fluid to perform a diagnostic paracentesis. However, the patient refused consent for the procedure. Small amount of ascites in the right lower quadrant. Ultrasound-guided paracentesis was not performed as the patient refused to give consent. Patient Instructions:      Medication List        CONTINUE taking these medications      acetaminophen 500 MG tablet  Commonly known as: TYLENOL  Take 1 tablet by mouth 4 times daily as needed for Pain     amLODIPine 5 MG tablet  Commonly known as: NORVASC     hydroCHLOROthiazide 25 MG tablet  Commonly known as: HYDRODIURIL  Take 1 tablet by mouth daily     lisinopril 40 MG tablet  Commonly known as: PRINIVIL;ZESTRIL  take 1 tablet by mouth once daily     Misc.  Devices Kit  BP monitoring KIT     therapeutic multivitamin-minerals tablet  Take 1 tablet by mouth daily     tiotropium 18 MCG inhalation capsule  Commonly known as: Spiriva HandiHaler  Inhale 1 capsule into the lungs daily     vitamin B-12 500 MCG tablet  Commonly known as: CYANOCOBALAMIN  take 1 tablet by mouth daily                Note that 36 minutes was spent in preparing discharge papers, discussing discharge with patient, medication review, etc.    Signed:  Electronically signed by Lauren Root DO on 11/9/2022 at 11:46 AM

## 2022-11-09 NOTE — PROGRESS NOTES
Hematology/oncology inpatient follow-up:    Subjective: The patient is feeling better following the paracentesis, total of 2510 cc of straw-colored ascitic fluid was removed. Plan for DC today. Objective:  BP (!) 177/75   Pulse 81   Temp 99.5 °F (37.5 °C) (Oral)   Resp 18   Ht 5' 1\" (1.549 m)   Wt 130 lb (59 kg)   SpO2 98%   BMI 24.56 kg/m²   GENERAL: Alert, oriented x 3, tired  HEENT:  Oropharynx clear. LUNGS: Lung sounds distant, without overt wheezes. CVS: RRR, no rubs/gallops/murmurs  GI: Soft, nontender, slightly distended  EXTREMITIES: Without clubbing, cyanosis, or edema. NEUROLOGIC: No focal deficits. ECOG PS 2    Diagnostics:  Lab Results   Component Value Date    WBC 6.3 11/09/2022    HGB 7.9 (L) 11/09/2022    HCT 24.9 (L) 11/09/2022    MCV 86.8 11/09/2022     11/09/2022     Lab Results   Component Value Date     11/09/2022    K 4.5 11/09/2022     11/09/2022    CO2 20 (L) 11/09/2022    BUN 35 (H) 11/09/2022    CREATININE 1.4 (H) 11/09/2022    GLUCOSE 87 11/09/2022    CALCIUM 8.5 (L) 11/09/2022    PROT 6.0 (L) 11/09/2022    LABALBU 2.2 (L) 11/09/2022    BILITOT <0.2 11/09/2022    ALKPHOS 168 (H) 11/09/2022    AST 21 11/09/2022    ALT 13 11/09/2022    LABGLOM 40 11/09/2022    GFRAA 49 09/14/2022     Impression/Plan:  71 y.o. female with Hx of smoking who underwent: (1) Bronchoscopy. (2) Right VATS. (3) VATS right upper lobe wedge resection. (4) VATS right upper lobectomy. (5) Intercostal nerve block from T3 to T10. (6) Mediastinal lymph node dissection on 11/11/2015:   Pathology:  Right lung, upper lobectomy: Invasive, moderately differentiated adenocarcinoma (Grade 2). Tumor size 1.5 cm in greatest dimension. Visceral pleural invasion: Not identified. Visceral pleural invasion: Not identified. Surgical margin negative for malignancy. Two of three peribronchial lymph nodes positive for adenocarcinoma.     pT1a N1 MX;      Being followed for a left upper lobe mass by Dr. Reg Geronimo. Multiple biopsies in the past came back negative for malignancy. Hypermetabolic on PET/CT scan on 09/29/2015 (2.3 cm in size with SUV of 6.4). CT guided biopsy of the left upper lobe lesion on 11/02/2015 was noted to be negative for malignancy. She was referred to the medical oncology clinic to discuss adjuvant chemotherapy. We recommended 4 cycles of adjuvant chemotherapy consisting of Carboplatin/Alimta. Mediport placed 12/7/15. -MRI Brain on 12/8/15:  Negative for metastatic disease.   -We will repeat CT chest and PET/CT after 4 cycles of Carboplatin/Alimta. To follow on Lingular lesion as well. -Molecular studies (EGFR, ALK, ROS-1) were all Not Detected. Cycle # 1 of Ryan Specter was on 12/09/2015. Cycle # 2 of Ryan Specter was on 01/06/2016. Cycle # 3 of Ryan Specter was on 01/27/2016. Cycle # 4 of Ryan Specter was on 02/24/2016. PET SCAN 3.2016: The 2.1 cm left lung mass abutting the major fissure is hypermetabolic with SUV max of 5.4. Finding is worrisome for tumor with avid glucose metabolism. 2. Elsewhere there is unremarkable distribution of FDG activity without evidence of hypermetabolic metastases. On 03/29/2016 underwent Bronch/Left VATS/VATS wedge ROMELIA/VATS Left upper lobectomy/Mediasinal lymph node dissection/Intercostal nerve block from T3-T10 per Dr. Jeremías Kendall. Final pathology revealed Stage I Adenocarcinoma of ROMELIA (morphologically different from the adenocarcinoma previously diagnosed in the right upper lobe. Therefore, synchronous primary tumors are favored). A. Left lung, upper lobe wedge resection: Invasive, well-differentiated adenocarcinoma (grade 1); Tumor size-1.4 cm in greatest dimension; Surgical margins-negative for malignancy; Lymphovascular invasion-not identified; TNM classification-pT2a N0 MX  B. AP window lymph node #1, excision: Anthracotic lymph node; negative for malignancy  C.  AP window lymph node #2, excision: Anthracotic lymph node; negative for malignancy   D. Periaortic lymph node #1, excision: Fibroadipose tissue; lymph node not identified  E. Bronchial lymph node #1, excision: Anthracotic lymph node; negative for malignancy  F. Left lung, upper lobectomy: Emphysematous change; negative for malignancy  4 anthracotic lymph nodes negative for malignancy   G. Inferior pulmonary ligament lymph node, excision: Anthracotic lymph node; negative for malignancy  H. Bronchial lymph node, excision: Anthracotic lymph node; negative for malignancy. Right side Mediport was removed on 11/04/2016 by Dr. Severo Castano. On surveillance per NCCN guidelines. CT chest 04/12/2017 noted no convincing evidence of recurrent disease. CT chest 10/19/2017 noted no definite evidence for recurrent malignancy. CT chest 03/12/2018 negative for pulmonary parenchymal masses or enlarged mediastinal or hilar LN. ? 2 cm lesion in spleen difficult to evaluate due to the arterial phase of the study. CT Abd/Pelvis 05/08/2018  Enhancing lesion within the spleen is relatively stable to slightly smaller when compared to April 2014 exam and likely splenic hemangioma. Other indeterminate findings (left periaortic LN, sclerotic/blastic foci in L3 and L1 reported by Dr. Pratima Guaman from radiology team). PET/CT scan 06/05/2018 unremarkable. No FDG avid uptake identified. No evidence for recurrent or metastatic disease. CT Chest 12/13/2018 noted no evidence of recurrent/metastatic disease. CT chest on 06/11/2019 noted no evidence for worrisome residual or recurrent malignancy. No evidence for new lymphadenopathy or metastatic disease. Stable scarring and postoperative changes right upper lobe. CT chest 11/26/2019: No evidence of active neoplasm. CT chest 06/15/2020: No evidence of active neoplasm. CT chest 12/30/2020: Postsurgical changes and postsurgical scarring seen within the right lung apex. There is no evidence of tumor recurrence.   2.1 x 2.3 cm enhancing lesion seen within the spleen  PET/CT scan 03/02/2021   No FDG avid uptake is identified which exceeds the threshold SUV. No convincing evidence for recurrent or metastatic disease  CT chest 09/28/2021 No evidence of tumor recurrence     Recent abdominal pain; ER visit reviewed  CT abdomen/pelvis 02/20/2022   6 mm right lower lobe pulmonary nodule  Multiple peritoneal cystic masses      CEA 12.5 on 03/16/2022     CT chest 04/05/2022 Ground-glass nodule right lower lobe superolateral portion 10 mm unchanged from prior however in the medial segment right lower lobe with pleural abutment is a 7 mm pulmonary nodule increased in size from 09/28/2021 with is barely visible at 2-3 mm; repeat CT chest in 3 mo     PET/CT scan 04/05/2022 noted No FDG avid uptake is identified which exceeds the threshold SUV      Bilateral screening mammogram 04/20/2022: Negative for malignancy     CEA     12.1 on 04/20/2022  CA-125 32.8 on 04/20/2022   <2 on 04/20/2022  Chromogranin A 1480 on 04/20/2022. EGD/Colonoscopy 05/13/2022: Gastric polyps , duodenal polyp moderate gastroduodenitis   A. Stomach, biopsy: Hyperplastic polyp and moderate chronic active gastritis; negative for intestinal metaplasia   Immunostain negative for Helicobacter pylori organisms   B. Duodenum, biopsy: Chronic duodenitis with features of peptic duodenitis   C. Colon, 20 cm biopsy: Fragments of tubular adenoma and hyperplastic polyp   Pathology reviewed. Referred to HBP team (Dr. Lashon Schroeder) for further evaluation of peritoneal cystic masses  CT abdomen/pelvis 06/23/2022 numerous cystic structures identified throughout the right flank and retroperitoneum. Laparoscopic robotic peritoneal mass resection on 07/06/2022  Findings included: serous cystic masses along the colon, periportal lymphadenopathy, fibrotic appearing liver, chronic cholecystitis     Peritoneal fluid Negative for malignant cells.  Cellblock shows reactive mesothelial cells, lymphocytes, adipose/stromal tissue, and blood. Monolayer preparation shows few reactive mesothelial cells and blood. A.  Lymph node, periportal, biopsy:   - Reactive node showing follicular hyperplasia, negative for malignancy, see comment. B.  Remnant gallbladder, cholecystectomy:   - Portion of gallbladder wall showing dense fibrosis/scar and occluded mariaelena-gallbladder vessels. C. Soft tissue, peritoneal sac, excision:   - Peritoneal inclusion cysts (benign cystic mesothelioma) and unremarkable fibroadipose tissue. D.  Liver, needle biopsy:   - Benign hepatic parenchyma showing focal periportal and incomplete septal fibrosis (stage 2)   - Negative for significant lobular/portal necroinflammatory activity, see comment. Comment:   Sections of the lymph node in specimen A reveal normal anat architecture with secondary follicular hyperplasia. There are no cytologically atypical lymphoid cells or [place] cells identified. Periportal LN Flow Cytometry noted 4.5% monoclonal B cells detected in a predominantly polyclonal background. Monoclonal B cell population (4.5% of total cells) without detectable CD5, CD10 or CD11c expression in a predominantly polyclonal background, raising the differential between a monoclonal B-cell population of undetermined significance versus a B-cell lymphoma/leukemia. In the context of B-cell lymphoma the main differential based on immunophenotype includes, but is not limited to marginal zone lymphoma/leukemia, lymphoplasmacytic lymphoma, JN18- negative follicular lymphoma, and less likely large b cell lymphoma. In specimen D, there is no evidence of chronic hepatitis or significant steatosis. Histologic features of cirrhosis including nodules of regenerating hepatocytes and complete portal-portal bridging fibrosis are   not identified. Focal periportal fibrosis and incomplete septal fibrosis are confirmed by trichrome stain.   Iron stain is also performed and is negative. Periportal lymph node flow cytometry showing 4.5% monoclonal B cells without detectable CD5, CD10 or CD11c expression in a predominantly polyclonal background, raising the differential between a monoclonal B-cell population of undetermined significance versus a B-cell lymphoma/leukemia     Repeat CT chest to follow on history of NSCLC on 08/04/2022  Postsurgical changes are identified in the upper lobes bilaterally. 1.1 cm ground-glass nodule in the right lower lobe and a smaller 1 measuring 0.9 cm are noted without change. There is a 1.6 x 1.2 cm soft tissue mass in the right lower lobe medially which is significantly increased since the previous examination. Bilateral adrenal nodules are noted, larger 1 on the left side measuring 1.8 x 1.6 cm.  1.2 cm right renal cystic lesion is identified. An ill-defined hypodense lesion is identified in the spleen currently measuring 2.9 x 3.5 cm      Evaluated by Dr. Ying Zurita on 08/15/2022 at [facility] who recommended proceeding with a biopsy of the enlarging right lower lobe lung mass. While lymphoma certainly possible, biopsy recommended to rule out recurrent lung cancer. Periportal lymph node biopsy reviewed at [facility]. Lymph node with lipid lymphadenopathy  Small clonal B-cell population detected by flow cytometry and molecular analysis  Negative for carcinoma  Flow cytometry showed a small clonal CD5 -/CD10- B-cell population (4.5% of all events) and a background of polyclonal B cells. B-cell receptor clonality assay was positive for a clonal B-cell population. However there is no morphologic evidence of lymphoma in the lymph node. The detection of a clonal B-cell population despite negative morphologic evidence of lymphoma might represent monoclonal B-cell lymphocytosis process in the lymph node.   An alternative consideration is peripheral blood involvement by B-cell clone (MBL-like process) that was picked up by the tissue flow cytometry and molecular tests. Even if lymphoma is concerned, likely low-grade and not causing her symptoms, so observation would be recommended. PET/CT scan, CT-guided core needle biopsy of enlarging right lower lobe lung mass recommended. PET/CT scan 09/06/2022: In the region of the lesion in the right lower lobe there is FDG avid tracer uptake peak SUV is 3.2. imaging reviewed. CT guided core needle biopsy RLL lung nodule on 09/20/2022  Right lung, lower lobe core needle biopsy: Adenocarcinoma (see comment)   Comment: The prior history of right upper lobe adenocarcinoma (HES ) and left upper lobe adenocarcinoma (HES ) is noted. The electronic medical record is also reviewed. The adenocarcinoma in the current specimen is weakly immunoreactive with GATA3 and CDX2. The TTF-1 immunostain is negative. 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 Status paracentesis 11/2/2022, total of 2450 mL of ascitic fluid was removed  Cytology pending  CEA 8.5  =4776  CA 19-9 <2  Anemia, likely secondary to malignancy, chronic inflammation, and iron deficiency    Pelvic U/S large calcified uterine mass; ovaries not identified. Large volume ascites. MRI Pelvis w/out contrast done and I reviewed results    Hb 7. 9G/DL today ; Transfuse if Hb <7.0    Right breast breast nodule was detect on CT abd/pelvis  Breast exam done on 11/6/22 and a 2 cm nodule subareolar was palpated on right breast. Chaperone/RN present  Both  and CA 27.29 elevated at 68 and 85, respectively    Rad Onc initially planned for definitive RT for her adenocarcinoma lung mass before onset of ascites. Seen by Dr. Hodan Armstrong on 11/4 inpatient. Currently RT is on hold while workup the ascites  The initial ascites fluid from earlier during hospitalization likely lost/disposed, so not able to run fluid analysis studies or cytology  Gyn saw patient on 11/7/22. I reviewed consultation. HCV PCR is positive with >300,000 viral load    The patient had a repeat paracentesis done, 2510 of straw-colored fluid was removed, cytology is in process, await results    PET scan showed an, noted the right breast nodule present with small amount of uptake, SUV estimate of 1.5 but does not overtly suggest malignancy. He otherwise suggested breast ultrasound as well, cannot be done inpatient, so will do outpatient; inpatient breast u/s was cancelled.     Plan for DC today, okay from heme-onc POV with outpatient follow-up    Venecia Wilburn MD  HEMATOLOGY/MEDICAL [address] [place] [address]  11/9/2022

## 2022-11-09 NOTE — PATIENT CARE CONFERENCE
[facility] Quality Flow/Interdisciplinary Rounds Progress Note        Quality Flow Rounds held on November 9, 2022    Disciplines Attending:  Bedside Nurse, , , and Nursing Unit Leadership    Kayla Del Angel was admitted on 10/31/2022 12:08 PM    Anticipated Discharge Date:  Expected Discharge Date: 11/09/22    Disposition:    Ishan Score:  Ishan Scale Score: 18    Readmission Risk              Risk of Unplanned Readmission:  24           Discussed patient goal for the day, patient clinical progression, and barriers to discharge.   The following Goal(s) of the Day/Commitment(s) have been identified:  Discharge - Obtain Order      Shekhar Fair RN  November 9, 2022

## 2022-11-09 NOTE — PROGRESS NOTES
Dr. Maninder Martinez reaching out for recommendations from Dr. Claudene Minor regarding discharge of the patient. After reviewed with Dr. Claudene Minor, patient can follow-up with office visit with same day breast ultrasound. Dr Maninder Martinez aware.

## 2022-11-10 LAB — GRAM STAIN ORDERABLE: NORMAL

## 2022-11-11 ENCOUNTER — CLINICAL DOCUMENTATION (OUTPATIENT)
Dept: ONCOLOGY | Age: 72
End: 2022-11-11

## 2022-11-11 DIAGNOSIS — R93.5 ABNORMAL CT OF THE ABDOMEN: Primary | ICD-10-CM

## 2022-11-11 DIAGNOSIS — C80.1 MALIGNANT (PRIMARY) NEOPLASM, UNSPECIFIED (HCC): ICD-10-CM

## 2022-11-11 DIAGNOSIS — C34.90 NON-SMALL CELL LUNG CANCER, UNSPECIFIED LATERALITY (HCC): ICD-10-CM

## 2022-11-11 LAB — HEPATITIS C GENOTYPE: NORMAL

## 2022-11-11 NOTE — PROGRESS NOTES
Per Dr. Cristal Mena' request, this patient was scheduled for a right breast ultrasound. Per [facility] patient requires a diagnostic mammogram as well. The nurse form [facility] was to reach out to Dr. Cristal Mena for an order. Patient was scheduled for both 11/17/22 at [address] [place] [address].  was notified to schedule patient to see the Doctor the same day after testing. Await scheduling details.

## 2022-11-14 LAB
BODY FLUID CULTURE, STERILE: NORMAL
GRAM STAIN RESULT: NORMAL

## 2022-11-15 ENCOUNTER — TELEPHONE (OUTPATIENT)
Dept: CASE MANAGEMENT | Age: 72
End: 2022-11-15

## 2022-11-15 ENCOUNTER — TELEPHONE (OUTPATIENT)
Dept: PULMONOLOGY | Age: 72
End: 2022-11-15

## 2022-11-15 NOTE — TELEPHONE ENCOUNTER
Updated by Dot Ferguson RN that patient was here for start of Radiation treatment today and prior to treatment imaging showed patient's lungs were full. Dr Karen Chen was updated. Patient should be scheduled for Medical Oncology and Pulmonology follow ups as soon as possible. Patient is scheduled for follow up with Dr Rashida Steele, Medical Oncology on Thursday 11/17/22 at 1000. Provided appointment information to  for patient. Patient currently resides at [facility] facility. They will arrange transportation for patient. Called Dr Danelle Zarate office and spoke with reception. She updated the nurse who will triage patient and speak with doctor to determine when they can schedule patient to be seen for a follow up. Provided with my contact number for any questions. Received a return call from [facility] with Dr Usman Garcia. Updated that Dr Usman Garcia is out of office until Monday and then will be covering ICU when he is back on Monday. She will update him on patient status but inquired if anyone was placing any orders for xray or scans for Dr Usman Garcia to review. Will update her if any new orders are received for scans. Will update Dr Karen Chen.

## 2022-11-15 NOTE — TELEPHONE ENCOUNTER
Follow up call to Gale Tesfaye RN re: follow up with Dr. Yomi Gardner. Advised that Dr. Yomi Gardner is out of town until Monday and will be updated on concerns with pt's lungs and radiation simulation appt from today at that time Graciela Wu that pt has follow up with oncology provider in am Thursday and Dr. Jack Rodríguez should be aware of this as well. Advised to call office with any additional needs. Pt will be given appt with Dr. Yomi Gardner for follow up with first available appt.

## 2022-11-17 ENCOUNTER — TELEPHONE (OUTPATIENT)
Dept: HEMATOLOGY | Age: 72
End: 2022-11-17

## 2022-11-17 ENCOUNTER — HOSPITAL ENCOUNTER (OUTPATIENT)
Dept: GENERAL RADIOLOGY | Age: 72
Discharge: HOME | End: 2022-11-19
Payer: MEDICARE

## 2022-11-17 ENCOUNTER — TELEPHONE (OUTPATIENT)
Dept: INFUSION THERAPY | Age: 72
End: 2022-11-17

## 2022-11-17 ENCOUNTER — OFFICE VISIT (OUTPATIENT)
Dept: ONCOLOGY | Age: 72
End: 2022-11-17
Payer: MEDICARE

## 2022-11-17 ENCOUNTER — HOSPITAL ENCOUNTER (OUTPATIENT)
Dept: INFUSION THERAPY | Age: 72
Discharge: HOME | End: 2022-11-17

## 2022-11-17 VITALS
HEIGHT: 61 IN | TEMPERATURE: 98.8 F | SYSTOLIC BLOOD PRESSURE: 136 MMHG | RESPIRATION RATE: 16 BRPM | BODY MASS INDEX: 24.55 KG/M2 | OXYGEN SATURATION: 95 % | DIASTOLIC BLOOD PRESSURE: 63 MMHG | HEART RATE: 87 BPM | WEIGHT: 130 LBS

## 2022-11-17 DIAGNOSIS — R93.5 ABNORMAL CT OF THE ABDOMEN: ICD-10-CM

## 2022-11-17 DIAGNOSIS — R59.0 PERIPORTAL LYMPHADENOPATHY: ICD-10-CM

## 2022-11-17 DIAGNOSIS — R18.8 OTHER ASCITES: ICD-10-CM

## 2022-11-17 DIAGNOSIS — C80.1 MALIGNANT (PRIMARY) NEOPLASM, UNSPECIFIED (HCC): ICD-10-CM

## 2022-11-17 DIAGNOSIS — C34.90 NON-SMALL CELL LUNG CANCER, UNSPECIFIED LATERALITY (HCC): Primary | ICD-10-CM

## 2022-11-17 PROCEDURE — 99215 OFFICE O/P EST HI 40 MIN: CPT | Performed by: INTERNAL MEDICINE

## 2022-11-17 PROCEDURE — 3074F SYST BP LT 130 MM HG: CPT | Performed by: INTERNAL MEDICINE

## 2022-11-17 PROCEDURE — 3078F DIAST BP <80 MM HG: CPT | Performed by: INTERNAL MEDICINE

## 2022-11-17 PROCEDURE — G8484 FLU IMMUNIZE NO ADMIN: HCPCS | Performed by: INTERNAL MEDICINE

## 2022-11-17 PROCEDURE — 1090F PRES/ABSN URINE INCON ASSESS: CPT | Performed by: INTERNAL MEDICINE

## 2022-11-17 PROCEDURE — G8427 DOCREV CUR MEDS BY ELIG CLIN: HCPCS | Performed by: INTERNAL MEDICINE

## 2022-11-17 PROCEDURE — G8399 PT W/DXA RESULTS DOCUMENT: HCPCS | Performed by: INTERNAL MEDICINE

## 2022-11-17 PROCEDURE — 1111F DSCHRG MED/CURRENT MED MERGE: CPT | Performed by: INTERNAL MEDICINE

## 2022-11-17 PROCEDURE — 1123F ACP DISCUSS/DSCN MKR DOCD: CPT | Performed by: INTERNAL MEDICINE

## 2022-11-17 PROCEDURE — G8420 CALC BMI NORM PARAMETERS: HCPCS | Performed by: INTERNAL MEDICINE

## 2022-11-17 PROCEDURE — 1036F TOBACCO NON-USER: CPT | Performed by: INTERNAL MEDICINE

## 2022-11-17 PROCEDURE — 76642 ULTRASOUND BREAST LIMITED: CPT

## 2022-11-17 PROCEDURE — 3017F COLORECTAL CA SCREEN DOC REV: CPT | Performed by: INTERNAL MEDICINE

## 2022-11-17 PROCEDURE — 99212 OFFICE O/P EST SF 10 MIN: CPT

## 2022-11-17 NOTE — PROGRESS NOTES
Medical Oncology Outpatient Progress Note    Reason for Visit: Follow-up on a patient with NSCLC     PCP:  Daniel Manzo MD     History of Present Illness:   71 y.o. -American female with significant history of tobacco abuse of approximately 30 pack years, quit smoking June 7, 2015. She has been followed for a left upper lobe mass by Dr. Jonas Hanson. Multiple biopsies in the past came back negative for malignancy. CT scan chest on 08/31/2015 showed a new right upper lobe mass. Bronchoscopy with washing RUL mass on 10/15/2015 was positive for RUL Lung Adenocarcinoma. Negative for malignancy on BAL and TBNA of Lingular lesion. PET/CT scan on 09/29/2015:  1. New since prior examination on CT is 1.7 cm right paratracheal   nodule. Nodule is intensely hypermetabolic with SUV max of 4.8   worrisome for tumor. 2. Previously known nodule in the lingula now measures 2.3 cm in   size. SUV max has increased from 1.7 on the 2012 examination to   6.4 at this time. At this time findings are worrisome for   hypermetabolic tumor with avid glucose metabolism. Therefore, she was referred to see Dr. Kendrick Olivier for resection. CT guided biopsy of the left upper lobe lesion on 11/02/2015 was noted to be negative for malignancy. On 11/11/2015 She underwent: (1) Bronchoscopy. (2) Right VATS. (3) VATS right upper lobe wedge resection. (4) VATS right upper lobectomy. (5) Intercostal nerve block from T3 to T10. (6) Mediastinal lymph node dissection. Pathology:  Right lung, upper lobectomy: Invasive, moderately differentiated adenocarcinoma (Grade 2). Tumor size 1.5 cm in greatest dimension. Surgical margin negative for malignancy. Two of three peribronchial lymph nodes positive for adenocarcinoma. pT1a N1 MX  She was referred to the medical oncology clinic to discuss adjuvant chemotherapy. We recommended 4 cycles of adjuvant chemotherapy consisting of Carboplatin/Alimta. Mediport placed 12/07/2015. Cycle # 1 of Ryan Specter was on 12/09/2015. Cycle # 2 of Ryan Specter was on 01/06/2016. Cycle # 3 of Ryan Specter was on 01/27/2016. Cycle # 4 of Ryan Specter was on 02/24/2016. PET SCAN 3.2016: The 2.1 cm left lung mass abutting the major fissure is hypermetabolic with SUV max of 5.4. Finding is worrisome for tumor with avid glucose metabolism. 2. Elsewhere there is unremarkable distribution of FDG activity without evidence of hypermetabolic metastases. On 03/29/2016 underwent Bronch/Left VATS/VATS wedge ROMELIA/VATS Left upper lobectomy/Mediasinal lymph node dissection/Intercostal nerve block from T3-T10 per Dr. Jeremías Kendall. Invasive, well-differentiated adenocarcinoma (grade 1); Tumor size-1.4 cm in greatest dimension; Surgical margins-negative for malignancy; Lymphovascular invasion-not identified; TNM classification-pT2a N0 MX  B. AP window lymph node #1, excision: Anthracotic lymph node; negative for malignancy  C. AP window lymph node #2, excision: Anthracotic lymph node; negative for malignancy   D. Periaortic lymph node #1, excision: Fibroadipose tissue; lymph node not identified  E. Bronchial lymph node #1, excision: Anthracotic lymph node; negative for malignancy  F. Left lung, upper lobectomy: Emphysematous change; negative for malignancy  4 anthracotic lymph nodes negative for malignancy   G. Inferior pulmonary ligament lymph node, excision: Anthracotic lymph node; negative for malignancy  H. Bronchial lymph node, excision: Anthracotic lymph node; negative for malignancy. On surveillance per NCCN guidelines.        Recent abdominal pain; ER visit reviewed  CT abdomen/pelvis 02/20/2022   6 mm right lower lobe pulmonary nodule  Multiple peritoneal cystic masses      CEA 12.5 on 03/16/2022     CT chest 04/05/2022 Ground-glass nodule right lower lobe superolateral portion 10 mm unchanged from prior however in the medial segment right lower lobe with pleural abutment is a 7 mm pulmonary nodule increased in size from 09/28/2021 with is barely visible at 2-3 mm; repeat CT chest in 3 mo     PET/CT scan 04/05/2022 noted No FDG avid uptake is identified which exceeds the threshold SUV     Bilateral Screening Mammogram 04/20/2022: Negative for malignancy     CEA 12.1 on 04/20/2022  CA-125 32.8 on 04/20/2022   <2 on 04/20/2022  Chromogranin A 1480 on 04/20/2022. EGD/Colonoscopy 05/13/2022: Gastric polyps , duodenal polyp moderate gastroduodenitis   A. Stomach, biopsy: Hyperplastic polyp and moderate chronic active gastritis; negative for intestinal metaplasia   Immunostain negative for Helicobacter pylori organisms   B. Duodenum, biopsy: Chronic duodenitis with features of peptic duodenitis   C. Colon, 20 cm biopsy: Fragments of tubular adenoma and hyperplastic polyp      Referred to HBP team (Dr. Zofia Gaspar) for further evaluation of peritoneal cystic masses  CT abdomen/pelvis 06/23/2022 numerous cystic structures identified throughout the right flank and retroperitoneum     Laparoscopic robotic peritoneal mass resection on 07/06/2022  Findings included: serous cystic masses along the colon, periportal lymphadenopathy, fibrotic appearing liver, chronic cholecystitis  Peritoneal fluid Negative for malignant cells. Cellblock shows reactive mesothelial cells, lymphocytes, adipose/stromal tissue, and blood. Monolayer preparation shows few reactive mesothelial cells and blood. A.  Lymph node, periportal, biopsy:   - Reactive node showing follicular hyperplasia, negative for malignancy, see comment. B.  Remnant gallbladder, cholecystectomy:   - Portion of gallbladder wall showing dense fibrosis/scar and occluded mariaelena-gallbladder vessels. C. Soft tissue, peritoneal sac, excision:   - Peritoneal inclusion cysts (benign cystic mesothelioma) and unremarkable fibroadipose tissue.    D.  Liver, needle biopsy:   - Benign hepatic parenchyma showing focal periportal and incomplete septal fibrosis (stage 2)   - Negative for significant lobular/portal necroinflammatory activity, see comment. Comment:   Sections of the lymph node in specimen A reveal normal anat architecture with secondary follicular hyperplasia. There are no cytologically atypical lymphoid cells or [place]-Marsha cells identified. Periportal LN Flow Cytometry noted 4.5% monoclonal B cells detected in a predominantly polyclonal background. Monoclonal B cell population (4.5% of total cells) without detectable CD5, CD10 or CD11c expression in a predominantly polyclonal background, raising the differential between a monoclonal B-cell population of undetermined significance versus a B-cell lymphoma/leukemia. In the context of B-cell lymphoma the main differential based on immunophenotype includes, but is not limited to marginal zone lymphoma/leukemia, lymphoplasmacytic lymphoma, VP69- negative follicular lymphoma, and less likely large b cell lymphoma. In specimen D, there is no evidence of chronic hepatitis or significant steatosis. Histologic features of cirrhosis including nodules of regenerating hepatocytes and complete portal-portal bridging fibrosis are   not identified. Focal periportal fibrosis and incomplete septal fibrosis are confirmed by trichrome stain. Iron stain is also performed and is negative. Periportal lymph node flow cytometry showing 4.5% monoclonal B cells without detectable CD5, CD10 or CD11c expression in a predominantly polyclonal background, raising the differential between a monoclonal B-cell population of undetermined significance versus a B-cell lymphoma/leukemia     Repeat CT chest on 08/04/2022  Postsurgical changes are identified in the upper lobes bilaterally. 1.1 cm ground-glass nodule in the right lower lobe and a smaller 1 measuring 0.9 cm are noted without change.    There is a 1.6 x 1.2 cm soft tissue mass in the right lower lobe medially which is significantly increased since the previous examination. Bilateral adrenal nodules are noted, larger 1 on the left side measuring 1.8 x 1.6 cm.  1.2 cm right renal cystic lesion is identified. An ill-defined hypodense lesion is identified in the spleen currently measuring 2.9 x 3.5 cm      Evaluated on 8/15/2022 by Dr. Jacobo Hunt at [facility] in [facility] who recommended proceeding with a biopsy of the enlarging right lower lobe lung mass. While lymphoma certainly possible, biopsy recommended to rule out recurrent lung cancer. Even if lymphoma is concerned, likely low-grade and not causing her symptoms so observation would be recommended. PET/CT scan, CT-guided core needle biopsy of enlarging right lower lobe lung mass recommended. PET/CT scan 09/06/2022: In the region of the lesion in the right lower lobe there is FDG avid tracer uptake peak SUV is 3.2. CT guided core needle biopsy RLL lung nodule on 09/20/2022  Right lung, lower lobe core needle biopsy: Adenocarcinoma (see comment)   Comment: The prior history of right upper lobe adenocarcinoma (HES ) and left upper lobe adenocarcinoma (HES ) is noted. The electronic medical record is also reviewed. The adenocarcinoma in the current specimen is weakly immunoreactive with GATA3 and CDX2.   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 Pelvic U/S large calcified uterine mass; ovaries not identified. Large volume ascites. MRI Pelvis 11/05/2022: No suspicious uterine or endometrial mass  Multiple calcified, degenerated uterine leiomyomas, measuring up to 5.2 cm    Status post paracentesis 11/9/2022: A total of 2510 cc of straw-colored ascitic fluid was removed  Cytology was negative for malignant cells  Reactive mesothelial cells, acute/chronic inflammatory cells, and blood present. Today 11/17/2022: No fever chills. Fatigue. Poor appetite. No nausea vomiting. No diarrhea. Right breast ultrasound today 11/17/2022    Physical Examination:  /63   Pulse 87   Temp 98.8 °F (37.1 °C) (Infrared)   Resp 16   Ht 5' 1\" (1.549 m)   Wt 130 lb (59 kg) Comment: unable to stand  SpO2 95%   BMI 24.56 kg/m²   GENERAL: Alert, oriented x 3, tired  HEENT: PERRLA; EOMI. Oropharynx clear. LUNGS: CTA Pérez  CVS: RRR  EXTREMITIES: Without clubbing, cyanosis, or edema. NEUROLOGIC: No focal deficits. ECOG PS 2    Diagnostics:  Lab Results   Component Value Date    WBC 6.3 11/09/2022    HGB 7.9 (L) 11/09/2022    HCT 24.9 (L) 11/09/2022    MCV 86.8 11/09/2022     11/09/2022     Lab Results   Component Value Date     11/09/2022    K 4.5 11/09/2022     11/09/2022    CO2 20 (L) 11/09/2022    BUN 35 (H) 11/09/2022    CREATININE 1.4 (H) 11/09/2022    GLUCOSE 87 11/09/2022    CALCIUM 8.5 (L) 11/09/2022    PROT 6.0 (L) 11/09/2022    LABALBU 2.2 (L) 11/09/2022    BILITOT <0.2 11/09/2022    ALKPHOS 168 (H) 11/09/2022    AST 21 11/09/2022    ALT 13 11/09/2022    LABGLOM 40 11/09/2022    GFRAA 49 09/14/2022     Impression/Plan:  71 y.o. female with Hx of smoking who underwent: (1) Bronchoscopy. (2) Right VATS. (3) VATS right upper lobe wedge resection. (4) VATS right upper lobectomy.  (5) Intercostal nerve block from T3 to T10. (6) Mediastinal lymph node dissection on 11/11/2015:   Pathology:  Right lung, upper lobectomy: Invasive, moderately differentiated adenocarcinoma (Grade 2). Tumor size 1.5 cm in greatest dimension. Visceral pleural invasion: Not identified. Visceral pleural invasion: Not identified. Surgical margin negative for malignancy. Two of three peribronchial lymph nodes positive for adenocarcinoma. pT1a N1 MX;      Being followed for a left upper lobe mass by Dr. Arti Gomez. Multiple biopsies in the past came back negative for malignancy. Hypermetabolic on PET/CT scan on 09/29/2015 (2.3 cm in size with SUV of 6.4). CT guided biopsy of the left upper lobe lesion on 11/02/2015 was noted to be negative for malignancy. She was referred to the medical oncology clinic to discuss adjuvant chemotherapy. We recommended 4 cycles of adjuvant chemotherapy consisting of Carboplatin/Alimta. Mediport placed 12/7/15. -MRI Brain on 12/8/15:  Negative for metastatic disease.   -We will repeat CT chest and PET/CT after 4 cycles of Carboplatin/Alimta. To follow on Lingular lesion as well. -Molecular studies (EGFR, ALK, ROS-1) were all Not Detected. Cycle # 1 of Romelle Lexington was on 12/09/2015. Cycle # 2 of Romelle Lexington was on 01/06/2016. Cycle # 3 of Romelle Lexington was on 01/27/2016. Cycle # 4 of Romelle Lexington was on 02/24/2016. PET SCAN 3.2016: The 2.1 cm left lung mass abutting the major fissure is hypermetabolic with SUV max of 5.4. Finding is worrisome for tumor with avid glucose metabolism. 2. Elsewhere there is unremarkable distribution of FDG activity without evidence of hypermetabolic metastases. On 03/29/2016 underwent Bronch/Left VATS/VATS wedge ROMELIA/VATS Left upper lobectomy/Mediasinal lymph node dissection/Intercostal nerve block from T3-T10 per Dr. Matt Starks. Final pathology revealed Stage I Adenocarcinoma of ROMELIA (morphologically different from the adenocarcinoma previously diagnosed in the right upper lobe. Therefore, synchronous primary tumors are favored).     A. Left lung, upper lobe wedge resection: Invasive, well-differentiated adenocarcinoma (grade 1); Tumor size-1.4 cm in greatest dimension; Surgical margins-negative for malignancy; Lymphovascular invasion-not identified; TNM classification-pT2a N0 MX  B. AP window lymph node #1, excision: Anthracotic lymph node; negative for malignancy  C. AP window lymph node #2, excision: Anthracotic lymph node; negative for malignancy   D. Periaortic lymph node #1, excision: Fibroadipose tissue; lymph node not identified  E. Bronchial lymph node #1, excision: Anthracotic lymph node; negative for malignancy  F. Left lung, upper lobectomy: Emphysematous change; negative for malignancy  4 anthracotic lymph nodes negative for malignancy   G. Inferior pulmonary ligament lymph node, excision: Anthracotic lymph node; negative for malignancy  H. Bronchial lymph node, excision: Anthracotic lymph node; negative for malignancy. Right side Mediport was removed on 11/04/2016 by Dr. Rebekah Carrasco. On surveillance per NCCN guidelines. CT chest 04/12/2017 noted no convincing evidence of recurrent disease. CT chest 10/19/2017 noted no definite evidence for recurrent malignancy. CT chest 03/12/2018 negative for pulmonary parenchymal masses or enlarged mediastinal or hilar LN. ? 2 cm lesion in spleen difficult to evaluate due to the arterial phase of the study. CT Abd/Pelvis 05/08/2018  Enhancing lesion within the spleen is relatively stable to slightly smaller when compared to April 2014 exam and likely splenic hemangioma. Other indeterminate findings (left periaortic LN, sclerotic/blastic foci in L3 and L1 reported by Dr. Ted Nelson from radiology team). PET/CT scan 06/05/2018 unremarkable. No FDG avid uptake identified. No evidence for recurrent or metastatic disease. CT Chest 12/13/2018 noted no evidence of recurrent/metastatic disease. CT chest on 06/11/2019 noted no evidence for worrisome residual or recurrent malignancy.   No evidence for new lymphadenopathy or metastatic disease. Stable scarring and postoperative changes right upper lobe. CT chest 11/26/2019: No evidence of active neoplasm. CT chest 06/15/2020: No evidence of active neoplasm. CT chest 12/30/2020: Postsurgical changes and postsurgical scarring seen within the right lung apex. There is no evidence of tumor recurrence. 2.1 x 2.3 cm enhancing lesion seen within the spleen  PET/CT scan 03/02/2021   No FDG avid uptake is identified which exceeds the threshold SUV. No convincing evidence for recurrent or metastatic disease  CT chest 09/28/2021 No evidence of tumor recurrence     Recent abdominal pain; ER visit reviewed  CT abdomen/pelvis 02/20/2022   6 mm right lower lobe pulmonary nodule  Multiple peritoneal cystic masses      CEA 12.5 on 03/16/2022     CT chest 04/05/2022 Ground-glass nodule right lower lobe superolateral portion 10 mm unchanged from prior however in the medial segment right lower lobe with pleural abutment is a 7 mm pulmonary nodule increased in size from 09/28/2021 with is barely visible at 2-3 mm; repeat CT chest in 3 mo     PET/CT scan 04/05/2022 noted No FDG avid uptake is identified which exceeds the threshold SUV      Bilateral screening mammogram 04/20/2022: Negative for malignancy     CEA     12.1 on 04/20/2022  CA-125 32.8 on 04/20/2022   <2 on 04/20/2022  Chromogranin A 1480 on 04/20/2022. EGD/Colonoscopy 05/13/2022: Gastric polyps , duodenal polyp moderate gastroduodenitis   A. Stomach, biopsy: Hyperplastic polyp and moderate chronic active gastritis; negative for intestinal metaplasia   Immunostain negative for Helicobacter pylori organisms   B. Duodenum, biopsy: Chronic duodenitis with features of peptic duodenitis   C. Colon, 20 cm biopsy: Fragments of tubular adenoma and hyperplastic polyp   Pathology reviewed.      Referred to HBP team (Dr. Nargis Oquendo) for further evaluation of peritoneal cystic masses  CT abdomen/pelvis 06/23/2022 numerous cystic structures identified throughout the right flank and retroperitoneum. Laparoscopic robotic peritoneal mass resection on 07/06/2022  Findings included: serous cystic masses along the colon, periportal lymphadenopathy, fibrotic appearing liver, chronic cholecystitis     Peritoneal fluid Negative for malignant cells. Cellblock shows reactive mesothelial cells, lymphocytes, adipose/stromal tissue, and blood. Monolayer preparation shows few reactive mesothelial cells and blood. A.  Lymph node, periportal, biopsy:   - Reactive node showing follicular hyperplasia, negative for malignancy, see comment. B.  Remnant gallbladder, cholecystectomy:   - Portion of gallbladder wall showing dense fibrosis/scar and occluded mariaelena-gallbladder vessels. C. Soft tissue, peritoneal sac, excision:   - Peritoneal inclusion cysts (benign cystic mesothelioma) and unremarkable fibroadipose tissue. D.  Liver, needle biopsy:   - Benign hepatic parenchyma showing focal periportal and incomplete septal fibrosis (stage 2)   - Negative for significant lobular/portal necroinflammatory activity, see comment. Comment:   Sections of the lymph node in specimen A reveal normal anat architecture with secondary follicular hyperplasia. There are no cytologically atypical lymphoid cells or [place]-Marsha cells identified. Periportal LN Flow Cytometry noted 4.5% monoclonal B cells detected in a predominantly polyclonal background. Monoclonal B cell population (4.5% of total cells) without detectable CD5, CD10 or CD11c expression in a predominantly polyclonal background, raising the differential between a monoclonal B-cell population of undetermined significance versus a B-cell lymphoma/leukemia.  In the context of B-cell lymphoma the main differential based on immunophenotype includes, but is not limited to marginal zone lymphoma/leukemia, lymphoplasmacytic lymphoma, MA96- negative follicular lymphoma, and less likely large b cell lymphoma. In specimen D, there is no evidence of chronic hepatitis or significant steatosis. Histologic features of cirrhosis including nodules of regenerating hepatocytes and complete portal-portal bridging fibrosis are not identified. Focal periportal fibrosis and incomplete septal fibrosis are confirmed by trichrome stain. Iron stain is also performed and is negative. Periportal lymph node flow cytometry showing 4.5% monoclonal B cells without detectable CD5, CD10 or CD11c expression in a predominantly polyclonal background, raising the differential between a monoclonal B-cell population of undetermined significance versus a B-cell lymphoma/leukemia     Repeat CT chest to follow on history of NSCLC on 08/04/2022  Postsurgical changes are identified in the upper lobes bilaterally. 1.1 cm ground-glass nodule in the right lower lobe and a smaller 1 measuring 0.9 cm are noted without change. There is a 1.6 x 1.2 cm soft tissue mass in the right lower lobe medially which is significantly increased since the previous examination. Bilateral adrenal nodules are noted, larger 1 on the left side measuring 1.8 x 1.6 cm.  1.2 cm right renal cystic lesion is identified. An ill-defined hypodense lesion is identified in the spleen currently measuring 2.9 x 3.5 cm      Evaluated by Dr. Briana Kc on 08/15/2022 at [facility] who recommended proceeding with a biopsy of the enlarging right lower lobe lung mass. While lymphoma certainly possible, biopsy recommended to rule out recurrent lung cancer. Periportal lymph node biopsy reviewed at [facility]. Lymph node with lipid lymphadenopathy  Small clonal B-cell population detected by flow cytometry and molecular analysis  Negative for carcinoma  Flow cytometry showed a small clonal CD5 -/CD10- B-cell population (4.5% of all events) and a background of polyclonal B cells. B-cell receptor clonality assay was positive for a clonal B-cell population. However there is no morphologic evidence of lymphoma in the lymph node. The detection of a clonal B-cell population despite negative morphologic evidence of lymphoma might represent monoclonal B-cell lymphocytosis process in the lymph node. An alternative consideration is peripheral blood involvement by B-cell clone (MBL-like process) that was picked up by the tissue flow cytometry and molecular tests. Even if lymphoma is concerned, likely low-grade and not causing her symptoms, so observation would be recommended. PET/CT scan, CT-guided core needle biopsy of enlarging right lower lobe lung mass recommended. PET/CT scan 09/06/2022: In the region of the lesion in the right lower lobe there is FDG avid tracer uptake peak SUV is 3.2. CT guided core needle biopsy RLL lung nodule on 09/20/2022  Right lung, lower lobe core needle biopsy: Adenocarcinoma (see comment)   Comment: The prior history of right upper lobe adenocarcinoma (HES ) and left upper lobe adenocarcinoma (HES ) is noted. The electronic medical record is also reviewed. The adenocarcinoma in the current specimen is weakly immunoreactive with GATA3 and CDX2. The TTF-1 immunostain is negative. 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 Pelvic U/S large calcified uterine mass; ovaries not identified. Large volume ascites.    MRI Pelvis 11/05/2022: No suspicious uterine or endometrial mass  Multiple calcified, degenerated uterine leiomyomas, measuring up to 5.2 cm  Seen by GYN (Dr. Wei Rice); No findings concerning for gynecologic malignancy. Status post paracentesis 11/9/2022: A total of 2510 cc of straw-colored ascitic fluid was removed  Cytology was negative for malignant cells  Reactive mesothelial cells, acute/chronic inflammatory cells, and blood present. Recommended;  Right breast ultrasound today 11/17/2022  Rad Onc for definitive RT for her adenocarcinoma lung mass. HBP team for evaluation of her ascites; hx of hepC treated (prior liver biopsy showing focal periportal fibrosis). Case discussed today with HBP team.   Poor appetite; encouraged her to take 2-3 Ensure per day. Dietary team consulted. 11/17/2022  Alin Seth MD    I spent a total of 42 minutes on the date of the service which included preparing to see the patient, face-to-face patient care, reviewing imaging, labs, pathology, coordinating care, completing clinical documentation, counseling and educating the family/family members/caregiver, ordering medications, tests, or procedures complicating results with the patient/family/caregiver.

## 2022-11-17 NOTE — PROGRESS NOTES
Briefly met w/ pt and family per physician request for Ensure samples. Pt currently residing at [facility]. Previously provided pt w/ samples of Ensure Complete at radiation oncology consult. Provided pt w/ additional samples and coupons. Pt notes she is not receiving any ONS products at facility. Attempted to contact facility dietitian. Left voicemail for Bobby Guzmán requesting pt receive ONS TID. Pt appreciative of assistance. Encouraged to contact this clinician as needed.   Electronically signed by Monika Voss MS, RD, LD on 11/17/2022 at 3:28 PM

## 2022-11-17 NOTE — PROGRESS NOTES
Patient here for clinic visit. Per Dr. Anita Long, patient needs rescheduled for US breast and MGM. Cleave Breed aware and can have patient have done today. Patient aware. Radiation also aware that patient was seen today and can restart radiation/ or do follow up with radiation, Dr. Cindy David to evaluate. Dr. Ramon June office aware to follow up with patient. Message left at their office.  Patient and family aware
Patient Reported Weight (Optional - Include Units): 185
Detail Level: Zone
Anticipated Starting Dosage (Optional): 40mg Daily

## 2022-11-17 NOTE — TELEPHONE ENCOUNTER
Talked to [facility] regarding patients visit with [facility] being cancelled and discussed with Dr. Nicola Kramer from the [facility]., that patient is stating she would like to proceed with testing and treatment but during clinic visit with Dr. Rsoe Ward, patient states that the Nurse practitioner from [facility]. had told her she could go home with hospice and that she \" Has cancer in the stomach\". Dr. Nicola Kramer had stated patient had stated she did not want anymore treatment and had wanted to go home. After reviewed with Dr. Rose Ward, this nurse discussed with Dr. Nicola Kramer that patient , in her clinic visit, would like to move forward with treatment at this time and is not discussing hospice. The plan is for patient to complete US breast with MGM diagnostic, radiation and follow up with Dr. Ludwig Jewell for ascitis. Dr. Nicola Kramer and [facility]. aware.

## 2022-11-17 NOTE — PROGRESS NOTES
Patient did stop at check out window, ok'd to leave without AVS.  Instructions will be mailed to patient, along with courtesy phone call.

## 2022-11-17 NOTE — TELEPHONE ENCOUNTER
I called [facility] and spoke with Junior Crouch. We scheduled the patient for a follow up on per dr Leny Shannon on 12/01/2022 at 1:45 pm [facility].  Directions to the office were given to [address] [place] [address]  Electronically signed by Betty Mcmullen MA on 11/17/2022 at 2:03 PM

## 2022-11-18 ENCOUNTER — TELEPHONE (OUTPATIENT)
Dept: HEMATOLOGY | Age: 72
End: 2022-11-18

## 2022-11-18 DIAGNOSIS — R18.8 OTHER ASCITES: ICD-10-CM

## 2022-11-18 DIAGNOSIS — R92.8 ABNORMAL ULTRASOUND OF BREAST: Primary | ICD-10-CM

## 2022-11-18 DIAGNOSIS — B18.2 CHRONIC HEPATITIS C WITHOUT HEPATIC COMA (HCC): Primary | ICD-10-CM

## 2022-11-18 NOTE — TELEPHONE ENCOUNTER
I spoke with Timbo Zarate in IR scheduling and gave her the info for the patients procedure. She will get the script together and get the patient scheduled. She is aware that the patient is in [facility]. I also called [facility] and spoke with Oleg Billy to let him know that Dr Isael Betancourt put the order in and that IR scheduling will be reaching out to the facility. I told him that we do have the patient scheduled for a follow up appt on 12/01 but that may have to change depending on when this biopsy is scheduled.  Oleg Billy verbalized understanding  Electronically signed by Tegan Rose MA on 11/18/2022 at 8:25 AM

## 2022-11-21 ENCOUNTER — HOSPITAL ENCOUNTER (INPATIENT)
Age: 72
LOS: 2 days | Discharge: HOME HEALTH | DRG: 948 | End: 2022-11-24
Attending: STUDENT IN AN ORGANIZED HEALTH CARE EDUCATION/TRAINING PROGRAM | Admitting: INTERNAL MEDICINE
Payer: MEDICARE

## 2022-11-21 ENCOUNTER — APPOINTMENT (OUTPATIENT)
Dept: GENERAL RADIOLOGY | Age: 72
DRG: 948 | End: 2022-11-21
Payer: MEDICARE

## 2022-11-21 ENCOUNTER — APPOINTMENT (OUTPATIENT)
Dept: CT IMAGING | Age: 72
DRG: 948 | End: 2022-11-21
Payer: MEDICARE

## 2022-11-21 DIAGNOSIS — C34.90 PRIMARY MALIGNANT NEOPLASM OF LUNG METASTATIC TO OTHER SITE, UNSPECIFIED LATERALITY (HCC): ICD-10-CM

## 2022-11-21 DIAGNOSIS — Z51.5 PALLIATIVE CARE ENCOUNTER: ICD-10-CM

## 2022-11-21 DIAGNOSIS — Z71.89 DNR (DO NOT RESUSCITATE) DISCUSSION: ICD-10-CM

## 2022-11-21 DIAGNOSIS — R92.8 ABNORMAL ULTRASOUND OF BREAST: Primary | ICD-10-CM

## 2022-11-21 DIAGNOSIS — C34.90 ADENOCARCINOMA OF LUNG, UNSPECIFIED LATERALITY (HCC): ICD-10-CM

## 2022-11-21 DIAGNOSIS — Z85.118 HISTORY OF LUNG CANCER: Chronic | ICD-10-CM

## 2022-11-21 DIAGNOSIS — N18.9 CHRONIC KIDNEY DISEASE, UNSPECIFIED CKD STAGE: ICD-10-CM

## 2022-11-21 DIAGNOSIS — R10.9 INTRACTABLE ABDOMINAL PAIN: ICD-10-CM

## 2022-11-21 DIAGNOSIS — R18.8 ASCITES OF LIVER: Primary | ICD-10-CM

## 2022-11-21 LAB
ALBUMIN SERPL-MCNC: 2.7 G/DL (ref 3.5–5.2)
ALP BLD-CCNC: 174 U/L (ref 35–104)
ALT SERPL-CCNC: 33 U/L (ref 0–32)
AMMONIA: 26.6 UMOL/L (ref 11–51)
ANION GAP SERPL CALCULATED.3IONS-SCNC: 10 MMOL/L (ref 7–16)
AST SERPL-CCNC: 48 U/L (ref 0–31)
BASOPHILS ABSOLUTE: 0.03 E9/L (ref 0–0.2)
BASOPHILS RELATIVE PERCENT: 0.3 % (ref 0–2)
BILIRUB SERPL-MCNC: <0.2 MG/DL (ref 0–1.2)
BUN BLDV-MCNC: 63 MG/DL (ref 6–23)
CALCIUM SERPL-MCNC: 9.1 MG/DL (ref 8.6–10.2)
CHLORIDE BLD-SCNC: 104 MMOL/L (ref 98–107)
CO2: 22 MMOL/L (ref 22–29)
CREAT SERPL-MCNC: 1.4 MG/DL (ref 0.5–1)
EOSINOPHILS ABSOLUTE: 0.05 E9/L (ref 0.05–0.5)
EOSINOPHILS RELATIVE PERCENT: 0.5 % (ref 0–6)
GFR SERPL CREATININE-BSD FRML MDRD: 40 ML/MIN/1.73
GLUCOSE BLD-MCNC: 130 MG/DL (ref 74–99)
HCT VFR BLD CALC: 27.9 % (ref 34–48)
HEMOGLOBIN: 8.8 G/DL (ref 11.5–15.5)
IMMATURE GRANULOCYTES #: 0.14 E9/L
IMMATURE GRANULOCYTES %: 1.4 % (ref 0–5)
LACTIC ACID, SEPSIS: 1.2 MMOL/L (ref 0.5–1.9)
LIPASE: 135 U/L (ref 13–60)
LYMPHOCYTES ABSOLUTE: 0.81 E9/L (ref 1.5–4)
LYMPHOCYTES RELATIVE PERCENT: 7.8 % (ref 20–42)
MCH RBC QN AUTO: 27.6 PG (ref 26–35)
MCHC RBC AUTO-ENTMCNC: 31.5 % (ref 32–34.5)
MCV RBC AUTO: 87.5 FL (ref 80–99.9)
MONOCYTES ABSOLUTE: 0.85 E9/L (ref 0.1–0.95)
MONOCYTES RELATIVE PERCENT: 8.2 % (ref 2–12)
NEUTROPHILS ABSOLUTE: 8.48 E9/L (ref 1.8–7.3)
NEUTROPHILS RELATIVE PERCENT: 81.8 % (ref 43–80)
PDW BLD-RTO: 15.8 FL (ref 11.5–15)
PLATELET # BLD: 531 E9/L (ref 130–450)
PMV BLD AUTO: 10.4 FL (ref 7–12)
POTASSIUM SERPL-SCNC: 5.3 MMOL/L (ref 3.5–5)
RBC # BLD: 3.19 E12/L (ref 3.5–5.5)
SODIUM BLD-SCNC: 136 MMOL/L (ref 132–146)
TOTAL PROTEIN: 7.5 G/DL (ref 6.4–8.3)
WBC # BLD: 10.4 E9/L (ref 4.5–11.5)

## 2022-11-21 PROCEDURE — 99285 EMERGENCY DEPT VISIT HI MDM: CPT

## 2022-11-21 PROCEDURE — 96374 THER/PROPH/DIAG INJ IV PUSH: CPT

## 2022-11-21 PROCEDURE — 85025 COMPLETE CBC W/AUTO DIFF WBC: CPT

## 2022-11-21 PROCEDURE — 83605 ASSAY OF LACTIC ACID: CPT

## 2022-11-21 PROCEDURE — 6360000004 HC RX CONTRAST MEDICATION: Performed by: RADIOLOGY

## 2022-11-21 PROCEDURE — 80053 COMPREHEN METABOLIC PANEL: CPT

## 2022-11-21 PROCEDURE — 81001 URINALYSIS AUTO W/SCOPE: CPT

## 2022-11-21 PROCEDURE — 83690 ASSAY OF LIPASE: CPT

## 2022-11-21 PROCEDURE — 87088 URINE BACTERIA CULTURE: CPT

## 2022-11-21 PROCEDURE — 74177 CT ABD & PELVIS W/CONTRAST: CPT

## 2022-11-21 PROCEDURE — 82140 ASSAY OF AMMONIA: CPT

## 2022-11-21 PROCEDURE — 71045 X-RAY EXAM CHEST 1 VIEW: CPT

## 2022-11-21 PROCEDURE — 93005 ELECTROCARDIOGRAM TRACING: CPT

## 2022-11-21 PROCEDURE — 87040 BLOOD CULTURE FOR BACTERIA: CPT

## 2022-11-21 PROCEDURE — 6360000002 HC RX W HCPCS

## 2022-11-21 RX ORDER — FENTANYL CITRATE 50 UG/ML
25 INJECTION, SOLUTION INTRAMUSCULAR; INTRAVENOUS ONCE
Status: DISCONTINUED | OUTPATIENT
Start: 2022-11-21 | End: 2022-11-21

## 2022-11-21 RX ORDER — FENTANYL CITRATE 50 UG/ML
50 INJECTION, SOLUTION INTRAMUSCULAR; INTRAVENOUS ONCE
Status: COMPLETED | OUTPATIENT
Start: 2022-11-21 | End: 2022-11-21

## 2022-11-21 RX ADMIN — FENTANYL CITRATE 50 MCG: 50 INJECTION INTRAMUSCULAR; INTRAVENOUS at 19:37

## 2022-11-21 RX ADMIN — IOPAMIDOL 75 ML: 755 INJECTION, SOLUTION INTRAVENOUS at 23:27

## 2022-11-21 ASSESSMENT — PAIN - FUNCTIONAL ASSESSMENT: PAIN_FUNCTIONAL_ASSESSMENT: 0-10

## 2022-11-21 ASSESSMENT — LIFESTYLE VARIABLES
HOW OFTEN DO YOU HAVE A DRINK CONTAINING ALCOHOL: NEVER
HOW MANY STANDARD DRINKS CONTAINING ALCOHOL DO YOU HAVE ON A TYPICAL DAY: PATIENT DOES NOT DRINK

## 2022-11-21 ASSESSMENT — PAIN SCALES - GENERAL
PAINLEVEL_OUTOF10: 3
PAINLEVEL_OUTOF10: 8

## 2022-11-22 ENCOUNTER — APPOINTMENT (OUTPATIENT)
Dept: ULTRASOUND IMAGING | Age: 72
DRG: 948 | End: 2022-11-22
Payer: MEDICARE

## 2022-11-22 PROBLEM — R22.31 MASS OF RIGHT AXILLA: Status: ACTIVE | Noted: 2022-11-22

## 2022-11-22 PROBLEM — N63.41 SUBAREOLAR MASS OF RIGHT BREAST: Status: ACTIVE | Noted: 2022-11-22

## 2022-11-22 PROBLEM — J90 PLEURAL EFFUSION, RIGHT: Status: ACTIVE | Noted: 2022-11-22

## 2022-11-22 PROBLEM — Z51.5 PALLIATIVE CARE ENCOUNTER: Status: ACTIVE | Noted: 2022-11-22

## 2022-11-22 PROBLEM — Z71.89 GOALS OF CARE, COUNSELING/DISCUSSION: Status: ACTIVE | Noted: 2022-11-22

## 2022-11-22 PROBLEM — R52 INTRACTABLE PAIN: Status: ACTIVE | Noted: 2022-11-22

## 2022-11-22 LAB
APPEARANCE FLUID: CLEAR
BACTERIA: ABNORMAL /HPF
BILIRUBIN URINE: NEGATIVE
BLOOD, URINE: ABNORMAL
CELL COUNT FLUID TYPE: NORMAL
CLARITY: ABNORMAL
COLOR FLUID: YELLOW
COLOR: YELLOW
EKG ATRIAL RATE: 103 BPM
EKG P AXIS: 69 DEGREES
EKG P-R INTERVAL: 128 MS
EKG Q-T INTERVAL: 370 MS
EKG QRS DURATION: 114 MS
EKG QTC CALCULATION (BAZETT): 484 MS
EKG R AXIS: -71 DEGREES
EKG T AXIS: 33 DEGREES
EKG VENTRICULAR RATE: 103 BPM
EPITHELIAL CELLS, UA: ABNORMAL /HPF
GLUCOSE URINE: NEGATIVE MG/DL
KETONES, URINE: NEGATIVE MG/DL
LEUKOCYTE ESTERASE, URINE: NEGATIVE
MONOCYTE, FLUID: 77 %
NEUTROPHIL, FLUID: 23 %
NITRITE, URINE: NEGATIVE
NUCLEATED CELLS FLUID: 218 /UL
PH UA: 6 (ref 5–9)
PROTEIN UA: ABNORMAL MG/DL
RBC FLUID: <2000 /UL
RBC UA: ABNORMAL /HPF (ref 0–2)
SPECIFIC GRAVITY UA: <=1.005 (ref 1–1.03)
UROBILINOGEN, URINE: 0.2 E.U./DL
WBC UA: ABNORMAL /HPF (ref 0–5)

## 2022-11-22 PROCEDURE — 0W9G3ZZ DRAINAGE OF PERITONEAL CAVITY, PERCUTANEOUS APPROACH: ICD-10-PCS | Performed by: RADIOLOGY

## 2022-11-22 PROCEDURE — 99222 1ST HOSP IP/OBS MODERATE 55: CPT

## 2022-11-22 PROCEDURE — 6370000000 HC RX 637 (ALT 250 FOR IP): Performed by: INTERNAL MEDICINE

## 2022-11-22 PROCEDURE — 6360000002 HC RX W HCPCS

## 2022-11-22 PROCEDURE — 94664 DEMO&/EVAL PT USE INHALER: CPT

## 2022-11-22 PROCEDURE — 89051 BODY FLUID CELL COUNT: CPT

## 2022-11-22 PROCEDURE — 6360000002 HC RX W HCPCS: Performed by: INTERNAL MEDICINE

## 2022-11-22 PROCEDURE — 87205 SMEAR GRAM STAIN: CPT

## 2022-11-22 PROCEDURE — 93010 ELECTROCARDIOGRAM REPORT: CPT | Performed by: INTERNAL MEDICINE

## 2022-11-22 PROCEDURE — 94640 AIRWAY INHALATION TREATMENT: CPT

## 2022-11-22 PROCEDURE — 2709999900 US GUIDED PARACENTESIS

## 2022-11-22 PROCEDURE — 6370000000 HC RX 637 (ALT 250 FOR IP)

## 2022-11-22 PROCEDURE — 99223 1ST HOSP IP/OBS HIGH 75: CPT | Performed by: INTERNAL MEDICINE

## 2022-11-22 PROCEDURE — 87070 CULTURE OTHR SPECIMN AEROBIC: CPT

## 2022-11-22 PROCEDURE — 88305 TISSUE EXAM BY PATHOLOGIST: CPT

## 2022-11-22 PROCEDURE — 2500000003 HC RX 250 WO HCPCS: Performed by: RADIOLOGY

## 2022-11-22 PROCEDURE — 1200000000 HC SEMI PRIVATE

## 2022-11-22 PROCEDURE — 6360000002 HC RX W HCPCS: Performed by: STUDENT IN AN ORGANIZED HEALTH CARE EDUCATION/TRAINING PROGRAM

## 2022-11-22 PROCEDURE — 99223 1ST HOSP IP/OBS HIGH 75: CPT | Performed by: STUDENT IN AN ORGANIZED HEALTH CARE EDUCATION/TRAINING PROGRAM

## 2022-11-22 PROCEDURE — 88112 CYTOPATH CELL ENHANCE TECH: CPT

## 2022-11-22 RX ORDER — OXYCODONE HYDROCHLORIDE 5 MG/1
10 TABLET ORAL EVERY 4 HOURS PRN
Status: DISCONTINUED | OUTPATIENT
Start: 2022-11-22 | End: 2022-11-24 | Stop reason: HOSPADM

## 2022-11-22 RX ORDER — 0.9 % SODIUM CHLORIDE 0.9 %
500 INTRAVENOUS SOLUTION INTRAVENOUS ONCE
Status: DISCONTINUED | OUTPATIENT
Start: 2022-11-22 | End: 2022-11-24 | Stop reason: HOSPADM

## 2022-11-22 RX ORDER — MORPHINE SULFATE 4 MG/ML
4 INJECTION, SOLUTION INTRAMUSCULAR; INTRAVENOUS ONCE
Status: COMPLETED | OUTPATIENT
Start: 2022-11-22 | End: 2022-11-22

## 2022-11-22 RX ORDER — LIDOCAINE HYDROCHLORIDE 10 MG/ML
INJECTION, SOLUTION INFILTRATION; PERINEURAL
Status: COMPLETED | OUTPATIENT
Start: 2022-11-22 | End: 2022-11-22

## 2022-11-22 RX ORDER — SENNA AND DOCUSATE SODIUM 50; 8.6 MG/1; MG/1
1 TABLET, FILM COATED ORAL 2 TIMES DAILY
Status: DISCONTINUED | OUTPATIENT
Start: 2022-11-22 | End: 2022-11-24 | Stop reason: HOSPADM

## 2022-11-22 RX ORDER — OXYCODONE HYDROCHLORIDE 5 MG/1
5 TABLET ORAL EVERY 4 HOURS PRN
Status: DISCONTINUED | OUTPATIENT
Start: 2022-11-22 | End: 2022-11-24 | Stop reason: HOSPADM

## 2022-11-22 RX ORDER — ONDANSETRON 2 MG/ML
4 INJECTION INTRAMUSCULAR; INTRAVENOUS ONCE
Status: COMPLETED | OUTPATIENT
Start: 2022-11-22 | End: 2022-11-22

## 2022-11-22 RX ORDER — POLYETHYLENE GLYCOL 3350 17 G/17G
17 POWDER, FOR SOLUTION ORAL DAILY PRN
Status: DISCONTINUED | OUTPATIENT
Start: 2022-11-22 | End: 2022-11-24 | Stop reason: HOSPADM

## 2022-11-22 RX ORDER — ACETAMINOPHEN 500 MG
500 TABLET ORAL EVERY 6 HOURS PRN
Status: DISCONTINUED | OUTPATIENT
Start: 2022-11-22 | End: 2022-11-24 | Stop reason: HOSPADM

## 2022-11-22 RX ORDER — MORPHINE SULFATE 2 MG/ML
1 INJECTION, SOLUTION INTRAMUSCULAR; INTRAVENOUS EVERY 4 HOURS PRN
Status: DISCONTINUED | OUTPATIENT
Start: 2022-11-22 | End: 2022-11-24 | Stop reason: HOSPADM

## 2022-11-22 RX ORDER — AMLODIPINE BESYLATE 5 MG/1
5 TABLET ORAL DAILY
Status: DISCONTINUED | OUTPATIENT
Start: 2022-11-23 | End: 2022-11-24 | Stop reason: HOSPADM

## 2022-11-22 RX ORDER — LISINOPRIL 10 MG/1
40 TABLET ORAL DAILY
Status: DISCONTINUED | OUTPATIENT
Start: 2022-11-22 | End: 2022-11-24 | Stop reason: HOSPADM

## 2022-11-22 RX ADMIN — IPRATROPIUM BROMIDE 0.5 MG: 0.5 SOLUTION RESPIRATORY (INHALATION) at 08:25

## 2022-11-22 RX ADMIN — MORPHINE SULFATE 4 MG: 4 INJECTION, SOLUTION INTRAMUSCULAR; INTRAVENOUS at 02:07

## 2022-11-22 RX ADMIN — OXYCODONE 10 MG: 5 TABLET ORAL at 20:51

## 2022-11-22 RX ADMIN — LISINOPRIL 40 MG: 10 TABLET ORAL at 08:36

## 2022-11-22 RX ADMIN — DOCUSATE SODIUM AND SENNOSIDES 1 TABLET: 8.6; 5 TABLET, FILM COATED ORAL at 13:40

## 2022-11-22 RX ADMIN — DOCUSATE SODIUM AND SENNOSIDES 1 TABLET: 8.6; 5 TABLET, FILM COATED ORAL at 20:50

## 2022-11-22 RX ADMIN — IPRATROPIUM BROMIDE 0.5 MG: 0.5 SOLUTION RESPIRATORY (INHALATION) at 20:12

## 2022-11-22 RX ADMIN — ONDANSETRON 4 MG: 2 INJECTION INTRAMUSCULAR; INTRAVENOUS at 02:07

## 2022-11-22 RX ADMIN — LIDOCAINE HYDROCHLORIDE 10 ML: 10 INJECTION, SOLUTION INFILTRATION; PERINEURAL at 09:58

## 2022-11-22 RX ADMIN — MORPHINE SULFATE 1 MG: 2 INJECTION, SOLUTION INTRAMUSCULAR; INTRAVENOUS at 13:25

## 2022-11-22 ASSESSMENT — PAIN DESCRIPTION - LOCATION
LOCATION: ABDOMEN

## 2022-11-22 ASSESSMENT — ENCOUNTER SYMPTOMS
COLOR CHANGE: 0
VOMITING: 0
ABDOMINAL PAIN: 1
BLOOD IN STOOL: 0
COUGH: 0
SHORTNESS OF BREATH: 0
ABDOMINAL DISTENTION: 1
CHEST TIGHTNESS: 0
CHOKING: 0
CONSTIPATION: 0
DIARRHEA: 0
SORE THROAT: 0
NAUSEA: 0

## 2022-11-22 ASSESSMENT — PAIN SCALES - GENERAL
PAINLEVEL_OUTOF10: 7
PAINLEVEL_OUTOF10: 9
PAINLEVEL_OUTOF10: 8
PAINLEVEL_OUTOF10: 0

## 2022-11-22 ASSESSMENT — PAIN DESCRIPTION - DESCRIPTORS
DESCRIPTORS: ACHING;DISCOMFORT
DESCRIPTORS: ACHING
DESCRIPTORS: ACHING;DISCOMFORT

## 2022-11-22 ASSESSMENT — PAIN SCALES - WONG BAKER
WONGBAKER_NUMERICALRESPONSE: 0
WONGBAKER_NUMERICALRESPONSE: 0

## 2022-11-22 ASSESSMENT — PAIN DESCRIPTION - ORIENTATION
ORIENTATION: RIGHT
ORIENTATION: RIGHT

## 2022-11-22 ASSESSMENT — PAIN DESCRIPTION - PAIN TYPE: TYPE: ACUTE PAIN;CHRONIC PAIN

## 2022-11-22 NOTE — DISCHARGE INSTRUCTIONS
[facility] Can be reached at [phone].    [facility] physicians--- ([phone])

## 2022-11-22 NOTE — HOME CARE
[facility] referral received for start of service on 11/30/22, awaiting final orders. Spoke with patient's domestic partner, Mirian Zuleta and verified demographics. Will follow. Taryn Ramos LPN [facility].

## 2022-11-22 NOTE — CONSULTS
Palliative Care Department  [phone]  Palliative Care Initial Consult  Provider Derek Chapin, APRN - CNP     Misty Diallo  [id number]  Hospital Day: 2  Date of Initial Consult: 11/22/22  Referring Provider: Dr. Tyree Mahajan was consulted for assistance with: family support, goals of care, symptom management     HPI:   Misty Diallo is a 71 y.o. 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 She was recently discharged 11/9 after being admitted for abdominal pain and having paracentesis. ED work up significant for: K 5.3, BUN/creatinine 63/1.4, albumin 2.7, alk phos 174, ALT 33, AST 48, lipase 135, hgb 8.8, blood and urine cultures obtained. CT A/P showed moderate right basilar pleural effusion with RLL atelectasis, slight nodularity of liver concerning for early cirrhotic changes, stable hypodense region within spleen with parenchymal atrophy likely related to old splenic infarction or injury, extensive calcification of uterine fibroids. CXR showed hazy density over the right hemithorax likely d/t laying right pleural effusion, superimposed infiltrates cannot be excluded, elevated right hemidiaphragm. She underwent a paracentesis in ED. After conversation with ED physician, she changed her CODE STATUS to [facility]. Oncology and GI consulted for further management. ASSESSMENT/PLAN:     Pertinent Hospital Diagnoses     Ascites of the liver  ANASTASIA vs CKD  Hyperkalemia   Elevated liver enzymes   Stage IV adenocarcinoma of the lung with metastatic disease      Palliative Care Encounter / Counseling Regarding Goals of Care  Please see detailed goals of care discussion as below  At this time, Misty Diallo, Does have capacity for medical decision-making. Capacity is time limited and situation/question specific  During encounter Aramis Cordova was surrogate medical decision-maker  Outcome of goals of care meeting:   Continue current medical management  Await plan from oncology  Continue DNR-CCA  Goal is to go home once strong enough  Code status DNR-CCA  Advanced Directives: no POA or living will in [facility]  Surrogate/Legal NOK:  Reba Sales (): [address] (sister): [phone]    Abdominal pain 2/2 peritoneal metastatic disease:   -  Oxycodone 5 or 10 mg every 4 hours PRN for pain    Constipation:   -  Start Glycolax 17g daily PRN   - Start SenoKot 1 tablet BID    Spiritual assessment: no spiritual distress identified  Bereavement and grief: to be determined  Referrals to: none today  SUBJECTIVE:     Current medical issues leading to Palliative Medicine involvement include   Active Hospital Problems    Diagnosis Date Noted    Intractable pain [R52] 11/22/2022     Priority: Medium       Details of Conversation:    Chart reviewed. Patient seen at bedside with her sister, Veronica Clark, and , Hugo Rangel. Role of Palliative Medicine introduced. Patient is alert and oriented and able to hold meaningful conversation. She c/o 9/10 right rib pain that is constant, sharp, nagging in nature. She states at the nursing facility, she was receiving Norco PRN without any relief. She denies constipation or nausea. 3L were removed today with paracentesis. Aramis Cordova states she recently completed a living will and HCPOA. Her  is her HCPOA and will bring the papers in for us to make a copy. CODE STATUS was discussed in ED. I followed up and she wants to continue the [facility]. Aramis Cordova and her sister are concerned about a mass located by her right axilla that is causing her discomfort. I encouraged them to make the PCP and oncologist aware as well. Aramis Cordova states her goal is to end up home but knows she needs more rehab at a facility before that.      OBJECTIVE:   Prognosis: Poor and Guarded    ROS: UNLESS STATED ABOVE PATIENT DENIES:  CONSTITUTIONAL:  fever, chill, rigors, nausea, vomiting, fatigue. HEENT: blurry vision, double vision, hearing problem, tinnitus, hoarseness, dysphagia, odynophagia  RESPIRATORY: cough, shortness of breath, sputum expectoration. CARDIOVASCULAR:  Chest pain/pressure, palpitation, syncope, irregular beats  GASTROINTESTINAL:  diarrhea, constipation  GENITOURINARY:  Burning, frequency, urgency, incontinence, discharge  INTEGUMENTARY: rash, pruritis  HEMATOLOGIC/LYMPHATIC:  Swelling, sores, gum bleeding, easy bruising, pica. MUSCULOSKELETAL:  pain, edema, joint swelling or redness  NEUROLOGICAL:  light headed, dizziness, loss of consciousness, weakness, change in memory, seizures, tremors    Physical Exam:  BP (!) 152/69   Pulse 88   Temp 98.7 °F (37.1 °C) (Oral)   Resp 18   Ht 5' 1\" (1.549 m)   Wt 113 lb 1.6 oz (51.3 kg)   SpO2 94%   BMI 21.37 kg/m²   Constitutional:  thin, ill-appearing  Lungs:  CTA bilaterally, no audible rhonchi or wheezes noted, respirations unlabored, no retractions  Heart:  RRR, distant heart tones, no murmur, rub, or gallop noted during exam  Abd:  Soft, s/p paracentesis today, right axilla mass  Ext:  Moving all extremities, no edema, pulses present  Skin:  Warm and dry, no rashes on visible skin  Neuro:  Alert and oriented    Objective data reviewed: labs, images, records, medication use, vitals, and chart    Discussed patient and the plan of care with the other IDT members: Palliative Medicine IDT Team, Patient, and Family    Time/Communication  Greater than 50% of time spent, total 50 minutes in counseling and coordination of care at the bedside regarding  CODE STATUS discussion, goals of care, symptom management, and diagnosis and prognosis. Thank you for allowing Palliative Medicine to participate in the care of Misty DialloCandi

## 2022-11-22 NOTE — CARE COORDINATION
Social work:    Reviewed chart notes. Patti Gallagher admitted to [facility] with worsening abdominal pain from [facility]. She was recently diagnosed with stage IV adenocarcinoma of the lung with mets to the peritoneal. Social work met with Patti Gallagher, her partner Madie Al, and sister Scott Stein. Social work also spoke with patient's sister Dahlia Lay via phone. Social service advised patient & family about social work &  roles, as well as discussed discharge planning. Patti Gallagher is a patient of Dr. Karyle Manas and resides with Madie Al in a bi-level home with 12-15 entry steps. She has a wheeled walker, cane, and BSC. Patti Gallagher & family declined comfort care and decline plans to return to [facility], and advise of plan to return home via ambulance with [place] [address], a hospital bed. Choices for [facility] and DME were provided and Patti Gallagher has no agency preference. Patti Gallagher and her family also inquired about obtaining help at home. Social work made a referral the [facility] program, however, they advised social work that patient's [address] [place] [address] provides her with a  who can assist with their needs. Social work updated patient and family who plan to call customer service at [address] [place] [address]. Social work also advised patient & family about [facility] home visiting physician's due to their current situation and concern for possible ambulette vs ambulance ride to appt. and they requested a referral be made for Patti Gallagher. Social work called a referral in to [employer] ([phone]), as well as Yecenia at [facility] and Diana Arana at [facility]. [facility]ician's will need called prior to discharge to arrange to see patient at her home. [facility] also will need notified of discharge day so they can arrange delivery of the hospital bed. Social work to provide cancer resources as well.  (Ambulance form in chart)    Electronically signed by ESTEVAN Pierson on 11/22/2022 at 1:10 PM

## 2022-11-22 NOTE — PROGRESS NOTES
Message sent to Dr. Antonio French with [facility]Onc regarding Dr. Alejandro Su request if they recommend IR bx or general surgery excisional bx of lymph node on pts back. Awaiting return call or orders.

## 2022-11-22 NOTE — ED PROVIDER NOTES
Musculoskeletal:  Negative for arthralgias, gait problem, joint swelling and myalgias. Skin:  Negative for color change, pallor, rash and wound. Neurological:  Negative for dizziness, tremors, seizures, syncope, weakness, light-headedness, numbness and headaches. Hematological:  Negative for adenopathy. Does not bruise/bleed easily. Psychiatric/Behavioral:  Negative for confusion and hallucinations. All other systems reviewed and are negative. Physical Exam  Constitutional:       General: She is not in acute distress. Appearance: Normal appearance. She is normal weight. She is ill-appearing. She is not toxic-appearing or diaphoretic. HENT:      Head: Normocephalic and atraumatic. Right Ear: External ear normal.      Left Ear: External ear normal.      Nose: Nose normal. No congestion or rhinorrhea. Mouth/Throat:      Pharynx: Oropharynx is clear. No oropharyngeal exudate or posterior oropharyngeal erythema. Eyes:      Conjunctiva/sclera: Conjunctivae normal.      Pupils: Pupils are equal, round, and reactive to light. Cardiovascular:      Rate and Rhythm: Normal rate and regular rhythm. Pulses: Normal pulses. Heart sounds: Normal heart sounds. Pulmonary:      Effort: Pulmonary effort is normal.      Breath sounds: Normal breath sounds. Abdominal:      General: Abdomen is protuberant. A surgical scar is present. Bowel sounds are decreased. There is no distension. Palpations: Abdomen is rigid. There is no mass. Tenderness: There is generalized abdominal tenderness and tenderness in the right lower quadrant and suprapubic area. There is no right CVA tenderness, left CVA tenderness or guarding. Hernia: No hernia is present. Musculoskeletal:      Cervical back: Normal range of motion. Skin:     General: Skin is warm and dry. Capillary Refill: Capillary refill takes less than 2 seconds. Neurological:      General: No focal deficit present. compared to patient's most recent EKG      [TC]      ED Course User Index  [TC] Mushtaq Castelan MD       --------------------------------------------- PAST HISTORY ---------------------------------------------  Past Medical History:  has a past medical history of Abnormal chest x-ray with multiple lung nodules, Abnormal Pap smear, Adrenal adenoma, Bilateral lung cancer ([address] Cholelithiasis, Encounter for screening colonoscopy, Fibroids, Hemangioma of spleen, Hepatitis C, Hyperlipidemia, Hypertension, Insomnia, Lung nodule, Lymphadenopathy, Malignant neoplasm of upper lobe of right lung ([address] Osteoarthritis, Panlobular emphysema ([address] PPD negative, Pulmonary embolism without acute cor pulmonale ([address] PVD (peripheral vascular disease) ([address] Scoliosis, and Uterine fibroid. Past Surgical History:  has a past surgical history that includes Appendectomy (1965); Endometrial biopsy; Colonoscopy (12/21/2009); bronchoscopy (2/1/2012); Colonoscopy (8/22/2014); bronchoscopy (10/15/15); Thoracoscopy (Right, 11/11/2015); Tunneled venous port placement (Right, 12/07/2015); Lung lobectomy (Left, 3/29/2016); other surgical history (Right, 11/04/2016); pr lap,cholecystectomy/graph (N/A, 6/29/2018); Cholecystectomy; Breast lumpectomy (4/28/1999); Colonoscopy (N/A, 3/28/2019); Breast biopsy (Left); Upper gastrointestinal endoscopy (N/A, 5/13/2022); Colonoscopy (N/A, 5/13/2022); Liver Resection (Right, 7/6/2022); and CT NEEDLE BIOPSY LUNG PERCUTANEOUS (9/20/2022). Social History:  reports that she quit smoking about 7 years ago. Her smoking use included cigarettes. She has never used smokeless tobacco. She reports that she does not currently use alcohol after a past usage of about 2.0 standard drinks per week. She reports that she does not use drugs. Family History: family history includes Breast Cancer in her sister; Cancer in her father and mother; Diabetes in her brother and sister;  Heart Disease in her father and mother; High Blood Pressure in her father and mother; Kidney Disease in her father. The patients home medications have been reviewed.     Allergies: Ibuprofen    -------------------------------------------------- RESULTS -------------------------------------------------    LABS:  Results for orders placed or performed during the hospital encounter of 11/21/22   CBC with Diff   Result Value Ref Range    WBC 10.4 4.5 - 11.5 E9/L    RBC 3.19 (L) 3.50 - 5.50 E12/L    Hemoglobin 8.8 (L) 11.5 - 15.5 g/dL    Hematocrit 27.9 (L) 34.0 - 48.0 %    MCV 87.5 80.0 - 99.9 fL    MCH 27.6 26.0 - 35.0 pg    MCHC 31.5 (L) 32.0 - 34.5 %    RDW 15.8 (H) 11.5 - 15.0 fL    Platelets 117 (H) 681 - 450 E9/L    MPV 10.4 7.0 - 12.0 fL    Neutrophils % 81.8 (H) 43.0 - 80.0 %    Immature Granulocytes % 1.4 0.0 - 5.0 %    Lymphocytes % 7.8 (L) 20.0 - 42.0 %    Monocytes % 8.2 2.0 - 12.0 %    Eosinophils % 0.5 0.0 - 6.0 %    Basophils % 0.3 0.0 - 2.0 %    Neutrophils Absolute 8.48 (H) 1.80 - 7.30 E9/L    Immature Granulocytes # 0.14 E9/L    Lymphocytes Absolute 0.81 (L) 1.50 - 4.00 E9/L    Monocytes Absolute 0.85 0.10 - 0.95 E9/L    Eosinophils Absolute 0.05 0.05 - 0.50 E9/L    Basophils Absolute 0.03 0.00 - 0.20 E9/L   CMP   Result Value Ref Range    Sodium 136 132 - 146 mmol/L    Potassium 5.3 (H) 3.5 - 5.0 mmol/L    Chloride 104 98 - 107 mmol/L    CO2 22 22 - 29 mmol/L    Anion Gap 10 7 - 16 mmol/L    Glucose 130 (H) 74 - 99 mg/dL    BUN 63 (H) 6 - 23 mg/dL    Creatinine 1.4 (H) 0.5 - 1.0 mg/dL    Est, Glom Filt Rate 40 >=60 mL/min/1.73    Calcium 9.1 8.6 - 10.2 mg/dL    Total Protein 7.5 6.4 - 8.3 g/dL    Albumin 2.7 (L) 3.5 - 5.2 g/dL    Total Bilirubin <0.2 0.0 - 1.2 mg/dL    Alkaline Phosphatase 174 (H) 35 - 104 U/L    ALT 33 (H) 0 - 32 U/L    AST 48 (H) 0 - 31 U/L   Lipase   Result Value Ref Range    Lipase 135 (H) 13 - 60 U/L   Urinalysis with Microscopic   Result Value Ref Range    Color, UA Yellow Straw/Yellow    Clarity, UA SL CLOUDY Clear    Glucose, Ur Negative Negative mg/dL    Bilirubin Urine Negative Negative    Ketones, Urine Negative Negative mg/dL    Specific Gravity, UA <=1.005 1.005 - 1.030    Blood, Urine SMALL (A) Negative    pH, UA 6.0 5.0 - 9.0    Protein, UA TRACE Negative mg/dL    Urobilinogen, Urine 0.2 <2.0 E.U./dL    Nitrite, Urine Negative Negative    Leukocyte Esterase, Urine Negative Negative    WBC, UA 1-3 0 - 5 /HPF    RBC, UA NONE 0 - 2 /HPF    Epithelial Cells, UA FEW /HPF    Bacteria, UA MANY (A) None Seen /HPF   Ammonia   Result Value Ref Range    Ammonia 26.6 11.0 - 51.0 umol/L   Lactate, Sepsis   Result Value Ref Range    Lactic Acid, Sepsis 1.2 0.5 - 1.9 mmol/L   EKG 12 Lead   Result Value Ref Range    Ventricular Rate 103 BPM    Atrial Rate 103 BPM    P-R Interval 128 ms    QRS Duration 114 ms    Q-T Interval 370 ms    QTc Calculation (Bazett) 484 ms    P Axis 69 degrees    R Axis -71 degrees    T Axis 33 degrees       RADIOLOGY:  CT ABDOMEN PELVIS W IV CONTRAST Additional Contrast? None   Final Result   Moderate right basilar pleural fluid collection with atelectasis of the right   lower lobe. Slight nodularity of the liver concerning for early cirrhotic morphological   change. Stable hypodense region within the spleen with parenchymal atrophy likely   related to old splenic infarction or old splenic injury. Large amount of intra-abdominal ascites, unchanged from the prior study. Extensive calcification of uterine fibroids. Atherosclerotic disease. .         XR CHEST PORTABLE   Final Result   Hazy density over the right hemithorax likely due to layering right pleural   effusion. Superimposed infiltrates cannot be excluded. Elevated right hemidiaphragm.                  ------------------------- NURSING NOTES AND VITALS REVIEWED ---------------------------  Date / Time Roomed:  11/21/2022  6:11 PM  ED Bed Assignment:  26/26    The nursing notes within the ED encounter and vital signs as below have been reviewed. Patient Vitals for the past 24 hrs:   BP Temp Pulse Resp SpO2 Weight   11/22/22 0035 (!) 156/67 -- (!) 105 16 99 % --   11/21/22 1815 (!) 158/84 98.2 °F (36.8 °C) 99 18 98 % 130 lb (59 kg)       Oxygen Saturation Interpretation: Normal    ------------------------------------------ PROGRESS NOTES ------------------------------------------  Re-evaluation(s):  Time: 0930  Patients symptoms show no change  Repeat physical examination is not changed    Counseling:  I have spoken with the patient and discussed todays results, in addition to providing specific details for the plan of care and counseling regarding the diagnosis and prognosis. Their questions are answered at this time and they are agreeable with the plan of admission.    --------------------------------- ADDITIONAL PROVIDER NOTES ---------------------------------  Consultations:  Spoke with Dr. Akash Vaughn.  Discussed case. They will admit the patient. This patient's ED course included: a personal history and physicial examination, re-evaluation prior to disposition, multiple bedside re-evaluations, IV medications, cardiac monitoring, and continuous pulse oximetry    This patient has remained hemodynamically stable during their ED course. Diagnosis:  1. Ascites of liver    2. Primary malignant neoplasm of lung metastatic to other site, unspecified laterality [address]    3. DNR (do not resuscitate) discussion    4. Chronic kidney disease, unspecified CKD stage    5. Intractable abdominal pain        Disposition:  Patient's disposition: Admit to telemetry  Patient's condition is stable.          Luis Orellana MD  Resident  11/22/22 9599

## 2022-11-22 NOTE — CARE COORDINATION
Social Work:    [facility] declined referral due to staffing. Referral given to [facility], however, they cannot start care until next Wednesday. Social work asked Anna to place patient on the list. Social work to try to locate an accept patient with an earlier start date.     Electronically signed by ESTEVAN Jones on 11/22/2022 at 1:44 PM

## 2022-11-22 NOTE — PROGRESS NOTES
Patient was admitted today by the nocturnist for abd pain. Abd pain - recurrent Ascites r/o SBP- cirrhosis, hx of hep c with Stage IV adenocarcinoma of the lung s/p lobectomy with peritoneal carcinomatosis. S/P multiple previous paracentesis - cytology was neg for malignant cells, s/p therapeutic paracentesis 11/22 with 3L fluid removal , oncology and GI consulted. 1.5 cm right subareolar breast lump that will s/p outpatient breast US, Inverted right nipple, advised OP nipple biopsy. has been seen by oncology as well as radiation oncology   Mass rt scapular lesion - consulted sx for excisional biopsy.    Mod rt basilar pleural effusion with atelectasis rt LL  On imaging  - hypodense splenic lesions - old infarct/ injury  Other MED conditions - PVD, emphysema, anemia, htn,     DVT prophylaxis -   Palliative care consulted - [facility]

## 2022-11-22 NOTE — CONSULTS
Inpatient Medical Oncology/Hematology Consult Note    Patient Name: Orin Roman  YOB: 1950    DATE OF ADMISSION: 11/21/2022  DATE OF CONSULTATION: 11/21/2022  REASON FOR CONSULTATION: \"recurrent peritoneal carcinamatosis\"  CONSULTING PROVIDER: Jude Grant MD  PCP: Sarkis Guaman    Room: 7622/0246-B      CHIEF COMPLAINT:  Abdominal Distention    HISTORY OF PRESENT ILLNESS:   Patient is a 71 y.o. female who is known to us for adenocarcinoma of the lung comes in with acute complaint of recurrent abdominal distention. Patient was previously admitted recently due to similar issues with ascites. Initial inpatient work-up was largely unremarkable in spite of concern for malignancy. Detailed history as outlined below including history of abdominal lesions/biopsies and a monoclonal B-cell population. The patient reports she has had acute onset of recurrent ascites that developed over the last 2 weeks or so. She had been previously discharged on 11/9/2022 to rehab. She was able to follow-up with her oncologist Dr. Kandice Ray, in which ultrasound of the breast was done, finding a BI-RADS 4 right-sided breast lesion. REVIEW OF SYSTEMS:  Review of Systems   Constitutional:  Negative for chills, diaphoresis and fever. HENT: Negative. Eyes:  Negative for visual disturbance. Respiratory:  Negative for cough and choking. Cardiovascular:  Negative for chest pain and leg swelling. Gastrointestinal:  Positive for abdominal distention. Negative for blood in stool. Musculoskeletal: Negative. Skin:         Painful mass just posterior to right axilla   Neurological: Negative. Hematological:  Does not bruise/bleed easily. Psychiatric/Behavioral: Negative.           PAST MEDICAL HISTORY:  Past Medical History:   Diagnosis Date    Abnormal chest x-ray with multiple lung nodules 9/8/2015    Abnormal Pap smear 1/3/2011    Alpha-1 negative    Adrenal adenoma     7 mm    Bilateral lung cancer ([facility] 5/12/2016    surgically removed - 2015     Cholelithiasis 6/6/2011    Encounter for screening colonoscopy     for OR 3-28-19     Fibroids     Hemangioma of spleen     Hepatitis C 01/03/2011    treated and cured, no current issues     Hyperlipidemia     no medications     Hypertension 6/6/2011    Insomnia     Lung nodule     ROMELIA     Lymphadenopathy     Cervical (-) ENT    Malignant neoplasm of upper lobe of right lung ([address] 11/18/2015    Osteoarthritis     Panlobular emphysema ([address] 11/12/2015    controlled with inhalers     PPD negative 1/18/12    Pulmonary embolism without acute cor pulmonale ([address] 5/12/2016    PVD (peripheral vascular disease) ([facility] [address] 5/6/2014    Scoliosis     Uterine fibroid 5/6/2014       PAST SURGICAL HISTORY:  Past Surgical History:   Procedure Laterality Date    APPENDECTOMY  1965    BREAST BIOPSY Left     AGE 31'S LEFT NIPPLE REMOVED    BREAST LUMPECTOMY  4/28/1999    left, benign[facility]  2/1/2012    Alida Merritt, [facility]  10/15/15    with EBUS - DR Raudel Nassar    CHOLECYSTECTOMY      COLONOSCOPY  12/21/2009    partial to distal ascending colon normal (completion BE same day normal), Dr. Ki West, [facility]    COLONOSCOPY  8/22/2014    done under fluoro with sigmoid straightening device so was able to get to cecum, very poor prep, moderate proctitis, repeat 5 years,  Dr. Ki West, [facility]    COLONOSCOPY N/A 3/28/2019    COLONOSCOPY POLYPECTOMY SNARE/COLD BIOPSY performed by Haim Amin DO at [address] [place] [address] N/A 5/13/2022    COLONOSCOPY POLYPECTOMY SNARE/COLD BIOPSY performed by Duane Abu, MD at [facility]  9/20/2022    CT NEEDLE BIOPSY LUNG PERCUTANEOUS 9/20/2022 DEANGELO Walters MD [facility]    ENDOMETRIAL BIOPSY      LIVER RESECTION Right 7/6/2022    LAPAROSCOPIC ROBOTIC PERITONEAL MASS RESECTION performed by Yulia Mann MD at [place] [address] Left 3/29/2016 VATS WEDGE RESECTION UPPER LOBECTOMY    OTHER SURGICAL HISTORY Right 11/04/2016    REMOVAL MEDI-PORT - DR Warren Nails    DE LAP,CHOLECYSTECTOMY/GRAPH N/A 6/29/2018    CHOLECYSTECTOMY LAPAROSCOPIC, POSSIBLE OPEN,POSSIBLE GRAM ( OC 2) performed by Jamaica Bennett MD at [facility] Right 11/11/2015    VATS, BRONCH,RT UPPER LOBECTOMY; NODE DISECTION    TUNNELED VENOUS PORT PLACEMENT Right 12/07/2015    right chest, and removed     UPPER GASTROINTESTINAL ENDOSCOPY N/A 5/13/2022    EGD POLYP HOT FORCEP/CAUTERY performed by Teetee Betancourt MD at [address] [place] [address]:  Current Facility-Administered Medications   Medication Dose Route Frequency Provider Last Rate Last Admin    0.9 % sodium chloride bolus  500 mL IntraVENous Once Floyce Denver, MD   Stopped at 11/22/22 0318    acetaminophen (TYLENOL) tablet 500 mg  500 mg Oral Q6H PRN Floyce Denver, MD        [START ON 11/23/2022] amLODIPine (NORVASC) tablet 5 mg  5 mg Oral Daily Floyce Denver, MD        lisinopril (PRINIVIL;ZESTRIL) tablet 40 mg  40 mg Oral Daily Floyce Denver, MD   40 mg at 11/22/22 0836    ipratropium (ATROVENT) 0.02 % nebulizer solution 0.5 mg  0.5 mg Nebulization TID Floyce Denver, MD   0.5 mg at 11/22/22 0825    polyethylene glycol (GLYCOLAX) packet 17 g  17 g Oral Daily PRN Lorretta Saltness, APRN - CNP        morphine (PF) injection 1 mg  1 mg IntraVENous Q4H PRN Jamal Ravi MD   1 mg at 11/22/22 1325    oxyCODONE (ROXICODONE) immediate release tablet 5 mg  5 mg Oral Q4H PRN Lorretta Saltness, APRN - CNP        Or    oxyCODONE (ROXICODONE) immediate release tablet 10 mg  10 mg Oral Q4H PRN Lorretta Saltness, APRN - CNP        sennosides-docusate sodium (SENOKOT-S) 8.6-50 MG tablet 1 tablet  1 tablet Oral BID Lorretta Saltness, APRN - CNP   1 tablet at 11/22/22 1340     Facility-Administered Medications Ordered in Other Encounters   Medication Dose Route Frequency Provider Last Rate Last Admin    pantoprazole (PROTONIX) tablet 40 mg  40 mg Oral QAM AC Luís Amin MD           ALLERGIES:   Allergies   Allergen Reactions    Ibuprofen Palpitations and Rash        SOCIAL HISTORY:   Social History     Socioeconomic History    Marital status: Single    Number of children: 0   Tobacco Use    Smoking status: Former     Packs/day: 0.00     Years: 30.00     Pack years: 0.00     Types: Cigarettes     Quit date: 2015     Years since quittin.4    Smokeless tobacco: Never   Vaping Use    Vaping Use: Never used   Substance and Sexual Activity    Alcohol use: Not Currently     Alcohol/week: 2.0 standard drinks     Types: 2 Glasses of wine per week     Comment: x2 week    Drug use: No    Sexual activity: Not Currently       FAMILY HISTORY:  Family History   Problem Relation Age of Onset    Heart Disease Mother     High Blood Pressure Mother     Cancer Mother         ovarian    Cancer Father     Heart Disease Father     High Blood Pressure Father     Kidney Disease Father     Diabetes Sister     Breast Cancer Sister     Diabetes Brother        PHYSICAL EXAM:   VITALS:  /63   Pulse 84   Temp 98.7 °F (37.1 °C) (Oral)   Resp 18   Ht 5' 1\" (1.549 m)   Wt 113 lb 1.6 oz (51.3 kg)   SpO2 94%   BMI 21.37 kg/m²   Physical Exam  Constitutional:       Appearance: She is ill-appearing. She is not toxic-appearing. HENT:      Head: Normocephalic. Nose: Nose normal.      Mouth/Throat:      Mouth: Mucous membranes are moist.   Eyes:      General: No scleral icterus. Cardiovascular:      Rate and Rhythm: Normal rate and regular rhythm. Heart sounds: No murmur heard. Pulmonary:      Effort: Pulmonary effort is normal. No respiratory distress. Breath sounds: No stridor. No wheezing. Abdominal:      General: Abdomen is flat. There is no distension. Palpations: There is no mass. Tenderness: There is no abdominal tenderness. Musculoskeletal:      Cervical back: Normal range of motion.  No rigidity. Right lower leg: No edema. Left lower leg: No edema. Comments: Tender, 3-4 centimeter subcutaneous mass just posterior to right axilla   Lymphadenopathy:      Cervical: No cervical adenopathy. Skin:     Findings: No lesion or rash. Neurological:      General: No focal deficit present. Mental Status: She is alert and oriented to person, place, and time. Psychiatric:         Behavior: Behavior normal.         Thought Content: Thought content normal.        ECOG PS: 1-2    No intake or output data in the 24 hours ending 11/22/22 1403    DIAGNOSTIC DATA:       Lab Results   Component Value Date    WBC 10.4 11/21/2022    HGB 8.8 (L) 11/21/2022    HCT 27.9 (L) 11/21/2022    MCV 87.5 11/21/2022     (H) 11/21/2022     Lab Results   Component Value Date    NEUTROABS 8.48 (H) 11/21/2022     Lab Results   Component Value Date    ALT 33 (H) 11/21/2022    AST 48 (H) 11/21/2022    ALKPHOS 174 (H) 11/21/2022    BILITOT <0.2 11/21/2022     Lab Results   Component Value Date    CREATININE 1.4 (H) 11/21/2022    BUN 63 (H) 11/21/2022     11/21/2022    K 5.3 (H) 11/21/2022     11/21/2022    CO2 22 11/21/2022       Pathology:     Radiology:      ASSESSMENT & PLAN:  The patient is a 71 y.o. female Hx of smoking who underwent: (1) Bronchoscopy. (2) Right VATS. (3) VATS right upper lobe wedge resection. (4) VATS right upper lobectomy. (5) Intercostal nerve block from T3 to T10. (6) Mediastinal lymph node dissection on 11/11/2015:   Pathology:  Right lung, upper lobectomy: Invasive, moderately differentiated adenocarcinoma (Grade 2). Tumor size 1.5 cm in greatest dimension. Visceral pleural invasion: Not identified. Visceral pleural invasion: Not identified. Surgical margin negative for malignancy. Two of three peribronchial lymph nodes positive for adenocarcinoma. pT1a N1 MX;      Being followed for a left upper lobe mass by Dr. Jan Uribe.  Multiple biopsies in the past came Periaortic lymph node #1, excision: Fibroadipose tissue; lymph node not identified  E. Bronchial lymph node #1, excision: Anthracotic lymph node; negative for malignancy  F. Left lung, upper lobectomy: Emphysematous change; negative for malignancy  4 anthracotic lymph nodes negative for malignancy   G. Inferior pulmonary ligament lymph node, excision: Anthracotic lymph node; negative for malignancy  H. Bronchial lymph node, excision: Anthracotic lymph node; negative for malignancy. Right side Mediport was removed on 11/04/2016 by Dr. Julianne Torres. On surveillance per NCCN guidelines. CT chest 04/12/2017 noted no convincing evidence of recurrent disease. CT chest 10/19/2017 noted no definite evidence for recurrent malignancy. CT chest 03/12/2018 negative for pulmonary parenchymal masses or enlarged mediastinal or hilar LN. ? 2 cm lesion in spleen difficult to evaluate due to the arterial phase of the study. CT Abd/Pelvis 05/08/2018  Enhancing lesion within the spleen is relatively stable to slightly smaller when compared to April 2014 exam and likely splenic hemangioma. Other indeterminate findings (left periaortic LN, sclerotic/blastic foci in L3 and L1 reported by Dr. Hanane King from radiology team). PET/CT scan 06/05/2018 unremarkable. No FDG avid uptake identified. No evidence for recurrent or metastatic disease. CT Chest 12/13/2018 noted no evidence of recurrent/metastatic disease. CT chest on 06/11/2019 noted no evidence for worrisome residual or recurrent malignancy. No evidence for new lymphadenopathy or metastatic disease. Stable scarring and postoperative changes right upper lobe. CT chest 11/26/2019: No evidence of active neoplasm. CT chest 06/15/2020: No evidence of active neoplasm. CT chest 12/30/2020: Postsurgical changes and postsurgical scarring seen within the right lung apex. There is no evidence of tumor recurrence.   2.1 x 2.3 cm enhancing lesion seen within the spleen  PET/CT scan 03/02/2021   No FDG avid uptake is identified which exceeds the threshold SUV. No convincing evidence for recurrent or metastatic disease  CT chest 09/28/2021 No evidence of tumor recurrence     Recent abdominal pain; ER visit reviewed  CT abdomen/pelvis 02/20/2022   6 mm right lower lobe pulmonary nodule  Multiple peritoneal cystic masses      CEA 12.5 on 03/16/2022     CT chest 04/05/2022 Ground-glass nodule right lower lobe superolateral portion 10 mm unchanged from prior however in the medial segment right lower lobe with pleural abutment is a 7 mm pulmonary nodule increased in size from 09/28/2021 with is barely visible at 2-3 mm; repeat CT chest in 3 mo     PET/CT scan 04/05/2022 noted No FDG avid uptake is identified which exceeds the threshold SUV      Bilateral screening mammogram 04/20/2022: Negative for malignancy     CEA     12.1 on 04/20/2022  CA-125 32.8 on 04/20/2022   <2 on 04/20/2022  Chromogranin A 1480 on 04/20/2022. EGD/Colonoscopy 05/13/2022: Gastric polyps , duodenal polyp moderate gastroduodenitis   A. Stomach, biopsy: Hyperplastic polyp and moderate chronic active gastritis; negative for intestinal metaplasia   Immunostain negative for Helicobacter pylori organisms   B. Duodenum, biopsy: Chronic duodenitis with features of peptic duodenitis   C. Colon, 20 cm biopsy: Fragments of tubular adenoma and hyperplastic polyp   Pathology reviewed. Referred to HBP team (Dr. Kendra Reese) for further evaluation of peritoneal cystic masses  CT abdomen/pelvis 06/23/2022 numerous cystic structures identified throughout the right flank and retroperitoneum. Laparoscopic robotic peritoneal mass resection on 07/06/2022  Findings included: serous cystic masses along the colon, periportal lymphadenopathy, fibrotic appearing liver, chronic cholecystitis     Peritoneal fluid Negative for malignant cells.  Cellblock shows reactive mesothelial cells, lymphocytes, adipose/stromal tissue, and blood. Monolayer preparation shows few reactive mesothelial cells and blood. A.  Lymph node, periportal, biopsy:   - Reactive node showing follicular hyperplasia, negative for malignancy, see comment. B.  Remnant gallbladder, cholecystectomy:   - Portion of gallbladder wall showing dense fibrosis/scar and occluded mariaelena-gallbladder vessels. C. Soft tissue, peritoneal sac, excision:   - Peritoneal inclusion cysts (benign cystic mesothelioma) and unremarkable fibroadipose tissue. D.  Liver, needle biopsy:   - Benign hepatic parenchyma showing focal periportal and incomplete septal fibrosis (stage 2)   - Negative for significant lobular/portal necroinflammatory activity, see comment. Comment:   Sections of the lymph node in specimen A reveal normal anat architecture with secondary follicular hyperplasia. There are no cytologically atypical lymphoid cells or [place] cells identified. Periportal LN Flow Cytometry noted 4.5% monoclonal B cells detected in a predominantly polyclonal background. Monoclonal B cell population (4.5% of total cells) without detectable CD5, CD10 or CD11c expression in a predominantly polyclonal background, raising the differential between a monoclonal B-cell population of undetermined significance versus a B-cell lymphoma/leukemia. In the context of B-cell lymphoma the main differential based on immunophenotype includes, but is not limited to marginal zone lymphoma/leukemia, lymphoplasmacytic lymphoma, NQ32- negative follicular lymphoma, and less likely large b cell lymphoma. In specimen D, there is no evidence of chronic hepatitis or significant steatosis. Histologic features of cirrhosis including nodules of regenerating hepatocytes and complete portal-portal bridging fibrosis are   not identified. Focal periportal fibrosis and incomplete septal fibrosis are confirmed by trichrome stain. Iron stain is also performed and is negative. molecular tests. Even if lymphoma is concerned, likely low-grade and not causing her symptoms, so observation would be recommended. PET/CT scan, CT-guided core needle biopsy of enlarging right lower lobe lung mass recommended. PET/CT scan 09/06/2022: In the region of the lesion in the right lower lobe there is FDG avid tracer uptake peak SUV is 3.2. imaging reviewed. CT guided core needle biopsy RLL lung nodule on 09/20/2022  Right lung, lower lobe core needle biopsy: Adenocarcinoma (see comment)   Comment: The prior history of right upper lobe adenocarcinoma (HES ) and left upper lobe adenocarcinoma (HES ) is noted. The electronic medical record is also reviewed. The adenocarcinoma in the current specimen is weakly immunoreactive with GATA3 and CDX2. The TTF-1 immunostain is negative. 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 Status paracentesis 11/2/2022, total of 2450 mL of ascitic fluid was removed  Cytology pending  CEA 8.5  =7371  CA 19-9 <2  Anemia, likely secondary to malignancy, chronic inflammation, and iron deficiency     Pelvic U/S large calcified uterine mass; ovaries not identified. Large volume ascites. MRI Pelvis w/out contrast done and I reviewed results     Hb 7. 9G/DL today ; Transfuse if Hb <7.0     Right breast breast nodule was detected on CT abd/pelvis on 10/31/2022  Breast exam done on 11/6/22 and a 2 cm nodule subareolar was palpated on right breast. Chaperone/RN present  Both  and CA 27.29 elevated at 68 and 85, respectively     Rad Onc initially planned for definitive RT for her adenocarcinoma lung mass before onset of ascites. Currently RT is on hold while workup the ascites  Gyn saw patient on 11/7/22. Consultation reviewed. HCV PCR is positive with >300,000 viral load     The patient had a repeat paracentesis done, 2510 of straw-colored fluid was removed, cytology is in process, await results     PET scan showed an, noted the right breast nodule present with small amount of uptake, SUV estimate of 1.5 but does not overtly suggest malignancy. Radiology otherwise suggested breast ultrasound which did show BIRADS 4 findings. CURRENT HOSPITAL COURSE:  The patient was admitted on 11/21/2022 for recurrent ascites. She was previously admitted several weeks back for similar issue. Work-up at that time was largely unremarkable. Details as noted above. Her previous ascites fluid was negative on cytology and flow cytometry for malignancy. Also during that time, multiple subspecialties were consulted including gynecology, hepatobiliary surgery, and others. For this admission, she had another paracentesis, drawing 3050 cc of straw-colored ascites fluid. Furthermore another complaint includes a 2-week duration/onset of a subcutaneous nodule just posterior of the right axilla. She was also noted to have a breast lesion on ultrasound recently on 11/17/2022, suggestive of BI-RADS 4.     Recurrent ascites, negative cytology, but with concern for malignancy  Subcutaneous mass posterior to right axilla  Imaging evidence of early cirrhosis  Right moderate pleural effusion, consider malignant vs hepatic hydrothorax  Active chronic hepatitis C infection  Right lung mass positive for Adenocarcinoma, weakly positive for GATA3 and CDX2, TTF-1 negative; KRAS (G12C) positive, PDL1 with 10% expression  Previously described multiple peritoneal cystic masses, s/p resection in 7/6/2022, not well visualized on 10/31/22 CT abdomen  Right breast leasion, BIRADs 4  Monoclonal B-cell population from periportal lymph node resection from 7/6/2022, in the setting of follicular hyperplasia    Previous right sided lung adenocarcinoma in 2015  Previous left-sided lung adenocarcinoma 2016  Family history of uterine cancer in mother  Family history of early age breast cancer in sister (Diagnosed around early 42's)    S/p paracentesis today  Cytology already in process  F/u on other fluid studies  Possibly malignant ascites vs 2/2 cirrhosis  GI is also following    Pt has tender right sided subcutaneous mass just posterior to right axilla (not within axillary bed)  She complains of much pain  D/w primary team  Surgery consulted, and they have no plan for intervention at this  RN noted IR suggest a biopsy can be done/considered as an outpatient    Consider thora for the moderate right pleural fluid collection    I have reviewed CT abd this admission, now suggestive of early cirrhosis  She has recent HCV PCR done, noting ongoing hep C infection  F/u iron studies, ferritin, FOBT, AFP    I have reviewed her recent breast u/s noting right breast lesion with BIRADS 4. Pt has not started RT for her right lung mass yet. Overall, appears frail, ill      Approximately spent 65 minutes with patient, discussing the laboratory, imaging, and clinical findings, and I have discussed the clinical implications and recommendations. More than 50% of time was spent counseling patient. The patient verbalized understanding.       Shara Silver MD  [address]  11/22/2022    [facility]) Office  P: [phone]  F: [phone]    [facility]) Office  P: [phone]  F: [phone]

## 2022-11-22 NOTE — ED NOTES
Dr. Brittany Willis to perform bedside paracentesis; VSS; will continue to monitor patient.      Sweetie Jansen RN  11/22/22 [id number]

## 2022-11-22 NOTE — CONSULTS
GENERAL SURGERY  CONSULT NOTE  11/22/2022    Physician Consulted: Dr. Elayne Leary   Reason for Consult: Right-sided back mass    HPI  Orin Roman is a 71 y.o. female who presents for evaluation of right-sided back mass. Patient unfortunately has an extensive past cancer history with current advanced adenocarcinoma of her lung. General surgery was consulted today secondary to a back mass. At length discussion with the daughter and the patient about the presentation of this mass revealed that she started to have some pain in the area about a week ago and that is is when she first noticed this mass. She states the mass has not enlarged since she first noticed it but has brought upon some discomfort. On inspection of the mass it can be completely lifted up off of the subcutaneous tissue and fascia. It is firm, smooth and round without any abnormalities.       Past Medical History:   Diagnosis Date    Abnormal chest x-ray with multiple lung nodules 9/8/2015    Abnormal Pap smear 1/3/2011    Alpha-1 negative    Adrenal adenoma     7 mm    Bilateral lung cancer ([address] 5/12/2016    surgically removed - 2015     Cholelithiasis 6/6/2011    Encounter for screening colonoscopy     for OR 3-28-19     Fibroids     Hemangioma of spleen     Hepatitis C 01/03/2011    treated and cured, no current issues     Hyperlipidemia     no medications     Hypertension 6/6/2011    Insomnia     Lung nodule     ROMELIA     Lymphadenopathy     Cervical (-) ENT    Malignant neoplasm of upper lobe of right lung ([address] 11/18/2015    Osteoarthritis     Panlobular emphysema ([address] 11/12/2015    controlled with inhalers     PPD negative 1/18/12    Pulmonary embolism without acute cor pulmonale ([address] 5/12/2016    PVD (peripheral vascular disease) ([address] 5/6/2014    Scoliosis     Uterine fibroid 5/6/2014       Past Surgical History:   Procedure Laterality Date    APPENDECTOMY  1965    BREAST BIOPSY Left     AGE 31'S LEFT NIPPLE REMOVED    BREAST LUMPECTOMY 4/28/1999    left, benign, Dr. Alba Moritz, [place]  2/1/2012    Ashly Javed, Dr. Minnie Real, [place]  10/15/15    with EBUS - DR Elsie Sosa    CHOLECYSTECTOMY      COLONOSCOPY  12/21/2009    partial to distal ascending colon normal (completion BE same day normal), Dr. Alba Moritz, [facility]    COLONOSCOPY  8/22/2014    done under fluoro with sigmoid straightening device so was able to get to cecum, very poor prep, moderate proctitis, repeat 5 years,  Dr. Alba Moritz, [facility]    COLONOSCOPY N/A 3/28/2019    COLONOSCOPY POLYPECTOMY SNARE/COLD BIOPSY performed by Alyson Rodriguez DO at [address] [facility] [place],[address] N/A 5/13/2022    COLONOSCOPY POLYPECTOMY SNARE/COLD BIOPSY performed by Uday Rubio MD at [address] [place] [address]  9/20/2022    CT NEEDLE BIOPSY LUNG PERCUTANEOUS 9/20/2022 DEANGELO Townsend MD [facility]    ENDOMETRIAL BIOPSY      LIVER RESECTION Right 7/6/2022    LAPAROSCOPIC ROBOTIC PERITONEAL MASS RESECTION performed by Von Reddy MD at [place] [address] Left 3/29/2016    VATS WEDGE RESECTION UPPER LOBECTOMY    OTHER SURGICAL HISTORY Right 11/04/2016    REMOVAL MEDI-PORT - DR YURIDIA BEY LAP,CHOLECYSTECTOMY/GRAPH N/A 6/29/2018    CHOLECYSTECTOMY LAPAROSCOPIC, POSSIBLE OPEN,POSSIBLE GRAM ( OC 2) performed by Judie Atkinson MD at [place] [address] Right 11/11/2015    VATS, BRONCH,RT UPPER LOBECTOMY; NODE DISECTION    TUNNELED VENOUS PORT PLACEMENT Right 12/07/2015    right chest, and removed     UPPER GASTROINTESTINAL ENDOSCOPY N/A 5/13/2022    EGD POLYP HOT FORCEP/CAUTERY performed by Uday Rubio MD at [address] [place] [address]       Medications Prior to Admission:    Prior to Admission medications    Medication Sig Start Date End Date Taking?  Authorizing Provider   acetaminophen (TYLENOL) 500 MG tablet Take 1 tablet by mouth 4 times daily as needed for Pain 7/6/22   Behzad Rodriguez MD   amLODIPine (NORVASC) 5 MG tablet take 1 tablet by mouth once daily 22   Historical Provider, MD   vitamin B-12 (CYANOCOBALAMIN) 500 MCG tablet take 1 tablet by mouth daily 5/3/21 10/31/22  Padmaja Durham DO   tiotropium (SPIRIVA HANDIHALER) 18 MCG inhalation capsule Inhale 1 capsule into the lungs daily 2/26/18 10/31/22  Padmaja Durham DO   lisinopril (PRINIVIL;ZESTRIL) 40 MG tablet take 1 tablet by mouth once daily 18   Padmaja Durham DO   hydrochlorothiazide (HYDRODIURIL) 25 MG tablet Take 1 tablet by mouth daily 18   Helen Marie MD   Multiple Vitamins-Minerals (THERAPEUTIC MULTIVITAMIN-MINERALS) tablet Take 1 tablet by mouth daily 17   Karen Juarez MD   [facility].  Devices KIT BP monitoring KIT 4/15/16   Michaele Goltz, MD       Allergies   Allergen Reactions    Ibuprofen Palpitations and Rash       Family History   Problem Relation Age of Onset    Heart Disease Mother     High Blood Pressure Mother     Cancer Mother         ovarian    Cancer Father     Heart Disease Father     High Blood Pressure Father     Kidney Disease Father     Diabetes Sister     Breast Cancer Sister     Diabetes Brother        Social History     Tobacco Use    Smoking status: Former     Packs/day: 0.00     Years: 30.00     Pack years: 0.00     Types: Cigarettes     Quit date: 2015     Years since quittin.4    Smokeless tobacco: Never   Vaping Use    Vaping Use: Never used   Substance Use Topics    Alcohol use: Not Currently     Alcohol/week: 2.0 standard drinks     Types: 2 Glasses of wine per week     Comment: x2 week    Drug use: No         Review of Systems   General ROS: positive for  - fatigue  Hematological and Lymphatic ROS: positive for - fatigue and weight loss  Respiratory ROS: negative  Cardiovascular ROS: negative  Gastrointestinal ROS: positive for -ascites  Genito-Urinary ROS: negative  Musculoskeletal ROS: negative      PHYSICAL EXAM:    Vitals:    22 1030   BP: 137/63   Pulse: 84   Resp: 18   Temp:    SpO2:        General Appearance:  awake, alert, oriented, in no acute distress  Skin:  Skin color, texture, turgor normal. No rashes or lesions. Head/face:  NCAT  Eyes:  PERRL  Lungs:  No chest wall tenderness. Heart:  Heart regular rate and rhythm  Abdomen:  Soft, non-tender, normal bowel sounds. No bruits, organomegaly or masses. Extremities: pulses present in all extremities. 2 cm x 2 cm subcutaneous mass over the lateral posterior portion of the scapula. Firm, smooth, round mass that is completely mobile off of the surrounding subcutaneous tissue and fascia. Mild darkening of the skin over the mass, no signs of cellulitis. Mass is not tender to palpation      LABS:    CBC  Recent Labs     11/21/22 1943   WBC 10.4   HGB 8.8*   HCT 27.9*   *     BMP  Recent Labs     11/21/22 1943      K 5.3*      CO2 22   BUN 63*   CREATININE 1.4*   CALCIUM 9.1     Liver Function  Recent Labs     11/21/22 1943   LIPASE 135*   BILITOT <0.2   AST 48*   ALT 33*   ALKPHOS 174*   PROT 7.5   LABALBU 2.7*     No results for input(s): LACTATE in the last 72 hours. No results for input(s): INR, PTT in the last 72 hours. Invalid input(s): PT    RADIOLOGY    CT ABDOMEN PELVIS W IV CONTRAST Additional Contrast? None    Result Date: 11/22/2022  EXAMINATION: CT OF THE ABDOMEN AND PELVIS WITH [id number]/21/2022 11:30 pm TECHNIQUE: CT of the abdomen and pelvis was performed with the administration of intravenous contrast. Multiplanar reformatted images are provided for review. Automated exposure control, iterative reconstruction, and/or weight based adjustment of the mA/kV was utilized to reduce the radiation dose to as low as reasonably achievable.  COMPARISON: October 31, 2022 HISTORY: ORDERING SYSTEM PROVIDED HISTORY: abd pain TECHNOLOGIST PROVIDED HISTORY: Reason for exam:->abd pain Additional Contrast?->None Decision Support Exception - unselect if not a suspected or confirmed emergency medical condition->Emergency Medical Condition (MA) FINDINGS: THORACIC STRUCTURES: There is a moderate right basilar pleural fluid collection with atelectasis of the right lower lobe. LIVER:  The liver is normal in size, slight nodularity and normal attenuation. No focal mass. No intra or extrahepatic bile duct dilation. GALL BLADDER: Cholecystectomy. SPLEEN:  Stable hypodense region with parenchymal atrophy of the mid spleen. PANCREAS:  Unremarkable. ADRENAL GLANDS:  Unremarkable. ESOPHAGUS AND STOMACH:  Unremarkable. BOWEL: Small bowel:  Unremarkable. Large bowel: The colon and rectum are of normal course and caliber. The appendix is within normal limits. There is no intraperitoneal free air. There is intra-abdominal ascites. URINARY/GENITAL TRACT: Kidneys:  The kidneys are unremarkable. .  No evidence of hydronephrosis, renal calcifications or solid renal mass. Ureters: The ureters are normal course and caliber. There is no evidence of ureter calculus/calculi. URINARY BLADDER:  The urinary bladder is well distended without wall thickening or focal mass. Extensive calcification of uterine fibroids. BLOOD VESSELS: There is atherosclerotic disease. LYMPH NODES:  No evidence of intraabdominal or intrapelvic lymphadenopathy. ABDOMINAL WALL & SOFT TISSUES:  Unremarkable. OSSEOUS STRUCTURES: No acute osseous lesion. Moderate right basilar pleural fluid collection with atelectasis of the right lower lobe. Slight nodularity of the liver concerning for early cirrhotic morphological change. Stable hypodense region within the spleen with parenchymal atrophy likely related to old splenic infarction or old splenic injury. Large amount of intra-abdominal ascites, unchanged from the prior study. Extensive calcification of uterine fibroids. Atherosclerotic disease. .     XR CHEST PORTABLE    Result Date: 11/21/2022  EXAMINATION: ONE XRAY VIEW OF THE CHEST 11/21/2022 7:35 pm COMPARISON: None.  HISTORY: ORDERING SYSTEM PROVIDED HISTORY: abd pain TECHNOLOGIST PROVIDED HISTORY: Reason for exam:->abd pain FINDINGS: Hazy density over the right hemithorax. The heart is not enlarged. No pneumothorax. Elevated right hemidiaphragm. Hazy density over the right hemithorax likely due to layering right pleural effusion. Superimposed infiltrates cannot be excluded. Elevated right hemidiaphragm. US GUIDED PARACENTESIS    Result Date: 11/22/2022  PROCEDURE: PARACENTESIS WITHOUT IMAGE GUIDANCE US ABDOMEN LIMITED 11/22/2022 HISTORY: ORDERING SYSTEM PROVIDED HISTORY: diagnostic and therapeutic ascites tap-- send for cx, +cytology, cell count TECHNOLOGIST PROVIDED HISTORY: Reason for exam:->diagnostic and therapeutic ascites tap-- send for cx, +cytology, cell count What reading provider will be dictating this exam?->CRC TECHNIQUE: Informed consent was obtained after a detailed explanation of the procedure including risks, benefits, and alternatives. Universal protocol was followed. A limited ultrasound of the abdomen was performed. The right abdomen was prepped and draped in sterile fashion and local anesthesia was achieved with lidocaine. A 5 [place] needle sheath was advanced into ascites under continuous ultrasound guidance and paracentesis was performed. The patient tolerated the procedure well. FINDINGS: Limited ultrasound of the abdomen demonstrates ascites. A total of 3050 cc of straw-colored ascitic fluid was removed. Status post paracentesis. ASSESSMENT:  71 y.o. female with complex and advance adenocarcinoma, cirrhosis with a history of hep C and ascites    PLAN:  Complex cancer patient with advanced malignancy   Had at length discussion with patient and 2 of patient's daughters.   At this time they would like Dr. Rita Mo and the oncology team to weigh in on whether this mass should be removed  At this time there is low suspicion that this is an occult cancer or malignancy  Most likely a soft tissue neoplasm, could be abnormal lymph node though the location makes this less likely  No acute surgical intervention at this time  IR paracentesis--cytology pending  Okay for diet from the general surgery point of view  Also during my at length discussion with the 2 daughters they expressed bringing patient home as their top priority. They do not want patient to return to a nursing facility. --We will defer to social work and primary team for arrangement  Possible hospice discussion per primary service      Electronically signed by Janette Damon DO on 11/22/22 at 4:31 PM EST

## 2022-11-22 NOTE — H&P
[facility] History and Physical        Chief Complaint:  abd distention  History of Present Illness   The patient is a 71 y.o. 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 - peritoneal metastasis. sp paracentesis today. follow-up of cytology results:  DIAGNOSIS   NEGATIVE FOR MALIGNANT CELLS     Reactive mesothelial cells, acute/chronic inflammatory cells, and blood   present. Today CT abd shows:  \"  Moderate right basilar pleural fluid collection with atelectasis of the right   lower lobe. Slight nodularity of the liver concerning for early cirrhotic morphological   change. Stable hypodense region within the spleen with parenchymal atrophy likely   related to old splenic infarction or old splenic injury. Large amount of intra-abdominal ascites, unchanged from the prior study. ER requesting we admit for this:  Plan interval radiology for diagnostic (per ER request to rule out SBP- but this is rare with protein >4/carcinomatosis fluids) and therapeutic tap  - ER requests we arrainge GI - to set up scheduled taps abdomen? ? Noted negative mammogram 4/20./22-- but last admission noted   incidental 1.5 cm right subareolar breast lump   Sp  outpatient breast ultrasound for this. --     Inverted right nipple is suspicious. Recommendation:       Punch biopsy of the right nipple is advised.    Patient has been seen by oncology as well as radiation oncology     hx taken from the patient and records  REVIEW OF SYSTEMS:  no fevers, chills, cp, sob, n/v, ha, vision/hearing changes, wt changes, hot/cold flashes, other open skin lesions, diarrhea, constipation, dysuria/hematuria unless noted in HPI. Complete ROS performed with the patient and is otherwise negative.     Past Medical History:      Diagnosis Date    Abnormal chest x-ray with multiple lung nodules 9/8/2015    Abnormal Pap smear 1/3/2011    Alpha-1 negative    Adrenal adenoma     7 mm    Bilateral lung cancer ([address] 5/12/2016    surgically removed - 2015     Cholelithiasis 6/6/2011    Encounter for screening colonoscopy     for OR 3-28-19     Fibroids     Hemangioma of spleen     Hepatitis C 01/03/2011    treated and cured, no current issues     Hyperlipidemia     no medications     Hypertension 6/6/2011    Insomnia     Lung nodule     ROMELIA     Lymphadenopathy     Cervical (-) ENT    Malignant neoplasm of upper lobe of right lung ([address] 11/18/2015    Osteoarthritis     Panlobular emphysema ([address] 11/12/2015    controlled with inhalers     PPD negative 1/18/12    Pulmonary embolism without acute cor pulmonale ([address] 5/12/2016    PVD (peripheral vascular disease) ([address] 5/6/2014    Scoliosis     Uterine fibroid 5/6/2014       Past Surgical History:        Procedure Laterality Date    APPENDECTOMY  1965    BREAST BIOPSY Left     AGE 31'S LEFT NIPPLE REMOVED    BREAST LUMPECTOMY  4/28/1999    left, benign, Dr. Laquita Ann, [place]  2/1/2012    Marlyn Garcia, Dr. Ryann Palomo, [place]  10/15/15    with EBUS - DR Preethi Ludwig    CHOLECYSTECTOMY      COLONOSCOPY  12/21/2009    partial to distal ascending colon normal (completion BE same day normal), Dr. Laquita Ann, [facility]    COLONOSCOPY  8/22/2014    done under fluoro with sigmoid straightening device so was able to get to cecum, very poor prep, moderate proctitis, repeat 5 years,  Dr. Laquita Ann, [facility]    COLONOSCOPY N/A 3/28/2019    COLONOSCOPY POLYPECTOMY SNARE/COLD BIOPSY performed by Josie Byrne DO at [facility] ENDOSCOPY    COLONOSCOPY N/A 5/13/2022    COLONOSCOPY POLYPECTOMY SNARE/COLD BIOPSY performed by Crissy Bob MD at [address] [place] [address]  9/20/2022    CT NEEDLE BIOPSY LUNG PERCUTANEOUS 9/20/2022 DEANGELO Lee MD [facility]    ENDOMETRIAL BIOPSY      LIVER RESECTION Right 7/6/2022    LAPAROSCOPIC ROBOTIC PERITONEAL MASS RESECTION performed by Sanaz Murry MD at [place] [address] Left 3/29/2016    VATS WEDGE RESECTION UPPER LOBECTOMY    OTHER SURGICAL HISTORY Right 11/04/2016    REMOVAL MEDI-LEMUEL - DR YURIDIA BEY LAP,CHOLECYSTECTOMY/GRAPH N/A 6/29/2018    CHOLECYSTECTOMY LAPAROSCOPIC, POSSIBLE OPEN,POSSIBLE GRAM ( OC 2) performed by Dodie Lombardo MD at [place] [address] Right 11/11/2015    VATS, BRONCH,RT UPPER LOBECTOMY; NODE DISECTION    TUNNELED VENOUS PORT PLACEMENT Right 12/07/2015    right chest, and removed     UPPER GASTROINTESTINAL ENDOSCOPY N/A 5/13/2022    EGD POLYP HOT FORCEP/CAUTERY performed by Inez Correia MD at [address] [place] [address] Medications:  Prior to Admission medications    Medication Sig Start Date End Date Taking? Authorizing Provider   acetaminophen (TYLENOL) 500 MG tablet Take 1 tablet by mouth 4 times daily as needed for Pain 7/6/22   Zheng Castillo MD   amLODIPine (NORVASC) 5 MG tablet take 1 tablet by mouth once daily 4/14/22   Historical Provider, MD   vitamin B-12 (CYANOCOBALAMIN) 500 MCG tablet take 1 tablet by mouth daily 5/3/21 10/31/22  Juana Slain, DO   tiotropium (SPIRIVA HANDIHALER) 18 MCG inhalation capsule Inhale 1 capsule into the lungs daily 2/26/18 10/31/22  Juana Slain, DO   lisinopril (PRINIVIL;ZESTRIL) 40 MG tablet take 1 tablet by mouth once daily 2/16/18   Juana Wattersin, DO   hydrochlorothiazide (HYDRODIURIL) 25 MG tablet Take 1 tablet by mouth daily 2/8/18   Arina Lundy MD   Multiple Vitamins-Minerals (THERAPEUTIC MULTIVITAMIN-MINERALS) tablet Take 1 tablet by mouth daily 4/18/17   Jessi Perez MD   [facility]  Devices KIT BP monitoring KIT 4/15/16   Carlos Avalos MD       Allergies:  Ibuprofen    Social History:   TOBACCO:   reports that she quit smoking about 7 years ago. Her smoking use included cigarettes. She has never used smokeless tobacco.  ETOH:   reports that she does not currently use alcohol after a past usage of about 2.0 standard drinks per week. Family History:       Problem Relation Age of Onset    Heart Disease Mother     High Blood Pressure Mother     Cancer Mother         ovarian    Cancer Father     Heart Disease Father     High Blood Pressure Father     Kidney Disease Father     Diabetes Sister     Breast Cancer Sister     Diabetes Brother       Or deferred/otherwise considered non contributory to current admission  PHYSICAL EXAM:    VS: BP (!) 156/67   Pulse (!) 105   Temp 98.2 °F (36.8 °C)   Resp 16   Wt 130 lb (59 kg)   SpO2 99%   BMI 24.56 kg/m²     General Appearance:    mild acute distress. +lethargic  +cachexia   Psych:  HEENT:    A.O. As per HPI details  NC/AT, PERRL, no pallor no icterus, lips/ext mucous membrane grossly dry    Neck:   Supple, trachea midline, no obvious JVD   Resp:     Dec bS,  R base No wheezes, +rhonchi   Chest wall:    No tenderness or deformity   Heart:    Regular rate and rhythm,  +0-3 harsh holosystolic murmer   S1 and S2 normal, no rub . Abdomen: Mod ascites (distended)- no rebound but tender, bowel sounds dec       Genitalia & Rectal:    Deferred.    Extremities x4:   Extremities thin, atrophic  atraumatic, no cyanosis, + clubbing   Musculoskeletal:      NO active synovitis or swollen b/l wrists, 2-5 MCPs examined   Skin:   Skin color, texture, turgor dry       Neurologic:  .Grossly symmetric  strength in UEs and LEs with symmetric grossly idecto light touch sensation     LABS:  CBC:   Lab Results   Component Value Date/Time    WBC 10.4 11/21/2022 07:43 PM    RBC 3.19 11/21/2022 07:43 PM    HGB 8.8 11/21/2022 07:43 PM    HCT 27.9 11/21/2022 07:43 PM     11/21/2022 07:43 PM    MCV 87.5 11/21/2022 07:43 PM     BMP:    Lab Results   Component Value Date/Time     11/21/2022 07:43 PM    K 5.3 11/21/2022 07:43 PM    K 3.8 07/01/2018 05:14 AM     11/21/2022 07:43 PM    CO2 22 11/21/2022 07:43 PM    BUN 63 11/21/2022 07:43 PM    CREATININE 1.4 11/21/2022 07:43 PM    GLUCOSE 130 11/21/2022 07:43 PM    GLUCOSE 85 04/10/2012 12:28 PM    CALCIUM 9.1 11/21/2022 07:43 PM     Hepatic Function Panel:    Lab Results   Component Value Date/Time    ALKPHOS 174 11/21/2022 07:43 PM    AST 48 11/21/2022 07:43 PM    ALT 33 11/21/2022 07:43 PM    PROT 7.5 11/21/2022 07:43 PM    LABALBU 2.7 11/21/2022 07:43 PM    LABALBU 4.2 04/10/2012 12:28 PM    BILITOT <0.2 11/21/2022 07:43 PM     Magnesium:    Lab Results   Component Value Date/Time    MG 1.7 10/31/2022 12:50 PM       PT/INR:    Lab Results   Component Value Date/Time    PROTIME 12.7 11/01/2022 09:46 AM    PROTIME 22.6 07/08/2016 01:35 PM    INR 1.2 11/01/2022 09:46 AM     U/A:   Lab Results   Component Value Date/Time    LEUKOCYTESUR Negative 11/21/2022 07:43 PM    PHUR 6.0 11/21/2022 07:43 PM    WBCUA 1-3 11/21/2022 07:43 PM    RBCUA NONE 11/21/2022 07:43 PM    BACTERIA MANY 11/21/2022 07:43 PM    SPECGRAV <=1.005 11/21/2022 07:43 PM    BLOODU SMALL 11/21/2022 07:43 PM    GLUCOSEU Negative 11/21/2022 07:43 PM     ABG:  No results found for: PHART, XRR6JRF, PO2ART, Z9JBBBUL, SJF8YVP, BEART  TSH:    Lab Results   Component Value Date/Time    TSH 2.230 06/14/2022 10:06 AM     Cardiac Enzymes:   Lab Results   Component Value Date    CKTOTAL 235 (H) 05/04/2020    CKTOTAL 117 04/06/2016    CKTOTAL 151 04/05/2016    CKMB 1.2 04/06/2016    CKMB 1.5 04/05/2016    CKMB 1.9 11/03/2015    TROPONINI <0.01 06/28/2018    TROPONINI <0.01 04/06/2016    TROPONINI <0.01 04/05/2016       Radiology: CT ABDOMEN PELVIS W IV CONTRAST Additional Contrast? None    Result Date: 11/22/2022  EXAMINATION: CT OF THE ABDOMEN AND PELVIS WITH KMNFCEXE25/21/2022 11:30 pm TECHNIQUE: CT of the abdomen and pelvis was performed with the administration of intravenous contrast. Multiplanar reformatted images are provided for review. Automated exposure control, iterative reconstruction, and/or weight based adjustment of the mA/kV was utilized to reduce the radiation dose to as low as reasonably achievable. COMPARISON: October 31, 2022 HISTORY: ORDERING SYSTEM PROVIDED HISTORY: abd pain TECHNOLOGIST PROVIDED HISTORY: Reason for exam:->abd pain Additional Contrast?->None Decision Support Exception - unselect if not a suspected or confirmed emergency medical condition->Emergency Medical Condition (MA) FINDINGS: THORACIC STRUCTURES: There is a moderate right basilar pleural fluid collection with atelectasis of the right lower lobe. LIVER:  The liver is normal in size, slight nodularity and normal attenuation. No focal mass. No intra or extrahepatic bile duct dilation. GALL BLADDER: Cholecystectomy. SPLEEN:  Stable hypodense region with parenchymal atrophy of the mid spleen. PANCREAS:  Unremarkable. ADRENAL GLANDS:  Unremarkable. ESOPHAGUS AND STOMACH:  Unremarkable. BOWEL: Small bowel:  Unremarkable. Large bowel: The colon and rectum are of normal course and caliber. The appendix is within normal limits. There is no intraperitoneal free air. There is intra-abdominal ascites. URINARY/GENITAL TRACT: Kidneys:  The kidneys are unremarkable. .  No evidence of hydronephrosis, renal calcifications or solid renal mass. Ureters: The ureters are normal course and caliber. There is no evidence of ureter calculus/calculi. URINARY BLADDER:  The urinary bladder is well distended without wall thickening or focal mass. Extensive calcification of uterine fibroids. BLOOD VESSELS: There is atherosclerotic disease. LYMPH NODES:  No evidence of intraabdominal or intrapelvic lymphadenopathy. ABDOMINAL WALL & SOFT TISSUES:  Unremarkable. OSSEOUS STRUCTURES: No acute osseous lesion. Moderate right basilar pleural fluid collection with atelectasis of the right lower lobe.  Slight nodularity of the liver concerning for early cirrhotic morphological change. Stable hypodense region within the spleen with parenchymal atrophy likely related to old splenic infarction or old splenic injury. Large amount of intra-abdominal ascites, unchanged from the prior study. Extensive calcification of uterine fibroids. Atherosclerotic disease. .     XR CHEST PORTABLE    Result Date: 11/21/2022  EXAMINATION: ONE XRAY VIEW OF THE CHEST 11/21/2022 7:35 pm COMPARISON: None. HISTORY: ORDERING SYSTEM PROVIDED HISTORY: abd pain TECHNOLOGIST PROVIDED HISTORY: Reason for exam:->abd pain FINDINGS: Hazy density over the right hemithorax. The heart is not enlarged. No pneumothorax. Elevated right hemidiaphragm. Hazy density over the right hemithorax likely due to layering right pleural effusion. Superimposed infiltrates cannot be excluded. Elevated right hemidiaphragm. EKG:  Sinus tachycardia   Left axis deviation   Right bundle branch block   Abnormal ECG   No previous ECGs available   Assessment & Plan   ACTIVE hospital problems being addressed/reassessed for this admission:  Principal Problem:    Intractable pain  Resolved Problems:    * No resolved hospital problems. *    Code status/DVT prophylaxis and PLAN --see orders   Note extensive time spent coordinating care between ER docs, ER and floor nurses, and transitioning care over to day providers  Plan of care/ clinical impressions/communication specifics detailed below:    71 y.o. 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 - peritoneal metastasis. sp paracentesis today.     follow-up of cytology results:  DIAGNOSIS   NEGATIVE FOR MALIGNANT CELLS     Reactive mesothelial cells, acute/chronic inflammatory cells, and blood present. Status paracentesis 11/1/2022, total of 2450 mL of ascitic fluid was removed  Abdominal U/S 11/04/2022 small amount of ascites in the right right lower quadrant    Today CT abd shows:  \"  Moderate right basilar pleural fluid collection with atelectasis of the right   lower lobe. Slight nodularity of the liver concerning for early cirrhotic morphological   change. Stable hypodense region within the spleen with parenchymal atrophy likely   related to old splenic infarction or old splenic injury. Large amount of intra-abdominal ascites, unchanged from the prior study. Plan interval radiology for diagnostic (per ER request to rule out SBP- but this is rare with protein >4/carcinomatosis fluids) and therapeutic tap  - ER requests we arrainge GI - to set up scheduled taps abdomen? ?    Rad Onc for definitive RT for her adenocarcinoma lung mass. HBP team for evaluation of her ascites; hx of hepC treated (prior liver biopsy showing focal periportal fibrosis). 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 not offered? ??  PET/CT scan 09/06/2022: In the region of the lesion in the right lower lobe there is FDG avid tracer uptake peak SUV is 3.2. When last seen by pulmoanry dr. Ankit Gandara in June:  \"yearly CT scan shows a 7 mm ground glass nodule in the peripheral right lung. Lung surveillance is ongoing for her bilateral low stage cancer s/p resection. She has stopped  smoking/vaping.  Her lung function was normal. A hypersensitivity panel done because of her bird (cockatiels) exposure, testing was +, PFT and CT are stable/normal\"      Also   ill-defined hypodense lesion is identified in the spleen currently measuring 2.9 x 3.5 cm --?thought to be hemangioma     There is a 1.6 x 1.2 cm soft tissue mass in the right lower lobe medially which is significantly increased since the previous examination. Evaluated by Dr. Sherif Groves on 08/15/2022 at [facility] who recommended proceeding with a biopsy of the enlarging right lower lobe lung mass. While lymphoma certainly possible--  Bx done:  \"B-cell receptor clonality assay was positive for a clonal B-cell population. However there is no morphologic evidence of lymphoma in the lymph node. The detection of a clonal B-cell population despite negative morphologic evidence of lymphoma might represent monoclonal B-cell lymphocytosis process in the lymph node. An alternative consideration is peripheral blood involvement by B-cell clone (MBL-like process) that was picked up by the tissue flow cytometry and molecular tests. Even if lymphoma is concerned, likely low-grade and not causing her symptoms, so observation would be recommended. \"        Noted negative mammogram 4/20./22-- but last admission noted   incidental 1.5 cm right subareolar breast lump   Sp  outpatient breast ultrasound for this. --     Inverted right nipple is suspicious. Recommendation:       Punch biopsy of the right nipple is advised. Patient has been seen by oncology as well as radiation oncology   -- will consult oncology  Palliative? (Already dnr CCA)    Noted caxechexia/malnutrition as per prior dietary notes. Frank vs ckd ?prior cr 1.4- now high K+- will give IV fluids and reassess  Anemia - ACD baseline 7-8  Debilitation severe- PT/OT? placement  Elevated liver enzymes- ?cirrhosis  --noted +Hep  +viral hep C 5.57 earlier this month-- also noted very high chromagraninn 1480 - rule out neuroendocrine tumor involvment        Karri Roman MD   Night Officer, overnight admitting doctor at [facility] call day time doctor   for questions after 7:30am    Covering for [address]  If Qs please call [phone]  Electronically signed by Christian Rodriges MD on 11/22/2022 at 2:09 AM

## 2022-11-22 NOTE — PLAN OF CARE
Problem: Discharge Planning  Goal: Discharge to home or other facility with appropriate resources  Outcome: Progressing  Flowsheets  Taken 11/22/2022 0512  Discharge to home or other facility with appropriate resources:   Identify barriers to discharge with patient and caregiver   Arrange for needed discharge resources and transportation as appropriate   Identify discharge learning needs (meds, wound care, etc)   Refer to discharge planning if patient needs post-hospital services based on physician order or complex needs related to functional status, cognitive ability or social support system  Taken 11/22/2022 0445  Discharge to home or other facility with appropriate resources:   Identify barriers to discharge with patient and caregiver   Arrange for needed discharge resources and transportation as appropriate   Identify discharge learning needs (meds, wound care, etc)   Refer to discharge planning if patient needs post-hospital services based on physician order or complex needs related to functional status, cognitive ability or social support system     Problem: Pain  Goal: Verbalizes/displays adequate comfort level or baseline comfort level  Outcome: Progressing     Problem: Skin/Tissue Integrity  Goal: Absence of new skin breakdown  Description: 1. Monitor for areas of redness and/or skin breakdown  2. Assess vascular access sites hourly  3. Every 4-6 hours minimum:  Change oxygen saturation probe site  4. Every 4-6 hours:  If on nasal continuous positive airway pressure, respiratory therapy assess nares and determine need for appliance change or resting period.   Outcome: Progressing     Problem: Safety - Adult  Goal: Free from fall injury  Outcome: Progressing

## 2022-11-22 NOTE — ED NOTES
Radiology Procedure Waiver   Name: Phoebe Carrera  : 1950  MRN: [id number]    Date:  22    Time: 11:04 PM EST    Benefits of immediately proceeding with Radiology exam(s) without pre-testing outweigh the risks or are not indicated as specified below and therefore the following is/are being waived:    [] Pregnancy test   [] Patients LMP on-time and regular.   [] Patient had Tubal Ligation or has other Contraception Device. [] Patient  is Menopausal or Premenarcheal.    [] Patient had Full or Partial Hysterectomy. [] Protocol for Iodine allergy    [] MRI Questionnaire     [x] BUN/Creatinine   [] Patient age w/no hx of renal dysfunction. [] Patient on Dialysis. [] Recent Normal Labs.   Electronically signed by Milena Campo MD on 22 at 11:04 PM EST               Milena Campo MD  [id number]

## 2022-11-22 NOTE — OR NURSING
Patient brought into room, discussed procedure. Dr. Winnie Dennis answered all questions and concerns related to the procedure. Treatment consent signed. Patient positioned and sterile prepped for the procedure. 1% Lidocaine administered by Dr. Toma Raza. A total of 3050 mL clear yellow ascitic fluid was taken. Patient tolerated procedure well. Site cleaned and dressed with a bandage. Vitals monitored and remained stable throughout. Nurse to nurse called and patient transported back to room.

## 2022-11-22 NOTE — CONSULTS
Wilver Olson M.D. [facility]  Dr. Loy Jackson M.D.,  Dr. David Jovel M.D.,  Dr. Rita Hurtado D.O.,  Dr. João Morgan D.O. ,  Dr. Anita Mccauley M.D.,          Misty Dates  71 y.o.  female      Re:ER requesting we consult, to set up for scheduled ascites tap,  +cirrhosis +hep C- adeno lung CA also  Requesting physician: Dr Reina Roles  Date:3:50 PM 11/22/2022          HPI: 81-year-old female patient seen in the hospital for above-described issue. Patient is accompanied family in the room who provides additional information. Patient apparently has history of stage IV adenocarcinoma of the lung with previous right upper lobectomy. Patient also apparently has history of peritoneal mets and possible breast mets with previous paracentesis including during recent hospital admission. According to the patient and her daughter-in-law she has history of liver disease however has not seen a liver specialist if not I told them at least in a very long time. Patient apparently is scheduled to see Dr. Jay Tay on 1 December. Apparently patient underwent paracentesis on the eighth and then on 9 November. Patient also underwent paracentesis yesterday with removal of 3 L of straw-colored ascitic fluid. Ascitic fluid analysis revealed 280 nucleated cells with 23% neutrophils. Additional laboratory work reveals anemia with hemoglobin of 8.8 hematocrit of 27.9. Platelet count is 629 and white blood cell count is 10.4. Chemistry panel reveals BUN of 63 with creatinine of 1.4. Liver profile shows ALT of 33, AST of 48 with nonelevated ammonia at 26.6. Bilirubin also nonelevated 0.2. CT scan of the abdomen and pelvis obtained on 21 November is reported to show slight nodularity of the liver concerning for early cirrhotic morphological changes  Patient herself denies any significant abdominal pain. She denies nausea vomiting. She complains of right side pain.   Apparently patient    Information sources:   -Patient  -family  -medical record  -health care team    PMHx:  Past Medical History:   Diagnosis Date    Abnormal chest x-ray with multiple lung nodules 9/8/2015    Abnormal Pap smear 1/3/2011    Alpha-1 negative    Adrenal adenoma     7 mm    Bilateral lung cancer ([address] 5/12/2016    surgically removed - 2015     Cholelithiasis 6/6/2011    Encounter for screening colonoscopy     for OR 3-28-19     Fibroids     Hemangioma of spleen     Hepatitis C 01/03/2011    treated and cured, no current issues     Hyperlipidemia     no medications     Hypertension 6/6/2011    Insomnia     Lung nodule     ROMELIA     Lymphadenopathy     Cervical (-) ENT    Malignant neoplasm of upper lobe of right lung ([address] 11/18/2015    Osteoarthritis     Panlobular emphysema ([address] 11/12/2015    controlled with inhalers     PPD negative 1/18/12    Pulmonary embolism without acute cor pulmonale ([address] 5/12/2016    PVD (peripheral vascular disease) ([address] 5/6/2014    Scoliosis     Uterine fibroid 5/6/2014       PSHx:  Past Surgical History:   Procedure Laterality Date    APPENDECTOMY  1965    BREAST BIOPSY Left     AGE 30'S LEFT NIPPLE REMOVED    BREAST LUMPECTOMY  4/28/1999    left, benign, Dr. Guanako Suh, [place]  2/1/2012    Alex Renteria, Dr. Susanna Jain, [place]  10/15/15    with EBUS - DR Nima Hinds    CHOLECYSTECTOMY      COLONOSCOPY  12/21/2009    partial to distal ascending colon normal (completion BE same day normal), Dr. Guanako Suh, [facility]    COLONOSCOPY  8/22/2014    done under fluoro with sigmoid straightening device so was able to get to cecum, very poor prep, moderate proctitis, repeat 5 years,  Dr. Guanako Suh, [facility]    COLONOSCOPY N/A 3/28/2019    COLONOSCOPY POLYPECTOMY SNARE/COLD BIOPSY performed by Teo Schmid DO at [facility] ENDOSCOPY    COLONOSCOPY N/A 5/13/2022    COLONOSCOPY POLYPECTOMY SNARE/COLD BIOPSY performed by Susanna Sawant MD at [address] [place] [address] 9/20/2022    CT NEEDLE BIOPSY LUNG PERCUTANEOUS 9/20/2022 DEANGELO Lanier MD [facility]    ENDOMETRIAL BIOPSY      LIVER RESECTION Right 7/6/2022    LAPAROSCOPIC ROBOTIC PERITONEAL MASS RESECTION performed by Yoli Gould III, MD at [place] [address] Left 3/29/2016    VATS WEDGE RESECTION UPPER LOBECTOMY    OTHER SURGICAL HISTORY Right 11/04/2016    REMOVAL MEDI-PORT - DR Daniel Solorio    MD LAP,CHOLECYSTECTOMY/GRAPH N/A 6/29/2018    CHOLECYSTECTOMY LAPAROSCOPIC, POSSIBLE OPEN,POSSIBLE GRAM ( OC 2) performed by Amando Santana MD at [facility] Right 11/11/2015    VATS, BRONCH,RT UPPER LOBECTOMY; NODE DISECTION    TUNNELED VENOUS PORT PLACEMENT Right 12/07/2015    right chest, and removed     UPPER GASTROINTESTINAL ENDOSCOPY N/A 5/13/2022    EGD POLYP HOT FORCEP/CAUTERY performed by Janet Santillan MD at [address]:  Current Facility-Administered Medications   Medication Dose Route Frequency Provider Last Rate Last Admin    0.9 % sodium chloride bolus  500 mL IntraVENous Once Bill Jacobsen MD   Stopped at 11/22/22 0318    acetaminophen (TYLENOL) tablet 500 mg  500 mg Oral Q6H PRN Bill Jacobsen MD        [START ON 11/23/2022] amLODIPine (NORVASC) tablet 5 mg  5 mg Oral Daily Bill Jacobsen MD        lisinopril (PRINIVIL;ZESTRIL) tablet 40 mg  40 mg Oral Daily Bill Jacobsen MD   40 mg at 11/22/22 0836    ipratropium (ATROVENT) 0.02 % nebulizer solution 0.5 mg  0.5 mg Nebulization TID Bill Jacobsen MD   0.5 mg at 11/22/22 0825    polyethylene glycol (GLYCOLAX) packet 17 g  17 g Oral Daily PRN VERITO Ramirez CNP        morphine (PF) injection 1 mg  1 mg IntraVENous Q4H PRN Yoanna Chavez MD   1 mg at 11/22/22 1325    oxyCODONE (ROXICODONE) immediate release tablet 5 mg  5 mg Oral Q4H PRN VERITO Ramirez CNP        Or    oxyCODONE (ROXICODONE) immediate release tablet 10 mg  10 mg Oral Q4H PRN Marijane Milling, APRN - CNP        sennosides-docusate sodium (SENOKOT-S) 8.6-50 MG tablet 1 tablet  1 tablet Oral BID Zack Goff APRN - CNP   1 tablet at 22 1340     Facility-Administered Medications Ordered in Other Encounters   Medication Dose Route Frequency Provider Last Rate Last Admin    pantoprazole (PROTONIX) tablet 40 mg  40 mg Oral QAM AC Uday Rubio MD            SocHx:  Social History     Socioeconomic History    Marital status: Single     Spouse name: Not on file    Number of children: 0    Years of education: Not on file    Highest education level: Not on file   Occupational History    Not on file   Tobacco Use    Smoking status: Former     Packs/day: 0.00     Years: 30.00     Pack years: 0.00     Types: Cigarettes     Quit date: 2015     Years since quittin.4    Smokeless tobacco: Never   Vaping Use    Vaping Use: Never used   Substance and Sexual Activity    Alcohol use: Not Currently     Alcohol/week: 2.0 standard drinks     Types: 2 Glasses of wine per week     Comment: x2 week    Drug use: No    Sexual activity: Not Currently   Other Topics Concern    Not on file   Social History Narrative    Not on file     Social Determinants of Health     Financial Resource Strain: Not on file   Food Insecurity: Not on file   Transportation Needs: Not on file   Physical Activity: Not on file   Stress: Not on file   Social Connections: Not on file   Intimate Partner Violence: Not on file   Housing Stability: Not on file       FamHx:  Family History   Problem Relation Age of Onset    Heart Disease Mother     High Blood Pressure Mother     Cancer Mother         ovarian    Cancer Father     Heart Disease Father     High Blood Pressure Father     Kidney Disease Father     Diabetes Sister     Breast Cancer Sister     Diabetes Brother        Allergy:  Allergies   Allergen Reactions    Ibuprofen Palpitations and Rash         ROS: As described in the HPI and in addition is negative upon detailed review of systems or unobtainable unless otherwise stated in this dictation. PE:  /63   Pulse 84   Temp 98.7 °F (37.1 °C) (Oral)   Resp 18   Ht 5' 1\" (1.549 m)   Wt 113 lb 1.6 oz (51.3 kg)   SpO2 94%   BMI 21.37 kg/m²     Gen.: NAD/thin appearing -American female  Head: Atraumatic/normocephalic  Eyes: EOMI/anicteric sclera  ENT: Moist oral mucosa/no discharge nose ears  Neck: Supple with trachea midline  Chest: Symmetric excursion/nonlabored respirations  Cor: Regular/S1-S2  Abd.: Soft and nondistended. Appears nontender.   Extr.:  No significant peripheral edema  Muscles: Decreased tone and bulk/generalized sarcopenia  Skin: Warm and dry      DATA:     Lab Results   Component Value Date/Time    WBC 10.4 11/21/2022 07:43 PM    RBC 3.19 11/21/2022 07:43 PM    HGB 8.8 11/21/2022 07:43 PM    HCT 27.9 11/21/2022 07:43 PM    MCV 87.5 11/21/2022 07:43 PM    MCH 27.6 11/21/2022 07:43 PM    MCHC 31.5 11/21/2022 07:43 PM    RDW 15.8 11/21/2022 07:43 PM     11/21/2022 07:43 PM    MPV 10.4 11/21/2022 07:43 PM     Lab Results   Component Value Date/Time     11/21/2022 07:43 PM    K 5.3 11/21/2022 07:43 PM    K 3.8 07/01/2018 05:14 AM     11/21/2022 07:43 PM    CO2 22 11/21/2022 07:43 PM    BUN 63 11/21/2022 07:43 PM    CREATININE 1.4 11/21/2022 07:43 PM    CALCIUM 9.1 11/21/2022 07:43 PM    PROT 7.5 11/21/2022 07:43 PM    LABALBU 2.7 11/21/2022 07:43 PM    LABALBU 4.2 04/10/2012 12:28 PM    BILITOT <0.2 11/21/2022 07:43 PM    ALKPHOS 174 11/21/2022 07:43 PM    AST 48 11/21/2022 07:43 PM    ALT 33 11/21/2022 07:43 PM     Lab Results   Component Value Date/Time    LIPASE 135 11/21/2022 07:43 PM     No results found for: AMYLASE      ASSESSMENT/PLAN:  Patient Active Problem List   Diagnosis    Hepatitis C    Abnormal Pap smear    Dyslipidemia    Insomnia    Ex-smoker    PVD (peripheral vascular disease) ([address]    Uterine fibroid    Gall stone    Kidney stone    Left groin pain    Need for Tdap vaccination    Abnormal chest x-ray with multiple lung nodules    Panlobular emphysema (HCC)    Chronic hepatitis C virus infection ([address]    Malignant neoplasm of upper lobe of right lung (HCC)    Malignant neoplasm of lung (HCC)    Leukocytosis    Anemia    Essential hypertension    Thrombocytopenia (HCC)    Malignant neoplasm of upper lobe of left lung (HCC)    History of lobectomy of lung    Adenocarcinoma, lung (HCC)    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 Cirrhosis  -Previously positive hepatitis C antibody  -Viral load noted to be 5.57 international logarithmic unit per milliliter on 4 November of this year -genotype 1a or Ib reported  -Obtain AFP, serology, etc.  -In regards to follow-up, see discussion below  -Follow as outpatient    2. Ascites  -Likely multifactorial and related to peritoneal involvement from advanced cancer  -Ascitic fluid analysis yesterday does not report albumin level however previously paracentesis yielded ascites with albumin of 1.7 at the time when serum albumin was 2.2 calculating ascitic gradient at 0.5 indicating likely alternative cause for patient's ascites -suspect malignant ascites as above  -Defer diuretics to admitting service given renal failure  -Patient can undergo serial paracentesis depending on rate of accumulation of ascites which can be set as outpatient    3.   Advanced malignancy  -Patient follows with Dr. Odalis Calero  -Poor prognosis  -Defer further discussion regarding goals of care and CODE STATUS to admitting/oncology    Above has been discussed in details with the patient and family both at bedside and over the phone. Patient/family have been told that they will be seeing Dr. Izabel Dupree  -at this time I will defer decision for inpatient consult to admitting service. All questions by patient/family have been answered to their satisfaction. Please, see orders for plan of care. Thank you for the opportunity to see this patient in consultation    Teresa Ricketts MD  11/22/2022  3:50 PM    NOTE:  This report was transcribed using voice recognition software. Every effort was made to ensure accuracy; however, inadvertent computerized transcription errors may be present.

## 2022-11-23 ENCOUNTER — TELEPHONE (OUTPATIENT)
Dept: BREAST CENTER | Age: 72
End: 2022-11-23

## 2022-11-23 LAB
ALBUMIN SERPL-MCNC: 2.3 G/DL (ref 3.5–5.2)
ALP BLD-CCNC: 339 U/L (ref 35–104)
ALT SERPL-CCNC: 39 U/L (ref 0–32)
ANION GAP SERPL CALCULATED.3IONS-SCNC: 8 MMOL/L (ref 7–16)
AST SERPL-CCNC: 51 U/L (ref 0–31)
BASOPHILS ABSOLUTE: 0.03 E9/L (ref 0–0.2)
BASOPHILS RELATIVE PERCENT: 0.4 % (ref 0–2)
BILIRUB SERPL-MCNC: <0.2 MG/DL (ref 0–1.2)
BILIRUBIN DIRECT: <0.2 MG/DL (ref 0–0.3)
BILIRUBIN, INDIRECT: NORMAL MG/DL (ref 0–1)
BUN BLDV-MCNC: 59 MG/DL (ref 6–23)
CALCIUM SERPL-MCNC: 8.9 MG/DL (ref 8.6–10.2)
CHLORIDE BLD-SCNC: 107 MMOL/L (ref 98–107)
CO2: 23 MMOL/L (ref 22–29)
CREAT SERPL-MCNC: 1.6 MG/DL (ref 0.5–1)
EOSINOPHILS ABSOLUTE: 0.09 E9/L (ref 0.05–0.5)
EOSINOPHILS RELATIVE PERCENT: 1.2 % (ref 0–6)
FERRITIN: 1532 NG/ML
GFR SERPL CREATININE-BSD FRML MDRD: 34 ML/MIN/1.73
GLUCOSE BLD-MCNC: 85 MG/DL (ref 74–99)
HAV IGM SER IA-ACNC: ABNORMAL
HCT VFR BLD CALC: 25.7 % (ref 34–48)
HEMOGLOBIN: 8.1 G/DL (ref 11.5–15.5)
HEPATITIS B CORE IGM ANTIBODY: ABNORMAL
HEPATITIS B SURFACE ANTIGEN INTERPRETATION: ABNORMAL
HEPATITIS C ANTIBODY INTERPRETATION: REACTIVE
IMMATURE GRANULOCYTES #: 0.12 E9/L
IMMATURE GRANULOCYTES %: 1.6 % (ref 0–5)
IRON SATURATION: 14 % (ref 15–50)
IRON: 25 MCG/DL (ref 37–145)
LIPASE: 124 U/L (ref 13–60)
LYMPHOCYTES ABSOLUTE: 0.87 E9/L (ref 1.5–4)
LYMPHOCYTES RELATIVE PERCENT: 11.3 % (ref 20–42)
MCH RBC QN AUTO: 27.1 PG (ref 26–35)
MCHC RBC AUTO-ENTMCNC: 31.5 % (ref 32–34.5)
MCV RBC AUTO: 86 FL (ref 80–99.9)
MONOCYTES ABSOLUTE: 0.58 E9/L (ref 0.1–0.95)
MONOCYTES RELATIVE PERCENT: 7.5 % (ref 2–12)
NEUTROPHILS ABSOLUTE: 6.03 E9/L (ref 1.8–7.3)
NEUTROPHILS RELATIVE PERCENT: 78 % (ref 43–80)
PDW BLD-RTO: 16 FL (ref 11.5–15)
PLATELET # BLD: 479 E9/L (ref 130–450)
PMV BLD AUTO: 9.9 FL (ref 7–12)
POTASSIUM SERPL-SCNC: 5 MMOL/L (ref 3.5–5)
RBC # BLD: 2.99 E12/L (ref 3.5–5.5)
SODIUM BLD-SCNC: 138 MMOL/L (ref 132–146)
TOTAL IRON BINDING CAPACITY: 180 MCG/DL (ref 250–450)
TOTAL PROTEIN: 6.7 G/DL (ref 6.4–8.3)
WBC # BLD: 7.7 E9/L (ref 4.5–11.5)

## 2022-11-23 PROCEDURE — 82248 BILIRUBIN DIRECT: CPT

## 2022-11-23 PROCEDURE — 83690 ASSAY OF LIPASE: CPT

## 2022-11-23 PROCEDURE — 99239 HOSP IP/OBS DSCHRG MGMT >30: CPT | Performed by: STUDENT IN AN ORGANIZED HEALTH CARE EDUCATION/TRAINING PROGRAM

## 2022-11-23 PROCEDURE — 6370000000 HC RX 637 (ALT 250 FOR IP): Performed by: INTERNAL MEDICINE

## 2022-11-23 PROCEDURE — 6360000002 HC RX W HCPCS: Performed by: INTERNAL MEDICINE

## 2022-11-23 PROCEDURE — 2580000003 HC RX 258: Performed by: INTERNAL MEDICINE

## 2022-11-23 PROCEDURE — 6370000000 HC RX 637 (ALT 250 FOR IP)

## 2022-11-23 PROCEDURE — 99233 SBSQ HOSP IP/OBS HIGH 50: CPT | Performed by: INTERNAL MEDICINE

## 2022-11-23 PROCEDURE — 97161 PT EVAL LOW COMPLEX 20 MIN: CPT

## 2022-11-23 PROCEDURE — 99231 SBSQ HOSP IP/OBS SF/LOW 25: CPT

## 2022-11-23 PROCEDURE — 36415 COLL VENOUS BLD VENIPUNCTURE: CPT

## 2022-11-23 PROCEDURE — 83550 IRON BINDING TEST: CPT

## 2022-11-23 PROCEDURE — 94640 AIRWAY INHALATION TREATMENT: CPT

## 2022-11-23 PROCEDURE — 80053 COMPREHEN METABOLIC PANEL: CPT

## 2022-11-23 PROCEDURE — 83540 ASSAY OF IRON: CPT

## 2022-11-23 PROCEDURE — 82728 ASSAY OF FERRITIN: CPT

## 2022-11-23 PROCEDURE — 85025 COMPLETE CBC W/AUTO DIFF WBC: CPT

## 2022-11-23 PROCEDURE — 82105 ALPHA-FETOPROTEIN SERUM: CPT

## 2022-11-23 PROCEDURE — 1200000000 HC SEMI PRIVATE

## 2022-11-23 PROCEDURE — 80074 ACUTE HEPATITIS PANEL: CPT

## 2022-11-23 RX ORDER — OXYCODONE HYDROCHLORIDE 5 MG/1
5 TABLET ORAL EVERY 4 HOURS PRN
Qty: 9 TABLET | Refills: 0 | Status: SHIPPED | OUTPATIENT
Start: 2022-11-23 | End: 2022-11-24 | Stop reason: SDUPTHER

## 2022-11-23 RX ADMIN — DOCUSATE SODIUM AND SENNOSIDES 1 TABLET: 8.6; 5 TABLET, FILM COATED ORAL at 09:53

## 2022-11-23 RX ADMIN — IPRATROPIUM BROMIDE 0.5 MG: 0.5 SOLUTION RESPIRATORY (INHALATION) at 20:12

## 2022-11-23 RX ADMIN — AMLODIPINE BESYLATE 5 MG: 5 TABLET ORAL at 09:53

## 2022-11-23 RX ADMIN — IPRATROPIUM BROMIDE 0.5 MG: 0.5 SOLUTION RESPIRATORY (INHALATION) at 08:12

## 2022-11-23 RX ADMIN — DOCUSATE SODIUM AND SENNOSIDES 1 TABLET: 8.6; 5 TABLET, FILM COATED ORAL at 19:55

## 2022-11-23 RX ADMIN — SODIUM CHLORIDE 25 MG: 9 INJECTION, SOLUTION INTRAVENOUS at 13:40

## 2022-11-23 RX ADMIN — OXYCODONE 10 MG: 5 TABLET ORAL at 19:55

## 2022-11-23 RX ADMIN — SODIUM CHLORIDE 100 MG: 9 INJECTION, SOLUTION INTRAVENOUS at 15:13

## 2022-11-23 RX ADMIN — IPRATROPIUM BROMIDE 0.5 MG: 0.5 SOLUTION RESPIRATORY (INHALATION) at 12:19

## 2022-11-23 RX ADMIN — LISINOPRIL 40 MG: 10 TABLET ORAL at 09:53

## 2022-11-23 ASSESSMENT — PAIN SCALES - WONG BAKER: WONGBAKER_NUMERICALRESPONSE: 0

## 2022-11-23 ASSESSMENT — PAIN SCALES - GENERAL
PAINLEVEL_OUTOF10: 0
PAINLEVEL_OUTOF10: 9

## 2022-11-23 ASSESSMENT — ENCOUNTER SYMPTOMS
COUGH: 0
CHOKING: 0
BLOOD IN STOOL: 0
ABDOMINAL DISTENTION: 1

## 2022-11-23 ASSESSMENT — PAIN DESCRIPTION - DESCRIPTORS: DESCRIPTORS: ACHING;DISCOMFORT

## 2022-11-23 ASSESSMENT — PAIN DESCRIPTION - PAIN TYPE: TYPE: CHRONIC PAIN;ACUTE PAIN

## 2022-11-23 ASSESSMENT — PAIN DESCRIPTION - ORIENTATION: ORIENTATION: RIGHT

## 2022-11-23 ASSESSMENT — PAIN DESCRIPTION - LOCATION: LOCATION: ABDOMEN

## 2022-11-23 NOTE — CARE COORDINATION
Need to notify [facility] at discharge; orders on chart. Also need to [facility] DME at discharge so that hospital bed can be delivered. [phone]. Jillian Dietz.

## 2022-11-23 NOTE — DISCHARGE SUMMARY
[facility] Physician Discharge Summary       No follow-up provider specified. Activity level: As tolerated     Dispo: Home      Condition on discharge: Stable     Patient ID:  Rose Quintanilla  [id number]  71 y.o.  1950    Admit date: 11/21/2022    Discharge date and time:  11/23/2022  1:23 PM    Admission Diagnoses: Principal Problem:    Intractable pain  Active Problems:    Ascites of liver    Goals of care, counseling/discussion    Palliative care encounter    Mass of right axilla    Subareolar mass of right breast    Pleural effusion, right  Resolved Problems:    * No resolved hospital problems. *      Discharge Diagnoses: Principal Problem:    Intractable pain  Active Problems:    Ascites of liver    Goals of care, counseling/discussion    Palliative care encounter    Mass of right axilla    Subareolar mass of right breast    Pleural effusion, right  Resolved Problems:    * No resolved hospital problems. *      Consults:  IP CONSULT TO GI  IP CONSULT TO PALLIATIVE CARE  IP CONSULT TO HEM/ONC  IP CONSULT TO SOCIAL WORK  IP CONSULT TO GENERAL SURGERY  IP CONSULT TO [address] Course:   Patient Rose Quintanilla is a 71 y.o. presented with Intractable pain [R52]  Intractable abdominal pain [R10.9]  DNR (do not resuscitate) discussion [Z71.89]  Primary malignant neoplasm of lung metastatic to other site, unspecified laterality ([facility] [C34.90]  Ascites of liver [R18.8]  Chronic kidney disease, unspecified CKD stage [N18.9]  Patient was admitted and managed for Abd pain - recurrent Ascites r/o SBP- cirrhosis, hx of hep c with Stage IV adenocarcinoma of the lung s/p lobectomy with peritoneal carcinomatosis. S/P multiple previous paracentesis - cytology was neg for malignant cells, s/p therapeutic paracentesis 11/22 with 3L fluid removal, repeat cytology pending , oncology and GI consulted.  1.5 cm right subareolar breast lump that will s/p outpatient breast US - BI-RADS category 4, suspicious abnormality , Inverted right nipple, advised OP nipple biopsy, has been seen by oncology as well as radiation oncology OP f/u. Mass rt scapular lesion - consulted sx for excisional biopsy, recommend there is low suspicion that this is an occult cancer or malignancy, no interventions. Mod rt basilar pleural effusion with atelectasis rt LL, not hypoxic, no discomfort. Discharge Exam:  General Appearance: alert and oriented to person, place and time and in no acute distress  Skin: warm and dry  Head: normocephalic and atraumatic  Eyes: pupils equal, round, and reactive to light, extraocular eye movements intact, conjunctivae normal  Neck: neck supple and non tender without mass   Pulmonary/Chest: clear to auscultation bilaterally- no wheezes, rales or rhonchi, normal air movement, no respiratory distress  Cardiovascular: normal rate, normal S1 and S2 and no carotid bruits  Abdomen: soft, non-tender, non-distended, normal bowel sounds, no masses or organomegaly  Extremities: no cyanosis, no clubbing and no edema  Neurologic: no cranial nerve deficit and speech normal    I/O last 3 completed shifts:  In: -   Out: 300 [Urine:300]  No intake/output data recorded. LABS:  Recent Labs     11/21/22 1943 11/23/22  0145    138   K 5.3* 5.0    107   CO2 22 23   BUN 63* 59*   CREATININE 1.4* 1.6*   GLUCOSE 130* 85   CALCIUM 9.1 8.9       Recent Labs     11/21/22 1943 11/23/22  0145   WBC 10.4 7.7   RBC 3.19* 2.99*   HGB 8.8* 8.1*   HCT 27.9* 25.7*   MCV 87.5 86.0   MCH 27.6 27.1   MCHC 31.5* 31.5*   RDW 15.8* 16.0*   * 479*   MPV 10.4 9.9       No results for input(s): POCGLU in the last 72 hours.     Imaging:  CT ABDOMEN PELVIS W IV CONTRAST Additional Contrast? None    Result Date: 11/22/2022  EXAMINATION: CT OF THE ABDOMEN AND PELVIS WITH XOLTMUXV05/21/2022 11:30 pm TECHNIQUE: CT of the abdomen and pelvis was performed with the administration of intravenous contrast. Multiplanar reformatted images are provided for review. Automated exposure control, iterative reconstruction, and/or weight based adjustment of the mA/kV was utilized to reduce the radiation dose to as low as reasonably achievable. COMPARISON: October 31, 2022 HISTORY: ORDERING SYSTEM PROVIDED HISTORY: abd pain TECHNOLOGIST PROVIDED HISTORY: Reason for exam:->abd pain Additional Contrast?->None Decision Support Exception - unselect if not a suspected or confirmed emergency medical condition->Emergency Medical Condition (MA) FINDINGS: THORACIC STRUCTURES: There is a moderate right basilar pleural fluid collection with atelectasis of the right lower lobe. LIVER:  The liver is normal in size, slight nodularity and normal attenuation. No focal mass. No intra or extrahepatic bile duct dilation. GALL BLADDER: Cholecystectomy. SPLEEN:  Stable hypodense region with parenchymal atrophy of the mid spleen. PANCREAS:  Unremarkable. ADRENAL GLANDS:  Unremarkable. ESOPHAGUS AND STOMACH:  Unremarkable. BOWEL: Small bowel:  Unremarkable. Large bowel: The colon and rectum are of normal course and caliber. The appendix is within normal limits. There is no intraperitoneal free air. There is intra-abdominal ascites. URINARY/GENITAL TRACT: Kidneys:  The kidneys are unremarkable. .  No evidence of hydronephrosis, renal calcifications or solid renal mass. Ureters: The ureters are normal course and caliber. There is no evidence of ureter calculus/calculi. URINARY BLADDER:  The urinary bladder is well distended without wall thickening or focal mass. Extensive calcification of uterine fibroids. BLOOD VESSELS: There is atherosclerotic disease. LYMPH NODES:  No evidence of intraabdominal or intrapelvic lymphadenopathy. ABDOMINAL WALL & SOFT TISSUES:  Unremarkable. OSSEOUS STRUCTURES: No acute osseous lesion. Moderate right basilar pleural fluid collection with atelectasis of the right lower lobe.  Slight nodularity of the liver concerning for early cirrhotic morphological change. Stable hypodense region within the spleen with parenchymal atrophy likely related to old splenic infarction or old splenic injury. Large amount of intra-abdominal ascites, unchanged from the prior study. Extensive calcification of uterine fibroids. Atherosclerotic disease. .     XR CHEST PORTABLE    Result Date: 11/21/2022  EXAMINATION: ONE XRAY VIEW OF THE CHEST 11/21/2022 7:35 pm COMPARISON: None. HISTORY: ORDERING SYSTEM PROVIDED HISTORY: abd pain TECHNOLOGIST PROVIDED HISTORY: Reason for exam:->abd pain FINDINGS: Hazy density over the right hemithorax. The heart is not enlarged. No pneumothorax. Elevated right hemidiaphragm. Hazy density over the right hemithorax likely due to layering right pleural effusion. Superimposed infiltrates cannot be excluded. Elevated right hemidiaphragm. US GUIDED PARACENTESIS    Result Date: 11/22/2022  PROCEDURE: PARACENTESIS WITHOUT IMAGE GUIDANCE US ABDOMEN LIMITED 11/22/2022 HISTORY: ORDERING SYSTEM PROVIDED HISTORY: diagnostic and therapeutic ascites tap-- send for cx, +cytology, cell count TECHNOLOGIST PROVIDED HISTORY: Reason for exam:->diagnostic and therapeutic ascites tap-- send for cx, +cytology, cell count What reading provider will be dictating this exam?->CRC TECHNIQUE: Informed consent was obtained after a detailed explanation of the procedure including risks, benefits, and alternatives. Universal protocol was followed. A limited ultrasound of the abdomen was performed. The right abdomen was prepped and draped in sterile fashion and local anesthesia was achieved with lidocaine. A 5 Latvian needle sheath was advanced into ascites under continuous ultrasound guidance and paracentesis was performed. The patient tolerated the procedure well. FINDINGS: Limited ultrasound of the abdomen demonstrates ascites. A total of 3050 cc of straw-colored ascitic fluid was removed. Status post paracentesis.        Patient Instructions:      Medication List        CONTINUE taking these medications      Misc.  Devices Kit  BP monitoring KIT            ASK your doctor about these medications      acetaminophen 500 MG tablet  Commonly known as: TYLENOL  Take 1 tablet by mouth 4 times daily as needed for Pain     amLODIPine 5 MG tablet  Commonly known as: NORVASC     hydroCHLOROthiazide 25 MG tablet  Commonly known as: HYDRODIURIL  Take 1 tablet by mouth daily     lisinopril 40 MG tablet  Commonly known as: PRINIVIL;ZESTRIL  take 1 tablet by mouth once daily     therapeutic multivitamin-minerals tablet  Take 1 tablet by mouth daily     tiotropium 18 MCG inhalation capsule  Commonly known as: Spiriva HandiHaler  Inhale 1 capsule into the lungs daily     vitamin B-12 500 MCG tablet  Commonly known as: CYANOCOBALAMIN  take 1 tablet by mouth daily                Note that more than 30 minutes was spent in preparing discharge papers, discussing discharge with patient, medication review, etc.    Signed:  Electronically signed by Octaviano Catherine MD on 11/23/2022 at 1:23 PM

## 2022-11-23 NOTE — PROGRESS NOTES
Call to floor nurse, DINA Haywood, to discuss IR request.  Per Chastity, pt packing up belongings currently and is being discharged.

## 2022-11-23 NOTE — TELEPHONE ENCOUNTER
Patient is a new referral. She presently resides at [facility] [phone]. Called and left detailed message with Pushpa Harris, director of nursing to return call so we can set up an appointment. Patient currently being treated for stage 4 adenocarcinoma of the lung with some ascites. Abnormal breast ultrasound recommends punch biopsy right breast nipple area. Will await call back to schedule an appointment.     Electronically signed by Bruce Jose RN on 11/23/22 at 8:55 AM EST

## 2022-11-23 NOTE — PLAN OF CARE
Problem: Discharge Planning  Goal: Discharge to home or other facility with appropriate resources  Outcome: Progressing     Problem: Pain  Goal: Verbalizes/displays adequate comfort level or baseline comfort level  Outcome: Progressing  Flowsheets (Taken 11/22/2022 1945)  Verbalizes/displays adequate comfort level or baseline comfort level:   Encourage patient to monitor pain and request assistance   Assess pain using appropriate pain scale   Administer analgesics based on type and severity of pain and evaluate response   Implement non-pharmacological measures as appropriate and evaluate response   Notify Licensed Independent Practitioner if interventions unsuccessful or patient reports new pain     Problem: Skin/Tissue Integrity  Goal: Absence of new skin breakdown  Description: 1. Monitor for areas of redness and/or skin breakdown  2. Assess vascular access sites hourly  3. Every 4-6 hours minimum:  Change oxygen saturation probe site  4. Every 4-6 hours:  If on nasal continuous positive airway pressure, respiratory therapy assess nares and determine need for appliance change or resting period.   Outcome: Progressing     Problem: Safety - Adult  Goal: Free from fall injury  Outcome: Progressing

## 2022-11-23 NOTE — PROGRESS NOTES
Hematology/oncology inpatient follow-up:    Patient Name: Carmela Mills  YOB: 1950    DATE OF ADMISSION: 11/21/2022  DATE OF CONSULTATION: 11/21/2022  REASON FOR CONSULTATION: \"recurrent peritoneal carcinamatosis\"  CONSULTING PROVIDER: Yousif Varma MD  PCP: Blanca Tesfaye    Room: [address] [place] [address]      CHIEF COMPLAINT:  Abdominal Distention    SUBJECTIVE:  The patient is feeling better following the paracentesis, 3050 cc of straw-colored ascitic fluid was removed yesterday. She denies any bleeding. HISTORY OF PRESENT ILLNESS:   Patient is a 71 y.o. female who is known to us for adenocarcinoma of the lung comes in with acute complaint of recurrent abdominal distention. Patient was previously admitted recently due to similar issues with ascites. Initial inpatient work-up was largely unremarkable in spite of concern for malignancy. Detailed history as outlined below including history of abdominal lesions/biopsies and a monoclonal B-cell population. The patient reports she has had acute onset of recurrent ascites that developed over the last 2 weeks or so. She had been previously discharged on 11/9/2022 to rehab. She was able to follow-up with her oncologist Dr. Pepper Lundborg, in which ultrasound of the breast was done, finding a BI-RADS 4 right-sided breast lesion. REVIEW OF SYSTEMS:  Review of Systems   Constitutional:  Negative for chills, diaphoresis and fever. HENT: Negative. Eyes:  Negative for visual disturbance. Respiratory:  Negative for cough and choking. Cardiovascular:  Negative for chest pain and leg swelling. Gastrointestinal:  Positive for abdominal distention. Negative for blood in stool. Musculoskeletal: Negative. Skin:         Painful mass just posterior to right axilla   Neurological: Negative. Hematological:  Does not bruise/bleed easily. Psychiatric/Behavioral: Negative.           PAST MEDICAL HISTORY:  Past Medical History:   Diagnosis Date Abnormal chest x-ray with multiple lung nodules 9/8/2015    Abnormal Pap smear 1/3/2011    Alpha-1 negative    Adrenal adenoma     7 mm    Bilateral lung cancer ([address] 5/12/2016    surgically removed - 2015     Cholelithiasis 6/6/2011    Encounter for screening colonoscopy     for OR 3-28-19     Fibroids     Hemangioma of spleen     Hepatitis C 01/03/2011    treated and cured, no current issues     Hyperlipidemia     no medications     Hypertension 6/6/2011    Insomnia     Lung nodule     ROMELIA     Lymphadenopathy     Cervical (-) ENT    Malignant neoplasm of upper lobe of right lung ([address] 11/18/2015    Osteoarthritis     Panlobular emphysema ([address] 11/12/2015    controlled with inhalers     PPD negative 1/18/12    Pulmonary embolism without acute cor pulmonale ([address] 5/12/2016    PVD (peripheral vascular disease) ([address] 5/6/2014    Scoliosis     Uterine fibroid 5/6/2014       PAST SURGICAL HISTORY:  Past Surgical History:   Procedure Laterality Date    APPENDECTOMY  1965    BREAST BIOPSY Left     AGE 30'S LEFT NIPPLE REMOVED    BREAST LUMPECTOMY  4/28/1999    left, benign, Jesu Jimenez  2/1/2012    Layne Kussmaul, Jesu Ramos  10/15/15    with EBUS - DR Pierce Read    CHOLECYSTECTOMY      COLONOSCOPY  12/21/2009    partial to distal ascending colon normal (completion BE same day normal), Dr. Natali Finney, [facility]    COLONOSCOPY  8/22/2014    done under fluoro with sigmoid straightening device so was able to get to cecum, very poor prep, moderate proctitis, repeat 5 years,  Dr. Natali Finney, [facility]    COLONOSCOPY N/A 3/28/2019    COLONOSCOPY POLYPECTOMY SNARE/COLD BIOPSY performed by Kaykay Krishna DO at [facility] ENDOSCOPY    COLONOSCOPY N/A 5/13/2022    COLONOSCOPY POLYPECTOMY SNARE/COLD BIOPSY performed by Anthony Collins MD at [address] [place] [address]  9/20/2022    CT NEEDLE BIOPSY LUNG PERCUTANEOUS 9/20/2022 L Gregory Underowod MD [facility]    ENDOMETRIAL BIOPSY      LIVER RESECTION Right 7/6/2022    LAPAROSCOPIC ROBOTIC PERITONEAL MASS RESECTION performed by Adriana Stephen III, MD at [place] [id number] Left 3/29/2016    VATS WEDGE RESECTION UPPER LOBECTOMY    OTHER SURGICAL HISTORY Right 11/04/2016    REMOVAL MEDI-PORT - DR Ana Luisa Law    SD LAP,CHOLECYSTECTOMY/GRAPH N/A 6/29/2018    CHOLECYSTECTOMY LAPAROSCOPIC, POSSIBLE OPEN,POSSIBLE GRAM ( OC 2) performed by Kalina Ocampo MD at [place] [address] Right 11/11/2015    VATS, BRONCH,RT UPPER LOBECTOMY; NODE DISECTION    TUNNELED VENOUS PORT PLACEMENT Right 12/07/2015    right chest, and removed     UPPER GASTROINTESTINAL ENDOSCOPY N/A 5/13/2022    EGD POLYP HOT FORCEP/CAUTERY performed by Bettie Crowley MD at [facility]. [facility]:  Current Facility-Administered Medications   Medication Dose Route Frequency Provider Last Rate Last Admin    0.9 % sodium chloride bolus  500 mL IntraVENous Once Miguel Galvan MD   Stopped at 11/22/22 0318    acetaminophen (TYLENOL) tablet 500 mg  500 mg Oral Q6H PRN Miguel Galvan MD        amLODIPine (NORVASC) tablet 5 mg  5 mg Oral Daily Miguel Galvan MD   5 mg at 11/23/22 0953    lisinopril (PRINIVIL;ZESTRIL) tablet 40 mg  40 mg Oral Daily Miguel Galvan MD   40 mg at 11/23/22 0953    ipratropium (ATROVENT) 0.02 % nebulizer solution 0.5 mg  0.5 mg Nebulization TID Miguel Galvan MD   0.5 mg at 11/23/22 1219    polyethylene glycol (GLYCOLAX) packet 17 g  17 g Oral Daily PRN VERITO Gustafson CNP        morphine (PF) injection 1 mg  1 mg IntraVENous Q4H PRN Juan Thomas MD   1 mg at 11/22/22 1325    oxyCODONE (ROXICODONE) immediate release tablet 5 mg  5 mg Oral Q4H PRN VERITO Gustafson CNP        Or    oxyCODONE (ROXICODONE) immediate release tablet 10 mg  10 mg Oral Q4H PRN VERITO Gustafson CNP   10 mg at 11/22/22 2051    sennosides-docusate sodium (SENOKOT-S) 8.6-50 MG tablet 1 tablet  1 tablet Oral BID VERITO Gustafson CNP   1 tablet at 22 0953     Facility-Administered Medications Ordered in Other Encounters   Medication Dose Route Frequency Provider Last Rate Last Admin    pantoprazole (PROTONIX) tablet 40 mg  40 mg Oral QAM AC Luís Amin MD           ALLERGIES:   Allergies   Allergen Reactions    Ibuprofen Palpitations and Rash        SOCIAL HISTORY:   Social History     Socioeconomic History    Marital status: Single    Number of children: 0   Tobacco Use    Smoking status: Former     Packs/day: 0.00     Years: 30.00     Pack years: 0.00     Types: Cigarettes     Quit date: 2015     Years since quittin.4    Smokeless tobacco: Never   Vaping Use    Vaping Use: Never used   Substance and Sexual Activity    Alcohol use: Not Currently     Alcohol/week: 2.0 standard drinks     Types: 2 Glasses of wine per week     Comment: x2 week    Drug use: No    Sexual activity: Not Currently       FAMILY HISTORY:  Family History   Problem Relation Age of Onset    Heart Disease Mother     High Blood Pressure Mother     Cancer Mother         ovarian    Cancer Father     Heart Disease Father     High Blood Pressure Father     Kidney Disease Father     Diabetes Sister     Breast Cancer Sister     Diabetes Brother        PHYSICAL EXAM:   VITALS:  /60   Pulse 81   Temp 98.4 °F (36.9 °C) (Oral)   Resp 16   Ht 5' 1\" (1.549 m)   Wt 103 lb 12.8 oz (47.1 kg)   SpO2 98%   BMI 19.61 kg/m²   Physical Exam  Constitutional:       Appearance: She is not toxic-appearing. HENT:      Head: Normocephalic. Nose: Nose normal.      Mouth/Throat:      Mouth: Mucous membranes are moist.   Eyes:      General: No scleral icterus. Cardiovascular:      Rate and Rhythm: Normal rate and regular rhythm. Heart sounds: No murmur heard. Pulmonary:      Effort: Pulmonary effort is normal. No respiratory distress. Breath sounds: No stridor. No wheezing. Abdominal:      General: Abdomen is flat. There is no distension.       Palpations: There is no mass. Tenderness: There is no abdominal tenderness. Musculoskeletal:      Cervical back: Normal range of motion. No rigidity. Right lower leg: No edema. Left lower leg: No edema. Comments: Tender, 3-4 centimeter subcutaneous mass just posterior to right axilla   Lymphadenopathy:      Cervical: No cervical adenopathy. Skin:     Findings: No lesion or rash. Neurological:      General: No focal deficit present. Mental Status: She is alert and oriented to person, place, and time. Psychiatric:         Behavior: Behavior normal.         Thought Content: Thought content normal.        ECOG PS: 1-2      Intake/Output Summary (Last 24 hours) at 11/23/2022 1617  Last data filed at 11/23/2022 0538  Gross per 24 hour   Intake --   Output 300 ml   Net -300 ml       DIAGNOSTIC DATA:       Lab Results   Component Value Date    WBC 7.7 11/23/2022    HGB 8.1 (L) 11/23/2022    HCT 25.7 (L) 11/23/2022    MCV 86.0 11/23/2022     (H) 11/23/2022     Lab Results   Component Value Date    NEUTROABS 6.03 11/23/2022     Lab Results   Component Value Date    ALT 39 (H) 11/23/2022    AST 51 (H) 11/23/2022    ALKPHOS 339 (H) 11/23/2022    BILITOT <0.2 11/23/2022     Lab Results   Component Value Date    CREATININE 1.6 (H) 11/23/2022    BUN 59 (H) 11/23/2022     11/23/2022    K 5.0 11/23/2022     11/23/2022    CO2 23 11/23/2022       Pathology:     Radiology:      ASSESSMENT & PLAN:  The patient is a 71 y.o. female Hx of smoking who underwent: (1) Bronchoscopy. (2) Right VATS. (3) VATS right upper lobe wedge resection. (4) VATS right upper lobectomy. (5) Intercostal nerve block from T3 to T10. (6) Mediastinal lymph node dissection on 11/11/2015:   Pathology:  Right lung, upper lobectomy: Invasive, moderately differentiated adenocarcinoma (Grade 2). Tumor size 1.5 cm in greatest dimension. Visceral pleural invasion: Not identified.    Visceral pleural invasion: Not identified. Surgical margin negative for malignancy. Two of three peribronchial lymph nodes positive for adenocarcinoma. pT1a N1 MX;      Being followed for a left upper lobe mass by Dr. Fab Swansno. Multiple biopsies in the past came back negative for malignancy. Hypermetabolic on PET/CT scan on 09/29/2015 (2.3 cm in size with SUV of 6.4). CT guided biopsy of the left upper lobe lesion on 11/02/2015 was noted to be negative for malignancy. She was referred to the medical oncology clinic to discuss adjuvant chemotherapy. We recommended 4 cycles of adjuvant chemotherapy consisting of Carboplatin/Alimta. Mediport placed 12/7/15. -MRI Brain on 12/8/15:  Negative for metastatic disease.   -We will repeat CT chest and PET/CT after 4 cycles of Carboplatin/Alimta. To follow on Lingular lesion as well. -Molecular studies (EGFR, ALK, ROS-1) were all Not Detected. Cycle # 1 of Lugenia Scripture was on 12/09/2015. Cycle # 2 of Lugenia Scripture was on 01/06/2016. Cycle # 3 of Lugenia Scripture was on 01/27/2016. Cycle # 4 of Lugenia Scripture was on 02/24/2016. PET SCAN 3.2016: The 2.1 cm left lung mass abutting the major fissure is hypermetabolic with SUV max of 5.4. Finding is worrisome for tumor with avid glucose metabolism. 2. Elsewhere there is unremarkable distribution of FDG activity without evidence of hypermetabolic metastases. On 03/29/2016 underwent Bronch/Left VATS/VATS wedge ROMELIA/VATS Left upper lobectomy/Mediasinal lymph node dissection/Intercostal nerve block from T3-T10 per Dr. Dey Neither. Final pathology revealed Stage I Adenocarcinoma of ROMELIA (morphologically different from the adenocarcinoma previously diagnosed in the right upper lobe. Therefore, synchronous primary tumors are favored). A. Left lung, upper lobe wedge resection: Invasive, well-differentiated adenocarcinoma (grade 1);  Tumor size-1.4 cm in greatest dimension; Surgical margins-negative for malignancy; Lymphovascular invasion-not identified; TNM classification-pT2a N0 MX  B. AP window lymph node #1, excision: Anthracotic lymph node; negative for malignancy  C. AP window lymph node #2, excision: Anthracotic lymph node; negative for malignancy   D. Periaortic lymph node #1, excision: Fibroadipose tissue; lymph node not identified  E. Bronchial lymph node #1, excision: Anthracotic lymph node; negative for malignancy  F. Left lung, upper lobectomy: Emphysematous change; negative for malignancy  4 anthracotic lymph nodes negative for malignancy   G. Inferior pulmonary ligament lymph node, excision: Anthracotic lymph node; negative for malignancy  H. Bronchial lymph node, excision: Anthracotic lymph node; negative for malignancy. Right side Mediport was removed on 11/04/2016 by Dr. Justin Fay. On surveillance per NCCN guidelines. CT chest 04/12/2017 noted no convincing evidence of recurrent disease. CT chest 10/19/2017 noted no definite evidence for recurrent malignancy. CT chest 03/12/2018 negative for pulmonary parenchymal masses or enlarged mediastinal or hilar LN. ? 2 cm lesion in spleen difficult to evaluate due to the arterial phase of the study. CT Abd/Pelvis 05/08/2018  Enhancing lesion within the spleen is relatively stable to slightly smaller when compared to April 2014 exam and likely splenic hemangioma. Other indeterminate findings (left periaortic LN, sclerotic/blastic foci in L3 and L1 reported by Dr. Kinsey Wilson from radiology team). PET/CT scan 06/05/2018 unremarkable. No FDG avid uptake identified. No evidence for recurrent or metastatic disease. CT Chest 12/13/2018 noted no evidence of recurrent/metastatic disease. CT chest on 06/11/2019 noted no evidence for worrisome residual or recurrent malignancy. No evidence for new lymphadenopathy or metastatic disease. Stable scarring and postoperative changes right upper lobe. CT chest 11/26/2019: No evidence of active neoplasm. CT chest 06/15/2020:  No evidence of active neoplasm. CT chest 12/30/2020: Postsurgical changes and postsurgical scarring seen within the right lung apex. There is no evidence of tumor recurrence. 2.1 x 2.3 cm enhancing lesion seen within the spleen  PET/CT scan 03/02/2021   No FDG avid uptake is identified which exceeds the threshold SUV. No convincing evidence for recurrent or metastatic disease  CT chest 09/28/2021 No evidence of tumor recurrence     Recent abdominal pain; ER visit reviewed  CT abdomen/pelvis 02/20/2022   6 mm right lower lobe pulmonary nodule  Multiple peritoneal cystic masses      CEA 12.5 on 03/16/2022     CT chest 04/05/2022 Ground-glass nodule right lower lobe superolateral portion 10 mm unchanged from prior however in the medial segment right lower lobe with pleural abutment is a 7 mm pulmonary nodule increased in size from 09/28/2021 with is barely visible at 2-3 mm; repeat CT chest in 3 mo     PET/CT scan 04/05/2022 noted No FDG avid uptake is identified which exceeds the threshold SUV      Bilateral screening mammogram 04/20/2022: Negative for malignancy     CEA     12.1 on 04/20/2022  CA-125 32.8 on 04/20/2022   <2 on 04/20/2022  Chromogranin A 1480 on 04/20/2022. EGD/Colonoscopy 05/13/2022: Gastric polyps , duodenal polyp moderate gastroduodenitis   A. Stomach, biopsy: Hyperplastic polyp and moderate chronic active gastritis; negative for intestinal metaplasia   Immunostain negative for Helicobacter pylori organisms   B. Duodenum, biopsy: Chronic duodenitis with features of peptic duodenitis   C. Colon, 20 cm biopsy: Fragments of tubular adenoma and hyperplastic polyp   Pathology reviewed. Referred to HBP team (Dr. Clois Fabry) for further evaluation of peritoneal cystic masses  CT abdomen/pelvis 06/23/2022 numerous cystic structures identified throughout the right flank and retroperitoneum.       Laparoscopic robotic peritoneal mass resection on 07/06/2022  Findings included: serous cystic masses along the colon, periportal lymphadenopathy, fibrotic appearing liver, chronic cholecystitis     Peritoneal fluid Negative for malignant cells. Cellblock shows reactive mesothelial cells, lymphocytes, adipose/stromal tissue, and blood. Monolayer preparation shows few reactive mesothelial cells and blood. A.  Lymph node, periportal, biopsy:   - Reactive node showing follicular hyperplasia, negative for malignancy, see comment. B.  Remnant gallbladder, cholecystectomy:   - Portion of gallbladder wall showing dense fibrosis/scar and occluded mariaelena-gallbladder vessels. C. Soft tissue, peritoneal sac, excision:   - Peritoneal inclusion cysts (benign cystic mesothelioma) and unremarkable fibroadipose tissue. D.  Liver, needle biopsy:   - Benign hepatic parenchyma showing focal periportal and incomplete septal fibrosis (stage 2)   - Negative for significant lobular/portal necroinflammatory activity, see comment. Comment:   Sections of the lymph node in specimen A reveal normal anat architecture with secondary follicular hyperplasia. There are no cytologically atypical lymphoid cells or [place] cells identified. Periportal LN Flow Cytometry noted 4.5% monoclonal B cells detected in a predominantly polyclonal background. Monoclonal B cell population (4.5% of total cells) without detectable CD5, CD10 or CD11c expression in a predominantly polyclonal background, raising the differential between a monoclonal B-cell population of undetermined significance versus a B-cell lymphoma/leukemia. In the context of B-cell lymphoma the main differential based on immunophenotype includes, but is not limited to marginal zone lymphoma/leukemia, lymphoplasmacytic lymphoma, FG66- negative follicular lymphoma, and less likely large b cell lymphoma. In specimen D, there is no evidence of chronic hepatitis or significant steatosis.   Histologic features of cirrhosis including nodules of regenerating hepatocytes and complete portal-portal bridging fibrosis are   not identified. Focal periportal fibrosis and incomplete septal fibrosis are confirmed by trichrome stain. Iron stain is also performed and is negative. Periportal lymph node flow cytometry showing 4.5% monoclonal B cells without detectable CD5, CD10 or CD11c expression in a predominantly polyclonal background, raising the differential between a monoclonal B-cell population of undetermined significance versus a B-cell lymphoma/leukemia     Repeat CT chest to follow on history of NSCLC on 08/04/2022  Postsurgical changes are identified in the upper lobes bilaterally. 1.1 cm ground-glass nodule in the right lower lobe and a smaller 1 measuring 0.9 cm are noted without change. There is a 1.6 x 1.2 cm soft tissue mass in the right lower lobe medially which is significantly increased since the previous examination. Bilateral adrenal nodules are noted, larger 1 on the left side measuring 1.8 x 1.6 cm.  1.2 cm right renal cystic lesion is identified. An ill-defined hypodense lesion is identified in the spleen currently measuring 2.9 x 3.5 cm      Evaluated by Dr. Robin Lafleur on 08/15/2022 at [facility] who recommended proceeding with a biopsy of the enlarging right lower lobe lung mass. While lymphoma certainly possible, biopsy recommended to rule out recurrent lung cancer. Periportal lymph node biopsy reviewed at [facility]. Lymph node with lipid lymphadenopathy  Small clonal B-cell population detected by flow cytometry and molecular analysis  Negative for carcinoma  Flow cytometry showed a small clonal CD5 -/CD10- B-cell population (4.5% of all events) and a background of polyclonal B cells. B-cell receptor clonality assay was positive for a clonal B-cell population. However there is no morphologic evidence of lymphoma in the lymph node.     The detection of a clonal B-cell population despite negative morphologic evidence of lymphoma might represent monoclonal B-cell lymphocytosis process in the lymph node. An alternative consideration is peripheral blood involvement by B-cell clone (MBL-like process) that was picked up by the tissue flow cytometry and molecular tests. Even if lymphoma is concerned, likely low-grade and not causing her symptoms, so observation would be recommended. PET/CT scan, CT-guided core needle biopsy of enlarging right lower lobe lung mass recommended. PET/CT scan 09/06/2022: In the region of the lesion in the right lower lobe there is FDG avid tracer uptake peak SUV is 3.2. imaging reviewed. CT guided core needle biopsy RLL lung nodule on 09/20/2022  Right lung, lower lobe core needle biopsy: Adenocarcinoma (see comment)   Comment: The prior history of right upper lobe adenocarcinoma (HES ) and left upper lobe adenocarcinoma (HES ) is noted. The electronic medical record is also reviewed. The adenocarcinoma in the current specimen is weakly immunoreactive with GATA3 and CDX2. The TTF-1 immunostain is negative. 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       Status paracentesis 11/2/2022, total of 2450 mL of ascitic fluid was removed  Cytology pending  CEA 8.5  =6805  CA 19-9 <2  Anemia, likely secondary to malignancy, chronic inflammation, and iron deficiency     Pelvic U/S large calcified uterine mass; ovaries not identified. Large volume ascites. MRI Pelvis w/out contrast done and I reviewed results     Hb 7. 9G/DL today ; Transfuse if Hb <7.0     Right breast breast nodule was detected on CT abd/pelvis on 10/31/2022  Breast exam done on 11/6/22 and a 2 cm nodule subareolar was palpated on right breast. Chaperone/RN present  Both  and CA 27.29 elevated at 68 and 85, respectively     Rad Onc initially planned for definitive RT for her adenocarcinoma lung mass before onset of ascites. Currently RT is on hold while workup the ascites  Gyn saw patient on 11/7/22. Consultation reviewed. HCV PCR is positive with >300,000 viral load     The patient had a repeat paracentesis done, 2510 of straw-colored fluid was removed, cytology is in process, await results     PET scan showed an, noted the right breast nodule present with small amount of uptake, SUV estimate of 1.5 but does not overtly suggest malignancy. Radiology otherwise suggested breast ultrasound which did show BIRADS 4 findings. CURRENT HOSPITAL COURSE:  The patient was admitted on 11/21/2022 for recurrent ascites. She was previously admitted several weeks back for similar issue. Work-up at that time was largely unremarkable. Details as noted above. Her previous ascites fluid was negative on cytology and flow cytometry for malignancy. Also during that time, multiple subspecialties were consulted including gynecology, hepatobiliary surgery, and others. For this admission, she had another paracentesis, drawing 3050 cc of straw-colored ascites fluid. Furthermore another complaint includes a 2-week duration/onset of a subcutaneous nodule just posterior of the right axilla. She was also noted to have a breast lesion on ultrasound recently on 11/17/2022, suggestive of BI-RADS 4.     Recurrent ascites, negative cytology, but with concern for malignancy  Subcutaneous mass posterior to right axilla  Imaging evidence of early cirrhosis  Right moderate pleural effusion, consider malignant vs hepatic hydrothorax  Active chronic hepatitis C infection  Right lung mass positive for Adenocarcinoma, weakly positive for GATA3 and CDX2, TTF-1 negative; KRAS (G12C) positive, PDL1 with 10% expression  Previously described multiple peritoneal cystic masses, s/p resection in 7/6/2022, not well visualized on 10/31/22 CT abdomen  Right breast leasion, BIRADs 4  Monoclonal B-cell population from periportal lymph node resection from 7/6/2022, in the setting of follicular hyperplasia    Previous right sided lung adenocarcinoma in 2015  Previous left-sided lung adenocarcinoma 2016  Family history of uterine cancer in mother  Family history of early age breast cancer in sister (Diagnosed around early 42's)    S/p paracentesis yesterday, 3050 cc of straw-colored ascitic fluid was removed yesterday. await cytology results   F/u on other fluid studies  Possibly malignant ascites vs 2/2 cirrhosis  GI is also following, they believe it is malignant ascites    Pt has tender right sided subcutaneous mass just posterior to right axilla (not within axillary bed)  She complains of much pain  Surgery consulted, and they have no plan for intervention at this  RN noted IR suggest a biopsy can be done/considered as an outpatient    I have reviewed CT abd this admission, now suggestive of early cirrhosis  She has recent HCV PCR done, noting ongoing hep C infection    Iron deficiency anemia, ordered Ferrlecit, to be given prior to DC, side effects reviewed with the patient. I have reviewed her recent breast u/s noting right breast lesion with BIRADS 4, will need outpatient follow-up for biopsy. Pt has not started RT for her right lung mass yet. Plan for DC today, okay from heme-onc POV with outpatient follow-up.     Nik Willingham MD  HEMATOLOGY/MEDICAL [address] [place] [address]  11/23/2022

## 2022-11-23 NOTE — CARE COORDINATION
Social Work:    Advised by Billie Taylor, charge RN, of discharge home today. Billie Taylor advised that she already made arrangement for the bed delivery this evening with -DME. Billie Taylor was made aware of [facility] not being able to start care until next Wednesday and Billie Taylor confirmed that Dr. Thomas Marley is aware of start of care next Wed. and will address in the orders per [facility] request. Social work attempted to arrange an ambulance transfer home and [facility] declined transfer tonight and advised they will transfer patient home 9:00 a.m. Social work notified Maxim Jensen at [facility] and advised her to place patient on a cancellation list if an opening comes in sooner. Social service updated Alessandro Potter and her domestic partner Varun Duty. Varun Duty was made aware that [facility] cannot start until Wednesday and aware she is on a cancellation list if they have earlier openings. Social work provided cancer resources to Alessandro Potter.     Electronically signed by ESTEVAN Peres on 11/23/2022 at 4:09 PM

## 2022-11-23 NOTE — PROGRESS NOTES
Palliative Care Department  [phone]  Palliative Care Progress Note  Provider Amelia Bocanegra, APRN - CNP     Kayla Del Angel  [id number]  Hospital Day: 3  Date of Initial Consult: 11/22/22  Referring Provider: Dr. Sandra Patricio was consulted for assistance with: family support, goals of care, symptom management     HPI:   Kayla Del Angel is a 71 y.o. 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 She was recently discharged 11/9 after being admitted for abdominal pain and having paracentesis. ED work up significant for: K 5.3, BUN/creatinine 63/1.4, albumin 2.7, alk phos 174, ALT 33, AST 48, lipase 135, hgb 8.8, blood and urine cultures obtained. CT A/P showed moderate right basilar pleural effusion with RLL atelectasis, slight nodularity of liver concerning for early cirrhotic changes, stable hypodense region within spleen with parenchymal atrophy likely related to old splenic infarction or injury, extensive calcification of uterine fibroids. CXR showed hazy density over the right hemithorax likely d/t laying right pleural effusion, superimposed infiltrates cannot be excluded, elevated right hemidiaphragm. She underwent a paracentesis in ED. After conversation with ED physician, she changed her CODE STATUS to [facility]. Oncology and GI consulted for further management. ASSESSMENT/PLAN:     Pertinent Hospital Diagnoses     Ascites of the liver  ANASTASIA vs CKD  Hyperkalemia   Elevated liver enzymes   Stage IV adenocarcinoma of the lung with metastatic disease      Palliative Care Encounter / Counseling Regarding Goals of Care  Please see detailed goals of care discussion as below  At this time, Kayla Del Angel, Does have capacity for medical decision-making. Capacity is time limited and situation/question specific  During encounter Eliel Lucas was surrogate medical decision-maker  Outcome of goals of care meeting:   Continue current medical management  Symptom management regime below  Continue DNR-CCA  Goal is to go home once strong enough  Code status DNR-CCA  Advanced Directives: no POA or living will in [facility]  Surrogate/Legal NOK:  Nicol Hayden (): [phone]  Leonid Johnson (sister): [phone]    Abdominal pain 2/2 peritoneal metastatic disease:   -  Oxycodone 5 or 10 mg every 4 hours PRN for pain    Constipation:   -  Glycolax 17g daily PRN   - SenoKot 1 tablet BID    Spiritual assessment: no spiritual distress identified  Bereavement and grief: to be determined  Referrals to: none today  SUBJECTIVE:     Current medical issues leading to Palliative Medicine involvement include   Active Hospital Problems    Diagnosis Date Noted    Intractable pain [R52] 11/22/2022     Priority: Medium    Goals of care, counseling/discussion [Z71.89] 11/22/2022     Priority: Medium    Palliative care encounter [Z51.5] 11/22/2022     Priority: Medium    Mass of right axilla [R22.31] 11/22/2022     Priority: Medium    Subareolar mass of right breast [N63.41] 11/22/2022     Priority: Medium    Pleural effusion, right [J90] 11/22/2022     Priority: Medium    Ascites of liver [R18.8] 11/01/2022     Priority: Medium       Details of Conversation:    Chart reviewed. Patient seen at bedside alert and oriented. Eliel Lucas states that her pain is much more controlled with the new regime. She denies constipation at this time but was educated that a bowel regime is ordered d/t constipation risk with opioids. We will follow. OBJECTIVE:   Prognosis: Poor and Guarded    ROS: UNLESS STATED ABOVE PATIENT DENIES:  CONSTITUTIONAL:  fever, chill, rigors, nausea, vomiting, fatigue.   HEENT: blurry vision, double vision, hearing problem, tinnitus, hoarseness, dysphagia, odynophagia  RESPIRATORY: cough, shortness of breath, sputum expectoration. CARDIOVASCULAR:  Chest pain/pressure, palpitation, syncope, irregular beats  GASTROINTESTINAL:  diarrhea, constipation  GENITOURINARY:  Burning, frequency, urgency, incontinence, discharge  INTEGUMENTARY: rash, pruritis  HEMATOLOGIC/LYMPHATIC:  Swelling, sores, gum bleeding, easy bruising, pica. MUSCULOSKELETAL:  pain, edema, joint swelling or redness  NEUROLOGICAL:  light headed, dizziness, loss of consciousness, weakness, change in memory, seizures, tremors    Physical Exam:  /60   Pulse 81   Temp 98.4 °F (36.9 °C) (Oral)   Resp 16   Ht 5' 1\" (1.549 m)   Wt 103 lb 12.8 oz (47.1 kg)   SpO2 98%   BMI 19.61 kg/m²   Constitutional:  thin, ill-appearing  Lungs:  CTA bilaterally, no audible rhonchi or wheezes noted, respirations unlabored, no retractions  Heart:  RRR, distant heart tones, no murmur, rub, or gallop noted during exam  Abd:  Soft, s/p paracentesis today, right axilla mass  Ext:  Moving all extremities, no edema, pulses present  Skin:  Warm and dry, no rashes on visible skin  Neuro:  Alert and oriented    Objective data reviewed: labs, images, records, medication use, vitals, and chart    Discussed patient and the plan of care with the other IDT members: Palliative Medicine IDT Team, Patient, and Family    Time/Communication  Greater than 50% of time spent, total 15 minutes in counseling and coordination of care at the bedside regarding symptom management. Thank you for allowing Palliative Medicine to participate in the care of Rhonda Stephenson.

## 2022-11-24 VITALS
HEART RATE: 95 BPM | BODY MASS INDEX: 20.47 KG/M2 | WEIGHT: 108.4 LBS | TEMPERATURE: 98.6 F | HEIGHT: 61 IN | SYSTOLIC BLOOD PRESSURE: 146 MMHG | OXYGEN SATURATION: 97 % | DIASTOLIC BLOOD PRESSURE: 68 MMHG | RESPIRATION RATE: 16 BRPM

## 2022-11-24 LAB
AFP-TUMOR MARKER: 4 NG/ML (ref 0–9)
ALBUMIN SERPL-MCNC: 2.2 G/DL (ref 3.5–5.2)
ALP BLD-CCNC: 293 U/L (ref 35–104)
ALT SERPL-CCNC: 26 U/L (ref 0–32)
ANION GAP SERPL CALCULATED.3IONS-SCNC: 7 MMOL/L (ref 7–16)
AST SERPL-CCNC: 32 U/L (ref 0–31)
BASOPHILS ABSOLUTE: 0.01 E9/L (ref 0–0.2)
BASOPHILS RELATIVE PERCENT: 0.1 % (ref 0–2)
BILIRUB SERPL-MCNC: <0.2 MG/DL (ref 0–1.2)
BODY FLUID CULTURE, STERILE: NORMAL
BUN BLDV-MCNC: 56 MG/DL (ref 6–23)
CALCIUM SERPL-MCNC: 8.6 MG/DL (ref 8.6–10.2)
CHLORIDE BLD-SCNC: 107 MMOL/L (ref 98–107)
CO2: 22 MMOL/L (ref 22–29)
CREAT SERPL-MCNC: 1.4 MG/DL (ref 0.5–1)
EOSINOPHILS ABSOLUTE: 0.07 E9/L (ref 0.05–0.5)
EOSINOPHILS RELATIVE PERCENT: 0.8 % (ref 0–6)
GFR SERPL CREATININE-BSD FRML MDRD: 40 ML/MIN/1.73
GLUCOSE BLD-MCNC: 107 MG/DL (ref 74–99)
GRAM STAIN RESULT: NORMAL
HCT VFR BLD CALC: 23.4 % (ref 34–48)
HEMOGLOBIN: 7.8 G/DL (ref 11.5–15.5)
IMMATURE GRANULOCYTES #: 0.17 E9/L
IMMATURE GRANULOCYTES %: 2.1 % (ref 0–5)
LYMPHOCYTES ABSOLUTE: 0.86 E9/L (ref 1.5–4)
LYMPHOCYTES RELATIVE PERCENT: 10.4 % (ref 20–42)
MCH RBC QN AUTO: 28.4 PG (ref 26–35)
MCHC RBC AUTO-ENTMCNC: 33.3 % (ref 32–34.5)
MCV RBC AUTO: 85.1 FL (ref 80–99.9)
MONOCYTES ABSOLUTE: 0.68 E9/L (ref 0.1–0.95)
MONOCYTES RELATIVE PERCENT: 8.2 % (ref 2–12)
NEUTROPHILS ABSOLUTE: 6.49 E9/L (ref 1.8–7.3)
NEUTROPHILS RELATIVE PERCENT: 78.4 % (ref 43–80)
PDW BLD-RTO: 15.9 FL (ref 11.5–15)
PLATELET # BLD: 471 E9/L (ref 130–450)
PMV BLD AUTO: 9.6 FL (ref 7–12)
POTASSIUM SERPL-SCNC: 4.6 MMOL/L (ref 3.5–5)
RBC # BLD: 2.75 E12/L (ref 3.5–5.5)
SODIUM BLD-SCNC: 136 MMOL/L (ref 132–146)
TOTAL PROTEIN: 6.2 G/DL (ref 6.4–8.3)
URINE CULTURE, ROUTINE: NORMAL
WBC # BLD: 8.3 E9/L (ref 4.5–11.5)

## 2022-11-24 PROCEDURE — 99231 SBSQ HOSP IP/OBS SF/LOW 25: CPT | Performed by: NURSE PRACTITIONER

## 2022-11-24 PROCEDURE — 85025 COMPLETE CBC W/AUTO DIFF WBC: CPT

## 2022-11-24 PROCEDURE — 36415 COLL VENOUS BLD VENIPUNCTURE: CPT

## 2022-11-24 PROCEDURE — 99233 SBSQ HOSP IP/OBS HIGH 50: CPT | Performed by: INTERNAL MEDICINE

## 2022-11-24 PROCEDURE — 6370000000 HC RX 637 (ALT 250 FOR IP): Performed by: INTERNAL MEDICINE

## 2022-11-24 PROCEDURE — 80053 COMPREHEN METABOLIC PANEL: CPT

## 2022-11-24 PROCEDURE — 6370000000 HC RX 637 (ALT 250 FOR IP)

## 2022-11-24 RX ORDER — OXYCODONE HYDROCHLORIDE 5 MG/1
5 TABLET ORAL EVERY 4 HOURS PRN
Qty: 9 TABLET | Refills: 0 | Status: SHIPPED | OUTPATIENT
Start: 2022-11-24 | End: 2022-11-27

## 2022-11-24 RX ADMIN — DOCUSATE SODIUM AND SENNOSIDES 1 TABLET: 8.6; 5 TABLET, FILM COATED ORAL at 08:27

## 2022-11-24 RX ADMIN — AMLODIPINE BESYLATE 5 MG: 5 TABLET ORAL at 08:27

## 2022-11-24 RX ADMIN — LISINOPRIL 40 MG: 10 TABLET ORAL at 08:27

## 2022-11-24 ASSESSMENT — ENCOUNTER SYMPTOMS
ABDOMINAL DISTENTION: 1
CHOKING: 0
BLOOD IN STOOL: 0
COUGH: 0

## 2022-11-24 ASSESSMENT — PAIN SCALES - GENERAL
PAINLEVEL_OUTOF10: 0

## 2022-11-24 NOTE — PROGRESS NOTES
Hematology/oncology inpatient follow-up:    Patient Name: Sahron Hart  YOB: 1950    DATE OF ADMISSION: 11/21/2022  DATE OF CONSULTATION: 11/21/2022  REASON FOR CONSULTATION: \"recurrent peritoneal carcinamatosis\"  CONSULTING PROVIDER: Micki Reynolds MD  PCP: Shanique De La Fuente    Room: 2248/9716      CHIEF COMPLAINT:  Abdominal Distention    SUBJECTIVE:  The patient is feeling better following the paracentesis, 3050 cc of straw-colored ascitic fluid removed on 12/22. She denies any bleeding. No SE from Ferrlecit. HISTORY OF PRESENT ILLNESS:   Patient is a 71 y.o. female who is known to us for adenocarcinoma of the lung comes in with acute complaint of recurrent abdominal distention. Patient was previously admitted recently due to similar issues with ascites. Initial inpatient work-up was largely unremarkable in spite of concern for malignancy. Detailed history as outlined below including history of abdominal lesions/biopsies and a monoclonal B-cell population. The patient reports she has had acute onset of recurrent ascites that developed over the last 2 weeks or so. She had been previously discharged on 11/9/2022 to rehab. She was able to follow-up with her oncologist Dr. Sandra Valdovinos, in which ultrasound of the breast was done, finding a BI-RADS 4 right-sided breast lesion. REVIEW OF SYSTEMS:  Review of Systems   Constitutional:  Negative for chills, diaphoresis and fever. HENT: Negative. Eyes:  Negative for visual disturbance. Respiratory:  Negative for cough and choking. Cardiovascular:  Negative for chest pain and leg swelling. Gastrointestinal:  Positive for abdominal distention. Negative for blood in stool. Musculoskeletal: Negative. Skin:         Painful mass just posterior to right axilla   Neurological: Negative. Hematological:  Does not bruise/bleed easily. Psychiatric/Behavioral: Negative.           PAST MEDICAL HISTORY:  Past Medical History: Diagnosis Date    Abnormal chest x-ray with multiple lung nodules 9/8/2015    Abnormal Pap smear 1/3/2011    Alpha-1 negative    Adrenal adenoma     7 mm    Bilateral lung cancer ([address] 5/12/2016    surgically removed - 2015     Cholelithiasis 6/6/2011    Encounter for screening colonoscopy     for OR 3-28-19     Fibroids     Hemangioma of spleen     Hepatitis C 01/03/2011    treated and cured, no current issues     Hyperlipidemia     no medications     Hypertension 6/6/2011    Insomnia     Lung nodule     ROMELIA     Lymphadenopathy     Cervical (-) ENT    Malignant neoplasm of upper lobe of right lung ([address] 11/18/2015    Osteoarthritis     Panlobular emphysema ([address] 11/12/2015    controlled with inhalers     PPD negative 1/18/12    Pulmonary embolism without acute cor pulmonale ([address] 5/12/2016    PVD (peripheral vascular disease) ([address] 5/6/2014    Scoliosis     Uterine fibroid 5/6/2014       PAST SURGICAL HISTORY:  Past Surgical History:   Procedure Laterality Date    APPENDECTOMY  1965    BREAST BIOPSY Left     AGE 30'S LEFT NIPPLE REMOVED    BREAST LUMPECTOMY  4/28/1999    left, benign, Jesu Avalos  2/1/2012    Michelle Bueno, Jesu Marie  10/15/15    with EBUS - DR Balbina Gamboa    CHOLECYSTECTOMY      COLONOSCOPY  12/21/2009    partial to distal ascending colon normal (completion BE same day normal), Dr. Uriah Quintero, [facility]    COLONOSCOPY  8/22/2014    done under fluoro with sigmoid straightening device so was able to get to cecum, very poor prep, moderate proctitis, repeat 5 years,  Dr. Uriah Quintero, [facility]    COLONOSCOPY N/A 3/28/2019    COLONOSCOPY POLYPECTOMY SNARE/COLD BIOPSY performed by Eduardo Olivia DO at [facility] ENDOSCOPY    COLONOSCOPY N/A 5/13/2022    COLONOSCOPY POLYPECTOMY SNARE/COLD BIOPSY performed by Alin Santiago MD at [address] [place] [address]  9/20/2022    CT NEEDLE BIOPSY LUNG PERCUTANEOUS 9/20/2022 DEANGELO Aleman Ace, MD [facility]    ENDOMETRIAL BIOPSY LIVER RESECTION Right 7/6/2022    LAPAROSCOPIC ROBOTIC PERITONEAL MASS RESECTION performed by Monica Mota MD at [place] [address] Left 3/29/2016    VATS WEDGE RESECTION UPPER LOBECTOMY    OTHER SURGICAL HISTORY Right 11/04/2016    REMOVAL MEDI-PORT - DR Maral Monet    CA LAP,CHOLECYSTECTOMY/GRAPH N/A 6/29/2018    CHOLECYSTECTOMY LAPAROSCOPIC, POSSIBLE OPEN,POSSIBLE GRAM ( OC 2) performed by Galo Jerry MD at [place] [address] Right 11/11/2015    VATS, BRONCH,RT UPPER LOBECTOMY; NODE DISECTION    TUNNELED VENOUS PORT PLACEMENT Right 12/07/2015    right chest, and removed     UPPER GASTROINTESTINAL ENDOSCOPY N/A 5/13/2022    EGD POLYP HOT FORCEP/CAUTERY performed by Breann Barone MD at [facility]:  Current Facility-Administered Medications   Medication Dose Route Frequency Provider Last Rate Last Admin    0.9 % sodium chloride bolus  500 mL IntraVENous Once Sabra Ruffin MD   Stopped at 11/22/22 0318    acetaminophen (TYLENOL) tablet 500 mg  500 mg Oral Q6H PRN Sabra Ruffin MD        amLODIPine (NORVASC) tablet 5 mg  5 mg Oral Daily Sabra Ruffin MD   5 mg at 11/24/22 0827    lisinopril (PRINIVIL;ZESTRIL) tablet 40 mg  40 mg Oral Daily Sabra Ruffin MD   40 mg at 11/24/22 0827    ipratropium (ATROVENT) 0.02 % nebulizer solution 0.5 mg  0.5 mg Nebulization TID Sabra Ruffin MD   0.5 mg at 11/23/22 2012    polyethylene glycol (GLYCOLAX) packet 17 g  17 g Oral Daily PRN VERITO Crain - CNP        morphine (PF) injection 1 mg  1 mg IntraVENous Q4H PRN Alok Ardon MD   1 mg at 11/22/22 1325    oxyCODONE (ROXICODONE) immediate release tablet 5 mg  5 mg Oral Q4H PRN Barb Esteban APRROMÁN Overton CNP        Or    oxyCODONE (ROXICODONE) immediate release tablet 10 mg  10 mg Oral Q4H PRN Barb Bride, APRN - CNP   10 mg at 11/23/22 1955    sennosides-docusate sodium (SENOKOT-S) 8.6-50 MG tablet 1 tablet  1 tablet Oral BID Barb Bride, APRN - CNP   1 tablet at 22 0827     Facility-Administered Medications Ordered in Other Encounters   Medication Dose Route Frequency Provider Last Rate Last Admin    pantoprazole (PROTONIX) tablet 40 mg  40 mg Oral QAM AC Luís Amin MD           ALLERGIES:   Allergies   Allergen Reactions    Ibuprofen Palpitations and Rash        SOCIAL HISTORY:   Social History     Socioeconomic History    Marital status: Single    Number of children: 0   Tobacco Use    Smoking status: Former     Packs/day: 0.00     Years: 30.00     Pack years: 0.00     Types: Cigarettes     Quit date: 2015     Years since quittin.4    Smokeless tobacco: Never   Vaping Use    Vaping Use: Never used   Substance and Sexual Activity    Alcohol use: Not Currently     Alcohol/week: 2.0 standard drinks     Types: 2 Glasses of wine per week     Comment: x2 week    Drug use: No    Sexual activity: Not Currently       FAMILY HISTORY:  Family History   Problem Relation Age of Onset    Heart Disease Mother     High Blood Pressure Mother     Cancer Mother         ovarian    Cancer Father     Heart Disease Father     High Blood Pressure Father     Kidney Disease Father     Diabetes Sister     Breast Cancer Sister     Diabetes Brother        PHYSICAL EXAM:   VITALS:  BP (!) 146/68   Pulse 95   Temp 98.6 °F (37 °C) (Oral)   Resp 16   Ht 5' 1\" (1.549 m)   Wt 108 lb 6.4 oz (49.2 kg)   SpO2 97%   BMI 20.48 kg/m²   Physical Exam  Constitutional:       Appearance: She is not toxic-appearing. HENT:      Head: Normocephalic. Nose: Nose normal.      Mouth/Throat:      Mouth: Mucous membranes are moist.   Eyes:      General: No scleral icterus. Cardiovascular:      Rate and Rhythm: Normal rate and regular rhythm. Heart sounds: No murmur heard. Pulmonary:      Effort: Pulmonary effort is normal. No respiratory distress. Breath sounds: No stridor. No wheezing. Abdominal:      General: Abdomen is flat. There is no distension. identified. Surgical margin negative for malignancy. Two of three peribronchial lymph nodes positive for adenocarcinoma. pT1a N1 MX;      Being followed for a left upper lobe mass by Dr. Lane Rodriguez. Multiple biopsies in the past came back negative for malignancy. Hypermetabolic on PET/CT scan on 09/29/2015 (2.3 cm in size with SUV of 6.4). CT guided biopsy of the left upper lobe lesion on 11/02/2015 was noted to be negative for malignancy. She was referred to the medical oncology clinic to discuss adjuvant chemotherapy. We recommended 4 cycles of adjuvant chemotherapy consisting of Carboplatin/Alimta. Mediport placed 12/7/15. -MRI Brain on 12/8/15:  Negative for metastatic disease.   -We will repeat CT chest and PET/CT after 4 cycles of Carboplatin/Alimta. To follow on Lingular lesion as well. -Molecular studies (EGFR, ALK, ROS-1) were all Not Detected. Cycle # 1 of Leitha Junior was on 12/09/2015. Cycle # 2 of Leitha Junior was on 01/06/2016. Cycle # 3 of Leitha Junior was on 01/27/2016. Cycle # 4 of Leitha Junior was on 02/24/2016. PET SCAN 3.2016: The 2.1 cm left lung mass abutting the major fissure is hypermetabolic with SUV max of 5.4. Finding is worrisome for tumor with avid glucose metabolism. 2. Elsewhere there is unremarkable distribution of FDG activity without evidence of hypermetabolic metastases. On 03/29/2016 underwent Bronch/Left VATS/VATS wedge ROMELIA/VATS Left upper lobectomy/Mediasinal lymph node dissection/Intercostal nerve block from T3-T10 per Dr. Nathan Marques. Final pathology revealed Stage I Adenocarcinoma of ROMELIA (morphologically different from the adenocarcinoma previously diagnosed in the right upper lobe. Therefore, synchronous primary tumors are favored). A. Left lung, upper lobe wedge resection: Invasive, well-differentiated adenocarcinoma (grade 1);  Tumor size-1.4 cm in greatest dimension; Surgical margins-negative for malignancy; Lymphovascular invasion-not identified; TNM classification-pT2a N0 MX  B. AP window lymph node #1, excision: Anthracotic lymph node; negative for malignancy  C. AP window lymph node #2, excision: Anthracotic lymph node; negative for malignancy   D. Periaortic lymph node #1, excision: Fibroadipose tissue; lymph node not identified  E. Bronchial lymph node #1, excision: Anthracotic lymph node; negative for malignancy  F. Left lung, upper lobectomy: Emphysematous change; negative for malignancy  4 anthracotic lymph nodes negative for malignancy   G. Inferior pulmonary ligament lymph node, excision: Anthracotic lymph node; negative for malignancy  H. Bronchial lymph node, excision: Anthracotic lymph node; negative for malignancy. Right side Mediport was removed on 11/04/2016 by Dr. Julieta Urrutia On surveillance per NCCN guidelines. CT chest 04/12/2017 noted no convincing evidence of recurrent disease. CT chest 10/19/2017 noted no definite evidence for recurrent malignancy. CT chest 03/12/2018 negative for pulmonary parenchymal masses or enlarged mediastinal or hilar LN. ? 2 cm lesion in spleen difficult to evaluate due to the arterial phase of the study. CT Abd/Pelvis 05/08/2018  Enhancing lesion within the spleen is relatively stable to slightly smaller when compared to April 2014 exam and likely splenic hemangioma. Other indeterminate findings (left periaortic LN, sclerotic/blastic foci in L3 and L1 reported by Dr. Ana Aguirre from radiology team). PET/CT scan 06/05/2018 unremarkable. No FDG avid uptake identified. No evidence for recurrent or metastatic disease. CT Chest 12/13/2018 noted no evidence of recurrent/metastatic disease. CT chest on 06/11/2019 noted no evidence for worrisome residual or recurrent malignancy. No evidence for new lymphadenopathy or metastatic disease. Stable scarring and postoperative changes right upper lobe. CT chest 11/26/2019: No evidence of active neoplasm. CT chest 06/15/2020:  No evidence of active neoplasm. CT chest 12/30/2020: Postsurgical changes and postsurgical scarring seen within the right lung apex. There is no evidence of tumor recurrence. 2.1 x 2.3 cm enhancing lesion seen within the spleen  PET/CT scan 03/02/2021   No FDG avid uptake is identified which exceeds the threshold SUV. No convincing evidence for recurrent or metastatic disease  CT chest 09/28/2021 No evidence of tumor recurrence     Recent abdominal pain; ER visit reviewed  CT abdomen/pelvis 02/20/2022   6 mm right lower lobe pulmonary nodule  Multiple peritoneal cystic masses      CEA 12.5 on 03/16/2022     CT chest 04/05/2022 Ground-glass nodule right lower lobe superolateral portion 10 mm unchanged from prior however in the medial segment right lower lobe with pleural abutment is a 7 mm pulmonary nodule increased in size from 09/28/2021 with is barely visible at 2-3 mm; repeat CT chest in 3 mo     PET/CT scan 04/05/2022 noted No FDG avid uptake is identified which exceeds the threshold SUV      Bilateral screening mammogram 04/20/2022: Negative for malignancy     CEA     12.1 on 04/20/2022  CA-125 32.8 on 04/20/2022   <2 on 04/20/2022  Chromogranin A 1480 on 04/20/2022. EGD/Colonoscopy 05/13/2022: Gastric polyps , duodenal polyp moderate gastroduodenitis   A. Stomach, biopsy: Hyperplastic polyp and moderate chronic active gastritis; negative for intestinal metaplasia   Immunostain negative for Helicobacter pylori organisms   B. Duodenum, biopsy: Chronic duodenitis with features of peptic duodenitis   C. Colon, 20 cm biopsy: Fragments of tubular adenoma and hyperplastic polyp   Pathology reviewed. Referred to HBP team (Dr. Antonette Jaems) for further evaluation of peritoneal cystic masses  CT abdomen/pelvis 06/23/2022 numerous cystic structures identified throughout the right flank and retroperitoneum.       Laparoscopic robotic peritoneal mass resection on 07/06/2022  Findings included: serous cystic masses along the colon, periportal lymphadenopathy, fibrotic appearing liver, chronic cholecystitis     Peritoneal fluid Negative for malignant cells. Cellblock shows reactive mesothelial cells, lymphocytes, adipose/stromal tissue, and blood. Monolayer preparation shows few reactive mesothelial cells and blood. A.  Lymph node, periportal, biopsy:   - Reactive node showing follicular hyperplasia, negative for malignancy, see comment. B.  Remnant gallbladder, cholecystectomy:   - Portion of gallbladder wall showing dense fibrosis/scar and occluded mariaelena-gallbladder vessels. C. Soft tissue, peritoneal sac, excision:   - Peritoneal inclusion cysts (benign cystic mesothelioma) and unremarkable fibroadipose tissue. D.  Liver, needle biopsy:   - Benign hepatic parenchyma showing focal periportal and incomplete septal fibrosis (stage 2)   - Negative for significant lobular/portal necroinflammatory activity, see comment. Comment:   Sections of the lymph node in specimen A reveal normal anat architecture with secondary follicular hyperplasia. There are no cytologically atypical lymphoid cells or [place] cells identified. Periportal LN Flow Cytometry noted 4.5% monoclonal B cells detected in a predominantly polyclonal background. Monoclonal B cell population (4.5% of total cells) without detectable CD5, CD10 or CD11c expression in a predominantly polyclonal background, raising the differential between a monoclonal B-cell population of undetermined significance versus a B-cell lymphoma/leukemia. In the context of B-cell lymphoma the main differential based on immunophenotype includes, but is not limited to marginal zone lymphoma/leukemia, lymphoplasmacytic lymphoma, TD77- negative follicular lymphoma, and less likely large b cell lymphoma. In specimen D, there is no evidence of chronic hepatitis or significant steatosis.   Histologic features of cirrhosis including nodules of regenerating hepatocytes and complete portal-portal bridging fibrosis are   not identified. Focal periportal fibrosis and incomplete septal fibrosis are confirmed by trichrome stain. Iron stain is also performed and is negative. Periportal lymph node flow cytometry showing 4.5% monoclonal B cells without detectable CD5, CD10 or CD11c expression in a predominantly polyclonal background, raising the differential between a monoclonal B-cell population of undetermined significance versus a B-cell lymphoma/leukemia     Repeat CT chest to follow on history of NSCLC on 08/04/2022  Postsurgical changes are identified in the upper lobes bilaterally. 1.1 cm ground-glass nodule in the right lower lobe and a smaller 1 measuring 0.9 cm are noted without change. There is a 1.6 x 1.2 cm soft tissue mass in the right lower lobe medially which is significantly increased since the previous examination. Bilateral adrenal nodules are noted, larger 1 on the left side measuring 1.8 x 1.6 cm.  1.2 cm right renal cystic lesion is identified. An ill-defined hypodense lesion is identified in the spleen currently measuring 2.9 x 3.5 cm      Evaluated by Dr. Jamie Chong on 08/15/2022 at [facility] who recommended proceeding with a biopsy of the enlarging right lower lobe lung mass. While lymphoma certainly possible, biopsy recommended to rule out recurrent lung cancer. Periportal lymph node biopsy reviewed at [facility]. Lymph node with lipid lymphadenopathy  Small clonal B-cell population detected by flow cytometry and molecular analysis  Negative for carcinoma  Flow cytometry showed a small clonal CD5 -/CD10- B-cell population (4.5% of all events) and a background of polyclonal B cells. B-cell receptor clonality assay was positive for a clonal B-cell population. However there is no morphologic evidence of lymphoma in the lymph node.     The detection of a clonal B-cell population despite negative morphologic evidence of lymphoma might represent monoclonal B-cell lymphocytosis process in the lymph node. An alternative consideration is peripheral blood involvement by B-cell clone (MBL-like process) that was picked up by the tissue flow cytometry and molecular tests. Even if lymphoma is concerned, likely low-grade and not causing her symptoms, so observation would be recommended. PET/CT scan, CT-guided core needle biopsy of enlarging right lower lobe lung mass recommended. PET/CT scan 09/06/2022: In the region of the lesion in the right lower lobe there is FDG avid tracer uptake peak SUV is 3.2. imaging reviewed. CT guided core needle biopsy RLL lung nodule on 09/20/2022  Right lung, lower lobe core needle biopsy: Adenocarcinoma (see comment)   Comment: The prior history of right upper lobe adenocarcinoma (HES ) and left upper lobe adenocarcinoma (HES ) is noted. The electronic medical record is also reviewed. The adenocarcinoma in the current specimen is weakly immunoreactive with GATA3 and CDX2. The TTF-1 immunostain is negative. PD-L1 32U8 FDA for NSCLC: PD-L1 EXPRESSION   Tumor proportion score: 10%   Intensity: 2+     EGFR Mutation: Not detected   ALK Rearrangement: Not detected (negative)   ROS1 Gene Rearrangement: Not detected (negative)   BRAF Mutation: Not detected   KRAS Mutation: KRAS Exon 2 DETECTED   Mutation-c.34 G >T (p.G12C)     MET Exon 14 Deletion Analysis: Not detected   RET: Not detected   NTRK 1, 2, 3: Not detected      Admitted for LE edema and abdominal ascites     Pathology from 7/6/2022 surgery mentioned fibrosis in liver focal periportal and incomplete septal fibrosis (stage II) but no evidence of chronic hepatitis/steatosis or cirrhosis.       Status paracentesis 11/2/2022, total of 2450 mL of ascitic fluid was removed  Cytology pending  CEA 8.5  =1865  CA 19-9 <2  Anemia, likely secondary to malignancy, chronic inflammation, and iron deficiency     Pelvic U/S large calcified uterine mass; ovaries not identified. Large volume ascites. MRI Pelvis w/out contrast done and I reviewed results     Hb 7. 9G/DL today ; Transfuse if Hb <7.0     Right breast breast nodule was detected on CT abd/pelvis on 10/31/2022  Breast exam done on 11/6/22 and a 2 cm nodule subareolar was palpated on right breast. Chaperone/RN present  Both  and CA 27.29 elevated at 68 and 85, respectively     Rad Onc initially planned for definitive RT for her adenocarcinoma lung mass before onset of ascites. Currently RT is on hold while workup the ascites  Gyn saw patient on 11/7/22. Consultation reviewed. HCV PCR is positive with >300,000 viral load     The patient had a repeat paracentesis done, 2510 of straw-colored fluid was removed, cytology is in process, await results     PET scan showed an, noted the right breast nodule present with small amount of uptake, SUV estimate of 1.5 but does not overtly suggest malignancy. Radiology otherwise suggested breast ultrasound which did show BIRADS 4 findings. CURRENT HOSPITAL COURSE:  The patient was admitted on 11/21/2022 for recurrent ascites. She was previously admitted several weeks back for similar issue. Work-up at that time was largely unremarkable. Details as noted above. Her previous ascites fluid was negative on cytology and flow cytometry for malignancy. Also during that time, multiple subspecialties were consulted including gynecology, hepatobiliary surgery, and others. For this admission, she had another paracentesis, drawing 3050 cc of straw-colored ascites fluid. Furthermore another complaint includes a 2-week duration/onset of a subcutaneous nodule just posterior of the right axilla. She was also noted to have a breast lesion on ultrasound recently on 11/17/2022, suggestive of BI-RADS 4.     Recurrent ascites, negative cytology, but with concern for malignancy  Subcutaneous mass posterior to right axilla  Imaging evidence of early cirrhosis  Right moderate pleural effusion, consider malignant vs hepatic hydrothorax  Active chronic hepatitis C infection  Right lung mass positive for Adenocarcinoma, weakly positive for GATA3 and CDX2, TTF-1 negative; KRAS (G12C) positive, PDL1 with 10% expression  Previously described multiple peritoneal cystic masses, s/p resection in 7/6/2022, not well visualized on 10/31/22 CT abdomen  Right breast leasion, BIRADs 4  Monoclonal B-cell population from periportal lymph node resection from 7/6/2022, in the setting of follicular hyperplasia    Previous right sided lung adenocarcinoma in 2015  Previous left-sided lung adenocarcinoma 2016  Family history of uterine cancer in mother  Family history of early age breast cancer in sister (Diagnosed around early 42's)    S/p paracentesis 11/22, 3050 cc of straw-colored ascitic fluid was removed yesterday. Cytology negative for malignant cells. F/u on other fluid studies  Possibly malignant ascites vs 2/2 cirrhosis  GI is also following, they believe it is malignant ascites    Pt has tender right sided subcutaneous mass just posterior to right axilla (not within axillary bed)  Surgery consulted, and they have no plan for intervention at this  RN noted IR suggest a biopsy can be done/considered as an outpatient    I have reviewed CT abd this admission, now suggestive of early cirrhosis  She has recent HCV PCR done, noting ongoing hep C infection, she used to f/up with GI for Hep C. Iron deficiency anemia, s/p Ferrlecit yesterday, no SE, hemoglobin/hematocrit 7.8/23.4, stable. I have reviewed her recent breast u/s noting right breast lesion with BIRADS 4, will need outpatient follow-up at [facility] for biopsy. Pt has not started RT for her right lung mass yet. Plan for DC today, okay from heme-onc POV with outpatient follow-up.     Clevester Merlin, MD  HEMATOLOGY/MEDICAL [address] [place] [address]  11/24/2022

## 2022-11-24 NOTE — DISCHARGE SUMMARY
[facility] Physician Discharge Summary       Mane Ip, [address]  [phone]    Schedule an appointment as soon as possible for a visit      Cielo Kurtz MD  [address] [id number] [phone]    Follow up      Bettye Lamar, [address]  [phone]    Follow up      Ricky De Guzman MD  [address] [facility]  [phone]    Schedule an appointment as soon as possible for a visit    Activity level: As tolerated     Dispo: Home      Condition on discharge: Stable     Patient ID:  Rhonda Stephenson  [id number]  71 y.o.  1950    Admit date: 11/21/2022    Discharge date and time:  11/24/2022  10:21 AM    Admission Diagnoses: Principal Problem:    Intractable pain  Active Problems:    Ascites of liver    Goals of care, counseling/discussion    Palliative care encounter    Mass of right axilla    Subareolar mass of right breast    Pleural effusion, right  Resolved Problems:    * No resolved hospital problems. *      Discharge Diagnoses: Principal Problem:    Intractable pain  Active Problems:    Ascites of liver    Goals of care, counseling/discussion    Palliative care encounter    Mass of right axilla    Subareolar mass of right breast    Pleural effusion, right  Resolved Problems:    * No resolved hospital problems.  *      Consults:  IP CONSULT TO GI  IP CONSULT TO PALLIATIVE CARE  IP CONSULT TO HEM/ONC  IP CONSULT TO SOCIAL WORK  IP CONSULT TO GENERAL SURGERY  IP CONSULT TO [address] Course:   Patient Rhonda Stephenson is a 71 y.o. 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 S/P multiple previous paracentesis - cytology was neg for malignant cells, s/p therapeutic paracentesis 11/22 with 3L fluid removal, repeat cytology pending , oncology and GI consulted. 1.5 cm right subareolar breast lump that will s/p outpatient breast US - BI-RADS category 4, suspicious abnormality , Inverted right nipple, advised OP nipple biopsy, has been seen by oncology as well as radiation oncology OP f/u. Mass rt scapular lesion - consulted sx for excisional biopsy, recommend there is low suspicion that this is an occult cancer or malignancy, no interventions. Mod rt basilar pleural effusion with atelectasis rt LL, not hypoxic, no discomfort. Patient discharge was held yesterday due to lack of transportation. Patient remains stable for discharge. Discharge Exam:  General Appearance: alert and oriented to person, place and time and in no acute distress  Skin: warm and dry  Head: normocephalic and atraumatic  Eyes: pupils equal, round, and reactive to light, extraocular eye movements intact, conjunctivae normal  Neck: neck supple and non tender without mass   Pulmonary/Chest: clear to auscultation bilaterally- no wheezes, rales or rhonchi, normal air movement, no respiratory distress  Cardiovascular: normal rate, normal S1 and S2 and no carotid bruits  Abdomen: soft, non-tender, non-distended, normal bowel sounds, no masses or organomegaly  Extremities: no cyanosis, no clubbing and no edema  Neurologic: no cranial nerve deficit and speech normal    I/O last 3 completed shifts: In: 360 [P.O.:360]  Out: 300 [Urine:300]  No intake/output data recorded.       LABS:  Recent Labs     11/21/22  1943 11/23/22  0145 11/24/22  0340    138 136   K 5.3* 5.0 4.6    107 107   CO2 22 23 22   BUN 63* 59* 56*   CREATININE 1.4* 1.6* 1.4* GLUCOSE 130* 85 107*   CALCIUM 9.1 8.9 8.6         Recent Labs     11/21/22  1943 11/23/22  0145 11/24/22  0340   WBC 10.4 7.7 8.3   RBC 3.19* 2.99* 2.75*   HGB 8.8* 8.1* 7.8*   HCT 27.9* 25.7* 23.4*   MCV 87.5 86.0 85.1   MCH 27.6 27.1 28.4   MCHC 31.5* 31.5* 33.3   RDW 15.8* 16.0* 15.9*   * 479* 471*   MPV 10.4 9.9 9.6         No results for input(s): POCGLU in the last 72 hours. Imaging:  CT ABDOMEN PELVIS W IV CONTRAST Additional Contrast? None    Result Date: 11/22/2022  EXAMINATION: CT OF THE ABDOMEN AND PELVIS WITH [id number]/21/2022 11:30 pm TECHNIQUE: CT of the abdomen and pelvis was performed with the administration of intravenous contrast. Multiplanar reformatted images are provided for review. Automated exposure control, iterative reconstruction, and/or weight based adjustment of the mA/kV was utilized to reduce the radiation dose to as low as reasonably achievable. COMPARISON: October 31, 2022 HISTORY: ORDERING SYSTEM PROVIDED HISTORY: abd pain TECHNOLOGIST PROVIDED HISTORY: Reason for exam:->abd pain Additional Contrast?->None Decision Support Exception - unselect if not a suspected or confirmed emergency medical condition->Emergency Medical Condition (MA) FINDINGS: THORACIC STRUCTURES: There is a moderate right basilar pleural fluid collection with atelectasis of the right lower lobe. LIVER:  The liver is normal in size, slight nodularity and normal attenuation. No focal mass. No intra or extrahepatic bile duct dilation. GALL BLADDER: Cholecystectomy. SPLEEN:  Stable hypodense region with parenchymal atrophy of the mid spleen. PANCREAS:  Unremarkable. ADRENAL GLANDS:  Unremarkable. ESOPHAGUS AND STOMACH:  Unremarkable. BOWEL: Small bowel:  Unremarkable. Large bowel: The colon and rectum are of normal course and caliber. The appendix is within normal limits. There is no intraperitoneal free air. There is intra-abdominal ascites.  URINARY/GENITAL TRACT: Kidneys:  The kidneys are unremarkable. .  No evidence of hydronephrosis, renal calcifications or solid renal mass. Ureters: The ureters are normal course and caliber. There is no evidence of ureter calculus/calculi. URINARY BLADDER:  The urinary bladder is well distended without wall thickening or focal mass. Extensive calcification of uterine fibroids. BLOOD VESSELS: There is atherosclerotic disease. LYMPH NODES:  No evidence of intraabdominal or intrapelvic lymphadenopathy. ABDOMINAL WALL & SOFT TISSUES:  Unremarkable. OSSEOUS STRUCTURES: No acute osseous lesion. Moderate right basilar pleural fluid collection with atelectasis of the right lower lobe. Slight nodularity of the liver concerning for early cirrhotic morphological change. Stable hypodense region within the spleen with parenchymal atrophy likely related to old splenic infarction or old splenic injury. Large amount of intra-abdominal ascites, unchanged from the prior study. Extensive calcification of uterine fibroids. Atherosclerotic disease. .     XR CHEST PORTABLE    Result Date: 11/21/2022  EXAMINATION: ONE XRAY VIEW OF THE CHEST 11/21/2022 7:35 pm COMPARISON: None. HISTORY: ORDERING SYSTEM PROVIDED HISTORY: abd pain TECHNOLOGIST PROVIDED HISTORY: Reason for exam:->abd pain FINDINGS: Hazy density over the right hemithorax. The heart is not enlarged. No pneumothorax. Elevated right hemidiaphragm. Hazy density over the right hemithorax likely due to layering right pleural effusion. Superimposed infiltrates cannot be excluded. Elevated right hemidiaphragm.      US GUIDED PARACENTESIS    Result Date: 11/22/2022  PROCEDURE: PARACENTESIS WITHOUT IMAGE GUIDANCE US ABDOMEN LIMITED 11/22/2022 HISTORY: ORDERING SYSTEM PROVIDED HISTORY: diagnostic and therapeutic ascites tap-- send for cx, +cytology, cell count TECHNOLOGIST PROVIDED HISTORY: Reason for exam:->diagnostic and therapeutic ascites tap-- send for cx, +cytology, cell count What reading provider will be dictating this exam?->CRC TECHNIQUE: Informed consent was obtained after a detailed explanation of the procedure including risks, benefits, and alternatives. Universal protocol was followed. A limited ultrasound of the abdomen was performed. The right abdomen was prepped and draped in sterile fashion and local anesthesia was achieved with lidocaine. A 5 French needle sheath was advanced into ascites under continuous ultrasound guidance and paracentesis was performed. The patient tolerated the procedure well. FINDINGS: Limited ultrasound of the abdomen demonstrates ascites. A total of 3050 cc of straw-colored ascitic fluid was removed. Status post paracentesis. Patient Instructions:      Medication List        START taking these medications      oxyCODONE 5 MG immediate release tablet  Commonly known as: ROXICODONE  Take 1 tablet by mouth every 4 hours as needed for Pain for up to 3 days. CONTINUE taking these medications      acetaminophen 500 MG tablet  Commonly known as: TYLENOL  Take 1 tablet by mouth 4 times daily as needed for Pain     amLODIPine 5 MG tablet  Commonly known as: NORVASC     lisinopril 40 MG tablet  Commonly known as: PRINIVIL;ZESTRIL  take 1 tablet by mouth once daily     Misc. Devices Kit  BP monitoring KIT     therapeutic multivitamin-minerals tablet  Take 1 tablet by mouth daily     tiotropium 18 MCG inhalation capsule  Commonly known as: Spiriva HandiHaler  Inhale 1 capsule into the lungs daily     vitamin B-12 500 MCG tablet  Commonly known as: CYANOCOBALAMIN  take 1 tablet by mouth daily            STOP taking these medications      hydroCHLOROthiazide 25 MG tablet  Commonly known as: HYDRODIURIL               Where to Get Your Medications        You can get these medications from any pharmacy    Bring a paper prescription for each of these medications  oxyCODONE 5 MG immediate release tablet         In Review of EMR,  evaluation, management and diagnosis.   Discharge plan has been discussed with attending.  Time spent 45  minutes       Signed:  Electronically signed by VERITO Ramirez CNP on 11/24/2022 at 10:21 AM

## 2022-11-24 NOTE — PLAN OF CARE
Problem: Discharge Planning  Goal: Discharge to home or other facility with appropriate resources  11/24/2022 0945 by Kleber Werner RN  Outcome: Completed  11/23/2022 2149 by Aiyana Maldonado RN  Outcome: Progressing     Problem: Pain  Goal: Verbalizes/displays adequate comfort level or baseline comfort level  11/24/2022 0945 by Kleber Werner RN  Outcome: Completed  11/23/2022 2149 by Aiyana Maldonado RN  Outcome: Progressing

## 2022-11-24 NOTE — PROGRESS NOTES
Palliative Care Department  [phone]  Palliative Care Progress Note  Provider VERITO Hathaway - MANNY     Yi Kuhn  [id number]  Hospital Day: 4  Date of Initial Consult: 11/22/22  Referring Provider: Dr. Jennifer Snellen was consulted for assistance with: family support, goals of care, symptom management     HPI:   Yi Kuhn is a 71 y.o. 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 She was recently discharged 11/9 after being admitted for abdominal pain and having paracentesis. ED work up significant for: K 5.3, BUN/creatinine 63/1.4, albumin 2.7, alk phos 174, ALT 33, AST 48, lipase 135, hgb 8.8, blood and urine cultures obtained. CT A/P showed moderate right basilar pleural effusion with RLL atelectasis, slight nodularity of liver concerning for early cirrhotic changes, stable hypodense region within spleen with parenchymal atrophy likely related to old splenic infarction or injury, extensive calcification of uterine fibroids. CXR showed hazy density over the right hemithorax likely d/t laying right pleural effusion, superimposed infiltrates cannot be excluded, elevated right hemidiaphragm. She underwent a paracentesis in ED. After conversation with ED physician, she changed her CODE STATUS to [facility]. Oncology and GI consulted for further management. ASSESSMENT/PLAN:     Pertinent Hospital Diagnoses     Ascites of the liver  ANASTASIA vs CKD  Hyperkalemia   Elevated liver enzymes   Stage IV adenocarcinoma of the lung with metastatic disease    Palliative Care Encounter / Counseling Regarding Goals of Care  Please see detailed goals of care discussion as below  At this time, Yi Kuhn, Does have capacity for medical decision-making. Capacity is time limited and situation/question specific  During encounter Lilyanna Robledo was surrogate medical decision-maker  Outcome of goals of care meeting:   Symptom control; palliative medicine to make outpatient follow-up  Code status DNR-CCA  Advanced Directives: no POA or living will in [facility]  Surrogate/Legal NOK:  Jenni Gutierrez (): [phone]  Danielle Dalal (sister): [phone]    Abdominal pain 2/2 peritoneal metastatic disease:   -  Oxycodone 5 or 10 mg every 4 hours PRN for pain    Constipation:   -  Glycolax 17g daily PRN   - SenoKot 1 tablet BID    Spiritual assessment: no spiritual distress identified  Bereavement and grief: to be determined  Referrals to: none today  SUBJECTIVE:     Current medical issues leading to Palliative Medicine involvement include   Active Hospital Problems    Diagnosis Date Noted    Intractable pain [R52] 11/22/2022     Priority: Medium    Goals of care, counseling/discussion [Z71.89] 11/22/2022     Priority: Medium    Palliative care encounter [Z51.5] 11/22/2022     Priority: Medium    Mass of right axilla [R22.31] 11/22/2022     Priority: Medium    Subareolar mass of right breast [N63.41] 11/22/2022     Priority: Medium    Pleural effusion, right [J90] 11/22/2022     Priority: Medium    Ascites of liver [R18.8] 11/01/2022     Priority: Medium       Details of Conversation:    Chart reviewed. Update received from nursing. Patient seen sitting up in bed, eating breakfast.  Patient is alert and oriented and able to hold meaningful conversation. States pain is controlled with current regimen. Denies bowel movement yesterday or today. Per nursing plan is for patient to be discharged home this morning. Palliative medicine will set up outpatient follow-up appointment for symptom management. OBJECTIVE:   Prognosis: Guarded    ROS: UNLESS STATED ABOVE PATIENT DENIES:  CONSTITUTIONAL:  fever, chill, rigors, nausea, vomiting, fatigue.   HEENT: blurry vision, double vision, hearing problem, tinnitus, hoarseness, dysphagia, odynophagia  RESPIRATORY: cough, shortness of breath, sputum expectoration. CARDIOVASCULAR:  Chest pain/pressure, palpitation, syncope, irregular beats  GASTROINTESTINAL:  diarrhea, constipation  GENITOURINARY:  Burning, frequency, urgency, incontinence, discharge  INTEGUMENTARY: rash, pruritis  HEMATOLOGIC/LYMPHATIC:  Swelling, sores, gum bleeding, easy bruising, pica. MUSCULOSKELETAL:  pain, edema, joint swelling or redness  NEUROLOGICAL:  light headed, dizziness, loss of consciousness, weakness, change in memory, seizures, tremors    Physical Exam:  BP (!) 146/68   Pulse 95   Temp 98.6 °F (37 °C) (Oral)   Resp 16   Ht 5' 1\" (1.549 m)   Wt 108 lb 6.4 oz (49.2 kg)   SpO2 97%   BMI 20.48 kg/m²   Constitutional:  thin, ill-appearing  Lungs:  CTA bilaterally, no audible rhonchi or wheezes noted, respirations unlabored, no retractions  Heart:  RRR, distant heart tones, no murmur, rub, or gallop noted during exam  Abd:  Soft, s/p paracentesis today, right axilla mass  Ext:  Moving all extremities, no edema, pulses present  Skin:  Warm and dry, no rashes on visible skin  Neuro:  Alert and oriented    Objective data reviewed: labs, images, records, medication use, vitals, and chart    Discussed patient and the plan of care with the other IDT members: Palliative Medicine IDT Team, Patient, and Family    Time/Communication  Greater than 50% of time spent, total 15 minutes in counseling and coordination of care at the bedside regarding symptom management. Thank you for allowing Palliative Medicine to participate in the care of Hortensia Youssef.

## 2022-11-27 LAB
BLOOD CULTURE, ROUTINE: NORMAL
CULTURE, BLOOD 2: NORMAL

## 2022-11-28 ENCOUNTER — TELEPHONE (OUTPATIENT)
Dept: HEMATOLOGY | Age: 72
End: 2022-11-28

## 2022-11-28 ENCOUNTER — TELEPHONE (OUTPATIENT)
Dept: PALLATIVE CARE | Age: 72
End: 2022-11-28

## 2022-11-28 NOTE — TELEPHONE ENCOUNTER
Spoke to Manju pineda who stated they would not be able to make todays appt with Palliative Care due to transportation. She stated she had already spoke to [facility] PCP Dr Madeleine Lopez regarding getting set up with Transportation, Home Care, and taking over her pain medications. She was waiting on a return call from there office. Reviewed with Manju pineda what Palliative Care did for [facility] while she was inpatient and what Palliative Care Outpatient handle. Manju pineda stated she would like to wait to hear back form PCP before possible rescheduling.

## 2022-11-28 NOTE — TELEPHONE ENCOUNTER
Renate Fisher called to cancel patients appt for 12/1/22 stating patient is not able to go out for appts at this time. She stated once she is able to get out she will call us back to make another follow up.     Electronically signed by Julian De Guzman RN on 11/28/2022 at 1:38 PM

## 2022-11-30 ENCOUNTER — TELEPHONE (OUTPATIENT)
Dept: CASE MANAGEMENT | Age: 72
End: 2022-11-30

## 2022-11-30 NOTE — TELEPHONE ENCOUNTER
Received a call from Bill Jacobsen [facility] with [facility] regarding patient and potential treatments. She states patient was agreeable to meet with Hospice but had questions regarding treatments. Patient would like to move forward with Radiation if it would be beneficial to treat her cancer. Darleen Laureano, DINA Navigator with Dr Rigo Ocampo. Medical Oncology plan of care was for Radiation Treatments. Updated Dr Jovita Marquez. Patient's cancer is treatable with Radiation. He recommends she be started on SBRT. Lerry Shone, RN with [facility]. She states patient does have transportation issues and is not ambulatory. Significant other was working on purchasing a wheel chair for patient as the transport chair was not comfortable for her. Updated JUNITO Reddy regarding patient need for scheduling of treatments. Treatments placed on schedule to start on Monday 12/5 at [address] and left a message for patient requesting call back to review treatment start date and time. Will await return call and confirm Radiation start. Will update Social Work on need for potential transportation. Received a return call from patients sister Jb Lao. She confirmed appointment for Monday 12/5 at 4pm but states the patient needs transportation. The patient is not mobile at all and is unable to get out of the house on her own. They are also having issues securing a wheel chair for her. Will speak with Social Work to determine if there is anything we can assist with or if there is any transportation options.

## 2022-12-01 ENCOUNTER — TELEPHONE (OUTPATIENT)
Dept: ONCOLOGY | Age: 72
End: 2022-12-01

## 2022-12-05 ENCOUNTER — TELEPHONE (OUTPATIENT)
Dept: BREAST CENTER | Age: 72
End: 2022-12-05

## 2022-12-05 ENCOUNTER — TELEPHONE (OUTPATIENT)
Dept: CASE MANAGEMENT | Age: 72
End: 2022-12-05

## 2022-12-05 NOTE — TELEPHONE ENCOUNTER
Called and spoke with patient's sister Nataliia Wilson this morning regarding transportation. She stated that transportation is set up for tomorrow to pick her up at 1505 to bring her to Radiation. Spoke with JUNITO Peterson regarding transportation for tomorrow and not today. Patient moved to start treatment tomorrow afternoon. Confirmed with Nataliia Wilson that patient is now scheduled for tomorrow afternoon to start Radiation treatments. She will get treatment Tuesday and Thursday this week and will be provided with a calendar schedule for Thursday time and treatment dates and times for next week. She verbalized understanding and was very appreciative of help and call.

## 2022-12-05 NOTE — TELEPHONE ENCOUNTER
Recived call from [facility], patient's sister who is taking care of all her appointments at this time. It appears patient has appojntment today at Radiation Oncology and wi8ll be starting some Radiation treatments fot her breast cancer. there are ambulation issues and transportation issues. Social Workers have beehn working with family. after talking with Mckenna Evans, will allow patient to get her Radiation consul;t and treatment started and Mckenna Evans will call our office at the end of the week to update. patient also has stage 4 adenocarcinoma of lung with ascites.       Electronically signed by Janell Hodge RN on 12/5/22 at 8:59 AM EST

## 2022-12-05 NOTE — TELEPHONE ENCOUNTER
Attempted to call the facility where patient was residing. They stated the patient is now back home. Left message with call back number in reference to her referral to the breast clinic. Unable to leave voicemail with the other number. NEW PATIENT: punch biopsy recommended. Imaging [facility].  Referring: Dr. Alejandra Roe

## 2022-12-06 ENCOUNTER — TELEPHONE (OUTPATIENT)
Dept: RADIATION ONCOLOGY | Age: 72
End: 2022-12-06

## 2022-12-06 ENCOUNTER — TELEPHONE (OUTPATIENT)
Dept: ONCOLOGY | Age: 72
End: 2022-12-06

## 2022-12-06 DIAGNOSIS — C34.11 MALIGNANT NEOPLASM OF UPPER LOBE OF RIGHT LUNG (HCC): Primary | ICD-10-CM

## 2022-12-06 DIAGNOSIS — R26.2 DIFFICULTY WALKING: ICD-10-CM

## 2022-12-06 DIAGNOSIS — C34.90 NON-SMALL CELL LUNG CANCER, UNSPECIFIED LATERALITY (HCC): ICD-10-CM

## 2022-12-06 NOTE — TELEPHONE ENCOUNTER
Rad onc nurse received voicemail message from patient's sister, Coral Wood, asking for a script to be faxed over for the patient to receive a proper wheelchair to get her to and from radiation appointments. Rad onc nurse spoke with Coral Wood this morning and is unsure who to get in touch with in order to get the wheelchair. Rad onc nurse informed the patient that she will get in touch with someone that could be of assistance and update her when she come in for patient's treatment this afternoon. Rad onc nurse spoke with Carlos Dudley, Nurse Navigator after speaking with sister and will reach out to social work for further assistance.

## 2022-12-06 NOTE — TELEPHONE ENCOUNTER
NADIR contacted pt at the request of cancer center staff. Pt's sister Marsha Caldwell answered the phone and reported that pt was in need of a wheelchair at this time. NADIR spoke with Dr. Luma Carroll who was agreeable at this time. Pt's sister Marsha Caldwell reported that they did not have a preference for where order was sent. Order placed and faxed to [address] [place] [address] at this time. NADIR attempted to contact pt to inform that this order was completed and left a message at this time.       Caitlyn Royal MSW, ANAW-S  Oncology Social Worker

## 2022-12-07 ENCOUNTER — APPOINTMENT (OUTPATIENT)
Dept: CT IMAGING | Age: 72
DRG: 180 | End: 2022-12-07
Payer: MEDICARE

## 2022-12-07 ENCOUNTER — HOSPITAL ENCOUNTER (INPATIENT)
Age: 72
LOS: 5 days | Discharge: HOME | DRG: 180 | End: 2022-12-12
Attending: EMERGENCY MEDICINE | Admitting: INTERNAL MEDICINE
Payer: MEDICARE

## 2022-12-07 DIAGNOSIS — J90 PLEURAL EFFUSION, RIGHT: Primary | ICD-10-CM

## 2022-12-07 DIAGNOSIS — R22.31 MASS OF RIGHT AXILLA: ICD-10-CM

## 2022-12-07 LAB
ALBUMIN SERPL-MCNC: 2.4 G/DL (ref 3.5–5.2)
ALP BLD-CCNC: 109 U/L (ref 35–104)
ALT SERPL-CCNC: 11 U/L (ref 0–32)
ANION GAP SERPL CALCULATED.3IONS-SCNC: 11 MMOL/L (ref 7–16)
AST SERPL-CCNC: 17 U/L (ref 0–31)
BASOPHILS ABSOLUTE: 0.03 E9/L (ref 0–0.2)
BASOPHILS RELATIVE PERCENT: 0.3 % (ref 0–2)
BILIRUB SERPL-MCNC: 0.2 MG/DL (ref 0–1.2)
BUN BLDV-MCNC: 36 MG/DL (ref 6–23)
CALCIUM SERPL-MCNC: 9 MG/DL (ref 8.6–10.2)
CHLORIDE BLD-SCNC: 107 MMOL/L (ref 98–107)
CO2: 21 MMOL/L (ref 22–29)
CREAT SERPL-MCNC: 1.5 MG/DL (ref 0.5–1)
EKG ATRIAL RATE: 94 BPM
EKG P AXIS: 83 DEGREES
EKG P-R INTERVAL: 106 MS
EKG Q-T INTERVAL: 382 MS
EKG QRS DURATION: 106 MS
EKG QTC CALCULATION (BAZETT): 477 MS
EKG R AXIS: -58 DEGREES
EKG T AXIS: 44 DEGREES
EKG VENTRICULAR RATE: 94 BPM
EOSINOPHILS ABSOLUTE: 0.04 E9/L (ref 0.05–0.5)
EOSINOPHILS RELATIVE PERCENT: 0.4 % (ref 0–6)
GFR SERPL CREATININE-BSD FRML MDRD: 37 ML/MIN/1.73
GLUCOSE BLD-MCNC: 107 MG/DL (ref 74–99)
HCT VFR BLD CALC: 28.5 % (ref 34–48)
HEMOGLOBIN: 9.2 G/DL (ref 11.5–15.5)
IMMATURE GRANULOCYTES #: 0.14 E9/L
IMMATURE GRANULOCYTES %: 1.3 % (ref 0–5)
LYMPHOCYTES ABSOLUTE: 0.78 E9/L (ref 1.5–4)
LYMPHOCYTES RELATIVE PERCENT: 7.2 % (ref 20–42)
MCH RBC QN AUTO: 27.5 PG (ref 26–35)
MCHC RBC AUTO-ENTMCNC: 32.3 % (ref 32–34.5)
MCV RBC AUTO: 85.3 FL (ref 80–99.9)
MONOCYTES ABSOLUTE: 0.55 E9/L (ref 0.1–0.95)
MONOCYTES RELATIVE PERCENT: 5 % (ref 2–12)
NEUTROPHILS ABSOLUTE: 9.36 E9/L (ref 1.8–7.3)
NEUTROPHILS RELATIVE PERCENT: 85.8 % (ref 43–80)
PDW BLD-RTO: 15.9 FL (ref 11.5–15)
PLATELET # BLD: 408 E9/L (ref 130–450)
PMV BLD AUTO: 9.8 FL (ref 7–12)
POTASSIUM SERPL-SCNC: 4.8 MMOL/L (ref 3.5–5)
RBC # BLD: 3.34 E12/L (ref 3.5–5.5)
SODIUM BLD-SCNC: 139 MMOL/L (ref 132–146)
TOTAL PROTEIN: 7.6 G/DL (ref 6.4–8.3)
WBC # BLD: 10.9 E9/L (ref 4.5–11.5)

## 2022-12-07 PROCEDURE — 80053 COMPREHEN METABOLIC PANEL: CPT

## 2022-12-07 PROCEDURE — 2140000000 HC CCU INTERMEDIATE R&B

## 2022-12-07 PROCEDURE — 2580000003 HC RX 258: Performed by: EMERGENCY MEDICINE

## 2022-12-07 PROCEDURE — 6370000000 HC RX 637 (ALT 250 FOR IP): Performed by: INTERNAL MEDICINE

## 2022-12-07 PROCEDURE — 6360000002 HC RX W HCPCS

## 2022-12-07 PROCEDURE — 6360000002 HC RX W HCPCS: Performed by: INTERNAL MEDICINE

## 2022-12-07 PROCEDURE — 85025 COMPLETE CBC W/AUTO DIFF WBC: CPT

## 2022-12-07 PROCEDURE — 93010 ELECTROCARDIOGRAM REPORT: CPT | Performed by: INTERNAL MEDICINE

## 2022-12-07 PROCEDURE — 96374 THER/PROPH/DIAG INJ IV PUSH: CPT

## 2022-12-07 PROCEDURE — 6360000004 HC RX CONTRAST MEDICATION: Performed by: RADIOLOGY

## 2022-12-07 PROCEDURE — 71260 CT THORAX DX C+: CPT

## 2022-12-07 PROCEDURE — 93005 ELECTROCARDIOGRAM TRACING: CPT

## 2022-12-07 PROCEDURE — 99285 EMERGENCY DEPT VISIT HI MDM: CPT

## 2022-12-07 RX ORDER — SODIUM CHLORIDE 9 MG/ML
INJECTION, SOLUTION INTRAVENOUS PRN
Status: DISCONTINUED | OUTPATIENT
Start: 2022-12-07 | End: 2022-12-12 | Stop reason: HOSPADM

## 2022-12-07 RX ORDER — SODIUM CHLORIDE 0.9 % (FLUSH) 0.9 %
5-40 SYRINGE (ML) INJECTION EVERY 12 HOURS SCHEDULED
Status: DISCONTINUED | OUTPATIENT
Start: 2022-12-07 | End: 2022-12-12 | Stop reason: HOSPADM

## 2022-12-07 RX ORDER — SODIUM CHLORIDE 9 MG/ML
INJECTION, SOLUTION INTRAVENOUS CONTINUOUS
Status: DISCONTINUED | OUTPATIENT
Start: 2022-12-07 | End: 2022-12-11

## 2022-12-07 RX ORDER — POLYETHYLENE GLYCOL 3350 17 G/17G
17 POWDER, FOR SOLUTION ORAL DAILY PRN
Status: DISCONTINUED | OUTPATIENT
Start: 2022-12-07 | End: 2022-12-12 | Stop reason: HOSPADM

## 2022-12-07 RX ORDER — DOCUSATE SODIUM 100 MG/1
100 CAPSULE, LIQUID FILLED ORAL DAILY
Status: DISCONTINUED | OUTPATIENT
Start: 2022-12-07 | End: 2022-12-08

## 2022-12-07 RX ORDER — ACETAMINOPHEN 650 MG/1
650 SUPPOSITORY RECTAL EVERY 6 HOURS PRN
Status: DISCONTINUED | OUTPATIENT
Start: 2022-12-07 | End: 2022-12-12 | Stop reason: HOSPADM

## 2022-12-07 RX ORDER — FENTANYL CITRATE 50 UG/ML
50 INJECTION, SOLUTION INTRAMUSCULAR; INTRAVENOUS ONCE
Status: COMPLETED | OUTPATIENT
Start: 2022-12-07 | End: 2022-12-07

## 2022-12-07 RX ORDER — ACETAMINOPHEN 325 MG/1
650 TABLET ORAL EVERY 6 HOURS PRN
Status: DISCONTINUED | OUTPATIENT
Start: 2022-12-07 | End: 2022-12-12 | Stop reason: HOSPADM

## 2022-12-07 RX ORDER — AMLODIPINE BESYLATE 5 MG/1
5 TABLET ORAL DAILY
Status: DISCONTINUED | OUTPATIENT
Start: 2022-12-07 | End: 2022-12-12 | Stop reason: HOSPADM

## 2022-12-07 RX ORDER — ENOXAPARIN SODIUM 100 MG/ML
30 INJECTION SUBCUTANEOUS DAILY
Status: DISCONTINUED | OUTPATIENT
Start: 2022-12-07 | End: 2022-12-12 | Stop reason: HOSPADM

## 2022-12-07 RX ORDER — SODIUM CHLORIDE 0.9 % (FLUSH) 0.9 %
5-40 SYRINGE (ML) INJECTION PRN
Status: DISCONTINUED | OUTPATIENT
Start: 2022-12-07 | End: 2022-12-12 | Stop reason: HOSPADM

## 2022-12-07 RX ORDER — MORPHINE SULFATE 2 MG/ML
2 INJECTION, SOLUTION INTRAMUSCULAR; INTRAVENOUS EVERY 4 HOURS PRN
Status: DISCONTINUED | OUTPATIENT
Start: 2022-12-07 | End: 2022-12-12 | Stop reason: HOSPADM

## 2022-12-07 RX ORDER — HYDROCODONE BITARTRATE AND ACETAMINOPHEN 5; 325 MG/1; MG/1
1 TABLET ORAL EVERY 6 HOURS PRN
Status: DISCONTINUED | OUTPATIENT
Start: 2022-12-07 | End: 2022-12-08

## 2022-12-07 RX ADMIN — MORPHINE SULFATE 2 MG: 2 INJECTION, SOLUTION INTRAMUSCULAR; INTRAVENOUS at 23:51

## 2022-12-07 RX ADMIN — IOPAMIDOL 75 ML: 755 INJECTION, SOLUTION INTRAVENOUS at 16:52

## 2022-12-07 RX ADMIN — AMLODIPINE BESYLATE 5 MG: 5 TABLET ORAL at 22:13

## 2022-12-07 RX ADMIN — FENTANYL CITRATE 50 MCG: 50 INJECTION, SOLUTION INTRAMUSCULAR; INTRAVENOUS at 13:31

## 2022-12-07 RX ADMIN — SODIUM CHLORIDE: 9 INJECTION, SOLUTION INTRAVENOUS at 14:50

## 2022-12-07 RX ADMIN — DOCUSATE SODIUM 100 MG: 100 CAPSULE, LIQUID FILLED ORAL at 19:31

## 2022-12-07 RX ADMIN — ENOXAPARIN SODIUM 30 MG: 100 INJECTION SUBCUTANEOUS at 22:11

## 2022-12-07 RX ADMIN — MORPHINE SULFATE 2 MG: 2 INJECTION, SOLUTION INTRAMUSCULAR; INTRAVENOUS at 19:31

## 2022-12-07 ASSESSMENT — ENCOUNTER SYMPTOMS
EYES NEGATIVE: 1
DIARRHEA: 0
ABDOMINAL PAIN: 0
VOMITING: 0
NAUSEA: 0
TROUBLE SWALLOWING: 0
SHORTNESS OF BREATH: 0
EYE REDNESS: 0
SORE THROAT: 0

## 2022-12-07 ASSESSMENT — PAIN SCALES - GENERAL
PAINLEVEL_OUTOF10: 5
PAINLEVEL_OUTOF10: 10
PAINLEVEL_OUTOF10: 9
PAINLEVEL_OUTOF10: 0

## 2022-12-07 ASSESSMENT — PAIN DESCRIPTION - LOCATION: LOCATION: BACK

## 2022-12-07 ASSESSMENT — LIFESTYLE VARIABLES
HOW MANY STANDARD DRINKS CONTAINING ALCOHOL DO YOU HAVE ON A TYPICAL DAY: PATIENT DOES NOT DRINK
HOW OFTEN DO YOU HAVE A DRINK CONTAINING ALCOHOL: NEVER

## 2022-12-07 ASSESSMENT — PAIN - FUNCTIONAL ASSESSMENT: PAIN_FUNCTIONAL_ASSESSMENT: 0-10

## 2022-12-07 ASSESSMENT — PAIN SCALES - WONG BAKER: WONGBAKER_NUMERICALRESPONSE: 0

## 2022-12-07 ASSESSMENT — PAIN DESCRIPTION - ORIENTATION: ORIENTATION: RIGHT

## 2022-12-07 ASSESSMENT — PAIN DESCRIPTION - DESCRIPTORS: DESCRIPTORS: ACHING;DISCOMFORT;THROBBING

## 2022-12-07 NOTE — ED PROVIDER NOTES
Hans Cox is a 20-year-old female presents the emergency department for right shoulder mass that started 2 weeks ago. She reports the complaint is worsening in severity, constant, nothing makes it better or worse. Patient has a history of lung cancer for which she receives chemo and radiation therapy. They report that she had increasing fluid so they could not do a treatment yesterday and the patient has been complaining of worsening pain to the mass to her right chest that was evaluated a few weeks ago. There has been no imaging done of the mass. Patient denies fever, chills, abdominal pain, nausea, vomiting, diarrhea and worsening shortness of breath from her baseline. The history is provided by the patient. Review of Systems   Constitutional:  Negative for chills and fever. HENT: Negative. Negative for congestion, sore throat and trouble swallowing. Eyes: Negative. Negative for redness. Respiratory:  Negative for shortness of breath. Cardiovascular:  Negative for chest pain. Gastrointestinal:  Negative for abdominal pain, diarrhea, nausea and vomiting. Genitourinary: Negative. Negative for dysuria and hematuria. Musculoskeletal:  Negative for neck pain and neck stiffness. Mass to right shoulder   Neurological:  Negative for dizziness, light-headedness and headaches. Psychiatric/Behavioral: Negative. Negative for agitation and behavioral problems. Physical Exam  Vitals and nursing note reviewed. Constitutional:       Comments: Frail in appearance. HENT:      Head: Normocephalic and atraumatic. Eyes:      Pupils: Pupils are equal, round, and reactive to light. Cardiovascular:      Rate and Rhythm: Normal rate and regular rhythm. Pulmonary:      Effort: Pulmonary effort is normal.      Breath sounds: No wheezing, rhonchi or rales. Comments: Decreased breath sounds on the right  Abdominal:      Palpations: Abdomen is soft. Tenderness:  There is no abdominal tenderness. There is no guarding or rebound. Musculoskeletal:      Cervical back: Normal range of motion. No rigidity. Comments: Mass to right axillary/scapular region- 3cm that is firm and no identifiable underlying fluctuance. Skin:     General: Skin is warm and dry. Capillary Refill: Capillary refill takes less than 2 seconds. Neurological:      General: No focal deficit present. Mental Status: She is alert and oriented to person, place, and time. Psychiatric:         Mood and Affect: Mood normal.         Behavior: Behavior normal.        Procedures     MDM  Number of Diagnoses or Management Options  Pleural effusion, right  Diagnosis management comments: Patient presented emergency department for right shoulder pain with a mass and was unable to have her radiation therapy yesterday secondary to \"fluid on the lung\". CT chest shows evidence of a large pleural effusion on the right side. Discussed case Dr. Stephanie Braxton who admit the patient to telemetry floor. Amount and/or Complexity of Data Reviewed  Clinical lab tests: reviewed  Tests in the radiology section of CPT®: reviewed  Tests in the medicine section of CPT®: reviewed           --------------------------------------------- PAST HISTORY ---------------------------------------------  Past Medical History:  has a past medical history of Abnormal chest x-ray with multiple lung nodules, Abnormal Pap smear, Adrenal adenoma, Bilateral lung cancer ([address] Cholelithiasis, Encounter for screening colonoscopy, Fibroids, Hemangioma of spleen, Hepatitis C, Hyperlipidemia, Hypertension, Insomnia, Lung nodule, Lymphadenopathy, Malignant neoplasm of upper lobe of right lung ([address] Osteoarthritis, Panlobular emphysema ([address] PPD negative, Pulmonary embolism without acute cor pulmonale ([address] PVD (peripheral vascular disease) ([address] Scoliosis, and Uterine fibroid.     Past Surgical History:  has a past surgical history that includes Appendectomy (1965); Endometrial biopsy; Colonoscopy (12/21/2009); bronchoscopy (2/1/2012); Colonoscopy (8/22/2014); bronchoscopy (10/15/15); Thoracoscopy (Right, 11/11/2015); Tunneled venous port placement (Right, 12/07/2015); Lung lobectomy (Left, 3/29/2016); other surgical history (Right, 11/04/2016); pr lap,cholecystectomy/graph (N/A, 6/29/2018); Cholecystectomy; Breast lumpectomy (4/28/1999); Colonoscopy (N/A, 3/28/2019); Breast biopsy (Left); Upper gastrointestinal endoscopy (N/A, 5/13/2022); Colonoscopy (N/A, 5/13/2022); Liver Resection (Right, 7/6/2022); and CT NEEDLE BIOPSY LUNG PERCUTANEOUS (9/20/2022). Social History:  reports that she quit smoking about 7 years ago. Her smoking use included cigarettes. She has never used smokeless tobacco. She reports that she does not currently use alcohol after a past usage of about 2.0 standard drinks per week. She reports that she does not use drugs. Family History: family history includes Breast Cancer in her sister; Cancer in her father and mother; Diabetes in her brother and sister; Heart Disease in her father and mother; High Blood Pressure in her father and mother; Kidney Disease in her father. The patients home medications have been reviewed.     Allergies: Ibuprofen    -------------------------------------------------- RESULTS -------------------------------------------------    Lab  Results for orders placed or performed during the hospital encounter of 12/07/22   CBC with Auto Differential   Result Value Ref Range    WBC 10.9 4.5 - 11.5 E9/L    RBC 3.34 (L) 3.50 - 5.50 E12/L    Hemoglobin 9.2 (L) 11.5 - 15.5 g/dL    Hematocrit 28.5 (L) 34.0 - 48.0 %    MCV 85.3 80.0 - 99.9 fL    MCH 27.5 26.0 - 35.0 pg    MCHC 32.3 32.0 - 34.5 %    RDW 15.9 (H) 11.5 - 15.0 fL    Platelets 249 540 - 855 E9/L    MPV 9.8 7.0 - 12.0 fL    Neutrophils % 85.8 (H) 43.0 - 80.0 %    Immature Granulocytes % 1.3 0.0 - 5.0 %    Lymphocytes % 7.2 (L) 20.0 - 42.0 %    Monocytes % 5.0 2.0 - 12.0 %    Eosinophils % 0.4 0.0 - 6.0 %    Basophils % 0.3 0.0 - 2.0 %    Neutrophils Absolute 9.36 (H) 1.80 - 7.30 E9/L    Immature Granulocytes # 0.14 E9/L    Lymphocytes Absolute 0.78 (L) 1.50 - 4.00 E9/L    Monocytes Absolute 0.55 0.10 - 0.95 E9/L    Eosinophils Absolute 0.04 (L) 0.05 - 0.50 E9/L    Basophils Absolute 0.03 0.00 - 0.20 E9/L   CMP   Result Value Ref Range    Sodium 139 132 - 146 mmol/L    Potassium 4.8 3.5 - 5.0 mmol/L    Chloride 107 98 - 107 mmol/L    CO2 21 (L) 22 - 29 mmol/L    Anion Gap 11 7 - 16 mmol/L    Glucose 107 (H) 74 - 99 mg/dL    BUN 36 (H) 6 - 23 mg/dL    Creatinine 1.5 (H) 0.5 - 1.0 mg/dL    Est, Glom Filt Rate 37 >=60 mL/min/1.73    Calcium 9.0 8.6 - 10.2 mg/dL    Total Protein 7.6 6.4 - 8.3 g/dL    Albumin 2.4 (L) 3.5 - 5.2 g/dL    Total Bilirubin 0.2 0.0 - 1.2 mg/dL    Alkaline Phosphatase 109 (H) 35 - 104 U/L    ALT 11 0 - 32 U/L    AST 17 0 - 31 U/L   Basic Metabolic Panel   Result Value Ref Range    Sodium 138 132 - 146 mmol/L    Potassium 4.6 3.5 - 5.0 mmol/L    Chloride 109 (H) 98 - 107 mmol/L    CO2 20 (L) 22 - 29 mmol/L    Anion Gap 9 7 - 16 mmol/L    Glucose 72 (L) 74 - 99 mg/dL    BUN 32 (H) 6 - 23 mg/dL    Creatinine 1.3 (H) 0.5 - 1.0 mg/dL    Est, Glom Filt Rate 44 >=60 mL/min/1.73    Calcium 8.4 (L) 8.6 - 10.2 mg/dL   EKG 12 Lead   Result Value Ref Range    Ventricular Rate 94 BPM    Atrial Rate 94 BPM    P-R Interval 106 ms    QRS Duration 106 ms    Q-T Interval 382 ms    QTc Calculation (Bazett) 477 ms    P Axis 83 degrees    R Axis -58 degrees    T Axis 44 degrees       Radiology  CT CHEST W CONTRAST    Result Date: 12/7/2022  EXAMINATION: CT OF THE CHEST WITH CONTRAST 12/7/2022 4:32 pm TECHNIQUE: CT of the chest was performed with the administration of intravenous contrast. Multiplanar reformatted images are provided for review.  Automated exposure control, iterative reconstruction, and/or weight based adjustment of the mA/kV was utilized to reduce the radiation dose to as low as reasonably achievable. COMPARISON: CT abdomen dated 11/22/2022 HISTORY: ORDERING SYSTEM PROVIDED HISTORY: right shoulder mass, hx lung cancer TECHNOLOGIST PROVIDED HISTORY: Reason for exam:->right shoulder mass, hx lung cancer Decision Support Exception - unselect if not a suspected or confirmed emergency medical condition->Emergency Medical Condition (MA) What reading provider will be dictating this exam?->CRC FINDINGS: Mediastinum: Thyroid gland is normal.  No evidence of bulky mediastinal adenopathy. No evidence of hilar adenopathy. Aorta is nonaneurysmal. Pulmonary artery is normal in size. No evidence of filling defects in the central pulmonary arteries to suggest pulmonary embolism. Lungs/pleura: Enhancing masses are seen involving the pleural space and chest wall in the right hemithorax. There is a 2.4 cm peripherally enhancing mass in the medial anterior right chest on image 48 series 301. Other smaller dependent mural nodules are noted. A large right pleural effusion is noted with atelectasis of the right lower lobe and right middle lobe. 2.5 cm low right infrahilar parenchymal low-density compatible with soft tissue mass. Best seen on image number 68 series 301. Upper Abdomen: Visualized portions of the liver appear unremarkable. Unchanged probable old splenic infarct or injury in the mid splenic body. Stomach has a normal configuration. The right adrenal gland is normal, left not included on the study. There is mild to moderate upper abdominal ascites. Soft Tissues/Bones: There is a peripherally enhancing 2.7 x 1.8 cm subcutaneous soft tissue mass overlying the trapezius muscle in the posterior right shoulder region likely representing a subcutaneous metastasis. No other subcutaneous lesions identified. Sclerotic focus along the inferior T2 vertebral body could represent bone island or sclerotic metastasis. No other osseous lesions detected.      1.  2.7 x 1.8 cm peripherally enhancing subcutaneous mass in the posterior right shoulder region. This likely represents a subcutaneous metastasis. No other masses related to the right shoulder region. 2.  Large right effusion, atelectasis of the right lower lobe, pleural and chest wall metastases on the right. 3.  Mild to moderate upper abdominal ascites. CT ABDOMEN PELVIS W IV CONTRAST Additional Contrast? None    Result Date: 11/22/2022  EXAMINATION: CT OF THE ABDOMEN AND PELVIS WITH CISIHGRA63/21/2022 11:30 pm TECHNIQUE: CT of the abdomen and pelvis was performed with the administration of intravenous contrast. Multiplanar reformatted images are provided for review. Automated exposure control, iterative reconstruction, and/or weight based adjustment of the mA/kV was utilized to reduce the radiation dose to as low as reasonably achievable. COMPARISON: October 31, 2022 HISTORY: ORDERING SYSTEM PROVIDED HISTORY: abd pain TECHNOLOGIST PROVIDED HISTORY: Reason for exam:->abd pain Additional Contrast?->None Decision Support Exception - unselect if not a suspected or confirmed emergency medical condition->Emergency Medical Condition (MA) FINDINGS: THORACIC STRUCTURES: There is a moderate right basilar pleural fluid collection with atelectasis of the right lower lobe. LIVER:  The liver is normal in size, slight nodularity and normal attenuation. No focal mass. No intra or extrahepatic bile duct dilation. GALL BLADDER: Cholecystectomy. SPLEEN:  Stable hypodense region with parenchymal atrophy of the mid spleen. PANCREAS:  Unremarkable. ADRENAL GLANDS:  Unremarkable. ESOPHAGUS AND STOMACH:  Unremarkable. BOWEL: Small bowel:  Unremarkable. Large bowel: The colon and rectum are of normal course and caliber. The appendix is within normal limits. There is no intraperitoneal free air. There is intra-abdominal ascites. URINARY/GENITAL TRACT: Kidneys:  The kidneys are unremarkable. .  No evidence of hydronephrosis, renal calcifications or solid renal mass. Ureters: The ureters are normal course and caliber. There is no evidence of ureter calculus/calculi. URINARY BLADDER:  The urinary bladder is well distended without wall thickening or focal mass. Extensive calcification of uterine fibroids. BLOOD VESSELS: There is atherosclerotic disease. LYMPH NODES:  No evidence of intraabdominal or intrapelvic lymphadenopathy. ABDOMINAL WALL & SOFT TISSUES:  Unremarkable. OSSEOUS STRUCTURES: No acute osseous lesion. Moderate right basilar pleural fluid collection with atelectasis of the right lower lobe. Slight nodularity of the liver concerning for early cirrhotic morphological change. Stable hypodense region within the spleen with parenchymal atrophy likely related to old splenic infarction or old splenic injury. Large amount of intra-abdominal ascites, unchanged from the prior study. Extensive calcification of uterine fibroids. Atherosclerotic disease. .     XR CHEST PORTABLE    Result Date: 11/21/2022  EXAMINATION: ONE XRAY VIEW OF THE CHEST 11/21/2022 7:35 pm COMPARISON: None. HISTORY: ORDERING SYSTEM PROVIDED HISTORY: abd pain TECHNOLOGIST PROVIDED HISTORY: Reason for exam:->abd pain FINDINGS: Hazy density over the right hemithorax. The heart is not enlarged. No pneumothorax. Elevated right hemidiaphragm. Hazy density over the right hemithorax likely due to layering right pleural effusion. Superimposed infiltrates cannot be excluded. Elevated right hemidiaphragm.      US GUIDED PARACENTESIS    Result Date: 11/22/2022  PROCEDURE: PARACENTESIS WITHOUT IMAGE GUIDANCE US ABDOMEN LIMITED 11/22/2022 HISTORY: ORDERING SYSTEM PROVIDED HISTORY: diagnostic and therapeutic ascites tap-- send for cx, +cytology, cell count TECHNOLOGIST PROVIDED HISTORY: Reason for exam:->diagnostic and therapeutic ascites tap-- send for cx, +cytology, cell count What reading provider will be dictating this exam?->CRC TECHNIQUE: Informed consent was obtained after a detailed explanation of the procedure including risks, benefits, and alternatives. Universal protocol was followed. A limited ultrasound of the abdomen was performed. The right abdomen was prepped and draped in sterile fashion and local anesthesia was achieved with lidocaine. A 5 Czech needle sheath was advanced into ascites under continuous ultrasound guidance and paracentesis was performed. The patient tolerated the procedure well. FINDINGS: Limited ultrasound of the abdomen demonstrates ascites. A total of 3050 cc of straw-colored ascitic fluid was removed. Status post paracentesis. US GUIDED PARACENTESIS    Result Date: 11/9/2022  PROCEDURE: PARACENTESIS WITHOUT IMAGE GUIDANCE US ABDOMEN LIMITED 11/9/2022 HISTORY: ORDERING SYSTEM PROVIDED HISTORY: Other ascites TECHNOLOGIST PROVIDED HISTORY: Reason for exam:->diagnostic and therapeutic paracentesis What reading provider will be dictating this exam?->CRC TECHNIQUE: Informed consent was obtained after a detailed explanation of the procedure including risks, benefits, and alternatives. Universal protocol was followed. A limited ultrasound of the abdomen was performed. The right abdomen was prepped and draped in sterile fashion and local anesthesia was achieved with lidocaine. A 5 Czech needle sheath was advanced into ascites and paracentesis was performed. The patient tolerated the procedure well. FINDINGS: Limited ultrasound of the abdomen demonstrates ascites. A total of 2510 cc of straw-colored ascitic fluid was removed. Status post paracentesis. US BREAST LIMITED RIGHT    Result Date: 11/17/2022  EXAMINATION: TARGETED ULTRASOUND OF THE RIGHT BREAST 11/17/2022 COMPARISON: Multiple prior studies, the most recent dated October 31, 2022.  HISTORY: ORDERING SYSTEM PROVIDED HISTORY: Abnormal CT of the abdomen TECHNOLOGIST PROVIDED HISTORY: Reason for exam:->hyperdense nodule in the right subareolar region FINDINGS: Targeted right breast ultrasound was performed utilizing high-resolution, real-time scanning. The right nipple is inverted and thickened. No suspicious cystic or solid mass identified in the right breast retroareolar region. Inverted right nipple is suspicious. Recommendation: Punch biopsy of the right nipple is advised. BIRADS: BIRADS - CATEGORY 4 Suspicious Abnormality. Biopsy should be considered at this time. OVERALL ASSESSMENT - SUSPICIOUS A letter of notification will be sent to the patient regarding the results. ------------------------- NURSING NOTES AND VITALS REVIEWED ---------------------------  Date / Time Roomed:  12/7/2022 12:01 PM  ED Bed Assignment:  [id number]    The nursing notes within the ED encounter and vital signs as below have been reviewed. Patient Vitals for the past 24 hrs:   BP Temp Temp src Pulse Resp SpO2 Height Weight   12/08/22 0859 -- -- -- -- 14 -- -- --   12/08/22 0735 134/77 98.1 °F (36.7 °C) Temporal 97 20 100 % -- --   12/08/22 0335 (!) 150/65 98 °F (36.7 °C) Temporal 94 20 95 % -- --   12/07/22 2309 138/67 98.4 °F (36.9 °C) Temporal 92 21 96 % -- --   12/07/22 2109 -- -- -- -- -- -- 5' 1\" (1.549 m) 103 lb 8 oz (46.9 kg)   12/07/22 2042 (!) 159/71 97.8 °F (36.6 °C) Temporal 100 16 96 % -- --   12/07/22 1834 (!) 141/68 -- -- 89 16 96 % -- --   12/07/22 1422 130/79 -- -- 89 16 96 % -- --   12/07/22 1333 131/75 -- -- 87 16 95 % -- --       Oxygen Saturation Interpretation: Normal      ------------------------------------------ PROGRESS NOTES ------------------------------------------      I have spoken with the patient and discussed todays results, in addition to providing specific details for the plan of care and counseling regarding the diagnosis and prognosis.   Their questions are answered at this time and they are agreeable with the plan.      --------------------------------- ADDITIONAL PROVIDER NOTES ---------------------------------  Consultations:  Spoke with Dr. Samir Castillo,  They will admit this patient. This patient's ED course included: a personal history and physicial examination, re-evaluation prior to disposition, cardiac monitoring, and continuous pulse oximetry    This patient has remained unchanged during their ED course. Clinical Impression  1. Pleural effusion, right          Disposition  Patient's disposition: Admit to telemetry  Patient's condition is serious.            Sugar Barker DO  Resident  12/08/22 9839

## 2022-12-07 NOTE — LETTER
[address] Encounter Date/Time: 2022 [address]   [address] Account: [de-identified]    MRN: [id number]    Patient: Phoebe Carrera    Contact Serial #: [id number]      ENCOUNTER          Patient Class: I Private Enc? No Unit RM BD: [facility] 6502/[id number]-B   Hospital Service: ELV[facility]A   Encounter DX: Pleural effusion [J90]   ADM Provider: Billy Mcginnis St, DO   Procedure:     ATT Provider: Billy Ras St, DO   REF Provider:        Admission DX: Pleural effusion, Pleural effusion, right and DX codes: Daron Wilkinson      PATIENT                 Name: Phoebe Carrera : 1950 (72 yrs)   Address: [address] [place] [address] Sex: Female   [facility] [address]         Marital Status: Single   Employer: RETIRED         Religious: [address]   Primary Care Provider: Billy Milian DO         Primary Phone: [phone]   EMERGENCY CONTACT   Contact Name Legal Guardian? Relationship to Patient Home Phone Work Phone   1. Venice Nicholson  2. [facility]      Brother/Sister  Domestic Partner [phone]              GUARANTOR            Guarantor: Phoebe Carrera     : 1950   Address: [address] [place] [address] Drive Sex: Female   [place] [id number]     Relation to Patient: Self       Home Phone: [phone]   Guarantor ID: [id number]       Work Phone:     Guarantor Employer: RETIRED         Status: RETIRED      COVERAGE        PRIMARY INSURANCE   Payor: [facility] MEDICARE Plan: ANTHEM MEDIBLUE DUAL ADV*   Payor Address: [facility] [id number] [place] [address]       Group Number: [facility] Insurance Type: INDEMNITY   Subscriber Name: Sunshine Stratton : 1950   Subscriber ID: [id number] Chandan Reyes.  Rel. to Sub: Self   SECONDARY INSURANCE   Payor: [address] Address:  [address] [place] [address], [address] [place] [address], [address] Medicaid  CERTIFICATION OF NECESSITY  FOR NON-EMERGENCY TRANSPORTATION   BY [facility]ANCE Individual Information   1. Name: Almita Allison 2. [place] Medicaid Billing Number:    3. Address: [address] [place] [address]      Transportation Provider Information   4. Provider Name:    5. [place] Medicaid Provider Number:  National Provider Identifier (NPI):      Certification  7. Criteria:  During transport, this individual requires:  [x] Medical treatment or continuous     supervision by an EMT. [] The administration or regulation of oxygen by another person. [] Supervised protective restraint. 8. Period Beginning Date: 22   9. Length  [x] Not more than 5 day(s)  [] One Year     Additional Information Relevant to Certification   10. Comments or Explanations, If Necessary or Appropriate     Lung ca with mets, COPD, decreased strength, balance, and coordination impairing functional ability,     Certifying Practitioner Information   11. Name of Practitioner:    12. [place] Medicaid Provider Number, If Applicable:  [address] Provider Identifier (NPI):      Signature Information   14. Date of Signature: 22 15. Name of Person Signing: Brenda BARRETO   16. Signature and Professional Designation: .Electronically signed by KENNEY Cantu LSW on 2022 at 10:32 AM         [place] [id number]  Rev. 2015 Number: Balta Gardner Type: INDEMNITY   Subscriber Name: Savannah Blood : 1950   Subscriber ID: [id number] Pat.  Rel. to Sub: SELF         CSN: [id number]

## 2022-12-07 NOTE — PROGRESS NOTES
Per protocol, patient's GFR does not meet minimum for IV contrast. A waiver for labs will need placed or the CT exam changed to without contrast. Thanks

## 2022-12-07 NOTE — ED NOTES
Name: Nikita Guevara  : 1950  MRN: [id number]    Date: 2022    Benefits of immediately proceeding with Radiology exam outweigh the risks and therefore the following is being waived:      [] Pregnancy test    [] Protocol for Iodine allergy    [] MRI questionnaire    [x] BUN/Creatinine        MD Olivia Kaufman MD  22 6994

## 2022-12-08 PROBLEM — E43 SEVERE PROTEIN-CALORIE MALNUTRITION (HCC): Chronic | Status: ACTIVE | Noted: 2022-12-08

## 2022-12-08 PROBLEM — E43 SEVERE PROTEIN-CALORIE MALNUTRITION (HCC): Status: ACTIVE | Noted: 2022-11-07

## 2022-12-08 LAB
ANION GAP SERPL CALCULATED.3IONS-SCNC: 9 MMOL/L (ref 7–16)
BUN BLDV-MCNC: 32 MG/DL (ref 6–23)
CALCIUM SERPL-MCNC: 8.4 MG/DL (ref 8.6–10.2)
CHLORIDE BLD-SCNC: 109 MMOL/L (ref 98–107)
CO2: 20 MMOL/L (ref 22–29)
CREAT SERPL-MCNC: 1.3 MG/DL (ref 0.5–1)
GFR SERPL CREATININE-BSD FRML MDRD: 44 ML/MIN/1.73
GLUCOSE BLD-MCNC: 72 MG/DL (ref 74–99)
POTASSIUM SERPL-SCNC: 4.6 MMOL/L (ref 3.5–5)
SODIUM BLD-SCNC: 138 MMOL/L (ref 132–146)

## 2022-12-08 PROCEDURE — 99222 1ST HOSP IP/OBS MODERATE 55: CPT

## 2022-12-08 PROCEDURE — 36415 COLL VENOUS BLD VENIPUNCTURE: CPT

## 2022-12-08 PROCEDURE — 6360000002 HC RX W HCPCS: Performed by: INTERNAL MEDICINE

## 2022-12-08 PROCEDURE — 2580000003 HC RX 258: Performed by: INTERNAL MEDICINE

## 2022-12-08 PROCEDURE — 99223 1ST HOSP IP/OBS HIGH 75: CPT | Performed by: INTERNAL MEDICINE

## 2022-12-08 PROCEDURE — 6370000000 HC RX 637 (ALT 250 FOR IP): Performed by: NURSE PRACTITIONER

## 2022-12-08 PROCEDURE — 80048 BASIC METABOLIC PNL TOTAL CA: CPT

## 2022-12-08 PROCEDURE — 6370000000 HC RX 637 (ALT 250 FOR IP)

## 2022-12-08 PROCEDURE — 2140000000 HC CCU INTERMEDIATE R&B

## 2022-12-08 PROCEDURE — 2580000003 HC RX 258: Performed by: EMERGENCY MEDICINE

## 2022-12-08 PROCEDURE — 6370000000 HC RX 637 (ALT 250 FOR IP): Performed by: INTERNAL MEDICINE

## 2022-12-08 PROCEDURE — 94640 AIRWAY INHALATION TREATMENT: CPT

## 2022-12-08 RX ORDER — IPRATROPIUM BROMIDE AND ALBUTEROL SULFATE 2.5; .5 MG/3ML; MG/3ML
1 SOLUTION RESPIRATORY (INHALATION)
Status: DISCONTINUED | OUTPATIENT
Start: 2022-12-08 | End: 2022-12-10

## 2022-12-08 RX ORDER — SENNA AND DOCUSATE SODIUM 50; 8.6 MG/1; MG/1
1 TABLET, FILM COATED ORAL 2 TIMES DAILY
Status: DISCONTINUED | OUTPATIENT
Start: 2022-12-08 | End: 2022-12-12 | Stop reason: HOSPADM

## 2022-12-08 RX ORDER — OXYCODONE HYDROCHLORIDE 10 MG/1
10 TABLET ORAL EVERY 4 HOURS PRN
Status: DISCONTINUED | OUTPATIENT
Start: 2022-12-08 | End: 2022-12-12 | Stop reason: HOSPADM

## 2022-12-08 RX ORDER — OXYCODONE HYDROCHLORIDE 5 MG/1
5 TABLET ORAL EVERY 4 HOURS PRN
Status: DISCONTINUED | OUTPATIENT
Start: 2022-12-08 | End: 2022-12-12 | Stop reason: HOSPADM

## 2022-12-08 RX ORDER — MIRTAZAPINE 15 MG/1
7.5 TABLET, FILM COATED ORAL NIGHTLY
Status: DISCONTINUED | OUTPATIENT
Start: 2022-12-08 | End: 2022-12-12 | Stop reason: HOSPADM

## 2022-12-08 RX ADMIN — SODIUM CHLORIDE: 9 INJECTION, SOLUTION INTRAVENOUS at 03:32

## 2022-12-08 RX ADMIN — ENOXAPARIN SODIUM 30 MG: 100 INJECTION SUBCUTANEOUS at 08:24

## 2022-12-08 RX ADMIN — OXYCODONE HYDROCHLORIDE 10 MG: 10 TABLET ORAL at 13:28

## 2022-12-08 RX ADMIN — SODIUM CHLORIDE, PRESERVATIVE FREE 10 ML: 5 INJECTION INTRAVENOUS at 21:01

## 2022-12-08 RX ADMIN — DOCUSATE SODIUM 100 MG: 100 CAPSULE, LIQUID FILLED ORAL at 08:23

## 2022-12-08 RX ADMIN — MIRTAZAPINE 7.5 MG: 15 TABLET, FILM COATED ORAL at 21:01

## 2022-12-08 RX ADMIN — AMLODIPINE BESYLATE 5 MG: 5 TABLET ORAL at 08:23

## 2022-12-08 RX ADMIN — IPRATROPIUM BROMIDE AND ALBUTEROL SULFATE 1 AMPULE: 2.5; .5 SOLUTION RESPIRATORY (INHALATION) at 15:43

## 2022-12-08 RX ADMIN — MORPHINE SULFATE 2 MG: 2 INJECTION, SOLUTION INTRAMUSCULAR; INTRAVENOUS at 08:29

## 2022-12-08 RX ADMIN — DOCUSATE SODIUM 50 MG AND SENNOSIDES 8.6 MG 1 TABLET: 8.6; 5 TABLET, FILM COATED ORAL at 21:01

## 2022-12-08 RX ADMIN — IPRATROPIUM BROMIDE AND ALBUTEROL SULFATE 1 AMPULE: 2.5; .5 SOLUTION RESPIRATORY (INHALATION) at 21:23

## 2022-12-08 ASSESSMENT — PAIN DESCRIPTION - DESCRIPTORS
DESCRIPTORS: ACHING
DESCRIPTORS: PRESSURE;ACHING;DISCOMFORT

## 2022-12-08 ASSESSMENT — PAIN DESCRIPTION - ORIENTATION
ORIENTATION: LEFT;RIGHT
ORIENTATION: MID;LOWER;UPPER

## 2022-12-08 ASSESSMENT — PAIN SCALES - GENERAL
PAINLEVEL_OUTOF10: 10
PAINLEVEL_OUTOF10: 0
PAINLEVEL_OUTOF10: 10

## 2022-12-08 ASSESSMENT — PAIN DESCRIPTION - LOCATION
LOCATION: BACK
LOCATION: ABDOMEN

## 2022-12-08 ASSESSMENT — PAIN SCALES - WONG BAKER: WONGBAKER_NUMERICALRESPONSE: 0

## 2022-12-08 NOTE — ACP (ADVANCE CARE PLANNING)
.Advance Care Planning   Healthcare Decision Maker:    Primary Decision Maker: [facility] - Domestic Partner - [phone]    Secondary Decision Maker: Jerald Jeffrey - Brother/Sister - [phone]    Supplemental (Other) Decision Maker: Mark Diamond - Brother/Sister - [phone]    Click here to complete Healthcare Decision Makers including selection of the Healthcare Decision Maker Relationship (ie \"Primary\").

## 2022-12-08 NOTE — PROGRESS NOTES
This patient is on medication that requires renal, weight, and/or indication dose adjustment. Date Body Weight IBW  Adjusted BW SCr  CrCl Dialysis status   12/7/2022 103 lb 8 oz (46.9 kg) Ideal body weight: 47.8 kg (105 lb 6.1 oz) Serum creatinine: 1.5 mg/dL (H) 12/07/22 1306  Estimated creatinine clearance: 25 mL/min (A) N/a       Pharmacy has dose-adjusted the following medication(s):    Date Previous Order Adjusted Order   12/7/2022 40 mg qd Lovenox 30 mg qd Lovenox       These changes were made per protocol according to the [facility] Clinical Guidance for Pharmacists. *Please note this dose may need readjusted if patient's condition changes. Please contact pharmacy with any questions regarding these changes.     Pati Jin [facility]  12/7/2022  9:43 PM

## 2022-12-08 NOTE — CONSULTS
GENERAL SURGERY  CONSULT NOTE  12/8/2022    Physician Consulted: Dr. Lillian Hughes  Reason for Consult: soft tissue mass  Referring Physician: Dr. Jimmy Vanegas    ERICA Cox is a 71 y.o. female who presents for evaluation of a painful subcutaneous mass within her right axilla. Patient's history is significant for right lung adenocarcinoma, ascites, hepatitis C. Surgery consulted for evaluation of mass. Patient states that the pain from the mass is unbearable. She is having difficulty sleeping secondary to pain. Patient denies any other painful masses at this time. Denies any fever or chills. Denies any abdominal pain. Patient denies any use of anticoagulation. CT imaging with evidence of 2.7 cm subcutaneous mass, large right pleural effusion and ascites.       Past Medical History:   Diagnosis Date    Abnormal chest x-ray with multiple lung nodules 9/8/2015    Abnormal Pap smear 1/3/2011    Alpha-1 negative    Adrenal adenoma     7 mm    Bilateral lung cancer ([address] 5/12/2016    surgically removed - 2015     Cholelithiasis 6/6/2011    Encounter for screening colonoscopy     for OR 3-28-19     Fibroids     Hemangioma of spleen     Hepatitis C 01/03/2011    treated and cured, no current issues     Hyperlipidemia     no medications     Hypertension 6/6/2011    Insomnia     Lung nodule     ROMELIA     Lymphadenopathy     Cervical (-) ENT    Malignant neoplasm of upper lobe of right lung ([address] 11/18/2015    Osteoarthritis     Panlobular emphysema ([address] 11/12/2015    controlled with inhalers     PPD negative 1/18/12    Pulmonary embolism without acute cor pulmonale ([address] 5/12/2016    PVD (peripheral vascular disease) ([address] 5/6/2014    Scoliosis     Uterine fibroid 5/6/2014       Past Surgical History:   Procedure Laterality Date    APPENDECTOMY  1965    BREAST BIOPSY Left     AGE 30'S LEFT NIPPLE REMOVED    BREAST LUMPECTOMY  4/28/1999    left, benign, Jesu Barney  2/1/2012    Dr. Agueda Torres, [facility]    BRONCHOSCOPY  10/15/15    with EBUS - DR Precious Andrade    CHOLECYSTECTOMY      COLONOSCOPY  12/21/2009    partial to distal ascending colon normal (completion BE same day normal), Dr. Jak Cheney, [facility]    COLONOSCOPY  8/22/2014    done under fluoro with sigmoid straightening device so was able to get to cecum, very poor prep, moderate proctitis, repeat 5 years,  Dr. Jak Cheney, [facility]    COLONOSCOPY N/A 3/28/2019    COLONOSCOPY POLYPECTOMY SNARE/COLD BIOPSY performed by Niels Neal DO at [address] [facility] N/A 5/13/2022    COLONOSCOPY POLYPECTOMY SNARE/COLD BIOPSY performed by Concepcion Hanks MD at [facility]  9/20/2022    CT NEEDLE BIOPSY LUNG PERCUTANEOUS 9/20/2022 DEANGELO Reynoso MD [facility]    ENDOMETRIAL BIOPSY      LIVER RESECTION Right 7/6/2022    LAPAROSCOPIC ROBOTIC PERITONEAL MASS RESECTION performed by Ethan Stafford III, MD at [place] [address] Left 3/29/2016    VATS WEDGE RESECTION UPPER LOBECTOMY    OTHER SURGICAL HISTORY Right 11/04/2016    REMOVAL MEDI-PORT - DR YURIDIA BEY LAP,CHOLECYSTECTOMY/GRAPH N/A 6/29/2018    CHOLECYSTECTOMY LAPAROSCOPIC, POSSIBLE OPEN,POSSIBLE GRAM ( OC 2) performed by Areli Cooley MD at [address] Right 11/11/2015    VATS, BRONCH,RT UPPER LOBECTOMY; NODE DISECTION    TUNNELED VENOUS PORT PLACEMENT Right 12/07/2015    right chest, and removed     UPPER GASTROINTESTINAL ENDOSCOPY N/A 5/13/2022    EGD POLYP HOT FORCEP/CAUTERY performed by Concepcion Hanks MD at [address]       Medications Prior to Admission    Prior to Admission medications    Medication Sig Start Date End Date Taking?  Authorizing Provider   acetaminophen (TYLENOL) 500 MG tablet Take 1 tablet by mouth 4 times daily as needed for Pain 7/6/22   Jesse Da Silva MD   amLODIPine (NORVASC) 5 MG tablet take 1 tablet by mouth once daily 4/14/22   Historical Provider, MD   vitamin B-12 (CYANOCOBALAMIN) 500 MCG tablet take 1 tablet by mouth daily 5/3/21 10/31/22  Green Fee, DO   tiotropium (SPIRIVA HANDIHALER) 18 MCG inhalation capsule Inhale 1 capsule into the lungs daily 2/26/18 10/31/22  Green Fee, DO   lisinopril (PRINIVIL;ZESTRIL) 40 MG tablet take 1 tablet by mouth once daily 18   Green Fee, DO   Multiple Vitamins-Minerals (THERAPEUTIC MULTIVITAMIN-MINERALS) tablet Take 1 tablet by mouth daily 17   Rashad Gayle MD   [facility]  Devices KIT BP monitoring KIT 4/15/16   Micki Reynolds MD       Allergies   Allergen Reactions    Ibuprofen Palpitations and Rash       Family History   Problem Relation Age of Onset    Heart Disease Mother     High Blood Pressure Mother     Cancer Mother         ovarian    Cancer Father     Heart Disease Father     High Blood Pressure Father     Kidney Disease Father     Diabetes Sister     Breast Cancer Sister     Diabetes Brother        Social History     Tobacco Use    Smoking status: Former     Packs/day: 0.00     Years: 30.00     Pack years: 0.00     Types: Cigarettes     Quit date: 2015     Years since quittin.5    Smokeless tobacco: Never   Vaping Use    Vaping Use: Never used   Substance Use Topics    Alcohol use: Not Currently     Alcohol/week: 2.0 standard drinks     Types: 2 Glasses of wine per week     Comment: x2 week    Drug use: No         Review of Systems:Review of Systems - History obtained from chart review and the patient  General ROS: positive for  - fatigue and sleep disturbance  negative for - chills or fever  Allergy and Immunology ROS: negative for - hives  Hematological and Lymphatic ROS: positive for - fatigue  negative for - jaundice or pallor  Respiratory ROS: positive for - shortness of breath  Cardiovascular ROS: negative for - chest pain or rapid heart rate  Gastrointestinal ROS: negative for - abdominal pain or nausea/vomiting  Genito-Urinary ROS: no dysuria, trouble voiding, or hematuria  Dermatological ROS: negative for - rash     PHYSICAL EXAM:    Vitals:    12/08/22 0859   BP:    Pulse:    Resp: 14   Temp:    SpO2:        GENERAL: Frail, cachectic no acute distress, alert, answers questions appropriately  HEAD:  Normocephalic. Atraumatic. EYES:   No scleral icterus. PERRL. LUNGS:  No increased work of breathing on room air  CARDIOVASCULAR: Regular rate  ABDOMEN: Soft, nontender. Without guarding or rigidity. EXTREMITIES:   Muscular atrophy in all 4 extremities. No LE edema. NEUROLOGIC:  GCS 15  SKIN: 3 cm right posterior axillary mass. Firm, mobile, severely tender, without fluctuance. No visible drainage with palpation. Media Information  Document Information    Wound Care Image:  Wound   Right low axillary    12/08/2022 15:25     Amna Resendiz DO  [address]     Last INR 11/1 was 1.2    ASSESSMENT/PLAN:  71 y.o. female with painful subcutaneous mass in right axilla    Plan will be  -OR excision of mass 12/9  -N.p.o. at midnight  -Recommend continue multimodal pain control  -will likely need wound care follow-up      discussed with Dr. Sukumar Herman.     Amna Resendiz DO  Surgery Resident PGY-1  12/8/2022  3:57 PM

## 2022-12-08 NOTE — CONSULTS
Palliative Care Department  [phone]  Palliative Care Initial Consult  Provider Marilee Greco, APRN - MANNY     Abraham Gordon  [id number]  Hospital Day: 2  Date of Initial Consult: 12/7/22  Referring Provider: Dr. Tootie Cortés was consulted for assistance with: goals of care, CODE STATUS discussion, family support, symptom management     HPI:   Abraham Gordon is a 71 y.o. with a medical history of uterine fibroids, stage IV adenocarcinoma of the lung s/p right upper lobectomy with peritoneal mets and possible breast mets, multiple paracentesis 11/1 and 11/9 who was admitted on 12/7/2022 from home with a CHIEF COMPLAINT of back pain. She is known to our practice from previous admission 11/22/22 where we established CODE STATUS and goals of care along with symptom management of pain. This ED work-up significant for: BUN/creatinine 36/1.5, albumin 2.4, hgb 9.2, Hep C (+). CT chest revealed mild to moderate upper abdominal ascites, large right effusion, atelectasis right the RLL, pleural and chest wall metastases on the right, 2.7x1.8 cm peripherally enhancing subcutaneous mass in the posterior right should region, likely represents metastasis. General surgery for right axilla mass evaluation. ASSESSMENT/PLAN:     Pertinent Hospital Diagnoses     Right axilla soft tissue mass   Right pleural effusion 2/2 lung cancer  Metastatic disease to liver    Palliative Care Encounter / Counseling Regarding Goals of Care  Please see detailed goals of care discussion as below  At this time, Abraham Gordon, Does have capacity for medical decision-making.   Capacity is time limited and situation/question specific  During encounter Ирина Chi was surrogate medical decision-maker  Outcome of goals of care meeting:   Awaiting general surgery plan for right axilla mass  Continue DNR-CCA  Symptom management as below  Continue current medical care   Code status DNR-CCA  Advanced Directives: no POA or living will in epic  Surrogate/Legal NOK:  Deniz Andujar (): Maria De Jesus Cortés (sister): [phone]    Pain 2/2 neoplasm:   -  stop Norco   -  Start Oxycodone 5 or 10 mg every 4 hours PRN   - Morphine 2 mg ever y4 hours PRN for breakthrough pain  Constipation:   -  Senokot-S 1 tablet BID   -  Miralax 17g daily PRN  Poor appetite:   -  start Remeron 7.5 mg HS     Spiritual assessment: no spiritual distress identified  Bereavement and grief: to be determined  Referrals to: none today  SUBJECTIVE:     Current medical issues leading to Palliative Medicine involvement include   Active Hospital Problems    Diagnosis Date Noted    Pleural effusion [J90] 12/07/2022     Priority: Medium       Details of Conversation:    Chart reviewed. Patient seen with sister, Sanjana Foy, at bedside. Luis Perez is alert and oriented and able to hold meaningful conversation. She states her pain has been around a 10/10 and after receiving pain medications she is 9/10. She is very anxious to speak to general surgery in hopes of having the right axilla mass removed as that is what is causing her pain. She admits to come constipation and not having a bowel movement \"in a few days\". She reports a decrease in appetite. We discussed symptom management medications and she is willing to try our recommendations. CODE STATUS remains DNR-CCA as she wants to continue to attempt to receive cancer treatment. Her sister is concerned about her not being able to get any thus far d/t \"having too much fluid\". Deferred that conversation to oncology. We will follow. OBJECTIVE:   Prognosis: Poor and Guarded    ROS: UNLESS STATED ABOVE PATIENT DENIES:  CONSTITUTIONAL:  fever, chill, rigors, nausea, vomiting, fatigue. HEENT: blurry vision, double vision, hearing problem, tinnitus, hoarseness, dysphagia, odynophagia  RESPIRATORY: cough, shortness of breath, sputum expectoration.   CARDIOVASCULAR:  Chest pain/pressure, palpitation, syncope, irregular beats  GASTROINTESTINAL:  abdominal or rectal pain, diarrhea  GENITOURINARY:  Burning, frequency, urgency, incontinence, discharge  INTEGUMENTARY: rash, wound, pruritis  HEMATOLOGIC/LYMPHATIC:  Swelling, sores, gum bleeding, easy bruising, pica. MUSCULOSKELETAL: edema, joint swelling or redness  NEUROLOGICAL:  light headed, dizziness, loss of consciousness, weakness, change in memory, seizures, tremors    Physical Exam:  /77   Pulse 97   Temp 98.1 °F (36.7 °C) (Temporal)   Resp 14   Ht 5' 1\" (1.549 m)   Wt 103 lb 8 oz (46.9 kg)   SpO2 100%   BMI 19.56 kg/m²   Constitutional:  thin, NAD, ill-appearing  Lungs:  diminished bilaterally, no audible rhonchi or wheezes noted, respirations unlabored, no retractions  Heart:  RRR, distant heart tones, no murmur, rub, or gallop noted during exam  Abd:  Soft, non tender, bowel sounds present, +distended  Ext:  Moving all extremities, no edema, pulses present  Skin:  Warm and dry, no rashes on visible skin +right axilla mass  Psych: non-anxious affect  Neuro:  Alert and oriented x4; following commands    Objective data reviewed: labs, images, records, medication use, vitals, and chart    Discussed patient and the plan of care with the other IDT members: Palliative Medicine IDT Team, Patient, and Family    Time/Communication  Greater than 50% of time spent, total 50 minutes in counseling and coordination of care at the bedside regarding  CODE STATUS discussion, goals of care, symptom management, and diagnosis and prognosis. Thank you for allowing Palliative Medicine to participate in the care of Muriel Minor.

## 2022-12-08 NOTE — HOME CARE
Patient current with [facility] Dorcas, PT/OT/MSW. Will need ANISHA orders if appropriate at discharge. Cuca Weeks LPN  [facility].

## 2022-12-08 NOTE — PATIENT CARE CONFERENCE
P Quality Flow/Interdisciplinary Rounds Progress Note        Quality Flow Rounds held on December 8, 2022    Disciplines Attending:  Bedside Nurse, , , and Nursing Unit Leadership    Dorcas Herrera was admitted on 12/7/2022 12:01 PM    Anticipated Discharge Date:       Disposition:    Ishan Score:  Ishan Scale Score: 18    Readmission Risk              Risk of Unplanned Readmission:  22           Discussed patient goal for the day, patient clinical progression, and barriers to discharge.   The following Goal(s) of the Day/Commitment(s) have been identified:  report labs/diagnostics       Tanya Nolan RN  December 8, 2022

## 2022-12-08 NOTE — H&P
[address]                  [address] [facility]                              HISTORY AND PHYSICAL    PATIENT NAME: Kim Morrell                     :        1950  MED REC NO:   [id number]                            ROOM:       [id number]  ACCOUNT NO:   [de-identified]                           ADMIT DATE: 2022  PROVIDER:     Lucita Tomlin DO    CHIEF COMPLAINT:  Right back pain. HISTORY OF PRESENT ILLNESS:  The patient is a 71-year-old black female  who presented to the emergency room complaining of pain in the right  back. She has a mass which is painful. She was seen in the emergency  room. Diagnostic evaluation revealed pleural effusion. The patient was  admitted for further evaluation and treatment. PAST MEDICAL HISTORY:  Lung CA with mets, ascites, hepatitis C  infection, hypertension, osteoarthritis, COPD. PAST SURGICAL HISTORY:  Appendectomy, breast biopsy, breast lumpectomy,  bronchoscopy, cholecystectomy, lung biopsy, liver resection, endometrial  biopsy, peritoneal mass resection, lung lobectomy left upper lobe,  thoracoscopy. MEDICATIONS PRIOR TO ADMISSION:  Tylenol p.r.n., Norvasc, vitamin B12,  Spiriva, Prinivil, multivitamin. SOCIAL HISTORY:  The patient quit tobacco, no alcohol. REVIEW OF SYSTEMS:  Remarkable for above-stated chief complaint. ALLERGIES:  To IBUPROFEN. PHYSICAL EXAMINATION:  GENERAL APPEARANCE:  Reveals a 71-year-old  female who is  alert, cooperative, and a fair historian. VITAL SIGNS:  On admission, temperature 98 degrees, pulse 95,  respirations 18, blood pressure 118/62. HEENT:  Head:  Normocephalic, atraumatic. Eyes:  Pupils equal and  reactive to light. Extraocular muscles intact. Fundi not well  visualized. Nose:  No obstruction, polyp OR discharge noted. Mouth:   Mucosa without lesion. Teeth edentulous. Pharynx noninjected without  exudate. NECK:  Supple. No JVD. No thyromegaly. No carotid bruits. HEART:  Regular rate and rhythm without murmur. LUNGS:  With decreased breath sounds at right base. ABDOMEN:  Positive bowel sounds. Soft. There is minimal ascites. BACK:  With increased thoracic kyphosis. EXTREMITIES:  With minimal bilateral lower extremity edema. LYMPH NODES:  No adenopathy noted. SKIN:  There is a soft tissue mass which is painful to palpation in the  right upper lateral back. IMPRESSION:  Soft tissue mass, right pleural effusion, lung CA with  mets, hypertension, ascites. PLAN:  Admit. Pain control. Pulmonary and surgery to see. Discharge  plan home versus ECF as per the patient's progress. We will ask  Palliative Care to also see as she is being followed by them.         Pastor Maldonado DO    D: 12/08/2022 8:42:18       T: 12/08/2022 8:45:19     [id number]  Job#: [id number]     Doc#: [id number]    CC:

## 2022-12-08 NOTE — CARE COORDINATION
Met with pt and sister Minna Nunn, they asked this LSW to call sister Jose Maria Ty. Maddiemickey Flores, pt lives with domestic partner Diana Mcdaniels, in a bilevel home with either 8 steps or 5 steps to enter. Pt is bed bound, has a hospital bed at home through [facility], which is not working correctly, called [facility] to have them service the bed. Pt has [facility] arranged [facility] ) to transport to/fro radiation/chemo txs. Pt is active with [facility] passport waiver program Monday-Friday 7am-3pm,  name is Kaity Mtz. They are requesting a w/c, door ways are 2 feet wide. [facility] [place] [facility], with referral, will need a DME order for standard w/c 18 inch. Will need to call [facility] for transport home. Envelope and ambulance form in soft chart. Pt unable to see a doctor, referral to [facility] Day visiting physicians, spoke with [facility]. Tom Marley, MSW, LSW

## 2022-12-08 NOTE — PROGRESS NOTES
Comprehensive Nutrition Assessment    Type and Reason for Visit:  Initial, Consult    Nutrition Recommendations/Plan:   Recommend and start Ensure Plus supplement BID and Magic Cup supplement daily to help meet increased nutritional needs. Malnutrition Assessment:  Malnutrition Status:  Severe malnutrition (12/08/22 1318)    Context:  Chronic Illness     Findings of the 6 clinical characteristics of malnutrition:  Energy Intake:  75% or less estimated energy requirements for 1 month or longer  Weight Loss:  Unable to assess (d/t possible fluid shifts ; hx of paracentesis)     Body Fat Loss:  Severe body fat loss Triceps, Orbital   Muscle Mass Loss:  Severe muscle mass loss Temples (temporalis), Clavicles (pectoralis & deltoids)  Fluid Accumulation:  No significant fluid accumulation     Strength:  Not Performed    Nutrition Assessment:    Patient adm w/ back pain and R axilla soft tissue mass ; noted pleural effusion ; poor po intake/appetite PTA ; hx stage IV adenocarcinoma of the lung s/p right upper lobectomy with peritoneal mets and possible breast mets (noted liver mets) ; hx of chemotherapy and radiation ; hx of multiple paracentesis 11/1 and 11/9 ; hx of hepatitis C and PVD ; pt also meets criteria for severe malnutrition ; will start ONS    Nutrition Related Findings:    I&Os WNL, trace edema, A&O x 4, active BS, rounded abd, upper dentures, redness to buttocks/heels, muscle/fat wasting ; Wound Type: Wound Consult Pending       Current Nutrition Intake & Therapies:    Average Meal Intake: 26-50%     ADULT DIET; Regular  ADULT ORAL NUTRITION SUPPLEMENT; Breakfast; Standard High Calorie/High Protein Oral Supplement    Anthropometric Measures:  Height: 5' 1\" (154.9 cm)  Ideal Body Weight (IBW): 105 lbs (48 kg)       Current Body Weight: 103 lb (46.7 kg) (12/7, actual), 98.1 % IBW.     Current BMI (kg/m2): 19.5  Usual Body Weight:  (UTO ; EMR shows past weights of 108# actual on 11/24/22 and 113# [facility] on 11/22/22)                       BMI Categories: Underweight (BMI less than 22) age over 72    Estimated Daily Nutrient Needs:  Energy Requirements Based On: Formula  Weight Used for Energy Requirements: Current  Energy (kcal/day): 7392-7441 ( x 1.3 SF)  Weight Used for Protein Requirements: Current  Protein (g/day): 60-70 (1.3-1.5g/kg CBW)  Method Used for Fluid Requirements: 1 ml/kcal  Fluid (ml/day): 2901-2487    Nutrition Diagnosis:   Severe malnutrition, In context of chronic illness related to catabolic illness (hx of metastatic lung CA) as evidenced by poor intake prior to admission, severe loss of subcutaneous fat, severe muscle loss    Nutrition Interventions:   Food and/or Nutrient Delivery: Continue Current Diet, Start Oral Nutrition Supplement  Nutrition Education/Counseling: Education not indicated  Coordination of Nutrition Care: Continue to monitor while inpatient       Goals:  Previous Goal Met: Progressing toward Goal(s)  Goals: PO intake 50% or greater, by next RD assessment       Nutrition Monitoring and Evaluation:   Behavioral-Environmental Outcomes: None Identified  Food/Nutrient Intake Outcomes: Food and Nutrient Intake, Supplement Intake  Physical Signs/Symptoms Outcomes: Biochemical Data, Chewing or Swallowing, GI Status, Fluid Status or Edema, Hemodynamic Status, Meal Time Behavior, Nutrition Focused Physical Findings, Skin, Weight    Discharge Planning:     Too soon to determine     Hitesh Joyce RD, LD  Contact: [phone]

## 2022-12-08 NOTE — CONSULTS
[place]  Department of Internal Medicine  Division of Pulmonary, Critical Care and Sleep Medicine  Consult Note    Carlota Ellington DO, MPH, [facility], [facility], [address] [facility] , [facility] Suni Coffman, APRN-CNP      Patient: Layla Fernando  MRN: [id number]  : 1950    Encounter Time: 8:42 AM     Date of Admission: 2022 12:01 PM    Primary Care Physician: Earnestine Meredith DO    Reason for Consultation: Lung Ca/pleural effusion     HISTORY OF PRESENT ILLNESS : Layla Fernando 71 y.o. female was seen in consultation regarding the above chief compliant. Ms. Layla Fernando has a past medical history of lung cancer with mets, ascites, hepatitis C, hypertension, osteoarthritis, emphysema, cholelithiasis, adrenal adenoma, uterine fibroids, hemangioma of the spleen, ex-smoker, history of lung nodules status post VATS right upper lobectomy and mediastinal LN dissection, status post Mediport for chemo-carboplatin/Alimta, ultrasound of breast finding a B -ADS 4 right sided breast lesion, PET CT scan 2022 in the region of the lesion in the right lower lobe there is FDG avid tracer uptake peak SUV is 3.2, CT-guided core needle biopsy of right lower lung nodule on 2022 adenocarcinoma, recent hospitalization in November for abdominal pain and distention and recurrent ascites, paracentesis with removal of  2400 mL of acetic fluid. Ms. Layla Fernando was admitted on 2022 after she presented to the ER with complaint of upper right back pain where she has what she calls a cyst, upon examination she has a right sided subcutaneous mass just posterior to the right axilla firm to palpation.   Imaging conducted in the ER shows 2.7x 1.8 cm peripheral enhancing subcutaneous mass in the posterior right shoulder region mild to moderate upper abdominal ascites in addition to large right pleural effusion atelectasis of the right lower lobe, pleural and chest wall metastasis on the right. We are asked to see Ms. Anabel Hall for pleural effusion. Discussed findings and plan with patient and sisters Ayo Lee and Luc Gregoria, will plan for placement of pleurX catheter. PAST MEDICAL HISTORY:  has a past medical history of Abnormal chest x-ray with multiple lung nodules, Abnormal Pap smear, Adrenal adenoma, Bilateral lung cancer ([address] Cholelithiasis, Encounter for screening colonoscopy, Fibroids, Hemangioma of spleen, Hepatitis C, Hyperlipidemia, Hypertension, Insomnia, Lung nodule, Lymphadenopathy, Malignant neoplasm of upper lobe of right lung ([address] Osteoarthritis, Panlobular emphysema ([address] PPD negative, Pulmonary embolism without acute cor pulmonale ([address] PVD (peripheral vascular disease) ([address] Scoliosis, and Uterine fibroid. SURGICAL HISTORY:  has a past surgical history that includes Appendectomy (1965); Endometrial biopsy; Colonoscopy (12/21/2009); bronchoscopy (2/1/2012); Colonoscopy (8/22/2014); bronchoscopy (10/15/15); Thoracoscopy (Right, 11/11/2015); Tunneled venous port placement (Right, 12/07/2015); Lung lobectomy (Left, 3/29/2016); other surgical history (Right, 11/04/2016); pr lap,cholecystectomy/graph (N/A, 6/29/2018); Cholecystectomy; Breast lumpectomy (4/28/1999); Colonoscopy (N/A, 3/28/2019); Breast biopsy (Left); Upper gastrointestinal endoscopy (N/A, 5/13/2022); Colonoscopy (N/A, 5/13/2022); Liver Resection (Right, 7/6/2022); and CT NEEDLE BIOPSY LUNG PERCUTANEOUS (9/20/2022). SOCIAL HISTORY:  reports that she quit smoking about 7 years ago. Her smoking use included cigarettes. She has never used smokeless tobacco. She reports that she does not currently use alcohol after a past usage of about 2.0 standard drinks per week. She reports that she does not use drugs. FAMILY  HISTORY: family history includes Breast Cancer in her sister; Cancer in her father and mother; Diabetes in her brother and sister;  Heart Disease in her father and mother; High Blood Pressure in her father and mother; Kidney Disease in her father. MEDICATIONS:    Prior to Admission medications    Medication Sig Start Date End Date Taking? Authorizing Provider   acetaminophen (TYLENOL) 500 MG tablet Take 1 tablet by mouth 4 times daily as needed for Pain 7/6/22   Zachary Chen MD   amLODIPine (NORVASC) 5 MG tablet take 1 tablet by mouth once daily 4/14/22   Historical Provider, MD   vitamin B-12 (CYANOCOBALAMIN) 500 MCG tablet take 1 tablet by mouth daily 5/3/21 10/31/22  David Bess, DO   tiotropium (SPIRIVA HANDIHALER) 18 MCG inhalation capsule Inhale 1 capsule into the lungs daily 2/26/18 10/31/22  David Bess, DO   lisinopril (PRINIVIL;ZESTRIL) 40 MG tablet take 1 tablet by mouth once daily 2/16/18   David Bess, DO   Multiple Vitamins-Minerals (THERAPEUTIC MULTIVITAMIN-MINERALS) tablet Take 1 tablet by mouth daily 4/18/17   Genoveva Foster MD   [facility]. Devices KIT BP monitoring KIT 4/15/16   Idalmis Villarreal MD       ALLERGIES: Ibuprofen       REVIEW OF SYSTEMS:  Otherwise negative if not reported or listed below  Constitutional:  No unanticipated weight loss. No change in sleep pattern or activity. No fevers, chills or rigors. Eyes:    No visual changes or diplopia. No scleral icterus. ENT:    No Headaches, hearing loss or vertigo. No mouth sores or sore throat. Cardiovascular:  + chest discomfort, palpitations. Respiratory:  No cough, No wheezing      No sputum production. No hemoptysis, pleuritic pain. Gastrointestinal:  No abdominal pain, appetite loss, blood in stools. No hematemesis  Genitourinary:  No dysuria, trouble voiding or hematuria. No nocturia. Musculoskeletal:   No weakness or joint complaints. Integumentary: No rashes or pruritis. Right upper posterior lesion near axilla  Neurological:  No headache, numbness or tingling.      Psychiatric:   No effect on mood, memory, mentation, or behavior. No anxiety or depression. Endocrine:   No excessive thirst, fluid intake, or urination. No tremor. Hematologic: No abnormal bruising or bleeding. Lymphatic:  No swollen lymph nodes. Immunologic:  No hives or hx of anaphylaxis      OBJECTIVE:     PHYSICAL EXAM:   VITALS:   Vitals:    12/07/22 2109 12/07/22 2309 12/08/22 0335 12/08/22 0735   BP:  138/67 (!) 150/65 134/77   Pulse:  92 94 97   Resp:  21 20 20   Temp:  98.4 °F (36.9 °C) 98 °F (36.7 °C) 98.1 °F (36.7 °C)   TempSrc:  Temporal Temporal Temporal   SpO2:  96% 95% 100%   Weight: 103 lb 8 oz (46.9 kg)      Height: 5' 1\" (1.549 m)           Intake/Output Summary (Last 24 hours) at 12/8/2022 0842  Last data filed at 12/7/2022 2201  Gross per 24 hour   Intake --   Output 150 ml   Net -150 ml        CONSTITUTIONAL:   A&O x 3, NAD, appears chronically ill  SKIN:     No rash, right upper back lesion, No skin discoloration  HEENT:     EOMI, MMM, No thrush  NECK:    No bruits, No JVP appreciated  CV:      Sinus,  No murmur, No rubs, No gallops  PULMONARY:   Couse BS,  No Wheezing, No Rales, No Rhonchi      No noted egophony  ABDOMEN:     Soft, non-tender. BS normal. No R/R/G  EXT:    No deformities . No clubbing. No lower extremity edema, No venous stasis  PULSE:   Appears equal and palpable.   PSYCHIATRIC:  Seems appropriate, No acute psychosis  MS:    No fractures, No gross weakness  NEUROLOGIC:   The clinical assessment is non-focal     DATA: IMAGING & TESTING:     LABORATORY TESTS:    CBC with Differential:    Lab Results   Component Value Date/Time    WBC 10.9 12/07/2022 01:06 PM    RBC 3.34 12/07/2022 01:06 PM    HGB 9.2 12/07/2022 01:06 PM    HCT 28.5 12/07/2022 01:06 PM     12/07/2022 01:06 PM    MCV 85.3 12/07/2022 01:06 PM    MCH 27.5 12/07/2022 01:06 PM    MCHC 32.3 12/07/2022 01:06 PM    RDW 15.9 12/07/2022 01:06 PM    SEGSPCT 61 01/16/2012 12:15 PM    LYMPHOPCT 7.2 12/07/2022 01:06 PM    MONOPCT 5.0 12/07/2022 01:06 PM BASOPCT 0.3 12/07/2022 01:06 PM    MONOSABS 0.55 12/07/2022 01:06 PM    LYMPHSABS 0.78 12/07/2022 01:06 PM    EOSABS 0.04 12/07/2022 01:06 PM    BASOSABS 0.03 12/07/2022 01:06 PM     CMP:    Lab Results   Component Value Date/Time     12/08/2022 05:51 AM    K 4.6 12/08/2022 05:51 AM    K 3.8 07/01/2018 05:14 AM     12/08/2022 05:51 AM    CO2 20 12/08/2022 05:51 AM    BUN 32 12/08/2022 05:51 AM    CREATININE 1.3 12/08/2022 05:51 AM    GFRAA 49 09/14/2022 10:12 AM    LABGLOM 44 12/08/2022 05:51 AM    GLUCOSE 72 12/08/2022 05:51 AM    GLUCOSE 85 04/10/2012 12:28 PM    PROT 7.6 12/07/2022 01:06 PM    LABALBU 2.4 12/07/2022 01:06 PM    LABALBU 4.2 04/10/2012 12:28 PM    CALCIUM 8.4 12/08/2022 05:51 AM    BILITOT 0.2 12/07/2022 01:06 PM    ALKPHOS 109 12/07/2022 01:06 PM    AST 17 12/07/2022 01:06 PM    ALT 11 12/07/2022 01:06 PM     Magnesium:    Lab Results   Component Value Date/Time    MG 1.7 10/31/2022 12:50 PM     Phosphorus:    Lab Results   Component Value Date/Time    PHOS 3.2 11/12/2015 04:30 AM     HgBA1c:    Lab Results   Component Value Date/Time    LABA1C 6.2 03/24/2016 08:30 AM        PRO-BNP:   Lab Results   Component Value Date    PROBNP 1,122 (H) 10/31/2022    PROBNP 104 04/05/2016      ABGs: No results found for: PH, PO2, PCO2  Hemoglobin A1C: No components found for: HGBA1C    IMAGING:  Imaging tests were completed and reviewed and discussed radiology and care team involved and reveals   CT CHEST W CONTRAST    Result Date: 12/7/2022  EXAMINATION: CT OF THE CHEST WITH CONTRAST 12/7/2022 4:32 pm TECHNIQUE: CT of the chest was performed with the administration of intravenous contrast. Multiplanar reformatted images are provided for review. Automated exposure control, iterative reconstruction, and/or weight based adjustment of the mA/kV was utilized to reduce the radiation dose to as low as reasonably achievable.  COMPARISON: CT abdomen dated 11/22/2022 HISTORY: ORDERING SYSTEM PROVIDED HISTORY: right shoulder mass, hx lung cancer TECHNOLOGIST PROVIDED HISTORY: Reason for exam:->right shoulder mass, hx lung cancer Decision Support Exception - unselect if not a suspected or confirmed emergency medical condition->Emergency Medical Condition (MA) What reading provider will be dictating this exam?->CRC FINDINGS: Mediastinum: Thyroid gland is normal.  No evidence of bulky mediastinal adenopathy. No evidence of hilar adenopathy. Aorta is nonaneurysmal. Pulmonary artery is normal in size. No evidence of filling defects in the central pulmonary arteries to suggest pulmonary embolism. Lungs/pleura: Enhancing masses are seen involving the pleural space and chest wall in the right hemithorax. There is a 2.4 cm peripherally enhancing mass in the medial anterior right chest on image 48 series 301. Other smaller dependent mural nodules are noted. A large right pleural effusion is noted with atelectasis of the right lower lobe and right middle lobe. 2.5 cm low right infrahilar parenchymal low-density compatible with soft tissue mass. Best seen on image number 68 series 301. Upper Abdomen: Visualized portions of the liver appear unremarkable. Unchanged probable old splenic infarct or injury in the mid splenic body. Stomach has a normal configuration. The right adrenal gland is normal, left not included on the study. There is mild to moderate upper abdominal ascites. Soft Tissues/Bones: There is a peripherally enhancing 2.7 x 1.8 cm subcutaneous soft tissue mass overlying the trapezius muscle in the posterior right shoulder region likely representing a subcutaneous metastasis. No other subcutaneous lesions identified. Sclerotic focus along the inferior T2 vertebral body could represent bone island or sclerotic metastasis. No other osseous lesions detected. 1.  2.7 x 1.8 cm peripherally enhancing subcutaneous mass in the posterior right shoulder region.   This likely represents a subcutaneous metastasis. No other masses related to the right shoulder region. 2.  Large right effusion, atelectasis of the right lower lobe, pleural and chest wall metastases on the right. 3.  Mild to moderate upper abdominal ascites. CT ABDOMEN PELVIS W IV CONTRAST Additional Contrast? None    Result Date: 11/22/2022  EXAMINATION: CT OF THE ABDOMEN AND PELVIS WITH WSHLMXOR50/21/2022 11:30 pm TECHNIQUE: CT of the abdomen and pelvis was performed with the administration of intravenous contrast. Multiplanar reformatted images are provided for review. Automated exposure control, iterative reconstruction, and/or weight based adjustment of the mA/kV was utilized to reduce the radiation dose to as low as reasonably achievable. COMPARISON: October 31, 2022 HISTORY: ORDERING SYSTEM PROVIDED HISTORY: abd pain TECHNOLOGIST PROVIDED HISTORY: Reason for exam:->abd pain Additional Contrast?->None Decision Support Exception - unselect if not a suspected or confirmed emergency medical condition->Emergency Medical Condition (MA) FINDINGS: THORACIC STRUCTURES: There is a moderate right basilar pleural fluid collection with atelectasis of the right lower lobe. LIVER:  The liver is normal in size, slight nodularity and normal attenuation. No focal mass. No intra or extrahepatic bile duct dilation. GALL BLADDER: Cholecystectomy. SPLEEN:  Stable hypodense region with parenchymal atrophy of the mid spleen. PANCREAS:  Unremarkable. ADRENAL GLANDS:  Unremarkable. ESOPHAGUS AND STOMACH:  Unremarkable. BOWEL: Small bowel:  Unremarkable. Large bowel: The colon and rectum are of normal course and caliber. The appendix is within normal limits. There is no intraperitoneal free air. There is intra-abdominal ascites. URINARY/GENITAL TRACT: Kidneys:  The kidneys are unremarkable. .  No evidence of hydronephrosis, renal calcifications or solid renal mass. Ureters: The ureters are normal course and caliber.   There is no evidence of ureter calculus/calculi. URINARY BLADDER:  The urinary bladder is well distended without wall thickening or focal mass. Extensive calcification of uterine fibroids. BLOOD VESSELS: There is atherosclerotic disease. LYMPH NODES:  No evidence of intraabdominal or intrapelvic lymphadenopathy. ABDOMINAL WALL & SOFT TISSUES:  Unremarkable. OSSEOUS STRUCTURES: No acute osseous lesion. Moderate right basilar pleural fluid collection with atelectasis of the right lower lobe. Slight nodularity of the liver concerning for early cirrhotic morphological change. Stable hypodense region within the spleen with parenchymal atrophy likely related to old splenic infarction or old splenic injury. Large amount of intra-abdominal ascites, unchanged from the prior study. Extensive calcification of uterine fibroids. Atherosclerotic disease. .     XR CHEST PORTABLE    Result Date: 11/21/2022  EXAMINATION: ONE XRAY VIEW OF THE CHEST 11/21/2022 7:35 pm COMPARISON: None. HISTORY: ORDERING SYSTEM PROVIDED HISTORY: abd pain TECHNOLOGIST PROVIDED HISTORY: Reason for exam:->abd pain FINDINGS: Hazy density over the right hemithorax. The heart is not enlarged. No pneumothorax. Elevated right hemidiaphragm. Hazy density over the right hemithorax likely due to layering right pleural effusion. Superimposed infiltrates cannot be excluded. Elevated right hemidiaphragm. US GUIDED PARACENTESIS    Result Date: 11/22/2022  PROCEDURE: PARACENTESIS WITHOUT IMAGE GUIDANCE US ABDOMEN LIMITED 11/22/2022 HISTORY: ORDERING SYSTEM PROVIDED HISTORY: diagnostic and therapeutic ascites tap-- send for cx, +cytology, cell count TECHNOLOGIST PROVIDED HISTORY: Reason for exam:->diagnostic and therapeutic ascites tap-- send for cx, +cytology, cell count What reading provider will be dictating this exam?->CRC TECHNIQUE: Informed consent was obtained after a detailed explanation of the procedure including risks, benefits, and alternatives.   Universal protocol was followed. A limited ultrasound of the abdomen was performed. The right abdomen was prepped and draped in sterile fashion and local anesthesia was achieved with lidocaine. A 5 [place] needle sheath was advanced into ascites under continuous ultrasound guidance and paracentesis was performed. The patient tolerated the procedure well. FINDINGS: Limited ultrasound of the abdomen demonstrates ascites. A total of 3050 cc of straw-colored ascitic fluid was removed. Status post paracentesis. US GUIDED PARACENTESIS    Result Date: 11/9/2022  PROCEDURE: PARACENTESIS WITHOUT IMAGE GUIDANCE US ABDOMEN LIMITED 11/9/2022 HISTORY: ORDERING SYSTEM PROVIDED HISTORY: Other ascites TECHNOLOGIST PROVIDED HISTORY: Reason for exam:->diagnostic and therapeutic paracentesis What reading provider will be dictating this exam?->CRC TECHNIQUE: Informed consent was obtained after a detailed explanation of the procedure including risks, benefits, and alternatives. Universal protocol was followed. A limited ultrasound of the abdomen was performed. The right abdomen was prepped and draped in sterile fashion and local anesthesia was achieved with lidocaine. A 5 [place] needle sheath was advanced into ascites and paracentesis was performed. The patient tolerated the procedure well. FINDINGS: Limited ultrasound of the abdomen demonstrates ascites. A total of 2510 cc of straw-colored ascitic fluid was removed. Status post paracentesis. US BREAST LIMITED RIGHT    Result Date: 11/17/2022  EXAMINATION: TARGETED ULTRASOUND OF THE RIGHT BREAST 11/17/2022 COMPARISON: Multiple prior studies, the most recent dated October 31, 2022. HISTORY: ORDERING SYSTEM PROVIDED HISTORY: Abnormal CT of the abdomen TECHNOLOGIST PROVIDED HISTORY: Reason for exam:->hyperdense nodule in the right subareolar region FINDINGS: Targeted right breast ultrasound was performed utilizing high-resolution, real-time scanning.   The right nipple is inverted and thickened. No suspicious cystic or solid mass identified in the right breast retroareolar region. Inverted right nipple is suspicious. Recommendation: Punch biopsy of the right nipple is advised. BIRADS: BIRADS - CATEGORY 4 Suspicious Abnormality. Biopsy should be considered at this time. OVERALL ASSESSMENT - SUSPICIOUS A letter of notification will be sent to the patient regarding the results. Assessment:     Right lung mass positive for Adenocarcinoma, weakly positive for GATA3 and CDX2, TTF-1 negative; KRAS (G12C) positive, PDL1 with 10% expression. 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 Will plan for IR placement of Pleurx catheter  2.7X1.8 cm enhancing subcutaneous mass in the posterior right shoulder region, this likely represents a subcutaneous metastasis. General surgery has been consulted  Recurrent ascites, malignant versus cirrhotic  Cirrhosis  Right breast lesion  History right-sided lung adenocarcinoma 2015 left-sided lung adenocarcinoma 2016  COPD  History of tobacco use  Nutrition, regular diet and nutritional supplement, poor oral intake        Plan:     Plan for IR placement of PleurX catheter   Obtain PT/INR in am  CBC and CMP in am  Pulmonary hygiene, flutter valve  Bronchodilators      Thank you for including us in the care of Ms. Bogdan Carias    Case and plan discussed with Dr. Sybil Rodgers, APRN  [facility] [facility]  Department of Internal Medicine  Pulmonary & Critical Care Medicine Staff Note    Physician Statement    In this documented case, I have discussed the pertinent history and examination findings with the nurse practitioner. I have seen and examined the patient and the key elements of the encounter have been performed by myself. I have read and reviewed the documentation and have added  findings, counseling and treatment options if needed.    I agree with the assessment, plan and orders as noted by the resident. I have spend >85% of the time with this encounter.      Rom Rodríguez DO, MPH, [facility]  Professor of [address] [place] [address]  Internal Medicine Residency Faculty

## 2022-12-09 ENCOUNTER — APPOINTMENT (OUTPATIENT)
Dept: INTERVENTIONAL RADIOLOGY/VASCULAR | Age: 72
DRG: 180 | End: 2022-12-09
Payer: MEDICARE

## 2022-12-09 LAB
ALBUMIN SERPL-MCNC: 2.4 G/DL (ref 3.5–5.2)
ALP BLD-CCNC: 499 U/L (ref 35–104)
ALT SERPL-CCNC: 44 U/L (ref 0–32)
ANION GAP SERPL CALCULATED.3IONS-SCNC: 12 MMOL/L (ref 7–16)
AST SERPL-CCNC: 98 U/L (ref 0–31)
BASOPHILS ABSOLUTE: 0.04 E9/L (ref 0–0.2)
BASOPHILS RELATIVE PERCENT: 0.3 % (ref 0–2)
BILIRUB SERPL-MCNC: 0.3 MG/DL (ref 0–1.2)
BUN BLDV-MCNC: 31 MG/DL (ref 6–23)
CALCIUM SERPL-MCNC: 8.8 MG/DL (ref 8.6–10.2)
CHLORIDE BLD-SCNC: 108 MMOL/L (ref 98–107)
CO2: 19 MMOL/L (ref 22–29)
CREAT SERPL-MCNC: 1.4 MG/DL (ref 0.5–1)
EOSINOPHILS ABSOLUTE: 0.05 E9/L (ref 0.05–0.5)
EOSINOPHILS RELATIVE PERCENT: 0.4 % (ref 0–6)
GFR SERPL CREATININE-BSD FRML MDRD: 40 ML/MIN/1.73
GLUCOSE BLD-MCNC: 87 MG/DL (ref 74–99)
HCT VFR BLD CALC: 24.5 % (ref 34–48)
HEMOGLOBIN: 7.9 G/DL (ref 11.5–15.5)
IMMATURE GRANULOCYTES #: 0.11 E9/L
IMMATURE GRANULOCYTES %: 1 % (ref 0–5)
INR BLD: 1.2
LYMPHOCYTES ABSOLUTE: 0.8 E9/L (ref 1.5–4)
LYMPHOCYTES RELATIVE PERCENT: 6.9 % (ref 20–42)
MCH RBC QN AUTO: 27.3 PG (ref 26–35)
MCHC RBC AUTO-ENTMCNC: 32.2 % (ref 32–34.5)
MCV RBC AUTO: 84.8 FL (ref 80–99.9)
MONOCYTES ABSOLUTE: 0.66 E9/L (ref 0.1–0.95)
MONOCYTES RELATIVE PERCENT: 5.7 % (ref 2–12)
NEUTROPHILS ABSOLUTE: 9.88 E9/L (ref 1.8–7.3)
NEUTROPHILS RELATIVE PERCENT: 85.7 % (ref 43–80)
PDW BLD-RTO: 15.9 FL (ref 11.5–15)
PLATELET # BLD: 429 E9/L (ref 130–450)
PMV BLD AUTO: 9.9 FL (ref 7–12)
POTASSIUM SERPL-SCNC: 4.7 MMOL/L (ref 3.5–5)
PROTHROMBIN TIME: 13.4 SEC (ref 9.3–12.4)
RBC # BLD: 2.89 E12/L (ref 3.5–5.5)
SODIUM BLD-SCNC: 139 MMOL/L (ref 132–146)
TOTAL PROTEIN: 7.1 G/DL (ref 6.4–8.3)
WBC # BLD: 11.5 E9/L (ref 4.5–11.5)

## 2022-12-09 PROCEDURE — 6370000000 HC RX 637 (ALT 250 FOR IP)

## 2022-12-09 PROCEDURE — 99221 1ST HOSP IP/OBS SF/LOW 40: CPT | Performed by: RADIOLOGY

## 2022-12-09 PROCEDURE — 89051 BODY FLUID CELL COUNT: CPT

## 2022-12-09 PROCEDURE — 0W9930Z DRAINAGE OF RIGHT PLEURAL CAVITY WITH DRAINAGE DEVICE, PERCUTANEOUS APPROACH: ICD-10-PCS | Performed by: RADIOLOGY

## 2022-12-09 PROCEDURE — 85610 PROTHROMBIN TIME: CPT

## 2022-12-09 PROCEDURE — 32550 INSERT PLEURAL CATH: CPT | Performed by: RADIOLOGY

## 2022-12-09 PROCEDURE — 2140000000 HC CCU INTERMEDIATE R&B

## 2022-12-09 PROCEDURE — 6370000000 HC RX 637 (ALT 250 FOR IP): Performed by: INTERNAL MEDICINE

## 2022-12-09 PROCEDURE — 2709999900 IR INSERT TUNNELED PLEURA CATH W CUFF

## 2022-12-09 PROCEDURE — 36415 COLL VENOUS BLD VENIPUNCTURE: CPT

## 2022-12-09 PROCEDURE — 2580000003 HC RX 258: Performed by: INTERNAL MEDICINE

## 2022-12-09 PROCEDURE — 6360000002 HC RX W HCPCS: Performed by: RADIOLOGY

## 2022-12-09 PROCEDURE — 99232 SBSQ HOSP IP/OBS MODERATE 35: CPT

## 2022-12-09 PROCEDURE — 2580000003 HC RX 258: Performed by: EMERGENCY MEDICINE

## 2022-12-09 PROCEDURE — 6370000000 HC RX 637 (ALT 250 FOR IP): Performed by: NURSE PRACTITIONER

## 2022-12-09 PROCEDURE — 2580000003 HC RX 258

## 2022-12-09 PROCEDURE — 80053 COMPREHEN METABOLIC PANEL: CPT

## 2022-12-09 PROCEDURE — 87205 SMEAR GRAM STAIN: CPT

## 2022-12-09 PROCEDURE — 2500000003 HC RX 250 WO HCPCS: Performed by: RADIOLOGY

## 2022-12-09 PROCEDURE — 6360000002 HC RX W HCPCS

## 2022-12-09 PROCEDURE — 75989 ABSCESS DRAINAGE UNDER X-RAY: CPT

## 2022-12-09 PROCEDURE — 94640 AIRWAY INHALATION TREATMENT: CPT

## 2022-12-09 PROCEDURE — 87070 CULTURE OTHR SPECIMN AEROBIC: CPT

## 2022-12-09 PROCEDURE — 75989 ABSCESS DRAINAGE UNDER X-RAY: CPT | Performed by: RADIOLOGY

## 2022-12-09 PROCEDURE — 32550 INSERT PLEURAL CATH: CPT

## 2022-12-09 PROCEDURE — 85025 COMPLETE CBC W/AUTO DIFF WBC: CPT

## 2022-12-09 PROCEDURE — 99232 SBSQ HOSP IP/OBS MODERATE 35: CPT | Performed by: INTERNAL MEDICINE

## 2022-12-09 RX ORDER — MIDAZOLAM HYDROCHLORIDE 2 MG/2ML
INJECTION, SOLUTION INTRAMUSCULAR; INTRAVENOUS
Status: COMPLETED | OUTPATIENT
Start: 2022-12-09 | End: 2022-12-09

## 2022-12-09 RX ORDER — FENTANYL CITRATE 50 UG/ML
INJECTION, SOLUTION INTRAMUSCULAR; INTRAVENOUS
Status: COMPLETED | OUTPATIENT
Start: 2022-12-09 | End: 2022-12-09

## 2022-12-09 RX ORDER — LIDOCAINE HYDROCHLORIDE 20 MG/ML
INJECTION, SOLUTION INFILTRATION; PERINEURAL
Status: COMPLETED | OUTPATIENT
Start: 2022-12-09 | End: 2022-12-09

## 2022-12-09 RX ADMIN — CEFAZOLIN 2000 MG: 2 INJECTION, POWDER, FOR SOLUTION INTRAMUSCULAR; INTRAVENOUS at 07:05

## 2022-12-09 RX ADMIN — MIDAZOLAM HYDROCHLORIDE 0.5 MG: 1 INJECTION, SOLUTION INTRAMUSCULAR; INTRAVENOUS at 16:41

## 2022-12-09 RX ADMIN — SODIUM CHLORIDE, PRESERVATIVE FREE 10 ML: 5 INJECTION INTRAVENOUS at 22:04

## 2022-12-09 RX ADMIN — IPRATROPIUM BROMIDE AND ALBUTEROL SULFATE 1 AMPULE: 2.5; .5 SOLUTION RESPIRATORY (INHALATION) at 08:41

## 2022-12-09 RX ADMIN — SODIUM CHLORIDE, PRESERVATIVE FREE 10 ML: 5 INJECTION INTRAVENOUS at 08:36

## 2022-12-09 RX ADMIN — OXYCODONE HYDROCHLORIDE 10 MG: 10 TABLET ORAL at 22:03

## 2022-12-09 RX ADMIN — DOCUSATE SODIUM 50 MG AND SENNOSIDES 8.6 MG 1 TABLET: 8.6; 5 TABLET, FILM COATED ORAL at 08:36

## 2022-12-09 RX ADMIN — OXYCODONE HYDROCHLORIDE 10 MG: 10 TABLET ORAL at 10:52

## 2022-12-09 RX ADMIN — SODIUM CHLORIDE: 9 INJECTION, SOLUTION INTRAVENOUS at 02:15

## 2022-12-09 RX ADMIN — FENTANYL CITRATE 25 MCG: 50 INJECTION, SOLUTION INTRAMUSCULAR; INTRAVENOUS at 16:44

## 2022-12-09 RX ADMIN — LIDOCAINE HYDROCHLORIDE 10 ML: 20 INJECTION, SOLUTION INFILTRATION; PERINEURAL at 16:46

## 2022-12-09 RX ADMIN — IPRATROPIUM BROMIDE AND ALBUTEROL SULFATE 1 AMPULE: 2.5; .5 SOLUTION RESPIRATORY (INHALATION) at 12:50

## 2022-12-09 RX ADMIN — MIDAZOLAM HYDROCHLORIDE 1 MG: 1 INJECTION, SOLUTION INTRAMUSCULAR; INTRAVENOUS at 16:46

## 2022-12-09 RX ADMIN — DOCUSATE SODIUM 50 MG AND SENNOSIDES 8.6 MG 1 TABLET: 8.6; 5 TABLET, FILM COATED ORAL at 22:04

## 2022-12-09 RX ADMIN — MIRTAZAPINE 7.5 MG: 15 TABLET, FILM COATED ORAL at 22:04

## 2022-12-09 RX ADMIN — FENTANYL CITRATE 25 MCG: 50 INJECTION, SOLUTION INTRAMUSCULAR; INTRAVENOUS at 16:47

## 2022-12-09 RX ADMIN — IPRATROPIUM BROMIDE AND ALBUTEROL SULFATE 1 AMPULE: 2.5; .5 SOLUTION RESPIRATORY (INHALATION) at 20:38

## 2022-12-09 RX ADMIN — AMLODIPINE BESYLATE 5 MG: 5 TABLET ORAL at 08:36

## 2022-12-09 ASSESSMENT — PAIN DESCRIPTION - ORIENTATION
ORIENTATION: RIGHT
ORIENTATION: RIGHT

## 2022-12-09 ASSESSMENT — PAIN DESCRIPTION - DESCRIPTORS: DESCRIPTORS: ACHING;DISCOMFORT;SORE

## 2022-12-09 ASSESSMENT — PAIN DESCRIPTION - LOCATION
LOCATION: SHOULDER
LOCATION: RIB CAGE

## 2022-12-09 ASSESSMENT — PAIN SCALES - GENERAL
PAINLEVEL_OUTOF10: 8
PAINLEVEL_OUTOF10: 9

## 2022-12-09 NOTE — PROGRESS NOTES
Palliative Care Department  [phone]  Palliative Care Progress Note  Provider VERITO Chinchilla - MANNY     Rose Quintanilla  [id number]  Hospital Day: 3  Date of Initial Consult: 12/7/22  Referring Provider: Dr. Ema Gusman was consulted for assistance with: goals of care, CODE STATUS discussion, family support, symptom management     HPI:   Rose Quintanilla is a 71 y.o. with a medical history of uterine fibroids, stage IV adenocarcinoma of the lung s/p right upper lobectomy with peritoneal mets and possible breast mets, multiple paracentesis 11/1 and 11/9 who was admitted on 12/7/2022 from home with a CHIEF COMPLAINT of back pain. She is known to our practice from previous admission 11/22/22 where we established CODE STATUS and goals of care along with symptom management of pain. This ED work-up significant for: BUN/creatinine 36/1.5, albumin 2.4, hgb 9.2, Hep C (+). CT chest revealed mild to moderate upper abdominal ascites, large right effusion, atelectasis right the RLL, pleural and chest wall metastases on the right, 2.7x1.8 cm peripherally enhancing subcutaneous mass in the posterior right should region, likely represents metastasis. General surgery for right axilla mass evaluation. ASSESSMENT/PLAN:     Pertinent Hospital Diagnoses     Right axilla soft tissue mass   Right pleural effusion 2/2 lung cancer  Metastatic disease to liver    Palliative Care Encounter / Counseling Regarding Goals of Care  Please see detailed goals of care discussion as below  At this time, Rose Quintanilla, Does have capacity for medical decision-making. Capacity is time limited and situation/question specific  During encounter Jessica Lozoya was surrogate medical decision-maker  Outcome of goals of care meeting:    Mass removal today  Continue DNR-CCA  Symptom management as below  Continue current medical care   Code status DNR-CCA  Advanced Directives: no POA or living will in [facility]  Surrogate/Legal NOK:  Corrie Hernandez (): Kathi Oreilly (sister): [phone]    Pain 2/2 neoplasm:   -  Oxycodone 5 or 10 mg every 4 hours PRN   - Morphine 2 mg every 4 hours PRN for breakthrough pain  Constipation:   -  Senokot-S 1 tablet BID   -  Miralax 17g daily PRN  Poor appetite:   -  Remeron 7.5 mg HS     Spiritual assessment: no spiritual distress identified  Bereavement and grief: to be determined  Referrals to: none today  SUBJECTIVE:     Current medical issues leading to Palliative Medicine involvement include   Active Hospital Problems    Diagnosis Date Noted    Severe protein-calorie malnutrition ([address] [E43] 12/08/2022     Priority: Medium    Pleural effusion [J90] 12/07/2022     Priority: Medium       Details of Conversation:    Chart reviewed. Patient seen resting at bedside in NAD. She complains of pain and states she is due for her medications. I reminded her that she also has IV pain medication should she need it. She states the pills are \"good for now\". She is excited about having the mass removed today. She wants to see how that helps her pain. She has been NPO today and was started on Remeron yesterday, reports an increase in appetite but not sure if it's medication or NPO induced. She states she still has not had a BM but also has not been eating. We will follow. OBJECTIVE:   Prognosis: Poor and Guarded    ROS: UNLESS STATED ABOVE PATIENT DENIES:  CONSTITUTIONAL:  fever, chill, rigors, nausea, vomiting, fatigue. HEENT: blurry vision, double vision, hearing problem, tinnitus, hoarseness, dysphagia, odynophagia  RESPIRATORY: cough, shortness of breath, sputum expectoration. CARDIOVASCULAR:  Chest pain/pressure, palpitation, syncope, irregular beats  GASTROINTESTINAL:  abdominal or rectal pain, diarrhea  GENITOURINARY:  Burning, frequency, urgency, incontinence, discharge  INTEGUMENTARY: rash, wound, pruritis  HEMATOLOGIC/LYMPHATIC:  Swelling, sores, gum bleeding, easy bruising, pica.   MUSCULOSKELETAL: edema, joint swelling or redness  NEUROLOGICAL:  light headed, dizziness, loss of consciousness, weakness, change in memory, seizures, tremors    Physical Exam:  /64   Pulse 100   Temp 98.4 °F (36.9 °C) (Temporal)   Resp 16   Ht 5' 1\" (1.549 m)   Wt 103 lb 8 oz (46.9 kg)   SpO2 100%   BMI 19.56 kg/m²   Constitutional:  thin, NAD, ill-appearing  Lungs:  diminished bilaterally, no audible rhonchi or wheezes noted, respirations unlabored, no retractions  Heart:  RRR, distant heart tones, no murmur, rub, or gallop noted during exam  Abd:  Soft, non tender, bowel sounds present, +distended  Ext:  Moving all extremities, no edema, pulses present  Skin:  Warm and dry, no rashes on visible skin +right axilla mass  Psych: non-anxious affect  Neuro:  Alert and oriented x4; following commands    Objective data reviewed: labs, images, records, medication use, vitals, and chart    Discussed patient and the plan of care with the other IDT members: Palliative Medicine IDT Team, Patient, and Family    Time/Communication  Greater than 50% of time spent, total 25 minutes in counseling and coordination of care at the bedside regarding symptom management. Thank you for allowing Palliative Medicine to participate in the care of Carmela Mills.

## 2022-12-09 NOTE — PATIENT CARE CONFERENCE
Spoke to Dr Cassie Blood pt said can eat and be sent home from his stand point and schedule surgery at a later date

## 2022-12-09 NOTE — BRIEF OP NOTE
Brief Postoperative Note    Abraham Gordon  YOB: 1950  [id number]    Pre-operative Diagnosis: right pleural effusion plan pleurx and thoracentesis    Post-operative Diagnosis: Same    Procedure:thoracentesis    Anesthesia: Local    Estimated Blood Loss: < 10 cc    Surgeon: Jackson ROSE     Complications: none    Specimen obtained: Yes, fluid, serous    Findings: fluid, drained with catheter drainage      Paula Horner II, MD   12/9/2022 2:57 PM  ri

## 2022-12-09 NOTE — PROGRESS NOTES
Patient is okay for diet from a general surgery standpoint at this time. Plan will be for OR excision tomorrow 12/10. N.p.o. at midnight.     Dr. Jn Villegas

## 2022-12-09 NOTE — PROGRESS NOTES
[place]  Department of Internal Medicine  Division of Pulmonary, Critical Care and Sleep Medicine  Progress Note    Nohemi Navarro DO, MPH, FCCP, FACOI, FACP  Coral Steel DO, MD Corine Valle, APRN-CNP      Patient: Sheila Regalado  MRN: [id number]  : 1950    Encounter Time: 4:33 PM     Date of Admission: 2022 12:01 PM    Primary Care Physician: Aldo Jones DO    Reason for Consultation: Lung Ca/pleural effusion     SUBJECTIVE:    Recommeneded Plerx for continue drainage      OBJECTIVE:     PHYSICAL EXAM:   VITALS:   Vitals:    22 2245 22 0746 22 1122 22 1502   BP: (!) 155/69 136/64  (!) 178/81   Pulse: (!) 108 100  (!) 105   Resp: 20 16 18 18   Temp: 98.3 °F (36.8 °C) 98.4 °F (36.9 °C)     TempSrc: Temporal Temporal     SpO2:  100%  96%   Weight:       Height:            Intake/Output Summary (Last 24 hours) at 2022 1633  Last data filed at 2022 1453  Gross per 24 hour   Intake 0 ml   Output 900 ml   Net -900 ml          CONSTITUTIONAL:   A&O x 3, NAD, appears chronically ill  SKIN:     No rash, right upper back lesion, No skin discoloration  HEENT:     EOMI, MMM, No thrush  NECK:    No bruits, No JVP appreciated  CV:      Sinus,  No murmur, No rubs, No gallops  PULMONARY:   Couse BS,  No Wheezing, No Rales, No Rhonchi      No noted egophony  ABDOMEN:     Soft, non-tender. BS normal. No R/R/G  EXT:    No deformities . No clubbing. No lower extremity edema, No venous stasis  PULSE:   Appears equal and palpable.   PSYCHIATRIC:  Seems appropriate, No acute psychosis  MS:    No fractures, No gross weakness  NEUROLOGIC:   The clinical assessment is non-focal     DATA: IMAGING & TESTING:     LABORATORY TESTS:    CBC with Differential:    Lab Results   Component Value Date/Time    WBC 11.5 2022 04:35 AM    RBC 2.89 2022 04:35 AM    HGB 7.9 2022 04:35 AM    HCT 24.5 12/09/2022 04:35 AM     12/09/2022 04:35 AM    MCV 84.8 12/09/2022 04:35 AM    MCH 27.3 12/09/2022 04:35 AM    MCHC 32.2 12/09/2022 04:35 AM    RDW 15.9 12/09/2022 04:35 AM    SEGSPCT 61 01/16/2012 12:15 PM    LYMPHOPCT 6.9 12/09/2022 04:35 AM    MONOPCT 5.7 12/09/2022 04:35 AM    BASOPCT 0.3 12/09/2022 04:35 AM    MONOSABS 0.66 12/09/2022 04:35 AM    LYMPHSABS 0.80 12/09/2022 04:35 AM    EOSABS 0.05 12/09/2022 04:35 AM    BASOSABS 0.04 12/09/2022 04:35 AM     CMP:    Lab Results   Component Value Date/Time     12/09/2022 04:35 AM    K 4.7 12/09/2022 04:35 AM    K 3.8 07/01/2018 05:14 AM     12/09/2022 04:35 AM    CO2 19 12/09/2022 04:35 AM    BUN 31 12/09/2022 04:35 AM    CREATININE 1.4 12/09/2022 04:35 AM    GFRAA 49 09/14/2022 10:12 AM    LABGLOM 40 12/09/2022 04:35 AM    GLUCOSE 87 12/09/2022 04:35 AM    GLUCOSE 85 04/10/2012 12:28 PM    PROT 7.1 12/09/2022 04:35 AM    LABALBU 2.4 12/09/2022 04:35 AM    LABALBU 4.2 04/10/2012 12:28 PM    CALCIUM 8.8 12/09/2022 04:35 AM    BILITOT 0.3 12/09/2022 04:35 AM    ALKPHOS 499 12/09/2022 04:35 AM    AST 98 12/09/2022 04:35 AM    ALT 44 12/09/2022 04:35 AM     Magnesium:    Lab Results   Component Value Date/Time    MG 1.7 10/31/2022 12:50 PM     Phosphorus:    Lab Results   Component Value Date/Time    PHOS 3.2 11/12/2015 04:30 AM     HgBA1c:    Lab Results   Component Value Date/Time    LABA1C 6.2 03/24/2016 08:30 AM        PRO-BNP:   Lab Results   Component Value Date    PROBNP 1,122 (H) 10/31/2022    PROBNP 104 04/05/2016      ABGs: No results found for: PH, PO2, PCO2  Hemoglobin A1C: No components found for: HGBA1C    IMAGING:  Imaging tests were completed and reviewed and discussed radiology and care team involved and reveals   CT CHEST W CONTRAST    Result Date: 12/7/2022  EXAMINATION: CT OF THE CHEST WITH CONTRAST 12/7/2022 4:32 pm TECHNIQUE: CT of the chest was performed with the administration of intravenous contrast. Multiplanar reformatted images are provided for review. Automated exposure control, iterative reconstruction, and/or weight based adjustment of the mA/kV was utilized to reduce the radiation dose to as low as reasonably achievable. COMPARISON: CT abdomen dated 11/22/2022 HISTORY: ORDERING SYSTEM PROVIDED HISTORY: right shoulder mass, hx lung cancer TECHNOLOGIST PROVIDED HISTORY: Reason for exam:->right shoulder mass, hx lung cancer Decision Support Exception - unselect if not a suspected or confirmed emergency medical condition->Emergency Medical Condition (MA) What reading provider will be dictating this exam?->CRC FINDINGS: Mediastinum: Thyroid gland is normal.  No evidence of bulky mediastinal adenopathy. No evidence of hilar adenopathy. Aorta is nonaneurysmal. Pulmonary artery is normal in size. No evidence of filling defects in the central pulmonary arteries to suggest pulmonary embolism. Lungs/pleura: Enhancing masses are seen involving the pleural space and chest wall in the right hemithorax. There is a 2.4 cm peripherally enhancing mass in the medial anterior right chest on image 48 series 301. Other smaller dependent mural nodules are noted. A large right pleural effusion is noted with atelectasis of the right lower lobe and right middle lobe. 2.5 cm low right infrahilar parenchymal low-density compatible with soft tissue mass. Best seen on image number 68 series 301. Upper Abdomen: Visualized portions of the liver appear unremarkable. Unchanged probable old splenic infarct or injury in the mid splenic body. Stomach has a normal configuration. The right adrenal gland is normal, left not included on the study. There is mild to moderate upper abdominal ascites. Soft Tissues/Bones: There is a peripherally enhancing 2.7 x 1.8 cm subcutaneous soft tissue mass overlying the trapezius muscle in the posterior right shoulder region likely representing a subcutaneous metastasis. No other subcutaneous lesions identified.   Sclerotic focus along the inferior T2 vertebral body could represent bone island or sclerotic metastasis. No other osseous lesions detected. 1.  2.7 x 1.8 cm peripherally enhancing subcutaneous mass in the posterior right shoulder region. This likely represents a subcutaneous metastasis. No other masses related to the right shoulder region. 2.  Large right effusion, atelectasis of the right lower lobe, pleural and chest wall metastases on the right. 3.  Mild to moderate upper abdominal ascites. CT ABDOMEN PELVIS W IV CONTRAST Additional Contrast? None    Result Date: 11/22/2022  THORACIC STRUCTURES: There is a moderate right basilar pleural fluid collection with atelectasis of the right lower lobe. LIVER:  The liver is normal in size, slight nodularity and normal attenuation. No focal mass. No intra or extrahepatic bile duct dilation. GALL BLADDER: Cholecystectomy. SPLEEN:  Stable hypodense region with parenchymal atrophy of the mid spleen. PANCREAS:  Unremarkable. ADRENAL GLANDS:  Unremarkable. ESOPHAGUS AND STOMACH:  Unremarkable. BOWEL: Small bowel:  Unremarkable. Large bowel: The colon and rectum are of normal course and caliber. The appendix is within normal limits. There is no intraperitoneal free air. There is intra-abdominal ascites. URINARY/GENITAL TRACT: Kidneys:  The kidneys are unremarkable. .  No evidence of hydronephrosis, renal calcifications or solid renal mass. Ureters: The ureters are normal course and caliber. There is no evidence of ureter calculus/calculi. URINARY BLADDER:  The urinary bladder is well distended without wall thickening or focal mass. Extensive calcification of uterine fibroids. BLOOD VESSELS: There is atherosclerotic disease. LYMPH NODES:  No evidence of intraabdominal or intrapelvic lymphadenopathy. ABDOMINAL WALL & SOFT TISSUES:  Unremarkable. OSSEOUS STRUCTURES: No acute osseous lesion.      Moderate right basilar pleural fluid collection with atelectasis of the right lower lobe. Slight nodularity of the liver concerning for early cirrhotic morphological change. Stable hypodense region within the spleen with parenchymal atrophy likely related to old splenic infarction or old splenic injury. Large amount of intra-abdominal ascites, unchanged from the prior study. Extensive calcification of uterine fibroids. Atherosclerotic disease. .     Assessment:     Right lung mass positive for Adenocarcinoma, weakly positive for GATA3 and CDX2, TTF-1 negative; KRAS (G12C) positive, PDL1 with 10% expression. 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 Will plan for IR placement of Pleurx catheter  2.7X1.8 cm enhancing subcutaneous mass in the posterior right shoulder region, this likely represents a subcutaneous metastasis. General surgery has been consulted  Recurrent ascites, malignant versus cirrhotic  Cirrhosis  Right breast lesion  History right-sided lung adenocarcinoma 2015 left-sided lung adenocarcinoma 2016  COPD  History of tobacco use  Nutrition, regular diet and nutritional supplement, poor oral intake        Plan:     Plan for IR placement of PleurX catheter   Obtain PT/INR in am  CBC and CMP in am  Pulmonary hygiene, flutter valve  Bronchodilators      Thank you for including us in the care of Ms.  [address] [facility]

## 2022-12-09 NOTE — PROGRESS NOTES
Patient arrived to radiology department for right pleurx catheter placement. Allergies, medications, H&P and fasting instructions reviewed with patient. Vital signs taken. IV flushed and prn adapter attached. Procedural instructions given, questions answered, understanding expressed and consent signed.  Patient given fluoroscopy education, no questions at this time

## 2022-12-09 NOTE — INTERVAL H&P NOTE
H&P Update    Patient's History and Physical  was reviewed. The patient appears likely to able to tolerate the procedure. Risk and benefits discussed including ultimate complications, possibly death and consent obtained. Patient and/family had the opportunity to ask questions. All questions were answered and patient/family wishes to proceed.      Denise Baron, II

## 2022-12-09 NOTE — PROGRESS NOTES
GENERAL SURGERY  DAILY PROGRESS NOTE  12/9/2022    CHIEF COMPLAINT:  Chief Complaint   Patient presents with    Back Pain     Cyst on back/shoulder with increased pain denies any new injury . Hx lung ca       SUBJECTIVE:  Patient seen resting comfortably. No acute events overnight, feels the same as yesterday. OBJECTIVE:  BP (!) 155/69   Pulse (!) 108   Temp 98.3 °F (36.8 °C) (Temporal)   Resp 20   Ht 5' 1\" (1.549 m)   Wt 103 lb 8 oz (46.9 kg)   SpO2 100%   BMI 19.56 kg/m²     GENERAL: Frail, cachectic,   LUNGS:  No increased work of breathing on room air  CARDIOVASCULAR: Hemodynamically stable  ABDOMEN:  Soft, non-distended, non-tender. No guarding, rigidity, rebound.     ASSESSMENT/PLAN:  71 y.o. female with painful 3 cm subcutaneous mass    Plan  -OR for excision today pending OR availability  -Multimodal pain control  -Remain n.p.o. until after surgery    D/w Dr. Carissa Osborne, DO  Surgery Resident PGY-1  12/9/2022  7:08 AM

## 2022-12-09 NOTE — CONSULTS
Neuro-Interventional/ Interventional Consult     Patient Nessa Camarillo  MRN: [id number]  YOB: 1950  DATE OF EVALUATION: 12/9/2022    HPI:   Chief Complaint   Patient presents with    Back Pain     Cyst on back/shoulder with increased pain denies any new injury . Hx lung ca       Assessment:   Reason For Evaluation:   Laura Reyes a 71 y.o. female back patient and pleural fluid  Imaging reviewed by me shows a large right pleural effusion  Physical Exam: decreased breath sounds bilateral worse on right    Plan:     Rec    Pleurx catheter placement and thoracentesis    30 min of which greater then 50% was spent either coordinating care or couseling    We discussed the possible risks including, bleeding infection and eventual catheter occlusion    We had an extensive discussion regarding different treatment options. We discussed possible risks of procedure including death. I answered all questions from patient/family to their satisfaction, they understand the options and risk and wishes to proceed. Allergies: Allergies   Allergen Reactions    Ibuprofen Palpitations and Rash     Prior to Visit Medications    Medication Sig Taking? Authorizing Provider   acetaminophen (TYLENOL) 500 MG tablet Take 1 tablet by mouth 4 times daily as needed for Pain  Ronald Perales MD   amLODIPine (NORVASC) 5 MG tablet take 1 tablet by mouth once daily  Historical Provider, MD   vitamin B-12 (CYANOCOBALAMIN) 500 MCG tablet take 1 tablet by mouth daily  Juanito Rojo, DO   tiotropium (SPIRIVA HANDIHALER) 18 MCG inhalation capsule Inhale 1 capsule into the lungs daily  Juanito Rojo DO   lisinopril (PRINIVIL;ZESTRIL) 40 MG tablet take 1 tablet by mouth once daily  Ramon Ruiz, DO   Multiple Vitamins-Minerals (THERAPEUTIC MULTIVITAMIN-MINERALS) tablet Take 1 tablet by mouth daily  Tala Jovel MD   [facility] [place].  Devices KIT BP monitoring KIT  Yuliana Warren MD     Social History     Tobacco Use    Smoking status: Former     Packs/day: 0.00     Years: 30.00     Pack years: 0.00     Types: Cigarettes     Quit date: 2015     Years since quittin.5    Smokeless tobacco: Never   Vaping Use    Vaping Use: Never used   Substance Use Topics    Alcohol use: Not Currently     Alcohol/week: 2.0 standard drinks     Types: 2 Glasses of wine per week     Comment: x2 week    Drug use: No     Family History   Problem Relation Age of Onset    Heart Disease Mother     High Blood Pressure Mother     Cancer Mother         ovarian    Cancer Father     Heart Disease Father     High Blood Pressure Father     Kidney Disease Father     Diabetes Sister     Breast Cancer Sister     Diabetes Brother      Past Surgical History:   Procedure Laterality Date    APPENDECTOMY  1965    BREAST BIOPSY Left     AGE 30'S LEFT NIPPLE REMOVED    BREAST LUMPECTOMY  1999    left, benign[facility]  2012    Rubi Meade, Dr. Yun Alonso, [facility]  10/15/15    with EBUS - DR Adelina Gonzalez    CHOLECYSTECTOMY      COLONOSCOPY  2009    partial to distal ascending colon normal (completion BE same day normal), Dr. Frank Dunham, [facility]    COLONOSCOPY  2014    done under fluoro with sigmoid straightening device so was able to get to cecum, very poor prep, moderate proctitis, repeat 5 years,  Dr. Frank Dunham, [facility]    COLONOSCOPY N/A 3/28/2019    COLONOSCOPY POLYPECTOMY SNARE/COLD BIOPSY performed by Jose Chowdary DO at [address] [place] [address]  N/A 2022    COLONOSCOPY POLYPECTOMY SNARE/COLD BIOPSY performed by Yaquelin Nagel MD at [address] [place] [address]  2022    CT NEEDLE BIOPSY LUNG PERCUTANEOUS 2022 L Ngoc Adams MD [facility]    ENDOMETRIAL BIOPSY      LIVER RESECTION Right 2022    LAPAROSCOPIC ROBOTIC PERITONEAL MASS RESECTION performed by Pierce Toledo MD at [place] [address] Left 3/29/2016    VATS WEDGE RESECTION UPPER LOBECTOMY    OTHER SURGICAL HISTORY Right 11/04/2016    REMOVAL MEDI-PORT - DR Wing Juárez    MA LAP,CHOLECYSTECTOMY/GRAPH N/A 6/29/2018    CHOLECYSTECTOMY LAPAROSCOPIC, POSSIBLE OPEN,POSSIBLE GRAM ( OC 2) performed by Barry Houston MD at [place] [id number] Right 11/11/2015    VATS, BRONCH,RT UPPER LOBECTOMY; NODE DISECTION    TUNNELED VENOUS PORT PLACEMENT Right 12/07/2015    right chest, and removed     UPPER GASTROINTESTINAL ENDOSCOPY N/A 5/13/2022    EGD POLYP HOT FORCEP/CAUTERY performed by Sherri Lantigua MD at [facility] ENDOSCOPY     Past Medical History:   Diagnosis Date    Abnormal chest x-ray with multiple lung nodules 9/8/2015    Abnormal Pap smear 1/3/2011    Alpha-1 negative    Adrenal adenoma     7 mm    Bilateral lung cancer ([address] 5/12/2016    surgically removed - 2015     Cholelithiasis 6/6/2011    Encounter for screening colonoscopy     for OR 3-28-19     Fibroids     Hemangioma of spleen     Hepatitis C 01/03/2011    treated and cured, no current issues     Hyperlipidemia     no medications     Hypertension 6/6/2011    Insomnia     Lung nodule     ROMELIA     Lymphadenopathy     Cervical (-) ENT    Malignant neoplasm of upper lobe of right lung ([address] 11/18/2015    Osteoarthritis     Panlobular emphysema ([address] 11/12/2015    controlled with inhalers     PPD negative 1/18/12    Pulmonary embolism without acute cor pulmonale ([address] 5/12/2016    PVD (peripheral vascular disease) ([address] 5/6/2014    Scoliosis     Uterine fibroid 5/6/2014         Objective:   /64   Pulse 100   Temp 98.4 °F (36.9 °C) (Temporal)   Resp 18   Ht 5' 1\" (1.549 m)   Wt 103 lb 8 oz (46.9 kg)   SpO2 100%   BMI 19.56 kg/m²       Laboratory/Radiology:     Recent Results (from the past 24 hour(s))   Protime-INR    Collection Time: 12/09/22  4:35 AM   Result Value Ref Range    Protime 13.4 (H) 9.3 - 12.4 sec    INR 1.2    CBC with Auto Differential    Collection Time: 12/09/22  4:35 AM   Result Value Ref Range    WBC 11.5 4.5 - 11.5 E9/L RBC 2.89 (L) 3.50 - 5.50 E12/L    Hemoglobin 7.9 (L) 11.5 - 15.5 g/dL    Hematocrit 24.5 (L) 34.0 - 48.0 %    MCV 84.8 80.0 - 99.9 fL    MCH 27.3 26.0 - 35.0 pg    MCHC 32.2 32.0 - 34.5 %    RDW 15.9 (H) 11.5 - 15.0 fL    Platelets 243 212 - 320 E9/L    MPV 9.9 7.0 - 12.0 fL    Neutrophils % 85.7 (H) 43.0 - 80.0 %    Immature Granulocytes % 1.0 0.0 - 5.0 %    Lymphocytes % 6.9 (L) 20.0 - 42.0 %    Monocytes % 5.7 2.0 - 12.0 %    Eosinophils % 0.4 0.0 - 6.0 %    Basophils % 0.3 0.0 - 2.0 %    Neutrophils Absolute 9.88 (H) 1.80 - 7.30 E9/L    Immature Granulocytes # 0.11 E9/L    Lymphocytes Absolute 0.80 (L) 1.50 - 4.00 E9/L    Monocytes Absolute 0.66 0.10 - 0.95 E9/L    Eosinophils Absolute 0.05 0.05 - 0.50 E9/L    Basophils Absolute 0.04 0.00 - 0.20 E9/L   Comprehensive Metabolic Panel    Collection Time: 12/09/22  4:35 AM   Result Value Ref Range    Sodium 139 132 - 146 mmol/L    Potassium 4.7 3.5 - 5.0 mmol/L    Chloride 108 (H) 98 - 107 mmol/L    CO2 19 (L) 22 - 29 mmol/L    Anion Gap 12 7 - 16 mmol/L    Glucose 87 74 - 99 mg/dL    BUN 31 (H) 6 - 23 mg/dL    Creatinine 1.4 (H) 0.5 - 1.0 mg/dL    Est, Glom Filt Rate 40 >=60 mL/min/1.73    Calcium 8.8 8.6 - 10.2 mg/dL    Total Protein 7.1 6.4 - 8.3 g/dL    Albumin 2.4 (L) 3.5 - 5.2 g/dL    Total Bilirubin 0.3 0.0 - 1.2 mg/dL    Alkaline Phosphatase 499 (H) 35 - 104 U/L    ALT 44 (H) 0 - 32 U/L    AST 98 (H) 0 - 31 U/L       CT CHEST W CONTRAST    Result Date: 12/7/2022  EXAMINATION: CT OF THE CHEST WITH CONTRAST 12/7/2022 4:32 pm TECHNIQUE: CT of the chest was performed with the administration of intravenous contrast. Multiplanar reformatted images are provided for review. Automated exposure control, iterative reconstruction, and/or weight based adjustment of the mA/kV was utilized to reduce the radiation dose to as low as reasonably achievable.  COMPARISON: CT abdomen dated 11/22/2022 HISTORY: ORDERING SYSTEM PROVIDED HISTORY: right shoulder mass, hx lung cancer TECHNOLOGIST PROVIDED HISTORY: Reason for exam:->right shoulder mass, hx lung cancer Decision Support Exception - unselect if not a suspected or confirmed emergency medical condition->Emergency Medical Condition (MA) What reading provider will be dictating this exam?->CRC FINDINGS: Mediastinum: Thyroid gland is normal.  No evidence of bulky mediastinal adenopathy. No evidence of hilar adenopathy. Aorta is nonaneurysmal. Pulmonary artery is normal in size. No evidence of filling defects in the central pulmonary arteries to suggest pulmonary embolism. Lungs/pleura: Enhancing masses are seen involving the pleural space and chest wall in the right hemithorax. There is a 2.4 cm peripherally enhancing mass in the medial anterior right chest on image 48 series 301. Other smaller dependent mural nodules are noted. A large right pleural effusion is noted with atelectasis of the right lower lobe and right middle lobe. 2.5 cm low right infrahilar parenchymal low-density compatible with soft tissue mass. Best seen on image number 68 series 301. Upper Abdomen: Visualized portions of the liver appear unremarkable. Unchanged probable old splenic infarct or injury in the mid splenic body. Stomach has a normal configuration. The right adrenal gland is normal, left not included on the study. There is mild to moderate upper abdominal ascites. Soft Tissues/Bones: There is a peripherally enhancing 2.7 x 1.8 cm subcutaneous soft tissue mass overlying the trapezius muscle in the posterior right shoulder region likely representing a subcutaneous metastasis. No other subcutaneous lesions identified. Sclerotic focus along the inferior T2 vertebral body could represent bone island or sclerotic metastasis. No other osseous lesions detected. 1.  2.7 x 1.8 cm peripherally enhancing subcutaneous mass in the posterior right shoulder region. This likely represents a subcutaneous metastasis.   No other masses related to the right shoulder region. 2.  Large right effusion, atelectasis of the right lower lobe, pleural and chest wall metastases on the right. 3.  Mild to moderate upper abdominal ascites.        Patient Active Problem List   Diagnosis    Hepatitis C    Abnormal Pap smear    Dyslipidemia    Insomnia    Ex-smoker    PVD (peripheral vascular disease) (HCC)    Uterine fibroid    Gall stone    Kidney stone    Left groin pain    Need for Tdap vaccination    Abnormal chest x-ray with multiple lung nodules    Panlobular emphysema (HCC)    Chronic hepatitis C virus infection ([address]    Malignant neoplasm of upper lobe of right lung (HCC)    Malignant neoplasm of lung (HCC)    Leukocytosis    Anemia    Essential hypertension    Thrombocytopenia (HCC)    Malignant neoplasm of upper lobe of left lung (HCC)    History of lobectomy of lung    Adenocarcinoma, lung (HCC)    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)    Hep C w/o coma, chronic (HCC)    Hyperlipidemia    Warfarin anticoagulation    Bilateral lung cancer ([address]    Pulmonary embolism without acute cor pulmonale (HCC)    Port-A-Cath in place    Acute cholecystitis    History of lung cancer    RBBB    Adenomatous polyp of descending colon    Abdominal pain    Ascites of liver    Severe protein-calorie malnutrition (HCC)    Intractable pain    Goals of care, counseling/discussion    Palliative care encounter    Mass of right axilla    Subareolar mass of right breast    Pleural effusion, right    Pleural effusion         Matthew Todd II, MD  12:08 PM  12/9/2022

## 2022-12-09 NOTE — PLAN OF CARE
Problem: Discharge Planning  Goal: Discharge to home or other facility with appropriate resources  12/9/2022 1203 by Julio C Dickey RN  Outcome: Progressing  12/9/2022 0525 by Yi Temple RN  Outcome: Progressing     Problem: Pain  Goal: Verbalizes/displays adequate comfort level or baseline comfort level  12/9/2022 1203 by Julio C Dickey RN  Outcome: Progressing  12/9/2022 0525 by Yi Temple RN  Outcome: Progressing     Problem: Safety - Adult  Goal: Free from fall injury  12/9/2022 1203 by Julio C Dickey RN  Outcome: Progressing  12/9/2022 0525 by Yi Temple RN  Outcome: Progressing     Problem: ABCDS Injury Assessment  Goal: Absence of physical injury  12/9/2022 1203 by Julio C Dickey RN  Outcome: Progressing  12/9/2022 0525 by Yi Temple RN  Outcome: Progressing     Problem: Skin/Tissue Integrity  Goal: Absence of new skin breakdown  Description: 1. Monitor for areas of redness and/or skin breakdown  2. Assess vascular access sites hourly  3. Every 4-6 hours minimum:  Change oxygen saturation probe site  4. Every 4-6 hours:  If on nasal continuous positive airway pressure, respiratory therapy assess nares and determine need for appliance change or resting period.   12/9/2022 1203 by Julio C Dickey RN  Outcome: Progressing  12/9/2022 0525 by Yi Temple RN  Outcome: Progressing     Problem: Nutrition Deficit:  Goal: Optimize nutritional status  12/9/2022 1203 by Julio C Dickey RN  Outcome: Progressing  12/9/2022 0525 by Yi Temple RN  Outcome: Progressing

## 2022-12-09 NOTE — CARE COORDINATION
Reviewed chart, per general surgery pt to have excision today, pending OR availability. IR consulted for pleurex cath. Plan per pt and family is home with significant other, and [facility], who is following. Pt will need a standard walker 18 inch ordered at discharge, and will need transport arranged at discharge. Referral was made to [facility] visiting physicians, they will contact sister Bill Sales. Ángel Russell, MSW, LSW

## 2022-12-09 NOTE — PROGRESS NOTES
Hospital Medicine    Subjective:  pt c/o pain r back soft tissue mass for OR today      Current Facility-Administered Medications:     oxyCODONE (ROXICODONE) immediate release tablet 5 mg, 5 mg, Oral, Q4H PRN **OR** oxyCODONE HCl (OXY-IR) immediate release tablet 10 mg, 10 mg, Oral, Q4H PRN, James Miller APRN - CNP, 10 mg at 12/08/22 1328    sennosides-docusate sodium (SENOKOT-S) 8.6-50 MG tablet 1 tablet, 1 tablet, Oral, BID, James Miller, APRN - CNP, 1 tablet at 12/08/22 2101    mirtazapine (REMERON) tablet 7.5 mg, 7.5 mg, Oral, Nightly, James Miller APRN - CNP, 7.5 mg at 12/08/22 2101    ipratropium-albuterol (DUONEB) nebulizer solution 1 ampule, 1 ampule, Inhalation, Q4H WA, Lila Warren APRN - CNP, 1 ampule at 12/08/22 2123    0.9 % sodium chloride infusion, , IntraVENous, Continuous, Yoni Albright MD, Last Rate: 100 mL/hr at 12/09/22 0215, New Bag at 12/09/22 0215    amLODIPine (NORVASC) tablet 5 mg, 5 mg, Oral, Daily, Aliciaia Nim Malmer, DO, 5 mg at 12/08/22 7097    sodium chloride flush 0.9 % injection 5-40 mL, 5-40 mL, IntraVENous, 2 times per day, Esparza April, DO, 10 mL at 12/08/22 2101    sodium chloride flush 0.9 % injection 5-40 mL, 5-40 mL, IntraVENous, PRN, Aliciaia Nim Malmer, DO    0.9 % sodium chloride infusion, , IntraVENous, PRN, Claria Nim Malmer, DO    enoxaparin Sodium (LOVENOX) injection 30 mg, 30 mg, SubCUTAneous, Daily, Claria Nim Malmer, DO, 30 mg at 12/08/22 8727    polyethylene glycol (GLYCOLAX) packet 17 g, 17 g, Oral, Daily PRN, Claria Nim Malmer, DO    acetaminophen (TYLENOL) tablet 650 mg, 650 mg, Oral, Q6H PRN **OR** acetaminophen (TYLENOL) suppository 650 mg, 650 mg, Rectal, Q6H PRN, Honey Nesbitt,     morphine (PF) injection 2 mg, 2 mg, IntraVENous, Q4H PRN, Honey Nesbitt, , 2 mg at 12/08/22 3126    trimethobenzamide (TIGAN) injection 200 mg, 200 mg, IntraMUSCular, Q6H PRN, Honey Nesbitt,     Facility-Administered Medications Ordered in Other Encounters:     pantoprazole (PROTONIX) tablet 40 mg, 40 mg, Oral, Luís SWEENEY MD    Objective:    /64   Pulse 100   Temp 98.4 °F (36.9 °C) (Temporal)   Resp 16   Ht 5' 1\" (1.549 m)   Wt 103 lb 8 oz (46.9 kg)   SpO2 100%   BMI 19.56 kg/m²     Heart:  reg  Lungs:  decreased breath sounds r base  Abd: + bs soft  Extrem:  min edema legs    CBC with Differential:    Lab Results   Component Value Date/Time    WBC 11.5 12/09/2022 04:35 AM    RBC 2.89 12/09/2022 04:35 AM    HGB 7.9 12/09/2022 04:35 AM    HCT 24.5 12/09/2022 04:35 AM     12/09/2022 04:35 AM    MCV 84.8 12/09/2022 04:35 AM    MCH 27.3 12/09/2022 04:35 AM    MCHC 32.2 12/09/2022 04:35 AM    RDW 15.9 12/09/2022 04:35 AM    SEGSPCT 61 01/16/2012 12:15 PM    LYMPHOPCT 6.9 12/09/2022 04:35 AM    MONOPCT 5.7 12/09/2022 04:35 AM    BASOPCT 0.3 12/09/2022 04:35 AM    MONOSABS 0.66 12/09/2022 04:35 AM    LYMPHSABS 0.80 12/09/2022 04:35 AM    EOSABS 0.05 12/09/2022 04:35 AM    BASOSABS 0.04 12/09/2022 04:35 AM     CMP:    Lab Results   Component Value Date/Time     12/09/2022 04:35 AM    K 4.7 12/09/2022 04:35 AM    K 3.8 07/01/2018 05:14 AM     12/09/2022 04:35 AM    CO2 19 12/09/2022 04:35 AM    BUN 31 12/09/2022 04:35 AM    CREATININE 1.4 12/09/2022 04:35 AM    GFRAA 49 09/14/2022 10:12 AM    LABGLOM 40 12/09/2022 04:35 AM    GLUCOSE 87 12/09/2022 04:35 AM    GLUCOSE 85 04/10/2012 12:28 PM    PROT 7.1 12/09/2022 04:35 AM    LABALBU 2.4 12/09/2022 04:35 AM    LABALBU 4.2 04/10/2012 12:28 PM    CALCIUM 8.8 12/09/2022 04:35 AM    BILITOT 0.3 12/09/2022 04:35 AM    ALKPHOS 499 12/09/2022 04:35 AM    AST 98 12/09/2022 04:35 AM    ALT 44 12/09/2022 04:35 AM     Warfarin PT/INR:    Lab Results   Component Value Date    INR 1.2 12/09/2022    INR 1.2 11/01/2022    INR 1.0 09/20/2022    PROTIME 13.4 (H) 12/09/2022    PROTIME 12.7 (H) 11/01/2022    PROTIME 11.7 09/20/2022       Assessment:    Principal Problem:    Pleural effusion  Active Problems:    Severe protein-calorie malnutrition ([facility]  Resolved Problems:    * No resolved hospital problems.  *      Plan:  For OR excision of soft tissue mass back today / eventual thoracentesis        Blas Oseguera DO  8:00 AM  12/9/2022

## 2022-12-10 ENCOUNTER — ANESTHESIA EVENT (OUTPATIENT)
Dept: OPERATING ROOM | Age: 72
End: 2022-12-10
Payer: MEDICARE

## 2022-12-10 LAB
ALBUMIN SERPL-MCNC: 2.2 G/DL (ref 3.5–5.2)
ALP BLD-CCNC: 517 U/L (ref 35–104)
ALT SERPL-CCNC: 25 U/L (ref 0–32)
ANION GAP SERPL CALCULATED.3IONS-SCNC: 11 MMOL/L (ref 7–16)
AST SERPL-CCNC: 60 U/L (ref 0–31)
BASOPHILS ABSOLUTE: 0.02 E9/L (ref 0–0.2)
BASOPHILS RELATIVE PERCENT: 0.2 % (ref 0–2)
BILIRUB SERPL-MCNC: 0.3 MG/DL (ref 0–1.2)
BUN BLDV-MCNC: 30 MG/DL (ref 6–23)
CALCIUM SERPL-MCNC: 8.7 MG/DL (ref 8.6–10.2)
CHLORIDE BLD-SCNC: 111 MMOL/L (ref 98–107)
CO2: 19 MMOL/L (ref 22–29)
CREAT SERPL-MCNC: 1.4 MG/DL (ref 0.5–1)
EOSINOPHILS ABSOLUTE: 0.01 E9/L (ref 0.05–0.5)
EOSINOPHILS RELATIVE PERCENT: 0.1 % (ref 0–6)
GFR SERPL CREATININE-BSD FRML MDRD: 40 ML/MIN/1.73
GLUCOSE BLD-MCNC: 78 MG/DL (ref 74–99)
HCT VFR BLD CALC: 24.1 % (ref 34–48)
HEMOGLOBIN: 7.7 G/DL (ref 11.5–15.5)
IMMATURE GRANULOCYTES #: 0.09 E9/L
IMMATURE GRANULOCYTES %: 1 % (ref 0–5)
INR BLD: 1.2
LYMPHOCYTES ABSOLUTE: 0.6 E9/L (ref 1.5–4)
LYMPHOCYTES RELATIVE PERCENT: 6.7 % (ref 20–42)
MCH RBC QN AUTO: 27.6 PG (ref 26–35)
MCHC RBC AUTO-ENTMCNC: 32 % (ref 32–34.5)
MCV RBC AUTO: 86.4 FL (ref 80–99.9)
MONOCYTES ABSOLUTE: 0.51 E9/L (ref 0.1–0.95)
MONOCYTES RELATIVE PERCENT: 5.7 % (ref 2–12)
NEUTROPHILS ABSOLUTE: 7.77 E9/L (ref 1.8–7.3)
NEUTROPHILS RELATIVE PERCENT: 86.3 % (ref 43–80)
PDW BLD-RTO: 16.1 FL (ref 11.5–15)
PLATELET # BLD: 400 E9/L (ref 130–450)
PMV BLD AUTO: 9.7 FL (ref 7–12)
POTASSIUM REFLEX MAGNESIUM: 4.8 MMOL/L (ref 3.5–5)
PROTHROMBIN TIME: 13.1 SEC (ref 9.3–12.4)
RBC # BLD: 2.79 E12/L (ref 3.5–5.5)
SODIUM BLD-SCNC: 141 MMOL/L (ref 132–146)
TOTAL PROTEIN: 6.9 G/DL (ref 6.4–8.3)
WBC # BLD: 9 E9/L (ref 4.5–11.5)

## 2022-12-10 PROCEDURE — 80053 COMPREHEN METABOLIC PANEL: CPT

## 2022-12-10 PROCEDURE — 6370000000 HC RX 637 (ALT 250 FOR IP): Performed by: NURSE PRACTITIONER

## 2022-12-10 PROCEDURE — 85025 COMPLETE CBC W/AUTO DIFF WBC: CPT

## 2022-12-10 PROCEDURE — 6370000000 HC RX 637 (ALT 250 FOR IP): Performed by: INTERNAL MEDICINE

## 2022-12-10 PROCEDURE — 2140000000 HC CCU INTERMEDIATE R&B

## 2022-12-10 PROCEDURE — 99233 SBSQ HOSP IP/OBS HIGH 50: CPT | Performed by: INTERNAL MEDICINE

## 2022-12-10 PROCEDURE — 94640 AIRWAY INHALATION TREATMENT: CPT

## 2022-12-10 PROCEDURE — 6370000000 HC RX 637 (ALT 250 FOR IP)

## 2022-12-10 PROCEDURE — 85610 PROTHROMBIN TIME: CPT

## 2022-12-10 PROCEDURE — 36415 COLL VENOUS BLD VENIPUNCTURE: CPT

## 2022-12-10 PROCEDURE — 2580000003 HC RX 258: Performed by: INTERNAL MEDICINE

## 2022-12-10 PROCEDURE — 2580000003 HC RX 258: Performed by: EMERGENCY MEDICINE

## 2022-12-10 RX ORDER — DIMETHICONE, OXYBENZONE, AND PADIMATE O 2; 2.5; 6.6 G/100G; G/100G; G/100G
STICK TOPICAL PRN
Status: DISCONTINUED | OUTPATIENT
Start: 2022-12-10 | End: 2022-12-12 | Stop reason: HOSPADM

## 2022-12-10 RX ORDER — IPRATROPIUM BROMIDE AND ALBUTEROL SULFATE 2.5; .5 MG/3ML; MG/3ML
1 SOLUTION RESPIRATORY (INHALATION) 4 TIMES DAILY
Status: DISCONTINUED | OUTPATIENT
Start: 2022-12-10 | End: 2022-12-12 | Stop reason: HOSPADM

## 2022-12-10 RX ADMIN — DOCUSATE SODIUM 50 MG AND SENNOSIDES 8.6 MG 1 TABLET: 8.6; 5 TABLET, FILM COATED ORAL at 20:52

## 2022-12-10 RX ADMIN — MIRTAZAPINE 7.5 MG: 15 TABLET, FILM COATED ORAL at 20:52

## 2022-12-10 RX ADMIN — SODIUM CHLORIDE, PRESERVATIVE FREE 10 ML: 5 INJECTION INTRAVENOUS at 08:53

## 2022-12-10 RX ADMIN — IPRATROPIUM BROMIDE AND ALBUTEROL SULFATE 1 AMPULE: 2.5; .5 SOLUTION RESPIRATORY (INHALATION) at 11:17

## 2022-12-10 RX ADMIN — OXYCODONE 5 MG: 5 TABLET ORAL at 08:37

## 2022-12-10 RX ADMIN — DOCUSATE SODIUM 50 MG AND SENNOSIDES 8.6 MG 1 TABLET: 8.6; 5 TABLET, FILM COATED ORAL at 08:37

## 2022-12-10 RX ADMIN — IPRATROPIUM BROMIDE AND ALBUTEROL SULFATE 1 AMPULE: 2.5; .5 SOLUTION RESPIRATORY (INHALATION) at 07:56

## 2022-12-10 RX ADMIN — IPRATROPIUM BROMIDE AND ALBUTEROL SULFATE 1 AMPULE: 2.5; .5 SOLUTION RESPIRATORY (INHALATION) at 16:12

## 2022-12-10 RX ADMIN — SODIUM CHLORIDE, PRESERVATIVE FREE 10 ML: 5 INJECTION INTRAVENOUS at 20:52

## 2022-12-10 RX ADMIN — OXYCODONE HYDROCHLORIDE 10 MG: 10 TABLET ORAL at 19:33

## 2022-12-10 RX ADMIN — SODIUM CHLORIDE: 9 INJECTION, SOLUTION INTRAVENOUS at 01:43

## 2022-12-10 RX ADMIN — OXYCODONE HYDROCHLORIDE 10 MG: 10 TABLET ORAL at 15:58

## 2022-12-10 RX ADMIN — SODIUM CHLORIDE: 9 INJECTION, SOLUTION INTRAVENOUS at 20:50

## 2022-12-10 RX ADMIN — IPRATROPIUM BROMIDE AND ALBUTEROL SULFATE 1 AMPULE: 2.5; .5 SOLUTION RESPIRATORY (INHALATION) at 19:40

## 2022-12-10 RX ADMIN — AMLODIPINE BESYLATE 5 MG: 5 TABLET ORAL at 08:37

## 2022-12-10 ASSESSMENT — PAIN DESCRIPTION - PAIN TYPE: TYPE: CHRONIC PAIN

## 2022-12-10 ASSESSMENT — PAIN SCALES - GENERAL
PAINLEVEL_OUTOF10: 7
PAINLEVEL_OUTOF10: 6
PAINLEVEL_OUTOF10: 6
PAINLEVEL_OUTOF10: 3
PAINLEVEL_OUTOF10: 2

## 2022-12-10 ASSESSMENT — PAIN DESCRIPTION - ORIENTATION
ORIENTATION: RIGHT
ORIENTATION: MID

## 2022-12-10 ASSESSMENT — PAIN DESCRIPTION - DESCRIPTORS
DESCRIPTORS: DISCOMFORT;DULL;SORE
DESCRIPTORS: SORE;THROBBING;DISCOMFORT
DESCRIPTORS: SORE;THROBBING;DISCOMFORT
DESCRIPTORS: ACHING;DISCOMFORT;SORE

## 2022-12-10 ASSESSMENT — PAIN DESCRIPTION - LOCATION
LOCATION: BACK
LOCATION: ARM
LOCATION: GENERALIZED
LOCATION: GENERALIZED

## 2022-12-10 ASSESSMENT — PAIN - FUNCTIONAL ASSESSMENT: PAIN_FUNCTIONAL_ASSESSMENT: PREVENTS OR INTERFERES SOME ACTIVE ACTIVITIES AND ADLS

## 2022-12-10 ASSESSMENT — PAIN DESCRIPTION - FREQUENCY: FREQUENCY: INTERMITTENT

## 2022-12-10 ASSESSMENT — PAIN DESCRIPTION - ONSET: ONSET: ON-GOING

## 2022-12-10 NOTE — PROGRESS NOTES
Patient seen and examined, noted to be tachycardic overnight however on room air this morning. Received right-sided Pleurx catheter yesterday no new complaints overnight. Large right axillary mass with plans for excision today. Risk first benefits of the procedure were discussed with the patient she agrees to proceed with procedure.     Electronically signed by Ludmila Jimenez MD on 12/10/22 at 7:56 AM EST

## 2022-12-10 NOTE — PROGRESS NOTES
[facility]  Department of Internal Medicine  Division of Pulmonary, Critical Care and Sleep Medicine  Progress Note    Vianey Cherry DO, MPH, FCCP, FACOI, FACP  Coral Sepulveda DO, MD Justice Deleon APRN-CNP      Patient: Oscar Shah  MRN: [id number]  : 1950    Encounter Time: 3:00 PM     Date of Admission: 2022 12:01 PM    Primary Care Physician: Yassine Grant DO    Reason for Consultation: Lung Ca/pleural effusion     SUBJECTIVE:    Recommeneded Plerx for continue drainage      OBJECTIVE:     PHYSICAL EXAM:   VITALS:   Vitals:    12/10/22 0758 12/10/22 0837 12/10/22 1118 12/10/22 1200   BP:    136/61   Pulse:    (!) 102   Resp:  16  17   Temp:    97.5 °F (36.4 °C)   TempSrc:    Temporal   SpO2: 96%  97% 94%   Weight:       Height:            Intake/Output Summary (Last 24 hours) at 12/10/2022 1500  Last data filed at 12/10/2022 1429  Gross per 24 hour   Intake 5 ml   Output 200 ml   Net -195 ml          CONSTITUTIONAL:   A&O x 3, NAD, appears chronically ill  SKIN:     No rash, right upper back lesion, No skin discoloration  HEENT:     EOMI, MMM, No thrush  NECK:    No bruits, No JVP appreciated  CV:      Sinus,  No murmur, No rubs, No gallops  PULMONARY:   Couse BS,  No Wheezing, No Rales, No Rhonchi      No noted egophony  ABDOMEN:     Soft, non-tender. BS normal. No R/R/G  EXT:    No deformities . No clubbing. No lower extremity edema, No venous stasis  PULSE:   Appears equal and palpable.   PSYCHIATRIC:  Seems appropriate, No acute psychosis  MS:    No fractures, No gross weakness  NEUROLOGIC:   The clinical assessment is non-focal     DATA: IMAGING & TESTING:     LABORATORY TESTS:    CBC with Differential:    Lab Results   Component Value Date/Time    WBC 9.0 12/10/2022 06:26 AM    RBC 2.79 12/10/2022 06:26 AM    HGB 7.7 12/10/2022 06:26 AM    HCT 24.1 12/10/2022 06:26 AM     12/10/2022 06:26 AM    MCV 86.4 12/10/2022 06:26 AM    MCH 27.6 12/10/2022 06:26 AM    MCHC 32.0 12/10/2022 06:26 AM    RDW 16.1 12/10/2022 06:26 AM    SEGSPCT 61 01/16/2012 12:15 PM    LYMPHOPCT 6.7 12/10/2022 06:26 AM    MONOPCT 5.7 12/10/2022 06:26 AM    BASOPCT 0.2 12/10/2022 06:26 AM    MONOSABS 0.51 12/10/2022 06:26 AM    LYMPHSABS 0.60 12/10/2022 06:26 AM    EOSABS 0.01 12/10/2022 06:26 AM    BASOSABS 0.02 12/10/2022 06:26 AM     CMP:    Lab Results   Component Value Date/Time     12/10/2022 06:26 AM    K 4.8 12/10/2022 06:26 AM     12/10/2022 06:26 AM    CO2 19 12/10/2022 06:26 AM    BUN 30 12/10/2022 06:26 AM    CREATININE 1.4 12/10/2022 06:26 AM    GFRAA 49 09/14/2022 10:12 AM    LABGLOM 40 12/10/2022 06:26 AM    GLUCOSE 78 12/10/2022 06:26 AM    GLUCOSE 85 04/10/2012 12:28 PM    PROT 6.9 12/10/2022 06:26 AM    LABALBU 2.2 12/10/2022 06:26 AM    LABALBU 4.2 04/10/2012 12:28 PM    CALCIUM 8.7 12/10/2022 06:26 AM    BILITOT 0.3 12/10/2022 06:26 AM    ALKPHOS 517 12/10/2022 06:26 AM    AST 60 12/10/2022 06:26 AM    ALT 25 12/10/2022 06:26 AM     Magnesium:    Lab Results   Component Value Date/Time    MG 1.7 10/31/2022 12:50 PM     Phosphorus:    Lab Results   Component Value Date/Time    PHOS 3.2 11/12/2015 04:30 AM     HgBA1c:    Lab Results   Component Value Date/Time    LABA1C 6.2 03/24/2016 08:30 AM        PRO-BNP:   Lab Results   Component Value Date    PROBNP 1,122 (H) 10/31/2022    PROBNP 104 04/05/2016      ABGs: No results found for: PH, PO2, PCO2  Hemoglobin A1C: No components found for: HGBA1C    IMAGING:  Imaging tests were completed and reviewed and discussed radiology and care team involved and reveals   CT CHEST W CONTRAST    Result Date: 12/7/2022  EXAMINATION: CT OF THE CHEST WITH CONTRAST 12/7/2022 4:32 pm TECHNIQUE: CT of the chest was performed with the administration of intravenous contrast. Multiplanar reformatted images are provided for review.  Automated exposure control, iterative reconstruction, and/or weight based adjustment of the mA/kV was utilized to reduce the radiation dose to as low as reasonably achievable. COMPARISON: CT abdomen dated 11/22/2022 HISTORY: ORDERING SYSTEM PROVIDED HISTORY: right shoulder mass, hx lung cancer TECHNOLOGIST PROVIDED HISTORY: Reason for exam:->right shoulder mass, hx lung cancer Decision Support Exception - unselect if not a suspected or confirmed emergency medical condition->Emergency Medical Condition (MA) What reading provider will be dictating this exam?->CRC FINDINGS: Mediastinum: Thyroid gland is normal.  No evidence of bulky mediastinal adenopathy. No evidence of hilar adenopathy. Aorta is nonaneurysmal. Pulmonary artery is normal in size. No evidence of filling defects in the central pulmonary arteries to suggest pulmonary embolism. Lungs/pleura: Enhancing masses are seen involving the pleural space and chest wall in the right hemithorax. There is a 2.4 cm peripherally enhancing mass in the medial anterior right chest on image 48 series 301. Other smaller dependent mural nodules are noted. A large right pleural effusion is noted with atelectasis of the right lower lobe and right middle lobe. 2.5 cm low right infrahilar parenchymal low-density compatible with soft tissue mass. Best seen on image number 68 series 301. Upper Abdomen: Visualized portions of the liver appear unremarkable. Unchanged probable old splenic infarct or injury in the mid splenic body. Stomach has a normal configuration. The right adrenal gland is normal, left not included on the study. There is mild to moderate upper abdominal ascites. Soft Tissues/Bones: There is a peripherally enhancing 2.7 x 1.8 cm subcutaneous soft tissue mass overlying the trapezius muscle in the posterior right shoulder region likely representing a subcutaneous metastasis. No other subcutaneous lesions identified.   Sclerotic focus along the inferior T2 vertebral body could represent bone island or sclerotic metastasis. No other osseous lesions detected. 1.  2.7 x 1.8 cm peripherally enhancing subcutaneous mass in the posterior right shoulder region. This likely represents a subcutaneous metastasis. No other masses related to the right shoulder region. 2.  Large right effusion, atelectasis of the right lower lobe, pleural and chest wall metastases on the right. 3.  Mild to moderate upper abdominal ascites. CT ABDOMEN PELVIS W IV CONTRAST Additional Contrast? None    Result Date: 11/22/2022  THORACIC STRUCTURES: There is a moderate right basilar pleural fluid collection with atelectasis of the right lower lobe. LIVER:  The liver is normal in size, slight nodularity and normal attenuation. No focal mass. No intra or extrahepatic bile duct dilation. GALL BLADDER: Cholecystectomy. SPLEEN:  Stable hypodense region with parenchymal atrophy of the mid spleen. PANCREAS:  Unremarkable. ADRENAL GLANDS:  Unremarkable. ESOPHAGUS AND STOMACH:  Unremarkable. BOWEL: Small bowel:  Unremarkable. Large bowel: The colon and rectum are of normal course and caliber. The appendix is within normal limits. There is no intraperitoneal free air. There is intra-abdominal ascites. URINARY/GENITAL TRACT: Kidneys:  The kidneys are unremarkable. .  No evidence of hydronephrosis, renal calcifications or solid renal mass. Ureters: The ureters are normal course and caliber. There is no evidence of ureter calculus/calculi. URINARY BLADDER:  The urinary bladder is well distended without wall thickening or focal mass. Extensive calcification of uterine fibroids. BLOOD VESSELS: There is atherosclerotic disease. LYMPH NODES:  No evidence of intraabdominal or intrapelvic lymphadenopathy. ABDOMINAL WALL & SOFT TISSUES:  Unremarkable. OSSEOUS STRUCTURES: No acute osseous lesion. Moderate right basilar pleural fluid collection with atelectasis of the right lower lobe.  Slight nodularity of the liver concerning for early cirrhotic morphological change. Stable hypodense region within the spleen with parenchymal atrophy likely related to old splenic infarction or old splenic injury. Large amount of intra-abdominal ascites, unchanged from the prior study. Extensive calcification of uterine fibroids. Atherosclerotic disease. .     Assessment:     Right lung mass positive for Adenocarcinoma, weakly positive for GATA3 and CDX2, TTF-1 negative; KRAS (G12C) positive, PDL1 with 10% expression. 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 Will plan for IR placement of Pleurx catheter  2.7X1.8 cm enhancing subcutaneous mass in the posterior right shoulder region, this likely represents a subcutaneous metastasis.   General surgery has been consulted  Recurrent ascites, malignant versus cirrhotic  Cirrhosis  Right breast lesion  History right-sided lung adenocarcinoma 2015 left-sided lung adenocarcinoma 2016  COPD  History of tobacco use  Nutrition, regular diet and nutritional supplement, poor oral intake        Plan:     Plan for IR placement of PleurX catheter   Pulmonary hygiene, flutter valve  Bronchodilators      Dock Dicka, DO

## 2022-12-10 NOTE — PLAN OF CARE
Problem: Pain  Goal: Verbalizes/displays adequate comfort level or baseline comfort level  12/9/2022 2354 by Angelita Merritt RN  Outcome: Progressing     Problem: Safety - Adult  Goal: Free from fall injury  12/9/2022 2354 by Angelita Merritt RN  Outcome: Progressing     Problem: ABCDS Injury Assessment  Goal: Absence of physical injury  12/9/2022 2354 by Angelita Merritt RN  Outcome: Progressing     Problem: Skin/Tissue Integrity  Goal: Absence of new skin breakdown  Description: 1. Monitor for areas of redness and/or skin breakdown  2. Assess vascular access sites hourly  3. Every 4-6 hours minimum:  Change oxygen saturation probe site  4. Every 4-6 hours:  If on nasal continuous positive airway pressure, respiratory therapy assess nares and determine need for appliance change or resting period.   12/9/2022 2354 by Angelita Merritt RN  Outcome: Progressing

## 2022-12-10 NOTE — PROGRESS NOTES
Patient seen at the bedside, sleeping. Chart reviewed. Update received for RN. Patients pain controlled at this time with current medication regimen. Palliative medicine will continue to follow.

## 2022-12-10 NOTE — PROGRESS NOTES
Due to difficulty with OR scheduling patient is okay for diet we will plan for right axillary biopsy 12/11.     Electronically signed by Eliza Mackenzie MD on 12/10/22 at 2:29 PM EST

## 2022-12-10 NOTE — PROGRESS NOTES
Hospital Medicine    Subjective:  Feeling tired notes some back pain  No CP or SOB  No fever or chills   No uncontrolled pain  No vomiting or diarrhea   No events reported overnight       Current Facility-Administered Medications:     ceFAZolin (ANCEF) 2,000 mg in sterile water 20 mL IV syringe, 2,000 mg, IntraVENous, See Admin Instructions, Nilda Baumann MD    oxyCODONE (ROXICODONE) immediate release tablet 5 mg, 5 mg, Oral, Q4H PRN, 5 mg at 12/10/22 0837 **OR** oxyCODONE HCl (OXY-IR) immediate release tablet 10 mg, 10 mg, Oral, Q4H PRN, Wynell Countess, APRN - CNP, 10 mg at 12/09/22 2203    sennosides-docusate sodium (SENOKOT-S) 8.6-50 MG tablet 1 tablet, 1 tablet, Oral, BID, Wynell Countess, APRN - CNP, 1 tablet at 12/10/22 4043    mirtazapine (REMERON) tablet 7.5 mg, 7.5 mg, Oral, Nightly, Wynell Countess, APRN - CNP, 7.5 mg at 12/09/22 2204    ipratropium-albuterol (DUONEB) nebulizer solution 1 ampule, 1 ampule, Inhalation, Q4H WA, Juan Cord, APRN - CNP, 1 ampule at 12/10/22 0756    0.9 % sodium chloride infusion, , IntraVENous, Continuous, Nicky Zhao MD, Last Rate: 100 mL/hr at 12/10/22 0143, New Bag at 12/10/22 0143    amLODIPine (NORVASC) tablet 5 mg, 5 mg, Oral, Daily, Shasta Benny Malmer, DO, 5 mg at 12/10/22 3019    sodium chloride flush 0.9 % injection 5-40 mL, 5-40 mL, IntraVENous, 2 times per day, Aldo Jones, DO, 10 mL at 12/10/22 2202    sodium chloride flush 0.9 % injection 5-40 mL, 5-40 mL, IntraVENous, PRN, Shasta Benny Malmer, DO    0.9 % sodium chloride infusion, , IntraVENous, PRN, Shasta Benny Malmer, DO    enoxaparin Sodium (LOVENOX) injection 30 mg, 30 mg, SubCUTAneous, Daily, Shasta Nesbitt, , 30 mg at 12/08/22 8337    polyethylene glycol (GLYCOLAX) packet 17 g, 17 g, Oral, Daily PRN, Shasta Nesbitt DO    acetaminophen (TYLENOL) tablet 650 mg, 650 mg, Oral, Q6H PRN **OR** acetaminophen (TYLENOL) suppository 650 mg, 650 mg, Rectal, Q6H PRN, Shasta Nesbitt, DO morphine (PF) injection 2 mg, 2 mg, IntraVENous, Q4H PRN, Ella Nesbitt DO, 2 mg at 12/08/22 5422    trimethobenzamide (TIGAN) injection 200 mg, 200 mg, IntraMUSCular, Q6H PRN, Ella Nesbitt DO    Facility-Administered Medications Ordered in Other Encounters:     pantoprazole (PROTONIX) tablet 40 mg, 40 mg, Oral, QAM AC, Luís Amin MD    Objective:    /71   Pulse 96   Temp 97.7 °F (36.5 °C) (Temporal)   Resp 16   Ht 5' 1\" (1.549 m)   Wt 103 lb 8 oz (46.9 kg)   SpO2 96%   BMI 19.56 kg/m²   NAD in bed  Heart:  reg  Lungs: CTA no [place]  Abd: + bs soft  Extrem:  min edema legs    CBC with Differential:    Lab Results   Component Value Date/Time    WBC 9.0 12/10/2022 06:26 AM    RBC 2.79 12/10/2022 06:26 AM    HGB 7.7 12/10/2022 06:26 AM    HCT 24.1 12/10/2022 06:26 AM     12/10/2022 06:26 AM    MCV 86.4 12/10/2022 06:26 AM    MCH 27.6 12/10/2022 06:26 AM    MCHC 32.0 12/10/2022 06:26 AM    RDW 16.1 12/10/2022 06:26 AM    SEGSPCT 61 01/16/2012 12:15 PM    LYMPHOPCT 6.7 12/10/2022 06:26 AM    MONOPCT 5.7 12/10/2022 06:26 AM    BASOPCT 0.2 12/10/2022 06:26 AM    MONOSABS 0.51 12/10/2022 06:26 AM    LYMPHSABS 0.60 12/10/2022 06:26 AM    EOSABS 0.01 12/10/2022 06:26 AM    BASOSABS 0.02 12/10/2022 06:26 AM     CMP:    Lab Results   Component Value Date/Time     12/10/2022 06:26 AM    K 4.8 12/10/2022 06:26 AM     12/10/2022 06:26 AM    CO2 19 12/10/2022 06:26 AM    BUN 30 12/10/2022 06:26 AM    CREATININE 1.4 12/10/2022 06:26 AM    GFRAA 49 09/14/2022 10:12 AM    LABGLOM 40 12/10/2022 06:26 AM    GLUCOSE 78 12/10/2022 06:26 AM    GLUCOSE 85 04/10/2012 12:28 PM    PROT 6.9 12/10/2022 06:26 AM    LABALBU 2.2 12/10/2022 06:26 AM    LABALBU 4.2 04/10/2012 12:28 PM    CALCIUM 8.7 12/10/2022 06:26 AM    BILITOT 0.3 12/10/2022 06:26 AM    ALKPHOS 517 12/10/2022 06:26 AM    AST 60 12/10/2022 06:26 AM    ALT 25 12/10/2022 06:26 AM     Warfarin PT/INR:    Lab Results   Component Value Date    INR 1.2 12/10/2022    INR 1.2 12/09/2022    INR 1.2 11/01/2022    PROTIME 13.1 (H) 12/10/2022    PROTIME 13.4 (H) 12/09/2022    PROTIME 12.7 (H) 11/01/2022       Assessment:    Principal Problem:    Pleural effusion  Active Problems:    Severe protein-calorie malnutrition (HCC)    Hepatitis C    Adenocarcinoma, lung (HCC)  Resolved Problems:    * No resolved hospital problems.  *      Plan:  Status post IR thoracentesis with right Pleurx placement 12/9  Plan for right axillary mass excision today  Pulmonology consult appreciated  General surgery consult appreciated  Palliative care consult appreciated    Sky Thompson MD  10:15 AM  12/10/2022

## 2022-12-10 NOTE — PROGRESS NOTES
GENERAL SURGERY  DAILY PROGRESS NOTE  12/10/2022    CHIEF COMPLAINT:  Chief Complaint   Patient presents with    Back Pain     Cyst on back/shoulder with increased pain denies any new injury . Hx lung ca       SUBJECTIVE:  Patient seen resting comfortably. No acute events overnight, feels the same as yesterday. OBJECTIVE:  /71   Pulse 96   Temp 97.7 °F (36.5 °C) (Temporal)   Resp 18   Ht 5' 1\" (1.549 m)   Wt 103 lb 8 oz (46.9 kg)   SpO2 96%   BMI 19.56 kg/m²     GENERAL: Frail, cachectic,   LUNGS:  No increased work of breathing on room air  R AXILLA: large palpable axillary lymph node    CARDIOVASCULAR: Hemodynamically stable  ABDOMEN:  Soft, non-distended, non-tender. No guarding, rigidity, rebound.     ASSESSMENT/PLAN:  71 y.o. female with painful 3 cm subcutaneous mass    Plan  -OR for excision today pending OR availability  -Multimodal pain control  -Remain n.p.o. until after surgery    D/w Dr. Radha Donis MD  Surgery Resident PGY-1  12/10/2022  8:28 AM

## 2022-12-11 ENCOUNTER — ANESTHESIA (OUTPATIENT)
Dept: OPERATING ROOM | Age: 72
End: 2022-12-11
Payer: MEDICARE

## 2022-12-11 LAB
ABO/RH: NORMAL
ANTIBODY SCREEN: NORMAL
BASOPHILS ABSOLUTE: 0.03 E9/L (ref 0–0.2)
BASOPHILS RELATIVE PERCENT: 0.3 % (ref 0–2)
EOSINOPHILS ABSOLUTE: 0.06 E9/L (ref 0.05–0.5)
EOSINOPHILS RELATIVE PERCENT: 0.6 % (ref 0–6)
HCT VFR BLD CALC: 24.1 % (ref 34–48)
HEMOGLOBIN: 7.4 G/DL (ref 11.5–15.5)
IMMATURE GRANULOCYTES #: 0.13 E9/L
IMMATURE GRANULOCYTES %: 1.3 % (ref 0–5)
LYMPHOCYTES ABSOLUTE: 0.79 E9/L (ref 1.5–4)
LYMPHOCYTES RELATIVE PERCENT: 8 % (ref 20–42)
MCH RBC QN AUTO: 26.9 PG (ref 26–35)
MCHC RBC AUTO-ENTMCNC: 30.7 % (ref 32–34.5)
MCV RBC AUTO: 87.6 FL (ref 80–99.9)
MONOCYTES ABSOLUTE: 0.83 E9/L (ref 0.1–0.95)
MONOCYTES RELATIVE PERCENT: 8.4 % (ref 2–12)
NEUTROPHILS ABSOLUTE: 8.06 E9/L (ref 1.8–7.3)
NEUTROPHILS RELATIVE PERCENT: 81.4 % (ref 43–80)
PDW BLD-RTO: 16.4 FL (ref 11.5–15)
PLATELET # BLD: 381 E9/L (ref 130–450)
PMV BLD AUTO: 9.9 FL (ref 7–12)
RBC # BLD: 2.75 E12/L (ref 3.5–5.5)
WBC # BLD: 9.9 E9/L (ref 4.5–11.5)

## 2022-12-11 PROCEDURE — 88360 TUMOR IMMUNOHISTOCHEM/MANUAL: CPT

## 2022-12-11 PROCEDURE — 6360000002 HC RX W HCPCS

## 2022-12-11 PROCEDURE — 2500000003 HC RX 250 WO HCPCS

## 2022-12-11 PROCEDURE — 86901 BLOOD TYPING SEROLOGIC RH(D): CPT

## 2022-12-11 PROCEDURE — 2709999900 HC NON-CHARGEABLE SUPPLY: Performed by: SURGERY

## 2022-12-11 PROCEDURE — 2580000003 HC RX 258

## 2022-12-11 PROCEDURE — 6370000000 HC RX 637 (ALT 250 FOR IP): Performed by: SURGERY

## 2022-12-11 PROCEDURE — 85025 COMPLETE CBC W/AUTO DIFF WBC: CPT

## 2022-12-11 PROCEDURE — 2580000003 HC RX 258: Performed by: SURGERY

## 2022-12-11 PROCEDURE — 88341 IMHCHEM/IMCYTCHM EA ADD ANTB: CPT

## 2022-12-11 PROCEDURE — 86900 BLOOD TYPING SEROLOGIC ABO: CPT

## 2022-12-11 PROCEDURE — 7100000001 HC PACU RECOVERY - ADDTL 15 MIN: Performed by: SURGERY

## 2022-12-11 PROCEDURE — 6370000000 HC RX 637 (ALT 250 FOR IP): Performed by: INTERNAL MEDICINE

## 2022-12-11 PROCEDURE — 88305 TISSUE EXAM BY PATHOLOGIST: CPT

## 2022-12-11 PROCEDURE — 3600000012 HC SURGERY LEVEL 2 ADDTL 15MIN: Performed by: SURGERY

## 2022-12-11 PROCEDURE — 88342 IMHCHEM/IMCYTCHM 1ST ANTB: CPT

## 2022-12-11 PROCEDURE — 94640 AIRWAY INHALATION TREATMENT: CPT

## 2022-12-11 PROCEDURE — 3700000001 HC ADD 15 MINUTES (ANESTHESIA): Performed by: SURGERY

## 2022-12-11 PROCEDURE — 6360000002 HC RX W HCPCS: Performed by: STUDENT IN AN ORGANIZED HEALTH CARE EDUCATION/TRAINING PROGRAM

## 2022-12-11 PROCEDURE — 2140000000 HC CCU INTERMEDIATE R&B

## 2022-12-11 PROCEDURE — 0JBD0ZZ EXCISION OF RIGHT UPPER ARM SUBCUTANEOUS TISSUE AND FASCIA, OPEN APPROACH: ICD-10-PCS | Performed by: SURGERY

## 2022-12-11 PROCEDURE — 7100000000 HC PACU RECOVERY - FIRST 15 MIN: Performed by: SURGERY

## 2022-12-11 PROCEDURE — 99232 SBSQ HOSP IP/OBS MODERATE 35: CPT | Performed by: INTERNAL MEDICINE

## 2022-12-11 PROCEDURE — 86850 RBC ANTIBODY SCREEN: CPT

## 2022-12-11 PROCEDURE — 6370000000 HC RX 637 (ALT 250 FOR IP)

## 2022-12-11 PROCEDURE — 36415 COLL VENOUS BLD VENIPUNCTURE: CPT

## 2022-12-11 PROCEDURE — 3600000002 HC SURGERY LEVEL 2 BASE: Performed by: SURGERY

## 2022-12-11 PROCEDURE — 2580000003 HC RX 258: Performed by: STUDENT IN AN ORGANIZED HEALTH CARE EDUCATION/TRAINING PROGRAM

## 2022-12-11 PROCEDURE — 3700000000 HC ANESTHESIA ATTENDED CARE: Performed by: SURGERY

## 2022-12-11 RX ORDER — SODIUM CHLORIDE 0.9 % (FLUSH) 0.9 %
5-40 SYRINGE (ML) INJECTION PRN
Status: DISCONTINUED | OUTPATIENT
Start: 2022-12-11 | End: 2022-12-11 | Stop reason: HOSPADM

## 2022-12-11 RX ORDER — ONDANSETRON 2 MG/ML
INJECTION INTRAMUSCULAR; INTRAVENOUS PRN
Status: DISCONTINUED | OUTPATIENT
Start: 2022-12-11 | End: 2022-12-11 | Stop reason: SDUPTHER

## 2022-12-11 RX ORDER — FENTANYL CITRATE 50 UG/ML
INJECTION, SOLUTION INTRAMUSCULAR; INTRAVENOUS PRN
Status: DISCONTINUED | OUTPATIENT
Start: 2022-12-11 | End: 2022-12-11

## 2022-12-11 RX ORDER — PROPOFOL 10 MG/ML
INJECTION, EMULSION INTRAVENOUS PRN
Status: DISCONTINUED | OUTPATIENT
Start: 2022-12-11 | End: 2022-12-11 | Stop reason: SDUPTHER

## 2022-12-11 RX ORDER — SODIUM CHLORIDE 9 MG/ML
INJECTION, SOLUTION INTRAVENOUS PRN
Status: DISCONTINUED | OUTPATIENT
Start: 2022-12-11 | End: 2022-12-11 | Stop reason: HOSPADM

## 2022-12-11 RX ORDER — SODIUM CHLORIDE 0.9 % (FLUSH) 0.9 %
5-40 SYRINGE (ML) INJECTION EVERY 12 HOURS SCHEDULED
Status: DISCONTINUED | OUTPATIENT
Start: 2022-12-11 | End: 2022-12-11 | Stop reason: HOSPADM

## 2022-12-11 RX ORDER — FENTANYL CITRATE 50 UG/ML
INJECTION, SOLUTION INTRAMUSCULAR; INTRAVENOUS PRN
Status: DISCONTINUED | OUTPATIENT
Start: 2022-12-11 | End: 2022-12-11 | Stop reason: SDUPTHER

## 2022-12-11 RX ORDER — PROCHLORPERAZINE EDISYLATE 5 MG/ML
5 INJECTION INTRAMUSCULAR; INTRAVENOUS
Status: DISCONTINUED | OUTPATIENT
Start: 2022-12-11 | End: 2022-12-11 | Stop reason: HOSPADM

## 2022-12-11 RX ORDER — DEXAMETHASONE SODIUM PHOSPHATE 10 MG/ML
INJECTION INTRAMUSCULAR; INTRAVENOUS PRN
Status: DISCONTINUED | OUTPATIENT
Start: 2022-12-11 | End: 2022-12-11 | Stop reason: SDUPTHER

## 2022-12-11 RX ORDER — ROCURONIUM BROMIDE 10 MG/ML
INJECTION, SOLUTION INTRAVENOUS PRN
Status: DISCONTINUED | OUTPATIENT
Start: 2022-12-11 | End: 2022-12-11 | Stop reason: SDUPTHER

## 2022-12-11 RX ORDER — GLYCOPYRROLATE 0.2 MG/ML
INJECTION INTRAMUSCULAR; INTRAVENOUS PRN
Status: DISCONTINUED | OUTPATIENT
Start: 2022-12-11 | End: 2022-12-11 | Stop reason: SDUPTHER

## 2022-12-11 RX ORDER — NEOSTIGMINE METHYLSULFATE 1 MG/ML
INJECTION, SOLUTION INTRAVENOUS PRN
Status: DISCONTINUED | OUTPATIENT
Start: 2022-12-11 | End: 2022-12-11 | Stop reason: SDUPTHER

## 2022-12-11 RX ORDER — SODIUM CHLORIDE 9 MG/ML
INJECTION, SOLUTION INTRAVENOUS CONTINUOUS PRN
Status: DISCONTINUED | OUTPATIENT
Start: 2022-12-11 | End: 2022-12-11 | Stop reason: SDUPTHER

## 2022-12-11 RX ADMIN — AMLODIPINE BESYLATE 5 MG: 5 TABLET ORAL at 07:18

## 2022-12-11 RX ADMIN — DOCUSATE SODIUM 50 MG AND SENNOSIDES 8.6 MG 1 TABLET: 8.6; 5 TABLET, FILM COATED ORAL at 07:18

## 2022-12-11 RX ADMIN — SODIUM CHLORIDE, PRESERVATIVE FREE 10 ML: 5 INJECTION INTRAVENOUS at 19:57

## 2022-12-11 RX ADMIN — DOCUSATE SODIUM 50 MG AND SENNOSIDES 8.6 MG 1 TABLET: 8.6; 5 TABLET, FILM COATED ORAL at 19:54

## 2022-12-11 RX ADMIN — IPRATROPIUM BROMIDE AND ALBUTEROL SULFATE 1 AMPULE: 2.5; .5 SOLUTION RESPIRATORY (INHALATION) at 16:35

## 2022-12-11 RX ADMIN — Medication: at 07:18

## 2022-12-11 RX ADMIN — PROPOFOL 100 MG: 10 INJECTION, EMULSION INTRAVENOUS at 07:53

## 2022-12-11 RX ADMIN — FENTANYL CITRATE 100 MCG: 50 INJECTION, SOLUTION INTRAMUSCULAR; INTRAVENOUS at 07:53

## 2022-12-11 RX ADMIN — DEXAMETHASONE SODIUM PHOSPHATE 10 MG: 10 INJECTION INTRAMUSCULAR; INTRAVENOUS at 08:09

## 2022-12-11 RX ADMIN — SODIUM CHLORIDE: 9 INJECTION, SOLUTION INTRAVENOUS at 07:36

## 2022-12-11 RX ADMIN — MIRTAZAPINE 7.5 MG: 15 TABLET, FILM COATED ORAL at 19:54

## 2022-12-11 RX ADMIN — OXYCODONE HYDROCHLORIDE 10 MG: 10 TABLET ORAL at 11:23

## 2022-12-11 RX ADMIN — GLYCOPYRROLATE 0.2 MG: 0.2 INJECTION INTRAMUSCULAR; INTRAVENOUS at 08:28

## 2022-12-11 RX ADMIN — SODIUM CHLORIDE: 9 INJECTION, SOLUTION INTRAVENOUS at 21:50

## 2022-12-11 RX ADMIN — IPRATROPIUM BROMIDE AND ALBUTEROL SULFATE 1 AMPULE: 2.5; .5 SOLUTION RESPIRATORY (INHALATION) at 20:42

## 2022-12-11 RX ADMIN — ROCURONIUM BROMIDE 25 MG: 10 INJECTION, SOLUTION INTRAVENOUS at 07:53

## 2022-12-11 RX ADMIN — CEFAZOLIN 2000 MG: 2 INJECTION, POWDER, FOR SOLUTION INTRAMUSCULAR; INTRAVENOUS at 08:04

## 2022-12-11 RX ADMIN — Medication 3 MG: at 08:28

## 2022-12-11 RX ADMIN — IPRATROPIUM BROMIDE AND ALBUTEROL SULFATE 1 AMPULE: 2.5; .5 SOLUTION RESPIRATORY (INHALATION) at 11:44

## 2022-12-11 RX ADMIN — SODIUM CHLORIDE: 9 INJECTION, SOLUTION INTRAVENOUS at 11:16

## 2022-12-11 RX ADMIN — ONDANSETRON 4 MG: 2 INJECTION INTRAMUSCULAR; INTRAVENOUS at 08:15

## 2022-12-11 RX ADMIN — OXYCODONE 5 MG: 5 TABLET ORAL at 19:19

## 2022-12-11 ASSESSMENT — PAIN DESCRIPTION - ORIENTATION
ORIENTATION: RIGHT
ORIENTATION: RIGHT

## 2022-12-11 ASSESSMENT — PAIN DESCRIPTION - LOCATION
LOCATION: GENERALIZED
LOCATION: RIB CAGE

## 2022-12-11 ASSESSMENT — PAIN DESCRIPTION - FREQUENCY: FREQUENCY: INTERMITTENT

## 2022-12-11 ASSESSMENT — PAIN DESCRIPTION - DESCRIPTORS
DESCRIPTORS: ACHING;DISCOMFORT;THROBBING
DESCRIPTORS: ACHING;DISCOMFORT;THROBBING

## 2022-12-11 ASSESSMENT — PAIN SCALES - GENERAL
PAINLEVEL_OUTOF10: 6
PAINLEVEL_OUTOF10: 0
PAINLEVEL_OUTOF10: 8

## 2022-12-11 ASSESSMENT — LIFESTYLE VARIABLES: SMOKING_STATUS: 0

## 2022-12-11 ASSESSMENT — PAIN DESCRIPTION - PAIN TYPE: TYPE: CHRONIC PAIN

## 2022-12-11 ASSESSMENT — PAIN SCALES - WONG BAKER
WONGBAKER_NUMERICALRESPONSE: 0

## 2022-12-11 ASSESSMENT — PAIN DESCRIPTION - ONSET: ONSET: ON-GOING

## 2022-12-11 ASSESSMENT — PAIN - FUNCTIONAL ASSESSMENT: PAIN_FUNCTIONAL_ASSESSMENT: ACTIVITIES ARE NOT PREVENTED

## 2022-12-11 NOTE — PROGRESS NOTES
[facility]  Department of Internal Medicine  Division of Pulmonary, Critical Care and Sleep Medicine  Progress Note    Zane Ross DO, MPH, FCCP, FACOI, FACP  Coral Wellington DO, FCCP  MD Patrick Driver, APRN-CNP      Patient: Migdalia Perez  MRN: [id number]  : 1950    Encounter Time: 12:21 PM     Date of Admission: 2022 12:01 PM    Primary Care Physician: General Wilberto DO    Reason for Consultation: Lung Ca/pleural effusion     SUBJECTIVE:    Recommeneded Plerx for continue drainage  S/P Surgery excision    OBJECTIVE:     PHYSICAL EXAM:   VITALS:   Vitals:    22 0920 22 1100 22 1123 22 1146   BP: (!) 146/70 (!) 152/78     Pulse: 98 89     Resp:     Temp: 97.9 °F (36.6 °C) 97.6 °F (36.4 °C)     TempSrc:  Temporal     SpO2: 96% 97%  97%   Weight:       Height:            Intake/Output Summary (Last 24 hours) at 2022 1221  Last data filed at 2022 0910  Gross per 24 hour   Intake 500 ml   Output 695 ml   Net -195 ml          CONSTITUTIONAL:   A&O x 3, NAD, appears chronically ill  SKIN:     No rash, right upper back lesion, No skin discoloration  HEENT:     EOMI, MMM, No thrush  NECK:    No bruits, No JVP appreciated  CV:      Sinus,  No murmur, No rubs, No gallops  PULMONARY:   Couse BS,  No Wheezing, No Rales, No Rhonchi      No noted egophony  ABDOMEN:     Soft, non-tender. BS normal. No R/R/G  EXT:    No deformities . No clubbing. No lower extremity edema, No venous stasis  PULSE:   Appears equal and palpable.   PSYCHIATRIC:  Seems appropriate, No acute psychosis  MS:    No fractures, No gross weakness  NEUROLOGIC:   The clinical assessment is non-focal     DATA: IMAGING & TESTING:     LABORATORY TESTS:    CBC with Differential:    Lab Results   Component Value Date/Time    WBC 9.9 2022 06:10 AM    RBC 2.75 2022 06:10 AM    HGB 7.4 2022 06:10 AM    HCT 24.1 2022 06:10 AM     12/11/2022 06:10 AM    MCV 87.6 12/11/2022 06:10 AM    MCH 26.9 12/11/2022 06:10 AM    MCHC 30.7 12/11/2022 06:10 AM    RDW 16.4 12/11/2022 06:10 AM    SEGSPCT 61 01/16/2012 12:15 PM    LYMPHOPCT 8.0 12/11/2022 06:10 AM    MONOPCT 8.4 12/11/2022 06:10 AM    BASOPCT 0.3 12/11/2022 06:10 AM    MONOSABS 0.83 12/11/2022 06:10 AM    LYMPHSABS 0.79 12/11/2022 06:10 AM    EOSABS 0.06 12/11/2022 06:10 AM    BASOSABS 0.03 12/11/2022 06:10 AM     CMP:    Lab Results   Component Value Date/Time     12/10/2022 06:26 AM    K 4.8 12/10/2022 06:26 AM     12/10/2022 06:26 AM    CO2 19 12/10/2022 06:26 AM    BUN 30 12/10/2022 06:26 AM    CREATININE 1.4 12/10/2022 06:26 AM    GFRAA 49 09/14/2022 10:12 AM    LABGLOM 40 12/10/2022 06:26 AM    GLUCOSE 78 12/10/2022 06:26 AM    GLUCOSE 85 04/10/2012 12:28 PM    PROT 6.9 12/10/2022 06:26 AM    LABALBU 2.2 12/10/2022 06:26 AM    LABALBU 4.2 04/10/2012 12:28 PM    CALCIUM 8.7 12/10/2022 06:26 AM    BILITOT 0.3 12/10/2022 06:26 AM    ALKPHOS 517 12/10/2022 06:26 AM    AST 60 12/10/2022 06:26 AM    ALT 25 12/10/2022 06:26 AM     Magnesium:    Lab Results   Component Value Date/Time    MG 1.7 10/31/2022 12:50 PM     Phosphorus:    Lab Results   Component Value Date/Time    PHOS 3.2 11/12/2015 04:30 AM     HgBA1c:    Lab Results   Component Value Date/Time    LABA1C 6.2 03/24/2016 08:30 AM        PRO-BNP:   Lab Results   Component Value Date    PROBNP 1,122 (H) 10/31/2022    PROBNP 104 04/05/2016      ABGs: No results found for: PH, PO2, PCO2  Hemoglobin A1C: No components found for: HGBA1C    IMAGING:  Imaging tests were completed and reviewed and discussed radiology and care team involved and reveals   CT CHEST W CONTRAST    Result Date: 12/7/2022  EXAMINATION: CT OF THE CHEST WITH CONTRAST 12/7/2022 4:32 pm TECHNIQUE: CT of the chest was performed with the administration of intravenous contrast. Multiplanar reformatted images are provided for review.  Automated exposure control, iterative reconstruction, and/or weight based adjustment of the mA/kV was utilized to reduce the radiation dose to as low as reasonably achievable. COMPARISON: CT abdomen dated 11/22/2022 HISTORY: ORDERING SYSTEM PROVIDED HISTORY: right shoulder mass, hx lung cancer TECHNOLOGIST PROVIDED HISTORY: Reason for exam:->right shoulder mass, hx lung cancer Decision Support Exception - unselect if not a suspected or confirmed emergency medical condition->Emergency Medical Condition (MA) What reading provider will be dictating this exam?->CRC FINDINGS: Mediastinum: Thyroid gland is normal.  No evidence of bulky mediastinal adenopathy. No evidence of hilar adenopathy. Aorta is nonaneurysmal. Pulmonary artery is normal in size. No evidence of filling defects in the central pulmonary arteries to suggest pulmonary embolism. Lungs/pleura: Enhancing masses are seen involving the pleural space and chest wall in the right hemithorax. There is a 2.4 cm peripherally enhancing mass in the medial anterior right chest on image 48 series 301. Other smaller dependent mural nodules are noted. A large right pleural effusion is noted with atelectasis of the right lower lobe and right middle lobe. 2.5 cm low right infrahilar parenchymal low-density compatible with soft tissue mass. Best seen on image number 68 series 301. Upper Abdomen: Visualized portions of the liver appear unremarkable. Unchanged probable old splenic infarct or injury in the mid splenic body. Stomach has a normal configuration. The right adrenal gland is normal, left not included on the study. There is mild to moderate upper abdominal ascites. Soft Tissues/Bones: There is a peripherally enhancing 2.7 x 1.8 cm subcutaneous soft tissue mass overlying the trapezius muscle in the posterior right shoulder region likely representing a subcutaneous metastasis. No other subcutaneous lesions identified.   Sclerotic focus along the inferior T2 vertebral body could represent bone island or sclerotic metastasis. No other osseous lesions detected. 1.  2.7 x 1.8 cm peripherally enhancing subcutaneous mass in the posterior right shoulder region. This likely represents a subcutaneous metastasis. No other masses related to the right shoulder region. 2.  Large right effusion, atelectasis of the right lower lobe, pleural and chest wall metastases on the right. 3.  Mild to moderate upper abdominal ascites. CT ABDOMEN PELVIS W IV CONTRAST Additional Contrast? None    Result Date: 11/22/2022  THORACIC STRUCTURES: There is a moderate right basilar pleural fluid collection with atelectasis of the right lower lobe. LIVER:  The liver is normal in size, slight nodularity and normal attenuation. No focal mass. No intra or extrahepatic bile duct dilation. GALL BLADDER: Cholecystectomy. SPLEEN:  Stable hypodense region with parenchymal atrophy of the mid spleen. PANCREAS:  Unremarkable. ADRENAL GLANDS:  Unremarkable. ESOPHAGUS AND STOMACH:  Unremarkable. BOWEL: Small bowel:  Unremarkable. Large bowel: The colon and rectum are of normal course and caliber. The appendix is within normal limits. There is no intraperitoneal free air. There is intra-abdominal ascites. URINARY/GENITAL TRACT: Kidneys:  The kidneys are unremarkable. .  No evidence of hydronephrosis, renal calcifications or solid renal mass. Ureters: The ureters are normal course and caliber. There is no evidence of ureter calculus/calculi. URINARY BLADDER:  The urinary bladder is well distended without wall thickening or focal mass. Extensive calcification of uterine fibroids. BLOOD VESSELS: There is atherosclerotic disease. LYMPH NODES:  No evidence of intraabdominal or intrapelvic lymphadenopathy. ABDOMINAL WALL & SOFT TISSUES:  Unremarkable. OSSEOUS STRUCTURES: No acute osseous lesion. Moderate right basilar pleural fluid collection with atelectasis of the right lower lobe.  Slight nodularity of the liver concerning for early cirrhotic morphological change. Stable hypodense region within the spleen with parenchymal atrophy likely related to old splenic infarction or old splenic injury. Large amount of intra-abdominal ascites, unchanged from the prior study. Extensive calcification of uterine fibroids. Atherosclerotic disease. .     Assessment:     Right lung mass positive for Adenocarcinoma, weakly positive for GATA3 and CDX2, TTF-1 negative; KRAS (G12C) positive, PDL1 with 10% expression. 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 Will plan for IR placement of Pleurx catheter  2.7X1.8 cm enhancing subcutaneous mass in the posterior right shoulder region, this likely represents a subcutaneous metastasis.   General surgery has been consulted  Recurrent ascites, malignant versus cirrhotic  Cirrhosis  Right breast lesion  History right-sided lung adenocarcinoma 2015 left-sided lung adenocarcinoma 2016  COPD  History of tobacco use  Nutrition, regular diet and nutritional supplement, poor oral intake        Plan:     Plan for IR placement of PleurX catheter   Pulmonary hygiene, flutter valve  Bronchodilators  Stop Fluids  Advance diet  PT OT      Delories Big Arm, DO

## 2022-12-11 NOTE — OP NOTE
Operative Note      Patient: Angelica Alejo  YOB: 1950  MRN: [id number]    Date of Procedure: 12/11/2022    Pre-Op Diagnosis: Mass of right axilla [R22.31]    Post-Op Diagnosis: Same       Procedure(s):  EXCISION OF RIGHT AXILLARY MASS    Surgeon(s):  Cihp Larson MD    Assistant:   Resident: Jessica Sin DO    Anesthesia: General    Estimated Blood Loss (mL): Minimal    Complications: None    Specimens:   ID Type Source Tests Collected by Time Destination   A : CONNECTIVE TISSUE NEOPLASM Tissue Tissue SURGICAL PATHOLOGY Chip Larson MD 12/11/2022 0867        Implants:  * No implants in log *      Drains:   [REMOVED] External Urinary Catheter (Removed)   Site Assessment Clean,dry & intact 11/02/22 1930   Placement Replaced 11/09/22 0325   Catheter Care Catheter/Wick replaced 11/04/22 1000   Perineal Care Yes 11/06/22 0640   Suction 40 mmgHg continuous 11/02/22 1930   Urine Color Carito 11/07/22 1200   Urine Appearance Clear 11/06/22 0640   Output (mL) 500 mL 11/08/22 0931       [REMOVED] External Urinary Catheter (Removed)   Site Assessment Clean,dry & intact 11/23/22 1945   Placement Replaced 11/23/22 1945   Securement Method Securing device (Describe) 11/23/22 1945   Catheter Care Catheter/Wick replaced;Suction Canister/Tubing changed 11/23/22 1945   Perineal Care Yes 11/23/22 1945   Suction 40 mmgHg continuous 11/23/22 1945   Urine Color Yellow 11/23/22 1945   Urine Appearance Hazy 11/23/22 1945       Findings: >5cm connective tissue neoplasm, subfascial;  closure length 7cm      BRIEF HISTORY: Angelica Alejo is a 71 y.o.  female with right posterior axillary mass that is painful. History of metastatic lung cancer. I discussed \"Excision of right axillary mass\" with the patient including the procedure, benefits, risks and alternatives, and the patient agreed.      PROCEDURE IN DETAIL: The patient was taken to the operative suite and was placed on the table in the lateral, right side up position. general anesthesia was administered. The skin of the right posterior axilla was prepped with Chloraprep and draped in a sterile fashion. After marking the site of the mass and incision,  the skin was opened with a 15-blade scalpel in an ellipse fashion around the mass. The incision was carried down through the dermis and subcutaneous tissue using the scalpel and electrocautery. Along the way, any bleeding points were cauterized. Skin flaps were raised superiorly, inferiorly, medially and laterally, and then the mass was grasped. The electrocautery was used to excise the mass down to the subfascial plane. Along the way, any bleeding points were cauterized. The mass was completely excised from the surrounding tissue, measuring approximately 5 cm x 3 cm in size. Specimen was sent off for pathology. The cavity was inspected for bleeding, and any bleeding points were cauterized. Any bleeding points were cauterized. The skin was closed with interrupted deep dermal 3-0 Vicryl suture and a running 4-0 cryl subcuticular stitch, which was tied inside the incision and the knot was inverted. The total length of closure was 7cm. A sterile dressing of Dermabond was placed. Needle, sponge, and instrument counts were reported as correct x2. The patient tolerated the procedure well without complications and was transferred to the recovery area in good condition. Dr. Demetrius Goff was present and scrubbed throughout the case.     Ac Rivas DO  Surgery Resident PGY-4  12/11/2022  8:37 AM

## 2022-12-11 NOTE — PLAN OF CARE
Problem: Discharge Planning  Goal: Discharge to home or other facility with appropriate resources  12/11/2022 1053 by Ken Valverde RN  Outcome: Progressing     Problem: Pain  Goal: Verbalizes/displays adequate comfort level or baseline comfort level  12/11/2022 1053 by Ken Valverde RN  Outcome: Progressing     Problem: Safety - Adult  Goal: Free from fall injury  12/11/2022 1053 by Ken Valverde RN  Outcome: Progressing     Problem: Skin/Tissue Integrity  Goal: Absence of new skin breakdown  Description: 1. Monitor for areas of redness and/or skin breakdown  2. Assess vascular access sites hourly  3. Every 4-6 hours minimum:  Change oxygen saturation probe site  4. Every 4-6 hours:  If on nasal continuous positive airway pressure, respiratory therapy assess nares and determine need for appliance change or resting period.   12/11/2022 1053 by Ken Valverde RN  Outcome: Progressing

## 2022-12-11 NOTE — ANESTHESIA POSTPROCEDURE EVALUATION
Department of Anesthesiology  Postprocedure Note    Patient: Kayla Del Angel  MRN: [id number]  YOB: 1950  Date of evaluation: 12/11/2022      Procedure Summary     Date: 12/11/22 Room / Location: [facility] OR 07 / [facility]    Anesthesia Start: 0595 Anesthesia Stop: 2937    Procedure: EXCISION OF RIGHT AXILLARY MASS (Right: Shoulder) Diagnosis:       Mass of right axilla      (Mass of right axilla [R22.31])    Surgeons: Gregg Mills MD Responsible Provider: Wendy Edmond MD    Anesthesia Type: General ASA Status: 4          Anesthesia Type: General    Willow Phase I: Willow Score: 10    Willow Phase II:        Anesthesia Post Evaluation    Patient location during evaluation: PACU  Patient participation: complete - patient participated  Level of consciousness: awake and alert  Pain score: 4  Airway patency: patent  Nausea & Vomiting: no vomiting and no nausea  Complications: no  Cardiovascular status: hemodynamically stable  Respiratory status: spontaneous ventilation  Hydration status: stable

## 2022-12-11 NOTE — PROGRESS NOTES
[facility] Medicine    Subjective:  pt alert conversive  at bedside s/p excision of mass      Current Facility-Administered Medications:     ipratropium-albuterol (DUONEB) nebulizer solution 1 ampule, 1 ampule, Inhalation, 4x daily, Deepali E Kaercher, DO, 1 ampule at 12/11/22 1144    medicated lip balm (BLISTEX/CARMEX) stick, , Topical, PRN, Codie Farris, DO, Given at 12/11/22 0422    oxyCODONE (ROXICODONE) immediate release tablet 5 mg, 5 mg, Oral, Q4H PRN, 5 mg at 12/10/22 0837 **OR** oxyCODONE HCl (OXY-IR) immediate release tablet 10 mg, 10 mg, Oral, Q4H PRN, Deepali E Kaercher, DO, 10 mg at 12/11/22 1123    sennosides-docusate sodium (SENOKOT-S) 8.6-50 MG tablet 1 tablet, 1 tablet, Oral, BID, Deepali E Kaercher, DO, 1 tablet at 12/11/22 2996    mirtazapine (REMERON) tablet 7.5 mg, 7.5 mg, Oral, Nightly, Deepali E Kaercher, DO, 7.5 mg at 12/10/22 2052    0.9 % sodium chloride infusion, , IntraVENous, Continuous, Deepali E Kaercher, DO, Last Rate: 100 mL/hr at 12/11/22 1116, New Bag at 12/11/22 1116    amLODIPine (NORVASC) tablet 5 mg, 5 mg, Oral, Daily, Deepali E Kaercher, DO, 5 mg at 12/11/22 0853    sodium chloride flush 0.9 % injection 5-40 mL, 5-40 mL, IntraVENous, 2 times per day, Deepali E Kaercher, DO, 10 mL at 12/10/22 2052    sodium chloride flush 0.9 % injection 5-40 mL, 5-40 mL, IntraVENous, PRN, Deepali E Kaercher, DO    0.9 % sodium chloride infusion, , IntraVENous, PRN, Deepali E Kaercher, DO    enoxaparin Sodium (LOVENOX) injection 30 mg, 30 mg, SubCUTAneous, Daily, Deepali E Kaercher, DO, 30 mg at 12/08/22 6278    polyethylene glycol (GLYCOLAX) packet 17 g, 17 g, Oral, Daily PRN, Deepali E Kaercher, DO    acetaminophen (TYLENOL) tablet 650 mg, 650 mg, Oral, Q6H PRN **OR** acetaminophen (TYLENOL) suppository 650 mg, 650 mg, Rectal, Q6H PRN, Deepali E Kaercher, DO    morphine (PF) injection 2 mg, 2 mg, IntraVENous, Q4H PRN, Deepali E Kaercher, DO, 2 mg at 12/08/22 2078 trimethobenzamide (TIGAN) injection 200 mg, 200 mg, IntraMUSCular, Q6H PRN, Deepali Enamroado,     Facility-Administered Medications Ordered in Other Encounters:     pantoprazole (PROTONIX) tablet 40 mg, 40 mg, Oral, Luís SWEENEY MD    Objective:    BP (!) 152/78   Pulse 89   Temp 97.6 °F (36.4 °C) (Temporal)   Resp 17   Ht 5' 1\" (1.549 m)   Wt 103 lb 8 oz (46.9 kg)   SpO2 97%   BMI 19.56 kg/m²     Heart:  reg  Lungs:  decreased breath sounds r base  Abd: + bs soft nontender  Extrem:  min edema legs    CBC with Differential:    Lab Results   Component Value Date/Time    WBC 9.9 12/11/2022 06:10 AM    RBC 2.75 12/11/2022 06:10 AM    HGB 7.4 12/11/2022 06:10 AM    HCT 24.1 12/11/2022 06:10 AM     12/11/2022 06:10 AM    MCV 87.6 12/11/2022 06:10 AM    MCH 26.9 12/11/2022 06:10 AM    MCHC 30.7 12/11/2022 06:10 AM    RDW 16.4 12/11/2022 06:10 AM    SEGSPCT 61 01/16/2012 12:15 PM    LYMPHOPCT 8.0 12/11/2022 06:10 AM    MONOPCT 8.4 12/11/2022 06:10 AM    BASOPCT 0.3 12/11/2022 06:10 AM    MONOSABS 0.83 12/11/2022 06:10 AM    LYMPHSABS 0.79 12/11/2022 06:10 AM    EOSABS 0.06 12/11/2022 06:10 AM    BASOSABS 0.03 12/11/2022 06:10 AM     CMP:    Lab Results   Component Value Date/Time     12/10/2022 06:26 AM    K 4.8 12/10/2022 06:26 AM     12/10/2022 06:26 AM    CO2 19 12/10/2022 06:26 AM    BUN 30 12/10/2022 06:26 AM    CREATININE 1.4 12/10/2022 06:26 AM    GFRAA 49 09/14/2022 10:12 AM    LABGLOM 40 12/10/2022 06:26 AM    GLUCOSE 78 12/10/2022 06:26 AM    GLUCOSE 85 04/10/2012 12:28 PM    PROT 6.9 12/10/2022 06:26 AM    LABALBU 2.2 12/10/2022 06:26 AM    LABALBU 4.2 04/10/2012 12:28 PM    CALCIUM 8.7 12/10/2022 06:26 AM    BILITOT 0.3 12/10/2022 06:26 AM    ALKPHOS 517 12/10/2022 06:26 AM    AST 60 12/10/2022 06:26 AM    ALT 25 12/10/2022 06:26 AM     Warfarin PT/INR:    Lab Results   Component Value Date    INR 1.2 12/10/2022    INR 1.2 12/09/2022    INR 1.2 11/01/2022 PROTIME 13.1 (H) 12/10/2022    PROTIME 13.4 (H) 12/09/2022    PROTIME 12.7 (H) 11/01/2022       Assessment:    Principal Problem:    Pleural effusion  Active Problems:    Severe protein-calorie malnutrition (HCC)    Hepatitis C    Adenocarcinoma, lung (HCC)  Resolved Problems:    * No resolved hospital problems.  *      Plan:  Pleurx cath drainage dc planning discussed plan with pt and pts  at bedside        [facility]    1:03 PM  12/11/2022

## 2022-12-11 NOTE — PROGRESS NOTES
5cc drained from PleurX cath. Bright red drainage with small clot visible. Site cleansed with NS. New dressing applied. Clean, dry, and intact.

## 2022-12-11 NOTE — PLAN OF CARE
Problem: Discharge Planning  Goal: Discharge to home or other facility with appropriate resources  Outcome: Progressing     Problem: Pain  Goal: Verbalizes/displays adequate comfort level or baseline comfort level  Outcome: Progressing     Problem: Safety - Adult  Goal: Free from fall injury  Outcome: Progressing     Problem: ABCDS Injury Assessment  Goal: Absence of physical injury  Outcome: Progressing  Flowsheets (Taken 12/10/2022 2053)  Absence of Physical Injury: Implement safety measures based on patient assessment     Problem: Skin/Tissue Integrity  Goal: Absence of new skin breakdown  Description: 1. Monitor for areas of redness and/or skin breakdown  2. Assess vascular access sites hourly  3. Every 4-6 hours minimum:  Change oxygen saturation probe site  4. Every 4-6 hours:  If on nasal continuous positive airway pressure, respiratory therapy assess nares and determine need for appliance change or resting period.   Outcome: Progressing     Problem: Nutrition Deficit:  Goal: Optimize nutritional status  Outcome: Progressing

## 2022-12-12 ENCOUNTER — APPOINTMENT (OUTPATIENT)
Dept: GENERAL RADIOLOGY | Age: 72
DRG: 180 | End: 2022-12-12
Payer: MEDICARE

## 2022-12-12 VITALS
HEART RATE: 100 BPM | WEIGHT: 103.5 LBS | BODY MASS INDEX: 19.54 KG/M2 | RESPIRATION RATE: 18 BRPM | TEMPERATURE: 97.7 F | SYSTOLIC BLOOD PRESSURE: 155 MMHG | OXYGEN SATURATION: 100 % | DIASTOLIC BLOOD PRESSURE: 72 MMHG | HEIGHT: 61 IN

## 2022-12-12 LAB
APPEARANCE FLUID: CLEAR
BODY FLUID CULTURE, STERILE: NORMAL
CELL COUNT FLUID TYPE: NORMAL
COLOR FLUID: NORMAL
GRAM STAIN RESULT: NORMAL
MONOCYTE, FLUID: 92 %
NEUTROPHIL, FLUID: 9 %
NUCLEATED CELLS FLUID: 340 /UL
RBC FLUID: 3000 /UL

## 2022-12-12 PROCEDURE — 0RBJ0ZX EXCISION OF RIGHT SHOULDER JOINT, OPEN APPROACH, DIAGNOSTIC: ICD-10-PCS | Performed by: SURGERY

## 2022-12-12 PROCEDURE — 6370000000 HC RX 637 (ALT 250 FOR IP): Performed by: SURGERY

## 2022-12-12 PROCEDURE — 2580000003 HC RX 258: Performed by: SURGERY

## 2022-12-12 PROCEDURE — 94640 AIRWAY INHALATION TREATMENT: CPT

## 2022-12-12 PROCEDURE — 6360000002 HC RX W HCPCS: Performed by: SURGERY

## 2022-12-12 PROCEDURE — 71045 X-RAY EXAM CHEST 1 VIEW: CPT

## 2022-12-12 RX ADMIN — OXYCODONE 5 MG: 5 TABLET ORAL at 08:57

## 2022-12-12 RX ADMIN — DOCUSATE SODIUM 50 MG AND SENNOSIDES 8.6 MG 1 TABLET: 8.6; 5 TABLET, FILM COATED ORAL at 08:57

## 2022-12-12 RX ADMIN — AMLODIPINE BESYLATE 5 MG: 5 TABLET ORAL at 08:57

## 2022-12-12 RX ADMIN — SODIUM CHLORIDE, PRESERVATIVE FREE 10 ML: 5 INJECTION INTRAVENOUS at 08:57

## 2022-12-12 RX ADMIN — ENOXAPARIN SODIUM 30 MG: 100 INJECTION SUBCUTANEOUS at 08:57

## 2022-12-12 RX ADMIN — IPRATROPIUM BROMIDE AND ALBUTEROL SULFATE 1 AMPULE: 2.5; .5 SOLUTION RESPIRATORY (INHALATION) at 09:50

## 2022-12-12 ASSESSMENT — PAIN SCALES - WONG BAKER
WONGBAKER_NUMERICALRESPONSE: 0
WONGBAKER_NUMERICALRESPONSE: 0

## 2022-12-12 ASSESSMENT — PAIN DESCRIPTION - ONSET: ONSET: ON-GOING

## 2022-12-12 ASSESSMENT — PAIN DESCRIPTION - ORIENTATION: ORIENTATION: RIGHT

## 2022-12-12 ASSESSMENT — PAIN DESCRIPTION - PAIN TYPE: TYPE: CHRONIC PAIN

## 2022-12-12 ASSESSMENT — PAIN SCALES - GENERAL
PAINLEVEL_OUTOF10: 0
PAINLEVEL_OUTOF10: 5

## 2022-12-12 ASSESSMENT — PAIN DESCRIPTION - LOCATION: LOCATION: BACK;RIB CAGE

## 2022-12-12 ASSESSMENT — PAIN - FUNCTIONAL ASSESSMENT: PAIN_FUNCTIONAL_ASSESSMENT: ACTIVITIES ARE NOT PREVENTED

## 2022-12-12 ASSESSMENT — PAIN DESCRIPTION - DESCRIPTORS: DESCRIPTORS: ACHING;DISCOMFORT;THROBBING

## 2022-12-12 ASSESSMENT — PAIN DESCRIPTION - FREQUENCY: FREQUENCY: INTERMITTENT

## 2022-12-12 NOTE — PLAN OF CARE
Problem: Discharge Planning  Goal: Discharge to home or other facility with appropriate resources  12/11/2022 2143 by Deborah Aranda RN  Outcome: Progressing     Problem: Pain  Goal: Verbalizes/displays adequate comfort level or baseline comfort level  12/11/2022 2143 by Deborah Aranda RN  Outcome: Progressing     Problem: Safety - Adult  Goal: Free from fall injury  12/11/2022 2143 by Deborah Aranda RN  Outcome: Progressing     Problem: ABCDS Injury Assessment  Goal: Absence of physical injury  Outcome: Progressing

## 2022-12-12 NOTE — DISCHARGE INSTRUCTIONS
SURGERY DISCHARGE INSTRUCTIONS    You may be drowsy or lightheaded after receiving sedation or anesthesia. A responsible person should be with you for the next 24 hours. FOLLOW UP: Call office to schedule follow-up appointment in 2 weeks. DIET: Advance your diet as tolerated. Start with light diet and progress to your normal diet as you feel like eating. If you experience nausea or repeated episodes of vomiting which persist beyond 12-24 hours, notify your doctor. ACTIVITY: Rest today. Increase activity gradually. No driving while on prescription pain medication. No heavy lifting for 4 weeks - nothing over 10 lbs, or heavier than a milk jug. SHOWER/BATHING: Okay to shower in 24 hours after procedure. No tub bathing, swimming or soaking for 2 weeks. WOUND CARE: You have Dermabond dressing (skin glue) applied to your incisions with sutures underneath your skin. The glue will gradually work itself off over the next few weeks and the stitches will dissolve on their own. You do not need to apply anything over them. Avoid directly applying lotions, creams or oils to your incision. Keep incisions clean and dry. Always ensure you and your care giver clean hands before and after caring for the wound. You may place ice on incisions to decrease the pain and bruising. MEDICATIONS: Take as prescribed. You may take over the counter ibuprofen or tylenol for pain as directed, limit total amount of acetaminophen (tylenol) to 3 grams per 24-hour period. Okay to resume anticoagulant medication after 24hrs. You may experience constipation while taking pain medication, you may take over the counter stool softeners (Docusate/Colace or Senokot-S) as directed.        SPECIAL INSTRUCTIONS:   Call physician if they or any other problems occur:  Call the office if you have a fever over >101F, or if your incision becomes red, tender, or drains more than a small amount of clear fluid  Fever over 101°    Redness, swelling, hardness or warmth at the operative site  Unrelieved nausea    Foul smelling or cloudy drainage at the operative site   Unrelieved pain    Blood soaked dressing (Some oozing may be normal)

## 2022-12-12 NOTE — PROGRESS NOTES
GENERAL SURGERY  DAILY PROGRESS NOTE  12/12/2022  Chief Complaint   Patient presents with    Back Pain     Cyst on back/shoulder with increased pain denies any new injury . Hx lung ca       Subjective:  Patient states her right axilla/back is sore but feeling better overall. Objective:  BP (!) 152/64   Pulse 85   Temp 96.8 °F (36 °C) (Temporal)   Resp 18   Ht 5' 1\" (1.549 m)   Wt 103 lb 8 oz (46.9 kg)   SpO2 96%   BMI 19.56 kg/m²     GENERAL:  Laying in bed, awake, alert, cooperative, no apparent distress  HEAD: Normocephalic, atraumatic  LUNGS:  No increased work of breathing  CARDIOVASCULAR:  RR  ABDOMEN:  Soft, non-tender, non-distended  SKIN: Right posterior axilla incision C/D/I no signs of bleeding or discharge    Assessment/Plan:  71 y.o. female with right posterior axilla mass s/p excision 12/11, history of metastatic cancer.    - Await pathology  - Dermabond will fall off on its own. OK for shower  - Follow up with Dr. Clarisse Pascual in 2 weeks for wound re-check  - Surgery to sign off, please call with questions or concerns.        Electronically signed by Bg Acevedo MD on 12/12/2022 at 7:36 AM

## 2022-12-12 NOTE — CARE COORDINATION
Pt discharging home today, called [facility] for 1:30pm transport, envelope and ambulance form in soft chart. Will need hhc orders ANISHA and pleurex cath and care, will need 4 pleurex kits to go home with. Will need an order for w/c standard 18 inch. Spoke wit charge RN to get hhc orders and DME order for w/c. Called Alena Clarke sister, and updated her on discharge and answered all questions. Called Alena Clarke to update  time. [facility] and [facility] orders obtained. Sal Alexandra, MSW, LSW

## 2022-12-12 NOTE — PROGRESS NOTES
[facility] Medicine    Subjective:  pt alert conversive       Current Facility-Administered Medications:     ipratropium-albuterol (DUONEB) nebulizer solution 1 ampule, 1 ampule, Inhalation, 4x daily, Deepali E Kaercher, DO, 1 ampule at 12/11/22 2042    medicated lip balm (BLISTEX/CARMEX) stick, , Topical, PRN, Vickey Fought, DO, Given at 12/11/22 0531    oxyCODONE (ROXICODONE) immediate release tablet 5 mg, 5 mg, Oral, Q4H PRN, 5 mg at 12/11/22 1919 **OR** oxyCODONE HCl (OXY-IR) immediate release tablet 10 mg, 10 mg, Oral, Q4H PRN, Deepali E Kaercher, DO, 10 mg at 12/11/22 1123    sennosides-docusate sodium (SENOKOT-S) 8.6-50 MG tablet 1 tablet, 1 tablet, Oral, BID, Deepali E Kaercher, DO, 1 tablet at 12/11/22 1954    mirtazapine (REMERON) tablet 7.5 mg, 7.5 mg, Oral, Nightly, Deepali E Kaercher, DO, 7.5 mg at 12/11/22 1954    amLODIPine (NORVASC) tablet 5 mg, 5 mg, Oral, Daily, Deepali E Kaercher, DO, 5 mg at 12/11/22 0205    sodium chloride flush 0.9 % injection 5-40 mL, 5-40 mL, IntraVENous, 2 times per day, Deepali E Kaercher, DO, 10 mL at 12/11/22 1957    sodium chloride flush 0.9 % injection 5-40 mL, 5-40 mL, IntraVENous, PRN, Deepali E Kaercher, DO    0.9 % sodium chloride infusion, , IntraVENous, PRN, Deepali E Kaercher, DO    enoxaparin Sodium (LOVENOX) injection 30 mg, 30 mg, SubCUTAneous, Daily, Deepali E Kaercher, DO, 30 mg at 12/08/22 8265    polyethylene glycol (GLYCOLAX) packet 17 g, 17 g, Oral, Daily PRN, Deepali E Kaercher, DO    acetaminophen (TYLENOL) tablet 650 mg, 650 mg, Oral, Q6H PRN **OR** acetaminophen (TYLENOL) suppository 650 mg, 650 mg, Rectal, Q6H PRN, Deepali E Kaercher, DO    morphine (PF) injection 2 mg, 2 mg, IntraVENous, Q4H PRN, Deepali E Kaercher, DO, 2 mg at 12/08/22 0813    trimethobenzamide (TIGAN) injection 200 mg, 200 mg, IntraMUSCular, Q6H PRN, Deepali E Kaercher, DO    Facility-Administered Medications Ordered in Other Encounters:     pantoprazole (PROTONIX) tablet 40 mg, 40 mg, Oral, QAM AC, Luís Amin MD    Objective:    BP (!) 152/64   Pulse 85   Temp 96.8 °F (36 °C) (Temporal)   Resp 18   Ht 5' 1\" (1.549 m)   Wt 103 lb 8 oz (46.9 kg)   SpO2 96%   BMI 19.56 kg/m²     Heart:  reg  Lungs:  decreased breath sounds r base  Abd: + bs soft min ascites  Extrem:  min edema    CBC with Differential:    Lab Results   Component Value Date/Time    WBC 9.9 12/11/2022 06:10 AM    RBC 2.75 12/11/2022 06:10 AM    HGB 7.4 12/11/2022 06:10 AM    HCT 24.1 12/11/2022 06:10 AM     12/11/2022 06:10 AM    MCV 87.6 12/11/2022 06:10 AM    MCH 26.9 12/11/2022 06:10 AM    MCHC 30.7 12/11/2022 06:10 AM    RDW 16.4 12/11/2022 06:10 AM    SEGSPCT 61 01/16/2012 12:15 PM    LYMPHOPCT 8.0 12/11/2022 06:10 AM    MONOPCT 8.4 12/11/2022 06:10 AM    BASOPCT 0.3 12/11/2022 06:10 AM    MONOSABS 0.83 12/11/2022 06:10 AM    LYMPHSABS 0.79 12/11/2022 06:10 AM    EOSABS 0.06 12/11/2022 06:10 AM    BASOSABS 0.03 12/11/2022 06:10 AM     CMP:    Lab Results   Component Value Date/Time     12/10/2022 06:26 AM    K 4.8 12/10/2022 06:26 AM     12/10/2022 06:26 AM    CO2 19 12/10/2022 06:26 AM    BUN 30 12/10/2022 06:26 AM    CREATININE 1.4 12/10/2022 06:26 AM    GFRAA 49 09/14/2022 10:12 AM    LABGLOM 40 12/10/2022 06:26 AM    GLUCOSE 78 12/10/2022 06:26 AM    GLUCOSE 85 04/10/2012 12:28 PM    PROT 6.9 12/10/2022 06:26 AM    LABALBU 2.2 12/10/2022 06:26 AM    LABALBU 4.2 04/10/2012 12:28 PM    CALCIUM 8.7 12/10/2022 06:26 AM    BILITOT 0.3 12/10/2022 06:26 AM    ALKPHOS 517 12/10/2022 06:26 AM    AST 60 12/10/2022 06:26 AM    ALT 25 12/10/2022 06:26 AM     Warfarin PT/INR:    Lab Results   Component Value Date    INR 1.2 12/10/2022    INR 1.2 12/09/2022    INR 1.2 11/01/2022    PROTIME 13.1 (H) 12/10/2022    PROTIME 13.4 (H) 12/09/2022    PROTIME 12.7 (H) 11/01/2022       Assessment:    Principal Problem:    Pleural effusion  Active Problems:    Severe protein-calorie malnutrition ([facility]    Hepatitis C    Adenocarcinoma, lung ([facility] [address]  Resolved Problems:    * No resolved hospital problems.  *  S/p pleurx catheter    Plan:  Dc home check cxr today        Carmelina Paredes DO  7:12 AM  12/12/2022

## 2022-12-12 NOTE — PATIENT CARE CONFERENCE
P Quality Flow/Interdisciplinary Rounds Progress Note        Quality Flow Rounds held on December 12, 2022    Disciplines Attending:  Bedside Nurse, , and Nursing Unit Leadership    Hans Cox was admitted on 12/7/2022 12:01 PM    Anticipated Discharge Date:       Disposition:    Ishan Score:  Ishan Scale Score: 12    Readmission Risk              Risk of Unplanned Readmission:  24           Discussed patient goal for the day, patient clinical progression, and barriers to discharge.   The following Goal(s) of the Day/Commitment(s) have been identified:  46 Inez Moreno RN  December 12, 2022

## 2022-12-13 ENCOUNTER — TELEPHONE (OUTPATIENT)
Dept: ONCOLOGY | Age: 72
End: 2022-12-13

## 2022-12-13 NOTE — TELEPHONE ENCOUNTER
SW received call from pt's sister Loida Adames asking about future appointments. SW spoke with Noam Whitley, Nurse Navigator, who reported that she would contact the family to update them on current treatment status.         Cassi MOULTON, LELE-S  Oncology Social Worker

## 2022-12-14 ENCOUNTER — HOSPITAL ENCOUNTER (INPATIENT)
Age: 72
LOS: 7 days | Discharge: HOME | DRG: 187 | End: 2022-12-22
Attending: EMERGENCY MEDICINE | Admitting: INTERNAL MEDICINE
Payer: MEDICARE

## 2022-12-14 ENCOUNTER — APPOINTMENT (OUTPATIENT)
Dept: CT IMAGING | Age: 72
DRG: 187 | End: 2022-12-14
Payer: MEDICARE

## 2022-12-14 ENCOUNTER — TELEPHONE (OUTPATIENT)
Dept: BREAST CENTER | Age: 72
End: 2022-12-14

## 2022-12-14 ENCOUNTER — APPOINTMENT (OUTPATIENT)
Dept: GENERAL RADIOLOGY | Age: 72
DRG: 187 | End: 2022-12-14
Payer: MEDICARE

## 2022-12-14 DIAGNOSIS — J90 LOCULATED PLEURAL EFFUSION: Primary | ICD-10-CM

## 2022-12-14 DIAGNOSIS — R53.83 OTHER FATIGUE: ICD-10-CM

## 2022-12-14 LAB
ABO/RH: NORMAL
ALBUMIN SERPL-MCNC: 2.1 G/DL (ref 3.5–5.2)
ALP BLD-CCNC: 218 U/L (ref 35–104)
ALT SERPL-CCNC: 11 U/L (ref 0–32)
ANION GAP SERPL CALCULATED.3IONS-SCNC: 7 MMOL/L (ref 7–16)
ANTIBODY SCREEN: NORMAL
AST SERPL-CCNC: 21 U/L (ref 0–31)
BASOPHILS ABSOLUTE: 0.03 E9/L (ref 0–0.2)
BASOPHILS RELATIVE PERCENT: 0.3 % (ref 0–2)
BILIRUB SERPL-MCNC: <0.2 MG/DL (ref 0–1.2)
BUN BLDV-MCNC: 23 MG/DL (ref 6–23)
CALCIUM SERPL-MCNC: 9 MG/DL (ref 8.6–10.2)
CHLORIDE BLD-SCNC: 114 MMOL/L (ref 98–107)
CO2: 21 MMOL/L (ref 22–29)
CREAT SERPL-MCNC: 0.8 MG/DL (ref 0.5–1)
EOSINOPHILS ABSOLUTE: 0.02 E9/L (ref 0.05–0.5)
EOSINOPHILS RELATIVE PERCENT: 0.2 % (ref 0–6)
GFR SERPL CREATININE-BSD FRML MDRD: >60 ML/MIN/1.73
GLUCOSE BLD-MCNC: 104 MG/DL (ref 74–99)
HCT VFR BLD CALC: 24.3 % (ref 34–48)
HEMOGLOBIN: 7.8 G/DL (ref 11.5–15.5)
IMMATURE GRANULOCYTES #: 0.18 E9/L
IMMATURE GRANULOCYTES %: 1.7 % (ref 0–5)
LYMPHOCYTES ABSOLUTE: 0.79 E9/L (ref 1.5–4)
LYMPHOCYTES RELATIVE PERCENT: 7.4 % (ref 20–42)
MCH RBC QN AUTO: 27.4 PG (ref 26–35)
MCHC RBC AUTO-ENTMCNC: 32.1 % (ref 32–34.5)
MCV RBC AUTO: 85.3 FL (ref 80–99.9)
MONOCYTES ABSOLUTE: 0.63 E9/L (ref 0.1–0.95)
MONOCYTES RELATIVE PERCENT: 5.9 % (ref 2–12)
NEUTROPHILS ABSOLUTE: 9 E9/L (ref 1.8–7.3)
NEUTROPHILS RELATIVE PERCENT: 84.5 % (ref 43–80)
PDW BLD-RTO: 16.8 FL (ref 11.5–15)
PLATELET # BLD: 388 E9/L (ref 130–450)
PMV BLD AUTO: 9.3 FL (ref 7–12)
POTASSIUM REFLEX MAGNESIUM: 4.6 MMOL/L (ref 3.5–5)
PRO-BNP: 1014 PG/ML (ref 0–125)
RBC # BLD: 2.85 E12/L (ref 3.5–5.5)
SODIUM BLD-SCNC: 142 MMOL/L (ref 132–146)
TOTAL PROTEIN: 6.8 G/DL (ref 6.4–8.3)
TROPONIN, HIGH SENSITIVITY: 34 NG/L (ref 0–9)
TROPONIN, HIGH SENSITIVITY: 36 NG/L (ref 0–9)
WBC # BLD: 10.7 E9/L (ref 4.5–11.5)

## 2022-12-14 PROCEDURE — P9016 RBC LEUKOCYTES REDUCED: HCPCS

## 2022-12-14 PROCEDURE — 2580000003 HC RX 258: Performed by: STUDENT IN AN ORGANIZED HEALTH CARE EDUCATION/TRAINING PROGRAM

## 2022-12-14 PROCEDURE — 86923 COMPATIBILITY TEST ELECTRIC: CPT

## 2022-12-14 PROCEDURE — 84484 ASSAY OF TROPONIN QUANT: CPT

## 2022-12-14 PROCEDURE — 71275 CT ANGIOGRAPHY CHEST: CPT

## 2022-12-14 PROCEDURE — 96361 HYDRATE IV INFUSION ADD-ON: CPT

## 2022-12-14 PROCEDURE — 86901 BLOOD TYPING SEROLOGIC RH(D): CPT

## 2022-12-14 PROCEDURE — 71275 CT ANGIOGRAPHY CHEST: CPT | Performed by: RADIOLOGY

## 2022-12-14 PROCEDURE — 6360000004 HC RX CONTRAST MEDICATION: Performed by: RADIOLOGY

## 2022-12-14 PROCEDURE — 6360000002 HC RX W HCPCS: Performed by: STUDENT IN AN ORGANIZED HEALTH CARE EDUCATION/TRAINING PROGRAM

## 2022-12-14 PROCEDURE — 86900 BLOOD TYPING SEROLOGIC ABO: CPT

## 2022-12-14 PROCEDURE — 93005 ELECTROCARDIOGRAM TRACING: CPT | Performed by: STUDENT IN AN ORGANIZED HEALTH CARE EDUCATION/TRAINING PROGRAM

## 2022-12-14 PROCEDURE — 36415 COLL VENOUS BLD VENIPUNCTURE: CPT

## 2022-12-14 PROCEDURE — 83880 ASSAY OF NATRIURETIC PEPTIDE: CPT

## 2022-12-14 PROCEDURE — 99285 EMERGENCY DEPT VISIT HI MDM: CPT

## 2022-12-14 PROCEDURE — 86850 RBC ANTIBODY SCREEN: CPT

## 2022-12-14 PROCEDURE — 71045 X-RAY EXAM CHEST 1 VIEW: CPT

## 2022-12-14 PROCEDURE — 85025 COMPLETE CBC W/AUTO DIFF WBC: CPT

## 2022-12-14 PROCEDURE — 96374 THER/PROPH/DIAG INJ IV PUSH: CPT

## 2022-12-14 PROCEDURE — 80053 COMPREHEN METABOLIC PANEL: CPT

## 2022-12-14 RX ORDER — FENTANYL CITRATE 50 UG/ML
25 INJECTION, SOLUTION INTRAMUSCULAR; INTRAVENOUS ONCE
Status: COMPLETED | OUTPATIENT
Start: 2022-12-14 | End: 2022-12-14

## 2022-12-14 RX ORDER — 0.9 % SODIUM CHLORIDE 0.9 %
250 INTRAVENOUS SOLUTION INTRAVENOUS ONCE
Status: COMPLETED | OUTPATIENT
Start: 2022-12-14 | End: 2022-12-14

## 2022-12-14 RX ADMIN — IOPAMIDOL 75 ML: 755 INJECTION, SOLUTION INTRAVENOUS at 23:02

## 2022-12-14 RX ADMIN — FENTANYL CITRATE 25 MCG: 50 INJECTION INTRAMUSCULAR; INTRAVENOUS at 22:23

## 2022-12-14 RX ADMIN — SODIUM CHLORIDE 250 ML: 9 INJECTION, SOLUTION INTRAVENOUS at 21:24

## 2022-12-14 ASSESSMENT — ENCOUNTER SYMPTOMS
VOMITING: 0
BACK PAIN: 0
NAUSEA: 0
ABDOMINAL PAIN: 0
DIARRHEA: 0
SHORTNESS OF BREATH: 1
COLOR CHANGE: 0
COUGH: 0

## 2022-12-14 ASSESSMENT — PAIN - FUNCTIONAL ASSESSMENT: PAIN_FUNCTIONAL_ASSESSMENT: NONE - DENIES PAIN

## 2022-12-14 NOTE — TELEPHONE ENCOUNTER
Attempted to reach patient and/or sister at both contact numbers. Left a message with call back number with \"home\" number and was unable to leave message with \"mobile\". Will attempt again soon.

## 2022-12-15 ENCOUNTER — TELEPHONE (OUTPATIENT)
Dept: BREAST CENTER | Age: 72
End: 2022-12-15

## 2022-12-15 PROBLEM — J90 LOCULATED PLEURAL EFFUSION: Status: ACTIVE | Noted: 2022-01-01

## 2022-12-15 PROBLEM — N64.9 BREAST LESION: Status: ACTIVE | Noted: 2022-01-01

## 2022-12-15 PROBLEM — R22.9 SUBCUTANEOUS NODULE: Status: ACTIVE | Noted: 2022-12-15

## 2022-12-15 PROBLEM — C79.89 METASTATIC CANCER TO CHEST WALL (HCC): Status: ACTIVE | Noted: 2022-12-15

## 2022-12-15 LAB
ALBUMIN SERPL-MCNC: 2.2 G/DL (ref 3.5–5.2)
ALP BLD-CCNC: 196 U/L (ref 35–104)
ALT SERPL-CCNC: 10 U/L (ref 0–32)
ANION GAP SERPL CALCULATED.3IONS-SCNC: 8 MMOL/L (ref 7–16)
AST SERPL-CCNC: 24 U/L (ref 0–31)
BASOPHILS ABSOLUTE: 0.03 E9/L (ref 0–0.2)
BASOPHILS RELATIVE PERCENT: 0.3 % (ref 0–2)
BILIRUB SERPL-MCNC: 0.2 MG/DL (ref 0–1.2)
BUN BLDV-MCNC: 20 MG/DL (ref 6–23)
C-REACTIVE PROTEIN: 3.1 MG/DL (ref 0–0.4)
CALCIUM SERPL-MCNC: 8.9 MG/DL (ref 8.6–10.2)
CHLORIDE BLD-SCNC: 111 MMOL/L (ref 98–107)
CO2: 21 MMOL/L (ref 22–29)
CREAT SERPL-MCNC: 0.8 MG/DL (ref 0.5–1)
EKG ATRIAL RATE: 93 BPM
EKG P AXIS: 69 DEGREES
EKG P-R INTERVAL: 98 MS
EKG Q-T INTERVAL: 386 MS
EKG QRS DURATION: 122 MS
EKG QTC CALCULATION (BAZETT): 479 MS
EKG R AXIS: -65 DEGREES
EKG T AXIS: 25 DEGREES
EKG VENTRICULAR RATE: 93 BPM
EOSINOPHILS ABSOLUTE: 0.07 E9/L (ref 0.05–0.5)
EOSINOPHILS RELATIVE PERCENT: 0.7 % (ref 0–6)
GFR SERPL CREATININE-BSD FRML MDRD: >60 ML/MIN/1.73
GLUCOSE BLD-MCNC: 87 MG/DL (ref 74–99)
HCT VFR BLD CALC: 22 % (ref 34–48)
HEMOGLOBIN: 7.1 G/DL (ref 11.5–15.5)
IMMATURE GRANULOCYTES #: 0.17 E9/L
IMMATURE GRANULOCYTES %: 1.8 % (ref 0–5)
INR BLD: 1.1
LYMPHOCYTES ABSOLUTE: 0.71 E9/L (ref 1.5–4)
LYMPHOCYTES RELATIVE PERCENT: 7.3 % (ref 20–42)
MCH RBC QN AUTO: 28 PG (ref 26–35)
MCHC RBC AUTO-ENTMCNC: 32.3 % (ref 32–34.5)
MCV RBC AUTO: 86.6 FL (ref 80–99.9)
MONOCYTES ABSOLUTE: 0.57 E9/L (ref 0.1–0.95)
MONOCYTES RELATIVE PERCENT: 5.9 % (ref 2–12)
NEUTROPHILS ABSOLUTE: 8.12 E9/L (ref 1.8–7.3)
NEUTROPHILS RELATIVE PERCENT: 84 % (ref 43–80)
PATHOLOGIST REVIEW: NORMAL
PDW BLD-RTO: 17.5 FL (ref 11.5–15)
PLATELET # BLD: 370 E9/L (ref 130–450)
PMV BLD AUTO: 10.8 FL (ref 7–12)
POTASSIUM REFLEX MAGNESIUM: 4.4 MMOL/L (ref 3.5–5)
PROTHROMBIN TIME: 12.9 SEC (ref 9.3–12.4)
RBC # BLD: 2.54 E12/L (ref 3.5–5.5)
REASON FOR REJECTION: NORMAL
REJECTED TEST: NORMAL
SODIUM BLD-SCNC: 140 MMOL/L (ref 132–146)
TOTAL PROTEIN: 6.7 G/DL (ref 6.4–8.3)
WBC # BLD: 9.7 E9/L (ref 4.5–11.5)

## 2022-12-15 PROCEDURE — 2580000003 HC RX 258

## 2022-12-15 PROCEDURE — 6360000002 HC RX W HCPCS: Performed by: FAMILY MEDICINE

## 2022-12-15 PROCEDURE — 85610 PROTHROMBIN TIME: CPT

## 2022-12-15 PROCEDURE — 99223 1ST HOSP IP/OBS HIGH 75: CPT | Performed by: STUDENT IN AN ORGANIZED HEALTH CARE EDUCATION/TRAINING PROGRAM

## 2022-12-15 PROCEDURE — 80053 COMPREHEN METABOLIC PANEL: CPT

## 2022-12-15 PROCEDURE — 36415 COLL VENOUS BLD VENIPUNCTURE: CPT

## 2022-12-15 PROCEDURE — 2060000000 HC ICU INTERMEDIATE R&B

## 2022-12-15 PROCEDURE — 99222 1ST HOSP IP/OBS MODERATE 55: CPT | Performed by: INTERNAL MEDICINE

## 2022-12-15 PROCEDURE — 99222 1ST HOSP IP/OBS MODERATE 55: CPT | Performed by: NURSE PRACTITIONER

## 2022-12-15 PROCEDURE — 86140 C-REACTIVE PROTEIN: CPT

## 2022-12-15 PROCEDURE — 89051 BODY FLUID CELL COUNT: CPT

## 2022-12-15 PROCEDURE — 6370000000 HC RX 637 (ALT 250 FOR IP)

## 2022-12-15 PROCEDURE — 83986 ASSAY PH BODY FLUID NOS: CPT

## 2022-12-15 PROCEDURE — 94640 AIRWAY INHALATION TREATMENT: CPT

## 2022-12-15 PROCEDURE — APPSS45 APP SPLIT SHARED TIME 31-45 MINUTES

## 2022-12-15 PROCEDURE — 85025 COMPLETE CBC W/AUTO DIFF WBC: CPT

## 2022-12-15 PROCEDURE — 93010 ELECTROCARDIOGRAM REPORT: CPT | Performed by: INTERNAL MEDICINE

## 2022-12-15 PROCEDURE — 6370000000 HC RX 637 (ALT 250 FOR IP): Performed by: FAMILY MEDICINE

## 2022-12-15 PROCEDURE — 6370000000 HC RX 637 (ALT 250 FOR IP): Performed by: NURSE PRACTITIONER

## 2022-12-15 PROCEDURE — 94664 DEMO&/EVAL PT USE INHALER: CPT

## 2022-12-15 RX ORDER — AMLODIPINE BESYLATE 5 MG/1
5 TABLET ORAL DAILY
Status: DISCONTINUED | OUTPATIENT
Start: 2022-12-15 | End: 2022-12-18

## 2022-12-15 RX ORDER — OXYCODONE HYDROCHLORIDE 5 MG/1
10 TABLET ORAL EVERY 6 HOURS PRN
Status: DISCONTINUED | OUTPATIENT
Start: 2022-12-15 | End: 2022-12-23 | Stop reason: HOSPADM

## 2022-12-15 RX ORDER — POLYETHYLENE GLYCOL 3350 17 G/17G
17 POWDER, FOR SOLUTION ORAL DAILY PRN
Status: DISCONTINUED | OUTPATIENT
Start: 2022-12-15 | End: 2022-12-23 | Stop reason: HOSPADM

## 2022-12-15 RX ORDER — ENOXAPARIN SODIUM 100 MG/ML
40 INJECTION SUBCUTANEOUS DAILY
Status: DISCONTINUED | OUTPATIENT
Start: 2022-12-15 | End: 2022-12-15

## 2022-12-15 RX ORDER — ONDANSETRON 4 MG/1
4 TABLET, ORALLY DISINTEGRATING ORAL EVERY 8 HOURS PRN
Status: DISCONTINUED | OUTPATIENT
Start: 2022-12-15 | End: 2022-12-23 | Stop reason: HOSPADM

## 2022-12-15 RX ORDER — ACETAMINOPHEN 650 MG/1
650 SUPPOSITORY RECTAL EVERY 6 HOURS PRN
Status: DISCONTINUED | OUTPATIENT
Start: 2022-12-15 | End: 2022-12-23 | Stop reason: HOSPADM

## 2022-12-15 RX ORDER — ESCITALOPRAM OXALATE 10 MG/1
5 TABLET ORAL DAILY
Status: DISCONTINUED | OUTPATIENT
Start: 2022-12-15 | End: 2022-12-23 | Stop reason: HOSPADM

## 2022-12-15 RX ORDER — SODIUM CHLORIDE 0.9 % (FLUSH) 0.9 %
5-40 SYRINGE (ML) INJECTION EVERY 12 HOURS SCHEDULED
Status: DISCONTINUED | OUTPATIENT
Start: 2022-12-15 | End: 2022-12-23 | Stop reason: HOSPADM

## 2022-12-15 RX ORDER — SODIUM CHLORIDE 9 MG/ML
INJECTION, SOLUTION INTRAVENOUS PRN
Status: DISCONTINUED | OUTPATIENT
Start: 2022-12-15 | End: 2022-12-23 | Stop reason: HOSPADM

## 2022-12-15 RX ORDER — ONDANSETRON 2 MG/ML
4 INJECTION INTRAMUSCULAR; INTRAVENOUS EVERY 6 HOURS PRN
Status: DISCONTINUED | OUTPATIENT
Start: 2022-12-15 | End: 2022-12-23 | Stop reason: HOSPADM

## 2022-12-15 RX ORDER — ACETAMINOPHEN 325 MG/1
650 TABLET ORAL EVERY 6 HOURS PRN
Status: DISCONTINUED | OUTPATIENT
Start: 2022-12-15 | End: 2022-12-23 | Stop reason: HOSPADM

## 2022-12-15 RX ORDER — SODIUM CHLORIDE 0.9 % (FLUSH) 0.9 %
5-40 SYRINGE (ML) INJECTION PRN
Status: DISCONTINUED | OUTPATIENT
Start: 2022-12-15 | End: 2022-12-23 | Stop reason: HOSPADM

## 2022-12-15 RX ORDER — IPRATROPIUM BROMIDE AND ALBUTEROL SULFATE 2.5; .5 MG/3ML; MG/3ML
1 SOLUTION RESPIRATORY (INHALATION)
Status: DISCONTINUED | OUTPATIENT
Start: 2022-12-15 | End: 2022-12-23 | Stop reason: HOSPADM

## 2022-12-15 RX ADMIN — IPRATROPIUM BROMIDE AND ALBUTEROL SULFATE 1 AMPULE: 2.5; .5 SOLUTION RESPIRATORY (INHALATION) at 20:38

## 2022-12-15 RX ADMIN — Medication 10 ML: at 10:16

## 2022-12-15 RX ADMIN — IPRATROPIUM BROMIDE AND ALBUTEROL SULFATE 1 AMPULE: 2.5; .5 SOLUTION RESPIRATORY (INHALATION) at 15:42

## 2022-12-15 RX ADMIN — IPRATROPIUM BROMIDE AND ALBUTEROL SULFATE 1 AMPULE: 2.5; .5 SOLUTION RESPIRATORY (INHALATION) at 12:24

## 2022-12-15 RX ADMIN — Medication 10 ML: at 20:03

## 2022-12-15 RX ADMIN — ESCITALOPRAM OXALATE 5 MG: 10 TABLET ORAL at 20:02

## 2022-12-15 RX ADMIN — HYDROMORPHONE HYDROCHLORIDE 1 MG: 1 INJECTION, SOLUTION INTRAMUSCULAR; INTRAVENOUS; SUBCUTANEOUS at 08:45

## 2022-12-15 RX ADMIN — IPRATROPIUM BROMIDE AND ALBUTEROL SULFATE 1 AMPULE: 2.5; .5 SOLUTION RESPIRATORY (INHALATION) at 08:25

## 2022-12-15 RX ADMIN — OXYCODONE 10 MG: 5 TABLET ORAL at 18:00

## 2022-12-15 RX ADMIN — ACETAMINOPHEN 650 MG: 325 TABLET ORAL at 20:02

## 2022-12-15 ASSESSMENT — ENCOUNTER SYMPTOMS
ABDOMINAL DISTENTION: 1
COUGH: 0
SHORTNESS OF BREATH: 1

## 2022-12-15 ASSESSMENT — PAIN DESCRIPTION - LOCATION: LOCATION: GENERALIZED

## 2022-12-15 ASSESSMENT — PAIN - FUNCTIONAL ASSESSMENT: PAIN_FUNCTIONAL_ASSESSMENT: NONE - DENIES PAIN

## 2022-12-15 ASSESSMENT — PAIN SCALES - GENERAL
PAINLEVEL_OUTOF10: 3
PAINLEVEL_OUTOF10: 9
PAINLEVEL_OUTOF10: 0

## 2022-12-15 NOTE — PLAN OF CARE
1515 71year old presented to [facility] ED with complaints of increased SOB beginning evening prior to presentation. Hx Adenocarcinoma of the lung. S/P Right pleural drain. Upon arrival to ED SPO2 80s. Placed on 4LNC. BUN/Crt 23/0.8 BNP 1014 HS Troponin 34 CRP 3.1 WBC 9.7 H/H 7.1/22.0 CXR redemonstration of hazy opacities throughout right lung notable suggesting persistent pleural effusion. CTA Chest No PE. Loculated large right pleural effusion with indwelling right hemithorax. Drainage catheter. Posterior right lung base compressive atelectasis. Multiple indeterminate subcentimeter nodules. Ascites. Pt seen earlier this morning by admitting physician. 1.  Loculated pleural effusion- CTA shows loculated large right pleural effusion with indwelling right hemithorax drainage catheter and posterior right lung base compressive atelectasis. Pleural drain placed on 12/9. Drained today for 500 ml. Drained twice weekly. Consult pulmonology- patient follows with Dr Montana Doll. Check CRP 3.1 Procalcitonin pending- WBC 9.7  Afebrile with stable vital signs. Pulmonology input appreciated. 2. Hx of lung cancer-right lung mass +Adenocarcinoma. GA TA 3/CD x2 KRAS (G12C)+ % expression./6/22 radiation therapy initiation. Follows with Dr Odalis Calero. Oncology/pulmonology/palliative medicine input appreciated     3. HTN- Continue home medications. 4. Anemia- H&H 7.8/ 24.3>7.1/22.0. No overt signs of bleeding noted. Continue to follow transfuse as needed. 5. Ascites- History of hepatitis C. Last paracentesis done on 11/9 for 2510 cc.  11/22 cytology negative for malignant cells. 6. Hypoxia-2/2 above. Baseline Room Air. Currently requiring 4 LNC. Pink Jamie Continue to wean as tolerated maintain SPO2> 92%. Continue DuoNebs. Pulmonology input appreciated. 7.  Subcutaneous mass posterior right shoulder 12/11 biopsy pathology remains pending.

## 2022-12-15 NOTE — ED NOTES
Unit secretary Maxim Childers advises this nurse consults on this patient will be called in am.     Rene Carrillo RN  12/15/22 [id number]

## 2022-12-15 NOTE — PLAN OF CARE
Patient just DCd from [facility] on 12/12- 3 days ago  NOT really much different? ? Vs decompensated  Similar exam similar  Lungs:  decreased breath sounds r base  Abd: + bs soft min ascites  Extrem:  min edema    Claims sob, but NOT hypoxic at rest.    If dr Erin Kline group NOT available here, check with [facility] will they modify pleurex? Loculated effusion, not draining all way?   Consult IR, keep NPO till verification if procedure planned

## 2022-12-15 NOTE — ED NOTES
Sister Seleta Brittle updated at this time, request for further updates to be called to her 15 Santosh Montenegro RN  12/15/22 [id number]

## 2022-12-15 NOTE — H&P
[facility] History and Physical      CHIEF COMPLAINT:  shortness of breath    History of Present Illness: This is a 71year old female with a past medical history of adenocarcinoma of the lung with a right pleural drain. States she has had increased shortness of breath since yesterday evening. Also admits to feeling fatigued. Denies fever, chills, nausea, or chest pain, States she has some lightheadedness and a new dry cough since yesterday. Denies congestion or diarrhea. Pleural catheter accessed today at home for 500 ml fluid. Was hypoxic in the [de-identified] on room air upon arrival by EMS per ED note.       Informant(s) for H&P: patient and chart review    REVIEW OF SYSTEMS:  A comprehensive review of systems was negative except for: what is in the HPI      PMH:  Past Medical History:   Diagnosis Date    Abnormal chest x-ray with multiple lung nodules 9/8/2015    Abnormal Pap smear 1/3/2011    Alpha-1 negative    Adrenal adenoma     7 mm    Bilateral lung cancer ([address] 5/12/2016    surgically removed - 2015     Cholelithiasis 6/6/2011    Encounter for screening colonoscopy     for OR 3-28-19     Fibroids     Hemangioma of spleen     Hepatitis C 01/03/2011    treated and cured, no current issues     Hyperlipidemia     no medications     Hypertension 6/6/2011    Insomnia     Lung nodule     ROMELIA     Lymphadenopathy     Cervical (-) ENT    Malignant neoplasm of upper lobe of right lung ([address] 11/18/2015    Osteoarthritis     Panlobular emphysema ([address] 11/12/2015    controlled with inhalers     PPD negative 1/18/12    Pulmonary embolism without acute cor pulmonale ([address] 5/12/2016    PVD (peripheral vascular disease) ([address] 5/6/2014    Scoliosis     Uterine fibroid 5/6/2014       Surgical History:  Past Surgical History:   Procedure Laterality Date    APPENDECTOMY  1965    AXILLARY SURGERY Right 12/11/2022    EXCISION OF RIGHT AXILLARY MASS performed by Valdo Merino MD at [facility] AGE 29'S LEFT NIPPLE REMOVED    BREAST LUMPECTOMY  4/28/1999    left, benign, Dr. Jordan Carter, [place]  2/1/2012    Joel Aponte, Dr. Akin Galvan, [place]  10/15/15    with EBUS - DR Renee Sawyer      COLONOSCOPY  12/21/2009    partial to distal ascending colon normal (completion BE same day normal), Dr. Jordan Carter, [facility]    COLONOSCOPY  8/22/2014    done under fluoro with sigmoid straightening device so was able to get to cecum, very poor prep, moderate proctitis, repeat 5 years,  Dr. Jordan Carter, [facility]    COLONOSCOPY N/A 3/28/2019    COLONOSCOPY POLYPECTOMY SNARE/COLD BIOPSY performed by Ramya Sheth DO at [address] [place] [address]  N/A 5/13/2022    COLONOSCOPY POLYPECTOMY SNARE/COLD BIOPSY performed by Bettie Crowley MD at [address] [place] [address]  9/20/2022    CT NEEDLE BIOPSY LUNG PERCUTANEOUS 9/20/2022 L Reva Aschoff, MD [facility]    ENDOMETRIAL BIOPSY      IR INSERT TUNNELED PLEURA CATH W CUFF  12/9/2022    IR INSERT TUNNELED PLEURA CATH W CUFF 12/9/2022 Beto Zendejas MD [facility] SPECIAL PROCEDURES    LIVER RESECTION Right 7/6/2022    LAPAROSCOPIC ROBOTIC PERITONEAL MASS RESECTION performed by Adriana Stephen III, MD at [place] [id number] Left 3/29/2016    VATS WEDGE RESECTION UPPER LOBECTOMY    OTHER SURGICAL HISTORY Right 11/04/2016    REMOVAL MEDI-PORT - DR SHI    WI LAP,CHOLECYSTECTOMY/GRAPH N/A 6/29/2018    CHOLECYSTECTOMY LAPAROSCOPIC, POSSIBLE OPEN,POSSIBLE GRAM ( OC 2) performed by Kalina Ocampo MD at [place] [address] Right 11/11/2015    VATS, BRONCH,RT UPPER LOBECTOMY; NODE DISECTION    TUNNELED VENOUS PORT PLACEMENT Right 12/07/2015    right chest, and removed     UPPER GASTROINTESTINAL ENDOSCOPY N/A 5/13/2022    EGD POLYP HOT FORCEP/CAUTERY performed by Bettie Crowley MD at [address] [place] [address]       Medications Prior to Admission:    Prior to Admission medications    Medication Sig Start Date End Date Taking?  Authorizing Provider   acetaminophen (TYLENOL) 500 MG tablet Take 1 tablet by mouth 4 times daily as needed for Pain 7/6/22   Sam Church MD   amLODIPine (NORVASC) 5 MG tablet take 1 tablet by mouth once daily 4/14/22   Historical Provider, MD   tiotropium (SPIRIVA HANDIHALER) 18 MCG inhalation capsule Inhale 1 capsule into the lungs daily 2/26/18 10/31/22  Mary Kate Abebe DO   Mis. Devices KIT BP monitoring KIT 4/15/16   Adrienne Elaine MD       Allergies:    Ibuprofen    Social History:    reports that she quit smoking about 7 years ago. Her smoking use included cigarettes. She has never used smokeless tobacco. She reports that she does not currently use alcohol after a past usage of about 2.0 standard drinks per week. She reports that she does not use drugs. Family History:   family history includes Breast Cancer in her sister; Cancer in her father and mother; Diabetes in her brother and sister; Heart Disease in her father and mother; High Blood Pressure in her father and mother; Kidney Disease in her father.        PHYSICAL EXAM:  Vitals:  BP (!) 164/71   Pulse 94   Temp 98.8 °F (37.1 °C) (Oral)   Resp 17   Ht 5' 1\" (1.549 m)   Wt 127 lb (57.6 kg)   SpO2 100%   BMI 24.00 kg/m²     General Appearance: alert and oriented to person, place and time and in no acute distress  Skin: warm and dry, mucous membranes dry  Head: normocephalic and atraumatic  Eyes: pupils equal, round, and reactive to light, conjunctivae normal  Pulmonary/Chest: diminished bilaterally, greater on right, bibasilar crackles, no wheezes or rhonchi, no respiratory distress, right chest pleural drain  Cardiovascular: normal rate, normal S1 and S2  Abdomen: soft, non-tender, non-distended, normal bowel sounds, no masses or organomegaly  Extremities: no cyanosis, no clubbing and no edema  Neurologic: speech normal        LABS:  Recent Labs     12/14/22  2116      K 4.6   *   CO2 21*   BUN 23   CREATININE 0.8   GLUCOSE 104* CALCIUM 9.0       Recent Labs     12/14/22  2116   WBC 10.7   RBC 2.85*   HGB 7.8*   HCT 24.3*   MCV 85.3   MCH 27.4   MCHC 32.1   RDW 16.8*      MPV 9.3       No results for input(s): POCGLU in the last 72 hours. Radiology:   CTA PULMONARY W CONTRAST   Final Result   No convincing evidence for acute pulmonary thromboembolism. Loculated large right pleural effusion with indwelling right hemithorax   drainage catheter. Posterior right lung base compressive atelectasis. Multiple indeterminate subcentimeter nodules, as detailed above. LUNG-RADS   3, PROBABLY BENIGN; advise follow up to document stability 6 months. Ascites. XR CHEST PORTABLE   Final Result   1. Redemonstration of hazy opacities throughout right lung notable   suggesting persistent right pleural effusion. 2. Redemonstration of right chest tube. 3. No evidence of pneumothorax. 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 *      PLAN:    1. Loculated pleural effusion: CTA shows loculated large right pleural effusion with indwelling right hemithorax drainage catheter and posterior right lung base compressive atelectasis. Patient had pleural drain placed on 12/9. Drained today for 500 ml. Patient states she thinks it is drained twice weekly. Consult pulmonology- patient follows with Dr Nikunj Souza. Check CRP and procalcitonin- WBC were WNL. Afebrile with stable vital signs. Consider antibiotics if elevated. 2. History of lung cancer: Stage 4 adenocarcinoma of lungs s/p lobectomy with peritoneal carcinomatosis. Right posterior axilla mass excised on 12/11. Potential mets to right breast-1.5 cm subareolar breast lump-advised to have outpatient breast biopsy on prior admission. Follows with Dr Christiane Mark.     3. Hypertension: Continue home medications. 4. Chronic normocytic anemia: H&H 7.8/ 24.3. Up from 7.4 during prior admission. Monitor and transfuse for hemoglobin less than 7.    5. Ascites: History of hepatitis C. Last paracentesis done on 11/9 for 2510 cc. Code Status: full  DVT prophylaxis: Lovenox    35 minutes or more spent reviewing patient chart, assessing patient, discussing plan of care with patient and family, discussing plan of care with collaborating physician, and documentation. NOTE: This report was transcribed using voice recognition software. Every effort was made to ensure accuracy; however, inadvertent computerized transcription errors may be present. Electronically signed by VERITO Calix CNP on 12/15/2022 at 3:43 AM     Attending Physician Statement:  Hilary Cho M.D., F.A.C.P. I have seen/discussed the case, including pertinent history and exam findings with NP. Meds reviewed. I agree with the NP history/PE, assessment, plan and orders as documented by the [address] reviewed, including other providers notes, relevant labs and imaging. Billing based on   Medically complex case  My assessment of various problems as below  Stage 4 adenocarcinoma of lungs s/p lobectomy with peritoneal carcinomatosis. Right posterior axilla mass excised on 12/11. Potential mets to right breast-1.5 cm subareolar breast lump-advised to have outpatient breast biopsy on prior admission--  She came in bc of ongoing fatigue and admitted bc of persisting pleural effusion  ascites  Noted oculated pleural effusion: CTA shows loculated large right pleural effusion with indwelling right hemithorax drainage catheter and posterior right lung base compressive atelectasis. Patient had pleural drain placed on 12/9. Drained today for 500 ml.  Patient states she thinks it is drained twice weekly  - last efussion - tap neg gram stain on 12/9  3000RVBC,  340 Nucleated with 92%monocytes- this is NOT exudative/emphyema    ACD - hgb baseline 7.4 range  Consult pulm, hem onc, palliative  Moderate complexity [id number]  >50% of time spent coordinating care -including ER then pimary service who will take over +other providers and/or counseling patient/family  Remainder of medical problems as per NP note.

## 2022-12-15 NOTE — CONSULTS
Pulmonary Consultation    Admit Date: 12/14/2022  Requesting Physician: Candi Queen DO    CC:  PleurX catheter management    HPI:    Bogdan Carias is a 71year old female, known to Dr. Mora Ocampo, former smoker with 58-pack-year history, with past medical history of lung cancer with mets, ascites, hepatitis C, hypertension, osteoarthritis, emphysema, cholelithiasis, adrenal adenoma, uterine fibroids, hemangioma of the spleen, ex-smoker, history of lung nodules status post VATS right upper lobectomy and mediastinal LN dissection, status post Mediport for chemo-carboplatin/Alimta, ultrasound of breast finding a B -ADS 4 right sided breast lesion, PET CT scan 9/6/2022 in the region of the lesion in the right lower lobe there is FDG avid tracer uptake peak SUV is 3.2, CT-guided core needle biopsy of right lower lung nodule on 9/20/2022 positive for adenocarcinoma. Who was recently hospitalized 12/7-12/12 at [facility] (1-) with complaints of right upper back pain which was found to be a right sided subcutaneous mass posterior to right axilla. Imaging in ER showed 2.7 x 1.8 cm peripheral enhancing subcutaneous mass in the right posterior shoulder region with mild to moderate upper abdominal ascites in addition to a large right pleural effusion atelectasis of the right lower lobe, pleural and chest wall metastasis on the right. She underwent right Pleurx catheter on 12/9 by IR. On 12/11 she underwent excision of right axillary mass per general surgery. 12/14/22: Patient presented to [facility] emergency department with complaints of shortness of breath and hypoxia. Per EMS 80s on room air and placed on 4 L nasal cannula. Pleurx catheter was drained last on 12/14 for 500 mL of fluid. Pulmonary consulted for further management of right Pleurx catheter    Patient was seen resting in bed on 4 L nasal cannula with pulse ox 97%. She is somewhat a poor historian but was able to answer simple questions.   She complains of shortness of breath at rest, denies cough or wheezing.       PMH: Patient is poor historian past medical history obtained from chart review  Past Medical History:   Diagnosis Date    Abnormal chest x-ray with multiple lung nodules 9/8/2015    Abnormal Pap smear 1/3/2011    Alpha-1 negative    Adrenal adenoma     7 mm    Bilateral lung cancer ([address] 5/12/2016    surgically removed - 2015     Cholelithiasis 6/6/2011    Encounter for screening colonoscopy     for OR 3-28-19     Fibroids     Hemangioma of spleen     Hepatitis C 01/03/2011    treated and cured, no current issues     Hyperlipidemia     no medications     Hypertension 6/6/2011    Insomnia     Lung nodule     ROMELIA     Lymphadenopathy     Cervical (-) ENT    Malignant neoplasm of upper lobe of right lung ([address] 11/18/2015    Osteoarthritis     Panlobular emphysema ([address] 11/12/2015    controlled with inhalers     PPD negative 1/18/12    Pulmonary embolism without acute cor pulmonale ([address] 5/12/2016    PVD (peripheral vascular disease) ([address] 5/6/2014    Scoliosis     Uterine fibroid 5/6/2014     PSH:  Patient is poor historian past surgical history obtained from chart review  Past Surgical History:   Procedure Laterality Date    [address] Right 12/11/2022    EXCISION OF RIGHT AXILLARY MASS performed by Lucero Barros MD at [facility] Left     AGE 31'S LEFT NIPPLE REMOVED    BREAST LUMPECTOMY  4/28/1999    left, benign, Dr. Helen Castellanos, [facility]  2/1/2012    Shana Moran, Dr. Tonio Dickinson, [facility]  10/15/15    with EBUS - DR Magdy Espinoza    CHOLECYSTECTOMY      COLONOSCOPY  12/21/2009    partial to distal ascending colon normal (completion BE same day normal), Dr. Helen Castellanos, [facility]    COLONOSCOPY  8/22/2014    done under fluoro with sigmoid straightening device so was able to get to cecum, very poor prep, moderate proctitis, repeat 5 years,  Dr. Helen Castellanos, [facility]    COLONOSCOPY N/A 3/28/2019    COLONOSCOPY POLYPECTOMY SNARE/COLD BIOPSY performed by Kalpana Reynolds DO at [facility] ENDOSCOPY    COLONOSCOPY N/A 5/13/2022    COLONOSCOPY POLYPECTOMY SNARE/COLD BIOPSY performed by Smiley Guan MD at [address] [place] [address]  9/20/2022    CT NEEDLE BIOPSY LUNG PERCUTANEOUS 9/20/2022 DEANGELO Kunz MD [facility] CT    ENDOMETRIAL BIOPSY      IR INSERT TUNNELED PLEURA CATH W CUFF  12/9/2022    IR INSERT TUNNELED PLEURA CATH W CUFF 12/9/2022 Marlo Burnette MD [facility] SPECIAL PROCEDURES    LIVER RESECTION Right 7/6/2022    LAPAROSCOPIC ROBOTIC PERITONEAL MASS RESECTION performed by Martín Connolly III, MD at [place] [address] Left 3/29/2016    VATS WEDGE RESECTION UPPER LOBECTOMY    OTHER SURGICAL HISTORY Right 11/04/2016    REMOVAL MEDI-PORT - DR MOUNT    MT LAP,CHOLECYSTECTOMY/GRAPH N/A 6/29/2018    CHOLECYSTECTOMY LAPAROSCOPIC, POSSIBLE OPEN,POSSIBLE GRAM ( OC 2) performed by Violeta Atwood MD at [place] [address] Right 11/11/2015    VATS, BRONCH,RT UPPER LOBECTOMY; NODE DISECTION    TUNNELED VENOUS PORT PLACEMENT Right 12/07/2015    right chest, and removed     UPPER GASTROINTESTINAL ENDOSCOPY N/A 5/13/2022    EGD POLYP HOT FORCEP/CAUTERY performed by Smiley Guan MD at [facility] ENDOSCOPY          Respiratory ROS: positive for - shortness of breath Otherwise, a complete review of systems is undertaken and is negative. Social History:  Patient lives at home with . States she has no children. Has always resided in the [facility]. Reports her occupation to have been making [employer]. She is a former smoker, quit in 2015. She has a 58-pack-year smoking history. Denies illicit drug use. Reports occasional wine.   Social History     Socioeconomic History    Marital status: Single     Spouse name: Not on file    Number of children: 0    Years of education: Not on file    Highest education level: Not on file   Occupational History    Not on file   Tobacco Use    Smoking status: Former     Packs/day: 0.00     Years: 30.00     Pack years: 0.00     Types: Cigarettes     Quit date: 2015     Years since quittin.5    Smokeless tobacco: Never   Vaping Use    Vaping Use: Never used   Substance and Sexual Activity    Alcohol use: Not Currently     Alcohol/week: 2.0 standard drinks     Types: 2 Glasses of wine per week     Comment: x2 week    Drug use: No    Sexual activity: Not Currently   Other Topics Concern    Not on file   Social History Narrative    Not on file     Social Determinants of Health     Financial Resource Strain: Not on file   Food Insecurity: Not on file   Transportation Needs: Not on file   Physical Activity: Not on file   Stress: Not on file   Social Connections: Not on file   Intimate Partner Violence: Not on file   Housing Stability: Not on file       Family History:  Family History   Problem Relation Age of Onset    Heart Disease Mother     High Blood Pressure Mother     Cancer Mother         ovarian    Cancer Father     Heart Disease Father     High Blood Pressure Father     Kidney Disease Father     Diabetes Sister     Breast Cancer Sister     Diabetes Brother        Medications:     sodium chloride        sodium chloride flush  5-40 mL IntraVENous 2 times per day    amLODIPine  5 mg Oral Daily    ipratropium-albuterol  1 ampule Inhalation Q4H WA         Vitals:    VITALS:  BP (!) 148/78   Pulse 100   Temp 98.8 °F (37.1 °C) (Oral)   Resp 23   Ht 5' 1\" (1.549 m)   Wt 127 lb (57.6 kg)   SpO2 94%   BMI 24.00 kg/m²   24HR INTAKE/OUTPUT:    Intake/Output Summary (Last 24 hours) at 12/15/2022 1146  Last data filed at 2022 2204  Gross per 24 hour   Intake 250 ml   Output --   Net 250 ml     CURRENT PULSE OXIMETRY:  SpO2: 94 %  24HR PULSE OXIMETRY RANGE:  SpO2  Av.2 %  Min: 94 %  Max: 100 %      EXAM:  General: No distress. 4 L alert frail  Eyes:  No sclera icterus. No conjunctival injection. ENT: No discharge. Pharynx clear.   Neck: Trachea midline. Normal thyroid. Resp: No accessory muscle use. No crackles. No wheezing. No rhonchi. Diminished bilaterally with poor inspiratory effort  CV: Regular rate. Regular rhythm. No mumur or rub. ABD: Non-tender. Non-distended. Normal bowel sounds. + ascites  Skin: Warm and dry. No nodule on exposed extremities. No rash on exposed extremities. Ext: No joint deformity. No clubbing. No cyanosis. No edema  Neuro: Awake. Follows commands A&Ox2,      Lab Results:  CBC:   Recent Labs     12/14/22  2116 12/15/22  0553   WBC 10.7 9.7   HGB 7.8* 7.1*   HCT 24.3* 22.0*   MCV 85.3 86.6    370     BMP:   Recent Labs     12/14/22  2116 12/15/22  0855    140   K 4.6 4.4   * 111*   CO2 21* 21*   BUN 23 20   CREATININE 0.8 0.8     LFT:   Recent Labs     12/14/22  2116 12/15/22  0855   ALKPHOS 218* 196*   ALT 11 10   AST 21 24   PROT 6.8 6.7   BILITOT <0.2 0.2   LABALBU 2.1* 2.2*     PT/INR:   Recent Labs     12/15/22  0412   PROTIME 12.9*   INR 1.1       Cultures:  No results for input(s): CULTRESP in the last 72 hours. ABG:   No results for input(s): PH, PO2, PCO2, HCO3, BE, O2SAT in the last 72 hours. Films:  XR CHEST PORTABLE    Result Date: 12/14/2022  EXAMINATION: ONE XRAY VIEW OF THE CHEST 12/14/2022 9:08 pm COMPARISON: December 12, 2022 HISTORY: ORDERING SYSTEM PROVIDED HISTORY: right effusion, sob TECHNOLOGIST PROVIDED HISTORY: Reason for exam:->right effusion, sob FINDINGS: Hazy opacities throughout the right lung notable along peripheral aspect of right lung and right costophrenic sulcus. Right-sided chest tube is present. No obvious pneumothorax. The heart is normal in size. 1. Redemonstration of hazy opacities throughout right lung notable suggesting persistent right pleural effusion. 2. Redemonstration of right chest tube. 3. No evidence of pneumothorax.      CTA PULMONARY W CONTRAST    Result Date: 12/15/2022  EXAMINATION: CTA OF THE CHEST 12/14/2022 10:05 pm TECHNIQUE: CTA of the chest was performed after the administration of intravenous contrast.  Multiplanar reformatted images are provided for review. MIP images are provided for review. Automated exposure control, iterative reconstruction, and/or weight based adjustment of the mA/kV was utilized to reduce the radiation dose to as low as reasonably achievable. COMPARISON: None. HISTORY: ORDERING SYSTEM PROVIDED HISTORY: sob rule out PE TECHNOLOGIST PROVIDED HISTORY: Reason for exam:->sob rule out PE Decision Support Exception - unselect if not a suspected or confirmed emergency medical condition->Emergency Medical Condition (MA) FINDINGS: Pulmonary Arteries: Pulmonary arteries are adequately opacified for evaluation. No evidence of intraluminal filling defect to suggest pulmonary embolism. Main pulmonary artery is normal in caliber. Mediastinum: No evidence of mediastinal lymphadenopathy. The heart is mildly enlarged. The pericardium demonstrates no acute abnormality. There is no acute abnormality of the thoracic aorta. Lungs/pleura: There is a large loculated right pleural effusion with associated posterior right lower lobe compressive atelectasis. There is indwelling right hemithorax chest tube which courses posteriorly with tip terminating near the mediastinum. No pulmonary edema. No pneumothorax. On axial image 32, there is indeterminate round solid nodule in posterior right upper lobe, abutting the major fissure. On axial image 56, there is indeterminate 9 mm ground-glass opacity in periphery of right lower lobe. On axial image 77, there is indeterminate 7.7 mm subpleural nodule localized in posterior left lower lobe base. There are a few other scattered subcentimeter nodules in the periphery of left lower lobe near the base. Upper Abdomen: There is intraabdominal ascites which is perihepatic  and perisplenic. Soft Tissues/Bones: No acute bone or soft tissue abnormality. No thyromegaly.      No convincing evidence for acute pulmonary thromboembolism. Loculated large right pleural effusion with indwelling right hemithorax drainage catheter. Posterior right lung base compressive atelectasis. Multiple indeterminate subcentimeter nodules, as detailed above. LUNG-RADS 3, PROBABLY BENIGN; advise follow up to document stability 6 months. Ascites. Assessment:   Right lung mass positive for Adenocarcinoma, weakly positive for GATA3 and CDX2, TTF-1 negative; KRAS (G12C) positive, PDL1 with 10% expression. Plan was to start radiation therapy on 12/6/2022  Large right loculated pleural effusion noted on CTA 12/14  Large right pleural effusion status post right Pleurx 12/9, no cytology obtained  2.7 X 1.8 cm enhancing subcutaneous mass in the posterior right shoulder region s/p excisional biopsy 12/11, pathology pending    Right breast lesion  History of ascites with cirrhosis status post paracentesis 11/22 cytology negative for malignant cells  History right-sided lung adenocarcinoma 2015 left-sided lung adenocarcinoma 2016  History of COPD  History of tobacco use, 58-pack-year smoking history        Plan:  Patient currently on 4 L nasal cannula with pulse ox 97%. Baseline is room air. Wean to maintain pulse ox greater than 92%  Patient previously on duo nebs 4 times per day and reports to being on Spiriva as outpatient. Resume duo nebs while here. Right Pleurx drained for 500 mL on 12/14. Unsure of frequency she was draining at home. We will continue to drain every other day for now next strain will be 12/16. Will order cytology for next drain  Hematology and palliative care consult to follow    Thank you for allowing us to see this patient in consultation. Please contact us with any questions. Electronically signed by VERITO Cardoso CNP on 12/15/2022 at 11:46 AM     Seen and examined, agree with above. Old records, meds, labs and imaging reviewed.   Has large right pleural effusion with PleurX with some evidence of loculation. I can't find studies of fluids and will send them today including cytology in light of her history of bilateral lung CA.

## 2022-12-15 NOTE — ED NOTES
Spoke with IR, They are aware Dr Danilo Chaudhari is concerned about plurex not draining properly. States radiologist looked at scans and most fluid is draining but does see a pocket of thick fluid that could possible drained by thoracentesis. To let physician know and if they would like to order thoracentesis.  Dr Myrna Guallpa in department and made aware of       Anel Montero RN  12/15/22 1850

## 2022-12-15 NOTE — ED PROVIDER NOTES
HPI   80-year-old female patient present to emergency department for evaluation of shortness of breath. She has past history of lung cancer, pleural effusion with right-sided pleural drain. Reportedly patient was short of breath today at home and was brought in for further evaluation and EMS reporting that she was satting in the 80s and placed on 4 L nasal cannula oxygen. She denies any fevers, chills, chest pain. States she still feels short of breath. She denies any leg swelling, no leg pain. She is not on any anticoagulation. Patient is very fatigued appearing she is unable to tell me any other significant history at this time. Review of Systems   Constitutional:  Negative for chills and fever. HENT:  Negative for congestion. Respiratory:  Positive for shortness of breath. Negative for cough. Cardiovascular:  Negative for chest pain. Gastrointestinal:  Negative for abdominal pain, diarrhea, nausea and vomiting. Genitourinary:  Negative for difficulty urinating, dysuria and hematuria. Musculoskeletal:  Negative for back pain. Skin:  Negative for color change. Physical Exam  Vitals and nursing note reviewed. Constitutional:       Appearance: Normal appearance. HENT:      Head: Normocephalic and atraumatic. Nose: Nose normal. No congestion. Mouth/Throat:      Mouth: Mucous membranes are moist.      Pharynx: Oropharynx is clear. Eyes:      Conjunctiva/sclera: Conjunctivae normal.      Pupils: Pupils are equal, round, and reactive to light. Cardiovascular:      Rate and Rhythm: Regular rhythm. Tachycardia present. Pulses: Normal pulses. Heart sounds: Normal heart sounds. Pulmonary:      Effort: No respiratory distress. Comments: Chest drain right side, crackles present no tachypnea or hypoxia. Abdominal:      General: Bowel sounds are normal. There is no distension. Tenderness: There is no abdominal tenderness.    Musculoskeletal:         General: Normal range of motion. Cervical back: Normal range of motion and neck supple. Skin:     General: Skin is warm and dry. Capillary Refill: Capillary refill takes less than 2 seconds. Neurological:      General: No focal deficit present. Mental Status: She is alert. Procedures     MDM     Amount and/or Complexity of Data Reviewed  Decide to obtain previous medical records or to obtain history from someone other than the patient: yes       EKG: This EKG is signed and interpreted by me. Rate: 93  Rhythm: sinus  Interpretation: RBBB, left axis, no STEMI  Comparison: stable as compared to patient's most recent EKG       71-year-old female patient presented to emergency department for evaluation of shortness of breath. Patient hypoxic at home in the 80s. Patient has history of lung cancer with large loculated pleural effusion. Patient very fatigued appearing she is not hypoxic here. Labs are stable however she is anemic hemoglobin of 7.8 troponin stable 36 and 34 no acute EKG changes noted. CT angiography of her chest shows no pulmonary emboli. Patient very fatigued may also require admission for placement.         --------------------------------------------- PAST HISTORY ---------------------------------------------  Past Medical History:  has a past medical history of Abnormal chest x-ray with multiple lung nodules, Abnormal Pap smear, Adrenal adenoma, Bilateral lung cancer ([address] Cholelithiasis, Encounter for screening colonoscopy, Fibroids, Hemangioma of spleen, Hepatitis C, Hyperlipidemia, Hypertension, Insomnia, Lung nodule, Lymphadenopathy, Malignant neoplasm of upper lobe of right lung ([address] Osteoarthritis, Panlobular emphysema ([address] PPD negative, Pulmonary embolism without acute cor pulmonale ([address] PVD (peripheral vascular disease) ([address] Scoliosis, and Uterine fibroid. Past Surgical History:  has a past surgical history that includes Appendectomy (1965);  Endometrial biopsy; Colonoscopy (12/21/2009); bronchoscopy (2/1/2012); Colonoscopy (8/22/2014); bronchoscopy (10/15/15); Thoracoscopy (Right, 11/11/2015); Tunneled venous port placement (Right, 12/07/2015); Lung lobectomy (Left, 3/29/2016); other surgical history (Right, 11/04/2016); pr lap,cholecystectomy/graph (N/A, 6/29/2018); Cholecystectomy; Breast lumpectomy (4/28/1999); Colonoscopy (N/A, 3/28/2019); Breast biopsy (Left); Upper gastrointestinal endoscopy (N/A, 5/13/2022); Colonoscopy (N/A, 5/13/2022); Liver Resection (Right, 7/6/2022); CT NEEDLE BIOPSY LUNG PERCUTANEOUS (9/20/2022); IR INSERT TUNNELED PLEURA CATH W CUFF (12/9/2022); and Axillary Surgery (Right, 12/11/2022). Social History:  reports that she quit smoking about 7 years ago. Her smoking use included cigarettes. She has never used smokeless tobacco. She reports that she does not currently use alcohol after a past usage of about 2.0 standard drinks per week. She reports that she does not use drugs. Family History: family history includes Breast Cancer in her sister; Cancer in her father and mother; Diabetes in her brother and sister; Heart Disease in her father and mother; High Blood Pressure in her father and mother; Kidney Disease in her father. The patients home medications have been reviewed.     Allergies: Ibuprofen    -------------------------------------------------- RESULTS -------------------------------------------------    LABS:  Results for orders placed or performed during the hospital encounter of 12/14/22   CBC with Auto Differential   Result Value Ref Range    WBC 10.7 4.5 - 11.5 E9/L    RBC 2.85 (L) 3.50 - 5.50 E12/L    Hemoglobin 7.8 (L) 11.5 - 15.5 g/dL    Hematocrit 24.3 (L) 34.0 - 48.0 %    MCV 85.3 80.0 - 99.9 fL    MCH 27.4 26.0 - 35.0 pg    MCHC 32.1 32.0 - 34.5 %    RDW 16.8 (H) 11.5 - 15.0 fL    Platelets 925 567 - 379 E9/L    MPV 9.3 7.0 - 12.0 fL    Neutrophils % 84.5 (H) 43.0 - 80.0 %    Immature Granulocytes % 1.7 0.0 - 5.0 %    Lymphocytes % 7.4 (L) 20.0 - 42.0 %    Monocytes % 5.9 2.0 - 12.0 %    Eosinophils % 0.2 0.0 - 6.0 %    Basophils % 0.3 0.0 - 2.0 %    Neutrophils Absolute 9.00 (H) 1.80 - 7.30 E9/L    Immature Granulocytes # 0.18 E9/L    Lymphocytes Absolute 0.79 (L) 1.50 - 4.00 E9/L    Monocytes Absolute 0.63 0.10 - 0.95 E9/L    Eosinophils Absolute 0.02 (L) 0.05 - 0.50 E9/L    Basophils Absolute 0.03 0.00 - 0.20 E9/L   Comprehensive Metabolic Panel w/ Reflex to MG   Result Value Ref Range    Sodium 142 132 - 146 mmol/L    Potassium reflex Magnesium 4.6 3.5 - 5.0 mmol/L    Chloride 114 (H) 98 - 107 mmol/L    CO2 21 (L) 22 - 29 mmol/L    Anion Gap 7 7 - 16 mmol/L    Glucose 104 (H) 74 - 99 mg/dL    BUN 23 6 - 23 mg/dL    Creatinine 0.8 0.5 - 1.0 mg/dL    Est, Glom Filt Rate >60 >=60 mL/min/1.73    Calcium 9.0 8.6 - 10.2 mg/dL    Total Protein 6.8 6.4 - 8.3 g/dL    Albumin 2.1 (L) 3.5 - 5.2 g/dL    Total Bilirubin <0.2 0.0 - 1.2 mg/dL    Alkaline Phosphatase 218 (H) 35 - 104 U/L    ALT 11 0 - 32 U/L    AST 21 0 - 31 U/L   Troponin   Result Value Ref Range    Troponin, High Sensitivity 36 (H) 0 - 9 ng/L   Brain Natriuretic Peptide   Result Value Ref Range    Pro-BNP 1,014 (H) 0 - 125 pg/mL   Troponin   Result Value Ref Range    Troponin, High Sensitivity 34 (H) 0 - 9 ng/L   EKG 12 Lead   Result Value Ref Range    Ventricular Rate 93 BPM    Atrial Rate 93 BPM    P-R Interval 98 ms    QRS Duration 122 ms    Q-T Interval 386 ms    QTc Calculation (Bazett) 479 ms    P Axis 69 degrees    R Axis -65 degrees    T Axis 25 degrees   TYPE AND SCREEN   Result Value Ref Range    ABO/Rh B POS     Antibody Screen NEG        RADIOLOGY:  CTA PULMONARY W CONTRAST   Final Result   No convincing evidence for acute pulmonary thromboembolism. Loculated large right pleural effusion with indwelling right hemithorax   drainage catheter. Posterior right lung base compressive atelectasis.       Multiple indeterminate subcentimeter nodules, as detailed above. LUNG-RADS   3, PROBABLY BENIGN; advise follow up to document stability 6 months. Ascites. XR CHEST PORTABLE   Final Result   1. Redemonstration of hazy opacities throughout right lung notable   suggesting persistent right pleural effusion. 2. Redemonstration of right chest tube. 3. No evidence of pneumothorax.             ------------------------- NURSING NOTES AND VITALS REVIEWED ---------------------------  Date / Time Roomed:  12/14/2022  8:41 PM  ED Bed Assignment:  04/04    The nursing notes within the ED encounter and vital signs as below have been reviewed. Patient Vitals for the past 24 hrs:   BP Temp Temp src Pulse Resp SpO2 Height Weight   12/15/22 0022 (!) 109/93 -- -- 84 20 -- -- --   12/14/22 2352 127/67 -- -- 82 20 -- -- --   12/14/22 2322 (!) 152/62 -- -- 89 24 97 % -- --   12/14/22 2249 115/67 -- -- 86 17 100 % -- --   12/14/22 2219 (!) 141/82 -- -- 99 22 -- -- --   12/14/22 2149 136/67 -- -- 91 23 98 % -- --   12/14/22 2050 (!) 140/79 98.8 °F (37.1 °C) Oral (!) 106 16 97 % 5' 1\" (1.549 m) 127 lb (57.6 kg)       Oxygen Saturation Interpretation: Normal  Counseling:  I have spoken with the patient and discussed todays results, in addition to providing specific details for the plan of care and counseling regarding the diagnosis and prognosis. Their questions are answered at this time and they are agreeable with the plan of admission.    --------------------------------- ADDITIONAL PROVIDER NOTES ---------------------------------  Consultations:  Spoke with Dr. Samara Bedoya. Discussed case. They will admit the patient. This patient's ED course included: a personal history and physicial examination, re-evaluation prior to disposition, multiple bedside re-evaluations, and cardiac monitoring    This patient has remained hemodynamically stable during their ED course. Diagnosis:  1. Loculated pleural effusion    2.  Other fatigue        Disposition:  Patient's disposition: Admit to telemetry  Patient's condition is stable.          Curtis Carlos DO  Resident  12/15/22 [id number]

## 2022-12-15 NOTE — ED NOTES
Pt yelling out stating's \" she cant breath\" and I feel like I am not moving any air.   VS taken and charted  Dr Ora Alford made aware of pts condition      Carol Scott RN  12/15/22 [id number]

## 2022-12-15 NOTE — ED NOTES
Sister n law at bedside with sister Debo John on phone, per [facility] pt is now living with her sister Debo John whom is primary caregiver [phone]  [facility] also active in care Ev[facility]ass[facility] [phone]      Stefany Almonte RN  12/15/22 0190

## 2022-12-15 NOTE — CONSULTS
Inpatient Medical Oncology/Hematology Consult Note    Patient Name: Baudilio Victor  YOB: 1950    DATE OF ADMISSION: 12/14/2022  DATE OF CONSULTATION: 12/15/2022  REASON FOR CONSULTATION: \"fu CA\"  CONSULTING PROVIDER: Carlos Avalos MD  PCP: [facility]    Room: 04/04 (ER)      CHIEF COMPLAINT:  Shortness of breath    HISTORY OF PRESENT ILLNESS:   Patient is a 71 y.o. female who comes in with chief complaint of shortness of breath. She is known to our practice due to history of adenocarcinoma of the lung on 2 different occasions, 1 in each lung. In recent months she was found to have a new right lung mass, with biopsy-proven adenocarcinoma, and there were initial plans to treat with definitive RT. In recent months, she has had multiple hospitalizations, in which she initially presented with ascites, negative for malignancy, although with high suspicion. Even more recently, she was last hospitalized only several days ago, with large pleural effusion in which a Pleurx catheter was placed on 12/9/2022. Furthermore she has had complaint of a subcutaneous nodule located in the posterior right axilla, in which surgery performed an excisional biopsy and pathology still pending. While at home, patient states that she had drainage from Pleurx catheter a few times. For this admission, she complained of worsening shortness of breath. For the last several days, in which repeat chest imaging showed complex multilobulated right-sided pleural effusion, with intact Pleurx catheter. She has not started radiation for her lung mass yet. She noted having some degree of abdominal distention. Apart from shortness of breath, she denies significant fevers or very much cough. Overall she does appear weak/frail. REVIEW OF SYSTEMS:  Review of Systems   Constitutional:  Positive for activity change and fatigue. Negative for chills. HENT: Negative. Eyes:  Negative for visual disturbance. Respiratory:  Positive for shortness of breath. Negative for cough. Cardiovascular:  Negative for chest pain and leg swelling. Gastrointestinal:  Positive for abdominal distention. Genitourinary: Negative. Musculoskeletal: Negative. Skin: Negative. Neurological: Negative. Hematological:  Negative for adenopathy. Does not bruise/bleed easily. Psychiatric/Behavioral: Negative.           PAST MEDICAL HISTORY:  Past Medical History:   Diagnosis Date    Abnormal chest x-ray with multiple lung nodules 9/8/2015    Abnormal Pap smear 1/3/2011    Alpha-1 negative    Adrenal adenoma     7 mm    Bilateral lung cancer ([address] 5/12/2016    surgically removed - 2015     Cholelithiasis 6/6/2011    Encounter for screening colonoscopy     for OR 3-28-19     Fibroids     Hemangioma of spleen     Hepatitis C 01/03/2011    treated and cured, no current issues     Hyperlipidemia     no medications     Hypertension 6/6/2011    Insomnia     Lung nodule     ROMELIA     Lymphadenopathy     Cervical (-) ENT    Malignant neoplasm of upper lobe of right lung ([address] 11/18/2015    Osteoarthritis     Panlobular emphysema ([address] 11/12/2015    controlled with inhalers     PPD negative 1/18/12    Pulmonary embolism without acute cor pulmonale ([address] 5/12/2016    PVD (peripheral vascular disease) ([address] 5/6/2014    Scoliosis     Uterine fibroid 5/6/2014       PAST SURGICAL HISTORY:  Past Surgical History:   Procedure Laterality Date    APPENDECTOMY  1965    AXILLARY SURGERY Right 12/11/2022    EXCISION OF RIGHT AXILLARY MASS performed by Chapo Starkey MD at [facility] Left     AGE 31'S LEFT NIPPLE REMOVED    BREAST LUMPECTOMY  4/28/1999    left, benign, Jesu Shirley  2/1/2012    Dr. Ankit Montenegro [place]  10/15/15    with EBUS - DR Jaiden Rodriguez    CHOLECYSTECTOMY      COLONOSCOPY  12/21/2009    partial to distal ascending colon normal (completion BE same day normal), Dr. Dino Figueroa, [facility] COLONOSCOPY  8/22/2014    done under fluoro with sigmoid straightening device so was able to get to cecum, very poor prep, moderate proctitis, repeat 5 years,  Dr. Lester Bustos, [facility]    COLONOSCOPY N/A 3/28/2019    COLONOSCOPY POLYPECTOMY SNARE/COLD BIOPSY performed by Jeannie Gaona DO at [address] N/A 5/13/2022    COLONOSCOPY POLYPECTOMY SNARE/COLD BIOPSY performed by Sam Tirado MD at [address]  9/20/2022    CT NEEDLE BIOPSY LUNG PERCUTANEOUS 9/20/2022 DEANGELO Thompson MD [facility]    ENDOMETRIAL BIOPSY      IR INSERT TUNNELED PLEURA CATH W CUFF  12/9/2022    IR INSERT TUNNELED PLEURA CATH W CUFF 12/9/2022 Logan Naqvi MD [facility] SPECIAL PROCEDURES    LIVER RESECTION Right 7/6/2022    LAPAROSCOPIC ROBOTIC PERITONEAL MASS RESECTION performed by Terrie Harvey III, MD at [place] [address] Left 3/29/2016    VATS WEDGE RESECTION UPPER LOBECTOMY    OTHER SURGICAL HISTORY Right 11/04/2016    REMOVAL MEDI-PORT - DR SHI    TN LAP,CHOLECYSTECTOMY/GRAPH N/A 6/29/2018    CHOLECYSTECTOMY LAPAROSCOPIC, POSSIBLE OPEN,POSSIBLE GRAM ( OC 2) performed by Ziyad Castro MD at [place] [address] Right 11/11/2015    VATS, BRONCH,RT UPPER LOBECTOMY; NODE DISECTION    TUNNELED VENOUS PORT PLACEMENT Right 12/07/2015    right chest, and removed     UPPER GASTROINTESTINAL ENDOSCOPY N/A 5/13/2022    EGD POLYP HOT FORCEP/CAUTERY performed by Sam Tirado MD at [facility]:  Current Facility-Administered Medications   Medication Dose Route Frequency Provider Last Rate Last Admin    sodium chloride flush 0.9 % injection 5-40 mL  5-40 mL IntraVENous 2 times per day Priya Valle, APRN - CNP   10 mL at 12/15/22 1016    sodium chloride flush 0.9 % injection 5-40 mL  5-40 mL IntraVENous PRN Priya Valle, APRN - CNP        0.9 % sodium chloride infusion   IntraVENous PRN Priya J Toyott, APRN - CNP ondansetron (ZOFRAN-ODT) disintegrating tablet 4 mg  4 mg Oral Q8H PRN Priya Valle, VERITO - CNP        Or    ondansetron (ZOFRAN) injection 4 mg  4 mg IntraVENous Q6H PRN Priya Valle, VERITO - MANNY        acetaminophen (TYLENOL) tablet 650 mg  650 mg Oral Q6H PRN Priya Valle, VERITO - CNP        Or    acetaminophen (TYLENOL) suppository 650 mg  650 mg Rectal Q6H PRN Priya Valle, VERITO - MANNY        polyethylene glycol (GLYCOLAX) packet 17 g  17 g Oral Daily PRN Priya Valle, APRN - CNP        amLODIPine (NORVASC) tablet 5 mg  5 mg Oral Daily Priya Valle, VERITO - CNP        ipratropium-albuterol (DUONEB) nebulizer solution 1 ampule  1 ampule Inhalation Q4H WA Jesús Morales MD   1 ampule at 12/15/22 1224    oxyCODONE (ROXICODONE) immediate release tablet 10 mg  10 mg Oral Q6H PRN Jesús Morales MD         Current Outpatient Medications   Medication Sig Dispense Refill    acetaminophen (TYLENOL) 500 MG tablet Take 1 tablet by mouth 4 times daily as needed for Pain 120 tablet 0    amLODIPine (NORVASC) 10 MG tablet Take 10 mg by mouth every morning       Facility-Administered Medications Ordered in Other Encounters   Medication Dose Route Frequency Provider Last Rate Last Admin    pantoprazole (PROTONIX) tablet 40 mg  40 mg Oral QAM AC Luís Amin MD           ALLERGIES:   Allergies   Allergen Reactions    Ibuprofen Palpitations and Rash        SOCIAL HISTORY:   Social History     Socioeconomic History    Marital status: Single     Spouse name: None    Number of children: 0    Years of education: None    Highest education level: None   Tobacco Use    Smoking status: Former     Packs/day: 0.00     Years: 30.00     Pack years: 0.00     Types: Cigarettes     Quit date: 2015     Years since quittin.5    Smokeless tobacco: Never   Vaping Use    Vaping Use: Never used   Substance and Sexual Activity    Alcohol use: Not Currently     Alcohol/week: 2.0 standard drinks     Types: 2 Glasses of wine per week     Comment: x2 week    Drug use: No    Sexual activity: Not Currently       FAMILY HISTORY:  Family History   Problem Relation Age of Onset    Heart Disease Mother     High Blood Pressure Mother     Cancer Mother         ovarian    Cancer Father     Heart Disease Father     High Blood Pressure Father     Kidney Disease Father     Diabetes Sister     Breast Cancer Sister     Diabetes Brother        PHYSICAL EXAM:   VITALS:  /62   Pulse 91   Temp 98.8 °F (37.1 °C) (Oral)   Resp 17   Ht 5' 1\" (1.549 m)   Wt 127 lb (57.6 kg)   SpO2 94%   BMI 24.00 kg/m²   Physical Exam  Constitutional:       General: She is not in acute distress. Appearance: She is ill-appearing. HENT:      Head: Normocephalic. Nose: Nose normal.      Mouth/Throat:      Mouth: Mucous membranes are moist.   Eyes:      General: No scleral icterus. Extraocular Movements: Extraocular movements intact. Cardiovascular:      Rate and Rhythm: Normal rate and regular rhythm. Heart sounds: No murmur heard. Pulmonary:      Effort: No respiratory distress. Breath sounds: No wheezing. Comments: Decreased lung sounds on right  Abdominal:      General: Abdomen is flat. Palpations: There is no mass. Tenderness: There is abdominal tenderness. Musculoskeletal:      Cervical back: Normal range of motion. No rigidity. Right lower leg: No edema. Left lower leg: No edema. Lymphadenopathy:      Cervical: No cervical adenopathy. Skin:     General: Skin is warm and dry. Coloration: Skin is not jaundiced. Findings: No lesion or rash. Neurological:      General: No focal deficit present. Mental Status: She is oriented to person, place, and time. Psychiatric:         Mood and Affect: Mood normal.         Behavior: Behavior normal.         Thought Content:  Thought content normal.        ECOG PS: 2      Intake/Output Summary (Last 24 hours) at 12/15/2022 3549  Last data filed at 12/14/2022 2204  Gross per 24 hour   Intake 250 ml   Output --   Net 250 ml       DIAGNOSTIC DATA:       Lab Results   Component Value Date    WBC 9.7 12/15/2022    HGB 7.1 (L) 12/15/2022    HCT 22.0 (L) 12/15/2022    MCV 86.6 12/15/2022     12/15/2022     Lab Results   Component Value Date    NEUTROABS 8.12 (H) 12/15/2022     Lab Results   Component Value Date    ALT 10 12/15/2022    AST 24 12/15/2022    ALKPHOS 196 (H) 12/15/2022    BILITOT 0.2 12/15/2022     Lab Results   Component Value Date    CREATININE 0.8 12/15/2022    BUN 20 12/15/2022     12/15/2022    K 4.4 12/15/2022     (H) 12/15/2022    CO2 21 (L) 12/15/2022       Pathology:     Radiology:      ASSESSMENT & PLAN:  The patient is a 71 y.o. female with background history of lung cancer, was admitted on 10/31/2022 for abdominal distention and lower extremity swelling. The patient has an extensive lung cancer history outlined above. But in short, patient has had multiple instances of interventions and miscellaneous findings:     First Instance of Lung Cancer:  >>> In 2015 she was found to have RUL mass, which confirmed moderately differentiated adenocarcinoma on bronchoscopy. She was found to have a 1.6 cm right paratracheal nodule, intensely hypermetabolic. And then underwent right VATS with RUL with wedge resection then completion lobectomy and mediastinal node dissection, staging suggestive with a pT1 aN1 MX disease. Now she underwent 4 cycles of carboplatin/pemetrexed completed in 2/24/2016. Second Lung Cancer:  >>>  PET scan was done a month later in 2016 revealed a 2.1 cm left lung mass concerning for malignancy, and she underwent left VATS with wedge and completion left upper lobe lobectomy/mediastinal lymph node dissection, confirming well-differentiated adenocarcinoma of the lung. Nodes were negative she proceeded with surveillance protocol.      Abdominal Cystic Lesions  >>> Patient was found to have multiple peritoneal cystic masses on CT abdomen on 2/20/2022. CEA was 12.5 on 3/16/2022, mammogram done on 4/20/2022 which was negative. On 4/20/2022, other tumor markers checked including , CEA 19-9, and chromogranin A which were 32.8, <2, and 1480, respectively. EGD/colonoscopy on 5/13/2022 found gastric and duodenal polyps. There is a hyper plastic gastric polyp. H. pylori was negative. And colonoscopy found fragments of tubular adenoma and hyperplastic polyp. She was then seen by [facility] surgery with Dr. Trish Reyes in which repeat CT abdomen on 6/23/2022, found numerous cystic structures throughout the right flank and retroperitoneum. Then on 7/6/2022, laparoscopic robotic peritoneal resection was done, revealing serous cystic masses along colon, periportal lymphadenopathy, fibrotic appearing liver, chronic cholecystitis, peritoneal fluid negative for malignancy. Periportal lymph node flow cytometry showed 4.5% monoclonal B cells, without detectable CD5, CD10, or CD11c, but otherwise predominantly polyclonal background. Suspected Lung Cancer #3:  >>> Repeat CT chest on 4/5/2022 found groundglass nodule of right lower lobe, approximately 10 mm in size, unchanged from before. But there was a right lower lobe 7 mm nodule with pleural abutment, increased from 9/20/2021. PET scan showed no significant FDG uptake. Then on 8/4/2022, repeat chest showed increase of RLL nodule size, 1.6 x 1.2 cm from previous study. There is also bilateral adrenal nodules, left being larger, at 1.8 x 1.6 cm with right cystic lesion being 1.2 cm, and a hypodense splenic lesion, 2.9 x 3.5 cm. This was further evaluated at [facility] on 8/15/2022, recommended biopsy of the RLL mass, some consideration of possible lymphoma. PET on 9/6/2022 of the RLL mass showed SUV 3.2, and then CT-guided biopsy on 9/20/2022 confirm adenocarcinoma, however weakly immunoreactive for GATA3, CDX2, but TTF-1 negative.   Molecular studies done, showing PD-L1 10%, and KRAS exon 2 (G12 C) positive but rest of studies negative. Patient was considered not  a good candidate for further resection by CT surgery, and met with radiation oncology for definitive SBRT on 10/10/2022, which is to be started at this time. CURRENT HOSPITALIZATION:  The patient was admitted on 12/14/2022 for increased shortness of breath. More recently few days ago she was discharged from the hospital for worsening right pleural effusion, in which Pleurx catheter was placed on 12/9/2022. Also of note, patient had recent development of a subcutaneous nodule located in the posterior right axilla. CT scans on 12/7/2022 reported 2.7 x 1.8 cm subcutaneous tissue mass, overlies the trapezius muscle, likely representing subcutaneous metastasis. Again, as this pleural effusion was known in recent weeks/months, CT scan also reports chest wall metastasis on right.     Large Right sided loculated pleural effusion, s/p PleurX placement 12/9/2022, along with findings suggestive of chest wall metastasis  Recurrent ascites, negative cytology, but with concern for malignancy  Subcutaneous mass posterior to right axilla, s/p excision on 12/11/2022  Right breast mass, BIRADS 4  History of recurrent ascites  Imaging evidence of early cirrhosis  Active chronic hepatitis C infection  Right lung mass positive for Adenocarcinoma, weakly positive for GATA3 and CDX2, TTF-1 negative; KRAS (G12C) positive, PDL1 with 10% expression  Previously described multiple peritoneal cystic masses, s/p resection in 7/6/2022, not well visualized on 10/31/22 CT abdomen  Monoclonal B-cell population from periportal lymph node resection from 7/6/2022, in the setting of follicular hyperplasia  Previous right sided lung adenocarcinoma in 2015  Previous left-sided lung adenocarcinoma 2016  Family history of uterine cancer in mother  Family history of early age breast cancer in sister (Diagnosed around early 42's)    Pleural fluid collected from PleurX including cytology and fluid studies today  IR consulted for management of PleurX  Pulmonology following    Awaiting for biopsy results from excision the subcutaneous nodule in posterior right axilla done on 12/11/2022. Updated to her current CT scan from 12/7/2022 expressing concerns for subcutaneous metastasis. In addition to 12/7/2022 scan also reported chest wall metastasis  Will hold off on radiation for now for the lung mass as we await results from fluid cytology and the excision biopsy  Palliative already consulted    Patient continues to have anemia. Peripheral smear done. It did not observe findings of leukoerythroblastic reaction, but would consider bone marrow infiltration from metastatic cancer on differential  Patient did receive IV iron during previous admission  Will recheck iron studies, folate/B12, reticulocyte count  Transfuse if Hgb <7      Approximately spent 62 minutes with patient, discussing the laboratory, imaging, and clinical findings, and I have discussed the clinical implications and recommendations. More than 50% of time was spent counseling patient. The patient verbalized understanding.       Jose Nazario MD  [address]  12/15/2022    [address] [place] [address]) Office  [place]: [phone]  F: [phone]    [place]un[place] Hernandez) Office  P: [phone]  F: [phone]

## 2022-12-15 NOTE — PROGRESS NOTES
Using sterile process, attempted to drain pleurx catheter. No more than 2-3mL of fluid from tubing drained into drainage container. Attempted to reposition patient and rotate to facilitate drainage, still no output. New dressing applied and patient repositioned for comfort.

## 2022-12-15 NOTE — ED NOTES
Patient c/o SOB, pulse ox 96-98% at this time, resp 20-26, Dr. Mehrdad Strickland and another provider at bedside at this time and advised. Oxygen 2 liters applied for comfort at this time, Dr. Mehrdad Strickland advised and aware.      Uyen Orantes RN  12/15/22 5619

## 2022-12-15 NOTE — ED NOTES
RN was in room, to draw labs per protocol per IV line, sent to lab a green/blue tube. Patient denies any needs at this time.      Zachary Garg RN  12/15/22 5935

## 2022-12-15 NOTE — CONSULTS
Palliative Care Department  [phone]  Palliative Care Initial Consult  Provider Dariel Hunter, APRN - MANNY    Monique Garsia  [id number]  Hospital Day: 2  Date of Initial Consult: 12/15/2022  Referring Provider: Stephanie Reynolds MD and Roberto Flores MD   Palliative Medicine was consulted for assistance with: Goals of Care, Code Status Discussion, and Family Support    HPI:   Monique Garsia is a 71 y.o. with a past medical history of hepatitis C, hypertension, osteoarthritis, emphysema, cholelithiasis, former smoker, uterine fibroids, stage IV adenocarcinoma of the lung s/p right upper lobectomy with peritoneal carcinomatosis and possible breast mets, pleural effusion s/p Pleurx drain placement, ascites s/p multiple paracentesis  who was admitted on 12/14/2022 from home with a CHIEF COMPLAINT of shortness of breath. Patient has had a recent hospitalization 12/7/2022 - 12/12/2022 with pleural effusion and had Pleurx drain placed and was also found with right axillary mass s/p excision awaiting pathology report. In the emergency room she was hypoxic requiring supplemental oxygen and was also tachycardic. CTA of the chest revealed loculated large right pleural effusion with indwelling right drainage catheter, posterior right lung base compressive atelectasis, multiple nodules, ascites. She was admitted to the hospital for further management. Palliative care was consulted for goals of care, CODE STATUS discussion, family support. ASSESSMENT/PLAN:     Pertinent Hospital Diagnoses     Metastatic adenocarcinoma of the lung  Recurrent pleural effusion s/p right Pleurx placement-now with loculated effusion  Ascites s/p paracentesis 11/9/2022  History of hepatitis C      Palliative Care Encounter / Counseling Regarding Goals of Care  Please see detailed goals of care discussion as below  At this time, Monique Garsia, Does Not have capacity for medical decision-making.   Capacity is time limited and situation/question specific  During encounter, Nacho Gaona, was surrogate medical decision-maker  Outcome of goals of care meeting:   Continue full code  Continue current management  Code status Full Code  Advanced Directives: Healthcare POA paperwork and DNR CCA form in epic  Surrogate/Legal NOK:  Carli Toledo ([address] partner): [phone]  Hal Penaloza (sister/1st alternate): [phone]  Francine Adamson (sister/2nd alternate): [phone]    Spiritual assessment: no spiritual distress identified  Bereavement and grief: to be determined  Referrals to: none today  SUBJECTIVE:     Current medical issues leading to Palliative Medicine involvement include   Active Hospital Problems    Diagnosis Date Noted    Loculated pleural effusion [J90] 12/15/2022     Priority: Medium    History of lung cancer [Z85.118] 06/29/2018    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 failure ([facility] [D61.9]     Hypertension [I10] 11/12/2015       Details of Conversation: Chart reviewed and patient seen. Patient is resting in bed, no family is present at the bedside. Patient is asleep, easily awakens to voice. Patient is oriented to person and place, disoriented to time and situation. Patient denies pain and symptoms at this time. Call placed to patient's [place]. Role of palliative medicine explained. Discussion regarding goals of care and CODE STATUS. Nithin Lopez states that goal is to be as aggressive as possible. He wishes to continue full code at this time. Attempted to have more in-depth conversation with Nithin Lopez regarding patient's quality of life however he is at work and asks that palliative medicine call back tomorrow. We will try again tomorrow. We will follow.     OBJECTIVE:   Prognosis: Guarded    Physical Exam:  BP (!) 148/78   Pulse 100   Temp 98.8 °F (37.1 °C) (Oral)   Resp 23   Ht 5' 1\" (1.549 m)   Wt 127 lb (57.6 kg)   SpO2 94%   BMI 24.00 kg/m²   Gen:  Thin, ill-appearing, asleep, easily awakens to voice  Lungs:  NC, easy and unlabored respirations   Heart:  RRR  Abd:  Soft, non tender, distended  Ext:  No edema, pulses present  Skin:  Warm and dry  Neuro:  Alert, oriented to person and place, disoriented to time and situation     Objective data reviewed: labs, images, records, medication use, vitals, and chart    Discussed patient and the plan of care with the other IDT members: Palliative Medicine IDT Team    Time/Communication  Greater than 50% of time spent, total 50 minutes in counseling and coordination of care at the bedside regarding goals of care, diagnosis and prognosis, and see above. Thank you for allowing Palliative Medicine to participate in the care of Denise Hill. Note: This report was completed using computerDaoliCloud voiced recognition software. Every effort has been made to ensure accuracy; however, inadvertent computerized transcription errors may be present.

## 2022-12-15 NOTE — TELEPHONE ENCOUNTER
RN received call from patient sister Bill Sales in regards to patient being back in the hospital. Patients sister stated shes not sure when will be best to have patient appointment as patient is sick and in and out of the hospital lately. Bill Sales asked if we were able to do what we needed to do while she was in the hospital. Rn advised unfortunately we are unable to do that. Bill Sales verbalized understanding and stated she figured was worth asking. RN verbalized understanding but stated patient has other health issues that she needs to get under control and then we will be able to better help her. Bill Sales verbalized understanding and stated once patient was home and healthy she would contact our office to schedule an appointment. Bill Sales can be reached at [phone] if have any further questions.      Electronically signed by Maricarmen Stiles RN on 12/15/22 at 9:55 AM EST

## 2022-12-16 PROBLEM — R53.83 OTHER FATIGUE: Status: ACTIVE | Noted: 2022-12-16

## 2022-12-16 PROBLEM — E44.0 MODERATE PROTEIN-CALORIE MALNUTRITION (HCC): Status: ACTIVE | Noted: 2022-12-08

## 2022-12-16 LAB
ANION GAP SERPL CALCULATED.3IONS-SCNC: 8 MMOL/L (ref 7–16)
BASOPHILS ABSOLUTE: 0.03 E9/L (ref 0–0.2)
BASOPHILS RELATIVE PERCENT: 0.3 % (ref 0–2)
BLOOD BANK DISPENSE STATUS: NORMAL
BLOOD BANK PRODUCT CODE: NORMAL
BPU ID: NORMAL
BUN BLDV-MCNC: 26 MG/DL (ref 6–23)
CALCIUM SERPL-MCNC: 8.8 MG/DL (ref 8.6–10.2)
CHLORIDE BLD-SCNC: 113 MMOL/L (ref 98–107)
CO2: 19 MMOL/L (ref 22–29)
CREAT SERPL-MCNC: 1.1 MG/DL (ref 0.5–1)
DESCRIPTION BLOOD BANK: NORMAL
EOSINOPHILS ABSOLUTE: 0.05 E9/L (ref 0.05–0.5)
EOSINOPHILS RELATIVE PERCENT: 0.6 % (ref 0–6)
FERRITIN: 1755 NG/ML
FOLATE: 14.2 NG/ML (ref 4.8–24.2)
GFR SERPL CREATININE-BSD FRML MDRD: 53 ML/MIN/1.73
GLUCOSE BLD-MCNC: 74 MG/DL (ref 74–99)
HCT VFR BLD CALC: 20.5 % (ref 34–48)
HCT VFR BLD CALC: 27.2 % (ref 34–48)
HEMOGLOBIN: 6.5 G/DL (ref 11.5–15.5)
HEMOGLOBIN: 8.8 G/DL (ref 11.5–15.5)
IMMATURE GRANULOCYTES #: 0.18 E9/L
IMMATURE GRANULOCYTES %: 2 % (ref 0–5)
IMMATURE RETIC FRACT: 31.1 % (ref 3–15.9)
IRON SATURATION: 13 % (ref 15–50)
IRON: 24 MCG/DL (ref 37–145)
LYMPHOCYTES ABSOLUTE: 0.82 E9/L (ref 1.5–4)
LYMPHOCYTES RELATIVE PERCENT: 9.2 % (ref 20–42)
MCH RBC QN AUTO: 27.5 PG (ref 26–35)
MCHC RBC AUTO-ENTMCNC: 31.7 % (ref 32–34.5)
MCV RBC AUTO: 86.9 FL (ref 80–99.9)
MONOCYTES ABSOLUTE: 0.61 E9/L (ref 0.1–0.95)
MONOCYTES RELATIVE PERCENT: 6.8 % (ref 2–12)
NEUTROPHILS ABSOLUTE: 7.25 E9/L (ref 1.8–7.3)
NEUTROPHILS RELATIVE PERCENT: 81.1 % (ref 43–80)
PDW BLD-RTO: 17.3 FL (ref 11.5–15)
PLATELET # BLD: 349 E9/L (ref 130–450)
PMV BLD AUTO: 9.9 FL (ref 7–12)
POTASSIUM REFLEX MAGNESIUM: 5 MMOL/L (ref 3.5–5)
RBC # BLD: 2.36 E12/L (ref 3.5–5.5)
RETIC HGB EQUIVALENT: 27.1 PG (ref 28.2–36.6)
RETICULOCYTE ABSOLUTE COUNT: 0.05 E12/L
RETICULOCYTE COUNT PCT: 2.2 % (ref 0.4–1.9)
SODIUM BLD-SCNC: 140 MMOL/L (ref 132–146)
TOTAL IRON BINDING CAPACITY: 178 MCG/DL (ref 250–450)
VITAMIN B-12: >2000 PG/ML (ref 211–946)
WBC # BLD: 8.9 E9/L (ref 4.5–11.5)

## 2022-12-16 PROCEDURE — 6360000002 HC RX W HCPCS: Performed by: HOSPITALIST

## 2022-12-16 PROCEDURE — 82728 ASSAY OF FERRITIN: CPT

## 2022-12-16 PROCEDURE — 84080 ASSAY ALKALINE PHOSPHATASES: CPT

## 2022-12-16 PROCEDURE — 99233 SBSQ HOSP IP/OBS HIGH 50: CPT | Performed by: FAMILY MEDICINE

## 2022-12-16 PROCEDURE — C9113 INJ PANTOPRAZOLE SODIUM, VIA: HCPCS | Performed by: HOSPITALIST

## 2022-12-16 PROCEDURE — 2580000003 HC RX 258: Performed by: HOSPITALIST

## 2022-12-16 PROCEDURE — A4216 STERILE WATER/SALINE, 10 ML: HCPCS | Performed by: HOSPITALIST

## 2022-12-16 PROCEDURE — 36415 COLL VENOUS BLD VENIPUNCTURE: CPT

## 2022-12-16 PROCEDURE — 6370000000 HC RX 637 (ALT 250 FOR IP): Performed by: FAMILY MEDICINE

## 2022-12-16 PROCEDURE — 80048 BASIC METABOLIC PNL TOTAL CA: CPT

## 2022-12-16 PROCEDURE — 99232 SBSQ HOSP IP/OBS MODERATE 35: CPT

## 2022-12-16 PROCEDURE — 2580000003 HC RX 258

## 2022-12-16 PROCEDURE — 83550 IRON BINDING TEST: CPT

## 2022-12-16 PROCEDURE — 82607 VITAMIN B-12: CPT

## 2022-12-16 PROCEDURE — 85045 AUTOMATED RETICULOCYTE COUNT: CPT

## 2022-12-16 PROCEDURE — 85018 HEMOGLOBIN: CPT

## 2022-12-16 PROCEDURE — 85025 COMPLETE CBC W/AUTO DIFF WBC: CPT

## 2022-12-16 PROCEDURE — 94640 AIRWAY INHALATION TREATMENT: CPT

## 2022-12-16 PROCEDURE — 99232 SBSQ HOSP IP/OBS MODERATE 35: CPT | Performed by: INTERNAL MEDICINE

## 2022-12-16 PROCEDURE — 84075 ASSAY ALKALINE PHOSPHATASE: CPT

## 2022-12-16 PROCEDURE — 83540 ASSAY OF IRON: CPT

## 2022-12-16 PROCEDURE — 6370000000 HC RX 637 (ALT 250 FOR IP): Performed by: NURSE PRACTITIONER

## 2022-12-16 PROCEDURE — 6370000000 HC RX 637 (ALT 250 FOR IP)

## 2022-12-16 PROCEDURE — 36430 TRANSFUSION BLD/BLD COMPNT: CPT

## 2022-12-16 PROCEDURE — 85014 HEMATOCRIT: CPT

## 2022-12-16 PROCEDURE — 2060000000 HC ICU INTERMEDIATE R&B

## 2022-12-16 PROCEDURE — 82746 ASSAY OF FOLIC ACID SERUM: CPT

## 2022-12-16 RX ORDER — SODIUM CHLORIDE 9 MG/ML
INJECTION, SOLUTION INTRAVENOUS PRN
Status: DISCONTINUED | OUTPATIENT
Start: 2022-12-16 | End: 2022-12-23 | Stop reason: HOSPADM

## 2022-12-16 RX ORDER — OCTREOTIDE ACETATE 50 UG/ML
50 INJECTION, SOLUTION INTRAVENOUS; SUBCUTANEOUS ONCE
Status: COMPLETED | OUTPATIENT
Start: 2022-12-16 | End: 2022-12-16

## 2022-12-16 RX ADMIN — OXYCODONE 10 MG: 5 TABLET ORAL at 13:13

## 2022-12-16 RX ADMIN — OCTREOTIDE ACETATE 50 MCG: 50 INJECTION, SOLUTION INTRAVENOUS; SUBCUTANEOUS at 18:22

## 2022-12-16 RX ADMIN — OCTREOTIDE ACETATE 50 MCG/HR: 500 INJECTION, SOLUTION INTRAVENOUS; SUBCUTANEOUS at 18:25

## 2022-12-16 RX ADMIN — Medication 10 ML: at 08:02

## 2022-12-16 RX ADMIN — IPRATROPIUM BROMIDE AND ALBUTEROL SULFATE 1 AMPULE: 2.5; .5 SOLUTION RESPIRATORY (INHALATION) at 20:59

## 2022-12-16 RX ADMIN — ESCITALOPRAM OXALATE 5 MG: 10 TABLET ORAL at 08:00

## 2022-12-16 RX ADMIN — AMLODIPINE BESYLATE 5 MG: 5 TABLET ORAL at 08:00

## 2022-12-16 RX ADMIN — IPRATROPIUM BROMIDE AND ALBUTEROL SULFATE 1 AMPULE: 2.5; .5 SOLUTION RESPIRATORY (INHALATION) at 16:24

## 2022-12-16 RX ADMIN — IPRATROPIUM BROMIDE AND ALBUTEROL SULFATE 1 AMPULE: 2.5; .5 SOLUTION RESPIRATORY (INHALATION) at 09:35

## 2022-12-16 RX ADMIN — IRON SUCROSE 100 MG: 20 INJECTION, SOLUTION INTRAVENOUS at 21:41

## 2022-12-16 RX ADMIN — SODIUM CHLORIDE, PRESERVATIVE FREE 40 MG: 5 INJECTION INTRAVENOUS at 18:34

## 2022-12-16 ASSESSMENT — PAIN DESCRIPTION - ONSET: ONSET: ON-GOING

## 2022-12-16 ASSESSMENT — PAIN SCALES - WONG BAKER
WONGBAKER_NUMERICALRESPONSE: 0

## 2022-12-16 ASSESSMENT — PAIN DESCRIPTION - PAIN TYPE: TYPE: ACUTE PAIN

## 2022-12-16 ASSESSMENT — PAIN DESCRIPTION - LOCATION
LOCATION: ABDOMEN;BACK
LOCATION: GENERALIZED

## 2022-12-16 ASSESSMENT — PAIN - FUNCTIONAL ASSESSMENT
PAIN_FUNCTIONAL_ASSESSMENT: PREVENTS OR INTERFERES WITH MANY ACTIVE NOT PASSIVE ACTIVITIES
PAIN_FUNCTIONAL_ASSESSMENT: PREVENTS OR INTERFERES WITH MANY ACTIVE NOT PASSIVE ACTIVITIES

## 2022-12-16 ASSESSMENT — PAIN DESCRIPTION - FREQUENCY: FREQUENCY: INTERMITTENT

## 2022-12-16 ASSESSMENT — PAIN SCALES - GENERAL
PAINLEVEL_OUTOF10: 0
PAINLEVEL_OUTOF10: 7
PAINLEVEL_OUTOF10: 7
PAINLEVEL_OUTOF10: 0

## 2022-12-16 ASSESSMENT — PAIN DESCRIPTION - ORIENTATION: ORIENTATION: RIGHT;LEFT;LOWER;UPPER

## 2022-12-16 ASSESSMENT — PAIN DESCRIPTION - DESCRIPTORS
DESCRIPTORS: ACHING;DISCOMFORT;DULL;GNAWING
DESCRIPTORS: ACHING;DULL;DISCOMFORT

## 2022-12-16 NOTE — PROGRESS NOTES
Diya Deluca from IR returned call regarding flushing pleurx. States readiologist states that there is not enough fluid in pleurx to be drained, that is loculated. He states that it could be removed and then at a later date as the fluid accumulates do a thoracentesis or replace pleurx. Nothing can be done today.

## 2022-12-16 NOTE — PLAN OF CARE
Problem: Discharge Planning  Goal: Discharge to home or other facility with appropriate resources  Outcome: Progressing     Problem: Skin/Tissue Integrity  Goal: Absence of new skin breakdown  Outcome: Progressing     Problem: ABCDS Injury Assessment  Goal: Absence of physical injury  Outcome: Progressing     Problem: Safety - Adult  Goal: Free from fall injury  Outcome: Progressing     Problem: Pain  Goal: Verbalizes/displays adequate comfort level or baseline comfort level  Outcome: Progressing

## 2022-12-16 NOTE — PROGRESS NOTES
Order received for interrogation of pleurx catheter. Dr Pat Dawkins reviewed cat scan and found loculated small amount of fluid. Pleurx in good position but unable to drain d/t loculation and no flushing or contrast can be injected into pleurx system. Suggestions given, -  Pleurx can be removed and new inserted but as of today not enough fluid to put in new pleurx . A thoracentesis can also be performed. This report given to [employer].

## 2022-12-16 NOTE — PROGRESS NOTES
Inpatient Medical Oncology Progress Note    Subjective:  SOB on exertion. Fatigue weakness. No N.V. No fever, chills. Objective:  BP (!) 148/69   Pulse 88   Temp 98.8 °F (37.1 °C) (Oral)   Resp 18   Ht 5' 1\" (1.549 m)   Wt 114 lb 3.2 oz (51.8 kg)   SpO2 97%   BMI 21.58 kg/m²   GENERAL: Alert, oriented x 3, tired  HEENT: PERRLA; EOMI. Oropharynx clear. NECK: Supple. Without lymphadenopathy. LUNGS: suresh basal crackles. CARDIOVASCULAR: Regular rate. No murmurs, rubs or gallops. ABDOMEN: Soft. Distended. EXTREMITIES: Without clubbing or cyanosis or edema. NEUROLOGIC: No focal deficits. ECOG PS 2    Diagnostics:  Lab Results   Component Value Date    WBC 8.9 12/16/2022    HGB 6.5 (LL) 12/16/2022    HCT 20.5 (L) 12/16/2022    MCV 86.9 12/16/2022     12/16/2022     Lab Results   Component Value Date     12/16/2022    K 5.0 12/16/2022     (H) 12/16/2022    CO2 19 (L) 12/16/2022    BUN 26 (H) 12/16/2022    CREATININE 1.1 (H) 12/16/2022    GLUCOSE 74 12/16/2022    CALCIUM 8.8 12/16/2022    PROT 6.7 12/15/2022    LABALBU 2.2 (L) 12/15/2022    BILITOT 0.2 12/15/2022    ALKPHOS 196 (H) 12/15/2022    AST 24 12/15/2022    ALT 10 12/15/2022    LABGLOM 53 12/16/2022    GFRAA 49 09/14/2022     Impression/Plan:  71 y.o. female Hx of smoking who underwent: (1) Bronchoscopy. (2) Right VATS. (3) VATS right upper lobe wedge resection. (4) VATS right upper lobectomy. (5) Intercostal nerve block from T3 to T10. (6) Mediastinal lymph node dissection on 11/11/2015:   Pathology:  Right lung, upper lobectomy: Invasive, moderately differentiated adenocarcinoma (Grade 2). Tumor size 1.5 cm in greatest dimension. Visceral pleural invasion: Not identified. Visceral pleural invasion: Not identified. Surgical margin negative for malignancy. Two of three peribronchial lymph nodes positive for adenocarcinoma. pT1a N1 MX;      Being followed for a left upper lobe mass by Dr. Malachi Clancy.  Multiple biopsies in the past came back negative for malignancy. Hypermetabolic on PET/CT scan on 09/29/2015 (2.3 cm in size with SUV of 6.4). CT guided biopsy of the left upper lobe lesion on 11/02/2015 was noted to be negative for malignancy. She was referred to the medical oncology clinic to discuss adjuvant chemotherapy. We recommended 4 cycles of adjuvant chemotherapy consisting of Carboplatin/Alimta. Mediport placed 12/7/15. -MRI Brain on 12/8/15:  Negative for metastatic disease.   -We will repeat CT chest and PET/CT after 4 cycles of Carboplatin/Alimta. To follow on Lingular lesion as well. -Molecular studies (EGFR, ALK, ROS-1) were all Not Detected. Cycle # 1 of Jaiden Sever was on 12/09/2015. Cycle # 2 of Jaiden Sever was on 01/06/2016. Cycle # 3 of Jaiden Sever was on 01/27/2016. Cycle # 4 of Jaiden Sever was on 02/24/2016. PET SCAN 3.2016: The 2.1 cm left lung mass abutting the major fissure is hypermetabolic with SUV max of 5.4. Finding is worrisome for tumor with avid glucose metabolism. 2. Elsewhere there is unremarkable distribution of FDG activity without evidence of hypermetabolic metastases. On 03/29/2016 underwent Bronch/Left VATS/VATS wedge ROMELIA/VATS Left upper lobectomy/Mediasinal lymph node dissection/Intercostal nerve block from T3-T10 per Dr. Jacob Eason. Final pathology revealed Stage I Adenocarcinoma of ROMELIA (morphologically different from the adenocarcinoma previously diagnosed in the right upper lobe. Therefore, synchronous primary tumors are favored). A. Left lung, upper lobe wedge resection: Invasive, well-differentiated adenocarcinoma (grade 1); Tumor size-1.4 cm in greatest dimension; Surgical margins-negative for malignancy; Lymphovascular invasion-not identified; TNM classification-pT2a N0 MX  B. AP window lymph node #1, excision: Anthracotic lymph node; negative for malignancy  C.  AP window lymph node #2, excision: Anthracotic lymph node; negative for malignancy D. Periaortic lymph node #1, excision: Fibroadipose tissue; lymph node not identified  E. Bronchial lymph node #1, excision: Anthracotic lymph node; negative for malignancy  F. Left lung, upper lobectomy: Emphysematous change; negative for malignancy  4 anthracotic lymph nodes negative for malignancy   G. Inferior pulmonary ligament lymph node, excision: Anthracotic lymph node; negative for malignancy  H. Bronchial lymph node, excision: Anthracotic lymph node; negative for malignancy. Right side Mediport was removed on 11/04/2016 by Dr. Tiffany Rojo. On surveillance per NCCN guidelines. CT chest 04/12/2017 noted no convincing evidence of recurrent disease. CT chest 10/19/2017 noted no definite evidence for recurrent malignancy. CT chest 03/12/2018 negative for pulmonary parenchymal masses or enlarged mediastinal or hilar LN. ? 2 cm lesion in spleen difficult to evaluate due to the arterial phase of the study. CT Abd/Pelvis 05/08/2018  Enhancing lesion within the spleen is relatively stable to slightly smaller when compared to April 2014 exam and likely splenic hemangioma. Other indeterminate findings (left periaortic LN, sclerotic/blastic foci in L3 and L1 reported by Dr. Pascual Valerio from radiology team). PET/CT scan 06/05/2018 unremarkable. No FDG avid uptake identified. No evidence for recurrent or metastatic disease. CT Chest 12/13/2018 noted no evidence of recurrent/metastatic disease. CT chest on 06/11/2019 noted no evidence for worrisome residual or recurrent malignancy. No evidence for new lymphadenopathy or metastatic disease. Stable scarring and postoperative changes right upper lobe. CT chest 11/26/2019: No evidence of active neoplasm. CT chest 06/15/2020: No evidence of active neoplasm. CT chest 12/30/2020: Postsurgical changes and postsurgical scarring seen within the right lung apex. There is no evidence of tumor recurrence.   2.1 x 2.3 cm enhancing lesion seen within the spleen  PET/CT scan 03/02/2021   No FDG avid uptake is identified which exceeds the threshold SUV. No convincing evidence for recurrent or metastatic disease  CT chest 09/28/2021 No evidence of tumor recurrence     Recent abdominal pain; ER visit reviewed  CT abdomen/pelvis 02/20/2022   6 mm right lower lobe pulmonary nodule  Multiple peritoneal cystic masses      CEA 12.5 on 03/16/2022     CT chest 04/05/2022 Ground-glass nodule right lower lobe superolateral portion 10 mm unchanged from prior however in the medial segment right lower lobe with pleural abutment is a 7 mm pulmonary nodule increased in size from 09/28/2021 with is barely visible at 2-3 mm; repeat CT chest in 3 mo     PET/CT scan 04/05/2022 noted No FDG avid uptake is identified which exceeds the threshold SUV      Bilateral screening mammogram 04/20/2022: Negative for malignancy     CEA     12.1 on 04/20/2022  CA-125 32.8 on 04/20/2022   <2 on 04/20/2022  Chromogranin A 1480 on 04/20/2022. EGD/Colonoscopy 05/13/2022: Gastric polyps , duodenal polyp moderate gastroduodenitis   A. Stomach, biopsy: Hyperplastic polyp and moderate chronic active gastritis; negative for intestinal metaplasia   Immunostain negative for Helicobacter pylori organisms   B. Duodenum, biopsy: Chronic duodenitis with features of peptic duodenitis   C. Colon, 20 cm biopsy: Fragments of tubular adenoma and hyperplastic polyp   Pathology reviewed. Referred to HBP team (Dr. Antonette James) for further evaluation of peritoneal cystic masses  CT abdomen/pelvis 06/23/2022 numerous cystic structures identified throughout the right flank and retroperitoneum. Laparoscopic robotic peritoneal mass resection on 07/06/2022  Findings included: serous cystic masses along the colon, periportal lymphadenopathy, fibrotic appearing liver, chronic cholecystitis     Peritoneal fluid Negative for malignant cells.  Cellblock shows reactive mesothelial cells, lymphocytes, adipose/stromal tissue, and blood. Monolayer preparation shows few reactive mesothelial cells and blood. A.  Lymph node, periportal, biopsy:   - Reactive node showing follicular hyperplasia, negative for malignancy, see comment. B.  Remnant gallbladder, cholecystectomy:   - Portion of gallbladder wall showing dense fibrosis/scar and occluded mariaelena-gallbladder vessels. C. Soft tissue, peritoneal sac, excision:   - Peritoneal inclusion cysts (benign cystic mesothelioma) and unremarkable fibroadipose tissue. D.  Liver, needle biopsy:   - Benign hepatic parenchyma showing focal periportal and incomplete septal fibrosis (stage 2)   - Negative for significant lobular/portal necroinflammatory activity, see comment. Comment:   Sections of the lymph node in specimen A reveal normal anat architecture with secondary follicular hyperplasia. There are no cytologically atypical lymphoid cells or [place] cells identified. Periportal LN Flow Cytometry noted 4.5% monoclonal B cells detected in a predominantly polyclonal background. Monoclonal B cell population (4.5% of total cells) without detectable CD5, CD10 or CD11c expression in a predominantly polyclonal background, raising the differential between a monoclonal B-cell population of undetermined significance versus a B-cell lymphoma/leukemia. In the context of B-cell lymphoma the main differential based on immunophenotype includes, but is not limited to marginal zone lymphoma/leukemia, lymphoplasmacytic lymphoma, UB91- negative follicular lymphoma, and less likely large b cell lymphoma. In specimen D, there is no evidence of chronic hepatitis or significant steatosis. Histologic features of cirrhosis including nodules of regenerating hepatocytes and complete portal-portal bridging fibrosis are not identified. Focal periportal fibrosis and incomplete septal fibrosis are confirmed by trichrome stain. Iron stain is also performed and is negative. Periportal lymph node flow cytometry showing 4.5% monoclonal B cells without detectable CD5, CD10 or CD11c expression in a predominantly polyclonal background, raising the differential between a monoclonal B-cell population of undetermined significance versus a B-cell lymphoma/leukemia     Repeat CT chest to follow on history of NSCLC on 08/04/2022  Postsurgical changes are identified in the upper lobes bilaterally. 1.1 cm ground-glass nodule in the right lower lobe and a smaller 1 measuring 0.9 cm are noted without change. There is a 1.6 x 1.2 cm soft tissue mass in the right lower lobe medially which is significantly increased since the previous examination. Bilateral adrenal nodules are noted, larger 1 on the left side measuring 1.8 x 1.6 cm.  1.2 cm right renal cystic lesion is identified. An ill-defined hypodense lesion is identified in the spleen currently measuring 2.9 x 3.5 cm      Evaluated by Dr. Sreekanth Mccann on 08/15/2022 at [address] [place] [address] who recommended proceeding with a biopsy of the enlarging right lower lobe lung mass. While lymphoma certainly possible, biopsy recommended to rule out recurrent lung cancer. Periportal lymph node biopsy reviewed at [address] [place] [address]. Lymph node with lipid lymphadenopathy  Small clonal B-cell population detected by flow cytometry and molecular analysis  Negative for carcinoma  Flow cytometry showed a small clonal CD5 -/CD10- B-cell population (4.5% of all events) and a background of polyclonal B cells. B-cell receptor clonality assay was positive for a clonal B-cell population. However there is no morphologic evidence of lymphoma in the lymph node. The detection of a clonal B-cell population despite negative morphologic evidence of lymphoma might represent monoclonal B-cell lymphocytosis process in the lymph node.   An alternative consideration is peripheral blood involvement by B-cell clone (MBL-like process) that was picked up by the tissue flow cytometry and molecular tests. Even if lymphoma is concerned, likely low-grade and not causing her symptoms, so observation would be recommended. PET/CT scan, CT-guided core needle biopsy of enlarging right lower lobe lung mass recommended. PET/CT scan 09/06/2022: In the region of the lesion in the right lower lobe there is FDG avid tracer uptake peak SUV is 3.2. CT guided core needle biopsy RLL lung nodule on 09/20/2022  Right lung, lower lobe core needle biopsy: Adenocarcinoma (see comment)   Comment: The prior history of right upper lobe adenocarcinoma (HES ) and left upper lobe adenocarcinoma (HES ) is noted. The electronic medical record is also reviewed. The adenocarcinoma in the current specimen is weakly immunoreactive with GATA3 and CDX2. The TTF-1 immunostain is negative. 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 Pelvic U/S large calcified uterine mass; ovaries not identified. Large volume ascites. MRI Pelvis 11/05/2022: No suspicious uterine or endometrial mass  Multiple calcified, degenerated uterine leiomyomas, measuring up to 5.2 cm  Seen by GYN (Dr. Mishel Duque); No findings concerning for gynecologic malignancy. Status post paracentesis 11/9/2022:  A total of 2510 cc of straw-colored ascitic fluid was removed  Cytology was negative for malignant cells  Reactive mesothelial cells, acute/chronic inflammatory cells, and blood present. The patient was admitted on 12/14/2022 for increased shortness of breath. More recently few days ago she was discharged from the hospital for worsening right pleural effusion, in which Pleurx catheter was placed on 12/9/2022. Also of note, patient had recent development of a subcutaneous nodule located in the posterior right axilla. CT scans on 12/7/2022 reported 2.7 x 1.8 cm subcutaneous tissue mass, overlies the trapezius muscle     Large Right sided loculated pleural effusion, s/p PleurX placement 12/9/2022, along with findings suggestive of chest wall metastasis  Recurrent ascites, negative cytology, but with concern for malignancy  Subcutaneous mass posterior to right axilla, s/p excision on 12/11/2022  Right breast mass, BIRADS 4  History of recurrent ascites  Imaging evidence of early cirrhosis  Active chronic hepatitis C infection  Right lung mass positive for Adenocarcinoma, weakly positive for GATA3 and CDX2, TTF-1 negative; KRAS (G12C) positive, PDL1 with 10% expression  Previously described multiple peritoneal cystic masses, s/p resection in 7/6/2022, not well visualized on 10/31/22 CT abdomen  Monoclonal B-cell population from periportal lymph node resection from 7/6/2022, in the setting of follicular hyperplasia  Previous right sided lung adenocarcinoma in 2015  Previous left-sided lung adenocarcinoma 2016  Family history of uterine cancer in mother  Family history of early age breast cancer in sister (Diagnosed around early 42's)     [facility]ology and fluid studies;   IR consulted for management of PleurX  Cytology pending  Pulmonary team following     Awaiting for biopsy results from excision the subcutaneous nodule in posterior right axilla done on 12/11/2022.       Hold off on radiation for now for the lung mass as we await results from fluid cytology and the excision biopsy    Palliative consulted    Anemia; did receive IV iron during previous admission  Hb 6.5; I ordered 1 unit prbc to be given today 12/16/2022  Continue to monitor CBC with diff  GI team consulted; EGD/Colonoscopy pending H&H    PPI BID     12/16/2022  Mary Brooks MD

## 2022-12-16 NOTE — PROGRESS NOTES
Tristen served Dr Tuyet Pulliam regarding pleurx drain, not draining and IR not being able to flush pleurx drain.

## 2022-12-16 NOTE — PROGRESS NOTES
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 *      Subjective:  Patient seen resting in bed on RA  She denies shortness of breath, cough or wheezing      Medications:   sodium chloride      sodium chloride          sodium chloride flush  5-40 mL IntraVENous 2 times per day    amLODIPine  5 mg Oral Daily    ipratropium-albuterol  1 ampule Inhalation Q4H WA    escitalopram  5 mg Oral Daily       Vitals:  VITALS:  BP (!) 153/74   Pulse 96   Temp 97.7 °F (36.5 °C) (Axillary)   Resp 18   Ht 5' 1\" (1.549 m)   Wt 114 lb 3.2 oz (51.8 kg)   SpO2 100%   BMI 21.58 kg/m²   24HR INTAKE/OUTPUT:    Intake/Output Summary (Last 24 hours) at 2022 0846  Last data filed at 2022 0530  Gross per 24 hour   Intake --   Output 128 ml   Net -128 ml     CURRENT PULSE OXIMETRY:  SpO2: 100 %  24HR PULSE OXIMETRY RANGE:  SpO2  Av %  Min: 93 %  Max: 100 %  CVP:    VENT SETTINGS:      Additional Respiratory Assessments  Heart Rate: 96  Resp: 18  SpO2: 100 %      EXAM:  General: No distress. Alert. Room air   Eyes: No sclera icterus. No conjunctival injection. ENT: No discharge. Pharynx clear. Neck: Trachea midline. Normal thyroid. Resp: No accessory muscle use. No crackles. No wheezing. No rhonchi. Diminished on the R base otherwise clear   CV: Regular rate. Regular rhythm. +1 BLE, + murmur   ABD: Non-tender. Non-distended. Normal bowel sounds. Skin: Warm and dry. No nodule on exposed extremities. No rash on exposed extremities. M/S: No cyanosis. No joint deformity. No clubbing. Neuro: Awake. Follows commands. A&Ox3     I/O: I/O last 3 completed shifts:   In: 250 [IV Piggyback:250]  Out: 128 [Urine:125; Chest Tube:3]  No intake/output data recorded. Results:  CBC:   Recent Labs     12/14/22  2116 12/15/22  0553 12/16/22  0352   WBC 10.7 9.7 8.9   HGB 7.8* 7.1* 6.5*   HCT 24.3* 22.0* 20.5*   MCV 85.3 86.6 86.9    370 349     BMP:   Recent Labs     12/14/22  2116 12/15/22  0855 12/16/22  0352    140 140   K 4.6 4.4 5.0   * 111* 113*   CO2 21* 21* 19*   BUN 23 20 26*   CREATININE 0.8 0.8 1.1*     LFT:   Recent Labs     12/14/22  2116 12/15/22  0855   ALKPHOS 218* 196*   ALT 11 10   AST 21 24   PROT 6.8 6.7   BILITOT <0.2 0.2   LABALBU 2.1* 2.2*     PT/INR:   Recent Labs     12/15/22  0412   PROTIME 12.9*   INR 1.1     Cultures:  No results for input(s): CULTRESP in the last 72 hours. ABG:   No results for input(s): PH, PO2, PCO2, HCO3, BE, O2SAT in the last 72 hours. Films:  XR CHEST PORTABLE    Result Date: 12/14/2022  EXAMINATION: ONE XRAY VIEW OF THE CHEST 12/14/2022 9:08 pm COMPARISON: December 12, 2022 HISTORY: ORDERING SYSTEM PROVIDED HISTORY: right effusion, sob TECHNOLOGIST PROVIDED HISTORY: Reason for exam:->right effusion, sob FINDINGS: Hazy opacities throughout the right lung notable along peripheral aspect of right lung and right costophrenic sulcus. Right-sided chest tube is present. No obvious pneumothorax. The heart is normal in size. 1. Redemonstration of hazy opacities throughout right lung notable suggesting persistent right pleural effusion. 2. Redemonstration of right chest tube. 3. No evidence of pneumothorax. CTA PULMONARY W CONTRAST    Result Date: 12/15/2022  EXAMINATION: CTA OF THE CHEST 12/14/2022 10:05 pm TECHNIQUE: CTA of the chest was performed after the administration of intravenous contrast.  Multiplanar reformatted images are provided for review. MIP images are provided for review.  Automated exposure control, iterative reconstruction, and/or weight based adjustment of the mA/kV was utilized to reduce the radiation dose to as low as reasonably achievable. COMPARISON: None. HISTORY: ORDERING SYSTEM PROVIDED HISTORY: sob rule out PE TECHNOLOGIST PROVIDED HISTORY: Reason for exam:->sob rule out PE Decision Support Exception - unselect if not a suspected or confirmed emergency medical condition->Emergency Medical Condition (MA) FINDINGS: Pulmonary Arteries: Pulmonary arteries are adequately opacified for evaluation. No evidence of intraluminal filling defect to suggest pulmonary embolism. Main pulmonary artery is normal in caliber. Mediastinum: No evidence of mediastinal lymphadenopathy. The heart is mildly enlarged. The pericardium demonstrates no acute abnormality. There is no acute abnormality of the thoracic aorta. Lungs/pleura: There is a large loculated right pleural effusion with associated posterior right lower lobe compressive atelectasis. There is indwelling right hemithorax chest tube which courses posteriorly with tip terminating near the mediastinum. No pulmonary edema. No pneumothorax. On axial image 32, there is indeterminate round solid nodule in posterior right upper lobe, abutting the major fissure. On axial image 56, there is indeterminate 9 mm ground-glass opacity in periphery of right lower lobe. On axial image 77, there is indeterminate 7.7 mm subpleural nodule localized in posterior left lower lobe base. There are a few other scattered subcentimeter nodules in the periphery of left lower lobe near the base. Upper Abdomen: There is intraabdominal ascites which is perihepatic  and perisplenic. Soft Tissues/Bones: No acute bone or soft tissue abnormality. No thyromegaly. No convincing evidence for acute pulmonary thromboembolism. Loculated large right pleural effusion with indwelling right hemithorax drainage catheter. Posterior right lung base compressive atelectasis. Multiple indeterminate subcentimeter nodules, as detailed above.   LUNG-RADS 3, PROBABLY BENIGN; advise follow up to document stability 6 months. Ascites. Assessment:  12/9: R pleurx placed by IR     12/14: R pleurex -500 mL ( prior to admission), ED for SOB  12/15: 4L   12/16: room air, hgb 6.5    Hypoxia - resolved   Anemia   Right lung mass positive for Adenocarcinoma, weakly positive for GATA3 and CDX2, TTF-1 negative; KRAS (G12C) positive, PDL1 with 10% expression. Plan was to start radiation therapy on 12/6/2022  Large right loculated pleural effusion noted on CTA 12/14  Large right pleural effusion with loculation s/p right Pleurx 12/9, no cytology obtained  2.7 X 1.8 cm enhancing subcutaneous mass in the posterior right shoulder region s/p excisional biopsy 12/11, pathology pending    Right breast lesion  History of ascites with cirrhosis status post paracentesis 11/22 cytology negative for malignant cells  History right-sided lung adenocarcinoma 2015 left-sided lung adenocarcinoma 2016  History of COPD  History of tobacco use, 58-pack-year smoking history      Plan:  Patient currently on room air from 4 L nasal cannula yesterday. Baseline is room air. Ok for supplemental O2 as needed to maintain pulse ox >90%  Continue Duo-nebs. Resume Spiriva on discharge  Right Pleurx drained for 500 mL on 12/14. Unsure of frequency she was draining at home. Continue drain every other day, due to be drained today. Fluid studies ordered  H/H 6.5/20.5, hematology following         Electronically signed by VERITO Robbins CNP on 12/16/2022 at 8:28 AM       Seen and examined, agree with above. Has had no drainage with last 2 attempted drainages so will ask IR to evaluate tube. Will analyze fluid if able to get any out.

## 2022-12-16 NOTE — PROGRESS NOTES
[address] [facility] Hospitalist   Progress Note    Admitting Date and Time: 12/14/2022  8:41 PM  Admit Dx: Loculated pleural effusion [J90]  Other fatigue [R53.83]    Subjective:    Pt feels today. Seen by palliative medicine. Has been previously CCA, however has change Code status  back to full code at this time. She is currently on room air tolerating well. Denies any new concerns at this time    Per RN: Cultures of pleural fluid to be obtained with next Pleurx catheter draining.     ROS: denies fever, chills, cp, sob, n/v, HA unless stated above.     sodium chloride flush  5-40 mL IntraVENous 2 times per day    amLODIPine  5 mg Oral Daily    ipratropium-albuterol  1 ampule Inhalation Q4H WA    escitalopram  5 mg Oral Daily     sodium chloride, , PRN  sodium chloride flush, 5-40 mL, PRN  sodium chloride, , PRN  ondansetron, 4 mg, Q8H PRN   Or  ondansetron, 4 mg, Q6H PRN  acetaminophen, 650 mg, Q6H PRN   Or  acetaminophen, 650 mg, Q6H PRN  polyethylene glycol, 17 g, Daily PRN  oxyCODONE, 10 mg, Q6H PRN         Objective:    BP (!) 153/74   Pulse 96   Temp 97.7 °F (36.5 °C) (Axillary)   Resp 18   Ht 5' 1\" (1.549 m)   Wt 114 lb 3.2 oz (51.8 kg)   SpO2 100%   BMI 21.58 kg/m²   General Appearance: alert and oriented to person, place and time and in no acute distress  Skin: warm and dry  Head: normocephalic and atraumatic  Eyes: pupils equal, round, and reactive to light, extraocular eye movements intact, conjunctivae normal  Neck: neck supple and non tender without mass   Pulmonary/Chest: clear to auscultation bilaterally- no wheezes, rales or rhonchi, normal air movement, no respiratory distress  Cardiovascular: normal rate, normal S1 and S2 and no RG +M  Abdomen: soft, non-tender, non-distended, normal bowel sounds, no masses or organomegaly  Extremities: no cyanosis, no clubbing and 1+ edema  Neurologic: no cranial nerve deficit and speech normal      Recent Labs     12/14/22 2112 12/15/22  2883 12/16/22  0352    140 140   K 4.6 4.4 5.0   * 111* 113*   CO2 21* 21* 19*   BUN 23 20 26*   CREATININE 0.8 0.8 1.1*   GLUCOSE 104* 87 74   CALCIUM 9.0 8.9 8.8       Recent Labs     12/14/22  2116 12/15/22  0855   ALKPHOS 218* 196*   PROT 6.8 6.7   LABALBU 2.1* 2.2*   BILITOT <0.2 0.2   AST 21 24   ALT 11 10       Recent Labs     12/14/22  2116 12/15/22  0553 12/16/22  0352   WBC 10.7 9.7 8.9   RBC 2.85* 2.54* 2.36*   HGB 7.8* 7.1* 6.5*   HCT 24.3* 22.0* 20.5*   MCV 85.3 86.6 86.9   MCH 27.4 28.0 27.5   MCHC 32.1 32.3 31.7*   RDW 16.8* 17.5* 17.3*    370 349   MPV 9.3 10.8 9.9            Radiology:   CTA PULMONARY W CONTRAST   Final Result   No convincing evidence for acute pulmonary thromboembolism. Loculated large right pleural effusion with indwelling right hemithorax   drainage catheter. Posterior right lung base compressive atelectasis. Multiple indeterminate subcentimeter nodules, as detailed above. LUNG-RADS   3, PROBABLY BENIGN; advise follow up to document stability 6 months. Ascites. XR CHEST PORTABLE   Final Result   1. Redemonstration of hazy opacities throughout right lung notable   suggesting persistent right pleural effusion. 2. Redemonstration of right chest tube. 3. No evidence of pneumothorax. 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 *      Plan:  1. Loculated pleural effusion- CTA shows loculated large right pleural effusion with indwelling right hemithorax drainage catheter and posterior right lung base compressive atelectasis. Pleural drain placed on 12/9. Drained 12/14 for 500 ml.  Drained twice weekly at home per pt. Follows with Dr Nikunj Souza. CRP 3.1 Procalcitonin pending WBC 9.7>8.9  Plan to drain EOD. Pulmonology input appreciated. 2. Hx of lung cancer-right lung mass +Adenocarcinoma. GA TA 3/CD x2 KRAS (G12C)+ % expression./6/22 radiation therapy initiation. Follows with Dr Christiane Mark. Oncology/pulmonology/palliative medicine input appreciated     3. HTN- Continue Amlodipine. 4. Anemia- H&H 6.5/20.5 No overt signs of bleeding noted. She has 1U PRBC. Repeat H/H. Check occult stool. Follow transfuse as needed. GI/Hematology input appreciated. 5  Ascites- History of hepatitis C. Last paracentesis done on 11/9 for 2510 cc.  11/22 cytology negative for malignant cells. 6. Hypoxia-2/2 above. Baseline Room Air. Currently ORA continue to wean as tolerated maintain SPO2> 92%. Continue DuoNebs. Pulmonology input appreciated. 7.  Subcutaneous mass posterior right shoulder 12/11 biopsy pathology remains pending    NOTE: This report was transcribed using voice recognition software. Every effort was made to ensure accuracy; however, inadvertent computerized transcription errors may be present. In Review of EMR,  evaluation, management and diagnosis. Care plan has been discussed with attending. Time spent 17  minutes. Electronically signed by Deidre Overton CNP on 12/16/2022 at 9:26 AM

## 2022-12-16 NOTE — PROGRESS NOTES
Palliative Care Department  [phone]  Palliative Care Progress Note  Provider Amelia Bocanegra, APRN - CNP    Kayla Del Angel  [id number]  Hospital Day: 3  Date of Initial Consult: 12/15/2022  Referring Provider: Varghese Fagan MD and Miguel Villagran MD   Palliative Medicine was consulted for assistance with: Goals of Care, Code Status Discussion, and Family Support    HPI:   Kayla Del Angel is a 71 y.o. with a past medical history of hepatitis C, hypertension, osteoarthritis, emphysema, cholelithiasis, former smoker, uterine fibroids, stage IV adenocarcinoma of the lung s/p right upper lobectomy with peritoneal carcinomatosis and possible breast mets, pleural effusion s/p Pleurx drain placement, ascites s/p multiple paracentesis  who was admitted on 12/14/2022 from home with a CHIEF COMPLAINT of shortness of breath. Patient has had a recent hospitalization 12/7/2022 - 12/12/2022 with pleural effusion and had Pleurx drain placed and was also found with right axillary mass s/p excision awaiting pathology report. In the emergency room she was hypoxic requiring supplemental oxygen and was also tachycardic. CTA of the chest revealed loculated large right pleural effusion with indwelling right drainage catheter, posterior right lung base compressive atelectasis, multiple nodules, ascites. She was admitted to the hospital for further management. Palliative care was consulted for goals of care, CODE STATUS discussion, family support. ASSESSMENT/PLAN:     Pertinent Hospital Diagnoses     Metastatic adenocarcinoma of the lung  Recurrent pleural effusion s/p right Pleurx placement-now with loculated effusion  Ascites s/p paracentesis 11/9/2022  History of hepatitis C      Palliative Care Encounter / Counseling Regarding Goals of Care  Please see detailed goals of care discussion as below  At this time, Kayla Del Angel, Does have capacity for medical decision-making.   Capacity is time limited and situation/question specific  During encounter, Kayla Crenshaw was surrogate medical decision-maker  Outcome of goals of care meeting:   Continue full code  Continue current medical management  Wishes to speak to doctors about her cancer and prognosis  She will speak to her family about CODE STATUS  Code status Full Code  Advanced Directives: Healthcare POA paperwork and DNR CCA form in epic  Surrogate/Legal NOK:  Chata Maria ([address] partner): [phone]  Armond Allen (sister/1st alternate): [phone]  Aubree Salinas (sister/2nd alternate): [phone]    Spiritual assessment: no spiritual distress identified  Bereavement and grief: to be determined  Referrals to: none today  SUBJECTIVE:     Current medical issues leading to Palliative Medicine involvement include   Active Hospital Problems    Diagnosis Date Noted    Loculated pleural effusion [J90] 12/15/2022     Priority: Medium    Subcutaneous nodule [R22.9] 12/15/2022     Priority: Medium    Metastatic cancer to chest wall [facility]) [C79.89] 12/15/2022     Priority: Medium    Breast lesion [N64.9] 12/15/2022     Priority: Medium    History of lung cancer [Z85.118] 06/29/2018    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 failure ([facility] [address] [D61.9]     Hypertension [I10] 11/12/2015       Details of Conversation:   Chart reviewed. Patient seen resting in bed. She is alert and oriented x4 and able to hold meaningful conversation. We discussed goals of care and CODE STATUS. She states she was living at home with her partner, Kendy Kuhn, and her sister, Alana Fischer. She confirms that Kendy Kuhn is to make her medical decisions along with her sisters Luz Mullins and Alana Fischer should she not be able to. We discussed her current medical condition and Kayla Crenshaw states she was unaware that her cancer was progressing. I told her that we were still waiting for the biopsy results of the mass removed from right axilla along with fluid studies from her right pleur-X.  She states at this time, she wishes to remain a FULL CODE even though she knows that's not what she wanted previously. She wants to get more in-depth updates from her doctors and then speak to her family about further goals of care. Care discussed with primary team and charge RN. We will follow. OBJECTIVE:   Prognosis: Guarded    Physical Exam:  BP (!) 148/69   Pulse 88   Temp 98.8 °F (37.1 °C) (Oral)   Resp 18   Ht 5' 1\" (1.549 m)   Wt 114 lb 3.2 oz (51.8 kg)   SpO2 97%   BMI 21.58 kg/m²   Gen:  Thin, ill-appearing, alert  Lungs:  NC, easy and unlabored respirations   Heart:  RRR  Abd:  Soft, non tender, distended  Ext:  No edema, pulses present  Skin:  Warm and dry  Neuro:  Alert, oriented x4    Objective data reviewed: labs, images, records, medication use, vitals, and chart    Discussed patient and the plan of care with the other IDT members: Palliative Medicine IDT Team, Primary Team, and Patient    Time/Communication  Greater than 50% of time spent, total 25 minutes in counseling and coordination of care at the bedside regarding  CODE STATUS discussion, goals of care, and diagnosis and prognosis. Thank you for allowing Palliative Medicine to participate in the care of Angelica Flores

## 2022-12-16 NOTE — CARE COORDINATION
Social work / Discharge Planning:          Patient is a readmission. She is from home with significant other. Patient has hospital bed, wc and walker. Her PASSPORT waiver  is Julian Anderson. Patient is active with [facility] for skilled nursing for pleurx catheter, PT and social work. Pulmonology, palliative medicine, GI and oncology consulted. Patient is on room air. Social work will follow.     Electronically signed by ESTEVAN Zamora on 12/16/2022 at 10:17 AM

## 2022-12-16 NOTE — CONSULTS
Houston Hicks M.D. [facility]  Dr. Devyn Lopez M.D.,  Dr. Dione Aviles M.D.,  Dr. Tez Archer D.O.,  Dr. Ella Frey D.O. ,  Dr. Parmjit Bey M.D.,          Monique Garsia  71 y.o.  female      Re: Anemia, lung cancer, adenocarcinoma  Requesting physician: Dr. Rosie Jain  Date:5:04 PM 12/16/2022          HPI: 78-year-old female patient who is known to me from recent hospital admission. She has established diagnosis of advanced lung adenocarcinoma. Patient apparently has history of liver disease and most recently was scheduled to be seen by Dr. Learta Collet, but patient does not recall. Patient was seen in the hospital most recently on 22 November. She is not sure but does not appear that she has follow-up as outpatient  -office records currently unavailable. Patient also does not recall any recent upper endoscopy. Patient denies any significant abdominal pain currently. She denies nausea vomiting. She denies hematemesis emesis of coffee-ground material.  She denies noticing blood in the stool or melena. In the hospital she was noted to be anemic with hemoglobin on presentation on 14 December at 7.8 slowly decreasing to 6.5 today. Patient has received 1 unit of PRBC transfusion. Iron studies have been obtained previously and show iron deficiency with decreased iron saturation. Additional laboratory work shows INR of 1.1 yesterday. Chemistry panel shows BUN of 26 with creatinine of 1.1 which appear fluctuating and worse than yesterday. Liver profile shows nonelevated transaminases and bilirubin with elevated alkaline phosphatase trending down. Viral hepatitis serology has been positive for hepatitis C antibody and in November she was found to have genotype 1a or Ib with over 300,000 international units/mL.   Imaging on this admission has been obtained with CT of the chest/CTA reporting to show no convincing evidence for acute pulmonary embolism and loculated large right pleural effusion with underlying right hemithorax drainage catheter. No dedicated abdominal imaging has been obtained. Ascites is mentioned on the abdominal portions of the study.     Information sources:   -Patient  -medical record  -health care team    PMHx:  Past Medical History:   Diagnosis Date    Abnormal chest x-ray with multiple lung nodules 9/8/2015    Abnormal Pap smear 1/3/2011    Alpha-1 negative    Adrenal adenoma     7 mm    Bilateral lung cancer ([address] 5/12/2016    surgically removed - 2015     Cholelithiasis 6/6/2011    Encounter for screening colonoscopy     for OR 3-28-19     Fibroids     Hemangioma of spleen     Hepatitis C 01/03/2011    treated and cured, no current issues     Hyperlipidemia     no medications     Hypertension 6/6/2011    Insomnia     Lung nodule     ROMELIA     Lymphadenopathy     Cervical (-) ENT    Malignant neoplasm of upper lobe of right lung ([address] 11/18/2015    Osteoarthritis     Panlobular emphysema ([address] 11/12/2015    controlled with inhalers     PPD negative 1/18/12    Pulmonary embolism without acute cor pulmonale ([address] 5/12/2016    PVD (peripheral vascular disease) ([address] 5/6/2014    Scoliosis     Uterine fibroid 5/6/2014       PSHx:  Past Surgical History:   Procedure Laterality Date    APPENDECTOMY  1965    AXILLARY SURGERY Right 12/11/2022    EXCISION OF RIGHT AXILLARY MASS performed by Robe Childers MD at [facility] Left     AGE 31'S LEFT NIPPLE REMOVED    BREAST LUMPECTOMY  4/28/1999    left, benign, Dr. Jordan Carter, [place]  2/1/2012    Joel Aponte, Dr. Akin Galvan, [place]  10/15/15    with EBUS - DR Jessica Goodman    CHOLECYSTECTOMY      COLONOSCOPY  12/21/2009    partial to distal ascending colon normal (completion BE same day normal), Dr. Jordan Carter, [facility]    COLONOSCOPY  8/22/2014    done under fluoro with sigmoid straightening device so was able to get to cecum, very poor prep, moderate proctitis, repeat 5 years,  Dr. Jordan Carter, [facility]    COLONOSCOPY N/A 3/28/2019    COLONOSCOPY POLYPECTOMY SNARE/COLD BIOPSY performed by Brendan Geiger DO at [address] N/A 5/13/2022    COLONOSCOPY POLYPECTOMY SNARE/COLD BIOPSY performed by Janet Santillan MD at [address] [place] [address] LUNG PERCUTANEOUS  9/20/2022    CT NEEDLE BIOPSY LUNG PERCUTANEOUS 9/20/2022 DEANGELO Lanier MD [facility]    ENDOMETRIAL BIOPSY      IR INSERT TUNNELED PLEURA CATH W CUFF  12/9/2022    IR INSERT TUNNELED PLEURA CATH W CUFF 12/9/2022 Rahul Bone MD [facility] SPECIAL PROCEDURES    LIVER RESECTION Right 7/6/2022    LAPAROSCOPIC ROBOTIC PERITONEAL MASS RESECTION performed by Sedrick Dickey MD at [place] [address] Left 3/29/2016    VATS WEDGE RESECTION UPPER LOBECTOMY    OTHER SURGICAL HISTORY Right 11/04/2016    REMOVAL MEDI-PORT - DR Daniel Solorio    RI LAP,CHOLECYSTECTOMY/GRAPH N/A 6/29/2018    CHOLECYSTECTOMY LAPAROSCOPIC, POSSIBLE OPEN,POSSIBLE GRAM ( OC 2) performed by Amando Santana MD at [place] [address] Right 11/11/2015    VATS, BRONCH,RT UPPER LOBECTOMY; NODE DISECTION    TUNNELED VENOUS PORT PLACEMENT Right 12/07/2015    right chest, and removed     UPPER GASTROINTESTINAL ENDOSCOPY N/A 5/13/2022    EGD POLYP HOT FORCEP/CAUTERY performed by Janet Santillan MD at [address]:  Current Facility-Administered Medications   Medication Dose Route Frequency Provider Last Rate Last Admin    0.9 % sodium chloride infusion   IntraVENous PRN Alexandra Mata MD        sodium chloride flush 0.9 % injection 5-40 mL  5-40 mL IntraVENous 2 times per day Priya ANDRADE Addicott, APRN - CNP   10 mL at 12/16/22 0802    sodium chloride flush 0.9 % injection 5-40 mL  5-40 mL IntraVENous PRN Priya ANDRADE Addicott, APRN - CNP        0.9 % sodium chloride infusion   IntraVENous PRN Priya Yeagerott, APRN - CNP        ondansetron (ZOFRAN-ODT) disintegrating tablet 4 mg  4 mg Oral Q8H PRN Priya Yeagerott, APRN - CNP        Or    ondansetron (Shery Najjar) injection 4 mg  4 mg IntraVENous Q6H PRN Priya Yeagerott, APRN - CNP        acetaminophen (TYLENOL) tablet 650 mg  650 mg Oral Q6H PRN Priya ANDRADE Addicott, APRN - CNP   650 mg at 12/15/22 2002    Or    acetaminophen (TYLENOL) suppository 650 mg  650 mg Rectal Q6H PRN Priya Yeagerott, APRN - CNP        polyethylene glycol (GLYCOLAX) packet 17 g  17 g Oral Daily PRN Priya Yeagerott, APRN - CNP        amLODIPine (NORVASC) tablet 5 mg  5 mg Oral Daily Priya ANDRADE Addicott, APRN - CNP   5 mg at 22 0800    ipratropium-albuterol (DUONEB) nebulizer solution 1 ampule  1 ampule Inhalation Q4H WA Robin Villa MD   1 ampule at 22 1624    oxyCODONE (ROXICODONE) immediate release tablet 10 mg  10 mg Oral Q6H PRN Robin Villa MD   10 mg at 22 1313    escitalopram (LEXAPRO) tablet 5 mg  5 mg Oral Daily Alexei Srivastava APRN - CNP   5 mg at 22 0800     Facility-Administered Medications Ordered in Other Encounters   Medication Dose Route Frequency Provider Last Rate Last Admin    pantoprazole (PROTONIX) tablet 40 mg  40 mg Oral QAM AC Uday Rubio MD            SocHx:  Social History     Socioeconomic History    Marital status: Single     Spouse name: Not on file    Number of children: 0    Years of education: Not on file    Highest education level: Not on file   Occupational History    Not on file   Tobacco Use    Smoking status: Former     Packs/day: 0.00     Years: 30.00     Pack years: 0.00     Types: Cigarettes     Quit date: 2015     Years since quittin.5    Smokeless tobacco: Never   Vaping Use    Vaping Use: Never used   Substance and Sexual Activity    Alcohol use: Not Currently     Alcohol/week: 2.0 standard drinks     Types: 2 Glasses of wine per week     Comment: x2 week    Drug use: No    Sexual activity: Not Currently   Other Topics Concern    Not on file   Social History Narrative    Not on file     Social Determinants of Health     Financial Resource Strain: Not on file   Food Insecurity: Not on file   Transportation Needs: Not on file   Physical Activity: Not on file   Stress: Not on file   Social Connections: Not on file   Intimate Partner Violence: Not on file   Housing Stability: Not on file       FamHx:  Family History   Problem Relation Age of Onset    Heart Disease Mother     High Blood Pressure Mother     Cancer Mother         ovarian    Cancer Father     Heart Disease Father     High Blood Pressure Father     Kidney Disease Father     Diabetes Sister     Breast Cancer Sister     Diabetes Brother        Allergy:  Allergies   Allergen Reactions    Ibuprofen Palpitations and Rash         ROS: As described in the HPI and in addition is negative upon detailed review of systems or unobtainable unless otherwise stated in this dictation. PE:  /67   Pulse 86   Temp 98.8 °F (37.1 °C) (Oral)   Resp 18   Ht 5' 1\" (1.549 m)   Wt 114 lb 3.2 oz (51.8 kg)   SpO2 97%   BMI 21.58 kg/m²     Gen.: NAD/thin looking -American female. AAOx3  Head: Atraumatic/normocephalic  Eyes: Anicteric sclera/no conjunctival erythema  ENT: Moist oral mucosa/no discharge from nose ears  Neck: Supple/trachea midline  Chest: Symmetric excursion/nonlabored respirations. CTA B  Cor: Regular/S1-S2  Abd.: Soft/appears nontender.   Mildly to moderately distended  Extr.:  No significant peripheral edema  Muscles: Decreased tone and bulk, consistent with age and condition  Skin: Warm and dry      DATA:     Lab Results   Component Value Date/Time    WBC 8.9 12/16/2022 03:52 AM    RBC 2.36 12/16/2022 03:52 AM    HGB 6.5 12/16/2022 03:52 AM    HCT 20.5 12/16/2022 03:52 AM    MCV 86.9 12/16/2022 03:52 AM    MCH 27.5 12/16/2022 03:52 AM    MCHC 31.7 12/16/2022 03:52 AM    RDW 17.3 12/16/2022 03:52 AM     12/16/2022 03:52 AM    MPV 9.9 12/16/2022 03:52 AM     Lab Results   Component Value Date/Time     12/16/2022 03:52 AM    K 5.0 12/16/2022 03:52 AM     12/16/2022 03:52 AM CO2 19 12/16/2022 03:52 AM    BUN 26 12/16/2022 03:52 AM    CREATININE 1.1 12/16/2022 03:52 AM    CALCIUM 8.8 12/16/2022 03:52 AM    PROT 6.7 12/15/2022 08:55 AM    LABALBU 2.2 12/15/2022 08:55 AM    LABALBU 4.2 04/10/2012 12:28 PM    BILITOT 0.2 12/15/2022 08:55 AM    ALKPHOS 196 12/15/2022 08:55 AM    AST 24 12/15/2022 08:55 AM    ALT 10 12/15/2022 08:55 AM     Lab Results   Component Value Date/Time    LIPASE 124 11/23/2022 01:45 AM     No results found for: AMYLASE      ASSESSMENT/PLAN:  Patient Active Problem List   Diagnosis    Hepatitis C    Abnormal Pap smear    Dyslipidemia    Insomnia    Ex-smoker    PVD (peripheral vascular disease) (HCC)    Uterine fibroid    Gall stone    Kidney stone    Left groin pain    Need for Tdap vaccination    Abnormal chest x-ray with multiple lung nodules    Hypertension    Panlobular emphysema (HCC)    Chronic hepatitis C virus infection ([address]    Malignant neoplasm of upper lobe of right lung (HCC)    Malignant neoplasm of lung (HCC)    Leukocytosis    Anemia    Essential hypertension    Thrombocytopenia (HCC)    Malignant neoplasm of upper lobe of left lung (HCC)    History of lobectomy of lung    Adenocarcinoma, lung (HCC)    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)    Hep C w/o coma, chronic (HCC)    Hyperlipidemia    Warfarin anticoagulation    Bilateral lung cancer ([address]    Pulmonary embolism without acute cor pulmonale (HCC)    Port-A-Cath in place    Acute cholecystitis    History of lung cancer    RBBB    Adenomatous polyp of descending colon    Abdominal pain    Ascites of liver    Severe protein-calorie malnutrition (HCC)    Intractable pain    Goals of care, counseling/discussion    Palliative care encounter    Mass of right axilla    Subareolar mass of right breast    Pleural effusion, right    Pleural effusion    Loculated pleural effusion    Subcutaneous nodule    Metastatic cancer to chest wall (HCC)    Breast lesion     1.   Cirrhosis  -Previously positive hepatitis C antibody  -Viral load noted to be 5.57 international logarithmic unit per milliliter on 4 November of this year -genotype 1a or Ib reported  -Nonelevated AFP on 23 November of this year  -Patient will require further follow-up in the office for possible treatment -see discussion below  -Patient will require further evaluation with upper endoscopy -see discussion below     2. Ascites  -Likely multifactorial and related to peritoneal involvement from advanced cancer/cirrhosis  - previously paracentesis yielded ascites with albumin of 1.7 at the time when serum albumin was 2.2 calculating ascitic gradient at 0.5 indicating likely alternative cause for patient's ascites -suspect malignant ascites as above  -Defer diuretics to admitting service given renal failure  -Patient can undergo serial paracentesis depending on rate of accumulation of ascites which can be set as outpatient     3. Advanced malignancy  -Patient follows with Dr. Claudene Minor  -Poor prognosis  -Defer further discussion regarding goals of care and CODE STATUS to admitting/oncology    4. Anemia  -Acute on chronic  -Iron deficient  -no evidence of overt bleed  -Patient require further evaluation with EGD and colonoscopy depending on H&H dynamics and goals of care  -Start twice a day PPI  -Given history of cirrhosis start octreotide  -Provide IV iron supplementation  -Monitor H&H      Above has been discussed with patient and all questions answered to her satisfaction. She verbalized understanding and agreement with the plan as delineated including plan for possible endoscopy. Thank you for the opportunity to see this patient in consultation       Rajeev Anna MD  12/16/2022  5:04 PM    NOTE:  This report was transcribed using voice recognition software. Every effort was made to ensure accuracy; however, inadvertent computerized transcription errors may be present.

## 2022-12-16 NOTE — PROGRESS NOTES
Consult received, pt known to Dr. Hiral Singletary, just seen last month. Will defer consult to them. Thank you, please call again if needed.     VERITO Iyer - CNP 12/16/2022 8:47 AM

## 2022-12-17 ENCOUNTER — APPOINTMENT (OUTPATIENT)
Dept: ULTRASOUND IMAGING | Age: 72
DRG: 187 | End: 2022-12-17
Payer: MEDICARE

## 2022-12-17 LAB
ALBUMIN SERPL-MCNC: 2.1 G/DL (ref 3.5–5.2)
ALP BLD-CCNC: 819 U/L (ref 35–104)
ALT SERPL-CCNC: 24 U/L (ref 0–32)
AMMONIA: 19.2 UMOL/L (ref 11–51)
ANION GAP SERPL CALCULATED.3IONS-SCNC: 9 MMOL/L (ref 7–16)
AST SERPL-CCNC: 59 U/L (ref 0–31)
BASOPHILS ABSOLUTE: 0.05 E9/L (ref 0–0.2)
BASOPHILS RELATIVE PERCENT: 0.5 % (ref 0–2)
BILIRUB SERPL-MCNC: 0.3 MG/DL (ref 0–1.2)
BUN BLDV-MCNC: 24 MG/DL (ref 6–23)
CALCIUM SERPL-MCNC: 8.7 MG/DL (ref 8.6–10.2)
CHLORIDE BLD-SCNC: 108 MMOL/L (ref 98–107)
CO2: 19 MMOL/L (ref 22–29)
CREAT SERPL-MCNC: 1 MG/DL (ref 0.5–1)
EOSINOPHILS ABSOLUTE: 0.11 E9/L (ref 0.05–0.5)
EOSINOPHILS RELATIVE PERCENT: 1.1 % (ref 0–6)
GFR SERPL CREATININE-BSD FRML MDRD: 60 ML/MIN/1.73
GLUCOSE BLD-MCNC: 106 MG/DL (ref 74–99)
HCT VFR BLD CALC: 28.2 % (ref 34–48)
HCT VFR BLD CALC: 28.5 % (ref 34–48)
HEMOGLOBIN: 8.7 G/DL (ref 11.5–15.5)
HEMOGLOBIN: 9 G/DL (ref 11.5–15.5)
IMMATURE GRANULOCYTES #: 0.17 E9/L
IMMATURE GRANULOCYTES %: 1.6 % (ref 0–5)
INR BLD: 1.1
LYMPHOCYTES ABSOLUTE: 0.65 E9/L (ref 1.5–4)
LYMPHOCYTES RELATIVE PERCENT: 6.2 % (ref 20–42)
MCH RBC QN AUTO: 27.1 PG (ref 26–35)
MCHC RBC AUTO-ENTMCNC: 30.9 % (ref 32–34.5)
MCV RBC AUTO: 87.9 FL (ref 80–99.9)
MONOCYTES ABSOLUTE: 0.75 E9/L (ref 0.1–0.95)
MONOCYTES RELATIVE PERCENT: 7.2 % (ref 2–12)
NEUTROPHILS ABSOLUTE: 8.71 E9/L (ref 1.8–7.3)
NEUTROPHILS RELATIVE PERCENT: 83.4 % (ref 43–80)
PDW BLD-RTO: 16.6 FL (ref 11.5–15)
PLATELET # BLD: 333 E9/L (ref 130–450)
PMV BLD AUTO: 10.3 FL (ref 7–12)
POTASSIUM REFLEX MAGNESIUM: 4.5 MMOL/L (ref 3.5–5)
PROTHROMBIN TIME: 12 SEC (ref 9.3–12.4)
RBC # BLD: 3.21 E12/L (ref 3.5–5.5)
SODIUM BLD-SCNC: 136 MMOL/L (ref 132–146)
TOTAL PROTEIN: 6.7 G/DL (ref 6.4–8.3)
WBC # BLD: 10.4 E9/L (ref 4.5–11.5)

## 2022-12-17 PROCEDURE — 6370000000 HC RX 637 (ALT 250 FOR IP): Performed by: FAMILY MEDICINE

## 2022-12-17 PROCEDURE — 6360000002 HC RX W HCPCS: Performed by: HOSPITALIST

## 2022-12-17 PROCEDURE — 2060000000 HC ICU INTERMEDIATE R&B

## 2022-12-17 PROCEDURE — 36415 COLL VENOUS BLD VENIPUNCTURE: CPT

## 2022-12-17 PROCEDURE — A4216 STERILE WATER/SALINE, 10 ML: HCPCS | Performed by: HOSPITALIST

## 2022-12-17 PROCEDURE — 82140 ASSAY OF AMMONIA: CPT

## 2022-12-17 PROCEDURE — 76705 ECHO EXAM OF ABDOMEN: CPT

## 2022-12-17 PROCEDURE — 85018 HEMOGLOBIN: CPT

## 2022-12-17 PROCEDURE — C9113 INJ PANTOPRAZOLE SODIUM, VIA: HCPCS | Performed by: HOSPITALIST

## 2022-12-17 PROCEDURE — 80053 COMPREHEN METABOLIC PANEL: CPT

## 2022-12-17 PROCEDURE — 85014 HEMATOCRIT: CPT

## 2022-12-17 PROCEDURE — 99232 SBSQ HOSP IP/OBS MODERATE 35: CPT | Performed by: INTERNAL MEDICINE

## 2022-12-17 PROCEDURE — 6370000000 HC RX 637 (ALT 250 FOR IP): Performed by: NURSE PRACTITIONER

## 2022-12-17 PROCEDURE — 99232 SBSQ HOSP IP/OBS MODERATE 35: CPT | Performed by: NURSE PRACTITIONER

## 2022-12-17 PROCEDURE — 85025 COMPLETE CBC W/AUTO DIFF WBC: CPT

## 2022-12-17 PROCEDURE — 93975 VASCULAR STUDY: CPT

## 2022-12-17 PROCEDURE — 6370000000 HC RX 637 (ALT 250 FOR IP)

## 2022-12-17 PROCEDURE — 85610 PROTHROMBIN TIME: CPT

## 2022-12-17 PROCEDURE — 94640 AIRWAY INHALATION TREATMENT: CPT

## 2022-12-17 PROCEDURE — 2580000003 HC RX 258: Performed by: HOSPITALIST

## 2022-12-17 RX ORDER — ALBUMIN (HUMAN) 12.5 G/50ML
25 SOLUTION INTRAVENOUS
Status: ACTIVE | OUTPATIENT
Start: 2022-12-17 | End: 2022-12-18

## 2022-12-17 RX ADMIN — IPRATROPIUM BROMIDE AND ALBUTEROL SULFATE 1 AMPULE: 2.5; .5 SOLUTION RESPIRATORY (INHALATION) at 20:29

## 2022-12-17 RX ADMIN — PETROLATUM: 420 OINTMENT TOPICAL at 12:09

## 2022-12-17 RX ADMIN — IPRATROPIUM BROMIDE AND ALBUTEROL SULFATE 1 AMPULE: 2.5; .5 SOLUTION RESPIRATORY (INHALATION) at 13:12

## 2022-12-17 RX ADMIN — ESCITALOPRAM OXALATE 5 MG: 10 TABLET ORAL at 08:27

## 2022-12-17 RX ADMIN — OXYCODONE 10 MG: 5 TABLET ORAL at 10:42

## 2022-12-17 RX ADMIN — IPRATROPIUM BROMIDE AND ALBUTEROL SULFATE 1 AMPULE: 2.5; .5 SOLUTION RESPIRATORY (INHALATION) at 16:17

## 2022-12-17 RX ADMIN — SODIUM CHLORIDE, PRESERVATIVE FREE 40 MG: 5 INJECTION INTRAVENOUS at 17:06

## 2022-12-17 RX ADMIN — IPRATROPIUM BROMIDE AND ALBUTEROL SULFATE 1 AMPULE: 2.5; .5 SOLUTION RESPIRATORY (INHALATION) at 09:20

## 2022-12-17 RX ADMIN — IRON SUCROSE 100 MG: 20 INJECTION, SOLUTION INTRAVENOUS at 17:06

## 2022-12-17 RX ADMIN — SODIUM CHLORIDE, PRESERVATIVE FREE 40 MG: 5 INJECTION INTRAVENOUS at 04:25

## 2022-12-17 RX ADMIN — AMLODIPINE BESYLATE 5 MG: 5 TABLET ORAL at 08:27

## 2022-12-17 RX ADMIN — OCTREOTIDE ACETATE 50 MCG/HR: 500 INJECTION, SOLUTION INTRAVENOUS; SUBCUTANEOUS at 04:22

## 2022-12-17 RX ADMIN — OXYCODONE 10 MG: 5 TABLET ORAL at 04:20

## 2022-12-17 RX ADMIN — OCTREOTIDE ACETATE 50 MCG/HR: 500 INJECTION, SOLUTION INTRAVENOUS; SUBCUTANEOUS at 18:07

## 2022-12-17 ASSESSMENT — PAIN SCALES - GENERAL
PAINLEVEL_OUTOF10: 7
PAINLEVEL_OUTOF10: 0
PAINLEVEL_OUTOF10: 9

## 2022-12-17 ASSESSMENT — PAIN DESCRIPTION - DESCRIPTORS: DESCRIPTORS: ACHING;DISCOMFORT;SORE

## 2022-12-17 ASSESSMENT — PAIN DESCRIPTION - LOCATION: LOCATION: GENERALIZED

## 2022-12-17 NOTE — CONSULTS
Comprehensive Nutrition Assessment    Type and Reason for Visit:  Consult, Reassess    Nutrition Recommendations/Plan:   Continue current diet and ONS, as tolerated  Continue inpatient monitoring     Malnutrition Assessment:  Malnutrition Status: Moderate malnutrition (12/16/22 1736)    Context:  Chronic Illness     Findings of the 6 clinical characteristics of malnutrition:  Energy Intake:  75% or less estimated energy requirements for 1 month or longer  Weight Loss:  Unable to assess (d/t ascites)     Body Fat Loss:  Mild body fat loss Triceps, Orbital   Muscle Mass Loss:  Mild muscle mass loss Clavicles (pectoralis & deltoids), Scapula (trapezius)  Fluid Accumulation:  No significant fluid accumulation     Strength:  Not Performed    Nutrition Assessment:    Pt seen 12/16 by another dietitian, who completed a full nutrition assessment. Chocolate Ensure Compact (4 oz ONS) was started at that time, which may be better tolerated in the setting of ascites/cirrhosis and adv cancer promoting early satiety. 12/16 s/p attempt at PleurX drainage but unable d/t loculation. Thoracentesis or paracentesis may be done. Will continue inpatient monitoring for status changes and tolerance. Nutrition Related Findings:    A&Ox2, LFT elevated/ammonia WNL, distended nontender abd +BS, fair appetite reported, edentulous but pt's  decline texture change, trace edema Wound Type: Surgical Incision       Current Nutrition Intake & Therapies:    Average Meal Intake: Unable to assess  Average Supplements Intake: Unable to assess  ADULT DIET; Regular  ADULT ORAL NUTRITION SUPPLEMENT; Lunch, Dinner; Standard 4 oz Oral Supplement    Anthropometric Measures:  Height: 5' 1\" (154.9 cm)  Ideal Body Weight (IBW): 105 lbs (48 kg)    Admission Body Weight: 103 lb 8 oz (46.9 kg) (12/7 before adm-actual wt)  Current Body Weight: 117 lb 1 oz (53.1 kg) (wt gain with fluid), 108.8 % IBW.  Weight Source: Bed Scale (12/17)  Current BMI (kg/m2): 22.1  Usual Body Weight: 121 lb 9.6 oz (55.2 kg) (9/28/22)  % Weight Change (Calculated): -6.1  Weight Adjustment For: No Adjustment                 BMI Categories: Underweight (BMI less than 22) age over 72    Estimated Daily Nutrient Needs:  Energy Requirements Based On: Kcal/kg (Veda Billow)  Weight Used for Energy Requirements: Admission  Energy (kcal/day):  (30-32 kcal/kg)  Weight Used for Protein Requirements: Admission  Protein (g/day): 60-70 (1.2-1.4 g/kg as nelly)  Method Used for Fluid Requirements: 1 ml/kcal  Fluid (ml/day):     Nutrition Diagnosis:   Moderate malnutrition, In context of chronic illness related to catabolic illness as evidenced by Criteria as identified in malnutrition assessment    Nutrition Interventions:   Food and/or Nutrient Delivery: Continue Current Diet, Continue Oral Nutrition Supplement  Nutrition Education/Counseling: No recommendation at this time  Coordination of Nutrition Care: Continue to monitor while inpatient       Goals:  Previous Goal Met:  (FISH as ONS just started x 1 meal 12/16 at this time)  Goals: PO intake 50% or greater, by next RD assessment       Nutrition Monitoring and Evaluation:   Behavioral-Environmental Outcomes: None Identified  Food/Nutrient Intake Outcomes: Food and Nutrient Intake, Supplement Intake  Physical Signs/Symptoms Outcomes: Biochemical Data, GI Status, Fluid Status or Edema, Nutrition Focused Physical Findings, Skin, Weight    Discharge Planning:    Continue Oral Nutrition Supplement, Continue current diet     Nilda Montero RD, CNSC, LD  Contact: [phone]

## 2022-12-17 NOTE — HOME CARE
Patient current with [facility] for SN,PT/OT/MSW. Will need ANISHA orders if appropriate at discharge. Nico Allen LPN  [facility].

## 2022-12-17 NOTE — PROGRESS NOTES
Name:  Nikita Guevara  :  1950  MRN:  [id number]  Room:  [address] [place] [address]  DOS:  2022    [facility]  [facility]  Dr. John Read M.D. Dr. Benna Skiff, M.D. Dr. Radha Sifuentes D.O. Dr. Richelle Nicholson M.D. Dr. Braeden Walker D.O.    -NP Progress Note-    PCP:  Sravanthi Sprague DO  Admitting Physician:  Carson Bryant DO  Chief Complaint:    Chief Complaint   Patient presents with    Shortness of Breath     Recent dx of lung CA, (right lung) chronic pleural effusion in R light (x1 month) last tapped today and got 500ml of fluid. Per medics pt was in the 80's at home on RA, placed on 4L        Subjective  Patient resting in bed. Complains of pain in the right ribs. Tolerated a small amount of breakfast this morning. Denies nausea. Reports some constipation, most recent bowel movement about 2 days ago.     Physical Examination  Vitals:  BP (!) 173/82   Pulse 82   Temp 98.1 °F (36.7 °C) (Oral)   Resp 16   Ht 5' 1\" (1.549 m)   Wt 117 lb 1 oz (53.1 kg)   SpO2 98%   BMI 22.12 kg/m²   General Appearance:  awake, alert, and oriented; appears stated age and cooperative; no apparent distress no labored breathing  HEENT:  PERRL; EOMI; sclera clear; buccal mucosa moist  Neck:  supple; trachea midline; no thyromegaly; no JVD; no bruits  Heart:  rhythm regular; rate controlled; no murmurs  Lungs:  symmetrical; clear to auscultation bilaterally; no wheezes; no rhonchi; no rales  Abdomen: Taut, non-tender currently, distended; bowel sounds positive; no organomegaly or masses  Extremities:  peripheral pulses present; no peripheral edema; no ulcers, decreased muscle tone and bulk  Neurologic:  alert and oriented x 3; no focal deficit; cranial nerves grossly intact  Skin:  no petechia; no hemorrhage; no wounds    Medications  Scheduled Meds    pantoprazole (PROTONIX) 40 mg injection  40 mg IntraVENous Q12H    iron sucrose  100 mg IntraVENous Q24H    sodium chloride flush  5-40 mL IntraVENous 2 times per day    amLODIPine  5 mg Oral Daily    ipratropium-albuterol  1 ampule Inhalation Q4H WA    escitalopram  5 mg Oral Daily     Infusion Meds    sodium chloride      octreotide (SandoSTATIN) infusion 50 mcg/hr (12/17/22 0422)    sodium chloride       PRN Meds sodium chloride, sodium chloride flush, sodium chloride, ondansetron **OR** ondansetron, acetaminophen **OR** acetaminophen, polyethylene glycol, oxyCODONE    Laboratory Data  Recent Results (from the past 24 hour(s))   Hemoglobin and Hematocrit    Collection Time: 12/16/22  4:47 PM   Result Value Ref Range    Hemoglobin 8.8 (L) 11.5 - 15.5 g/dL    Hematocrit 27.2 (L) 34.0 - 48.0 %   CBC with Auto Differential    Collection Time: 12/17/22  4:25 AM   Result Value Ref Range    WBC 10.4 4.5 - 11.5 E9/L    RBC 3.21 (L) 3.50 - 5.50 E12/L    Hemoglobin 8.7 (L) 11.5 - 15.5 g/dL    Hematocrit 28.2 (L) 34.0 - 48.0 %    MCV 87.9 80.0 - 99.9 fL    MCH 27.1 26.0 - 35.0 pg    MCHC 30.9 (L) 32.0 - 34.5 %    RDW 16.6 (H) 11.5 - 15.0 fL    Platelets 701 816 - 770 E9/L    MPV 10.3 7.0 - 12.0 fL    Neutrophils % 83.4 (H) 43.0 - 80.0 %    Immature Granulocytes % 1.6 0.0 - 5.0 %    Lymphocytes % 6.2 (L) 20.0 - 42.0 %    Monocytes % 7.2 2.0 - 12.0 %    Eosinophils % 1.1 0.0 - 6.0 %    Basophils % 0.5 0.0 - 2.0 %    Neutrophils Absolute 8.71 (H) 1.80 - 7.30 E9/L    Immature Granulocytes # 0.17 E9/L    Lymphocytes Absolute 0.65 (L) 1.50 - 4.00 E9/L    Monocytes Absolute 0.75 0.10 - 0.95 E9/L    Eosinophils Absolute 0.11 0.05 - 0.50 E9/L    Basophils Absolute 0.05 0.00 - 0.20 I5/H   Basic Metabolic Panel w/ Reflex to MG    Collection Time: 12/17/22  4:25 AM   Result Value Ref Range    Sodium 136 132 - 146 mmol/L    Potassium reflex Magnesium 4.5 3.5 - 5.0 mmol/L    Chloride 108 (H) 98 - 107 mmol/L    CO2 19 (L) 22 - 29 mmol/L    Anion Gap 9 7 - 16 mmol/L    Glucose 106 (H) 74 - 99 mg/dL    BUN 24 (H) 6 - 23 mg/dL    Creatinine 1.0 0.5 - 1.0 mg/dL    Est, Glom Filt Rate 60 >=60 mL/min/1.73    Calcium 8.7 8.6 - 10.2 mg/dL   Protime-INR    Collection Time: 12/17/22  4:25 AM   Result Value Ref Range    Protime 12.0 9.3 - 12.4 sec    INR 1.1    Comprehensive Metabolic Panel    Collection Time: 12/17/22  4:25 AM   Result Value Ref Range    Total Protein 6.7 6.4 - 8.3 g/dL    Albumin 2.1 (L) 3.5 - 5.2 g/dL    Total Bilirubin 0.3 0.0 - 1.2 mg/dL    Alkaline Phosphatase 819 (H) 35 - 104 U/L    ALT 24 0 - 32 U/L    AST 59 (H) 0 - 31 U/L   Ammonia    Collection Time: 12/17/22  4:25 AM   Result Value Ref Range    Ammonia 19.2 11.0 - 51.0 umol/L       Imaging  CT CHEST W CONTRAST    Result Date: 12/7/2022  EXAMINATION: CT OF THE CHEST WITH CONTRAST 12/7/2022 4:32 pm TECHNIQUE: CT of the chest was performed with the administration of intravenous contrast. Multiplanar reformatted images are provided for review. Automated exposure control, iterative reconstruction, and/or weight based adjustment of the mA/kV was utilized to reduce the radiation dose to as low as reasonably achievable. COMPARISON: CT abdomen dated 11/22/2022 HISTORY: ORDERING SYSTEM PROVIDED HISTORY: right shoulder mass, hx lung cancer TECHNOLOGIST PROVIDED HISTORY: Reason for exam:->right shoulder mass, hx lung cancer Decision Support Exception - unselect if not a suspected or confirmed emergency medical condition->Emergency Medical Condition (MA) What reading provider will be dictating this exam?->CRC FINDINGS: Mediastinum: Thyroid gland is normal.  No evidence of bulky mediastinal adenopathy. No evidence of hilar adenopathy. Aorta is nonaneurysmal. Pulmonary artery is normal in size. No evidence of filling defects in the central pulmonary arteries to suggest pulmonary embolism. Lungs/pleura: Enhancing masses are seen involving the pleural space and chest wall in the right hemithorax. There is a 2.4 cm peripherally enhancing mass in the medial anterior right chest on image 48 series 301.   Other smaller dependent mural nodules are noted.  A large right pleural effusion is noted with atelectasis of the right lower lobe and right middle lobe. 2.5 cm low right infrahilar parenchymal low-density compatible with soft tissue mass. Best seen on image number 68 series 301. Upper Abdomen: Visualized portions of the liver appear unremarkable. Unchanged probable old splenic infarct or injury in the mid splenic body. Stomach has a normal configuration. The right adrenal gland is normal, left not included on the study. There is mild to moderate upper abdominal ascites. Soft Tissues/Bones: There is a peripherally enhancing 2.7 x 1.8 cm subcutaneous soft tissue mass overlying the trapezius muscle in the posterior right shoulder region likely representing a subcutaneous metastasis. No other subcutaneous lesions identified. Sclerotic focus along the inferior T2 vertebral body could represent bone island or sclerotic metastasis. No other osseous lesions detected. 1.  2.7 x 1.8 cm peripherally enhancing subcutaneous mass in the posterior right shoulder region. This likely represents a subcutaneous metastasis. No other masses related to the right shoulder region. 2.  Large right effusion, atelectasis of the right lower lobe, pleural and chest wall metastases on the right. 3.  Mild to moderate upper abdominal ascites. CT ABDOMEN PELVIS W IV CONTRAST Additional Contrast? None    Result Date: 11/22/2022  EXAMINATION: CT OF THE ABDOMEN AND PELVIS WITH GZULWUDM49/21/2022 11:30 pm TECHNIQUE: CT of the abdomen and pelvis was performed with the administration of intravenous contrast. Multiplanar reformatted images are provided for review. Automated exposure control, iterative reconstruction, and/or weight based adjustment of the mA/kV was utilized to reduce the radiation dose to as low as reasonably achievable.  COMPARISON: October 31, 2022 HISTORY: ORDERING SYSTEM PROVIDED HISTORY: abd pain TECHNOLOGIST PROVIDED HISTORY: Reason for exam:->abd pain Additional Contrast?->None Decision Support Exception - unselect if not a suspected or confirmed emergency medical condition->Emergency Medical Condition (MA) FINDINGS: THORACIC STRUCTURES: There is a moderate right basilar pleural fluid collection with atelectasis of the right lower lobe. LIVER:  The liver is normal in size, slight nodularity and normal attenuation. No focal mass. No intra or extrahepatic bile duct dilation. GALL BLADDER: Cholecystectomy. SPLEEN:  Stable hypodense region with parenchymal atrophy of the mid spleen. PANCREAS:  Unremarkable. ADRENAL GLANDS:  Unremarkable. ESOPHAGUS AND STOMACH:  Unremarkable. BOWEL: Small bowel:  Unremarkable. Large bowel: The colon and rectum are of normal course and caliber. The appendix is within normal limits. There is no intraperitoneal free air. There is intra-abdominal ascites. URINARY/GENITAL TRACT: Kidneys:  The kidneys are unremarkable. .  No evidence of hydronephrosis, renal calcifications or solid renal mass. Ureters: The ureters are normal course and caliber. There is no evidence of ureter calculus/calculi. URINARY BLADDER:  The urinary bladder is well distended without wall thickening or focal mass. Extensive calcification of uterine fibroids. BLOOD VESSELS: There is atherosclerotic disease. LYMPH NODES:  No evidence of intraabdominal or intrapelvic lymphadenopathy. ABDOMINAL WALL & SOFT TISSUES:  Unremarkable. OSSEOUS STRUCTURES: No acute osseous lesion. Moderate right basilar pleural fluid collection with atelectasis of the right lower lobe. Slight nodularity of the liver concerning for early cirrhotic morphological change. Stable hypodense region within the spleen with parenchymal atrophy likely related to old splenic infarction or old splenic injury. Large amount of intra-abdominal ascites, unchanged from the prior study. Extensive calcification of uterine fibroids. Atherosclerotic disease. .     XR CHEST PORTABLE    Result Date: 12/14/2022  EXAMINATION: ONE XRAY VIEW OF THE CHEST 12/14/2022 9:08 pm COMPARISON: December 12, 2022 HISTORY: ORDERING SYSTEM PROVIDED HISTORY: right effusion, sob TECHNOLOGIST PROVIDED HISTORY: Reason for exam:->right effusion, sob FINDINGS: Hazy opacities throughout the right lung notable along peripheral aspect of right lung and right costophrenic sulcus. Right-sided chest tube is present. No obvious pneumothorax. The heart is normal in size. 1. Redemonstration of hazy opacities throughout right lung notable suggesting persistent right pleural effusion. 2. Redemonstration of right chest tube. 3. No evidence of pneumothorax. XR CHEST PORTABLE    Result Date: 12/12/2022  EXAMINATION: ONE XRAY VIEW OF THE CHEST 12/12/2022 6:47 am COMPARISON: Chest CT 7 December 2022 and chest radiograph 21 November 2022 HISTORY: ORDERING SYSTEM PROVIDED HISTORY: f/u pleural effusion TECHNOLOGIST PROVIDED HISTORY: Reason for exam:->f/u pleural effusion What reading provider will be dictating this exam?->CRC FINDINGS: Newly placed right chest tube. There is layering right pleural effusion with adjacent atelectasis and or infiltrate. Aeration on the right appears modestly worse. Mildly worsening aeration on the right. Newly placed right chest tube. XR CHEST PORTABLE    Result Date: 11/21/2022  EXAMINATION: ONE XRAY VIEW OF THE CHEST 11/21/2022 7:35 pm COMPARISON: None. HISTORY: ORDERING SYSTEM PROVIDED HISTORY: abd pain TECHNOLOGIST PROVIDED HISTORY: Reason for exam:->abd pain FINDINGS: Hazy density over the right hemithorax. The heart is not enlarged. No pneumothorax. Elevated right hemidiaphragm. Hazy density over the right hemithorax likely due to layering right pleural effusion. Superimposed infiltrates cannot be excluded. Elevated right hemidiaphragm.      CTA PULMONARY W CONTRAST    Result Date: 12/15/2022  EXAMINATION: CTA OF THE CHEST 12/14/2022 10:05 pm TECHNIQUE: CTA of the chest was performed after the administration of intravenous contrast.  Multiplanar reformatted images are provided for review. MIP images are provided for review. Automated exposure control, iterative reconstruction, and/or weight based adjustment of the mA/kV was utilized to reduce the radiation dose to as low as reasonably achievable. COMPARISON: None. HISTORY: ORDERING SYSTEM PROVIDED HISTORY: sob rule out PE TECHNOLOGIST PROVIDED HISTORY: Reason for exam:->sob rule out PE Decision Support Exception - unselect if not a suspected or confirmed emergency medical condition->Emergency Medical Condition (MA) FINDINGS: Pulmonary Arteries: Pulmonary arteries are adequately opacified for evaluation. No evidence of intraluminal filling defect to suggest pulmonary embolism. Main pulmonary artery is normal in caliber. Mediastinum: No evidence of mediastinal lymphadenopathy. The heart is mildly enlarged. The pericardium demonstrates no acute abnormality. There is no acute abnormality of the thoracic aorta. Lungs/pleura: There is a large loculated right pleural effusion with associated posterior right lower lobe compressive atelectasis. There is indwelling right hemithorax chest tube which courses posteriorly with tip terminating near the mediastinum. No pulmonary edema. No pneumothorax. On axial image 32, there is indeterminate round solid nodule in posterior right upper lobe, abutting the major fissure. On axial image 56, there is indeterminate 9 mm ground-glass opacity in periphery of right lower lobe. On axial image 77, there is indeterminate 7.7 mm subpleural nodule localized in posterior left lower lobe base. There are a few other scattered subcentimeter nodules in the periphery of left lower lobe near the base. Upper Abdomen: There is intraabdominal ascites which is perihepatic  and perisplenic. Soft Tissues/Bones: No acute bone or soft tissue abnormality. No thyromegaly.      No convincing evidence for acute pulmonary thromboembolism. Loculated large right pleural effusion with indwelling right hemithorax drainage catheter. Posterior right lung base compressive atelectasis. Multiple indeterminate subcentimeter nodules, as detailed above. LUNG-RADS 3, PROBABLY BENIGN; advise follow up to document stability 6 months. Ascites. US GUIDED PARACENTESIS    Result Date: 11/22/2022  PROCEDURE: PARACENTESIS WITHOUT IMAGE GUIDANCE US ABDOMEN LIMITED 11/22/2022 HISTORY: ORDERING SYSTEM PROVIDED HISTORY: diagnostic and therapeutic ascites tap-- send for cx, +cytology, cell count TECHNOLOGIST PROVIDED HISTORY: Reason for exam:->diagnostic and therapeutic ascites tap-- send for cx, +cytology, cell count What reading provider will be dictating this exam?->CRC TECHNIQUE: Informed consent was obtained after a detailed explanation of the procedure including risks, benefits, and alternatives. Universal protocol was followed. A limited ultrasound of the abdomen was performed. The right abdomen was prepped and draped in sterile fashion and local anesthesia was achieved with lidocaine. A 5 Peruvian needle sheath was advanced into ascites under continuous ultrasound guidance and paracentesis was performed. The patient tolerated the procedure well. FINDINGS: Limited ultrasound of the abdomen demonstrates ascites. A total of 3050 cc of straw-colored ascitic fluid was removed. Status post paracentesis. US BREAST LIMITED RIGHT    Result Date: 11/17/2022  EXAMINATION: TARGETED ULTRASOUND OF THE RIGHT BREAST 11/17/2022 COMPARISON: Multiple prior studies, the most recent dated October 31, 2022. HISTORY: ORDERING SYSTEM PROVIDED HISTORY: Abnormal CT of the abdomen TECHNOLOGIST PROVIDED HISTORY: Reason for exam:->hyperdense nodule in the right subareolar region FINDINGS: Targeted right breast ultrasound was performed utilizing high-resolution, real-time scanning. The right nipple is inverted and thickened.   No suspicious cystic or solid mass identified in the right breast retroareolar region. Inverted right nipple is suspicious. Recommendation: Punch biopsy of the right nipple is advised. BIRADS: BIRADS - CATEGORY 4 Suspicious Abnormality. Biopsy should be considered at this time. OVERALL ASSESSMENT - SUSPICIOUS A letter of notification will be sent to the patient regarding the results. IR ABSCESS DRAINAGE PERC    Result Date: 2022  Patient MRN:  [id number] : 1950 Age: 71 years Gender: Female Order Date:  2022 4:20 PM EXAM: IR ABSCESS DRAINAGE PERC, IR INSERT TUNNELED PLEURA CATH W CUFF NUMBER OF IMAGES:  4 INDICATION:  pleurx pleurx What reading provider will be dictating this exam?->[facility] COMPARISON: None After obtaining informed consent and following the routine sterile prep and draped a needle was inserted in the chest under ultrasound guidance and serous fluid was obtained. .After obtaining informed consent and after routine sterile prep and drape and after administration of a local anesthesia, a system of needles, and cannulas was guided by ultrasound control into the pleural cavity. The needle was visualized directly under ultrasound while placing the needle within the pleural space. An image of the needle and positioned was stored in PACS. Subsequently the tract was dilated. Subsequently a tunnel was created to a more caudal incision following local anesthetic and subsequently a catheter was tunneled from the first incision to the second incision and advanced through a dilated tract into the chest. Subsequently the catheter was sutured in position. The patient tolerated the procedure well. No complications. Time out occurred at 1643 . 11 seconds of fluoroscopy was used. Successful uncomplicated placement of a tunneled chest tube.      IR INSERT TUNNELED PLEURA CATH W CUFF    Result Date: 2022  Patient MRN:  [id number] : 1950 Age: 71 years Gender: Female Order Date:  2022 4:20 PM EXAM: IR ABSCESS DRAINAGE PERC, IR INSERT TUNNELED PLEURA CATH W CUFF NUMBER OF IMAGES:  4 INDICATION:  pleurx pleurx What reading provider will be dictating this exam?->[facility] COMPARISON: None After obtaining informed consent and following the routine sterile prep and draped a needle was inserted in the chest under ultrasound guidance and serous fluid was obtained. .After obtaining informed consent and after routine sterile prep and drape and after administration of a local anesthesia, a system of needles, and cannulas was guided by ultrasound control into the pleural cavity. The needle was visualized directly under ultrasound while placing the needle within the pleural space. An image of the needle and positioned was stored in PACS. Subsequently the tract was dilated. Subsequently a tunnel was created to a more caudal incision following local anesthetic and subsequently a catheter was tunneled from the first incision to the second incision and advanced through a dilated tract into the chest. Subsequently the catheter was sutured in position. The patient tolerated the procedure well. No complications. Time out occurred at 1643 . 11 seconds of fluoroscopy was used. Successful uncomplicated placement of a tunneled chest tube. Assessment and Plan    1. Cirrhosis / HCV  -Previously positive hepatitis C antibody  -Viral load 638k (11/4/2022)   -genotype 1a or Ib reported (11/4/2022)  -HBV negative  -Nonelevated AFP 4 (11/23/2022)  -CT 11/21/2022 with contrast showing no evidence of hepatic mass  -Ascites - see below  -Patient will require further follow-up in the office for possible treatment - see discussion below  -Patient will require further evaluation with upper endoscopy -see discussion below     2.   Ascites  -Likely multifactorial and related to peritoneal involvement from advanced cancer/cirrhosis  -Paracentesis 11/22/2022 with 3050 mL removed  -Previously paracentesis yielded ascites with albumin of 1.7 at the time when serum albumin was 2.2 calculating ascitic gradient at 0.5 indicating likely alternative cause for patient's ascites   -cytology negative  -Defer diuretics to admitting service given renal failure  -Repeat paracentesis now  -US liver with Dopplers  -Patient can undergo serial paracentesis depending on rate of accumulation of ascites which can be set as outpatient     3. Advanced malignancy  -Patient follows with Dr. Shaheed Hodge  -Poor prognosis  -Defer further discussion regarding goals of care and CODE STATUS to admitting/oncology     4. Anemia  -Acute on chronic  -Iron deficient  -no evidence of overt bleed  -Twice daily PPI  -Octreotide drip  -Provide IV iron supplementation  -Monitor hemoglobin every 8 hours  -Keep PRBCs on hold  -Transfusion per admitting  -Patient require further evaluation with EGD and colonoscopy depending on H&H dynamics and goals of care         Patient transfused 1 unit PRBCs this admission, 12/16/2022. Hemoglobin fairly stable since transfusion. No overt bleeding. Continue to monitor. Continue PPI and octreotide. Transfusion per admitting. Consider endoscopic evaluation. Repeat paracentesis. GI will follow.     VERITO Ca - CNP  9:35 AM  12/17/2022

## 2022-12-17 NOTE — PROGRESS NOTES
[facility] Hospitalist   Progress Note    Admitting Date and Time: 12/14/2022  8:41 PM  Admit Dx: Loculated pleural effusion [J90]  Other fatigue [R53.83]    Subjective:    Pt lying in bed in no acute distress. Patient states a little tired today. Awaiting specialty plan and prognosis. She denies any new concerns at this time. Per RN: Patient for liver ultrasound today. Paracentesis scheduled for Monday. Possible thoracentesis. Await pulmonology plan. ROS: denies fever, chills, cp, sob, n/v, HA unless stated above.      albumin human  25 g IntraVENous On Call    pantoprazole (PROTONIX) 40 mg injection  40 mg IntraVENous Q12H    iron sucrose  100 mg IntraVENous Q24H    sodium chloride flush  5-40 mL IntraVENous 2 times per day    amLODIPine  5 mg Oral Daily    ipratropium-albuterol  1 ampule Inhalation Q4H WA    escitalopram  5 mg Oral Daily     sodium chloride, , PRN  sodium chloride flush, 5-40 mL, PRN  sodium chloride, , PRN  ondansetron, 4 mg, Q8H PRN   Or  ondansetron, 4 mg, Q6H PRN  acetaminophen, 650 mg, Q6H PRN   Or  acetaminophen, 650 mg, Q6H PRN  polyethylene glycol, 17 g, Daily PRN  oxyCODONE, 10 mg, Q6H PRN       Objective:    BP (!) 173/82   Pulse 82   Temp 98.1 °F (36.7 °C) (Oral)   Resp 16   Ht 5' 1\" (1.549 m)   Wt 117 lb 1 oz (53.1 kg)   SpO2 98%   BMI 22.12 kg/m²   General Appearance: alert and oriented to person, place and time and in no acute distress  Skin: warm and dry  Head: normocephalic and atraumatic  Eyes: pupils equal, round, and reactive to light, extraocular eye movements intact, conjunctivae normal  Neck: neck supple and non tender without mass   Pulmonary/Chest: clear to auscultation bilaterally- no wheezes, rales or rhonchi, normal air movement, no respiratory distress  Cardiovascular: normal rate, normal S1 and S2 and no RG +M  Abdomen: soft, non-tender, non-distended, normal bowel sounds, no masses or organomegaly  Extremities: no cyanosis, no clubbing and 1+ edema  Neurologic: no cranial nerve deficit and speech normal      Recent Labs     12/15/22  0855 12/16/22  0352 12/17/22  0425    140 136   K 4.4 5.0 4.5   * 113* 108*   CO2 21* 19* 19*   BUN 20 26* 24*   CREATININE 0.8 1.1* 1.0   GLUCOSE 87 74 106*   CALCIUM 8.9 8.8 8.7         Recent Labs     12/14/22  2116 12/15/22  0855 12/17/22  0425   ALKPHOS 218* 196* 819*   PROT 6.8 6.7 6.7   LABALBU 2.1* 2.2* 2.1*   BILITOT <0.2 0.2 0.3   AST 21 24 59*   ALT 11 10 24         Recent Labs     12/15/22  0553 12/16/22  0352 12/16/22  1647 12/17/22  0425   WBC 9.7 8.9  --  10.4   RBC 2.54* 2.36*  --  3.21*   HGB 7.1* 6.5* 8.8* 8.7*   HCT 22.0* 20.5* 27.2* 28.2*   MCV 86.6 86.9  --  87.9   MCH 28.0 27.5  --  27.1   MCHC 32.3 31.7*  --  30.9*   RDW 17.5* 17.3*  --  16.6*    349  --  333   MPV 10.8 9.9  --  10.3              Radiology:   CTA PULMONARY W CONTRAST   Final Result   No convincing evidence for acute pulmonary thromboembolism. Loculated large right pleural effusion with indwelling right hemithorax   drainage catheter. Posterior right lung base compressive atelectasis. Multiple indeterminate subcentimeter nodules, as detailed above. LUNG-RADS   3, PROBABLY BENIGN; advise follow up to document stability 6 months. Ascites. XR CHEST PORTABLE   Final Result   1. Redemonstration of hazy opacities throughout right lung notable   suggesting persistent right pleural effusion. 2. Redemonstration of right chest tube. 3. No evidence of pneumothorax. 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 *      Plan:  1. Loculated pleural effusion- CTA shows loculated large right pleural effusion with indwelling right hemithorax drainage catheter and posterior right lung base compressive atelectasis. Pleural drain placed on 12/9. Drained 12/14 for 500 ml. Drained twice weekly at home per pt. Follows with Dr Soraya Ca. CRP 3.1 Procalcitonin pending WBC 9.7>8.9  Plan to drain EOD. Pulmonology input appreciated. 2. Hx of lung cancer-right lung mass +Adenocarcinoma. GA TA 3/CD x2 KRAS (G12C)+ % expression./6/22 radiation therapy initiation. Follows with Dr Evangelina Jacobsen. Oncology/pulmonology/palliative medicine input appreciated     3. HTN- Continue Amlodipine. 4. Anemia- H&H 6.5/20.5 No overt signs of bleeding noted. She has 1U PRBC. Repeat H/H. Check occult stool. Follow transfuse as needed. GI/Hematology input appreciated. 5  Ascites- History of hepatitis C. Last paracentesis done on 11/9 for 2510 cc.  11/22 cytology negative for malignant cells. For liver US today. Paracentesis scheduled on Monday. 6. Hypoxia-2/2 above. Baseline Room Air. Currently ORA continue to wean as tolerated maintain SPO2> 92%. Continue DuoNebs. Pulmonology input appreciated. 7.  Subcutaneous mass posterior right shoulder 12/11 biopsy pathology remains pending    8. Disposition-scheduled for liver ultrasound today. Paracentesis scheduled on Monday. Possible thoracentesis? Pleurx cath removal and replacement? Await specialty plan. Case management following. NOTE: This report was transcribed using voice recognition software. Every effort was made to ensure accuracy; however, inadvertent computerized transcription errors may be present.     In Review of EMR,  evaluation, management and diagnosis. Care plan has been discussed with attending. Time spent  17 minutes. Electronically signed by Lydia Jurado Organ - CNP on 12/17/2022 at 10:29 AM

## 2022-12-17 NOTE — PLAN OF CARE
Problem: Nutrition Deficit:  Goal: Optimize nutritional status  12/17/2022 1103 by Lane Reynoso RN  Outcome: Not Progressing  12/17/2022 0908 by Loli Villaseñor RD, CNSC, LD  Flowsheets (Taken 12/17/2022 0908)  Nutrient intake appropriate for improving, restoring, or maintaining nutritional needs:   Assess nutritional status and recommend course of action   Monitor oral intake, labs, and treatment plans   Recommend appropriate diets, oral nutritional supplements, and vitamin/mineral supplements    Problem: Discharge Planning  Goal: Discharge to home or other facility with appropriate resources  Outcome: Progressing  Flowsheets (Taken 12/17/2022 0800)  Discharge to home or other facility with appropriate resources:   Identify barriers to discharge with patient and caregiver   Arrange for needed discharge resources and transportation as appropriate   Identify discharge learning needs (meds, wound care, etc)   Arrange for interpreters to assist at discharge as needed   Refer to discharge planning if patient needs post-hospital services based on physician order or complex needs related to functional status, cognitive ability or social support system     Problem: Skin/Tissue Integrity  Goal: Absence of new skin breakdown  Description: 1. Monitor for areas of redness and/or skin breakdown  2. Assess vascular access sites hourly  3. Every 4-6 hours minimum:  Change oxygen saturation probe site  4. Every 4-6 hours:  If on nasal continuous positive airway pressure, respiratory therapy assess nares and determine need for appliance change or resting period.   Outcome: Progressing     Problem: ABCDS Injury Assessment  Goal: Absence of physical injury  Outcome: Progressing     Problem: Safety - Adult  Goal: Free from fall injury  Outcome: Progressing     Problem: Pain  Goal: Verbalizes/displays adequate comfort level or baseline comfort level  Outcome: Progressing  Flowsheets (Taken 12/17/2022 0734)  Verbalizes/displays adequate comfort level or baseline comfort level:   Encourage patient to monitor pain and request assistance   Assess pain using appropriate pain scale   Administer analgesics based on type and severity of pain and evaluate response   Implement non-pharmacological measures as appropriate and evaluate response   Consider cultural and social influences on pain and pain management   Notify Licensed Independent Practitioner if interventions unsuccessful or patient reports new pain

## 2022-12-17 NOTE — PROGRESS NOTES
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 *      Subjective:  Patient seen resting in bed on RA-heading to radiology   Dyspnea with exertion       Medications:   sodium chloride      octreotide (SandoSTATIN) infusion 50 mcg/hr (22 0422)    sodium chloride          albumin human  25 g IntraVENous On Call    pantoprazole (PROTONIX) 40 mg injection  40 mg IntraVENous Q12H    iron sucrose  100 mg IntraVENous Q24H    sodium chloride flush  5-40 mL IntraVENous 2 times per day    amLODIPine  5 mg Oral Daily    ipratropium-albuterol  1 ampule Inhalation Q4H WA    escitalopram  5 mg Oral Daily       Vitals:  VITALS:  BP (!) 173/82   Pulse 82   Temp 98.1 °F (36.7 °C) (Oral)   Resp 16   Ht 5' 1\" (1.549 m)   Wt 117 lb 1 oz (53.1 kg)   SpO2 98%   BMI 22.12 kg/m²   24HR INTAKE/OUTPUT:    Intake/Output Summary (Last 24 hours) at 2022 1450  Last data filed at 2022 0536  Gross per 24 hour   Intake 110 ml   Output 200 ml   Net -90 ml     CURRENT PULSE OXIMETRY:  SpO2: 98 %  24HR PULSE OXIMETRY RANGE:  SpO2  Av.7 %  Min: 91 %  Max: 99 %  CVP:    VENT SETTINGS:      Additional Respiratory Assessments  Heart Rate: 82  Resp: 16  SpO2: 98 %      EXAM:  General: No distress. Alert. Room air   Eyes: No sclera icterus. No conjunctival injection. ENT: No discharge. Pharynx clear. Neck: Trachea midline. Normal thyroid. Resp: No accessory muscle use. No crackles. No wheezing. No rhonchi. Diminished on the R base otherwise clear   CV: Regular rate.  Regular rhythm. +1 BLE, + murmur ABD: Non-tender. Non-distended. Normal bowel sounds. Skin: Warm and dry. No nodule on exposed extremities. No rash on exposed extremities. M/S: No cyanosis. No joint deformity. No clubbing. Neuro: Awake. Follows commands. A&Ox3     I/O: I/O last 3 completed shifts: In: 469 [I.V.:110; Blood:359]  Out: 325 [Urine:325]  No intake/output data recorded. Results:  CBC:   Recent Labs     12/15/22  0553 12/16/22  0352 12/16/22  1647 12/17/22  0425 12/17/22  1228   WBC 9.7 8.9  --  10.4  --    HGB 7.1* 6.5* 8.8* 8.7* 9.0*   HCT 22.0* 20.5* 27.2* 28.2* 28.5*   MCV 86.6 86.9  --  87.9  --     349  --  333  --      BMP:   Recent Labs     12/15/22  0855 12/16/22  0352 12/17/22  0425    140 136   K 4.4 5.0 4.5   * 113* 108*   CO2 21* 19* 19*   BUN 20 26* 24*   CREATININE 0.8 1.1* 1.0     LFT:   Recent Labs     12/14/22  2116 12/15/22  0855 12/17/22  0425   ALKPHOS 218* 196* 819*   ALT 11 10 24   AST 21 24 59*   PROT 6.8 6.7 6.7   BILITOT <0.2 0.2 0.3   LABALBU 2.1* 2.2* 2.1*     PT/INR:   Recent Labs     12/15/22  0412 12/17/22  0425   PROTIME 12.9* 12.0   INR 1.1 1.1     Cultures:  No results for input(s): CULTRESP in the last 72 hours. ABG:   No results for input(s): PH, PO2, PCO2, HCO3, BE, O2SAT in the last 72 hours. Films:  XR CHEST PORTABLE    Result Date: 12/14/2022  EXAMINATION: ONE XRAY VIEW OF THE CHEST 12/14/2022 9:08 pm COMPARISON: December 12, 2022 HISTORY: ORDERING SYSTEM PROVIDED HISTORY: right effusion, sob TECHNOLOGIST PROVIDED HISTORY: Reason for exam:->right effusion, sob FINDINGS: Hazy opacities throughout the right lung notable along peripheral aspect of right lung and right costophrenic sulcus. Right-sided chest tube is present. No obvious pneumothorax. The heart is normal in size. 1. Redemonstration of hazy opacities throughout right lung notable suggesting persistent right pleural effusion. 2. Redemonstration of right chest tube. 3. No evidence of pneumothorax. CTA PULMONARY W CONTRAST    Result Date: 12/15/2022  EXAMINATION: CTA OF THE CHEST 12/14/2022 10:05 pm TECHNIQUE: CTA of the chest was performed after the administration of intravenous contrast.  Multiplanar reformatted images are provided for review. MIP images are provided for review. Automated exposure control, iterative reconstruction, and/or weight based adjustment of the mA/kV was utilized to reduce the radiation dose to as low as reasonably achievable. COMPARISON: None. HISTORY: ORDERING SYSTEM PROVIDED HISTORY: sob rule out PE TECHNOLOGIST PROVIDED HISTORY: Reason for exam:->sob rule out PE Decision Support Exception - unselect if not a suspected or confirmed emergency medical condition->Emergency Medical Condition (MA) FINDINGS: Pulmonary Arteries: Pulmonary arteries are adequately opacified for evaluation. No evidence of intraluminal filling defect to suggest pulmonary embolism. Main pulmonary artery is normal in caliber. Mediastinum: No evidence of mediastinal lymphadenopathy. The heart is mildly enlarged. The pericardium demonstrates no acute abnormality. There is no acute abnormality of the thoracic aorta. Lungs/pleura: There is a large loculated right pleural effusion with associated posterior right lower lobe compressive atelectasis. There is indwelling right hemithorax chest tube which courses posteriorly with tip terminating near the mediastinum. No pulmonary edema. No pneumothorax. On axial image 32, there is indeterminate round solid nodule in posterior right upper lobe, abutting the major fissure. On axial image 56, there is indeterminate 9 mm ground-glass opacity in periphery of right lower lobe. On axial image 77, there is indeterminate 7.7 mm subpleural nodule localized in posterior left lower lobe base. There are a few other scattered subcentimeter nodules in the periphery of left lower lobe near the base. Upper Abdomen:  There is intraabdominal ascites which is perihepatic  and perisplenic. Soft Tissues/Bones: No acute bone or soft tissue abnormality. No thyromegaly. No convincing evidence for acute pulmonary thromboembolism. Loculated large right pleural effusion with indwelling right hemithorax drainage catheter. Posterior right lung base compressive atelectasis. Multiple indeterminate subcentimeter nodules, as detailed above. LUNG-RADS 3, PROBABLY BENIGN; advise follow up to document stability 6 months. Ascites. Assessment:  12/9: R pleurx placed by IR     12/14: R pleurex -500 mL ( prior to admission), ED for SOB  12/15: 4L   12/16: room air, hgb 6.5  12/17 RA    Hypoxia - resolved   Anemia   Right lung mass positive for Adenocarcinoma, weakly positive for GATA3 and CDX2, TTF-1 negative; KRAS (G12C) positive, PDL1 with 10% expression. Plan was to start radiation therapy on 12/6/2022  Large right loculated pleural effusion noted on CTA 12/14  Large right pleural effusion with loculation s/p right Pleurx 12/9, no cytology obtained  2.7 X 1.8 cm enhancing subcutaneous mass in the posterior right shoulder region s/p excisional biopsy 12/11, pathology pending    Right breast lesion  History of ascites with cirrhosis status post paracentesis 11/22 cytology negative for malignant cells  History right-sided lung adenocarcinoma 2015 left-sided lung adenocarcinoma 2016  History of COPD  History of tobacco use, 58-pack-year smoking history      Plan:  Patient currently on room air ,  Baseline is room air. Ok for supplemental O2 as needed to maintain pulse ox >90%  Continue Duo-nebs. Resume Spiriva on discharge  Right Pleurx drained for 500 mL on 12/14. Unsure of frequency she was draining at home. Continue drain every other day, unable to get any fluid despite fluid on imaging-IR  evaluated pleurx, in good poisiotn but will not flush or instill into pleurx. [id number] Esperanza Moura for IR to to remove pleurx.  And we will evaluate    H/H 6.5/20.5, hematology following stable now 9.0   Recently had a axillary mass removed by general surgery.   Awaiting pathology report        Electronically signed by VERITO Guzman on 12/17/2022 at 2:50 PM

## 2022-12-17 NOTE — PROGRESS NOTES
Inpatient Medical Oncology Progress Note    Subjective:  SOB on exertion. Fatigue. No fever. Resting in bed. No nausea    Objective:  BP (!) 151/83   Pulse 93   Temp 98.4 °F (36.9 °C) (Oral)   Resp 16   Ht 5' 1\" (1.549 m)   Wt 117 lb 1 oz (53.1 kg)   SpO2 98%   BMI 22.12 kg/m²   GENERAL: Alert, oriented x 3, tired  HEENT: PERRLA; EOMI. Oropharynx clear. NECK: Supple. Without lymphadenopathy. LUNGS: suresh basal crackles. CARDIOVASCULAR: Regular rate. No murmurs, rubs or gallops. ABDOMEN: Soft. Distended. EXTREMITIES: Without clubbing or cyanosis or edema. NEUROLOGIC: No focal deficits. ECOG PS 2    Diagnostics:  Lab Results   Component Value Date    WBC 10.4 12/17/2022    HGB 9.0 (L) 12/17/2022    HCT 28.5 (L) 12/17/2022    MCV 87.9 12/17/2022     12/17/2022     Lab Results   Component Value Date     12/17/2022    K 4.5 12/17/2022     (H) 12/17/2022    CO2 19 (L) 12/17/2022    BUN 24 (H) 12/17/2022    CREATININE 1.0 12/17/2022    GLUCOSE 106 (H) 12/17/2022    CALCIUM 8.7 12/17/2022    PROT 6.7 12/17/2022    LABALBU 2.1 (L) 12/17/2022    BILITOT 0.3 12/17/2022    ALKPHOS 819 (H) 12/17/2022    AST 59 (H) 12/17/2022    ALT 24 12/17/2022    LABGLOM 60 12/17/2022    GFRAA 49 09/14/2022     Impression/Plan:  71 y.o. female Hx of smoking who underwent: (1) Bronchoscopy. (2) Right VATS. (3) VATS right upper lobe wedge resection. (4) VATS right upper lobectomy. (5) Intercostal nerve block from T3 to T10. (6) Mediastinal lymph node dissection on 11/11/2015:   Pathology:  Right lung, upper lobectomy: Invasive, moderately differentiated adenocarcinoma (Grade 2). Tumor size 1.5 cm in greatest dimension. Visceral pleural invasion: Not identified. Visceral pleural invasion: Not identified. Surgical margin negative for malignancy. Two of three peribronchial lymph nodes positive for adenocarcinoma. pT1a N1 MX;      Being followed for a left upper lobe mass by Dr. Josefina Ghotra.  Multiple biopsies in the past came back negative for malignancy. Hypermetabolic on PET/CT scan on 09/29/2015 (2.3 cm in size with SUV of 6.4). CT guided biopsy of the left upper lobe lesion on 11/02/2015 was noted to be negative for malignancy. She was referred to the medical oncology clinic to discuss adjuvant chemotherapy. We recommended 4 cycles of adjuvant chemotherapy consisting of Carboplatin/Alimta. Mediport placed 12/7/15. -MRI Brain on 12/8/15:  Negative for metastatic disease.   -We will repeat CT chest and PET/CT after 4 cycles of Carboplatin/Alimta. To follow on Lingular lesion as well. -Molecular studies (EGFR, ALK, ROS-1) were all Not Detected. Cycle # 1 of Valeriano Garcia was on 12/09/2015. Cycle # 2 of Valeriano Garcia was on 01/06/2016. Cycle # 3 of Valeriano Garcia was on 01/27/2016. Cycle # 4 of Valeriano Garcia was on 02/24/2016. PET SCAN 3.2016: The 2.1 cm left lung mass abutting the major fissure is hypermetabolic with SUV max of 5.4. Finding is worrisome for tumor with avid glucose metabolism. 2. Elsewhere there is unremarkable distribution of FDG activity without evidence of hypermetabolic metastases. On 03/29/2016 underwent Bronch/Left VATS/VATS wedge ROMELIA/VATS Left upper lobectomy/Mediasinal lymph node dissection/Intercostal nerve block from T3-T10 per Dr. Brielle Hernandez. Final pathology revealed Stage I Adenocarcinoma of ROMELIA (morphologically different from the adenocarcinoma previously diagnosed in the right upper lobe. Therefore, synchronous primary tumors are favored). A. Left lung, upper lobe wedge resection: Invasive, well-differentiated adenocarcinoma (grade 1); Tumor size-1.4 cm in greatest dimension; Surgical margins-negative for malignancy; Lymphovascular invasion-not identified; TNM classification-pT2a N0 MX  B. AP window lymph node #1, excision: Anthracotic lymph node; negative for malignancy  C.  AP window lymph node #2, excision: Anthracotic lymph node; negative for malignancy   D. Periaortic lymph node #1, excision: Fibroadipose tissue; lymph node not identified  E. Bronchial lymph node #1, excision: Anthracotic lymph node; negative for malignancy  F. Left lung, upper lobectomy: Emphysematous change; negative for malignancy  4 anthracotic lymph nodes negative for malignancy   G. Inferior pulmonary ligament lymph node, excision: Anthracotic lymph node; negative for malignancy  H. Bronchial lymph node, excision: Anthracotic lymph node; negative for malignancy. Right side Mediport was removed on 11/04/2016 by Dr. Mary Garner. On surveillance per NCCN guidelines. CT chest 04/12/2017 noted no convincing evidence of recurrent disease. CT chest 10/19/2017 noted no definite evidence for recurrent malignancy. CT chest 03/12/2018 negative for pulmonary parenchymal masses or enlarged mediastinal or hilar LN. ? 2 cm lesion in spleen difficult to evaluate due to the arterial phase of the study. CT Abd/Pelvis 05/08/2018  Enhancing lesion within the spleen is relatively stable to slightly smaller when compared to April 2014 exam and likely splenic hemangioma. Other indeterminate findings (left periaortic LN, sclerotic/blastic foci in L3 and L1 reported by Dr. Nickolas Cox from radiology team). PET/CT scan 06/05/2018 unremarkable. No FDG avid uptake identified. No evidence for recurrent or metastatic disease. CT Chest 12/13/2018 noted no evidence of recurrent/metastatic disease. CT chest on 06/11/2019 noted no evidence for worrisome residual or recurrent malignancy. No evidence for new lymphadenopathy or metastatic disease. Stable scarring and postoperative changes right upper lobe. CT chest 11/26/2019: No evidence of active neoplasm. CT chest 06/15/2020: No evidence of active neoplasm. CT chest 12/30/2020: Postsurgical changes and postsurgical scarring seen within the right lung apex. There is no evidence of tumor recurrence.   2.1 x 2.3 cm enhancing lesion seen within the spleen  PET/CT scan 03/02/2021   No FDG avid uptake is identified which exceeds the threshold SUV. No convincing evidence for recurrent or metastatic disease  CT chest 09/28/2021 No evidence of tumor recurrence     Recent abdominal pain; ER visit reviewed  CT abdomen/pelvis 02/20/2022   6 mm right lower lobe pulmonary nodule  Multiple peritoneal cystic masses      CEA 12.5 on 03/16/2022     CT chest 04/05/2022 Ground-glass nodule right lower lobe superolateral portion 10 mm unchanged from prior however in the medial segment right lower lobe with pleural abutment is a 7 mm pulmonary nodule increased in size from 09/28/2021 with is barely visible at 2-3 mm; repeat CT chest in 3 mo     PET/CT scan 04/05/2022 noted No FDG avid uptake is identified which exceeds the threshold SUV      Bilateral screening mammogram 04/20/2022: Negative for malignancy     CEA     12.1 on 04/20/2022  CA-125 32.8 on 04/20/2022   <2 on 04/20/2022  Chromogranin A 1480 on 04/20/2022. EGD/Colonoscopy 05/13/2022: Gastric polyps , duodenal polyp moderate gastroduodenitis   A. Stomach, biopsy: Hyperplastic polyp and moderate chronic active gastritis; negative for intestinal metaplasia   Immunostain negative for Helicobacter pylori organisms   B. Duodenum, biopsy: Chronic duodenitis with features of peptic duodenitis   C. Colon, 20 cm biopsy: Fragments of tubular adenoma and hyperplastic polyp   Pathology reviewed. Referred to HBP team (Dr. Benuel Lesches) for further evaluation of peritoneal cystic masses  CT abdomen/pelvis 06/23/2022 numerous cystic structures identified throughout the right flank and retroperitoneum. Laparoscopic robotic peritoneal mass resection on 07/06/2022  Findings included: serous cystic masses along the colon, periportal lymphadenopathy, fibrotic appearing liver, chronic cholecystitis     Peritoneal fluid Negative for malignant cells.  Cellblock shows reactive mesothelial cells, lymphocytes, adipose/stromal tissue, and blood. Monolayer preparation shows few reactive mesothelial cells and blood. A.  Lymph node, periportal, biopsy:   - Reactive node showing follicular hyperplasia, negative for malignancy, see comment. B.  Remnant gallbladder, cholecystectomy:   - Portion of gallbladder wall showing dense fibrosis/scar and occluded mariaelena-gallbladder vessels. C. Soft tissue, peritoneal sac, excision:   - Peritoneal inclusion cysts (benign cystic mesothelioma) and unremarkable fibroadipose tissue. D.  Liver, needle biopsy:   - Benign hepatic parenchyma showing focal periportal and incomplete septal fibrosis (stage 2)   - Negative for significant lobular/portal necroinflammatory activity, see comment. Comment:   Sections of the lymph node in specimen A reveal normal anat architecture with secondary follicular hyperplasia. There are no cytologically atypical lymphoid cells or [place]-Marsha cells identified. Periportal LN Flow Cytometry noted 4.5% monoclonal B cells detected in a predominantly polyclonal background. Monoclonal B cell population (4.5% of total cells) without detectable CD5, CD10 or CD11c expression in a predominantly polyclonal background, raising the differential between a monoclonal B-cell population of undetermined significance versus a B-cell lymphoma/leukemia. In the context of B-cell lymphoma the main differential based on immunophenotype includes, but is not limited to marginal zone lymphoma/leukemia, lymphoplasmacytic lymphoma, JH86- negative follicular lymphoma, and less likely large b cell lymphoma. In specimen D, there is no evidence of chronic hepatitis or significant steatosis. Histologic features of cirrhosis including nodules of regenerating hepatocytes and complete portal-portal bridging fibrosis are not identified. Focal periportal fibrosis and incomplete septal fibrosis are confirmed by trichrome stain. Iron stain is also performed and is negative. Periportal lymph node flow cytometry showing 4.5% monoclonal B cells without detectable CD5, CD10 or CD11c expression in a predominantly polyclonal background, raising the differential between a monoclonal B-cell population of undetermined significance versus a B-cell lymphoma/leukemia     Repeat CT chest to follow on history of NSCLC on 08/04/2022  Postsurgical changes are identified in the upper lobes bilaterally. 1.1 cm ground-glass nodule in the right lower lobe and a smaller 1 measuring 0.9 cm are noted without change. There is a 1.6 x 1.2 cm soft tissue mass in the right lower lobe medially which is significantly increased since the previous examination. Bilateral adrenal nodules are noted, larger 1 on the left side measuring 1.8 x 1.6 cm.  1.2 cm right renal cystic lesion is identified. An ill-defined hypodense lesion is identified in the spleen currently measuring 2.9 x 3.5 cm      Evaluated by Dr. Sharl Merlin on 08/15/2022 at [facility] who recommended proceeding with a biopsy of the enlarging right lower lobe lung mass. While lymphoma certainly possible, biopsy recommended to rule out recurrent lung cancer. Periportal lymph node biopsy reviewed at [facility]. Lymph node with lipid lymphadenopathy  Small clonal B-cell population detected by flow cytometry and molecular analysis  Negative for carcinoma  Flow cytometry showed a small clonal CD5 -/CD10- B-cell population (4.5% of all events) and a background of polyclonal B cells. B-cell receptor clonality assay was positive for a clonal B-cell population. However there is no morphologic evidence of lymphoma in the lymph node. The detection of a clonal B-cell population despite negative morphologic evidence of lymphoma might represent monoclonal B-cell lymphocytosis process in the lymph node.   An alternative consideration is peripheral blood involvement by B-cell clone (MBL-like process) that was picked up by the tissue flow cytometry and molecular tests. Even if lymphoma is concerned, likely low-grade and not causing her symptoms, so observation would be recommended. PET/CT scan, CT-guided core needle biopsy of enlarging right lower lobe lung mass recommended. PET/CT scan 09/06/2022: In the region of the lesion in the right lower lobe there is FDG avid tracer uptake peak SUV is 3.2. CT guided core needle biopsy RLL lung nodule on 09/20/2022  Right lung, lower lobe core needle biopsy: Adenocarcinoma (see comment)   Comment: The prior history of right upper lobe adenocarcinoma (HES ) and left upper lobe adenocarcinoma (HES ) is noted. The electronic medical record is also reviewed. The adenocarcinoma in the current specimen is weakly immunoreactive with GATA3 and CDX2. The TTF-1 immunostain is negative. 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 Pelvic U/S large calcified uterine mass; ovaries not identified. Large volume ascites. MRI Pelvis 11/05/2022: No suspicious uterine or endometrial mass  Multiple calcified, degenerated uterine leiomyomas, measuring up to 5.2 cm  Seen by GYN (Dr. Thomas Xiong); No findings concerning for gynecologic malignancy. Status post paracentesis 11/9/2022:  A total of 2510 cc of straw-colored ascitic fluid was removed  Cytology was negative for malignant cells  Reactive mesothelial cells, acute/chronic inflammatory cells, and blood present. The patient was admitted on 12/14/2022 for increased shortness of breath. More recently few days ago she was discharged from the hospital for worsening right pleural effusion, in which Pleurx catheter was placed on 12/9/2022. Also of note, patient had recent development of a subcutaneous nodule located in the posterior right axilla. CT scans on 12/7/2022 reported 2.7 x 1.8 cm subcutaneous tissue mass, overlies the trapezius muscle     Large Right sided loculated pleural effusion, s/p PleurX placement 12/9/2022, along with findings suggestive of chest wall metastasis  Recurrent ascites, negative cytology, but with concern for malignancy  Subcutaneous mass posterior to right axilla, s/p excision on 12/11/2022  Right breast mass, BIRADS 4  History of recurrent ascites  Imaging evidence of early cirrhosis  Active chronic hepatitis C infection  Right lung mass positive for Adenocarcinoma, weakly positive for GATA3 and CDX2, TTF-1 negative; KRAS (G12C) positive, PDL1 with 10% expression  Previously described multiple peritoneal cystic masses, s/p resection in 7/6/2022, not well visualized on 10/31/22 CT abdomen  Monoclonal B-cell population from periportal lymph node resection from 7/6/2022, in the setting of follicular hyperplasia  Previous right sided lung adenocarcinoma in 2015  Previous left-sided lung adenocarcinoma 2016  Family history of uterine cancer in mother  Family history of early age breast cancer in sister (Diagnosed around early 42's)     Pleural fluid collected from PleurX including cytology and fluid studies; Cytology pending  Pulmonary team following  IR team evaluated Pleurx in good position   Ok to remove Pleurx catheter  Continue Duonebs. Maintain pulse ox >90%    Awaiting for biopsy results from excision the subcutaneous nodule in posterior right axilla done on 12/11/2022.       Hold off on radiation for now for the lung mass as we await results from pleurx fluid cytology and the right axillary excision biopsy    Palliative on board    Anemia; did receive IV iron during previous admission  Hb 9.0 today 12/17/2022; s/p prbc transfusion  Labs reviewed. Continue to monitor CBC with diff   GI team on board; EGD/Colonoscopy pending H&H    PPI BID  Octreotide started  Liver US noted cirrhotic liver. No hepatic mass. Moderate to large ascites. Imaging reviewed.     Repeat paracentesis    12/17/2022  Nanci Caal MD

## 2022-12-18 LAB
HCT VFR BLD CALC: 27.4 % (ref 34–48)
HEMOGLOBIN: 8.7 G/DL (ref 11.5–15.5)

## 2022-12-18 PROCEDURE — 85014 HEMATOCRIT: CPT

## 2022-12-18 PROCEDURE — 94640 AIRWAY INHALATION TREATMENT: CPT

## 2022-12-18 PROCEDURE — 2580000003 HC RX 258: Performed by: HOSPITALIST

## 2022-12-18 PROCEDURE — 6360000002 HC RX W HCPCS: Performed by: HOSPITALIST

## 2022-12-18 PROCEDURE — C9113 INJ PANTOPRAZOLE SODIUM, VIA: HCPCS | Performed by: HOSPITALIST

## 2022-12-18 PROCEDURE — 2580000003 HC RX 258

## 2022-12-18 PROCEDURE — 99232 SBSQ HOSP IP/OBS MODERATE 35: CPT | Performed by: PHYSICIAN ASSISTANT

## 2022-12-18 PROCEDURE — 99232 SBSQ HOSP IP/OBS MODERATE 35: CPT | Performed by: INTERNAL MEDICINE

## 2022-12-18 PROCEDURE — 6370000000 HC RX 637 (ALT 250 FOR IP): Performed by: PHYSICIAN ASSISTANT

## 2022-12-18 PROCEDURE — 36415 COLL VENOUS BLD VENIPUNCTURE: CPT

## 2022-12-18 PROCEDURE — 85018 HEMOGLOBIN: CPT

## 2022-12-18 PROCEDURE — 6370000000 HC RX 637 (ALT 250 FOR IP): Performed by: FAMILY MEDICINE

## 2022-12-18 PROCEDURE — 2060000000 HC ICU INTERMEDIATE R&B

## 2022-12-18 PROCEDURE — 6370000000 HC RX 637 (ALT 250 FOR IP): Performed by: NURSE PRACTITIONER

## 2022-12-18 PROCEDURE — 6370000000 HC RX 637 (ALT 250 FOR IP)

## 2022-12-18 PROCEDURE — A4216 STERILE WATER/SALINE, 10 ML: HCPCS | Performed by: HOSPITALIST

## 2022-12-18 RX ORDER — DIMETHICONE, OXYBENZONE, AND PADIMATE O 2; 2.5; 6.6 G/100G; G/100G; G/100G
STICK TOPICAL PRN
Status: DISCONTINUED | OUTPATIENT
Start: 2022-12-18 | End: 2022-12-23 | Stop reason: HOSPADM

## 2022-12-18 RX ORDER — AMLODIPINE BESYLATE 5 MG/1
5 TABLET ORAL ONCE
Status: COMPLETED | OUTPATIENT
Start: 2022-12-18 | End: 2022-12-18

## 2022-12-18 RX ORDER — AMLODIPINE BESYLATE 10 MG/1
10 TABLET ORAL DAILY
Status: DISCONTINUED | OUTPATIENT
Start: 2022-12-19 | End: 2022-12-23 | Stop reason: HOSPADM

## 2022-12-18 RX ADMIN — ESCITALOPRAM OXALATE 5 MG: 10 TABLET ORAL at 08:48

## 2022-12-18 RX ADMIN — OCTREOTIDE ACETATE 50 MCG/HR: 500 INJECTION, SOLUTION INTRAVENOUS; SUBCUTANEOUS at 04:19

## 2022-12-18 RX ADMIN — IPRATROPIUM BROMIDE AND ALBUTEROL SULFATE 1 AMPULE: 2.5; .5 SOLUTION RESPIRATORY (INHALATION) at 12:31

## 2022-12-18 RX ADMIN — OCTREOTIDE ACETATE 50 MCG/HR: 500 INJECTION, SOLUTION INTRAVENOUS; SUBCUTANEOUS at 16:16

## 2022-12-18 RX ADMIN — OXYCODONE 10 MG: 5 TABLET ORAL at 16:17

## 2022-12-18 RX ADMIN — OXYCODONE 10 MG: 5 TABLET ORAL at 08:47

## 2022-12-18 RX ADMIN — Medication: at 13:13

## 2022-12-18 RX ADMIN — AMLODIPINE BESYLATE 5 MG: 5 TABLET ORAL at 16:17

## 2022-12-18 RX ADMIN — IPRATROPIUM BROMIDE AND ALBUTEROL SULFATE 1 AMPULE: 2.5; .5 SOLUTION RESPIRATORY (INHALATION) at 15:35

## 2022-12-18 RX ADMIN — SODIUM CHLORIDE, PRESERVATIVE FREE 40 MG: 5 INJECTION INTRAVENOUS at 16:16

## 2022-12-18 RX ADMIN — IPRATROPIUM BROMIDE AND ALBUTEROL SULFATE 1 AMPULE: 2.5; .5 SOLUTION RESPIRATORY (INHALATION) at 08:30

## 2022-12-18 RX ADMIN — Medication 10 ML: at 08:49

## 2022-12-18 RX ADMIN — SODIUM CHLORIDE, PRESERVATIVE FREE 40 MG: 5 INJECTION INTRAVENOUS at 05:36

## 2022-12-18 RX ADMIN — PETROLATUM: 420 OINTMENT TOPICAL at 08:47

## 2022-12-18 RX ADMIN — POLYETHYLENE GLYCOL 3350 17 G: 17 POWDER, FOR SOLUTION ORAL at 08:47

## 2022-12-18 RX ADMIN — AMLODIPINE BESYLATE 5 MG: 5 TABLET ORAL at 08:48

## 2022-12-18 ASSESSMENT — PAIN SCALES - GENERAL
PAINLEVEL_OUTOF10: 4
PAINLEVEL_OUTOF10: 4
PAINLEVEL_OUTOF10: 7
PAINLEVEL_OUTOF10: 6
PAINLEVEL_OUTOF10: 6

## 2022-12-18 ASSESSMENT — PAIN DESCRIPTION - FREQUENCY
FREQUENCY: CONTINUOUS
FREQUENCY: CONTINUOUS

## 2022-12-18 ASSESSMENT — PAIN - FUNCTIONAL ASSESSMENT
PAIN_FUNCTIONAL_ASSESSMENT: PREVENTS OR INTERFERES SOME ACTIVE ACTIVITIES AND ADLS
PAIN_FUNCTIONAL_ASSESSMENT: PREVENTS OR INTERFERES SOME ACTIVE ACTIVITIES AND ADLS

## 2022-12-18 ASSESSMENT — PAIN DESCRIPTION - LOCATION
LOCATION: RIB CAGE
LOCATION: RIB CAGE
LOCATION: GENERALIZED

## 2022-12-18 ASSESSMENT — PAIN DESCRIPTION - ORIENTATION
ORIENTATION: RIGHT
ORIENTATION: RIGHT

## 2022-12-18 ASSESSMENT — PAIN DESCRIPTION - DESCRIPTORS
DESCRIPTORS: ACHING
DESCRIPTORS: ACHING

## 2022-12-18 ASSESSMENT — PAIN DESCRIPTION - PAIN TYPE
TYPE: ACUTE PAIN;SURGICAL PAIN
TYPE: ACUTE PAIN;SURGICAL PAIN

## 2022-12-18 ASSESSMENT — PAIN DESCRIPTION - ONSET
ONSET: ON-GOING
ONSET: ON-GOING

## 2022-12-18 ASSESSMENT — PAIN SCALES - WONG BAKER: WONGBAKER_NUMERICALRESPONSE: 0

## 2022-12-18 NOTE — PROGRESS NOTES
Name:  Rose Quintanilla  :  1950  MRN:  [id number]  Room:  [address] [place] [address]  DOS:  2022    [facility]  [facility]  Dr. Martina Marroquin M.D. Dr. Kristina Chopra M.D. Dr. John Edmond D.O. Dr. Chaka Burroughs M.D. Dr. Warren Herring D.O.    -NP Progress Note-    PCP:  David Shelton DO  Admitting Physician:  Sven Michel DO  Chief Complaint:    Chief Complaint   Patient presents with    Shortness of Breath     Recent dx of lung CA, (right lung) chronic pleural effusion in R light (x1 month) last tapped today and got 500ml of fluid. Per medics pt was in the 80's at home on RA, placed on 4L        Subjective  Patient resting in bed. Complains of pain in the right ribs. Tolerated a small amount of breakfast this morning. Denies nausea. Reports some constipation, most recent bowel movement about 2 days ago.     Physical Examination  Vitals:  BP (!) 183/86   Pulse 80   Temp 98.3 °F (36.8 °C) (Oral)   Resp 18   Ht 5' 1\" (1.549 m)   Wt 121 lb 0.5 oz (54.9 kg)   SpO2 94%   BMI 22.87 kg/m²   General Appearance:  awake, alert, and oriented; appears stated age and cooperative; no apparent distress no labored breathing  HEENT:  PERRL; EOMI; sclera clear; buccal mucosa moist  Neck:  supple; trachea midline; no thyromegaly; no JVD; no bruits  Heart:  rhythm regular; rate controlled; no murmurs  Lungs:  symmetrical; clear to auscultation bilaterally; no wheezes; no rhonchi; no rales  Abdomen: Taut, non-tender currently, distended; bowel sounds positive; no organomegaly or masses  Extremities:  peripheral pulses present; no peripheral edema; no ulcers, decreased muscle tone and bulk  Neurologic:  alert and oriented x 3; no focal deficit; cranial nerves grossly intact  Skin:  no petechia; no hemorrhage; no wounds    Medications  Scheduled Meds    pantoprazole (PROTONIX) 40 mg injection  40 mg IntraVENous Q12H    sodium chloride flush  5-40 mL IntraVENous 2 times per day    amLODIPine  5 mg Oral Daily ipratropium-albuterol  1 ampule Inhalation Q4H WA    escitalopram  5 mg Oral Daily     Infusion Meds    sodium chloride      octreotide (SandoSTATIN) infusion 50 mcg/hr (12/18/22 0416)    sodium chloride       PRN Meds medicated lip balm, white petrolatum, sodium chloride, sodium chloride flush, sodium chloride, ondansetron **OR** ondansetron, acetaminophen **OR** acetaminophen, polyethylene glycol, oxyCODONE    Laboratory Data  Recent Results (from the past 24 hour(s))   Hemoglobin and Hematocrit    Collection Time: 12/18/22  1:41 AM   Result Value Ref Range    Hemoglobin 8.7 (L) 11.5 - 15.5 g/dL    Hematocrit 27.4 (L) 34.0 - 48.0 %       Imaging  CT CHEST W CONTRAST    Result Date: 12/7/2022  EXAMINATION: CT OF THE CHEST WITH CONTRAST 12/7/2022 4:32 pm TECHNIQUE: CT of the chest was performed with the administration of intravenous contrast. Multiplanar reformatted images are provided for review. Automated exposure control, iterative reconstruction, and/or weight based adjustment of the mA/kV was utilized to reduce the radiation dose to as low as reasonably achievable. COMPARISON: CT abdomen dated 11/22/2022 HISTORY: ORDERING SYSTEM PROVIDED HISTORY: right shoulder mass, hx lung cancer TECHNOLOGIST PROVIDED HISTORY: Reason for exam:->right shoulder mass, hx lung cancer Decision Support Exception - unselect if not a suspected or confirmed emergency medical condition->Emergency Medical Condition (MA) What reading provider will be dictating this exam?->CRC FINDINGS: Mediastinum: Thyroid gland is normal.  No evidence of bulky mediastinal adenopathy. No evidence of hilar adenopathy. Aorta is nonaneurysmal. Pulmonary artery is normal in size. No evidence of filling defects in the central pulmonary arteries to suggest pulmonary embolism. Lungs/pleura: Enhancing masses are seen involving the pleural space and chest wall in the right hemithorax.   There is a 2.4 cm peripherally enhancing mass in the medial anterior right chest on image 48 series 301. Other smaller dependent mural nodules are noted. A large right pleural effusion is noted with atelectasis of the right lower lobe and right middle lobe. 2.5 cm low right infrahilar parenchymal low-density compatible with soft tissue mass. Best seen on image number 68 series 301. Upper Abdomen: Visualized portions of the liver appear unremarkable. Unchanged probable old splenic infarct or injury in the mid splenic body. Stomach has a normal configuration. The right adrenal gland is normal, left not included on the study. There is mild to moderate upper abdominal ascites. Soft Tissues/Bones: There is a peripherally enhancing 2.7 x 1.8 cm subcutaneous soft tissue mass overlying the trapezius muscle in the posterior right shoulder region likely representing a subcutaneous metastasis. No other subcutaneous lesions identified. Sclerotic focus along the inferior T2 vertebral body could represent bone island or sclerotic metastasis. No other osseous lesions detected. 1.  2.7 x 1.8 cm peripherally enhancing subcutaneous mass in the posterior right shoulder region. This likely represents a subcutaneous metastasis. No other masses related to the right shoulder region. 2.  Large right effusion, atelectasis of the right lower lobe, pleural and chest wall metastases on the right. 3.  Mild to moderate upper abdominal ascites. CT ABDOMEN PELVIS W IV CONTRAST Additional Contrast? None    Result Date: 11/22/2022  EXAMINATION: CT OF THE ABDOMEN AND PELVIS WITH CEAYQAHQ40/21/2022 11:30 pm TECHNIQUE: CT of the abdomen and pelvis was performed with the administration of intravenous contrast. Multiplanar reformatted images are provided for review. Automated exposure control, iterative reconstruction, and/or weight based adjustment of the mA/kV was utilized to reduce the radiation dose to as low as reasonably achievable.  COMPARISON: October 31, 2022 HISTORY: ORDERING SYSTEM PROVIDED HISTORY: abd pain TECHNOLOGIST PROVIDED HISTORY: Reason for exam:->abd pain Additional Contrast?->None Decision Support Exception - unselect if not a suspected or confirmed emergency medical condition->Emergency Medical Condition (MA) FINDINGS: THORACIC STRUCTURES: There is a moderate right basilar pleural fluid collection with atelectasis of the right lower lobe. LIVER:  The liver is normal in size, slight nodularity and normal attenuation. No focal mass. No intra or extrahepatic bile duct dilation. GALL BLADDER: Cholecystectomy. SPLEEN:  Stable hypodense region with parenchymal atrophy of the mid spleen. PANCREAS:  Unremarkable. ADRENAL GLANDS:  Unremarkable. ESOPHAGUS AND STOMACH:  Unremarkable. BOWEL: Small bowel:  Unremarkable. Large bowel: The colon and rectum are of normal course and caliber. The appendix is within normal limits. There is no intraperitoneal free air. There is intra-abdominal ascites. URINARY/GENITAL TRACT: Kidneys:  The kidneys are unremarkable. .  No evidence of hydronephrosis, renal calcifications or solid renal mass. Ureters: The ureters are normal course and caliber. There is no evidence of ureter calculus/calculi. URINARY BLADDER:  The urinary bladder is well distended without wall thickening or focal mass. Extensive calcification of uterine fibroids. BLOOD VESSELS: There is atherosclerotic disease. LYMPH NODES:  No evidence of intraabdominal or intrapelvic lymphadenopathy. ABDOMINAL WALL & SOFT TISSUES:  Unremarkable. OSSEOUS STRUCTURES: No acute osseous lesion. Moderate right basilar pleural fluid collection with atelectasis of the right lower lobe. Slight nodularity of the liver concerning for early cirrhotic morphological change. Stable hypodense region within the spleen with parenchymal atrophy likely related to old splenic infarction or old splenic injury. Large amount of intra-abdominal ascites, unchanged from the prior study. Extensive calcification of uterine fibroids. Atherosclerotic disease. .     XR CHEST PORTABLE    Result Date: 12/14/2022  EXAMINATION: ONE XRAY VIEW OF THE CHEST 12/14/2022 9:08 pm COMPARISON: December 12, 2022 HISTORY: ORDERING SYSTEM PROVIDED HISTORY: right effusion, sob TECHNOLOGIST PROVIDED HISTORY: Reason for exam:->right effusion, sob FINDINGS: Hazy opacities throughout the right lung notable along peripheral aspect of right lung and right costophrenic sulcus. Right-sided chest tube is present. No obvious pneumothorax. The heart is normal in size. 1. Redemonstration of hazy opacities throughout right lung notable suggesting persistent right pleural effusion. 2. Redemonstration of right chest tube. 3. No evidence of pneumothorax. XR CHEST PORTABLE    Result Date: 12/12/2022  EXAMINATION: ONE XRAY VIEW OF THE CHEST 12/12/2022 6:47 am COMPARISON: Chest CT 7 December 2022 and chest radiograph 21 November 2022 HISTORY: ORDERING SYSTEM PROVIDED HISTORY: f/u pleural effusion TECHNOLOGIST PROVIDED HISTORY: Reason for exam:->f/u pleural effusion What reading provider will be dictating this exam?->CRC FINDINGS: Newly placed right chest tube. There is layering right pleural effusion with adjacent atelectasis and or infiltrate. Aeration on the right appears modestly worse. Mildly worsening aeration on the right. Newly placed right chest tube. XR CHEST PORTABLE    Result Date: 11/21/2022  EXAMINATION: ONE XRAY VIEW OF THE CHEST 11/21/2022 7:35 pm COMPARISON: None. HISTORY: ORDERING SYSTEM PROVIDED HISTORY: abd pain TECHNOLOGIST PROVIDED HISTORY: Reason for exam:->abd pain FINDINGS: Hazy density over the right hemithorax. The heart is not enlarged. No pneumothorax. Elevated right hemidiaphragm. Hazy density over the right hemithorax likely due to layering right pleural effusion. Superimposed infiltrates cannot be excluded. Elevated right hemidiaphragm.      CTA PULMONARY W CONTRAST    Result Date: 12/15/2022  EXAMINATION: CTA OF THE CHEST 12/14/2022 10:05 pm TECHNIQUE: CTA of the chest was performed after the administration of intravenous contrast.  Multiplanar reformatted images are provided for review. MIP images are provided for review. Automated exposure control, iterative reconstruction, and/or weight based adjustment of the mA/kV was utilized to reduce the radiation dose to as low as reasonably achievable. COMPARISON: None. HISTORY: ORDERING SYSTEM PROVIDED HISTORY: sob rule out PE TECHNOLOGIST PROVIDED HISTORY: Reason for exam:->sob rule out PE Decision Support Exception - unselect if not a suspected or confirmed emergency medical condition->Emergency Medical Condition (MA) FINDINGS: Pulmonary Arteries: Pulmonary arteries are adequately opacified for evaluation. No evidence of intraluminal filling defect to suggest pulmonary embolism. Main pulmonary artery is normal in caliber. Mediastinum: No evidence of mediastinal lymphadenopathy. The heart is mildly enlarged. The pericardium demonstrates no acute abnormality. There is no acute abnormality of the thoracic aorta. Lungs/pleura: There is a large loculated right pleural effusion with associated posterior right lower lobe compressive atelectasis. There is indwelling right hemithorax chest tube which courses posteriorly with tip terminating near the mediastinum. No pulmonary edema. No pneumothorax. On axial image 32, there is indeterminate round solid nodule in posterior right upper lobe, abutting the major fissure. On axial image 56, there is indeterminate 9 mm ground-glass opacity in periphery of right lower lobe. On axial image 77, there is indeterminate 7.7 mm subpleural nodule localized in posterior left lower lobe base. There are a few other scattered subcentimeter nodules in the periphery of left lower lobe near the base. Upper Abdomen: There is intraabdominal ascites which is perihepatic  and perisplenic. Soft Tissues/Bones: No acute bone or soft tissue abnormality.   No thyromegaly. No convincing evidence for acute pulmonary thromboembolism. Loculated large right pleural effusion with indwelling right hemithorax drainage catheter. Posterior right lung base compressive atelectasis. Multiple indeterminate subcentimeter nodules, as detailed above. LUNG-RADS 3, PROBABLY BENIGN; advise follow up to document stability 6 months. Ascites. US GUIDED PARACENTESIS    Result Date: 11/22/2022  PROCEDURE: PARACENTESIS WITHOUT IMAGE GUIDANCE US ABDOMEN LIMITED 11/22/2022 HISTORY: ORDERING SYSTEM PROVIDED HISTORY: diagnostic and therapeutic ascites tap-- send for cx, +cytology, cell count TECHNOLOGIST PROVIDED HISTORY: Reason for exam:->diagnostic and therapeutic ascites tap-- send for cx, +cytology, cell count What reading provider will be dictating this exam?->CRC TECHNIQUE: Informed consent was obtained after a detailed explanation of the procedure including risks, benefits, and alternatives. Universal protocol was followed. A limited ultrasound of the abdomen was performed. The right abdomen was prepped and draped in sterile fashion and local anesthesia was achieved with lidocaine. A 5 Turkmen needle sheath was advanced into ascites under continuous ultrasound guidance and paracentesis was performed. The patient tolerated the procedure well. FINDINGS: Limited ultrasound of the abdomen demonstrates ascites. A total of 3050 cc of straw-colored ascitic fluid was removed. Status post paracentesis. US BREAST LIMITED RIGHT    Result Date: 11/17/2022  EXAMINATION: TARGETED ULTRASOUND OF THE RIGHT BREAST 11/17/2022 COMPARISON: Multiple prior studies, the most recent dated October 31, 2022. HISTORY: ORDERING SYSTEM PROVIDED HISTORY: Abnormal CT of the abdomen TECHNOLOGIST PROVIDED HISTORY: Reason for exam:->hyperdense nodule in the right subareolar region FINDINGS: Targeted right breast ultrasound was performed utilizing high-resolution, real-time scanning.   The right nipple is inverted and thickened. No suspicious cystic or solid mass identified in the right breast retroareolar region. Inverted right nipple is suspicious. Recommendation: Punch biopsy of the right nipple is advised. BIRADS: BIRADS - CATEGORY 4 Suspicious Abnormality. Biopsy should be considered at this time. OVERALL ASSESSMENT - SUSPICIOUS A letter of notification will be sent to the patient regarding the results. IR ABSCESS DRAINAGE PERC    Result Date: 2022  Patient MRN:  [id number] : 1950 Age: 71 years Gender: Female Order Date:  2022 4:20 PM EXAM: IR ABSCESS DRAINAGE PERC, IR INSERT TUNNELED PLEURA CATH W CUFF NUMBER OF IMAGES:  4 INDICATION:  pleurx pleurx What reading provider will be dictating this exam?->[facility] COMPARISON: None After obtaining informed consent and following the routine sterile prep and draped a needle was inserted in the chest under ultrasound guidance and serous fluid was obtained. .After obtaining informed consent and after routine sterile prep and drape and after administration of a local anesthesia, a system of needles, and cannulas was guided by ultrasound control into the pleural cavity. The needle was visualized directly under ultrasound while placing the needle within the pleural space. An image of the needle and positioned was stored in PACS. Subsequently the tract was dilated. Subsequently a tunnel was created to a more caudal incision following local anesthetic and subsequently a catheter was tunneled from the first incision to the second incision and advanced through a dilated tract into the chest. Subsequently the catheter was sutured in position. The patient tolerated the procedure well. No complications. Time out occurred at 1643 . 11 seconds of fluoroscopy was used. Successful uncomplicated placement of a tunneled chest tube.      IR INSERT TUNNELED PLEURA CATH W CUFF    Result Date: 2022  Patient MRN:  [id number] : 1950 Age: 71 years Gender: Female Order Date:  12/9/2022 4:20 PM EXAM: IR ABSCESS DRAINAGE PERC, IR INSERT TUNNELED PLEURA CATH W CUFF NUMBER OF IMAGES:  4 INDICATION:  pleurx pleurx What reading provider will be dictating this exam?->[facility] COMPARISON: None After obtaining informed consent and following the routine sterile prep and draped a needle was inserted in the chest under ultrasound guidance and serous fluid was obtained. .After obtaining informed consent and after routine sterile prep and drape and after administration of a local anesthesia, a system of needles, and cannulas was guided by ultrasound control into the pleural cavity. The needle was visualized directly under ultrasound while placing the needle within the pleural space. An image of the needle and positioned was stored in PACS. Subsequently the tract was dilated. Subsequently a tunnel was created to a more caudal incision following local anesthetic and subsequently a catheter was tunneled from the first incision to the second incision and advanced through a dilated tract into the chest. Subsequently the catheter was sutured in position. The patient tolerated the procedure well. No complications. Time out occurred at 1643 . 11 seconds of fluoroscopy was used. Successful uncomplicated placement of a tunneled chest tube. Assessment and Plan    1. Cirrhosis / HCV  -Previously positive hepatitis C antibody  -Viral load 638k (11/4/2022)   -genotype 1a or Ib reported (11/4/2022)  -HBV negative  -Nonelevated AFP 4 (11/23/2022)  -CT 11/21/2022 with contrast showing no evidence of hepatic mass  -Ascites - see below  -Patient will require further follow-up in the office for possible treatment - see discussion below  -Patient will require further evaluation with upper endoscopy -see discussion below     2.   Ascites  -Likely multifactorial and related to peritoneal involvement from advanced cancer/cirrhosis  -Paracentesis 11/22/2022 with 3050 mL removed  -Previously paracentesis yielded ascites with albumin of 1.7 at the time when serum albumin was 2.2 calculating ascitic gradient at 0.5 indicating likely alternative cause for patient's ascites   -cytology negative  -Defer diuretics to admitting service given renal failure  -Repeat paracentesis pending  -US liver with Dopplers 12/17/2022 showing normal vasculature  -Patient can undergo serial paracentesis depending on rate of accumulation of ascites which can be set as outpatient     3. Advanced malignancy  -Patient follows with Dr. Claudene Minor  -Poor prognosis  -Defer further discussion regarding goals of care and CODE STATUS to admitting/oncology     4. Anemia  -Acute on chronic  -Iron deficient  -no evidence of overt bleed  -Twice daily PPI  -Octreotide drip  -Provide IV iron supplementation  -Monitor hemoglobin every 8 hours  -Keep PRBCs on hold  -Transfusion per admitting  -Patient require further evaluation with EGD and colonoscopy depending on H&H dynamics and goals of care         Pending repeat paracentesis. Will repeat fluid evaluation and cytology. Monitor labs. GI will follow.     VERITO Ramires - CNP  1:48 PM  12/18/2022

## 2022-12-18 NOTE — PROGRESS NOTES
12/16/22  0352 12/17/22  0425    136   K 5.0 4.5   * 108*   CO2 19* 19*   BUN 26* 24*   CREATININE 1.1* 1.0   GLUCOSE 74 106*   CALCIUM 8.8 8.7       Recent Labs     12/16/22  0352 12/16/22  1647 12/17/22  0425 12/17/22  1228 12/18/22  0141   WBC 8.9  --  10.4  --   --    RBC 2.36*  --  3.21*  --   --    HGB 6.5*   < > 8.7* 9.0* 8.7*   HCT 20.5*   < > 28.2* 28.5* 27.4*   MCV 86.9  --  87.9  --   --    MCH 27.5  --  27.1  --   --    MCHC 31.7*  --  30.9*  --   --    RDW 17.3*  --  16.6*  --   --      --  333  --   --    MPV 9.9  --  10.3  --   --     < > = values in this interval not displayed. Radiology:     EXAMINATION:  CTA OF THE CHEST 12/14/2022 10:05 pm     TECHNIQUE:  CTA of the chest was performed after the administration of intravenous  contrast.  Multiplanar reformatted images are provided for review. MIP  images are provided for review. Automated exposure control, iterative  reconstruction, and/or weight based adjustment of the mA/kV was utilized to  reduce the radiation dose to as low as reasonably achievable. COMPARISON:  None. HISTORY:  ORDERING SYSTEM PROVIDED HISTORY: sob rule out PE  TECHNOLOGIST PROVIDED HISTORY:  Reason for exam:->sob rule out PE  Decision Support Exception - unselect if not a suspected or confirmed  emergency medical condition->Emergency Medical Condition (MA)     FINDINGS:  Pulmonary Arteries: Pulmonary arteries are adequately opacified for  evaluation. No evidence of intraluminal filling defect to suggest pulmonary  embolism. Main pulmonary artery is normal in caliber. Mediastinum: No evidence of mediastinal lymphadenopathy. The heart is mildly  enlarged. The pericardium demonstrates no acute abnormality. There is no  acute abnormality of the thoracic aorta. Lungs/pleura: There is a large loculated right pleural effusion with  associated posterior right lower lobe compressive atelectasis.   There is  indwelling right hemithorax chest tube which courses posteriorly with tip  terminating near the mediastinum. No pulmonary edema. No pneumothorax. On axial image 32, there is indeterminate round solid nodule in posterior  right upper lobe, abutting the major fissure. On axial image 56, there is  indeterminate 9 mm ground-glass opacity in periphery of right lower lobe. On  axial image 77, there is indeterminate 7.7 mm subpleural nodule localized in  posterior left lower lobe base. There are a few other scattered  subcentimeter nodules in the periphery of left lower lobe near the base. Upper Abdomen: There is intraabdominal ascites which is perihepatic  and  perisplenic. Soft Tissues/Bones: No acute bone or soft tissue abnormality. No thyromegaly. IMPRESSION:  No convincing evidence for acute pulmonary thromboembolism. Loculated large right pleural effusion with indwelling right hemithorax  drainage catheter. Posterior right lung base compressive atelectasis. Multiple indeterminate subcentimeter nodules, as detailed above. LUNG-RADS  3, PROBABLY BENIGN; advise follow up to document stability 6 months. 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 *      Plan:  1. Loculated pleural effusion:  CTA shows loculated large right pleural effusion with indwelling right hemithorax drainage catheter and posterior right lung base compressive atelectasis. CRP 3.1. Pleural drain placed on 12/9. Right Pleurex drained on 12/14 with 500 ml removed. Drained twice weekly at home, per patient. Follows with Dr Nikunj Souza outpatient. [id number] [address] for IR to remove pleurx. Pulmonology following.           2. Hx of lung cancer: Right lung mass. Adenocarcinoma. GA TA 3/CD x2 KRAS (G12C)+ % expression. Radiation therapy initiated in 6/22. Follows with Dr Rigo Ocampo. Oncology, pulmonology, and palliative medicine following. 3. HTN: Continue Amlodipine. 4. Anemia: Hgb stable. Received 1 U PBC on 12/16 for hgb of 6.5. No overt bleeding. Monitor H&H. Transfuse for hgb <7. Monitor. GI and hem/onc following. Consider endoscopic eval, per GI. 5. Ascites: Hx of hepatitis C. Paracentesis on 11/9 with removal of 2510 cc. Cytology negative for malignant cells in 11/22. Liver US showing cirrhotic liver, and moderate to large intra-ascites. Paracentesis scheduled for Monday. 6. Hypoxia: Secondary to above. Baseline RA. Was on 4 L NC, now down to RA. Continue DuoNebs. Pulmonology following. 7. Subcutaneous mass posterior right shoulder : Biopsy on 12/11. With pathology pending    8. Subcentimeter nodules: Seen on CT. Probably benign. Patient is to follow up outpatient in 6 months. NOTE: This report was transcribed using voice recognition software. Every effort was made to ensure accuracy; however, inadvertent computerized transcription errors may be present. Electronically signed by Gisela Castellanos PA-C on 12/18/2022 at 9:40 AM     27 minutes time spent reviewing patient chart, assessing patient, discussing plan of care with patient and family, discussing plan of care with collaborating physician, and charting.

## 2022-12-18 NOTE — PROGRESS NOTES
Inhalation, Q4H WA, Laura Wheeler MD, 1 ampule at 12/18/22 0830    oxyCODONE (ROXICODONE) immediate release tablet 10 mg, 10 mg, Oral, Q6H PRN, Laura Wheeler MD, 10 mg at 12/18/22 0847    escitalopram (LEXAPRO) tablet 5 mg, 5 mg, Oral, Daily, Janes Greenwood, APRN - CNP, 5 mg at 12/18/22 0848  BP (!) 183/86   Pulse 80   Temp 98.3 °F (36.8 °C) (Oral)   Resp 18   Ht 5' 1\" (1.549 m)   Wt 121 lb 0.5 oz (54.9 kg)   SpO2 94%   BMI 22.87 kg/m²     General: No distress  Chest: No accessory use  Heart: RRR  Lungs: Diminished R>L  Abdomen: Distended, soft, nt  Extremities: No edema    Intake/Output Summary (Last 24 hours) at 12/18/2022 1154  Last data filed at 12/17/2022 2233  Gross per 24 hour   Intake --   Output 550 ml   Net -550 ml     Hemoglobin/Hematocrit:    Lab Results   Component Value Date/Time    HGB 8.7 12/18/2022 01:41 AM    HCT 27.4 12/18/2022 01:41 AM       IMPRESSION:   Patient Active Problem List   Diagnosis    Hepatitis C    Abnormal Pap smear    Dyslipidemia    Insomnia    Ex-smoker    PVD (peripheral vascular disease) ([facility] [address]    Uterine fibroid    Gall stone    Kidney stone    Left groin pain    Need for Tdap vaccination    Abnormal chest x-ray with multiple lung nodules    Hypertension    Panlobular emphysema (HCC)    Chronic hepatitis C virus infection ([address]    Malignant neoplasm of upper lobe of right lung (HCC)    Malignant neoplasm of lung (HCC)    Leukocytosis    Severe anemia    Essential hypertension    Thrombocytopenia (HCC)    Malignant neoplasm of upper lobe of left lung (HCC)    History of lobectomy of lung    Adenocarcinoma, lung (HCC)    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)    Hep C w/o coma, chronic (HCC)    Hyperlipidemia    Warfarin anticoagulation    Bilateral lung cancer ([address]    Pulmonary embolism without acute cor pulmonale ([address]    Port-A-Cath in place    Acute cholecystitis    History of lung cancer    RBBB    Adenomatous polyp of descending colon    Abdominal pain Ascites of liver    Moderate protein-calorie malnutrition (HCC)    Intractable pain    Goals of care, counseling/discussion    Palliative care encounter    Mass of right axilla    Subareolar mass of right breast    Pleural effusion, right    Pleural effusion    Loculated pleural effusion    Subcutaneous nodule    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 Nothing really else to add at this point.     Devan Nuñez MD  12/18/2022  11:54 AM

## 2022-12-19 ENCOUNTER — APPOINTMENT (OUTPATIENT)
Dept: CT IMAGING | Age: 72
DRG: 187 | End: 2022-12-19
Payer: MEDICARE

## 2022-12-19 ENCOUNTER — APPOINTMENT (OUTPATIENT)
Dept: ULTRASOUND IMAGING | Age: 72
DRG: 187 | End: 2022-12-19
Payer: MEDICARE

## 2022-12-19 LAB
ALBUMIN FLUID: 1.5 G/DL
ALBUMIN SERPL-MCNC: 2.4 G/DL (ref 3.5–5.2)
ALP BLD-CCNC: 506 U/L (ref 35–104)
ALT SERPL-CCNC: 13 U/L (ref 0–32)
AMYLASE FLUID: 64 U/L
ANION GAP SERPL CALCULATED.3IONS-SCNC: 9 MMOL/L (ref 7–16)
APTT: 26.1 SEC (ref 24.5–35.1)
AST SERPL-CCNC: 23 U/L (ref 0–31)
BILIRUB SERPL-MCNC: 0.3 MG/DL (ref 0–1.2)
BILIRUBIN DIRECT: <0.2 MG/DL (ref 0–0.3)
BILIRUBIN, INDIRECT: ABNORMAL MG/DL (ref 0–1)
BUN BLDV-MCNC: 19 MG/DL (ref 6–23)
CALCIUM SERPL-MCNC: 9.1 MG/DL (ref 8.6–10.2)
CHLORIDE BLD-SCNC: 110 MMOL/L (ref 98–107)
CO2: 21 MMOL/L (ref 22–29)
CREAT SERPL-MCNC: 0.9 MG/DL (ref 0.5–1)
GFR SERPL CREATININE-BSD FRML MDRD: >60 ML/MIN/1.73
GLUCOSE BLD-MCNC: 123 MG/DL (ref 74–99)
GLUCOSE, FLUID: 125 MG/DL
HCT VFR BLD CALC: 29.3 % (ref 34–48)
HEMOGLOBIN: 9.3 G/DL (ref 11.5–15.5)
INR BLD: 1.1
MCH RBC QN AUTO: 27.7 PG (ref 26–35)
MCHC RBC AUTO-ENTMCNC: 31.7 % (ref 32–34.5)
MCV RBC AUTO: 87.2 FL (ref 80–99.9)
PDW BLD-RTO: 16.7 FL (ref 11.5–15)
PLATELET # BLD: 414 E9/L (ref 130–450)
PMV BLD AUTO: 9.9 FL (ref 7–12)
POTASSIUM REFLEX MAGNESIUM: 4.6 MMOL/L (ref 3.5–5)
PROTEIN FLUID: 4.1 G/DL
PROTHROMBIN TIME: 11.9 SEC (ref 9.3–12.4)
RBC # BLD: 3.36 E12/L (ref 3.5–5.5)
SODIUM BLD-SCNC: 140 MMOL/L (ref 132–146)
TOTAL PROTEIN: 7.2 G/DL (ref 6.4–8.3)
WBC # BLD: 10.6 E9/L (ref 4.5–11.5)

## 2022-12-19 PROCEDURE — 85027 COMPLETE CBC AUTOMATED: CPT

## 2022-12-19 PROCEDURE — A4216 STERILE WATER/SALINE, 10 ML: HCPCS | Performed by: HOSPITALIST

## 2022-12-19 PROCEDURE — 80048 BASIC METABOLIC PNL TOTAL CA: CPT

## 2022-12-19 PROCEDURE — C9113 INJ PANTOPRAZOLE SODIUM, VIA: HCPCS | Performed by: HOSPITALIST

## 2022-12-19 PROCEDURE — 82150 ASSAY OF AMYLASE: CPT

## 2022-12-19 PROCEDURE — 85610 PROTHROMBIN TIME: CPT

## 2022-12-19 PROCEDURE — 85730 THROMBOPLASTIN TIME PARTIAL: CPT

## 2022-12-19 PROCEDURE — 99232 SBSQ HOSP IP/OBS MODERATE 35: CPT | Performed by: PHYSICIAN ASSISTANT

## 2022-12-19 PROCEDURE — 99233 SBSQ HOSP IP/OBS HIGH 50: CPT | Performed by: STUDENT IN AN ORGANIZED HEALTH CARE EDUCATION/TRAINING PROGRAM

## 2022-12-19 PROCEDURE — 6370000000 HC RX 637 (ALT 250 FOR IP): Performed by: FAMILY MEDICINE

## 2022-12-19 PROCEDURE — 6360000002 HC RX W HCPCS: Performed by: NURSE PRACTITIONER

## 2022-12-19 PROCEDURE — P9047 ALBUMIN (HUMAN), 25%, 50ML: HCPCS | Performed by: NURSE PRACTITIONER

## 2022-12-19 PROCEDURE — 88112 CYTOPATH CELL ENHANCE TECH: CPT

## 2022-12-19 PROCEDURE — 2580000003 HC RX 258

## 2022-12-19 PROCEDURE — 84157 ASSAY OF PROTEIN OTHER: CPT

## 2022-12-19 PROCEDURE — 6370000000 HC RX 637 (ALT 250 FOR IP): Performed by: NURSE PRACTITIONER

## 2022-12-19 PROCEDURE — 2580000003 HC RX 258: Performed by: HOSPITALIST

## 2022-12-19 PROCEDURE — 0W9G3ZZ DRAINAGE OF PERITONEAL CAVITY, PERCUTANEOUS APPROACH: ICD-10-PCS | Performed by: RADIOLOGY

## 2022-12-19 PROCEDURE — 88305 TISSUE EXAM BY PATHOLOGIST: CPT

## 2022-12-19 PROCEDURE — 36415 COLL VENOUS BLD VENIPUNCTURE: CPT

## 2022-12-19 PROCEDURE — 87070 CULTURE OTHR SPECIMN AEROBIC: CPT

## 2022-12-19 PROCEDURE — 6370000000 HC RX 637 (ALT 250 FOR IP): Performed by: PHYSICIAN ASSISTANT

## 2022-12-19 PROCEDURE — 94640 AIRWAY INHALATION TREATMENT: CPT

## 2022-12-19 PROCEDURE — C1729 CATH, DRAINAGE: HCPCS

## 2022-12-19 PROCEDURE — 83615 LACTATE (LD) (LDH) ENZYME: CPT

## 2022-12-19 PROCEDURE — 2060000000 HC ICU INTERMEDIATE R&B

## 2022-12-19 PROCEDURE — 80076 HEPATIC FUNCTION PANEL: CPT

## 2022-12-19 PROCEDURE — 82947 ASSAY GLUCOSE BLOOD QUANT: CPT

## 2022-12-19 PROCEDURE — 87205 SMEAR GRAM STAIN: CPT

## 2022-12-19 PROCEDURE — 6370000000 HC RX 637 (ALT 250 FOR IP): Performed by: HOSPITALIST

## 2022-12-19 PROCEDURE — 6360000002 HC RX W HCPCS: Performed by: HOSPITALIST

## 2022-12-19 PROCEDURE — 82042 OTHER SOURCE ALBUMIN QUAN EA: CPT

## 2022-12-19 RX ORDER — PANTOPRAZOLE SODIUM 40 MG/1
40 TABLET, DELAYED RELEASE ORAL
Status: DISCONTINUED | OUTPATIENT
Start: 2022-12-19 | End: 2022-12-23 | Stop reason: HOSPADM

## 2022-12-19 RX ORDER — ALBUMIN (HUMAN) 12.5 G/50ML
25 SOLUTION INTRAVENOUS
Status: COMPLETED | OUTPATIENT
Start: 2022-12-19 | End: 2022-12-19

## 2022-12-19 RX ADMIN — ALBUMIN (HUMAN) 25 G: 25 SOLUTION INTRAVENOUS at 11:13

## 2022-12-19 RX ADMIN — IPRATROPIUM BROMIDE AND ALBUTEROL SULFATE 1 AMPULE: 2.5; .5 SOLUTION RESPIRATORY (INHALATION) at 17:24

## 2022-12-19 RX ADMIN — OXYCODONE 10 MG: 5 TABLET ORAL at 03:51

## 2022-12-19 RX ADMIN — SODIUM CHLORIDE, PRESERVATIVE FREE 40 MG: 5 INJECTION INTRAVENOUS at 06:58

## 2022-12-19 RX ADMIN — ESCITALOPRAM OXALATE 5 MG: 10 TABLET ORAL at 14:15

## 2022-12-19 RX ADMIN — Medication 10 ML: at 20:28

## 2022-12-19 RX ADMIN — PANTOPRAZOLE SODIUM 40 MG: 40 TABLET, DELAYED RELEASE ORAL at 17:36

## 2022-12-19 RX ADMIN — OXYCODONE 10 MG: 5 TABLET ORAL at 14:15

## 2022-12-19 RX ADMIN — AMLODIPINE BESYLATE 10 MG: 10 TABLET ORAL at 14:15

## 2022-12-19 RX ADMIN — Medication: at 14:19

## 2022-12-19 ASSESSMENT — PAIN SCALES - GENERAL
PAINLEVEL_OUTOF10: 7
PAINLEVEL_OUTOF10: 7

## 2022-12-19 ASSESSMENT — PAIN - FUNCTIONAL ASSESSMENT: PAIN_FUNCTIONAL_ASSESSMENT: NONE - DENIES PAIN

## 2022-12-19 NOTE — PROGRESS NOTES
[facility] Progress Note    Admitting Date and Time: 12/14/2022  8:41 PM  Admit Dx: Loculated pleural effusion [J90]  Other fatigue [R53.83]    Subjective:  Patient is being followed for Loculated pleural effusion [J90]  Other fatigue [R53.83]     Patient was lying in no acute distress. She is eager for pleurx to be removed due to pain. ROS: denies fever, chills, cp, sob, n/v, HA unless stated above.       amLODIPine  10 mg Oral Daily    pantoprazole (PROTONIX) 40 mg injection  40 mg IntraVENous Q12H    sodium chloride flush  5-40 mL IntraVENous 2 times per day    ipratropium-albuterol  1 ampule Inhalation Q4H WA    escitalopram  5 mg Oral Daily     medicated lip balm, , PRN  white petrolatum, , BID PRN  sodium chloride, , PRN  sodium chloride flush, 5-40 mL, PRN  sodium chloride, , PRN  ondansetron, 4 mg, Q8H PRN   Or  ondansetron, 4 mg, Q6H PRN  acetaminophen, 650 mg, Q6H PRN   Or  acetaminophen, 650 mg, Q6H PRN  polyethylene glycol, 17 g, Daily PRN  oxyCODONE, 10 mg, Q6H PRN       Objective:    BP (!) 162/85   Pulse 87   Temp 97.6 °F (36.4 °C) (Temporal)   Resp 18   Ht 5' 1\" (1.549 m)   Wt 120 lb 5.9 oz (54.6 kg)   SpO2 96%   BMI 22.74 kg/m²   General Appearance: alert and oriented to person, place and time and in no acute distress  Skin: warm and dry  Head: normocephalic and atraumatic  Eyes: pupils equal, round, and reactive to light, extraocular eye movements intact, conjunctivae normal  Neck: neck supple and non tender without mass   Pulmonary/Chest: diminished on the right, no wheezes, rales or rhonchi, normal air movement, no respiratory distress  Cardiovascular: normal rate, normal S1 and S2 and no carotid bruits  Abdomen: soft, non-tender, distended, normal bowel sounds, no masses or organomegaly  Extremities: no cyanosis, no clubbing and no edema  Neurologic: no cranial nerve deficit and speech normal        Recent Labs     12/17/22  0425 12/19/22  0347    140   K 4.5 4.6   * 110*   CO2 19* 21*   BUN 24* 19   CREATININE 1.0 0.9   GLUCOSE 106* 123*   CALCIUM 8.7 9.1         Recent Labs     12/17/22  0425 12/17/22  1228 12/18/22  0141 12/19/22  0347   WBC 10.4  --   --  10.6   RBC 3.21*  --   --  3.36*   HGB 8.7* 9.0* 8.7* 9.3*   HCT 28.2* 28.5* 27.4* 29.3*   MCV 87.9  --   --  87.2   MCH 27.1  --   --  27.7   MCHC 30.9*  --   --  31.7*   RDW 16.6*  --   --  16.7*     --   --  414   MPV 10.3  --   --  9.9         Radiology:     EXAMINATION:  CTA OF THE CHEST 12/14/2022 10:05 pm     TECHNIQUE:  CTA of the chest was performed after the administration of intravenous  contrast.  Multiplanar reformatted images are provided for review. MIP  images are provided for review. Automated exposure control, iterative  reconstruction, and/or weight based adjustment of the mA/kV was utilized to  reduce the radiation dose to as low as reasonably achievable. COMPARISON:  None. HISTORY:  ORDERING SYSTEM PROVIDED HISTORY: sob rule out PE  TECHNOLOGIST PROVIDED HISTORY:  Reason for exam:->sob rule out PE  Decision Support Exception - unselect if not a suspected or confirmed  emergency medical condition->Emergency Medical Condition (MA)     FINDINGS:  Pulmonary Arteries: Pulmonary arteries are adequately opacified for  evaluation. No evidence of intraluminal filling defect to suggest pulmonary  embolism. Main pulmonary artery is normal in caliber. Mediastinum: No evidence of mediastinal lymphadenopathy. The heart is mildly  enlarged. The pericardium demonstrates no acute abnormality. There is no  acute abnormality of the thoracic aorta. Lungs/pleura: There is a large loculated right pleural effusion with  associated posterior right lower lobe compressive atelectasis. There is  indwelling right hemithorax chest tube which courses posteriorly with tip  terminating near the mediastinum. No pulmonary edema. No pneumothorax.      On axial image 32, there is indeterminate round solid nodule in posterior  right upper lobe, abutting the major fissure. On axial image 56, there is  indeterminate 9 mm ground-glass opacity in periphery of right lower lobe. On  axial image 77, there is indeterminate 7.7 mm subpleural nodule localized in  posterior left lower lobe base. There are a few other scattered  subcentimeter nodules in the periphery of left lower lobe near the base. Upper Abdomen: There is intraabdominal ascites which is perihepatic  and  perisplenic. Soft Tissues/Bones: No acute bone or soft tissue abnormality. No thyromegaly. IMPRESSION:  No convincing evidence for acute pulmonary thromboembolism. Loculated large right pleural effusion with indwelling right hemithorax  drainage catheter. Posterior right lung base compressive atelectasis. Multiple indeterminate subcentimeter nodules, as detailed above. LUNG-RADS  3, PROBABLY BENIGN; advise follow up to document stability 6 months. 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 *      Plan:  1. Loculated pleural effusion:  CTA shows loculated large right pleural effusion with indwelling right hemithorax drainage catheter and posterior right lung base compressive atelectasis. CRP 3.1. Pleural drain placed on 12/9. Right Pleurex drained on 12/14 with 500 ml removed. Drained twice weekly at home, per patient. Follows with Dr Alma Moreau outpatient. [id number] [address] for IR to remove pleurx. Pulmonology following. 2. Hx of lung cancer: Right lung mass. Adenocarcinoma. GA TA 3/CD x2 KRAS (G12C)+ % expression. Radiation therapy initiated in 6/22. Follows with Dr Aurora Muñoz. Oncology, pulmonology, and palliative medicine following. PT/OT consulted. 3. HTN: Continue Amlodipine. Increase to Norvasc 10 mg daily due to uncontrolled BP. 4. Anemia: Hgb stable. Received 1 U PBC on 12/16 for hgb of 6.5. No overt bleeding. Monitor H&H. Transfuse for hgb <7. Monitor. GI and hem/onc following. Consider endoscopic eval, per GI. 5. Ascites: Hx of hepatitis C. Paracentesis on 11/9 with removal of 2510 cc. Cytology negative for malignant cells in 11/22. Liver US showing cirrhotic liver, and moderate to large intra-ascites. Paracentesis scheduled for Monday. 6. Hypoxia: Secondary to above. Baseline RA. Was on 4 L NC, now down to RA. Continue DuoNebs. Pulmonology following. 7. Subcutaneous mass posterior right shoulder : Biopsy on 12/11. With pathology pending    8. Subcentimeter nodules: Seen on CT. Probably benign. Patient is to follow up outpatient in 6 months. NOTE: This report was transcribed using voice recognition software. Every effort was made to ensure accuracy; however, inadvertent computerized transcription errors may be present. Electronically signed by Valerie Felder PA-C on 12/19/2022 at 8:40 AM     27 minutes time spent reviewing patient chart, assessing patient, discussing plan of care with patient and family, discussing plan of care with collaborating physician, and charting.

## 2022-12-19 NOTE — PROGRESS NOTES
Met with the patient at the bedside. She was eating lunch. She stated that they did not do the procedure this morning and then will be moved to tomorrow. No acute palliative care needs identified. We will continue to follow.

## 2022-12-19 NOTE — PROGRESS NOTES
Inpatient Medical Oncology Progress Note    Subjective:  SOB on exertion. Fatigue. No fever. Weakness. No nausea    Objective:  BP (!) 166/74   Pulse 87   Temp 99.1 °F (37.3 °C) (Oral)   Resp 18   Ht 5' 1\" (1.549 m)   Wt 121 lb 0.5 oz (54.9 kg)   SpO2 94%   BMI 22.87 kg/m²   GENERAL: Alert, oriented x 3, tired  HEENT: PERRLA; EOMI. Oropharynx clear. NECK: Supple. Without lymphadenopathy. LUNGS: suresh basal crackles. CARDIOVASCULAR: Regular rate. No murmurs, rubs or gallops. ABDOMEN: Soft. Distended. EXTREMITIES: Without clubbing or cyanosis or edema. NEUROLOGIC: No focal deficits. ECOG PS 2    Diagnostics:  Lab Results   Component Value Date    WBC 10.4 12/17/2022    HGB 8.7 (L) 12/18/2022    HCT 27.4 (L) 12/18/2022    MCV 87.9 12/17/2022     12/17/2022     Lab Results   Component Value Date     12/17/2022    K 4.5 12/17/2022     (H) 12/17/2022    CO2 19 (L) 12/17/2022    BUN 24 (H) 12/17/2022    CREATININE 1.0 12/17/2022    GLUCOSE 106 (H) 12/17/2022    CALCIUM 8.7 12/17/2022    PROT 6.7 12/17/2022    LABALBU 2.1 (L) 12/17/2022    BILITOT 0.3 12/17/2022    ALKPHOS 819 (H) 12/17/2022    AST 59 (H) 12/17/2022    ALT 24 12/17/2022    LABGLOM 60 12/17/2022    GFRAA 49 09/14/2022     Impression/Plan:  71 y.o. female Hx of smoking who underwent: (1) Bronchoscopy. (2) Right VATS. (3) VATS right upper lobe wedge resection. (4) VATS right upper lobectomy. (5) Intercostal nerve block from T3 to T10. (6) Mediastinal lymph node dissection on 11/11/2015:   Pathology:  Right lung, upper lobectomy: Invasive, moderately differentiated adenocarcinoma (Grade 2). Tumor size 1.5 cm in greatest dimension. Visceral pleural invasion: Not identified. Visceral pleural invasion: Not identified. Surgical margin negative for malignancy. Two of three peribronchial lymph nodes positive for adenocarcinoma. pT1a N1 MX;      Being followed for a left upper lobe mass by Dr. Minnie Chow.  Multiple biopsies in the past came back negative for malignancy. Hypermetabolic on PET/CT scan on 09/29/2015 (2.3 cm in size with SUV of 6.4). CT guided biopsy of the left upper lobe lesion on 11/02/2015 was noted to be negative for malignancy. She was referred to the medical oncology clinic to discuss adjuvant chemotherapy. We recommended 4 cycles of adjuvant chemotherapy consisting of Carboplatin/Alimta. Mediport placed 12/7/15. -MRI Brain on 12/8/15:  Negative for metastatic disease.   -We will repeat CT chest and PET/CT after 4 cycles of Carboplatin/Alimta. To follow on Lingular lesion as well. -Molecular studies (EGFR, ALK, ROS-1) were all Not Detected. Cycle # 1 of Ryan Specter was on 12/09/2015. Cycle # 2 of Ryan Specter was on 01/06/2016. Cycle # 3 of Ryan Specter was on 01/27/2016. Cycle # 4 of Ryan Specter was on 02/24/2016. PET SCAN 3.2016: The 2.1 cm left lung mass abutting the major fissure is hypermetabolic with SUV max of 5.4. Finding is worrisome for tumor with avid glucose metabolism. 2. Elsewhere there is unremarkable distribution of FDG activity without evidence of hypermetabolic metastases. On 03/29/2016 underwent Bronch/Left VATS/VATS wedge ROMELIA/VATS Left upper lobectomy/Mediasinal lymph node dissection/Intercostal nerve block from T3-T10 per Dr. Jeremías Kendall. Final pathology revealed Stage I Adenocarcinoma of ROMELIA (morphologically different from the adenocarcinoma previously diagnosed in the right upper lobe. Therefore, synchronous primary tumors are favored). A. Left lung, upper lobe wedge resection: Invasive, well-differentiated adenocarcinoma (grade 1); Tumor size-1.4 cm in greatest dimension; Surgical margins-negative for malignancy; Lymphovascular invasion-not identified; TNM classification-pT2a N0 MX  B. AP window lymph node #1, excision: Anthracotic lymph node; negative for malignancy  C.  AP window lymph node #2, excision: Anthracotic lymph node; negative for malignancy D. Periaortic lymph node #1, excision: Fibroadipose tissue; lymph node not identified  E. Bronchial lymph node #1, excision: Anthracotic lymph node; negative for malignancy  F. Left lung, upper lobectomy: Emphysematous change; negative for malignancy  4 anthracotic lymph nodes negative for malignancy   G. Inferior pulmonary ligament lymph node, excision: Anthracotic lymph node; negative for malignancy  H. Bronchial lymph node, excision: Anthracotic lymph node; negative for malignancy. Right side Mediport was removed on 11/04/2016 by Dr. Lyn Rose. On surveillance per NCCN guidelines. CT chest 04/12/2017 noted no convincing evidence of recurrent disease. CT chest 10/19/2017 noted no definite evidence for recurrent malignancy. CT chest 03/12/2018 negative for pulmonary parenchymal masses or enlarged mediastinal or hilar LN. ? 2 cm lesion in spleen difficult to evaluate due to the arterial phase of the study. CT Abd/Pelvis 05/08/2018  Enhancing lesion within the spleen is relatively stable to slightly smaller when compared to April 2014 exam and likely splenic hemangioma. Other indeterminate findings (left periaortic LN, sclerotic/blastic foci in L3 and L1 reported by Dr. Armando Zuñiga from radiology team). PET/CT scan 06/05/2018 unremarkable. No FDG avid uptake identified. No evidence for recurrent or metastatic disease. CT Chest 12/13/2018 noted no evidence of recurrent/metastatic disease. CT chest on 06/11/2019 noted no evidence for worrisome residual or recurrent malignancy. No evidence for new lymphadenopathy or metastatic disease. Stable scarring and postoperative changes right upper lobe. CT chest 11/26/2019: No evidence of active neoplasm. CT chest 06/15/2020: No evidence of active neoplasm. CT chest 12/30/2020: Postsurgical changes and postsurgical scarring seen within the right lung apex. There is no evidence of tumor recurrence.   2.1 x 2.3 cm enhancing lesion seen within the spleen  PET/CT scan 03/02/2021   No FDG avid uptake is identified which exceeds the threshold SUV. No convincing evidence for recurrent or metastatic disease  CT chest 09/28/2021 No evidence of tumor recurrence     Recent abdominal pain; ER visit reviewed  CT abdomen/pelvis 02/20/2022   6 mm right lower lobe pulmonary nodule  Multiple peritoneal cystic masses      CEA 12.5 on 03/16/2022     CT chest 04/05/2022 Ground-glass nodule right lower lobe superolateral portion 10 mm unchanged from prior however in the medial segment right lower lobe with pleural abutment is a 7 mm pulmonary nodule increased in size from 09/28/2021 with is barely visible at 2-3 mm; repeat CT chest in 3 mo     PET/CT scan 04/05/2022 noted No FDG avid uptake is identified which exceeds the threshold SUV      Bilateral screening mammogram 04/20/2022: Negative for malignancy     CEA     12.1 on 04/20/2022  CA-125 32.8 on 04/20/2022   <2 on 04/20/2022  Chromogranin A 1480 on 04/20/2022. EGD/Colonoscopy 05/13/2022: Gastric polyps , duodenal polyp moderate gastroduodenitis   A. Stomach, biopsy: Hyperplastic polyp and moderate chronic active gastritis; negative for intestinal metaplasia   Immunostain negative for Helicobacter pylori organisms   B. Duodenum, biopsy: Chronic duodenitis with features of peptic duodenitis   C. Colon, 20 cm biopsy: Fragments of tubular adenoma and hyperplastic polyp   Pathology reviewed. Referred to HBP team (Dr. Manuel Bey) for further evaluation of peritoneal cystic masses  CT abdomen/pelvis 06/23/2022 numerous cystic structures identified throughout the right flank and retroperitoneum. Laparoscopic robotic peritoneal mass resection on 07/06/2022  Findings included: serous cystic masses along the colon, periportal lymphadenopathy, fibrotic appearing liver, chronic cholecystitis     Peritoneal fluid Negative for malignant cells.  Cellblock shows reactive mesothelial cells, lymphocytes, Periportal lymph node flow cytometry showing 4.5% monoclonal B cells without detectable CD5, CD10 or CD11c expression in a predominantly polyclonal background, raising the differential between a monoclonal B-cell population of undetermined significance versus a B-cell lymphoma/leukemia     Repeat CT chest to follow on history of NSCLC on 08/04/2022  Postsurgical changes are identified in the upper lobes bilaterally. 1.1 cm ground-glass nodule in the right lower lobe and a smaller 1 measuring 0.9 cm are noted without change. There is a 1.6 x 1.2 cm soft tissue mass in the right lower lobe medially which is significantly increased since the previous examination. Bilateral adrenal nodules are noted, larger 1 on the left side measuring 1.8 x 1.6 cm.  1.2 cm right renal cystic lesion is identified. An ill-defined hypodense lesion is identified in the spleen currently measuring 2.9 x 3.5 cm      Evaluated by Dr. Shabbir Zarate on 08/15/2022 at [facility] who recommended proceeding with a biopsy of the enlarging right lower lobe lung mass. While lymphoma certainly possible, biopsy recommended to rule out recurrent lung cancer. Periportal lymph node biopsy reviewed at [facility]. Lymph node with lipid lymphadenopathy  Small clonal B-cell population detected by flow cytometry and molecular analysis  Negative for carcinoma  Flow cytometry showed a small clonal CD5 -/CD10- B-cell population (4.5% of all events) and a background of polyclonal B cells. B-cell receptor clonality assay was positive for a clonal B-cell population. However there is no morphologic evidence of lymphoma in the lymph node. The detection of a clonal B-cell population despite negative morphologic evidence of lymphoma might represent monoclonal B-cell lymphocytosis process in the lymph node.   An alternative consideration is peripheral blood involvement by B-cell clone (MBL-like process) that was picked up by the tissue flow cytometry and molecular tests. Even if lymphoma is concerned, likely low-grade and not causing her symptoms, so observation would be recommended. PET/CT scan, CT-guided core needle biopsy of enlarging right lower lobe lung mass recommended. PET/CT scan 09/06/2022: In the region of the lesion in the right lower lobe there is FDG avid tracer uptake peak SUV is 3.2. CT guided core needle biopsy RLL lung nodule on 09/20/2022  Right lung, lower lobe core needle biopsy: Adenocarcinoma (see comment)   Comment: The prior history of right upper lobe adenocarcinoma (HES ) and left upper lobe adenocarcinoma (HES ) is noted. The electronic medical record is also reviewed. The adenocarcinoma in the current specimen is weakly immunoreactive with GATA3 and CDX2. The TTF-1 immunostain is negative. 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 Pelvic U/S large calcified uterine mass; ovaries not identified. Large volume ascites. MRI Pelvis 11/05/2022: No suspicious uterine or endometrial mass  Multiple calcified, degenerated uterine leiomyomas, measuring up to 5.2 cm  Seen by GYN (Dr. Edris Hammans); No findings concerning for gynecologic malignancy. Status post paracentesis 11/9/2022:  A total of 2510 cc of straw-colored ascitic fluid was removed  Cytology was negative for malignant cells  Reactive mesothelial cells, acute/chronic inflammatory cells, and blood present. The patient was admitted on 12/14/2022 for increased shortness of breath. More recently few days ago she was discharged from the hospital for worsening right pleural effusion, in which Pleurx catheter was placed on 12/9/2022. Also of note, patient had recent development of a subcutaneous nodule located in the posterior right axilla. CT scans on 12/7/2022 reported 2.7 x 1.8 cm subcutaneous tissue mass, overlies the trapezius muscle     Large Right sided loculated pleural effusion, s/p PleurX placement 12/9/2022, along with findings suggestive of chest wall metastasis  Recurrent ascites, negative cytology, but with concern for malignancy  Subcutaneous mass posterior to right axilla, s/p excision on 12/11/2022  Right breast mass, BIRADS 4  History of recurrent ascites  Imaging evidence of early cirrhosis  Active chronic hepatitis C infection  Right lung mass positive for Adenocarcinoma, weakly positive for GATA3 and CDX2, TTF-1 negative; KRAS (G12C) positive, PDL1 with 10% expression  Previously described multiple peritoneal cystic masses, s/p resection in 7/6/2022, not well visualized on 10/31/22 CT abdomen  Monoclonal B-cell population from periportal lymph node resection from 7/6/2022, in the setting of follicular hyperplasia  Previous right sided lung adenocarcinoma in 2015  Previous left-sided lung adenocarcinoma 2016  Family history of uterine cancer in mother  Family history of early age breast cancer in sister (Diagnosed around early 42's)     Pleural fluid collected from PleurX including cytology and fluid studies; Cytology pending  Pulmonary team following  IR team evaluated Pleurx in good position   Ok to remove Pleurx catheter  Continue Duonebs. Maintain pulse ox >90%    Awaiting for biopsy results from excision the subcutaneous nodule in posterior right axilla done on 12/11/2022.       Hold off on radiation for now for the lung mass as we await results from pleurx fluid cytology and the right axillary excision biopsy    Palliative on board    Anemia; did receive IV iron  Hb 8.7 today 12/18/2022. Labs reviewed. Continue to monitor CBC with diff   GI team on board; EGD/Colonoscopy pending H&H    PPI BID  On Octreotide  Liver US noted cirrhotic liver. No hepatic mass. Moderate to large ascites. Repeat paracentesis tomorrow; repeat fluid evaluation and cytology.    Removal of right pleurx tomorrow     12/18/2022  Lori Romo MD

## 2022-12-19 NOTE — PROGRESS NOTES
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 *      Subjective:  Patient seen resting in bed on RA-heading to radiology to have Pleurx removed   Denies complaints       Medications:   sodium chloride      octreotide (SandoSTATIN) infusion 50 mcg/hr (22 1616)    sodium chloride          amLODIPine  10 mg Oral Daily    pantoprazole (PROTONIX) 40 mg injection  40 mg IntraVENous Q12H    sodium chloride flush  5-40 mL IntraVENous 2 times per day    ipratropium-albuterol  1 ampule Inhalation Q4H WA    escitalopram  5 mg Oral Daily       Vitals:  VITALS:  BP (!) 162/85   Pulse 87   Temp 97.6 °F (36.4 °C) (Temporal)   Resp 18   Ht 5' 1\" (1.549 m)   Wt 120 lb 5.9 oz (54.6 kg)   SpO2 96%   BMI 22.74 kg/m²   24HR INTAKE/OUTPUT:    Intake/Output Summary (Last 24 hours) at 2022 0950  Last data filed at 2022 0027  Gross per 24 hour   Intake --   Output 250 ml   Net -250 ml     CURRENT PULSE OXIMETRY:  SpO2: 96 %  24HR PULSE OXIMETRY RANGE:  SpO2  Av.5 %  Min: 96 %  Max: 99 %  CVP:    VENT SETTINGS:      Additional Respiratory Assessments  Heart Rate: 87  Resp: 18  SpO2: 96 %      EXAM:  General: No distress. Alert. Room air   Eyes: No sclera icterus. No conjunctival injection. ENT: No discharge. Pharynx clear. Neck: Trachea midline. Normal thyroid. Resp: No accessory muscle use. No crackles. No wheezing. No rhonchi. Diminished on the R base otherwise clear   CV: Regular rate. Regular rhythm. +1 BLE, + murmur   ABD: Non-tender. Non-distended. Normal bowel sounds.    Skin: Warm and dry. No nodule on exposed extremities. No rash on exposed extremities. Right pleurx   M/S: No cyanosis. No joint deformity. No clubbing. Neuro: Awake. Follows commands. A&Ox3     I/O: I/O last 3 completed shifts:  In: -   Out: 800 [Urine:800]  No intake/output data recorded. Results:  CBC:   Recent Labs     12/17/22  0425 12/17/22  1228 12/18/22  0141 12/19/22  0347   WBC 10.4  --   --  10.6   HGB 8.7* 9.0* 8.7* 9.3*   HCT 28.2* 28.5* 27.4* 29.3*   MCV 87.9  --   --  87.2     --   --  414     BMP:   Recent Labs     12/17/22 0425 12/19/22 0347    140   K 4.5 4.6   * 110*   CO2 19* 21*   BUN 24* 19   CREATININE 1.0 0.9     LFT:   Recent Labs     12/17/22 0425 12/19/22 0347   ALKPHOS 819* 506*   ALT 24 13   AST 59* 23   PROT 6.7 7.2   BILITOT 0.3 0.3   BILIDIR  --  <0.2   LABALBU 2.1* 2.4*     PT/INR:   Recent Labs     12/17/22 0425 12/19/22 0347   PROTIME 12.0 11.9   INR 1.1 1.1     Cultures:  No results for input(s): CULTRESP in the last 72 hours. ABG:   No results for input(s): PH, PO2, PCO2, HCO3, BE, O2SAT in the last 72 hours. Films:  XR CHEST PORTABLE    Result Date: 12/14/2022  EXAMINATION: ONE XRAY VIEW OF THE CHEST 12/14/2022 9:08 pm COMPARISON: December 12, 2022 HISTORY: ORDERING SYSTEM PROVIDED HISTORY: right effusion, sob TECHNOLOGIST PROVIDED HISTORY: Reason for exam:->right effusion, sob FINDINGS: Hazy opacities throughout the right lung notable along peripheral aspect of right lung and right costophrenic sulcus. Right-sided chest tube is present. No obvious pneumothorax. The heart is normal in size. 1. Redemonstration of hazy opacities throughout right lung notable suggesting persistent right pleural effusion. 2. Redemonstration of right chest tube. 3. No evidence of pneumothorax.      CTA PULMONARY W CONTRAST    Result Date: 12/15/2022  EXAMINATION: CTA OF THE CHEST 12/14/2022 10:05 pm TECHNIQUE: CTA of the chest was performed after the administration of intravenous contrast.  Multiplanar reformatted images are provided for review. MIP images are provided for review. Automated exposure control, iterative reconstruction, and/or weight based adjustment of the mA/kV was utilized to reduce the radiation dose to as low as reasonably achievable. COMPARISON: None. HISTORY: ORDERING SYSTEM PROVIDED HISTORY: sob rule out PE TECHNOLOGIST PROVIDED HISTORY: Reason for exam:->sob rule out PE Decision Support Exception - unselect if not a suspected or confirmed emergency medical condition->Emergency Medical Condition (MA) FINDINGS: Pulmonary Arteries: Pulmonary arteries are adequately opacified for evaluation. No evidence of intraluminal filling defect to suggest pulmonary embolism. Main pulmonary artery is normal in caliber. Mediastinum: No evidence of mediastinal lymphadenopathy. The heart is mildly enlarged. The pericardium demonstrates no acute abnormality. There is no acute abnormality of the thoracic aorta. Lungs/pleura: There is a large loculated right pleural effusion with associated posterior right lower lobe compressive atelectasis. There is indwelling right hemithorax chest tube which courses posteriorly with tip terminating near the mediastinum. No pulmonary edema. No pneumothorax. On axial image 32, there is indeterminate round solid nodule in posterior right upper lobe, abutting the major fissure. On axial image 56, there is indeterminate 9 mm ground-glass opacity in periphery of right lower lobe. On axial image 77, there is indeterminate 7.7 mm subpleural nodule localized in posterior left lower lobe base. There are a few other scattered subcentimeter nodules in the periphery of left lower lobe near the base. Upper Abdomen: There is intraabdominal ascites which is perihepatic  and perisplenic. Soft Tissues/Bones: No acute bone or soft tissue abnormality. No thyromegaly. No convincing evidence for acute pulmonary thromboembolism.  Loculated large right pleural effusion with indwelling right hemithorax drainage catheter. Posterior right lung base compressive atelectasis. Multiple indeterminate subcentimeter nodules, as detailed above. LUNG-RADS 3, PROBABLY BENIGN; advise follow up to document stability 6 months. Ascites. Assessment:  12/9: R pleurx placed by IR     12/14: R pleurex -500 mL ( prior to admission), ED for SOB  12/15: 4L   12/16: room air, hgb 6.5  12/17 RA    Hypoxia - resolved   Anemia   Right lung mass positive for Adenocarcinoma, weakly positive for GATA3 and CDX2, TTF-1 negative; KRAS (G12C) positive, PDL1 with 10% expression. Plan was to start radiation therapy on 12/6/2022  Large right loculated pleural effusion noted on CTA 12/14  Large right pleural effusion with loculation s/p right Pleurx 12/9, no cytology obtained  2.7 X 1.8 cm enhancing subcutaneous mass in the posterior right shoulder region s/p excisional biopsy 12/11, pathology pending    Right breast lesion  History of ascites with cirrhosis status post paracentesis 11/22 cytology negative for malignant cells  History right-sided lung adenocarcinoma 2015 left-sided lung adenocarcinoma 2016  History of COPD  History of tobacco use, 58-pack-year smoking history      Plan:  Patient currently on room air ,  Baseline is room air. Ok for supplemental O2 as needed to maintain pulse ox >90%  Continue Duo-nebs. Resume Spiriva on discharge  Right Pleurx drained for 500 mL on 12/14. Unsure of frequency she was draining at home. was to Continue drain every other day, unable to get any fluid despite fluid on imaging-IR  evaluated pleurx, in good position but will not flush or instill into pleurx.  [facility] SYSTEM for IR to to remove pleurx, to be done today 12/19  And we will evaluate  and Monitor post   H/H 6.5/20.5, hematology following stable now 9.3  Supportive care         Electronically signed by VERITO Fatima on 12/19/2022 at 9:50 AM    Attending Attestation Note:    Patient seen and examined with Hospital Staff, NP. I have extensively reviewed the chart lab work and imaging. I agree with above. Modifications and amendment of note made as necessary.     In addition, the following apply:    Current Facility-Administered Medications   Medication Dose Route Frequency Provider Last Rate Last Admin    pantoprazole (PROTONIX) tablet 40 mg  40 mg Oral BID AC Henna Maldonado MD        medicated lip balm (BLISTEX/CARMEX) stick   Topical PRN Maria C Hernandez MD   Given at 12/19/22 1419    amLODIPine (NORVASC) tablet 10 mg  10 mg Oral Daily Amal Fred Wasserman PA-C   10 mg at 12/19/22 1415    white petrolatum ointment   Topical BID PRN Maria C Hernandez MD   Given at 12/18/22 0847    0.9 % sodium chloride infusion   IntraVENous PRN Sahron Willingham MD        octreotide (SANDOSTATIN) 500 mcg in sodium chloride 0.9 % 100 mL infusion  25 mcg/hr IntraVENous Continuous Henna Maldonado MD 5 mL/hr at 12/19/22 0720 25 mcg/hr at 12/19/22 0720    sodium chloride flush 0.9 % injection 5-40 mL  5-40 mL IntraVENous 2 times per day Priya J Addicott, APRN - CNP   10 mL at 12/18/22 0849    sodium chloride flush 0.9 % injection 5-40 mL  5-40 mL IntraVENous PRN Priya J Addicott, APRN - CNP        0.9 % sodium chloride infusion   IntraVENous PRN Priya ANDRADE Addicott, APRN - CNP        ondansetron (ZOFRAN-ODT) disintegrating tablet 4 mg  4 mg Oral Q8H PRN Priya J Addicott, APRN - CNP        Or    ondansetron (ZOFRAN) injection 4 mg  4 mg IntraVENous Q6H PRN Priya J Addicott, APRN - CNP        acetaminophen (TYLENOL) tablet 650 mg  650 mg Oral Q6H PRN Priya J Addicott, APRN - CNP   650 mg at 12/15/22 2002    Or    acetaminophen (TYLENOL) suppository 650 mg  650 mg Rectal Q6H PRN Priya J Addicott, APRN - CNP        polyethylene glycol (GLYCOLAX) packet 17 g  17 g Oral Daily PRN Priya J Addicott, APRN - CNP   17 g at 12/18/22 0847    ipratropium-albuterol (DUONEB) nebulizer solution 1 ampule  1 ampule Inhalation Q4H [address] Julissa MD   1 ampule at 12/19/22 1724    oxyCODONE (ROXICODONE) immediate release tablet 10 mg  10 mg Oral Q6H PRN Logan Zee MD   10 mg at 12/19/22 1415    escitalopram (LEXAPRO) tablet 5 mg  5 mg Oral Daily VERITO Rondon - CNP   5 mg at 12/19/22 1415      - IR paracentesis  - await pleurX catheter removal   - O2, IS    Vidya Navarro MD  12/19/2022  5:31 PM

## 2022-12-19 NOTE — OR NURSING
Patient arrival from room 621 to IR for paracentesis. Vitals taken, Dr. Agusto Del Angel in to speak with the patient about the procedure, all questions answered. Abdomen scanned, prepped and centesis catheter inserted RLQ with ultrasound guidance by Dr Agusto Del Angel @ 21 [phone]. Patient tolerated well. 2060 ml drained of clear light yellow colored ascitic fluid. Centesis catheter removed @ 1242 . Puncture site cleansed and dry dressing applied. No bleeding, swelling or complications noted. Patient transported to [facility] for removal of pleurx catheter. Nurse handoff report called to floor, floor to re-order albumin that has .

## 2022-12-19 NOTE — PROGRESS NOTES
Patient in radiology department for right chest pleur-x removal. Interventional radiologist, Dr. Elidia Perry requested to perform a STAT case at main [place], therefore procedure to be delayed until tomorrow 12/20. Floor RN Ish Loera made aware of procedure date/time change.

## 2022-12-19 NOTE — PROGRESS NOTES
Inpatient Medical Oncology Progress Note    Subjective:  Patient appears fatigued. She was asleep, but easily to awaken. She appears calm but somewhat disheveled. Speech not very clear. Patient states she has not been ambulating very much lately. Objective:  BP (!) 160/73   Pulse 87   Temp 98.8 °F (37.1 °C) (Oral)   Resp 16   Ht 5' 1\" (1.549 m)   Wt 120 lb 5.9 oz (54.6 kg)   SpO2 95%   BMI 22.74 kg/m²   GENERAL: Appears somewhat confused, frail  HEENT: Oropharynx clear. NECK: . Without lymphadenopathy. LUNGS: suresh basal crackles. CARDIOVASCULAR: Regular rate. No murmurs, rubs or gallops. ABDOMEN: Soft. Distended. EXTREMITIES: Without clubbing or cyanosis or edema. NEUROLOGIC: No focal deficits. ECOG PS 2    Diagnostics:  Lab Results   Component Value Date    WBC 10.6 12/19/2022    HGB 9.3 (L) 12/19/2022    HCT 29.3 (L) 12/19/2022    MCV 87.2 12/19/2022     12/19/2022     Lab Results   Component Value Date     12/19/2022    K 4.6 12/19/2022     (H) 12/19/2022    CO2 21 (L) 12/19/2022    BUN 19 12/19/2022    CREATININE 0.9 12/19/2022    GLUCOSE 123 (H) 12/19/2022    CALCIUM 9.1 12/19/2022    PROT 7.2 12/19/2022    LABALBU 2.4 (L) 12/19/2022    BILITOT 0.3 12/19/2022    ALKPHOS 506 (H) 12/19/2022    AST 23 12/19/2022    ALT 13 12/19/2022    LABGLOM >60 12/19/2022    GFRAA 49 09/14/2022     Impression/Plan:  71 y.o. female Hx of smoking who underwent: (1) Bronchoscopy. (2) Right VATS. (3) VATS right upper lobe wedge resection. (4) VATS right upper lobectomy. (5) Intercostal nerve block from T3 to T10. (6) Mediastinal lymph node dissection on 11/11/2015:   Pathology:  Right lung, upper lobectomy: Invasive, moderately differentiated adenocarcinoma (Grade 2). Tumor size 1.5 cm in greatest dimension. Visceral pleural invasion: Not identified. Visceral pleural invasion: Not identified. Surgical margin negative for malignancy.     Two of three peribronchial lymph nodes positive for adenocarcinoma. pT1a N1 MX;      Being followed for a left upper lobe mass by Dr. Reg Geronimo. Multiple biopsies in the past came back negative for malignancy. Hypermetabolic on PET/CT scan on 09/29/2015 (2.3 cm in size with SUV of 6.4). CT guided biopsy of the left upper lobe lesion on 11/02/2015 was noted to be negative for malignancy. She was referred to the medical oncology clinic to discuss adjuvant chemotherapy. We recommended 4 cycles of adjuvant chemotherapy consisting of Carboplatin/Alimta. Mediport placed 12/7/15. -MRI Brain on 12/8/15:  Negative for metastatic disease.   -We will repeat CT chest and PET/CT after 4 cycles of Carboplatin/Alimta. To follow on Lingular lesion as well. -Molecular studies (EGFR, ALK, ROS-1) were all Not Detected. Cycle # 1 of Ryan Specter was on 12/09/2015. Cycle # 2 of Ryan Specter was on 01/06/2016. Cycle # 3 of Ryan Specter was on 01/27/2016. Cycle # 4 of Ryan Specter was on 02/24/2016. PET SCAN 3.2016: The 2.1 cm left lung mass abutting the major fissure is hypermetabolic with SUV max of 5.4. Finding is worrisome for tumor with avid glucose metabolism. 2. Elsewhere there is unremarkable distribution of FDG activity without evidence of hypermetabolic metastases. On 03/29/2016 underwent Bronch/Left VATS/VATS wedge ROMELIA/VATS Left upper lobectomy/Mediasinal lymph node dissection/Intercostal nerve block from T3-T10 per Dr. Jeremías Kendall. Final pathology revealed Stage I Adenocarcinoma of ROMELIA (morphologically different from the adenocarcinoma previously diagnosed in the right upper lobe. Therefore, synchronous primary tumors are favored). A. Left lung, upper lobe wedge resection: Invasive, well-differentiated adenocarcinoma (grade 1); Tumor size-1.4 cm in greatest dimension; Surgical margins-negative for malignancy; Lymphovascular invasion-not identified; TNM classification-pT2a N0 MX  B.  AP window lymph node #1, excision: Anthracotic lymph node; negative for malignancy  C. AP window lymph node #2, excision: Anthracotic lymph node; negative for malignancy   D. Periaortic lymph node #1, excision: Fibroadipose tissue; lymph node not identified  E. Bronchial lymph node #1, excision: Anthracotic lymph node; negative for malignancy  F. Left lung, upper lobectomy: Emphysematous change; negative for malignancy  4 anthracotic lymph nodes negative for malignancy   G. Inferior pulmonary ligament lymph node, excision: Anthracotic lymph node; negative for malignancy  H. Bronchial lymph node, excision: Anthracotic lymph node; negative for malignancy. Right side Mediport was removed on 11/04/2016 by Dr. Vladimir Farmer. On surveillance per NCCN guidelines. CT chest 04/12/2017 noted no convincing evidence of recurrent disease. CT chest 10/19/2017 noted no definite evidence for recurrent malignancy. CT chest 03/12/2018 negative for pulmonary parenchymal masses or enlarged mediastinal or hilar LN. ? 2 cm lesion in spleen difficult to evaluate due to the arterial phase of the study. CT Abd/Pelvis 05/08/2018  Enhancing lesion within the spleen is relatively stable to slightly smaller when compared to April 2014 exam and likely splenic hemangioma. Other indeterminate findings (left periaortic LN, sclerotic/blastic foci in L3 and L1 reported by Dr. Anabelle Bennett from radiology team). PET/CT scan 06/05/2018 unremarkable. No FDG avid uptake identified. No evidence for recurrent or metastatic disease. CT Chest 12/13/2018 noted no evidence of recurrent/metastatic disease. CT chest on 06/11/2019 noted no evidence for worrisome residual or recurrent malignancy. No evidence for new lymphadenopathy or metastatic disease. Stable scarring and postoperative changes right upper lobe. CT chest 11/26/2019: No evidence of active neoplasm. CT chest 06/15/2020: No evidence of active neoplasm.   CT chest 12/30/2020: Postsurgical changes and postsurgical scarring seen within the right lung apex. There is no evidence of tumor recurrence. 2.1 x 2.3 cm enhancing lesion seen within the spleen  PET/CT scan 03/02/2021   No FDG avid uptake is identified which exceeds the threshold SUV. No convincing evidence for recurrent or metastatic disease  CT chest 09/28/2021 No evidence of tumor recurrence     Recent abdominal pain; ER visit reviewed  CT abdomen/pelvis 02/20/2022   6 mm right lower lobe pulmonary nodule  Multiple peritoneal cystic masses      CEA 12.5 on 03/16/2022     CT chest 04/05/2022 Ground-glass nodule right lower lobe superolateral portion 10 mm unchanged from prior however in the medial segment right lower lobe with pleural abutment is a 7 mm pulmonary nodule increased in size from 09/28/2021 with is barely visible at 2-3 mm; repeat CT chest in 3 mo     PET/CT scan 04/05/2022 noted No FDG avid uptake is identified which exceeds the threshold SUV      Bilateral screening mammogram 04/20/2022: Negative for malignancy     CEA     12.1 on 04/20/2022  CA-125 32.8 on 04/20/2022   <2 on 04/20/2022  Chromogranin A 1480 on 04/20/2022. EGD/Colonoscopy 05/13/2022: Gastric polyps , duodenal polyp moderate gastroduodenitis   A. Stomach, biopsy: Hyperplastic polyp and moderate chronic active gastritis; negative for intestinal metaplasia   Immunostain negative for Helicobacter pylori organisms   B. Duodenum, biopsy: Chronic duodenitis with features of peptic duodenitis   C. Colon, 20 cm biopsy: Fragments of tubular adenoma and hyperplastic polyp   Pathology reviewed. Referred to HBP team (Dr. Trish Reyes) for further evaluation of peritoneal cystic masses  CT abdomen/pelvis 06/23/2022 numerous cystic structures identified throughout the right flank and retroperitoneum.       Laparoscopic robotic peritoneal mass resection on 07/06/2022  Findings included: serous cystic masses along the colon, periportal lymphadenopathy, fibrotic appearing liver, chronic cholecystitis Peritoneal fluid Negative for malignant cells. Cellblock shows reactive mesothelial cells, lymphocytes, adipose/stromal tissue, and blood. Monolayer preparation shows few reactive mesothelial cells and blood. A.  Lymph node, periportal, biopsy:   - Reactive node showing follicular hyperplasia, negative for malignancy, see comment. B.  Remnant gallbladder, cholecystectomy:   - Portion of gallbladder wall showing dense fibrosis/scar and occluded mariaelena-gallbladder vessels. C. Soft tissue, peritoneal sac, excision:   - Peritoneal inclusion cysts (benign cystic mesothelioma) and unremarkable fibroadipose tissue. D.  Liver, needle biopsy:   - Benign hepatic parenchyma showing focal periportal and incomplete septal fibrosis (stage 2)   - Negative for significant lobular/portal necroinflammatory activity, see comment. Comment:   Sections of the lymph node in specimen A reveal normal anat architecture with secondary follicular hyperplasia. There are no cytologically atypical lymphoid cells or [place] cells identified. Periportal LN Flow Cytometry noted 4.5% monoclonal B cells detected in a predominantly polyclonal background. Monoclonal B cell population (4.5% of total cells) without detectable CD5, CD10 or CD11c expression in a predominantly polyclonal background, raising the differential between a monoclonal B-cell population of undetermined significance versus a B-cell lymphoma/leukemia. In the context of B-cell lymphoma the main differential based on immunophenotype includes, but is not limited to marginal zone lymphoma/leukemia, lymphoplasmacytic lymphoma, JZ24- negative follicular lymphoma, and less likely large b cell lymphoma. In specimen D, there is no evidence of chronic hepatitis or significant steatosis. Histologic features of cirrhosis including nodules of regenerating hepatocytes and complete portal-portal bridging fibrosis are not identified.   Focal periportal fibrosis and incomplete septal fibrosis are confirmed by trichrome stain. Iron stain is also performed and is negative. Periportal lymph node flow cytometry showing 4.5% monoclonal B cells without detectable CD5, CD10 or CD11c expression in a predominantly polyclonal background, raising the differential between a monoclonal B-cell population of undetermined significance versus a B-cell lymphoma/leukemia     Repeat CT chest to follow on history of NSCLC on 08/04/2022  Postsurgical changes are identified in the upper lobes bilaterally. 1.1 cm ground-glass nodule in the right lower lobe and a smaller 1 measuring 0.9 cm are noted without change. There is a 1.6 x 1.2 cm soft tissue mass in the right lower lobe medially which is significantly increased since the previous examination. Bilateral adrenal nodules are noted, larger 1 on the left side measuring 1.8 x 1.6 cm.  1.2 cm right renal cystic lesion is identified. An ill-defined hypodense lesion is identified in the spleen currently measuring 2.9 x 3.5 cm      Evaluated by Dr. Sherif Groves on 08/15/2022 at [facility] who recommended proceeding with a biopsy of the enlarging right lower lobe lung mass. While lymphoma certainly possible, biopsy recommended to rule out recurrent lung cancer. Periportal lymph node biopsy reviewed at [facility]. Lymph node with lipid lymphadenopathy  Small clonal B-cell population detected by flow cytometry and molecular analysis  Negative for carcinoma  Flow cytometry showed a small clonal CD5 -/CD10- B-cell population (4.5% of all events) and a background of polyclonal B cells. B-cell receptor clonality assay was positive for a clonal B-cell population. However there is no morphologic evidence of lymphoma in the lymph node. The detection of a clonal B-cell population despite negative morphologic evidence of lymphoma might represent monoclonal B-cell lymphocytosis process in the lymph node.   An alternative consideration is peripheral blood involvement by B-cell clone (MBL-like process) that was picked up by the tissue flow cytometry and molecular tests. Even if lymphoma is concerned, likely low-grade and not causing her symptoms, so observation would be recommended. PET/CT scan, CT-guided core needle biopsy of enlarging right lower lobe lung mass recommended. PET/CT scan 09/06/2022: In the region of the lesion in the right lower lobe there is FDG avid tracer uptake peak SUV is 3.2. CT guided core needle biopsy RLL lung nodule on 09/20/2022  Right lung, lower lobe core needle biopsy: Adenocarcinoma (see comment)   Comment: The prior history of right upper lobe adenocarcinoma (HES ) and left upper lobe adenocarcinoma (HES ) is noted. The electronic medical record is also reviewed. The adenocarcinoma in the current specimen is weakly immunoreactive with GATA3 and CDX2. The TTF-1 immunostain is negative. PD-L1 10U4 FDA for NSCLC: PD-L1 EXPRESSION   Tumor proportion score: 10%   Intensity: 2+     EGFR Mutation: Not detected   ALK Rearrangement: Not detected (negative)   ROS1 Gene Rearrangement: Not detected (negative)   BRAF Mutation: Not detected   KRAS Mutation: KRAS Exon 2 DETECTED   Mutation-c.34 G >T (p.G12C)     MET Exon 14 Deletion Analysis: Not detected   RET: Not detected   NTRK 1, 2, 3: Not detected      Admitted for LE edema and abdominal ascites     Status paracentesis 11/1/2022, total of 2450 mL of ascitic fluid was removed  Abdominal U/S 11/04/2022 small amount of ascites in the right right lower quadrant. Pelvic U/S large calcified uterine mass; ovaries not identified. Large volume ascites. MRI Pelvis 11/05/2022: No suspicious uterine or endometrial mass  Multiple calcified, degenerated uterine leiomyomas, measuring up to 5.2 cm  Seen by GYN (Dr. Melissa Luna); No findings concerning for gynecologic malignancy. Status post paracentesis 11/9/2022:  A total of 2510 cc of straw-colored ascitic fluid was removed  Cytology was negative for malignant cells  Reactive mesothelial cells, acute/chronic inflammatory cells, and blood present. The patient was admitted on 12/14/2022 for increased shortness of breath. More recently few days ago she was discharged from the hospital for worsening right pleural effusion, in which Pleurx catheter was placed on 12/9/2022. Also of note, patient had recent development of a subcutaneous nodule located in the posterior right axilla. CT scans on 12/7/2022 reported 2.7 x 1.8 cm subcutaneous tissue mass, overlies the trapezius muscle     Large Right sided loculated pleural effusion, s/p PleurX placement 12/9/2022, along with findings suggestive of chest wall metastasis  Recurrent ascites, negative cytology, but with concern for malignancy  Subcutaneous mass posterior to right axilla, s/p excision on 12/11/2022  Right breast mass, BIRADS 4  History of recurrent ascites  Imaging evidence of early cirrhosis  Active chronic hepatitis C infection  Right lung mass positive for Adenocarcinoma, weakly positive for GATA3 and CDX2, TTF-1 negative; KRAS (G12C) positive, PDL1 with 10% expression  Previously described multiple peritoneal cystic masses, s/p resection in 7/6/2022, not well visualized on 10/31/22 CT abdomen  Monoclonal B-cell population from periportal lymph node resection from 7/6/2022, in the setting of follicular hyperplasia  Previous right sided lung adenocarcinoma in 2015  Previous left-sided lung adenocarcinoma 2016  Family history of uterine cancer in mother  Family history of early age breast cancer in sister (Diagnosed around early 42's)       [facility]le[facility] [address]   Orders for cytology and fluid studies were placed on 12/17/22. I called lab 12/19/2022, noting they don't have the any recent pleural fluid    Pulmonary team following    Anemia; did receive IV iron  Hb 9.3 today 12/19/2022. Labs reviewed.    Continue to monitor CBC with diff   GI team on board; EGD/Colonoscopy pending H&H, likely recommending as outpatient   PPI BID  On Octreotide, GI planning to wean  Liver US noted cirrhotic liver. No hepatic mass. Moderate to large ascites. Repeat paracentesis today, removing >2,000cc; repeat fluid evaluation and cytology. Removal of right pleurx delayed for tomorrow    Awaiting for biopsy results from excision the subcutaneous nodule in posterior right axilla done on 12/11/2022. Palliative on board  Hold off on radiation against the lung mass at this time  Patient appears more unwell today. I reached out to patient's sister Omid Hardy to provide updates  We agreed on family meeting.  Omid Hardy will get a hold of her other family members and notify RN station, and she will tentatively plan on 3:30 or 4PM tomorrow  I have also discussed with Omid Hardy regarding brain MRI in which she is agreeable        MD Christopher Zhou 18 12/19/22 5:50 PM

## 2022-12-19 NOTE — CARE COORDINATION
Social work / Discharge Planning:          Patient is from home and active with [facility]. She has a PASSPORT  (Brodie Cummings). Currently out of the room for IR procedure. PT/OT evaluations ordered. Social work will follow.     Electronically signed by ESTEVAN Yu on 12/19/2022 at 11:47 AM

## 2022-12-19 NOTE — PROGRESS NOTES
PROGRESS NOTE  By Nona Jimenez M.D. [facility]  Dr. Jessenia Daily M.D.,  Dr. Jose Antonio Mo M.D.,   Dr. Gin Abad D.O.,  Dr. Barry Clement M.D.,  Dr. Maurice Mark D.O.,          Migdalia Perez  71 y.o.  female    SUBJECTIVE:  Appears to deny any significant abdominal pain after paracentesis    OBJECTIVE:    BP (!) 160/73   Pulse 87   Temp 98.8 °F (37.1 °C) (Oral)   Resp 16   Ht 5' 1\" (1.549 m)   Wt 120 lb 5.9 oz (54.6 kg)   SpO2 95%   BMI 22.74 kg/m²     General: NAD/-American female. Appears thin and somewhat malnourished  HEENT: Anicteric sclera/moist oral mucosa  Neck: Supple with trachea midline  Chest: Symmetric excursion/labored respirations  Cor: Regular  Abd.: Soft and appears nontender. Less distended  Extr.:  Decreased muscle tone and bulk throughout  Skin: Warm and dry/anicteric      DATA:    Monitor data reviewed - noted.        Lab Results   Component Value Date/Time    WBC 10.6 12/19/2022 03:47 AM    RBC 3.36 12/19/2022 03:47 AM    HGB 9.3 12/19/2022 03:47 AM    HCT 29.3 12/19/2022 03:47 AM    MCV 87.2 12/19/2022 03:47 AM    MCH 27.7 12/19/2022 03:47 AM    MCHC 31.7 12/19/2022 03:47 AM    RDW 16.7 12/19/2022 03:47 AM     12/19/2022 03:47 AM    MPV 9.9 12/19/2022 03:47 AM     Lab Results   Component Value Date/Time     12/19/2022 03:47 AM    K 4.6 12/19/2022 03:47 AM     12/19/2022 03:47 AM    CO2 21 12/19/2022 03:47 AM    BUN 19 12/19/2022 03:47 AM    CREATININE 0.9 12/19/2022 03:47 AM    CALCIUM 9.1 12/19/2022 03:47 AM    PROT 7.2 12/19/2022 03:47 AM    LABALBU 2.4 12/19/2022 03:47 AM    LABALBU 4.2 04/10/2012 12:28 PM    BILITOT 0.3 12/19/2022 03:47 AM    ALKPHOS 506 12/19/2022 03:47 AM    AST 23 12/19/2022 03:47 AM    ALT 13 12/19/2022 03:47 AM     Lab Results   Component Value Date/Time    LIPASE 124 11/23/2022 01:45 AM     No results found for: AMYLASE      ASSESSMENT/PLAN:  Patient Active Problem List   Diagnosis    Hepatitis C Abnormal Pap smear    Dyslipidemia    Insomnia    Ex-smoker    PVD (peripheral vascular disease) (HCC)    Uterine fibroid    Gall stone    Kidney stone    Left groin pain    Need for Tdap vaccination    Abnormal chest x-ray with multiple lung nodules    Hypertension    Panlobular emphysema (HCC)    Chronic hepatitis C virus infection (HCC)    Malignant neoplasm of upper lobe of right lung (HCC)    Malignant neoplasm of lung (HCC)    Leukocytosis    Severe anemia    Essential hypertension    Thrombocytopenia (HCC)    Malignant neoplasm of upper lobe of left lung (HCC)    History of lobectomy of lung    Adenocarcinoma, lung (HCC)    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)    Hep C w/o coma, chronic (HCC)    Hyperlipidemia    Warfarin anticoagulation    Bilateral lung cancer ([address]    Pulmonary embolism without acute cor pulmonale (HCC)    Port-A-Cath in place    Acute cholecystitis    History of lung cancer    RBBB    Adenomatous polyp of descending colon    Abdominal pain    Ascites of liver    Moderate protein-calorie malnutrition (HCC)    Intractable pain    Goals of care, counseling/discussion    Palliative care encounter    Mass of right axilla    Subareolar mass of right breast    Pleural effusion, right    Pleural effusion    Loculated pleural effusion    Subcutaneous nodule    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 Cirrhosis / HCV  -Previously positive hepatitis C antibody  -Viral load 638k (11/4/2022)   -genotype 1a or Ib reported (11/4/2022)  -HBV negative  -Nonelevated AFP 4 (11/23/2022)  -CT 11/21/2022 with contrast showing no evidence of hepatic mass  -Ascites - see below  -Patient will require further follow-up in the office for possible treatment - see discussion below  -Patient will require further evaluation with upper endoscopy - see discussion below     2.   Ascites  -Likely multifactorial and related to peritoneal involvement from advanced cancer/cirrhosis  -Paracentesis 11/22/2022 with 3050 mL removed  -Previously paracentesis yielded ascites with albumin of 1.7 at the time when serum albumin was 2.2 calculating ascitic gradient at 0.5 indicating likely alternative cause for patient's ascites -see repeat paracentesis today.  -cytology negative  -Defer diuretics to admitting service given renal failure  -Repeat paracentesis pending  -US liver with Dopplers 12/17/2022 showing normal vasculature  -Patient can undergo serial paracentesis depending on rate of accumulation of ascites which can be set as outpatient -paracentesis today with removal of approximately 2 L     3. Advanced malignancy  -Patient follows with Dr. Christiane Mrak  -Poor prognosis  -Defer further discussion regarding goals of care and CODE STATUS to admitting/oncology     4. Anemia  -Acute on chronic  -Iron deficient  -no evidence of overt bleed  -Twice daily PPI  -Octreotide drip  -Decrease frequency of H&H checks  -Keep PRBCs on hold  -Transfusion per admitting  -Patient require further evaluation with EGD and colonoscopy depending on H&H dynamics and goals of care -currently plan is outpatient procedures unless precipitous drop/overt bleed           S/P paracentesis with removal of slightly over 2 L. Ascitic fluid analysis SAAG 0.9 suggesting alternative to portal hypertension as etiology for ascites -likely related to peritoneal carcinomatosis  Pending cytology  Stable H&H without evidence of overt bleed. Plan for upper endoscopy as outpatient. Wean off octreotide     Cara Demarco MD  12/19/2022  3:44 PM    NOTE:  This report was transcribed using voice recognition software. Every effort was made to ensure accuracy; however, inadvertent computerized transcription errors may be present.

## 2022-12-20 ENCOUNTER — APPOINTMENT (OUTPATIENT)
Dept: CT IMAGING | Age: 72
DRG: 187 | End: 2022-12-20
Payer: MEDICARE

## 2022-12-20 LAB
ANION GAP SERPL CALCULATED.3IONS-SCNC: 9 MMOL/L (ref 7–16)
APPEARANCE FLUID: CLEAR
BUN BLDV-MCNC: 19 MG/DL (ref 6–23)
CALCIUM SERPL-MCNC: 9.2 MG/DL (ref 8.6–10.2)
CELL COUNT FLUID TYPE: NORMAL
CHLORIDE BLD-SCNC: 110 MMOL/L (ref 98–107)
CO2: 22 MMOL/L (ref 22–29)
COLOR FLUID: YELLOW
CREAT SERPL-MCNC: 1 MG/DL (ref 0.5–1)
FLUID TYPE: NORMAL
GFR SERPL CREATININE-BSD FRML MDRD: 60 ML/MIN/1.73
GLUCOSE BLD-MCNC: 99 MG/DL (ref 74–99)
GRAM STAIN ORDERABLE: NORMAL
HCT VFR BLD CALC: 28.3 % (ref 34–48)
HEMOGLOBIN: 9.1 G/DL (ref 11.5–15.5)
LD, FLUID: 120 U/L
MCH RBC QN AUTO: 28.8 PG (ref 26–35)
MCHC RBC AUTO-ENTMCNC: 32.2 % (ref 32–34.5)
MCV RBC AUTO: 89.6 FL (ref 80–99.9)
MONOCYTE, FLUID: 82 %
NEUTROPHIL, FLUID: 18 %
NUCLEATED CELLS FLUID: 135 /UL
PDW BLD-RTO: 16.8 FL (ref 11.5–15)
PLATELET # BLD: 384 E9/L (ref 130–450)
PMV BLD AUTO: 10.1 FL (ref 7–12)
POTASSIUM REFLEX MAGNESIUM: 4.8 MMOL/L (ref 3.5–5)
RBC # BLD: 3.16 E12/L (ref 3.5–5.5)
RBC FLUID: <2000 /UL
SODIUM BLD-SCNC: 141 MMOL/L (ref 132–146)
WBC # BLD: 10.6 E9/L (ref 4.5–11.5)

## 2022-12-20 PROCEDURE — 85027 COMPLETE CBC AUTOMATED: CPT

## 2022-12-20 PROCEDURE — 2709999900 CT REMOVAL OF TUNNELED PLEURAL CATH W CUFF

## 2022-12-20 PROCEDURE — 99232 SBSQ HOSP IP/OBS MODERATE 35: CPT | Performed by: PHYSICIAN ASSISTANT

## 2022-12-20 PROCEDURE — 2500000003 HC RX 250 WO HCPCS: Performed by: RADIOLOGY

## 2022-12-20 PROCEDURE — 99233 SBSQ HOSP IP/OBS HIGH 50: CPT | Performed by: STUDENT IN AN ORGANIZED HEALTH CARE EDUCATION/TRAINING PROGRAM

## 2022-12-20 PROCEDURE — 97165 OT EVAL LOW COMPLEX 30 MIN: CPT

## 2022-12-20 PROCEDURE — 6370000000 HC RX 637 (ALT 250 FOR IP): Performed by: NURSE PRACTITIONER

## 2022-12-20 PROCEDURE — 36415 COLL VENOUS BLD VENIPUNCTURE: CPT

## 2022-12-20 PROCEDURE — 80048 BASIC METABOLIC PNL TOTAL CA: CPT

## 2022-12-20 PROCEDURE — 6370000000 HC RX 637 (ALT 250 FOR IP): Performed by: PHYSICIAN ASSISTANT

## 2022-12-20 PROCEDURE — 97161 PT EVAL LOW COMPLEX 20 MIN: CPT

## 2022-12-20 PROCEDURE — 6370000000 HC RX 637 (ALT 250 FOR IP): Performed by: HOSPITALIST

## 2022-12-20 PROCEDURE — 94640 AIRWAY INHALATION TREATMENT: CPT

## 2022-12-20 PROCEDURE — 2580000003 HC RX 258

## 2022-12-20 PROCEDURE — 6370000000 HC RX 637 (ALT 250 FOR IP): Performed by: FAMILY MEDICINE

## 2022-12-20 PROCEDURE — 2060000000 HC ICU INTERMEDIATE R&B

## 2022-12-20 RX ORDER — LIDOCAINE HYDROCHLORIDE 10 MG/ML
INJECTION, SOLUTION INFILTRATION; PERINEURAL
Status: COMPLETED | OUTPATIENT
Start: 2022-12-20 | End: 2022-12-20

## 2022-12-20 RX ADMIN — Medication 10 ML: at 10:43

## 2022-12-20 RX ADMIN — PANTOPRAZOLE SODIUM 40 MG: 40 TABLET, DELAYED RELEASE ORAL at 05:51

## 2022-12-20 RX ADMIN — IPRATROPIUM BROMIDE AND ALBUTEROL SULFATE 1 AMPULE: 2.5; .5 SOLUTION RESPIRATORY (INHALATION) at 12:25

## 2022-12-20 RX ADMIN — IPRATROPIUM BROMIDE AND ALBUTEROL SULFATE 1 AMPULE: 2.5; .5 SOLUTION RESPIRATORY (INHALATION) at 20:05

## 2022-12-20 RX ADMIN — ESCITALOPRAM OXALATE 5 MG: 10 TABLET ORAL at 10:33

## 2022-12-20 RX ADMIN — LIDOCAINE HYDROCHLORIDE 10 ML: 10 INJECTION, SOLUTION INFILTRATION; PERINEURAL at 08:56

## 2022-12-20 RX ADMIN — IPRATROPIUM BROMIDE AND ALBUTEROL SULFATE 1 AMPULE: 2.5; .5 SOLUTION RESPIRATORY (INHALATION) at 15:44

## 2022-12-20 RX ADMIN — AMLODIPINE BESYLATE 10 MG: 10 TABLET ORAL at 10:34

## 2022-12-20 RX ADMIN — Medication 10 ML: at 22:09

## 2022-12-20 RX ADMIN — PANTOPRAZOLE SODIUM 40 MG: 40 TABLET, DELAYED RELEASE ORAL at 15:44

## 2022-12-20 RX ADMIN — OXYCODONE 10 MG: 5 TABLET ORAL at 15:42

## 2022-12-20 ASSESSMENT — PAIN DESCRIPTION - LOCATION: LOCATION: RIB CAGE

## 2022-12-20 ASSESSMENT — PAIN SCALES - GENERAL: PAINLEVEL_OUTOF10: 7

## 2022-12-20 ASSESSMENT — PAIN DESCRIPTION - DESCRIPTORS: DESCRIPTORS: DISCOMFORT;DULL;ACHING

## 2022-12-20 ASSESSMENT — PAIN - FUNCTIONAL ASSESSMENT: PAIN_FUNCTIONAL_ASSESSMENT: NONE - DENIES PAIN

## 2022-12-20 ASSESSMENT — PAIN DESCRIPTION - ORIENTATION: ORIENTATION: RIGHT

## 2022-12-20 NOTE — PROGRESS NOTES
[facility] Progress Note    Admitting Date and Time: 12/14/2022  8:41 PM  Admit Dx: Loculated pleural effusion [J90]  Other fatigue [R53.83]    Subjective:  Patient is being followed for Loculated pleural effusion [J90]  Other fatigue [R53.83]     Patient was lying in no acute distress. Pain has improved with pleurx removal. Denies any new concerns. ROS: denies fever, chills, cp, sob, n/v, HA unless stated above.       pantoprazole  40 mg Oral BID AC    amLODIPine  10 mg Oral Daily    sodium chloride flush  5-40 mL IntraVENous 2 times per day    ipratropium-albuterol  1 ampule Inhalation Q4H WA    escitalopram  5 mg Oral Daily     medicated lip balm, , PRN  white petrolatum, , BID PRN  sodium chloride, , PRN  sodium chloride flush, 5-40 mL, PRN  sodium chloride, , PRN  ondansetron, 4 mg, Q8H PRN   Or  ondansetron, 4 mg, Q6H PRN  acetaminophen, 650 mg, Q6H PRN   Or  acetaminophen, 650 mg, Q6H PRN  polyethylene glycol, 17 g, Daily PRN  oxyCODONE, 10 mg, Q6H PRN       Objective:    BP (!) 159/71   Pulse 76   Temp 98.2 °F (36.8 °C) (Oral)   Resp 18   Ht 5' 1\" (1.549 m)   Wt 98 lb 15.8 oz (44.9 kg)   SpO2 90%   BMI 18.70 kg/m²   General Appearance: alert and oriented to person, place and time and in no acute distress  Skin: warm and dry  Head: normocephalic and atraumatic  Eyes: pupils equal, round, and reactive to light, extraocular eye movements intact, conjunctivae normal  Neck: neck supple and non tender without mass   Pulmonary/Chest: diminished on the right, no wheezes, rales or rhonchi, normal air movement, no respiratory distress  Cardiovascular: normal rate, normal S1 and S2 and no carotid bruits  Abdomen: soft, non-tender, distended, normal bowel sounds, no masses or organomegaly  Extremities: no cyanosis, no clubbing and no edema  Neurologic: no cranial nerve deficit and speech normal        Recent Labs     12/19/22  0347 12/20/22  0349    141   K 4.6 4.8   * 110* CO2 21* 22   BUN 19 19   CREATININE 0.9 1.0   GLUCOSE 123* 99   CALCIUM 9.1 9.2         Recent Labs     12/18/22  0141 12/19/22  0347 12/20/22  0349   WBC  --  10.6 10.6   RBC  --  3.36* 3.16*   HGB 8.7* 9.3* 9.1*   HCT 27.4* 29.3* 28.3*   MCV  --  87.2 89.6   MCH  --  27.7 28.8   MCHC  --  31.7* 32.2   RDW  --  16.7* 16.8*   PLT  --  414 384   MPV  --  9.9 10.1         Radiology:     EXAMINATION:  CTA OF THE CHEST 12/14/2022 10:05 pm     TECHNIQUE:  CTA of the chest was performed after the administration of intravenous  contrast.  Multiplanar reformatted images are provided for review. MIP  images are provided for review. Automated exposure control, iterative  reconstruction, and/or weight based adjustment of the mA/kV was utilized to  reduce the radiation dose to as low as reasonably achievable. COMPARISON:  None. HISTORY:  ORDERING SYSTEM PROVIDED HISTORY: sob rule out PE  TECHNOLOGIST PROVIDED HISTORY:  Reason for exam:->sob rule out PE  Decision Support Exception - unselect if not a suspected or confirmed  emergency medical condition->Emergency Medical Condition (MA)     FINDINGS:  Pulmonary Arteries: Pulmonary arteries are adequately opacified for  evaluation. No evidence of intraluminal filling defect to suggest pulmonary  embolism. Main pulmonary artery is normal in caliber. Mediastinum: No evidence of mediastinal lymphadenopathy. The heart is mildly  enlarged. The pericardium demonstrates no acute abnormality. There is no  acute abnormality of the thoracic aorta. Lungs/pleura: There is a large loculated right pleural effusion with  associated posterior right lower lobe compressive atelectasis. There is  indwelling right hemithorax chest tube which courses posteriorly with tip  terminating near the mediastinum. No pulmonary edema. No pneumothorax. On axial image 32, there is indeterminate round solid nodule in posterior  right upper lobe, abutting the major fissure.   On axial image 56, there is  indeterminate 9 mm ground-glass opacity in periphery of right lower lobe. On  axial image 77, there is indeterminate 7.7 mm subpleural nodule localized in  posterior left lower lobe base. There are a few other scattered  subcentimeter nodules in the periphery of left lower lobe near the base. Upper Abdomen: There is intraabdominal ascites which is perihepatic  and  perisplenic. Soft Tissues/Bones: No acute bone or soft tissue abnormality. No thyromegaly. IMPRESSION:  No convincing evidence for acute pulmonary thromboembolism. Loculated large right pleural effusion with indwelling right hemithorax  drainage catheter. Posterior right lung base compressive atelectasis. Multiple indeterminate subcentimeter nodules, as detailed above. LUNG-RADS  3, PROBABLY BENIGN; advise follow up to document stability 6 months. 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 *      Plan:  1. Loculated pleural effusion:  CTA shows loculated large right pleural effusion with indwelling right hemithorax drainage catheter and posterior right lung base compressive atelectasis. CRP 3.1. Pleural drain placed on 12/9. Right Pleurex drained on 12/14 with 500 ml removed. Drained twice weekly at home, per patient. Follows with Dr Reinier Vargas outpatient. Since admission, no fluid has been removed from pleurx. Plurex removed on 12/20. Pulmonology following. 2. Hx of lung cancer: Right lung mass. Adenocarcinoma. GA TA 3/CD x2 KRAS (G12C)+ % expression. Radiation therapy initiated in 6/22. Follows with Dr Autumn Sanchez.   Oncology, pulmonology, and palliative

## 2022-12-20 NOTE — PROGRESS NOTES
Physical Therapy  Facility/Department: [facility] MED SURG  Physical Therapy Initial Assessment    Name: Isiah Cisneros  : 1950  MRN: [id number]  Date of Service: 2022             Patient Diagnosis(es): The primary encounter diagnosis was Loculated pleural effusion. A diagnosis of Other fatigue was also pertinent to this visit. Past Medical History:  has a past medical history of Abnormal chest x-ray with multiple lung nodules, Abnormal Pap smear, Adrenal adenoma, Bilateral lung cancer ([address] Cholelithiasis, Encounter for screening colonoscopy, Fibroids, Hemangioma of spleen, Hepatitis C, Hyperlipidemia, Hypertension, Insomnia, Lung nodule, Lymphadenopathy, Malignant neoplasm of upper lobe of right lung ([address] Osteoarthritis, Panlobular emphysema ([address] PPD negative, Pulmonary embolism without acute cor pulmonale ([address] PVD (peripheral vascular disease) ([address] Scoliosis, and Uterine fibroid. Past Surgical History:  has a past surgical history that includes Appendectomy (); Endometrial biopsy; Colonoscopy (2009); bronchoscopy (2012); Colonoscopy (2014); bronchoscopy (10/15/15); Thoracoscopy (Right, 2015); Tunneled venous port placement (Right, 2015); Lung lobectomy (Left, 3/29/2016); other surgical history (Right, 2016); pr lap,cholecystectomy/graph (N/A, 2018); Cholecystectomy; Breast lumpectomy (1999); Colonoscopy (N/A, 3/28/2019); Breast biopsy (Left); Upper gastrointestinal endoscopy (N/A, 2022); Colonoscopy (N/A, 2022); Liver Resection (Right, 2022); CT NEEDLE BIOPSY LUNG PERCUTANEOUS (2022); IR INSERT TUNNELED PLEURA CATH W CUFF (2022); and Axillary Surgery (Right, 2022). Requires PT Follow-Up: Yes      Attending Provider:  Brandon Redd PA-C     Evaluating PT:  Tuyet Capps      Room #:  766  Diagnosis:  The primary encounter diagnosis was Loculated pleural effusion.  A diagnosis of Other fatigue was also pertinent to this visit. Pertinent PMHx/PSHx:  Lung CA, B pleural effusions    Precautions:  Falls, bed/chair alarm     SUBJECTIVE:     Pt lives with  in a bi-level home with 3 stairs and 1 rail to enter. There are 5 steps and 2 rails to the main level. Pt ambulated with a ww or cane PTA. Info per chart, pt difficult to understand; speaks very softly      OBJECTIVE:    Initial Evaluation  Date: 12/20/22 Treatment Short Term/ Long Term   Goals   Was pt agreeable to Eval/treatment? yes       Does pt have pain? R side where CT removed earlier today        Bed Mobility  Rolling: MOD A  Supine to sit: MAX A  Sit to supine: MAX A   Scooting: MAX A in sit   Min assist    Transfers Sit to stand: MAX A  Stand to sit: MAX A  Stand pivot: NT    Min assist    Ambulation   2 feet side stepping with ww MAX A   100 feet with ww CGA    Stair negotiation: ascended and descended NT    TBA    AM-PAC 6 Clicks 68/76          BLE ROM is WFL. BLE strength is grossly 3-/5 to 3+/ 5    Balance: sitting is SBA to min assist  and standing with ww is MAX A. High fall risk due to weakness   Endurance: poor      Patient education  Pt educated on fall risk , safety with mobility      Patient response to education:   Pt verbalized understanding Pt demonstrated skill Pt requires further education in this area   yes  with VCs and assist yes      ASSESSMENT:     Conditions Requiring Skilled Therapeutic Intervention:     [x]Decreased strength                                      []Decreased ROM  [x]Decreased functional mobility  [x]Decreased balance   [x]Decreased endurance   []Decreased posture  []Decreased sensation  []Decreased coordination   []Decreased vision  []Decreased safety awareness   []Increased pain             Comments:  Pt was in bed and agreeable to PT. She has decreased strength and endurance and moved slowly requiring increased time to complete functional mobility.   required instruction with technique with bed mobility, hand placement with transfers, and upright posture in stand. Pt needed max assist for taking lateral steps, which were very minimal . RTB and positioned with TAPS system                Chair/bed alarm: bed alarm was activated. Pt's/ family goals   1. none stated      Patient and or family understand(s) diagnosis, prognosis, and plan of care. - yes? PHYSICAL THERAPY PLAN OF CARE:     PT POC is established based on physician order and patient diagnosis      Referring provider/PT Order:  PT eval and treat  Specific instructions for next treatment:  progress functional mobility as pt is able. Current Treatment Recommendations:      [x] Strengthening to improve independence with functional mobility   [] ROM to improve ROM and decrease spasm and pain which will help promote independence with functional mobility   [x] Balance Training to improve static/dynamic balance and to reduce fall risk  [x] Endurance Training to improve activity tolerance during functional mobility   [x] Transfer Training to improve safety and independence with all functional transfers   [x] Gait Training to improve gait mechanics, endurance and assess need for appropriate assistive device  [x] Stair Training in preparation for safe discharge home and/or into the community   [] Positioning to prevent skin breakdown and contractures  [] Safety and Education Training   [x] Patient/Caregiver Education   [] HEP  [] Other      PT long term treatment goals are located in above grid     Frequency of treatments: 2-5x/week x 1-2 weeks. Time in  1313   Time out  1330          Evaluation Time includes thorough review of current medical information, gathering information on past medical history/social history and prior level of function, completion of standardized testing/informal observation of tasks, assessment of data and education on plan of care and goals.      CPT codes:  [x] Low Complexity PT evaluation 36995  [] Moderate Complexity PT evaluation [id number]  [] High Complexity PT evaluation [id number]  [] PT Re-evaluation [id number]  [] Gait training 73308 ** minutes  [] Manual therapy 39317 ** minutes  [] Therapeutic activities 02078  minutes  [] Therapeutic exercises 72138 ** minutes  [] Neuromuscular reeducation 84559 ** minutes            Berny Carey, PT

## 2022-12-20 NOTE — PROGRESS NOTES
Called the patient's sister Ugo Camacho) to obtain information for the MRI checklist.  Electronically signed by Juanito James RN on 12/20/2022 at 6:49 AM

## 2022-12-20 NOTE — PROGRESS NOTES
Inpatient Medical Oncology Progress Note    Subjective:  Await but fatigue. No major issues overnight. Denies of new pain or bleed. No family was present. Objective:  BP (!) 156/82   Pulse 94   Temp 98.4 °F (36.9 °C) (Oral)   Resp 18   Ht 5' 1\" (1.549 m)   Wt 98 lb 15.8 oz (44.9 kg)   SpO2 100%   BMI 18.70 kg/m²   GENERAL: Appears somewhat confused, frail  HEENT: Oropharynx clear. NECK: . Without lymphadenopathy. LUNGS: suresh basal crackles. Decreased lung sounds  CARDIOVASCULAR: Regular rate. No murmurs, rubs or gallops. ABDOMEN: Soft. Less distended   EXTREMITIES: Without clubbing or cyanosis or edema. NEUROLOGIC: No focal deficits. ECOG PS 34    Diagnostics:  Lab Results   Component Value Date    WBC 10.6 12/20/2022    HGB 9.1 (L) 12/20/2022    HCT 28.3 (L) 12/20/2022    MCV 89.6 12/20/2022     12/20/2022     Lab Results   Component Value Date     12/20/2022    K 4.8 12/20/2022     (H) 12/20/2022    CO2 22 12/20/2022    BUN 19 12/20/2022    CREATININE 1.0 12/20/2022    GLUCOSE 99 12/20/2022    CALCIUM 9.2 12/20/2022    PROT 7.2 12/19/2022    LABALBU 2.4 (L) 12/19/2022    BILITOT 0.3 12/19/2022    ALKPHOS 506 (H) 12/19/2022    AST 23 12/19/2022    ALT 13 12/19/2022    LABGLOM 60 12/20/2022    GFRAA 49 09/14/2022     Impression/Plan:  71 y.o. female Hx of smoking who underwent: (1) Bronchoscopy. (2) Right VATS. (3) VATS right upper lobe wedge resection. (4) VATS right upper lobectomy. (5) Intercostal nerve block from T3 to T10. (6) Mediastinal lymph node dissection on 11/11/2015:   Pathology:  Right lung, upper lobectomy: Invasive, moderately differentiated adenocarcinoma (Grade 2). Tumor size 1.5 cm in greatest dimension. Visceral pleural invasion: Not identified. Visceral pleural invasion: Not identified. Surgical margin negative for malignancy. Two of three peribronchial lymph nodes positive for adenocarcinoma.     pT1a N1 MX;      Being followed for a left upper lobe mass by Dr. Sara Castro. Multiple biopsies in the past came back negative for malignancy. Hypermetabolic on PET/CT scan on 09/29/2015 (2.3 cm in size with SUV of 6.4). CT guided biopsy of the left upper lobe lesion on 11/02/2015 was noted to be negative for malignancy. She was referred to the medical oncology clinic to discuss adjuvant chemotherapy. We recommended 4 cycles of adjuvant chemotherapy consisting of Carboplatin/Alimta. Mediport placed 12/7/15. -MRI Brain on 12/8/15:  Negative for metastatic disease.   -We will repeat CT chest and PET/CT after 4 cycles of Carboplatin/Alimta. To follow on Lingular lesion as well. -Molecular studies (EGFR, ALK, ROS-1) were all Not Detected. Cycle # 1 of Dulce Jimenez was on 12/09/2015. Cycle # 2 of Dulce Jimenez was on 01/06/2016. Cycle # 3 of Dulce Jimenez was on 01/27/2016. Cycle # 4 of Dulce Jimenez was on 02/24/2016. PET SCAN 3.2016: The 2.1 cm left lung mass abutting the major fissure is hypermetabolic with SUV max of 5.4. Finding is worrisome for tumor with avid glucose metabolism. 2. Elsewhere there is unremarkable distribution of FDG activity without evidence of hypermetabolic metastases. On 03/29/2016 underwent Bronch/Left VATS/VATS wedge ROMELIA/VATS Left upper lobectomy/Mediasinal lymph node dissection/Intercostal nerve block from T3-T10 per Dr. Leann Coleman. Final pathology revealed Stage I Adenocarcinoma of ROMELIA (morphologically different from the adenocarcinoma previously diagnosed in the right upper lobe. Therefore, synchronous primary tumors are favored). A. Left lung, upper lobe wedge resection: Invasive, well-differentiated adenocarcinoma (grade 1); Tumor size-1.4 cm in greatest dimension; Surgical margins-negative for malignancy; Lymphovascular invasion-not identified; TNM classification-pT2a N0 MX  B. AP window lymph node #1, excision: Anthracotic lymph node; negative for malignancy  C.  AP window lymph node #2, excision: Anthracotic lymph node; negative for malignancy   D. Periaortic lymph node #1, excision: Fibroadipose tissue; lymph node not identified  E. Bronchial lymph node #1, excision: Anthracotic lymph node; negative for malignancy  F. Left lung, upper lobectomy: Emphysematous change; negative for malignancy  4 anthracotic lymph nodes negative for malignancy   G. Inferior pulmonary ligament lymph node, excision: Anthracotic lymph node; negative for malignancy  H. Bronchial lymph node, excision: Anthracotic lymph node; negative for malignancy. Right side Mediport was removed on 11/04/2016 by Dr. Chanda Krueger. On surveillance per NCCN guidelines. CT chest 04/12/2017 noted no convincing evidence of recurrent disease. CT chest 10/19/2017 noted no definite evidence for recurrent malignancy. CT chest 03/12/2018 negative for pulmonary parenchymal masses or enlarged mediastinal or hilar LN. ? 2 cm lesion in spleen difficult to evaluate due to the arterial phase of the study. CT Abd/Pelvis 05/08/2018  Enhancing lesion within the spleen is relatively stable to slightly smaller when compared to April 2014 exam and likely splenic hemangioma. Other indeterminate findings (left periaortic LN, sclerotic/blastic foci in L3 and L1 reported by Dr. Son Steiner from radiology team). PET/CT scan 06/05/2018 unremarkable. No FDG avid uptake identified. No evidence for recurrent or metastatic disease. CT Chest 12/13/2018 noted no evidence of recurrent/metastatic disease. CT chest on 06/11/2019 noted no evidence for worrisome residual or recurrent malignancy. No evidence for new lymphadenopathy or metastatic disease. Stable scarring and postoperative changes right upper lobe. CT chest 11/26/2019: No evidence of active neoplasm. CT chest 06/15/2020: No evidence of active neoplasm. CT chest 12/30/2020: Postsurgical changes and postsurgical scarring seen within the right lung apex. There is no evidence of tumor recurrence.   2.1 x 2.3 cm enhancing lesion seen within the spleen  PET/CT scan 03/02/2021   No FDG avid uptake is identified which exceeds the threshold SUV. No convincing evidence for recurrent or metastatic disease  CT chest 09/28/2021 No evidence of tumor recurrence     Recent abdominal pain; ER visit reviewed  CT abdomen/pelvis 02/20/2022   6 mm right lower lobe pulmonary nodule  Multiple peritoneal cystic masses      CEA 12.5 on 03/16/2022     CT chest 04/05/2022 Ground-glass nodule right lower lobe superolateral portion 10 mm unchanged from prior however in the medial segment right lower lobe with pleural abutment is a 7 mm pulmonary nodule increased in size from 09/28/2021 with is barely visible at 2-3 mm; repeat CT chest in 3 mo     PET/CT scan 04/05/2022 noted No FDG avid uptake is identified which exceeds the threshold SUV      Bilateral screening mammogram 04/20/2022: Negative for malignancy     CEA     12.1 on 04/20/2022  CA-125 32.8 on 04/20/2022   <2 on 04/20/2022  Chromogranin A 1480 on 04/20/2022. EGD/Colonoscopy 05/13/2022: Gastric polyps , duodenal polyp moderate gastroduodenitis   A. Stomach, biopsy: Hyperplastic polyp and moderate chronic active gastritis; negative for intestinal metaplasia   Immunostain negative for Helicobacter pylori organisms   B. Duodenum, biopsy: Chronic duodenitis with features of peptic duodenitis   C. Colon, 20 cm biopsy: Fragments of tubular adenoma and hyperplastic polyp   Pathology reviewed. Referred to HBP team (Dr. Emanuel Aschoff) for further evaluation of peritoneal cystic masses  CT abdomen/pelvis 06/23/2022 numerous cystic structures identified throughout the right flank and retroperitoneum. Laparoscopic robotic peritoneal mass resection on 07/06/2022  Findings included: serous cystic masses along the colon, periportal lymphadenopathy, fibrotic appearing liver, chronic cholecystitis     Peritoneal fluid Negative for malignant cells.  Cellblock shows reactive mesothelial cells, lymphocytes, adipose/stromal tissue, and blood. Monolayer preparation shows few reactive mesothelial cells and blood. A.  Lymph node, periportal, biopsy:   - Reactive node showing follicular hyperplasia, negative for malignancy, see comment. B.  Remnant gallbladder, cholecystectomy:   - Portion of gallbladder wall showing dense fibrosis/scar and occluded mariaelena-gallbladder vessels. C. Soft tissue, peritoneal sac, excision:   - Peritoneal inclusion cysts (benign cystic mesothelioma) and unremarkable fibroadipose tissue. D.  Liver, needle biopsy:   - Benign hepatic parenchyma showing focal periportal and incomplete septal fibrosis (stage 2)   - Negative for significant lobular/portal necroinflammatory activity, see comment. Comment:   Sections of the lymph node in specimen A reveal normal anat architecture with secondary follicular hyperplasia. There are no cytologically atypical lymphoid cells or Holbrook-Marsha cells identified. Periportal LN Flow Cytometry noted 4.5% monoclonal B cells detected in a predominantly polyclonal background. Monoclonal B cell population (4.5% of total cells) without detectable CD5, CD10 or CD11c expression in a predominantly polyclonal background, raising the differential between a monoclonal B-cell population of undetermined significance versus a B-cell lymphoma/leukemia. In the context of B-cell lymphoma the main differential based on immunophenotype includes, but is not limited to marginal zone lymphoma/leukemia, lymphoplasmacytic lymphoma, CY19- negative follicular lymphoma, and less likely large b cell lymphoma. In specimen D, there is no evidence of chronic hepatitis or significant steatosis. Histologic features of cirrhosis including nodules of regenerating hepatocytes and complete portal-portal bridging fibrosis are not identified. Focal periportal fibrosis and incomplete septal fibrosis are confirmed by trichrome stain.   Iron stain is also performed and is negative. Periportal lymph node flow cytometry showing 4.5% monoclonal B cells without detectable CD5, CD10 or CD11c expression in a predominantly polyclonal background, raising the differential between a monoclonal B-cell population of undetermined significance versus a B-cell lymphoma/leukemia     Repeat CT chest to follow on history of NSCLC on 08/04/2022  Postsurgical changes are identified in the upper lobes bilaterally. 1.1 cm ground-glass nodule in the right lower lobe and a smaller 1 measuring 0.9 cm are noted without change. There is a 1.6 x 1.2 cm soft tissue mass in the right lower lobe medially which is significantly increased since the previous examination. Bilateral adrenal nodules are noted, larger 1 on the left side measuring 1.8 x 1.6 cm.  1.2 cm right renal cystic lesion is identified. An ill-defined hypodense lesion is identified in the spleen currently measuring 2.9 x 3.5 cm      Evaluated by Dr. Ying Zurita on 08/15/2022 at [address] [place] [address] who recommended proceeding with a biopsy of the enlarging right lower lobe lung mass. While lymphoma certainly possible, biopsy recommended to rule out recurrent lung cancer. Periportal lymph node biopsy reviewed at [address] [place] [address]. Lymph node with lipid lymphadenopathy  Small clonal B-cell population detected by flow cytometry and molecular analysis  Negative for carcinoma  Flow cytometry showed a small clonal CD5 -/CD10- B-cell population (4.5% of all events) and a background of polyclonal B cells. B-cell receptor clonality assay was positive for a clonal B-cell population. However there is no morphologic evidence of lymphoma in the lymph node. The detection of a clonal B-cell population despite negative morphologic evidence of lymphoma might represent monoclonal B-cell lymphocytosis process in the lymph node.   An alternative consideration is peripheral blood involvement by B-cell clone (MBL-like process) that was picked up by the tissue flow cytometry and molecular tests. Even if lymphoma is concerned, likely low-grade and not causing her symptoms, so observation would be recommended. PET/CT scan, CT-guided core needle biopsy of enlarging right lower lobe lung mass recommended. PET/CT scan 09/06/2022: In the region of the lesion in the right lower lobe there is FDG avid tracer uptake peak SUV is 3.2. CT guided core needle biopsy RLL lung nodule on 09/20/2022  Right lung, lower lobe core needle biopsy: Adenocarcinoma (see comment)   Comment: The prior history of right upper lobe adenocarcinoma (HES ) and left upper lobe adenocarcinoma (HES ) is noted. The electronic medical record is also reviewed. The adenocarcinoma in the current specimen is weakly immunoreactive with GATA3 and CDX2. The TTF-1 immunostain is negative. 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 Pelvic U/S large calcified uterine mass; ovaries not identified. Large volume ascites. MRI Pelvis 11/05/2022: No suspicious uterine or endometrial mass  Multiple calcified, degenerated uterine leiomyomas, measuring up to 5.2 cm  Seen by GYN (Dr. Mirella Earl); No findings concerning for gynecologic malignancy. Status post paracentesis 11/9/2022:  A total of 2510 cc of straw-colored ascitic fluid was removed  Cytology was negative for malignant cells  Reactive mesothelial cells, acute/chronic inflammatory cells, and blood present. The patient was admitted on 12/14/2022 for increased shortness of breath. More recently few days ago she was discharged from the hospital for worsening right pleural effusion, in which Pleurx catheter was placed on 12/9/2022. Also of note, patient had recent development of a subcutaneous nodule located in the posterior right axilla. CT scans on 12/7/2022 reported 2.7 x 1.8 cm subcutaneous tissue mass, overlies the trapezius muscle     Large Right sided loculated pleural effusion, s/p PleurX placement 12/9/2022, along with findings suggestive of chest wall metastasis  Recurrent ascites, negative cytology, but with concern for malignancy  Subcutaneous mass posterior to right axilla, s/p excision on 12/11/2022  Right breast mass, BIRADS 4  History of recurrent ascites  Imaging evidence of early cirrhosis  Active chronic hepatitis C infection  Right lung mass positive for Adenocarcinoma, weakly positive for GATA3 and CDX2, TTF-1 negative; KRAS (G12C) positive, PDL1 with 10% expression  Previously described multiple peritoneal cystic masses, s/p resection in 7/6/2022, not well visualized on 10/31/22 CT abdomen  Monoclonal B-cell population from periportal lymph node resection from 7/6/2022, in the setting of follicular hyperplasia  Previous right sided lung adenocarcinoma in 2015  Previous left-sided lung adenocarcinoma 2016  Family history of uterine cancer in mother  Family history of early age breast cancer in sister (Diagnosed around early 42's)       [facility]   Orders for cytology and fluid studies were placed on 12/17/22. I called lab 12/19/2022, noting they don't have the any recent pleural fluid    Pulmonary team following    Anemia; did receive IV iron  Hb 9.1 today 12/20/2022. Labs reviewed.    Continue to monitor CBC with diff   GI team on board; EGD/Colonoscopy pending H&H, likely recommending as outpatient PPI BID  Off Octreotide  Liver US noted cirrhotic liver. No hepatic mass. Moderate to large ascites. Repeat paracentesis today, removing >2,000cc; repeat fluid evaluation and cytology. Removal of right pleurx done today    Excisional biopsy of the subcutaneous nodule in posterior right axilla done on 12/11/2022. I called pathology today, and received verbal report, noting morphologic it appears to be adenocarcinoma.   Pathology (Dr. Marion Burrows) could not confirm the primary at this time and could not confirm association of the lung mass   Pathology noted final report likely tomorrow    I disclosed my concerns for metastatic cancer earlier and have also disclose the preliminary report to patient  I discussed clinical implications, suggesting incurable Stage IV disease  Not a good candidate for intensive chemo  I addressed single agent immunotherapy but may not be significantly beneficial as well  Pt has poor functional status  I briefly discussed prognosis  We could await final pathology otherwise  Pt appears understanding of the clinical situation and seems overwhelmed  Will allow patient to think about options of management  I have also discussed with sister Abdoul Will  I briefly discussed hospice and would favor this option  There was no family on my encounter today but Abdoul Will will try to update rest of family and possibly a family meeting, and noted she will contact unit for updates  Palliative on board  No radiation at this time  I ordered brain MRI yesterday but will cancel order at this time        Miranda Eaton, [address]   12/20/22 5:17 PM

## 2022-12-20 NOTE — PROGRESS NOTES
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 *      Subjective:  Patient seen resting in bed on RA eating lunch. S/p R pleurX removal this morning. Family is at bedside. States she is waiting to go down to have an MRI of brain. She denies any breathing complaints. Medications:   sodium chloride      sodium chloride          pantoprazole  40 mg Oral BID AC    amLODIPine  10 mg Oral Daily    sodium chloride flush  5-40 mL IntraVENous 2 times per day    ipratropium-albuterol  1 ampule Inhalation Q4H WA    escitalopram  5 mg Oral Daily       Vitals:  VITALS:  BP (!) 159/71   Pulse 76   Temp 98.2 °F (36.8 °C) (Oral)   Resp 18   Ht 5' 1\" (1.549 m)   Wt 98 lb 15.8 oz (44.9 kg)   SpO2 90%   BMI 18.70 kg/m²   24HR INTAKE/OUTPUT:    Intake/Output Summary (Last 24 hours) at 2022 1120  Last data filed at 2022 0444  Gross per 24 hour   Intake --   Output 400 ml   Net -400 ml     CURRENT PULSE OXIMETRY:  SpO2: 90 %  24HR PULSE OXIMETRY RANGE:  SpO2  Av.9 %  Min: 90 %  Max: 99 %  CVP:    VENT SETTINGS:      Additional Respiratory Assessments  Heart Rate: 76  Resp: 18  SpO2: 90 %      EXAM:  General: No distress. Alert. Room air   Eyes: No sclera icterus. No conjunctival injection. ENT: No discharge. Pharynx clear. Neck: Trachea midline. Normal thyroid. Resp: No accessory muscle use. No crackles. No wheezing. No rhonchi. Diminished on the R base otherwise clear   CV: Regular rate. Regular rhythm. +1 BLE, + murmur   ABD: Non-tender. Non-distended. Normal bowel sounds. Skin: Warm and dry. No nodule on exposed extremities. No rash on exposed extremities. Right pleurx   M/S: No cyanosis. No joint deformity. No clubbing. Neuro: Awake. Follows commands. A&Ox3     I/O: I/O last 3 completed shifts:  In: -   Out: 650 [Urine:650]  No intake/output data recorded. Results:  CBC:   Recent Labs     12/18/22  0141 12/19/22 0347 12/20/22 0349   WBC  --  10.6 10.6   HGB 8.7* 9.3* 9.1*   HCT 27.4* 29.3* 28.3*   MCV  --  87.2 89.6   PLT  --  414 384     BMP:   Recent Labs     12/19/22 0347 12/20/22 0349    141   K 4.6 4.8   * 110*   CO2 21* 22   BUN 19 19   CREATININE 0.9 1.0     LFT:   Recent Labs     12/19/22 0347   ALKPHOS 506*   ALT 13   AST 23   PROT 7.2   BILITOT 0.3   BILIDIR <0.2   LABALBU 2.4*     PT/INR:   Recent Labs     12/19/22 0347   PROTIME 11.9   INR 1.1     Cultures:  No results for input(s): CULTRESP in the last 72 hours. ABG:   No results for input(s): PH, PO2, PCO2, HCO3, BE, O2SAT in the last 72 hours. Films:  XR CHEST PORTABLE    Result Date: 12/14/2022  EXAMINATION: ONE XRAY VIEW OF THE CHEST 12/14/2022 9:08 pm COMPARISON: December 12, 2022 HISTORY: ORDERING SYSTEM PROVIDED HISTORY: right effusion, sob TECHNOLOGIST PROVIDED HISTORY: Reason for exam:->right effusion, sob FINDINGS: Hazy opacities throughout the right lung notable along peripheral aspect of right lung and right costophrenic sulcus. Right-sided chest tube is present. No obvious pneumothorax. The heart is normal in size. 1. Redemonstration of hazy opacities throughout right lung notable suggesting persistent right pleural effusion. 2. Redemonstration of right chest tube. 3. No evidence of pneumothorax. CTA PULMONARY W CONTRAST    Result Date: 12/15/2022  EXAMINATION: CTA OF THE CHEST 12/14/2022 10:05 pm TECHNIQUE: CTA of the chest was performed after the administration of intravenous contrast.  Multiplanar reformatted images are provided for review.   MIP images are provided for review. Automated exposure control, iterative reconstruction, and/or weight based adjustment of the mA/kV was utilized to reduce the radiation dose to as low as reasonably achievable. COMPARISON: None. HISTORY: ORDERING SYSTEM PROVIDED HISTORY: sob rule out PE TECHNOLOGIST PROVIDED HISTORY: Reason for exam:->sob rule out PE Decision Support Exception - unselect if not a suspected or confirmed emergency medical condition->Emergency Medical Condition (MA) FINDINGS: Pulmonary Arteries: Pulmonary arteries are adequately opacified for evaluation. No evidence of intraluminal filling defect to suggest pulmonary embolism. Main pulmonary artery is normal in caliber. Mediastinum: No evidence of mediastinal lymphadenopathy. The heart is mildly enlarged. The pericardium demonstrates no acute abnormality. There is no acute abnormality of the thoracic aorta. Lungs/pleura: There is a large loculated right pleural effusion with associated posterior right lower lobe compressive atelectasis. There is indwelling right hemithorax chest tube which courses posteriorly with tip terminating near the mediastinum. No pulmonary edema. No pneumothorax. On axial image 32, there is indeterminate round solid nodule in posterior right upper lobe, abutting the major fissure. On axial image 56, there is indeterminate 9 mm ground-glass opacity in periphery of right lower lobe. On axial image 77, there is indeterminate 7.7 mm subpleural nodule localized in posterior left lower lobe base. There are a few other scattered subcentimeter nodules in the periphery of left lower lobe near the base. Upper Abdomen: There is intraabdominal ascites which is perihepatic  and perisplenic. Soft Tissues/Bones: No acute bone or soft tissue abnormality. No thyromegaly. No convincing evidence for acute pulmonary thromboembolism. Loculated large right pleural effusion with indwelling right hemithorax drainage catheter.  Posterior right lung base compressive atelectasis. Multiple indeterminate subcentimeter nodules, as detailed above. LUNG-RADS 3, PROBABLY BENIGN; advise follow up to document stability 6 months. Ascites. Assessment:  12/9: R pleurx placed by IR     12/14: R pleurex -500 mL ( prior to admission), ED for SOB  12/15: 4L   12/16: room air, hgb 6.5 s/p 1 uPRBCs  12/17 RA  12/20: room air, R chest pleurX removed in IR, MRI brain pending    Hypoxia - resolved   Anemia   Right lung mass positive for Adenocarcinoma, weakly positive for GATA3 and CDX2, TTF-1 negative; KRAS (G12C) positive, PDL1 with 10% expression. Plan was to start radiation therapy on 12/6/2022  Large right loculated pleural effusion noted on CTA 12/14  Large right pleural effusion with loculation s/p right Pleurx 12/9, no cytology obtained  2.7 X 1.8 cm enhancing subcutaneous mass in the posterior right shoulder region s/p excisional biopsy 12/11, pathology pending    Right breast lesion  History of ascites with cirrhosis status post paracentesis 11/22 cytology negative for malignant cells  History right-sided lung adenocarcinoma 2015 left-sided lung adenocarcinoma 2016  History of COPD  History of tobacco use, 58-pack-year smoking history      Plan:  Patient currently on room air,  Baseline is room air. Ok for supplemental O2 as needed to maintain pulse ox >90%  Continue Duo-nebs. Resume Spiriva on discharge  Right Pleurx drained for 500 mL on 12/14. Unsure of frequency she was draining at home. was to Continue drain every other day, unable to get any fluid despite fluid on imaging. IR evaluated pleurx, in good position but will not flush or instill into pleurx.  12/20 R pleurX removed by IR   Will continue to monitor patient's symptoms as well as oxygenation levels to determine if plans for fluid removal are needed      Electronically signed by VERITO Diop CNP on 12/20/2022 at 11:20 AM    Attending Attestation Note:    Patient seen and examined with OLIVERIO MENDOZA Staff, NP. I have extensively reviewed the chart lab work and imaging. I agree with above. Modifications and amendment of note made as necessary.     In addition, the following apply:    Current Facility-Administered Medications   Medication Dose Route Frequency Provider Last Rate Last Admin    pantoprazole (PROTONIX) tablet 40 mg  40 mg Oral BID AC Dg Clayton MD   40 mg at 12/20/22 1544    medicated lip balm (BLISTEX/CARMEX) stick   Topical PRN Jesús Morales MD   Given at 12/19/22 1419    amLODIPine (NORVASC) tablet 10 mg  10 mg Oral Daily Amanna Wasserman PA-C   10 mg at 12/20/22 1034    white petrolatum ointment   Topical BID PRN Jesús Morales MD   Given at 12/18/22 0847    0.9 % sodium chloride infusion   IntraVENous PRN Bolivar Booker MD        sodium chloride flush 0.9 % injection 5-40 mL  5-40 mL IntraVENous 2 times per day Priya Yeagerott, APRN - CNP   10 mL at 12/20/22 1043    sodium chloride flush 0.9 % injection 5-40 mL  5-40 mL IntraVENous PRN Priya J Addicott, APRN - CNP        0.9 % sodium chloride infusion   IntraVENous PRN Priya ANDRADE Addicott, APRN - CNP        ondansetron (ZOFRAN-ODT) disintegrating tablet 4 mg  4 mg Oral Q8H PRN Priya J Addicott, APRN - CNP        Or    ondansetron (ZOFRAN) injection 4 mg  4 mg IntraVENous Q6H PRN Priya J Addicott, APRN - CNP        acetaminophen (TYLENOL) tablet 650 mg  650 mg Oral Q6H PRN Priya J Addicott, APRN - CNP   650 mg at 12/15/22 2002    Or    acetaminophen (TYLENOL) suppository 650 mg  650 mg Rectal Q6H PRN Priya J Addicott, APRN - CNP        polyethylene glycol (GLYCOLAX) packet 17 g  17 g Oral Daily PRN Priya J Addicott, APRN - CNP   17 g at 12/18/22 0847    ipratropium-albuterol (DUONEB) nebulizer solution 1 ampule  1 ampule Inhalation Q4H CINTHIA Morales MD   1 ampule at 12/20/22 1544    oxyCODONE (ROXICODONE) immediate release tablet 10 mg  10 mg Oral Q6H PRN Jesús Morales MD   10 mg at 12/20/22 9487 escitalopram (LEXAPRO) tablet 5 mg  5 mg Oral Daily VERITO Forbes - CNP   5 mg at 12/20/22 1033      - s/p right pleurX catheter removal   - supportive care to continue  - room air, await D/C planning     Dee Poon MD  12/20/2022  5:18 PM

## 2022-12-20 NOTE — PROGRESS NOTES
PROGRESS NOTE  By Carlos García M.D. [facility]  Dr. Daniel Ortiz M.D.,  Dr. Kerry Diez M.D.,   Dr. Jose Song D.O.,  Dr. Rachel Mcfarland M.D.,  Dr. Kasandra Gaviria D.O.,          Sheila Regalado  71 y.o.  female    SUBJECTIVE:  Denies nausea vomiting but poor oral intake    OBJECTIVE:    BP (!) 156/82   Pulse 94   Temp 98.4 °F (36.9 °C) (Oral)   Resp 18   Ht 5' 1\" (1.549 m)   Wt 98 lb 15.8 oz (44.9 kg)   SpO2 100%   BMI 18.70 kg/m²     General: NAD/-American female. Appears thin  HEENT: Anicteric sclera/moist oral mucosa  Neck: Supple/trachea midline  Chest: Symmetric excursion/labored respirations  Cor: Regular  Abd.: Soft and appears nontender. No significant distention  Extr.:  No peripheral edema  Skin: Warm and dry      DATA:    Monitor data reviewed -sinus rhythm/sinus tachycardia noted.        Lab Results   Component Value Date/Time    WBC 10.6 12/20/2022 03:49 AM    RBC 3.16 12/20/2022 03:49 AM    HGB 9.1 12/20/2022 03:49 AM    HCT 28.3 12/20/2022 03:49 AM    MCV 89.6 12/20/2022 03:49 AM    MCH 28.8 12/20/2022 03:49 AM    MCHC 32.2 12/20/2022 03:49 AM    RDW 16.8 12/20/2022 03:49 AM     12/20/2022 03:49 AM    MPV 10.1 12/20/2022 03:49 AM     Lab Results   Component Value Date/Time     12/20/2022 03:49 AM    K 4.8 12/20/2022 03:49 AM     12/20/2022 03:49 AM    CO2 22 12/20/2022 03:49 AM    BUN 19 12/20/2022 03:49 AM    CREATININE 1.0 12/20/2022 03:49 AM    CALCIUM 9.2 12/20/2022 03:49 AM    PROT 7.2 12/19/2022 03:47 AM    LABALBU 2.4 12/19/2022 03:47 AM    LABALBU 4.2 04/10/2012 12:28 PM    BILITOT 0.3 12/19/2022 03:47 AM    ALKPHOS 506 12/19/2022 03:47 AM    AST 23 12/19/2022 03:47 AM    ALT 13 12/19/2022 03:47 AM     Lab Results   Component Value Date/Time    LIPASE 124 11/23/2022 01:45 AM     No results found for: AMYLASE      ASSESSMENT/PLAN:  Patient Active Problem List   Diagnosis    Hepatitis C    Abnormal Pap smear    Dyslipidemia    Insomnia Ex-smoker    PVD (peripheral vascular disease) ([address]    Uterine fibroid    Gall stone    Kidney stone    Left groin pain    Need for Tdap vaccination    Abnormal chest x-ray with multiple lung nodules    Hypertension    Panlobular emphysema (HCC)    Chronic hepatitis C virus infection ([address]    Malignant neoplasm of upper lobe of right lung (HCC)    Malignant neoplasm of lung (HCC)    Leukocytosis    Severe anemia    Essential hypertension    Thrombocytopenia (HCC)    Malignant neoplasm of upper lobe of left lung (HCC)    History of lobectomy of lung    Adenocarcinoma, lung (HCC)    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)    Hep C w/o coma, chronic (HCC)    Hyperlipidemia    Warfarin anticoagulation    Bilateral lung cancer [address]    Pulmonary embolism without acute cor pulmonale (HCC)    Port-A-Cath in place    Acute cholecystitis    History of lung cancer    RBBB    Adenomatous polyp of descending colon    Abdominal pain    Ascites of liver    Moderate protein-calorie malnutrition (HCC)    Intractable pain    Goals of care, counseling/discussion    Palliative care encounter    Mass of right axilla    Subareolar mass of right breast    Pleural effusion, right    Pleural effusion    Loculated pleural effusion    Subcutaneous nodule    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 Cirrhosis / HCV  -Previously positive hepatitis C antibody  -Viral load 638k (11/4/2022)   -genotype 1a or Ib reported (11/4/2022)  -HBV negative  -Nonelevated AFP 4 (11/23/2022)  -CT 11/21/2022 with contrast showing no evidence of hepatic mass  -Ascites - see below  -Patient will require further follow-up in the office for possible treatment - see discussion below  -Patient will require further evaluation with upper endoscopy - see discussion below     2.   Ascites  -Likely multifactorial and related to peritoneal involvement from advanced cancer/cirrhosis  -Paracentesis 11/22/2022 with 3050 mL removed  -Previously paracentesis yielded ascites with albumin of 1.7 at the time when serum albumin was 2.2 calculating ascitic gradient at 0.5 indicating likely alternative cause for patient's ascites -see repeat paracentesis today.  -cytology negative  -Defer diuretics to admitting service given renal failure  -Repeat paracentesis pending  -US liver with Dopplers 12/17/2022 showing normal vasculature  -Patient can undergo serial paracentesis depending on rate of accumulation of ascites which can be set as outpatient -paracentesis today with removal of approximately 2 L     3. Advanced malignancy  -Patient follows with Dr. Rose Ward  -Poor prognosis  -Defer further discussion regarding goals of care and CODE STATUS to admitting/oncology     4. Anemia  -Acute on chronic  -Iron deficient  -no evidence of overt bleed  -Twice daily PPI  -Octreotide drip  -Decrease frequency of H&H checks  -Keep PRBCs on hold  -Transfusion per admitting  -Patient require further evaluation with EGD and colonoscopy depending on H&H dynamics and goals of care -currently plan is outpatient procedures unless precipitous drop/overt bleed           S/P paracentesis with removal of slightly over 2 L. Ascitic fluid analysis SAAG 0.9 suggesting alternative to portal hypertension as etiology for ascites -likely related to peritoneal carcinomatosis  Pending cytology  Stable H&H without evidence of overt bleed. Plan for upper endoscopy as outpatient. Nona Jimenez MD  12/20/2022  4:00 PM    NOTE:  This report was transcribed using voice recognition software. Every effort was made to ensure accuracy; however, inadvertent computerized transcription errors may be present.

## 2022-12-20 NOTE — PROGRESS NOTES
Occupational Therapy    OCCUPATIONAL THERAPY INITIAL EVALUATION     Nidhi Tran [facility] [place], [place]         BNAV:                                                  Patient Name: Sheila Regalado    MRN: [id number]    : 1950    Room: [address] [place] [address]      Evaluating OT: Bill Roque OTR/L   [id number]      Referring Provider:Amal Doll Closs, PA-C    Specific Provider Orders/Date:OT eval and treat 2022      Diagnosis:  Loculated pleural effusion [J90]  Other fatigue [R53.83]     Pertinent Medical History: B lung CA, with mets  OA, PVD, scoliosis      Precautions:  Fall Risk, alarm      Assessment of current deficits    [x] Functional mobility  [x]ADLs  [x] Strength               [x]Cognition    [x] Functional transfers   [x] IADLs         [x] Safety Awareness   [x]Endurance    [x] Fine Coordination              [x] Balance      [] Vision/perception   []Sensation     []Gross Motor Coordination  [] ROM  [] Delirium                   [] Motor Control     OT PLAN OF CARE   OT POC based on physician orders, patient diagnosis and results of clinical assessment    Frequency/Duration  2-4 days/wk for 2 weeks PRN   Specific OT Treatment Interventions to include:   ADL retraining/adapted techniques and AE recommendations to increase functional independence within precautions                    Energy conservation techniques to improve tolerance for selfcare routine   Functional transfer/mobility training/DME recommendations for increased independence, safety and fall prevention         Patient/family education to increase safety and functional independence             Environmental modifications for safe mobility and completion of ADLs                             Therapeutic activity to improve functional performance during ADLs.                                          Therapeutic exercise to improve tolerance and functional strength for ADLs Balance retraining/tolerance tasks for facilitation of postural control with dynamic challenges during ADLs . Positioning to improve functional independence    Recommended Adaptive Equipment: TBD     Home Living: Pt lives with  1 story, steps to enter   Equipment owned: hospital bed, wheelchair, walker     Prior Level of Function: assist  with ADLs , assist  with IADLs; patient reports she hasn't walked in over 3 months. Pain Level: back pain ; R chest pleurX removed this date   Cognition: A&O: pleasant, following simple commands, very soft spoken    Memory:  forgetful    Sequencing:  fair    Problem solving:  fair    Judgement/safety:  fair      Functional Assessment:  AM-PAC Daily Activity Raw Score: 14/24   Initial Eval Status  Date: 12/20/22 Treatment Status  Date: STGs = LTGs  Time frame: 10-14 days   Feeding SBA/set-up   Supervision    Grooming Mod A, seated   SBA    UB Dressing Mod a   Min A    LB Dressing Max a  Mod a    Bathing Max A   Mod a    Toileting Max A/dependent   Mod A    Bed Mobility  Max A  Supine <> sit   Min A    Functional Transfers Max A  Sit - stand from  bed   Min A    Functional Mobility Patient stood next to bed   Overall decrease strength and endurance   Min A at w/c level  with fair+tolerance    Balance Sitting:     Static:  min/CGA    Dynamic:Mod A   Standing: Max A   SBA- sitting   Min A- standing    Activity Tolerance Limited   No SOB   Fatigued with light activity     Fair+ with ADL activity    Visual/  Perceptual Glasses: none by bedside                 Hand Dominance right   AROM (PROM) Strength Additional Info:    R/LUE  WFL Fair+ Fair+  and wfl FMC/dexterity noted during ADL tasks         Hearing: WFL   Sensation:  No c/o numbness or tingling   Tone: WFL   Edema: none observed     Comments: Upon arrival patient lying in bed . At end of session, patient returned to bed  with call light and phone within reach, all lines and tubes intact.   *ALARM ON Overall patient demonstrated  decreased independence and safety during completion of ADL/functional transfer/mobility tasks. Pt would benefit from continued skilled OT to increase safety and independence with completion of ADL/IADL tasks for functional independence and quality of life. Rehab Potential: fair + for established goals     Patient / Family Goal: none stated       Patient and/or family were instructed on functional diagnosis, prognosis/goals and OT plan of care. Demonstrated fair  understanding. Eval Complexity: Low    Time In: 1317  Time Out: 1332      Min Units   OT Eval Low 97165 x  1   OT Eval Medium 63094      OT Eval High 94179      OT Re-Eval [id number]       Therapeutic Ex 05627      Therapeutic Activities 56904       ADL/Self Care 99325       Orthotic Management 13709       Manual [id number]     Neuro Re-Ed 51458       Non-Billable Time          Evaluation Time additionally includes thorough review of current medical information, gathering information on past medical history/social history and prior level of function, interpretation of standardized testing/informal observation of tasks, assessment of data and development of plan of care and goals.             Ryan Bolden  OTR/L  OT [id number]

## 2022-12-20 NOTE — OR NURSING
Patient arrived to CT department for removal of right chest pleurx catheter. Vitals obtained prior to procedure. Dr. Bhanu Sanchez spoke with patient about procedure. Procedural consent obtained by family member [employer] Patient placed supine on CT table and images reviewed with Dr. Bhanu Sanchez. Patient medicated with 1% lidocaine. Using sterile technique pleurx removed from right chest. Patient tolerated procedure without difficulty. Report given to floor RN Rossy Calero. Patient transported back to unit.

## 2022-12-20 NOTE — CARE COORDINATION
Transition of Care-Patient is active with [facility], will need resumption of care orders placed prior to discharge. PT/OT ordered 12/19-waiting on evaluations to discuss possible SNF placement. Patient has Lung CA with mets, pleurx catheter removed today in IR. Waiting on biopsy results from excision subcutaneous nodule in posterior right axilla done 12/11/22. Radiation on hold. Palliative following. SW/CM continue to follow.     Rivera LINDN, RN  [facility]

## 2022-12-21 LAB
ANION GAP SERPL CALCULATED.3IONS-SCNC: 7 MMOL/L (ref 7–16)
BUN BLDV-MCNC: 19 MG/DL (ref 6–23)
CALCIUM SERPL-MCNC: 8.7 MG/DL (ref 8.6–10.2)
CHLORIDE BLD-SCNC: 109 MMOL/L (ref 98–107)
CO2: 23 MMOL/L (ref 22–29)
CREAT SERPL-MCNC: 1 MG/DL (ref 0.5–1)
GFR SERPL CREATININE-BSD FRML MDRD: 60 ML/MIN/1.73
GLUCOSE BLD-MCNC: 91 MG/DL (ref 74–99)
HCT VFR BLD CALC: 28.7 % (ref 34–48)
HEMOGLOBIN: 8.8 G/DL (ref 11.5–15.5)
MCH RBC QN AUTO: 26.9 PG (ref 26–35)
MCHC RBC AUTO-ENTMCNC: 30.7 % (ref 32–34.5)
MCV RBC AUTO: 87.8 FL (ref 80–99.9)
PDW BLD-RTO: 16.9 FL (ref 11.5–15)
PLATELET # BLD: 383 E9/L (ref 130–450)
PMV BLD AUTO: 10.1 FL (ref 7–12)
POTASSIUM REFLEX MAGNESIUM: 4.1 MMOL/L (ref 3.5–5)
RBC # BLD: 3.27 E12/L (ref 3.5–5.5)
SODIUM BLD-SCNC: 139 MMOL/L (ref 132–146)
WBC # BLD: 10.4 E9/L (ref 4.5–11.5)

## 2022-12-21 PROCEDURE — 6370000000 HC RX 637 (ALT 250 FOR IP): Performed by: PHYSICIAN ASSISTANT

## 2022-12-21 PROCEDURE — 99232 SBSQ HOSP IP/OBS MODERATE 35: CPT | Performed by: PHYSICIAN ASSISTANT

## 2022-12-21 PROCEDURE — 6370000000 HC RX 637 (ALT 250 FOR IP): Performed by: FAMILY MEDICINE

## 2022-12-21 PROCEDURE — 36415 COLL VENOUS BLD VENIPUNCTURE: CPT

## 2022-12-21 PROCEDURE — 80048 BASIC METABOLIC PNL TOTAL CA: CPT

## 2022-12-21 PROCEDURE — 94640 AIRWAY INHALATION TREATMENT: CPT

## 2022-12-21 PROCEDURE — 6370000000 HC RX 637 (ALT 250 FOR IP): Performed by: NURSE PRACTITIONER

## 2022-12-21 PROCEDURE — 0WP9X3Z REMOVAL OF INFUSION DEVICE FROM RIGHT PLEURAL CAVITY, EXTERNAL APPROACH: ICD-10-PCS | Performed by: RADIOLOGY

## 2022-12-21 PROCEDURE — 2060000000 HC ICU INTERMEDIATE R&B

## 2022-12-21 PROCEDURE — 2580000003 HC RX 258

## 2022-12-21 PROCEDURE — 6370000000 HC RX 637 (ALT 250 FOR IP): Performed by: HOSPITALIST

## 2022-12-21 PROCEDURE — 99232 SBSQ HOSP IP/OBS MODERATE 35: CPT | Performed by: INTERNAL MEDICINE

## 2022-12-21 PROCEDURE — 85027 COMPLETE CBC AUTOMATED: CPT

## 2022-12-21 RX ADMIN — IPRATROPIUM BROMIDE AND ALBUTEROL SULFATE 1 AMPULE: 2.5; .5 SOLUTION RESPIRATORY (INHALATION) at 08:54

## 2022-12-21 RX ADMIN — PANTOPRAZOLE SODIUM 40 MG: 40 TABLET, DELAYED RELEASE ORAL at 05:58

## 2022-12-21 RX ADMIN — IPRATROPIUM BROMIDE AND ALBUTEROL SULFATE 1 AMPULE: 2.5; .5 SOLUTION RESPIRATORY (INHALATION) at 16:19

## 2022-12-21 RX ADMIN — OXYCODONE 10 MG: 5 TABLET ORAL at 15:43

## 2022-12-21 RX ADMIN — Medication 10 ML: at 10:11

## 2022-12-21 RX ADMIN — ESCITALOPRAM OXALATE 5 MG: 10 TABLET ORAL at 10:07

## 2022-12-21 RX ADMIN — AMLODIPINE BESYLATE 10 MG: 10 TABLET ORAL at 10:07

## 2022-12-21 RX ADMIN — IPRATROPIUM BROMIDE AND ALBUTEROL SULFATE 1 AMPULE: 2.5; .5 SOLUTION RESPIRATORY (INHALATION) at 13:01

## 2022-12-21 RX ADMIN — IPRATROPIUM BROMIDE AND ALBUTEROL SULFATE 1 AMPULE: 2.5; .5 SOLUTION RESPIRATORY (INHALATION) at 19:43

## 2022-12-21 RX ADMIN — PANTOPRAZOLE SODIUM 40 MG: 40 TABLET, DELAYED RELEASE ORAL at 17:03

## 2022-12-21 RX ADMIN — Medication 10 ML: at 20:27

## 2022-12-21 ASSESSMENT — PAIN SCALES - GENERAL
PAINLEVEL_OUTOF10: 0
PAINLEVEL_OUTOF10: 9
PAINLEVEL_OUTOF10: 9

## 2022-12-21 ASSESSMENT — PAIN DESCRIPTION - LOCATION: LOCATION: OTHER (COMMENT)

## 2022-12-21 ASSESSMENT — PAIN DESCRIPTION - ORIENTATION: ORIENTATION: RIGHT

## 2022-12-21 ASSESSMENT — PAIN SCALES - WONG BAKER: WONGBAKER_NUMERICALRESPONSE: 0

## 2022-12-21 NOTE — PROGRESS NOTES
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 *      Subjective:  Patient seen resting in bed on RA . Denies dyspnea or cough     Medications:   sodium chloride      sodium chloride          pantoprazole  40 mg Oral BID AC    amLODIPine  10 mg Oral Daily    sodium chloride flush  5-40 mL IntraVENous 2 times per day    ipratropium-albuterol  1 ampule Inhalation Q4H WA    escitalopram  5 mg Oral Daily       Vitals:  VITALS:  BP (!) 148/59   Pulse 96   Temp 98.7 °F (37.1 °C) (Oral)   Resp 18   Ht 5' 1\" (1.549 m)   Wt 107 lb 12.9 oz (48.9 kg)   SpO2 98%   BMI 20.37 kg/m²   24HR INTAKE/OUTPUT:    Intake/Output Summary (Last 24 hours) at 2022 0930  Last data filed at 2022 2247  Gross per 24 hour   Intake --   Output 125 ml   Net -125 ml     CURRENT PULSE OXIMETRY:  SpO2: 98 %  24HR PULSE OXIMETRY RANGE:  SpO2  Av %  Min: 93 %  Max: 100 %  CVP:    VENT SETTINGS:      Additional Respiratory Assessments  Heart Rate: 96  Resp: 18  SpO2: 98 %      EXAM:  General: No distress. Alert. Room air   Eyes: No sclera icterus. No conjunctival injection. ENT: No discharge. Pharynx clear. Neck: Trachea midline. Normal thyroid. Resp: No accessory muscle use. No crackles. No wheezing. No rhonchi. Diminished on the R base otherwise clear   CV: Regular rate. Regular rhythm. +1 BLE, + murmur   ABD: Non-tender. Non-distended. Normal bowel sounds. Skin: Warm and dry. No nodule on exposed extremities. No rash on exposed extremities. Right pleurx   M/S: No cyanosis. No joint deformity.  No clubbing. Neuro: Awake. Follows commands. A&Ox3     I/O: I/O last 3 completed shifts:  In: -   Out: 525 [Urine:525]  No intake/output data recorded. Results:  CBC:   Recent Labs     12/19/22 0347 12/20/22 0349 12/21/22  0553   WBC 10.6 10.6 10.4   HGB 9.3* 9.1* 8.8*   HCT 29.3* 28.3* 28.7*   MCV 87.2 89.6 87.8    384 383     BMP:   Recent Labs     12/19/22 0347 12/20/22  0349 12/21/22  0553    141 139   K 4.6 4.8 4.1   * 110* 109*   CO2 21* 22 23   BUN 19 19 19   CREATININE 0.9 1.0 1.0     LFT:   Recent Labs     12/19/22 0347   ALKPHOS 506*   ALT 13   AST 23   PROT 7.2   BILITOT 0.3   BILIDIR <0.2   LABALBU 2.4*     PT/INR:   Recent Labs     12/19/22 0347   PROTIME 11.9   INR 1.1     Cultures:  No results for input(s): CULTRESP in the last 72 hours. ABG:   No results for input(s): PH, PO2, PCO2, HCO3, BE, O2SAT in the last 72 hours. Films:  XR CHEST PORTABLE    Result Date: 12/14/2022  EXAMINATION: ONE XRAY VIEW OF THE CHEST 12/14/2022 9:08 pm COMPARISON: December 12, 2022 HISTORY: ORDERING SYSTEM PROVIDED HISTORY: right effusion, sob TECHNOLOGIST PROVIDED HISTORY: Reason for exam:->right effusion, sob FINDINGS: Hazy opacities throughout the right lung notable along peripheral aspect of right lung and right costophrenic sulcus. Right-sided chest tube is present. No obvious pneumothorax. The heart is normal in size. 1. Redemonstration of hazy opacities throughout right lung notable suggesting persistent right pleural effusion. 2. Redemonstration of right chest tube. 3. No evidence of pneumothorax. CTA PULMONARY W CONTRAST    Result Date: 12/15/2022  EXAMINATION: CTA OF THE CHEST 12/14/2022 10:05 pm TECHNIQUE: CTA of the chest was performed after the administration of intravenous contrast.  Multiplanar reformatted images are provided for review. MIP images are provided for review.  Automated exposure control, iterative reconstruction, and/or weight based adjustment of the mA/kV was utilized to reduce the radiation dose to as low as reasonably achievable. COMPARISON: None. HISTORY: ORDERING SYSTEM PROVIDED HISTORY: sob rule out PE TECHNOLOGIST PROVIDED HISTORY: Reason for exam:->sob rule out PE Decision Support Exception - unselect if not a suspected or confirmed emergency medical condition->Emergency Medical Condition (MA) FINDINGS: Pulmonary Arteries: Pulmonary arteries are adequately opacified for evaluation. No evidence of intraluminal filling defect to suggest pulmonary embolism. Main pulmonary artery is normal in caliber. Mediastinum: No evidence of mediastinal lymphadenopathy. The heart is mildly enlarged. The pericardium demonstrates no acute abnormality. There is no acute abnormality of the thoracic aorta. Lungs/pleura: There is a large loculated right pleural effusion with associated posterior right lower lobe compressive atelectasis. There is indwelling right hemithorax chest tube which courses posteriorly with tip terminating near the mediastinum. No pulmonary edema. No pneumothorax. On axial image 32, there is indeterminate round solid nodule in posterior right upper lobe, abutting the major fissure. On axial image 56, there is indeterminate 9 mm ground-glass opacity in periphery of right lower lobe. On axial image 77, there is indeterminate 7.7 mm subpleural nodule localized in posterior left lower lobe base. There are a few other scattered subcentimeter nodules in the periphery of left lower lobe near the base. Upper Abdomen: There is intraabdominal ascites which is perihepatic  and perisplenic. Soft Tissues/Bones: No acute bone or soft tissue abnormality. No thyromegaly. No convincing evidence for acute pulmonary thromboembolism. Loculated large right pleural effusion with indwelling right hemithorax drainage catheter. Posterior right lung base compressive atelectasis. Multiple indeterminate subcentimeter nodules, as detailed above.   LUNG-RADS 3, PROBABLY BENIGN; advise follow up to document stability 6 months. Ascites. Assessment:  12/9: R pleurx placed by IR     12/14: R pleurex -500 mL ( prior to admission), ED for SOB  12/15: 4L   12/16: room air, hgb 6.5 s/p 1 uPRBCs  12/17 RA  12/20: room air, R chest pleurX removed in IR, MRI brain pending  12/21 on room air     Hypoxia - resolved   Anemia   Right lung mass positive for Adenocarcinoma, weakly positive for GATA3 and CDX2, TTF-1 negative; KRAS (G12C) positive, PDL1 with 10% expression. Plan was to start radiation therapy on 12/6/2022  Large right loculated pleural effusion noted on CTA 12/14  Large right pleural effusion with loculation s/p right Pleurx 12/9, no cytology obtained  2.7 X 1.8 cm enhancing subcutaneous mass in the posterior right shoulder region s/p excisional biopsy 12/11, pathology pending    Right breast lesion  History of ascites with cirrhosis status post paracentesis 11/22 cytology negative for malignant cells  History right-sided lung adenocarcinoma 2015 left-sided lung adenocarcinoma 2016  History of COPD  History of tobacco use, 58-pack-year smoking history      Plan:  Patient currently on room air,  Baseline is room air. Ok for supplemental O2 as needed to maintain pulse ox >90%  Continue Duo-nebs. Resume Spiriva on discharge   12/20 R pleurX removed by IR , no increased sx thus far. Monitor  Stable from pulm standpoint         Electronically signed by VERITO Reece on 12/21/2022 at 9:30 AM    Attending Attestation Note:    Patient seen and examined with Hospital Staff, NP. I have extensively reviewed the chart lab work and imaging. I agree with above. Modifications and amendment of note made as necessary.     In addition, the following apply:    Current Facility-Administered Medications   Medication Dose Route Frequency Provider Last Rate Last Admin    pantoprazole (PROTONIX) tablet 40 mg  40 mg Oral BID AC Marjorie Mayberry MD   40 mg at 12/21/22 8114 medicated lip balm (BLISTEX/CARMEX) stick   Topical PRN Logan Zee MD   Given at 12/19/22 1419    amLODIPine (NORVASC) tablet 10 mg  10 mg Oral Daily Howard Wasserman PA-C   10 mg at 12/21/22 1007    white petrolatum ointment   Topical BID PRN Logan Zee MD   Given at 12/18/22 0847    0.9 % sodium chloride infusion   IntraVENous PRN Chico Abreu MD        sodium chloride flush 0.9 % injection 5-40 mL  5-40 mL IntraVENous 2 times per day Priya J Addicott, APRN - CNP   10 mL at 12/21/22 1011    sodium chloride flush 0.9 % injection 5-40 mL  5-40 mL IntraVENous PRN Priya J Addicott, APRN - CNP        0.9 % sodium chloride infusion   IntraVENous PRN Priya J Addicott, APRN - CNP        ondansetron (ZOFRAN-ODT) disintegrating tablet 4 mg  4 mg Oral Q8H PRN Priya J Addicott, APRN - CNP        Or    ondansetron (ZOFRAN) injection 4 mg  4 mg IntraVENous Q6H PRN Priya J Addicott, APRN - CNP        acetaminophen (TYLENOL) tablet 650 mg  650 mg Oral Q6H PRN Priya J Addicott, APRN - CNP   650 mg at 12/15/22 2002    Or    acetaminophen (TYLENOL) suppository 650 mg  650 mg Rectal Q6H PRN Priya J Addicott, APRN - CNP        polyethylene glycol (GLYCOLAX) packet 17 g  17 g Oral Daily PRN Priya J Addicott, APRN - CNP   17 g at 12/18/22 0847    ipratropium-albuterol (DUONEB) nebulizer solution 1 ampule  1 ampule Inhalation Q4H WA Logan Zee MD   1 ampule at 12/21/22 1301    oxyCODONE (ROXICODONE) immediate release tablet 10 mg  10 mg Oral Q6H PRN Logan Zee MD   10 mg at 12/21/22 1543    escitalopram (LEXAPRO) tablet 5 mg  5 mg Oral Daily Danilo Smith APRN - CNP   5 mg at 12/21/22 1007     - s/p right pleurX catheter removal   - supportive care to continue  - room air, await D/C planning   - pulmonary to sign off, follow up in office in 4 weeks     Vidya Navarro MD  12/21/2022  3:58 PM

## 2022-12-21 NOTE — PLAN OF CARE

## 2022-12-21 NOTE — CARE COORDINATION
Social work / Discharge Planning:          AM [facility] 10/24. Social work contacted patient's sister Coral Wood via phone to discuss discharge plan. Social work offered the option of [facility] which family declined. The patient's domestic partner Jacey Pichardo and her two sisters care for her at home as well as support from [facility] and aids from [address] [place] [address], [phone] [facility]  is Pieter Batista. Patient's sister states that they have all of the equipment needed at home. Social work spoke to [facility] and requested that they add a  to the case at home.     Electronically signed by ESTEVAN Rhoades on 12/21/2022 at 10:47 AM

## 2022-12-21 NOTE — PROGRESS NOTES
[facility] Progress Note    Admitting Date and Time: 12/14/2022  8:41 PM  Admit Dx: Loculated pleural effusion [J90]  Other fatigue [R53.83]    Subjective:  Patient is being followed for Loculated pleural effusion [J90]  Other fatigue [R53.83]     Patient was lying in no acute distress. Continues to have pain in her right side. Denies any new concerns. ROS: denies fever, chills, cp, sob, n/v, HA unless stated above.       pantoprazole  40 mg Oral BID AC    amLODIPine  10 mg Oral Daily    sodium chloride flush  5-40 mL IntraVENous 2 times per day    ipratropium-albuterol  1 ampule Inhalation Q4H WA    escitalopram  5 mg Oral Daily     medicated lip balm, , PRN  white petrolatum, , BID PRN  sodium chloride, , PRN  sodium chloride flush, 5-40 mL, PRN  sodium chloride, , PRN  ondansetron, 4 mg, Q8H PRN   Or  ondansetron, 4 mg, Q6H PRN  acetaminophen, 650 mg, Q6H PRN   Or  acetaminophen, 650 mg, Q6H PRN  polyethylene glycol, 17 g, Daily PRN  oxyCODONE, 10 mg, Q6H PRN       Objective:    BP (!) 148/59   Pulse 96   Temp 98.7 °F (37.1 °C) (Oral)   Resp 18   Ht 5' 1\" (1.549 m)   Wt 107 lb 12.9 oz (48.9 kg)   SpO2 98%   BMI 20.37 kg/m²   General Appearance: awake and alert and in no acute distress  Skin: warm and dry  Head: normocephalic and atraumatic  Eyes: pupils equal, round, and reactive to light, extraocular eye movements intact, conjunctivae normal  Neck: neck supple and non tender without mass   Pulmonary/Chest: diminished on the right, no wheezes, rales or rhonchi, normal air movement, no respiratory distress  Cardiovascular: normal rate, normal S1 and S2 and no carotid bruits  Abdomen: soft, non-tender, distended, normal bowel sounds, no masses or organomegaly  Extremities: no cyanosis, no clubbing and no edema  Neurologic: no cranial nerve deficit and speech normal        Recent Labs     12/19/22  0347 12/20/22  0349 12/21/22  0553    141 139   K 4.6 4.8 4.1   * 110* 109* CO2 21* 22 23   BUN 19 19 19   CREATININE 0.9 1.0 1.0   GLUCOSE 123* 99 91   CALCIUM 9.1 9.2 8.7         Recent Labs     12/19/22  0347 12/20/22  0349 12/21/22  0553   WBC 10.6 10.6 10.4   RBC 3.36* 3.16* 3.27*   HGB 9.3* 9.1* 8.8*   HCT 29.3* 28.3* 28.7*   MCV 87.2 89.6 87.8   MCH 27.7 28.8 26.9   MCHC 31.7* 32.2 30.7*   RDW 16.7* 16.8* 16.9*    384 383   MPV 9.9 10.1 10.1         Radiology:     EXAMINATION:  CTA OF THE CHEST 12/14/2022 10:05 pm     TECHNIQUE:  CTA of the chest was performed after the administration of intravenous  contrast.  Multiplanar reformatted images are provided for review. MIP  images are provided for review. Automated exposure control, iterative  reconstruction, and/or weight based adjustment of the mA/kV was utilized to  reduce the radiation dose to as low as reasonably achievable. COMPARISON:  None. HISTORY:  ORDERING SYSTEM PROVIDED HISTORY: sob rule out PE  TECHNOLOGIST PROVIDED HISTORY:  Reason for exam:->sob rule out PE  Decision Support Exception - unselect if not a suspected or confirmed  emergency medical condition->Emergency Medical Condition (MA)     FINDINGS:  Pulmonary Arteries: Pulmonary arteries are adequately opacified for  evaluation. No evidence of intraluminal filling defect to suggest pulmonary  embolism. Main pulmonary artery is normal in caliber. Mediastinum: No evidence of mediastinal lymphadenopathy. The heart is mildly  enlarged. The pericardium demonstrates no acute abnormality. There is no  acute abnormality of the thoracic aorta. Lungs/pleura: There is a large loculated right pleural effusion with  associated posterior right lower lobe compressive atelectasis. There is  indwelling right hemithorax chest tube which courses posteriorly with tip  terminating near the mediastinum. No pulmonary edema. No pneumothorax.      On axial image 32, there is indeterminate round solid nodule in posterior  right upper lobe, abutting the major fissure. On axial image 56, there is  indeterminate 9 mm ground-glass opacity in periphery of right lower lobe. On  axial image 77, there is indeterminate 7.7 mm subpleural nodule localized in  posterior left lower lobe base. There are a few other scattered  subcentimeter nodules in the periphery of left lower lobe near the base. Upper Abdomen: There is intraabdominal ascites which is perihepatic  and  perisplenic. Soft Tissues/Bones: No acute bone or soft tissue abnormality. No thyromegaly. IMPRESSION:  No convincing evidence for acute pulmonary thromboembolism. Loculated large right pleural effusion with indwelling right hemithorax  drainage catheter. Posterior right lung base compressive atelectasis. Multiple indeterminate subcentimeter nodules, as detailed above. LUNG-RADS  3, PROBABLY BENIGN; advise follow up to document stability 6 months. 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 *      Plan:  1. Loculated pleural effusion:  CTA shows loculated large right pleural effusion with indwelling right hemithorax drainage catheter and posterior right lung base compressive atelectasis. CRP 3.1. Pleural drain placed on 12/9. Right Pleurex drained on 12/14 with 500 ml removed. Drained twice weekly at home, per patient. Follows with Dr Lissette Chaparro outpatient. Since admission, no fluid has been removed from pleurx. Plurex removed on 12/20. Pulmonology following. 2. Hx of lung cancer: Right lung mass. Adenocarcinoma. GA TA 3/CD x2 KRAS (G12C)+ % expression. Radiation therapy initiated in 6/22. Follows with Dr Cleotha Crigler.   Oncology, pulmonology, and palliative medicine following. PT/OT consulted. Per oncology, patient likely has incurable stage IV disease. Hospice is favorable option. 3. HTN: Continue Amlodipine. Increased to Norvasc 10 mg daily due to uncontrolled BP. 4. Anemia: Hgb stable. Received 1 U PBC on 12/16 for hgb of 6.5. No overt bleeding. Monitor H&H. Transfuse for hgb <7. Monitor. GI and hem/onc following. Consider endoscopic eval, per GI.      5. Cirrhosis with ascites: Likely secondary to portal hypertension- ? Peritoneal carcinomatosis. Hx of hepatitis C. Paracentesis on 11/9 with removal of 2510 cc. Cytology negative for malignant cells in 11/22. Liver US showing cirrhotic liver, and moderate to large intra-ascites. S/p Paracentesis on 12/20 with slightly over 2 L removed. 6. Hypoxia: Secondary to above. Was on 4 L NC, now down to RA. Continue DuoNebs. Pulmonology following. 7. Subcutaneous mass posterior right shoulder : Biopsy on 12/11. With pathology pending    8. Subcentimeter nodules: Seen on CT. Probably benign. Patient is to follow up outpatient in 6 months. Dispo: Oncology recommending hospice care. Family is not agreeable to this. PT AM-PAC 10/24. Family declining SNF placement, and would like the patient discharged [place] [id number]. NOTE: This report was transcribed using voice recognition software. Every effort was made to ensure accuracy; however, inadvertent computerized transcription errors may be present. Electronically signed by Barndon Redd PA-C on 12/21/2022 at 9:00 AM     25 minutes time spent reviewing patient chart, assessing patient, discussing plan of care with patient and family, discussing plan of care with collaborating physician, and charting.

## 2022-12-21 NOTE — PROGRESS NOTES
PROGRESS NOTE  By Nedra Briones M.D. [facility]  Dr. Jenna Osorio M.D.,  Dr. Mikey Beckett M.D.,   Dr. Juanita Reese D.O.,  Dr. Lupe Moncada M.D.,  Dr. Matt Macias D.O.,          Berton Cord  71 y.o.  female    SUBJECTIVE:  Complains of buttock pain. Denies nausea or vomiting or abdominal pain per se    OBJECTIVE:    BP (!) 150/82   Pulse 92   Temp 99 °F (37.2 °C) (Oral)   Resp 16   Ht 5' 1\" (1.549 m)   Wt 107 lb 12.9 oz (48.9 kg)   SpO2 94%   BMI 20.37 kg/m²     General: NAD/-American female  HEENT: Anicteric sclera/moist oral mucosa  Neck: Supple with trachea midline  Chest: Symmetric excursion/nonlabored respirations  Abd.: Soft and nondistended  Extr.:  No significant peripheral edema.   Decreased muscle tone and bulk throughout  Skin: Warm and dry      DATA:       Lab Results   Component Value Date/Time    WBC 10.4 12/21/2022 05:53 AM    RBC 3.27 12/21/2022 05:53 AM    HGB 8.8 12/21/2022 05:53 AM    HCT 28.7 12/21/2022 05:53 AM    MCV 87.8 12/21/2022 05:53 AM    MCH 26.9 12/21/2022 05:53 AM    MCHC 30.7 12/21/2022 05:53 AM    RDW 16.9 12/21/2022 05:53 AM     12/21/2022 05:53 AM    MPV 10.1 12/21/2022 05:53 AM     Lab Results   Component Value Date/Time     12/21/2022 05:53 AM    K 4.1 12/21/2022 05:53 AM     12/21/2022 05:53 AM    CO2 23 12/21/2022 05:53 AM    BUN 19 12/21/2022 05:53 AM    CREATININE 1.0 12/21/2022 05:53 AM    CALCIUM 8.7 12/21/2022 05:53 AM    PROT 7.2 12/19/2022 03:47 AM    LABALBU 2.4 12/19/2022 03:47 AM    LABALBU 4.2 04/10/2012 12:28 PM    BILITOT 0.3 12/19/2022 03:47 AM    ALKPHOS 506 12/19/2022 03:47 AM    AST 23 12/19/2022 03:47 AM    ALT 13 12/19/2022 03:47 AM     Lab Results   Component Value Date/Time    LIPASE 124 11/23/2022 01:45 AM     No results found for: AMYLASE      ASSESSMENT/PLAN:  Patient Active Problem List   Diagnosis    Hepatitis C    Abnormal Pap smear    Dyslipidemia    Insomnia    Ex-smoker    PVD (peripheral vascular disease) [address] [address]    Uterine fibroid    Gall stone    Kidney stone    Left groin pain    Need for Tdap vaccination    Abnormal chest x-ray with multiple lung nodules    Hypertension    Panlobular emphysema (HCC)    Chronic hepatitis C virus infection (HCC)    Malignant neoplasm of upper lobe of right lung (HCC)    Malignant neoplasm of lung (HCC)    Leukocytosis    Severe anemia    Essential hypertension    Thrombocytopenia (HCC)    Malignant neoplasm of upper lobe of left lung (HCC)    History of lobectomy of lung    Adenocarcinoma, lung (HCC)    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)    Hep C w/o coma, chronic (HCC)    Hyperlipidemia    Warfarin anticoagulation    Bilateral lung cancer ([facility]    Pulmonary embolism without acute cor pulmonale (HCC)    Port-A-Cath in place    Acute cholecystitis    History of lung cancer    RBBB    Adenomatous polyp of descending colon    Abdominal pain    Ascites of liver    Moderate protein-calorie malnutrition (HCC)    Intractable pain    Goals of care, counseling/discussion    Palliative care encounter    Mass of right axilla    Subareolar mass of right breast    Pleural effusion, right    Pleural effusion    Loculated pleural effusion    Subcutaneous nodule    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 Cirrhosis / HCV  -Previously positive hepatitis C antibody  -Viral load 638k (11/4/2022)   -genotype 1a or Ib reported (11/4/2022)  -HBV negative  -Nonelevated AFP 4 (11/23/2022)  -CT 11/21/2022 with contrast showing no evidence of hepatic mass  -Ascites - see below  -Patient will require further follow-up in the office for possible treatment - see discussion below  -Patient will require further evaluation with upper endoscopy - see discussion below     2.   Ascites  -Likely multifactorial and related to peritoneal involvement from advanced cancer/cirrhosis  -Paracentesis 11/22/2022 with 3050 mL removed  -Previously paracentesis yielded ascites with albumin of 1.7 at the time when serum albumin was 2.2 calculating ascitic gradient at 0.5 indicating likely alternative cause for patient's ascites -see repeat paracentesis today.  -cytology negative  -Defer diuretics to admitting service given renal failure  -Repeat paracentesis pending  -US liver with Dopplers 12/17/2022 showing normal vasculature  -Patient can undergo serial paracentesis depending on rate of accumulation of ascites which can be set as outpatient -paracentesis this admission with removal of approximately 2 L     3. Advanced malignancy  -Patient follows with Dr. Anita Long  -Poor prognosis  -Defer further discussion regarding goals of care and CODE STATUS to admitting/oncology     4. Anemia  -Acute on chronic  -Iron deficient  -no evidence of overt bleed  -Twice daily PPI  -Decrease frequency of H&H checks  -Keep PRBCs on hold  -Transfusion per admitting  -Patient require further evaluation with EGD and colonoscopy -currently plan is outpatient procedures unless precipitous drop/overt bleed     S/P paracentesis with removal of slightly over 2 L. Ascitic fluid analysis SAAG 0.9 suggesting alternative to portal hypertension as etiology for ascites -likely related to peritoneal carcinomatosis  Pending cytology  Stable H&H without evidence of overt bleed. Plan for upper endoscopy as outpatient. No immediate plan for intervention from GI POV. Follow-up in 2 weeks after discharge -patient/family are to call for appointment and with questions/concerns at [phone]. Thank you for the opportunity participate in the care of  Regi Talbert MD  12/21/2022  3:52 PM    NOTE:  This report was transcribed using voice recognition software. Every effort was made to ensure accuracy; however, inadvertent computerized transcription errors may be present.

## 2022-12-22 VITALS
HEART RATE: 94 BPM | TEMPERATURE: 98.6 F | DIASTOLIC BLOOD PRESSURE: 65 MMHG | RESPIRATION RATE: 16 BRPM | BODY MASS INDEX: 21.52 KG/M2 | SYSTOLIC BLOOD PRESSURE: 141 MMHG | OXYGEN SATURATION: 96 % | WEIGHT: 113.98 LBS | HEIGHT: 61 IN

## 2022-12-22 LAB
ALK PHOS OTHER CALC: 0 U/L
ALK PHOSPHATASE: 437 U/L (ref 40–120)
ALKALINE PHOSPHATASE BONE FRACTION: 44 U/L (ref 0–55)
ALKALINE PHOSPHATASE LIVER FRACTION: 393 U/L (ref 0–94)
BODY FLUID CULTURE, STERILE: NORMAL
GRAM STAIN RESULT: NORMAL

## 2022-12-22 PROCEDURE — 2580000003 HC RX 258

## 2022-12-22 PROCEDURE — 94640 AIRWAY INHALATION TREATMENT: CPT

## 2022-12-22 PROCEDURE — 6370000000 HC RX 637 (ALT 250 FOR IP): Performed by: NURSE PRACTITIONER

## 2022-12-22 PROCEDURE — 6370000000 HC RX 637 (ALT 250 FOR IP): Performed by: HOSPITALIST

## 2022-12-22 PROCEDURE — 99239 HOSP IP/OBS DSCHRG MGMT >30: CPT | Performed by: INTERNAL MEDICINE

## 2022-12-22 PROCEDURE — 99233 SBSQ HOSP IP/OBS HIGH 50: CPT | Performed by: STUDENT IN AN ORGANIZED HEALTH CARE EDUCATION/TRAINING PROGRAM

## 2022-12-22 PROCEDURE — 6370000000 HC RX 637 (ALT 250 FOR IP): Performed by: FAMILY MEDICINE

## 2022-12-22 PROCEDURE — 6370000000 HC RX 637 (ALT 250 FOR IP): Performed by: PHYSICIAN ASSISTANT

## 2022-12-22 RX ORDER — ESCITALOPRAM OXALATE 5 MG/1
5 TABLET ORAL DAILY
Qty: 30 TABLET | Refills: 1 | Status: SHIPPED | OUTPATIENT
Start: 2022-12-23

## 2022-12-22 RX ADMIN — PANTOPRAZOLE SODIUM 40 MG: 40 TABLET, DELAYED RELEASE ORAL at 15:36

## 2022-12-22 RX ADMIN — Medication 10 ML: at 20:15

## 2022-12-22 RX ADMIN — IPRATROPIUM BROMIDE AND ALBUTEROL SULFATE 1 AMPULE: 2.5; .5 SOLUTION RESPIRATORY (INHALATION) at 14:21

## 2022-12-22 RX ADMIN — IPRATROPIUM BROMIDE AND ALBUTEROL SULFATE 1 AMPULE: 2.5; .5 SOLUTION RESPIRATORY (INHALATION) at 20:26

## 2022-12-22 RX ADMIN — Medication 10 ML: at 10:11

## 2022-12-22 RX ADMIN — PANTOPRAZOLE SODIUM 40 MG: 40 TABLET, DELAYED RELEASE ORAL at 06:03

## 2022-12-22 RX ADMIN — IPRATROPIUM BROMIDE AND ALBUTEROL SULFATE 1 AMPULE: 2.5; .5 SOLUTION RESPIRATORY (INHALATION) at 16:19

## 2022-12-22 ASSESSMENT — PAIN SCALES - WONG BAKER
WONGBAKER_NUMERICALRESPONSE: 0
WONGBAKER_NUMERICALRESPONSE: 0

## 2022-12-22 ASSESSMENT — PAIN SCALES - GENERAL
PAINLEVEL_OUTOF10: 0
PAINLEVEL_OUTOF10: 0

## 2022-12-22 NOTE — PLAN OF CARE
Problem: Discharge Planning  Goal: Discharge to home or other facility with appropriate resources  Outcome: Progressing  Flowsheets (Taken 12/21/2022 2235 by José Miguel Goldsmith RN)  Discharge to home or other facility with appropriate resources: Identify barriers to discharge with patient and caregiver     Problem: Skin/Tissue Integrity  Goal: Absence of new skin breakdown  Outcome: Progressing     Problem: ABCDS Injury Assessment  Goal: Absence of physical injury  Outcome: Progressing     Problem: Safety - Adult  Goal: Free from fall injury  Outcome: Progressing     Problem: Pain  Goal: Verbalizes/displays adequate comfort level or baseline comfort level  Outcome: Progressing  Flowsheets  Taken 12/22/2022 0630 by Harrington Goodell, RN  Verbalizes/displays adequate comfort level or baseline comfort level:   Encourage patient to monitor pain and request assistance   Assess pain using appropriate pain scale  Taken 12/22/2022 0315 by José Miguel Goldsmith RN  Verbalizes/displays adequate comfort level or baseline comfort level: Assess pain using appropriate pain scale  Taken 12/21/2022 2249 by José Miguel Goldsmith RN  Verbalizes/displays adequate comfort level or baseline comfort level: Assess pain using appropriate pain scale     Problem: Nutrition Deficit:  Goal: Optimize nutritional status  Outcome: Progressing

## 2022-12-22 NOTE — PROGRESS NOTES
Hematology/Oncology Progress Note:    Subjective: The patient is feeling tired, has decreased appetite, pain in her right side. Objective:  BP (!) 150/82   Pulse 92   Temp 99 °F (37.2 °C) (Oral)   Resp 16   Ht 5' 1\" (1.549 m)   Wt 107 lb 12.9 oz (48.9 kg)   SpO2 94%   BMI 20.37 kg/m²   GENERAL: Awake and alert, looks chronically ill  HEENT: Oropharynx clear. NECK: . Without lymphadenopathy. LUNGS: suresh basal crackles. Decreased lung sounds  CARDIOVASCULAR: Regular rate. No murmurs, rubs or gallops. ABDOMEN: Soft. Less distended   EXTREMITIES: Without clubbing or cyanosis or edema. NEUROLOGIC: No focal deficits. ECOG PS 3-4    Diagnostics:  Lab Results   Component Value Date    WBC 10.4 12/21/2022    HGB 8.8 (L) 12/21/2022    HCT 28.7 (L) 12/21/2022    MCV 87.8 12/21/2022     12/21/2022     Lab Results   Component Value Date     12/21/2022    K 4.1 12/21/2022     (H) 12/21/2022    CO2 23 12/21/2022    BUN 19 12/21/2022    CREATININE 1.0 12/21/2022    GLUCOSE 91 12/21/2022    CALCIUM 8.7 12/21/2022    PROT 7.2 12/19/2022    LABALBU 2.4 (L) 12/19/2022    BILITOT 0.3 12/19/2022    ALKPHOS 506 (H) 12/19/2022    AST 23 12/19/2022    ALT 13 12/19/2022    LABGLOM 60 12/21/2022    GFRAA 49 09/14/2022     Impression/Plan:  71 y.o. female Hx of smoking who underwent: (1) Bronchoscopy. (2) Right VATS. (3) VATS right upper lobe wedge resection. (4) VATS right upper lobectomy. (5) Intercostal nerve block from T3 to T10. (6) Mediastinal lymph node dissection on 11/11/2015:   Pathology:  Right lung, upper lobectomy: Invasive, moderately differentiated adenocarcinoma (Grade 2). Tumor size 1.5 cm in greatest dimension. Visceral pleural invasion: Not identified. Visceral pleural invasion: Not identified. Surgical margin negative for malignancy. Two of three peribronchial lymph nodes positive for adenocarcinoma. pT1a N1 MX;      Being followed for a left upper lobe mass by Dr. Knapp Patient. Multiple biopsies in the past came back negative for malignancy. Hypermetabolic on PET/CT scan on 09/29/2015 (2.3 cm in size with SUV of 6.4). CT guided biopsy of the left upper lobe lesion on 11/02/2015 was noted to be negative for malignancy. She was referred to the medical oncology clinic to discuss adjuvant chemotherapy. We recommended 4 cycles of adjuvant chemotherapy consisting of Carboplatin/Alimta. Mediport placed 12/7/15. -MRI Brain on 12/8/15:  Negative for metastatic disease.   -We will repeat CT chest and PET/CT after 4 cycles of Carboplatin/Alimta. To follow on Lingular lesion as well. -Molecular studies (EGFR, ALK, ROS-1) were all Not Detected. Cycle # 1 of Candi Dumfries was on 12/09/2015. Cycle # 2 of Candi Dumfries was on 01/06/2016. Cycle # 3 of Candi Stacey was on 01/27/2016. Cycle # 4 of Candi Dumfries was on 02/24/2016. PET SCAN 3.2016: The 2.1 cm left lung mass abutting the major fissure is hypermetabolic with SUV max of 5.4. Finding is worrisome for tumor with avid glucose metabolism. 2. Elsewhere there is unremarkable distribution of FDG activity without evidence of hypermetabolic metastases. On 03/29/2016 underwent Bronch/Left VATS/VATS wedge ROMELIA/VATS Left upper lobectomy/Mediasinal lymph node dissection/Intercostal nerve block from T3-T10 per Dr. Lilian Jordan. Final pathology revealed Stage I Adenocarcinoma of ROMELIA (morphologically different from the adenocarcinoma previously diagnosed in the right upper lobe. Therefore, synchronous primary tumors are favored). A. Left lung, upper lobe wedge resection: Invasive, well-differentiated adenocarcinoma (grade 1); Tumor size-1.4 cm in greatest dimension; Surgical margins-negative for malignancy; Lymphovascular invasion-not identified; TNM classification-pT2a N0 MX  B. AP window lymph node #1, excision: Anthracotic lymph node; negative for malignancy  C.  AP window lymph node #2, excision: Anthracotic lymph node; negative for malignancy   D. Periaortic lymph node #1, excision: Fibroadipose tissue; lymph node not identified  E. Bronchial lymph node #1, excision: Anthracotic lymph node; negative for malignancy  F. Left lung, upper lobectomy: Emphysematous change; negative for malignancy  4 anthracotic lymph nodes negative for malignancy   G. Inferior pulmonary ligament lymph node, excision: Anthracotic lymph node; negative for malignancy  H. Bronchial lymph node, excision: Anthracotic lymph node; negative for malignancy. Right side Mediport was removed on 11/04/2016 by Dr. Robert Maloney. On surveillance per NCCN guidelines. CT chest 04/12/2017 noted no convincing evidence of recurrent disease. CT chest 10/19/2017 noted no definite evidence for recurrent malignancy. CT chest 03/12/2018 negative for pulmonary parenchymal masses or enlarged mediastinal or hilar LN. ? 2 cm lesion in spleen difficult to evaluate due to the arterial phase of the study. CT Abd/Pelvis 05/08/2018  Enhancing lesion within the spleen is relatively stable to slightly smaller when compared to April 2014 exam and likely splenic hemangioma. Other indeterminate findings (left periaortic LN, sclerotic/blastic foci in L3 and L1 reported by Dr. Shamar Driscoll from radiology team). PET/CT scan 06/05/2018 unremarkable. No FDG avid uptake identified. No evidence for recurrent or metastatic disease. CT Chest 12/13/2018 noted no evidence of recurrent/metastatic disease. CT chest on 06/11/2019 noted no evidence for worrisome residual or recurrent malignancy. No evidence for new lymphadenopathy or metastatic disease. Stable scarring and postoperative changes right upper lobe. CT chest 11/26/2019: No evidence of active neoplasm. CT chest 06/15/2020: No evidence of active neoplasm. CT chest 12/30/2020: Postsurgical changes and postsurgical scarring seen within the right lung apex. There is no evidence of tumor recurrence.   2.1 x 2.3 cm enhancing lesion seen within the spleen  PET/CT scan 03/02/2021   No FDG avid uptake is identified which exceeds the threshold SUV. No convincing evidence for recurrent or metastatic disease  CT chest 09/28/2021 No evidence of tumor recurrence     Recent abdominal pain; ER visit reviewed  CT abdomen/pelvis 02/20/2022   6 mm right lower lobe pulmonary nodule  Multiple peritoneal cystic masses      CEA 12.5 on 03/16/2022     CT chest 04/05/2022 Ground-glass nodule right lower lobe superolateral portion 10 mm unchanged from prior however in the medial segment right lower lobe with pleural abutment is a 7 mm pulmonary nodule increased in size from 09/28/2021 with is barely visible at 2-3 mm; repeat CT chest in 3 mo     PET/CT scan 04/05/2022 noted No FDG avid uptake is identified which exceeds the threshold SUV      Bilateral screening mammogram 04/20/2022: Negative for malignancy     CEA     12.1 on 04/20/2022  CA-125 32.8 on 04/20/2022   <2 on 04/20/2022  Chromogranin A 1480 on 04/20/2022. EGD/Colonoscopy 05/13/2022: Gastric polyps , duodenal polyp moderate gastroduodenitis   A. Stomach, biopsy: Hyperplastic polyp and moderate chronic active gastritis; negative for intestinal metaplasia   Immunostain negative for Helicobacter pylori organisms   B. Duodenum, biopsy: Chronic duodenitis with features of peptic duodenitis   C. Colon, 20 cm biopsy: Fragments of tubular adenoma and hyperplastic polyp   Pathology reviewed. Referred to HBP team (Dr. Georgette Amado) for further evaluation of peritoneal cystic masses  CT abdomen/pelvis 06/23/2022 numerous cystic structures identified throughout the right flank and retroperitoneum. Laparoscopic robotic peritoneal mass resection on 07/06/2022  Findings included: serous cystic masses along the colon, periportal lymphadenopathy, fibrotic appearing liver, chronic cholecystitis     Peritoneal fluid Negative for malignant cells.  Cellblock shows reactive mesothelial cells, lymphocytes, adipose/stromal tissue, and blood. Monolayer preparation shows few reactive mesothelial cells and blood. A.  Lymph node, periportal, biopsy:   - Reactive node showing follicular hyperplasia, negative for malignancy, see comment. B.  Remnant gallbladder, cholecystectomy:   - Portion of gallbladder wall showing dense fibrosis/scar and occluded mariaelena-gallbladder vessels. C. Soft tissue, peritoneal sac, excision:   - Peritoneal inclusion cysts (benign cystic mesothelioma) and unremarkable fibroadipose tissue. D.  Liver, needle biopsy:   - Benign hepatic parenchyma showing focal periportal and incomplete septal fibrosis (stage 2)   - Negative for significant lobular/portal necroinflammatory activity, see comment. Comment:   Sections of the lymph node in specimen A reveal normal anat architecture with secondary follicular hyperplasia. There are no cytologically atypical lymphoid cells or Alford-Marsha cells identified. Periportal LN Flow Cytometry noted 4.5% monoclonal B cells detected in a predominantly polyclonal background. Monoclonal B cell population (4.5% of total cells) without detectable CD5, CD10 or CD11c expression in a predominantly polyclonal background, raising the differential between a monoclonal B-cell population of undetermined significance versus a B-cell lymphoma/leukemia. In the context of B-cell lymphoma the main differential based on immunophenotype includes, but is not limited to marginal zone lymphoma/leukemia, lymphoplasmacytic lymphoma, KP57- negative follicular lymphoma, and less likely large b cell lymphoma. In specimen D, there is no evidence of chronic hepatitis or significant steatosis. Histologic features of cirrhosis including nodules of regenerating hepatocytes and complete portal-portal bridging fibrosis are not identified. Focal periportal fibrosis and incomplete septal fibrosis are confirmed by trichrome stain.   Iron stain is also performed and is negative. Periportal lymph node flow cytometry showing 4.5% monoclonal B cells without detectable CD5, CD10 or CD11c expression in a predominantly polyclonal background, raising the differential between a monoclonal B-cell population of undetermined significance versus a B-cell lymphoma/leukemia     Repeat CT chest to follow on history of NSCLC on 08/04/2022  Postsurgical changes are identified in the upper lobes bilaterally. 1.1 cm ground-glass nodule in the right lower lobe and a smaller 1 measuring 0.9 cm are noted without change. There is a 1.6 x 1.2 cm soft tissue mass in the right lower lobe medially which is significantly increased since the previous examination. Bilateral adrenal nodules are noted, larger 1 on the left side measuring 1.8 x 1.6 cm.  1.2 cm right renal cystic lesion is identified. An ill-defined hypodense lesion is identified in the spleen currently measuring 2.9 x 3.5 cm      Evaluated by Dr. Gia Kay on 08/15/2022 at [facility] who recommended proceeding with a biopsy of the enlarging right lower lobe lung mass. While lymphoma certainly possible, biopsy recommended to rule out recurrent lung cancer. Periportal lymph node biopsy reviewed at [facility]. Lymph node with lipid lymphadenopathy  Small clonal B-cell population detected by flow cytometry and molecular analysis  Negative for carcinoma  Flow cytometry showed a small clonal CD5 -/CD10- B-cell population (4.5% of all events) and a background of polyclonal B cells. B-cell receptor clonality assay was positive for a clonal B-cell population. However there is no morphologic evidence of lymphoma in the lymph node. The detection of a clonal B-cell population despite negative morphologic evidence of lymphoma might represent monoclonal B-cell lymphocytosis process in the lymph node.   An alternative consideration is peripheral blood involvement by B-cell clone (MBL-like process) that was picked up by the tissue flow cytometry and molecular tests. Even if lymphoma is concerned, likely low-grade and not causing her symptoms, so observation would be recommended. PET/CT scan, CT-guided core needle biopsy of enlarging right lower lobe lung mass recommended. PET/CT scan 09/06/2022: In the region of the lesion in the right lower lobe there is FDG avid tracer uptake peak SUV is 3.2. CT guided core needle biopsy RLL lung nodule on 09/20/2022  Right lung, lower lobe core needle biopsy: Adenocarcinoma (see comment)   Comment: The prior history of right upper lobe adenocarcinoma (HES ) and left upper lobe adenocarcinoma (HES ) is noted. The electronic medical record is also reviewed. The adenocarcinoma in the current specimen is weakly immunoreactive with GATA3 and CDX2. The TTF-1 immunostain is negative. 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 Pelvic U/S large calcified uterine mass; ovaries not identified. Large volume ascites. MRI Pelvis 11/05/2022: No suspicious uterine or endometrial mass  Multiple calcified, degenerated uterine leiomyomas, measuring up to 5.2 cm  Seen by GYN (Dr. Priscilla See); No findings concerning for gynecologic malignancy. Status post paracentesis 11/9/2022:  A total of 2510 cc of straw-colored ascitic fluid was removed  Cytology was negative for malignant cells  Reactive mesothelial cells, acute/chronic inflammatory cells, and blood present. The patient was admitted on 12/14/2022 for increased shortness of breath. More recently few days ago she was discharged from the hospital for worsening right pleural effusion, in which Pleurx catheter was placed on 12/9/2022. Also of note, patient had recent development of a subcutaneous nodule located in the posterior right axilla. CT scans on 12/7/2022 reported 2.7 x 1.8 cm subcutaneous tissue mass, overlies the trapezius muscle     Large Right sided loculated pleural effusion, s/p PleurX placement 12/9/2022, along with findings suggestive of chest wall metastasis  Recurrent ascites, negative cytology, but with concern for malignancy  Subcutaneous mass posterior to right axilla, s/p excision on 12/11/2022  Right breast mass, BIRADS 4  History of recurrent ascites  Imaging evidence of early cirrhosis  Active chronic hepatitis C infection  Right lung mass positive for Adenocarcinoma, weakly positive for GATA3 and CDX2, TTF-1 negative; KRAS (G12C) positive, PDL1 with 10% expression  Previously described multiple peritoneal cystic masses, s/p resection in 7/6/2022, not well visualized on 10/31/22 CT abdomen  Monoclonal B-cell population from periportal lymph node resection from 7/6/2022, in the setting of follicular hyperplasia  Previous right sided lung adenocarcinoma in 2015  Previous left-sided lung adenocarcinoma 2016  Family history of uterine cancer in mother  Family history of early age breast cancer in sister (Diagnosed around early 42's)       [facility]   Orders for cytology and fluid studies were placed on 12/17/22., cytology neg for malignant cells. Pulmonary team following    Anemia; did receive IV iron  Hb 9.1 today 12/20/2022. Labs reviewed. Continue to monitor CBC with diff   GI team on board; EGD/Colonoscopy pending H&H, likely recommending as outpatient   PPI BID  Off Octreotide  Liver US noted cirrhotic liver.  No hepatic mass. Moderate to large ascites. S/p right pleurx cath removal.    Excisional biopsy of the subcutaneous nodule in posterior right axilla done on 12/11/2022. Pathology is consistent with metastatic adenocarcinoma, with central necrosis,   The adenocarcinoma in block A6 stains as follows:   GATA3, Mammaglobin, GCDFP, and Sox 10: Negative   Estrogen receptor, Progesterone receptor, and HER2: Negative (0%   staining)   TTF-1: Negative   CK7: Positive   CK20 and CDX2: Negative   Ki-67 index: Approximately 40%   PAX8: Negative     Patient's history of multiple lung adenocarcinomas is noted. Additionally, per review of electronic medical record, it is noted that   the patient does have an apparent right breast mass with inverted right   nipple. Unfortunately, the immunoprofile of CK7 positive/CK20 negative   adenocarcinoma is not specific as to primary site of origin, and CK7   positive/CK20 negative tumors include both lung and breast primary   adenocarcinomas, as well as upper GI carcinomas and Mullerian carcinomas, among others. Clinical and radiologic correlation is recommended.     Discussed with the patient prognosis, she has incurable Stage IV disease  Not a good candidate for intensive chemo  I addressed single agent immunotherapy but may not be significantly beneficial as well  Pt has poor functional status  Pt appears understanding of the clinical situation and seems overwhelmed  Will allow patient to think about options of management  I briefly discussed hospice and would favor this option  Palliative on board  No radiation at this time      Cheng Walter MD   HEMATOLOGY/MEDICAL [address] [facility] [address] [facility] [address]  Dept: [id number] [place] Obed: [address] [facility]  [facility])  12/21/22 7:07 PM

## 2022-12-22 NOTE — PROGRESS NOTES
PROGRESS NOTE  By Tariq Alvarado M.D. [facility]  Dr. Johannah Denver, M.D.,  Dr. Nuzhat Vivar M.D.,   Dr. Nerissa Cutler D.O.,  Dr. Tressa Smith M.D.,  Dr. Eleazar Degroot D.O.,          Muriel Minor  71 y.o.  female    SUBJECTIVE:  Denies abdominal pain per se. Reports buttock pain. Denies nausea vomiting. Tolerating diet    OBJECTIVE:    BP (!) 166/71   Pulse 88   Temp 98.9 °F (37.2 °C) (Oral)   Resp 16   Ht 5' 1\" (1.549 m)   Wt 113 lb 15.7 oz (51.7 kg)   SpO2 99%   BMI 21.54 kg/m²     General: NAD/-American female  HEENT: Anicteric sclera/moist oral mucosa/unremarkable dentition  Neck: Supple with trachea midline  Chest: Symmetric excursion/nonlabored respirations  Cor: Regular  Abd.: Soft and nondistended  Extr.:  Decreased muscle tone and bulk.   No significant peripheral edema  Skin: Warm and dry/anicteric        DATA:       Lab Results   Component Value Date/Time    WBC 10.4 12/21/2022 05:53 AM    RBC 3.27 12/21/2022 05:53 AM    HGB 8.8 12/21/2022 05:53 AM    HCT 28.7 12/21/2022 05:53 AM    MCV 87.8 12/21/2022 05:53 AM    MCH 26.9 12/21/2022 05:53 AM    MCHC 30.7 12/21/2022 05:53 AM    RDW 16.9 12/21/2022 05:53 AM     12/21/2022 05:53 AM    MPV 10.1 12/21/2022 05:53 AM     Lab Results   Component Value Date/Time     12/21/2022 05:53 AM    K 4.1 12/21/2022 05:53 AM     12/21/2022 05:53 AM    CO2 23 12/21/2022 05:53 AM    BUN 19 12/21/2022 05:53 AM    CREATININE 1.0 12/21/2022 05:53 AM    CALCIUM 8.7 12/21/2022 05:53 AM    PROT 7.2 12/19/2022 03:47 AM    LABALBU 2.4 12/19/2022 03:47 AM    LABALBU 4.2 04/10/2012 12:28 PM    BILITOT 0.3 12/19/2022 03:47 AM    ALKPHOS 506 12/19/2022 03:47 AM    AST 23 12/19/2022 03:47 AM    ALT 13 12/19/2022 03:47 AM     Lab Results   Component Value Date/Time    LIPASE 124 11/23/2022 01:45 AM     No results found for: AMYLASE      ASSESSMENT/PLAN:  Patient Active Problem List   Diagnosis    Hepatitis C    Abnormal Pap smear Dyslipidemia    Insomnia    Ex-smoker    PVD (peripheral vascular disease) (HCC)    Uterine fibroid    Gall stone    Kidney stone    Left groin pain    Need for Tdap vaccination    Abnormal chest x-ray with multiple lung nodules    Hypertension    Panlobular emphysema (HCC)    Chronic hepatitis C virus infection ([address]    Malignant neoplasm of upper lobe of right lung (HCC)    Malignant neoplasm of lung (HCC)    Leukocytosis    Severe anemia    Essential hypertension    Thrombocytopenia (HCC)    Malignant neoplasm of upper lobe of left lung (HCC)    History of lobectomy of lung    Adenocarcinoma, lung (HCC)    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)    Hep C w/o coma, chronic (HCC)    Hyperlipidemia    Warfarin anticoagulation    Bilateral lung cancer ([address]    Pulmonary embolism without acute cor pulmonale (HCC)    Port-A-Cath in place    Acute cholecystitis    History of lung cancer    RBBB    Adenomatous polyp of descending colon    Abdominal pain    Ascites of liver    Moderate protein-calorie malnutrition (HCC)    Intractable pain    Goals of care, counseling/discussion    Palliative care encounter    Mass of right axilla    Subareolar mass of right breast    Pleural effusion, right    Pleural effusion    Loculated pleural effusion    Subcutaneous nodule    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 Cirrhosis / HCV  -Previously positive hepatitis C antibody  -Viral load 638k (11/4/2022)   -genotype 1a or Ib reported (11/4/2022)  -HBV negative  -Nonelevated AFP 4 (11/23/2022)  -CT 11/21/2022 with contrast showing no evidence of hepatic mass  -Ascites - see below  -Patient will require further follow-up in the office for possible treatment - see discussion below  -Patient will require further evaluation with upper endoscopy - see discussion below     2.   Ascites  -Likely multifactorial and related to peritoneal involvement from advanced cancer/cirrhosis  -Paracentesis 11/22/2022 with 3050 mL removed  -Previously paracentesis yielded ascites with albumin of 1.7 at the time when serum albumin was 2.2 calculating ascitic gradient at 0.5 indicating likely alternative cause for patient's ascites -see repeat paracentesis today.  -cytology negative  -Defer diuretics to admitting service given renal failure  -Repeat paracentesis pending  -US liver with Dopplers 12/17/2022 showing normal vasculature  -Patient can undergo serial paracentesis depending on rate of accumulation of ascites which can be set as outpatient -paracentesis this admission with removal of approximately 2 L     3. Advanced malignancy  -Patient follows with Dr. Autumn Sanchez  -Poor prognosis  -Defer further discussion regarding goals of care and CODE STATUS to admitting/oncology     4. Anemia  -Acute on chronic  -Iron deficient  -no evidence of overt bleed  -Twice daily PPI  -Decrease frequency of H&H checks  -Keep PRBCs on hold  -Transfusion per admitting  -Patient require further evaluation with EGD and colonoscopy -currently plan is outpatient procedures unless precipitous drop/overt bleed     S/P paracentesis with removal of slightly over 2 L. Ascitic fluid analysis SAAG 0.9 suggesting alternative to portal hypertension as etiology for ascites -likely related to peritoneal carcinomatosis  Cytology reported to be negative for malignant cells. Stable H&H without evidence of overt bleed. Plan for upper endoscopy as outpatient. No immediate plan for intervention from GI POV. Follow-up in 2 weeks after discharge -patient/family are to call for appointment and with questions/concerns at [phone]. Thank you for the opportunity participate in the care of  Scottie Steel MD  12/22/2022  12:57 PM    NOTE:  This report was transcribed using voice recognition software. Every effort was made to ensure accuracy; however, inadvertent computerized transcription errors may be present.

## 2022-12-22 NOTE — PROGRESS NOTES
Hematology/Oncology Progress Note:    Subjective:  Appears tired/fatigued, frail. No major events and patient denies of new symptoms. Objective:  BP (!) 166/71   Pulse 88   Temp 98.9 °F (37.2 °C) (Oral)   Resp 16   Ht 5' 1\" (1.549 m)   Wt 113 lb 15.7 oz (51.7 kg)   SpO2 99%   BMI 21.54 kg/m²   GENERAL: Awake and alert, looks chronically ill, frail  HEENT: Oropharynx clear. NECK: . Without lymphadenopathy. LUNGS: suresh basal crackles. Decreased lung sounds  CARDIOVASCULAR: Regular rate. No murmurs, rubs or gallops. ABDOMEN: Soft. Less distended   EXTREMITIES: Without clubbing or cyanosis or edema. NEUROLOGIC: No focal deficits. ECOG PS 4    Diagnostics:  Lab Results   Component Value Date    WBC 10.4 12/21/2022    HGB 8.8 (L) 12/21/2022    HCT 28.7 (L) 12/21/2022    MCV 87.8 12/21/2022     12/21/2022     Lab Results   Component Value Date     12/21/2022    K 4.1 12/21/2022     (H) 12/21/2022    CO2 23 12/21/2022    BUN 19 12/21/2022    CREATININE 1.0 12/21/2022    GLUCOSE 91 12/21/2022    CALCIUM 8.7 12/21/2022    PROT 7.2 12/19/2022    LABALBU 2.4 (L) 12/19/2022    BILITOT 0.3 12/19/2022    ALKPHOS 506 (H) 12/19/2022    AST 23 12/19/2022    ALT 13 12/19/2022    LABGLOM 60 12/21/2022    GFRAA 49 09/14/2022     Impression/Plan:  71 y.o. female Hx of smoking who underwent: (1) Bronchoscopy. (2) Right VATS. (3) VATS right upper lobe wedge resection. (4) VATS right upper lobectomy. (5) Intercostal nerve block from T3 to T10. (6) Mediastinal lymph node dissection on 11/11/2015:   Pathology:  Right lung, upper lobectomy: Invasive, moderately differentiated adenocarcinoma (Grade 2). Tumor size 1.5 cm in greatest dimension. Visceral pleural invasion: Not identified. Visceral pleural invasion: Not identified. Surgical margin negative for malignancy. Two of three peribronchial lymph nodes positive for adenocarcinoma.     pT1a N1 MX;      Being followed for a left upper lobe mass by  Alia. Multiple biopsies in the past came back negative for malignancy. Hypermetabolic on PET/CT scan on 09/29/2015 (2.3 cm in size with SUV of 6.4). CT guided biopsy of the left upper lobe lesion on 11/02/2015 was noted to be negative for malignancy. She was referred to the medical oncology clinic to discuss adjuvant chemotherapy. We recommended 4 cycles of adjuvant chemotherapy consisting of Carboplatin/Alimta. Mediport placed 12/7/15. -MRI Brain on 12/8/15:  Negative for metastatic disease.   -We will repeat CT chest and PET/CT after 4 cycles of Carboplatin/Alimta. To follow on Lingular lesion as well. -Molecular studies (EGFR, ALK, ROS-1) were all Not Detected. Cycle # 1 of Carlos Quarry was on 12/09/2015. Cycle # 2 of Carlos Quarry was on 01/06/2016. Cycle # 3 of Carlos Quarry was on 01/27/2016. Cycle # 4 of Carlos Quarry was on 02/24/2016. PET SCAN 3.2016: The 2.1 cm left lung mass abutting the major fissure is hypermetabolic with SUV max of 5.4. Finding is worrisome for tumor with avid glucose metabolism. 2. Elsewhere there is unremarkable distribution of FDG activity without evidence of hypermetabolic metastases. On 03/29/2016 underwent Bronch/Left VATS/VATS wedge ROMELIA/VATS Left upper lobectomy/Mediasinal lymph node dissection/Intercostal nerve block from T3-T10 per Dr. Abraham White. Final pathology revealed Stage I Adenocarcinoma of ROMELIA (morphologically different from the adenocarcinoma previously diagnosed in the right upper lobe. Therefore, synchronous primary tumors are favored). A. Left lung, upper lobe wedge resection: Invasive, well-differentiated adenocarcinoma (grade 1); Tumor size-1.4 cm in greatest dimension; Surgical margins-negative for malignancy; Lymphovascular invasion-not identified; TNM classification-pT2a N0 MX  B. AP window lymph node #1, excision: Anthracotic lymph node; negative for malignancy  C.  AP window lymph node #2, excision: Anthracotic lymph node; negative for malignancy   D. Periaortic lymph node #1, excision: Fibroadipose tissue; lymph node not identified  E. Bronchial lymph node #1, excision: Anthracotic lymph node; negative for malignancy  F. Left lung, upper lobectomy: Emphysematous change; negative for malignancy  4 anthracotic lymph nodes negative for malignancy   G. Inferior pulmonary ligament lymph node, excision: Anthracotic lymph node; negative for malignancy  H. Bronchial lymph node, excision: Anthracotic lymph node; negative for malignancy. Right side Mediport was removed on 11/04/2016 by Dr. Sharon Kang. On surveillance per NCCN guidelines. CT chest 04/12/2017 noted no convincing evidence of recurrent disease. CT chest 10/19/2017 noted no definite evidence for recurrent malignancy. CT chest 03/12/2018 negative for pulmonary parenchymal masses or enlarged mediastinal or hilar LN. ? 2 cm lesion in spleen difficult to evaluate due to the arterial phase of the study. CT Abd/Pelvis 05/08/2018  Enhancing lesion within the spleen is relatively stable to slightly smaller when compared to April 2014 exam and likely splenic hemangioma. Other indeterminate findings (left periaortic LN, sclerotic/blastic foci in L3 and L1 reported by Dr. Geri Montano from radiology team). PET/CT scan 06/05/2018 unremarkable. No FDG avid uptake identified. No evidence for recurrent or metastatic disease. CT Chest 12/13/2018 noted no evidence of recurrent/metastatic disease. CT chest on 06/11/2019 noted no evidence for worrisome residual or recurrent malignancy. No evidence for new lymphadenopathy or metastatic disease. Stable scarring and postoperative changes right upper lobe. CT chest 11/26/2019: No evidence of active neoplasm. CT chest 06/15/2020: No evidence of active neoplasm. CT chest 12/30/2020: Postsurgical changes and postsurgical scarring seen within the right lung apex. There is no evidence of tumor recurrence.   2.1 x 2.3 cm enhancing lesion seen within the spleen  PET/CT scan 03/02/2021   No FDG avid uptake is identified which exceeds the threshold SUV. No convincing evidence for recurrent or metastatic disease  CT chest 09/28/2021 No evidence of tumor recurrence     Recent abdominal pain; ER visit reviewed  CT abdomen/pelvis 02/20/2022   6 mm right lower lobe pulmonary nodule  Multiple peritoneal cystic masses      CEA 12.5 on 03/16/2022     CT chest 04/05/2022 Ground-glass nodule right lower lobe superolateral portion 10 mm unchanged from prior however in the medial segment right lower lobe with pleural abutment is a 7 mm pulmonary nodule increased in size from 09/28/2021 with is barely visible at 2-3 mm; repeat CT chest in 3 mo     PET/CT scan 04/05/2022 noted No FDG avid uptake is identified which exceeds the threshold SUV      Bilateral screening mammogram 04/20/2022: Negative for malignancy     CEA     12.1 on 04/20/2022  CA-125 32.8 on 04/20/2022   <2 on 04/20/2022  Chromogranin A 1480 on 04/20/2022. EGD/Colonoscopy 05/13/2022: Gastric polyps , duodenal polyp moderate gastroduodenitis   A. Stomach, biopsy: Hyperplastic polyp and moderate chronic active gastritis; negative for intestinal metaplasia   Immunostain negative for Helicobacter pylori organisms   B. Duodenum, biopsy: Chronic duodenitis with features of peptic duodenitis   C. Colon, 20 cm biopsy: Fragments of tubular adenoma and hyperplastic polyp   Pathology reviewed. Referred to HBP team (Dr. Kendra Reese) for further evaluation of peritoneal cystic masses  CT abdomen/pelvis 06/23/2022 numerous cystic structures identified throughout the right flank and retroperitoneum. Laparoscopic robotic peritoneal mass resection on 07/06/2022  Findings included: serous cystic masses along the colon, periportal lymphadenopathy, fibrotic appearing liver, chronic cholecystitis     Peritoneal fluid Negative for malignant cells.  Cellblock shows reactive mesothelial cells, lymphocytes, adipose/stromal tissue, and blood. Monolayer preparation shows few reactive mesothelial cells and blood. A.  Lymph node, periportal, biopsy:   - Reactive node showing follicular hyperplasia, negative for malignancy, see comment. B.  Remnant gallbladder, cholecystectomy:   - Portion of gallbladder wall showing dense fibrosis/scar and occluded mariaelena-gallbladder vessels. C. Soft tissue, peritoneal sac, excision:   - Peritoneal inclusion cysts (benign cystic mesothelioma) and unremarkable fibroadipose tissue. D.  Liver, needle biopsy:   - Benign hepatic parenchyma showing focal periportal and incomplete septal fibrosis (stage 2)   - Negative for significant lobular/portal necroinflammatory activity, see comment. Comment:   Sections of the lymph node in specimen A reveal normal anat architecture with secondary follicular hyperplasia. There are no cytologically atypical lymphoid cells or Cotter-Marsha cells identified. Periportal LN Flow Cytometry noted 4.5% monoclonal B cells detected in a predominantly polyclonal background. Monoclonal B cell population (4.5% of total cells) without detectable CD5, CD10 or CD11c expression in a predominantly polyclonal background, raising the differential between a monoclonal B-cell population of undetermined significance versus a B-cell lymphoma/leukemia. In the context of B-cell lymphoma the main differential based on immunophenotype includes, but is not limited to marginal zone lymphoma/leukemia, lymphoplasmacytic lymphoma, ST61- negative follicular lymphoma, and less likely large b cell lymphoma. In specimen D, there is no evidence of chronic hepatitis or significant steatosis. Histologic features of cirrhosis including nodules of regenerating hepatocytes and complete portal-portal bridging fibrosis are not identified. Focal periportal fibrosis and incomplete septal fibrosis are confirmed by trichrome stain.   Iron stain is also performed and is negative. Periportal lymph node flow cytometry showing 4.5% monoclonal B cells without detectable CD5, CD10 or CD11c expression in a predominantly polyclonal background, raising the differential between a monoclonal B-cell population of undetermined significance versus a B-cell lymphoma/leukemia     Repeat CT chest to follow on history of NSCLC on 08/04/2022  Postsurgical changes are identified in the upper lobes bilaterally. 1.1 cm ground-glass nodule in the right lower lobe and a smaller 1 measuring 0.9 cm are noted without change. There is a 1.6 x 1.2 cm soft tissue mass in the right lower lobe medially which is significantly increased since the previous examination. Bilateral adrenal nodules are noted, larger 1 on the left side measuring 1.8 x 1.6 cm.  1.2 cm right renal cystic lesion is identified. An ill-defined hypodense lesion is identified in the spleen currently measuring 2.9 x 3.5 cm      Evaluated by Dr. Prieto Clifford on 08/15/2022 at [facility] who recommended proceeding with a biopsy of the enlarging right lower lobe lung mass. While lymphoma certainly possible, biopsy recommended to rule out recurrent lung cancer. Periportal lymph node biopsy reviewed at [facility]. Lymph node with lipid lymphadenopathy  Small clonal B-cell population detected by flow cytometry and molecular analysis  Negative for carcinoma  Flow cytometry showed a small clonal CD5 -/CD10- B-cell population (4.5% of all events) and a background of polyclonal B cells. B-cell receptor clonality assay was positive for a clonal B-cell population. However there is no morphologic evidence of lymphoma in the lymph node. The detection of a clonal B-cell population despite negative morphologic evidence of lymphoma might represent monoclonal B-cell lymphocytosis process in the lymph node.   An alternative consideration is peripheral blood involvement by B-cell clone (MBL-like process) that was picked up by the tissue flow cytometry and molecular tests. Even if lymphoma is concerned, likely low-grade and not causing her symptoms, so observation would be recommended. PET/CT scan, CT-guided core needle biopsy of enlarging right lower lobe lung mass recommended. PET/CT scan 09/06/2022: In the region of the lesion in the right lower lobe there is FDG avid tracer uptake peak SUV is 3.2. CT guided core needle biopsy RLL lung nodule on 09/20/2022  Right lung, lower lobe core needle biopsy: Adenocarcinoma (see comment)   Comment: The prior history of right upper lobe adenocarcinoma (HES ) and left upper lobe adenocarcinoma (HES ) is noted. The electronic medical record is also reviewed. The adenocarcinoma in the current specimen is weakly immunoreactive with GATA3 and CDX2. The TTF-1 immunostain is negative. PD-L1 97C5 FDA for NSCLC: PD-L1 EXPRESSION   Tumor proportion score: 10%   Intensity: 2+     EGFR Mutation: Not detected   ALK Rearrangement: Not detected (negative)   ROS1 Gene Rearrangement: Not detected (negative)   BRAF Mutation: Not detected   KRAS Mutation: KRAS Exon 2 DETECTED   Mutation-c.34 G >T (p.G12C)     MET Exon 14 Deletion Analysis: Not detected   RET: Not detected   NTRK 1, 2, 3: Not detected      Admitted for LE edema and abdominal ascites     Status paracentesis 11/1/2022, total of 2450 mL of ascitic fluid was removed  Abdominal U/S 11/04/2022 small amount of ascites in the right right lower quadrant. Pelvic U/S large calcified uterine mass; ovaries not identified. Large volume ascites. MRI Pelvis 11/05/2022: No suspicious uterine or endometrial mass  Multiple calcified, degenerated uterine leiomyomas, measuring up to 5.2 cm  Seen by GYN (Dr. Antony Cm); No findings concerning for gynecologic malignancy. Status post paracentesis 11/9/2022:  A total of 2510 cc of straw-colored ascitic fluid was removed  Cytology was negative for malignant cells  Reactive mesothelial cells, acute/chronic inflammatory cells, and blood present. The patient was admitted on 12/14/2022 for increased shortness of breath. More recently few days ago she was discharged from the hospital for worsening right pleural effusion, in which Pleurx catheter was placed on 12/9/2022. Also of note, patient had recent development of a subcutaneous nodule located in the posterior right axilla. CT scans on 12/7/2022 reported 2.7 x 1.8 cm subcutaneous tissue mass, overlies the trapezius muscle     Large Right sided loculated pleural effusion, s/p PleurX placement 12/9/2022, along with findings suggestive of chest wall metastasis  Recurrent ascites, negative cytology, but with concern for malignancy  Subcutaneous mass posterior to right axilla, s/p excision on 12/11/2022  Right breast mass, BIRADS 4  History of recurrent ascites  Imaging evidence of early cirrhosis  Active chronic hepatitis C infection  Right lung mass positive for Adenocarcinoma, weakly positive for GATA3 and CDX2, TTF-1 negative; KRAS (G12C) positive, PDL1 with 10% expression  Previously described multiple peritoneal cystic masses, s/p resection in 7/6/2022, not well visualized on 10/31/22 CT abdomen  Monoclonal B-cell population from periportal lymph node resection from 7/6/2022, in the setting of follicular hyperplasia  Previous right sided lung adenocarcinoma in 2015  Previous left-sided lung adenocarcinoma 2016  Family history of uterine cancer in mother  Family history of early age breast cancer in sister (Diagnosed around early 42's)       [facility]   Orders for cytology and fluid studies were placed on 12/17/22., cytology neg for malignant cells. Pulmonary team following    Anemia; did receive IV iron  Hb 9.1 today 12/20/2022. Labs reviewed.    Continue to monitor CBC with diff   GI team on board; EGD/Colonoscopy pending H&H, likely recommending as outpatient   PPI BID  Off Octreotide  Liver US noted cirrhotic liver. No hepatic mass. Moderate to large ascites. S/p right pleurx cath removal.    Excisional biopsy of the subcutaneous nodule in posterior right axilla done on 12/11/2022. Pathology is consistent with metastatic adenocarcinoma, with central necrosis,   The adenocarcinoma in block A6 stains as follows:   GATA3, Mammaglobin, GCDFP, and Sox 10: Negative   Estrogen receptor, Progesterone receptor, and HER2: Negative (0%   staining)   TTF-1: Negative   CK7: Positive   CK20 and CDX2: Negative   Ki-67 index: Approximately 40%   PAX8: Negative     Patient's history of multiple lung adenocarcinomas is noted. Additionally, per review of electronic medical record, it is noted that   the patient does have an apparent right breast mass with inverted right   nipple. Unfortunately, the immunoprofile of CK7 positive/CK20 negative   adenocarcinoma is not specific as to primary site of origin, and CK7   positive/CK20 negative tumors include both lung and breast primary   adenocarcinomas, as well as upper GI carcinomas and Mullerian carcinomas, among others. Clinical and radiologic correlation is recommended. Discussed with the patient prognosis, she has incurable Stage IV disease  Not a good candidate for intensive chemo  I addressed single agent immunotherapy but may not be significantly beneficial as well  Pt has poor functional status  Palliative on board  No radiation at this time. I have received word from rad onc as well to hold radiation.   I briefly discussed hospice and would favor this option  Will request for PDL1 and NGS from the 12/11/2022 excision biopsy  I called Sara Lopez again today she noted family would like to take of patient at home   Patient and family noted wanting to follow up with her primary oncologist Dr. Jack Rodríguez next week by TeleHealth visit, which is scheduled on 12/29/2022  At this time, no immediate workup or interventions at this time      Rafiq Gramajo, [facility]  12/22/22 5:47 PM

## 2022-12-22 NOTE — PROGRESS NOTES
Comprehensive Nutrition Assessment    Type and Reason for Visit:  Reassess    Nutrition Recommendations/Plan:   Continue current diet as tolerated  Will increase Ensure Compact to all meals     Malnutrition Assessment:  Malnutrition Status: Moderate malnutrition (12/16/22 6426)    Context:  Chronic Illness     Findings of the 6 clinical characteristics of malnutrition:  Energy Intake:  75% or less estimated energy requirements for 1 month or longer  Weight Loss:  Unable to assess (d/t ascites)     Body Fat Loss:  Mild body fat loss Triceps, Orbital   Muscle Mass Loss:  Mild muscle mass loss Clavicles (pectoralis & deltoids), Scapula (trapezius)  Fluid Accumulation:  No significant fluid accumulation     Strength:  Not Performed    Nutrition Assessment:    Pt w/ ascites s/p paracentesis 12/19 w/ 2060 ml removed. Note R pleurx removed 12/20 & hypoxia resolved. Oncology recommending hospice, family not ready at this time. Continue current diet, will increase ONS & monitor while inpatient. Nutrition Related Findings:    A&O X3, I&Os WDL (12/21), BLE trace edema, abd flat/taut/distention, hypo BS, elevated LFT, loss of appetite Wound Type: Surgical Incision, Skin Tears       Current Nutrition Intake & Therapies:    Average Meal Intake:  (appetite poor)  Average Supplements Intake: Unable to assess  ADULT DIET; Regular  ADULT ORAL NUTRITION SUPPLEMENT; Lunch, Dinner; Standard 4 oz Oral Supplement    Anthropometric Measures:  Height: 5' 1\" (154.9 cm)  Ideal Body Weight (IBW): 105 lbs (48 kg)    Admission Body Weight: 113 lb (51.3 kg) (12/16 bed, first measured)  Current Body Weight: 113 lb (51.3 kg), 108.8 % IBW.  Weight Source: Bed Scale (12/22)  Current BMI (kg/m2): 21.4  Usual Body Weight: 121 lb 9.6 oz (55.2 kg) (9/28/22)  % Weight Change (Calculated): -6.1  Weight Adjustment For: No Adjustment                 BMI Categories: Underweight (BMI less than 22) age over 72    Estimated Daily Nutrient Needs:  Energy Requirements Based On: Kcal/kg  Weight Used for Energy Requirements: Current  Energy (kcal/day):   Weight Used for Protein Requirements: Current  Protein (g/day): 60-70 (1.2-1.4 as tolerated)  Method Used for Fluid Requirements: 1 ml/kcal  Fluid (ml/day):     Nutrition Diagnosis:   Moderate malnutrition, In context of chronic illness related to catabolic illness as evidenced by Criteria as identified in malnutrition assessment    Nutrition Interventions:   Food and/or Nutrient Delivery: Continue Current Diet, Modify Oral Nutrition Supplement  Nutrition Education/Counseling: No recommendation at this time  Coordination of Nutrition Care: Continue to monitor while inpatient       Goals:  Previous Goal Met:  (FISH, pt w/ poor appetite)  Goals: PO intake 50% or greater, by next RD assessment       Nutrition Monitoring and Evaluation:   Behavioral-Environmental Outcomes: None Identified  Food/Nutrient Intake Outcomes: Food and Nutrient Intake, Supplement Intake  Physical Signs/Symptoms Outcomes: Biochemical Data, GI Status, Fluid Status or Edema, Nutrition Focused Physical Findings, Skin, Weight    Discharge Planning:    Continue Oral Nutrition Supplement     Jenelle Oliveira RD, LD  Contact: [phone]

## 2022-12-22 NOTE — PROGRESS NOTES
Per update from Halima Miller with pulmonology, pulmonology is signing off. Patient to follow-up with Dr. Zollie Lefort in 4 weeks outpatient.     Electronically signed by Raul Nix RN on 12/22/2022 at 8:30 AM

## 2022-12-22 NOTE — PROGRESS NOTES
Called and reviewed AVS with pt's sister, Elizabeth Hope.  Electronically signed by Jn Cox RN on 12/22/2022 at 6:22 PM

## 2022-12-22 NOTE — PROGRESS NOTES
Called and notified pt's sister, Vidya Olivares, of discharge and  time of 9:30PM by Lupe Murrell.  Electronically signed by Leeroy Grider RN on 12/22/2022 at 3:41 PM

## 2022-12-22 NOTE — DISCHARGE SUMMARY
[facility] Physician Discharge Summary       [facility] [place] [address] [facility] [address], [address] [facility] [address] [phone]    Call in 2 week(s)  hospital follow up    Diez Cart, DO  Gino Mccormick [phone]    Schedule an appointment as soon as possible for a visit in 4 week(s)  hospital follow up    Yuri Law, [address] [facility] [address]  [phone]    Schedule an appointment as soon as possible for a visit in 2 week(s)  hospital follow up      Activity level: As tolerated     Dispo: home      Condition on discharge: Stable     Patient ID:  Denise Hill  [id number]  71 y.o.  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  *      Consults:  IP CONSULT TO PULMONOLOGY  IP CONSULT TO PALLIATIVE CARE  IP CONSULT TO HEM/ONC  IP CONSULT TO SOCIAL WORK  IP CONSULT TO PULMONOLOGY  IP CONSULT TO PALLIATIVE CARE  IP CONSULT TO DIETITIAN  IP CONSULT TO GI  IP CONSULT TO DIETITIAN  IP CONSULT TO IV TEAM  IP CONSULT TO IV TEAM  IP CONSULT TO IV TEAM    Procedures: pleur X catheter removal, biopsy of mass on R posterior axilla     Hospital Course:   Patient is a 61-year-old female who was admitted on 12/15 due to increased shortness of breath and fatigue. She was being managed for the following    1. Loculated pleural effusion:  CTA shows loculated large right pleural effusion with indwelling right hemithorax drainage catheter and posterior right lung base compressive atelectasis. CRP 3.1. Pleural drain placed on 12/9. Right Pleurex drained on 12/14 with 500 ml removed. Drained twice weekly at home, per patient. Follows with Dr Petra Jean Baptiste outpatient. Since admission, no fluid has been removed from pleurx. Plurex removed on 12/20. Pulmonology following. stable         2. Hx of lung cancer: Right lung mass. Adenocarcinoma. GA TA 3/CD x2 KRAS (G12C)+ % expression. Radiation therapy initiated in 6/22. Follows with Dr aKndice Ray. Oncology, pulmonology, and palliative medicine following. Per oncology, patient likely has incurable stage IV disease. Hospice is favorable option. 3. HTN: Continue Amlodipine. Increased to Norvasc 10 mg daily due to uncontrolled BP. 4. Anemia: Hgb stable. Received 1 U PBC on 12/16 for hgb of 6.5. No overt bleeding. Monitor H&H. Transfuse for hgb <7. Monitor. GI and hem/onc following. Consider endoscopic eval, per GI as outpatient     5. Cirrhosis with ascites: Likely secondary to portal hypertension- ? Peritoneal carcinomatosis. Hx of hepatitis C. Paracentesis on 11/9 with removal of 2510 cc. Cytology negative for malignant cells in 11/22. Liver US showing cirrhotic liver, and moderate to large intra-ascites. S/p Paracentesis on 12/20 with slightly over 2 L removed and cytology again negative for malignant cells     6. Hypoxia: Secondary to above. Was on 4 L NC, now down to RA. Continue DuoNebs. Pulmonology following.       7. Subcutaneous mass posterior right shoulder : Biopsy on 12/11 consistent with adenocarcinoma     8. Subcentimeter nodules: Seen on CT. Probably benign. Patient is to follow up outpatient in 6 months. Oncology and palliative spoke to family. Family is not ready for hospice at this time. They also declined [facility] for the patient. Patient will be discharged home with her family in a stable condition. She is to follow-up with heme-onc, pulmonology, and GI as outpatient to discuss further goals of care. Discharge Exam:    General Appearance: alert and oriented to person, place and time and in no acute distress, weak  Skin: warm and dry  Head: normocephalic and atraumatic  Eyes: pupils equal, round, and reactive to light, extraocular eye movements intact, conjunctivae normal  Neck: neck supple and non tender without mass   Pulmonary/Chest: coarse bs bl   Cardiovascular: normal rate, normal S1 and S2 and no carotid bruits  Abdomen: soft, non-tender, non-distended, normal bowel sounds, no masses or organomegaly  Extremities: no cyanosis, no clubbing and no edema  Neurologic: no cranial nerve deficit and speech normal    I/O last 3 completed shifts:  In: -   Out: 125 [Urine:125]  No intake/output data recorded. LABS:  Recent Labs     12/20/22  0349 12/21/22  0553    139   K 4.8 4.1   * 109*   CO2 22 23   BUN 19 19   CREATININE 1.0 1.0   GLUCOSE 99 91   CALCIUM 9.2 8.7       Recent Labs     12/20/22  0349 12/21/22  0553   WBC 10.6 10.4   RBC 3.16* 3.27*   HGB 9.1* 8.8*   HCT 28.3* 28.7*   MCV 89.6 87.8   MCH 28.8 26.9   MCHC 32.2 30.7*   RDW 16.8* 16.9*    383   MPV 10.1 10.1       No results for input(s): POCGLU in the last 72 hours.     Imaging:  XR CHEST PORTABLE    Result Date: 12/14/2022  EXAMINATION: ONE XRAY VIEW OF THE CHEST 12/14/2022 9:08 pm COMPARISON: December 12, 2022 HISTORY: ORDERING SYSTEM PROVIDED HISTORY: right effusion, sob TECHNOLOGIST PROVIDED HISTORY: Reason for exam:->right effusion, sob FINDINGS: Hazy opacities throughout the right lung notable along peripheral aspect of right lung and right costophrenic sulcus. Right-sided chest tube is present. No obvious pneumothorax. The heart is normal in size. 1. Redemonstration of hazy opacities throughout right lung notable suggesting persistent right pleural effusion. 2. Redemonstration of right chest tube. 3. No evidence of pneumothorax. CTA PULMONARY W CONTRAST    Result Date: 12/15/2022  EXAMINATION: CTA OF THE CHEST 12/14/2022 10:05 pm TECHNIQUE: CTA of the chest was performed after the administration of intravenous contrast.  Multiplanar reformatted images are provided for review. MIP images are provided for review. Automated exposure control, iterative reconstruction, and/or weight based adjustment of the mA/kV was utilized to reduce the radiation dose to as low as reasonably achievable. COMPARISON: None. HISTORY: ORDERING SYSTEM PROVIDED HISTORY: sob rule out PE TECHNOLOGIST PROVIDED HISTORY: Reason for exam:->sob rule out PE Decision Support Exception - unselect if not a suspected or confirmed emergency medical condition->Emergency Medical Condition (MA) FINDINGS: Pulmonary Arteries: Pulmonary arteries are adequately opacified for evaluation. No evidence of intraluminal filling defect to suggest pulmonary embolism. Main pulmonary artery is normal in caliber. Mediastinum: No evidence of mediastinal lymphadenopathy. The heart is mildly enlarged. The pericardium demonstrates no acute abnormality. There is no acute abnormality of the thoracic aorta. Lungs/pleura: There is a large loculated right pleural effusion with associated posterior right lower lobe compressive atelectasis. There is indwelling right hemithorax chest tube which courses posteriorly with tip terminating near the mediastinum. No pulmonary edema. No pneumothorax. On axial image 32, there is indeterminate round solid nodule in posterior right upper lobe, abutting the major fissure.   On axial image 56, there is indeterminate 9 mm ground-glass opacity in periphery of right lower lobe. On axial image 77, there is indeterminate 7.7 mm subpleural nodule localized in posterior left lower lobe base. There are a few other scattered subcentimeter nodules in the periphery of left lower lobe near the base. Upper Abdomen: There is intraabdominal ascites which is perihepatic  and perisplenic. Soft Tissues/Bones: No acute bone or soft tissue abnormality. No thyromegaly. No convincing evidence for acute pulmonary thromboembolism. Loculated large right pleural effusion with indwelling right hemithorax drainage catheter. Posterior right lung base compressive atelectasis. Multiple indeterminate subcentimeter nodules, as detailed above. LUNG-RADS 3, PROBABLY BENIGN; advise follow up to document stability 6 months. Ascites.        Patient Instructions:      Medication List        ASK your doctor about these medications      acetaminophen 500 MG tablet  Commonly known as: TYLENOL  Take 1 tablet by mouth 4 times daily as needed for Pain     amLODIPine 10 MG tablet  Commonly known as: NORVASC                Note that more than 30 minutes was spent in preparing discharge papers, discussing discharge with patient, medication review, etc.    Signed:  Electronically signed by Severiano Frias MD on 12/22/2022 at 5:17 PM

## 2022-12-22 NOTE — CARE COORDINATION
Transition of Care-Transfer order noted, discharge plan is HOME, despite AMPAC score of 10/24. SW discussed [facility] yesterday family declined. The patient's domestic partner Kendy Kuhn and her two sisters care for her at home as well as support from [facility] and aids from [address] [place] [address], [phone] [facility].  is Gasper Abreu. Patient's sister Christi oMnge states that they have all of the equipment needed at home. Social work spoke to [facility] and requested that they add a  to the case at home. FAMILY IS NOT READY FOR HOSPICE, ANTICIPATE DISCHARGE IN THE NEXT 24 HRS.     Buck LINDN, RN  [address] [facility]

## 2022-12-22 NOTE — PROGRESS NOTES
Palliative Medicine  Progress Note    Goals of care and code status have been addressed. She and her family have been made aware of her diagnosis, prognosis and recommendation for hospice has been made. At this time the goal is for her to go home with her family, and they do not want hospice at this time. There are no further PM needs at this time. PM will now sign off. If new PM needs arise, please re-consult. Thank you. Dante Allison APRN-CNP  Palliative Medicine    Note: This report was completed using computerAgency Entourage voiced recognition software. Every effort has been made to ensure accuracy; however, inadvertent computerized transcription errors may be present.

## 2022-12-22 NOTE — CARE COORDINATION
Social Work discharge planning    Per charge Dr DINA is discharging today. RN Mega Maradiaga said she will call [address] faxed paperwork to [facility] ambulance to see if they can transport pt home today. Await call back from [facility] [phone]. Electronically signed by Tessa Batista on 12/22/2022 at 3:00 PM     Addendum:   NADIR received call back from Derick at [facility], who advised they can NOT accommodate pt trip to home. Nadir called [facility] [phone] and spoke to [address] [place] [address].  She advised that their next available is 930 pm today. PT'S CORRECT ADDRESS  [place],  as it is on pt's face sheet. Nadir called [facility] back and spoke to [facility]. [facility] confirmed pt's ambulance  at 930p today.   Electronically signed by Tessa Batista on 12/22/2022 at 3:28 PM

## 2022-12-23 ENCOUNTER — TELEPHONE (OUTPATIENT)
Dept: CASE MANAGEMENT | Age: 72
End: 2022-12-23

## 2022-12-23 NOTE — TELEPHONE ENCOUNTER
Dr. Edris Osgood requesting [facility] [place] [facility] and PDL-1. Order completed via online portal. [facility] to request specimen. Copy of patient's insurance card, face sheet,and path report along with test requisition form was submitted via online portal.Order form scanned into patient's EMR. Email confirmation received for order with order # [id number].

## 2022-12-24 LAB
REPORT: NORMAL
THIS TEST SENT TO: NORMAL

## 2022-12-28 NOTE — PROGRESS NOTES
Physician Progress Note      Seema Aguilar  CSN #:                  [id number]  :                       1950  ADMIT DATE:       2022 8:41 PM  [address] DATE:        2022 11:24 PM  RESPONDING  PROVIDER #:        Prabhu Bray MD          QUERY TEXT:    Patient admitted with SOB, noted to have pleural effusion. If possible, please   document in progress notes and d/c summary further specificity regarding the   type/underlying cause of pleural effusion: The medical record reflects the following:  Risk Factors: metastatic cancer to chest wall  Clinical Indicators: per IM note \". Author Showman Author William Cirrhosis with ascites: Likely secondary   to portal hypertension- ? Peritoneal carcinomatosis. Hx of hepatitis C. Paracentesis on  with removal of 2510 cc. Cytology negative for malignant   cells in . Liver US showing cirrhotic liver, and moderate to large   intra-ascites. S/p Paracentesis on  with slightly over 2 L removed. .. Hx   of lung cancer: Right lung mass. Adenocarcinoma. GA TA 3/CD x2 KRAS (G12C)+   % expression. Radiation therapy initiated in . Follows with Dr Christiane Mark. Oncology, pulmonology, and palliative me  Treatment: drainage of right PleurX catheter, paracentesis    Thank you,  Skylar GONZALEZ, RN, CDIS  Clinical Documentation Improvement  [email or web address]  Options provided:  -- Pleural effusion due to Cirrhosis  -- Malignant pleural effusion due to lung cancer  -- Other - I will add my own diagnosis  -- Disagree - Not applicable / Not valid  -- Disagree - Clinically unable to determine / Unknown  -- Refer to Clinical Documentation Reviewer    PROVIDER RESPONSE TEXT:    Patient has a Malignant pleural effusion due to lung cancer. Query created by: Beka Koehler on 2022 11:35 AM      Electronically signed by:   Prabhu Bray MD 2022 8:20 AM

## 2023-01-13 DIAGNOSIS — R52 PAIN: ICD-10-CM

## 2023-01-13 DIAGNOSIS — R09.89 AIR HUNGER: ICD-10-CM

## 2023-01-13 DIAGNOSIS — Z51.5 HOSPICE CARE PATIENT: Primary | ICD-10-CM

## 2023-01-13 RX ORDER — MORPHINE SULFATE 100 MG/5ML
5 SOLUTION ORAL
Qty: 30 ML | Refills: 0 | Status: SHIPPED | OUTPATIENT
Start: 2023-01-13 | End: 2023-02-12

## 2023-01-19 NOTE — DISCHARGE SUMMARY
[address]                  [address] [facility],  [facility]                               DISCHARGE SUMMARY    PATIENT NAME: Earl Decker                     :        1950  MED REC NO:   [id number]                            ROOM:       6502  ACCOUNT NO:   [de-identified]                           ADMIT DATE: 2022  PROVIDER:     Erika Jones DO                  DISCHARGE DATE:  2022    DISCHARGE DIAGNOSES:  1. Soft tissue mass, back. 2.  Right pleural effusion. 3.  Lung CA with mets. 4.  Hypertension. 5.  Hepatitis C.    HOSPITAL COURSE:  The patient is a 71-year-old black female, who  presented to the emergency room complaining of pain in the right back. She has a mass, which is painful. She has a history of lung CA. Diagnostic evaluation revealed pleural effusion. She was admitted to  the hospital.  She underwent excision of mass, back. She underwent  PleurX catheter placement, 2022. She was seen by Pulmonary and  Surgery and Palliative Care. She was discharged to home in stable  condition on 2022. DISCHARGE MEDICATIONS:  As per discharge med rec, which include:  1. Remeron. 2.  Amlodipine. 3.  Tylenol. DISCHARGE INSTRUCTIONS:  The patient was instructed to follow up with  Dr. Gaines Soulier, call office for appointment. Follow up with Dr. Micah Pan,  call office for appointment. Follow up with Dr. Michelle Angulo, call office for  appointment.         Sharon Roger DO    D: 2023 16:24:43       T: 2023 16:27:02     [email or web address]  Job#: [id number]     Doc#: [id number]    CC:

## 2023-07-12 NOTE — PROGRESS NOTES
Occupational Therapy    Visit Type: treatment    Relevant History/Co-morbidities: admitted with severe protein calorie malnutrition, anasarca.   Hx of schizoaffective disorder, dementia, tobacco abuse, DVT and PE.     SUBJECTIVE  Patient agreed to participate in therapy this date.  Patient / Family Goal: unable to state    OBJECTIVE     Cognitive Status   Level of Consciousness   - alert  Affect/Behavior    - irritable  Orientation    - Unable to assess   - Oriented to: normal baseline confusion present  Functional Communication   - Forms of Communication: verbal  Attention Span    - Attention: impaired - difficulty attending to directions   - Attention impairment: tangential behaviors  Following Direction   - follows one step commands inconsistently  Memory   - impaired  Safety Awareness/Insight   - good awareness of safety precautions  Awareness of Deficits   - not aware of deficits    Patient Activity Tolerance: 1 to 2 activity to rest         Bed Mobility  - Supine to sit: supervision  Transfers  Assistive devices: 1 person, gait belt  - Sit to stand: minimal assist  - Stand to sit: minimal assist      Functional Ambulation  - Assistance: minimal assist  - Assistive device: 2-wheeled walker  Activities of Daily Living (ADLs)  Grooming/Oral Hygiene:   - Grooming assist: minimal assist       - Oral hygiene assist: minimal assist  - Position: standing at sink  - Assist needed for: steadying, increased time to complete, standing with assistive device and supervision/safety  Lower Body Dressing:   - Assist: minimal assist  - Position: edge of bed  - Footwear:       - Assistance: minimal assist       - Position: edge of bed  - Assist needed for: supervision/safety, increased time to complete and verbal cueing  Toileting:   - Toilet transfer:        - Assist: minimal assist       - Device: 2-wheeled walker  - Assist: minimal assist  - Assist needed for: steadying, verbal cueing, increased time to complete, clothing  Patient in IR department for pleurx catheter placement. While obtaining consent for procedure patient states she wants to be fully resuscitated which in opposition to current code status listed as DNR CCA. I spoke with [facility] patient's  as well as Ayo Lee patient's sister and confirmed she indeed does want to be fully resuscitated. [facility] suspended for procedure, floor RN notified. management up and clothing management down  Interventions    Training provided: ADL training, activity tolerance, positioning, transfer training and safety training  Skilled input: verbal instruction/cues  Verbal Consent: Writer verbally educated and received verbal consent for hand placement, positioning of patient, and techniques to be performed today from patient for therapist position for techniques as described above and how they are pertinent to the patient's plan of care.         ASSESSMENT   Progress: progressing toward goals    Summary of function and discharge needs based on today's assessment:  - Current level of function: slightly below baseline level of function  - Therapy needs at discharge: therapy 5 or more times per week  - Activities of daily living (ADLs) requiring support at discharge: bed mobility and transfers  Patient willing to work with me with encouragement     AM-PAC  - Prior Level of Function: IND/MOD I ([facility] 22-24)       Key: 

## 2024-03-20 NOTE — ANESTHESIA PRE PROCEDURE
Subjective   Patient ID: Mari is a 59 year old male.    Chief Complaint   Patient presents with    Office Visit     cardiology    Follow-up     Last visit 23         HPI:   ====  Patient was recently treated for diverticulitis  Patient reports  -----------------  No Dyspnea on exertion  No PND and no orthopnea    No Chest pain and no angina     No Loss of consciousness  No Dizziness     No Edema   No Palpitation       Patient is reporting ,no side effects .    Past Medical History:   Diagnosis Date    Coronary artery disease     Diverticulitis     Gastroesophageal reflux disease     Hyperlipidemia 2019    Hypertension, benign 2017    Type 2 diabetes mellitus without complication, without long-term current use of insulin (CMD) 2019       ALLERGIES:   Allergen Reactions    Lisinopril GI UPSET        Past Surgical History:   Procedure Laterality Date    Cardiac catherization      [facility]    Colonoscopy      Coronary angioplasty with stent placement  2022    RCA       Family History   Problem Relation Age of Onset    Hyperlipidemia Mother     Hypertension Mother     Diabetes Mother     Myocardial Infarction Father        Social History     Tobacco Use    Smoking status: Former     Current packs/day: 0.00     Types: Cigarettes     Quit date: 2021     Years since quittin.7    Smokeless tobacco: Never   Vaping Use    Vaping Use: never used   Substance Use Topics    Alcohol use: Yes     Comment: Socially    Drug use: Never        Recent hospitalization:  ====================   None in the last year     Review of Systems:   ================    Constitutional:  No chills, and no malaise  Eyes:  No change in eyesight, no pain  Ears, nose, mouth, throat, and face:  No pain, no swelling   Respiratory:  No hemoptysis  Cardiovascular:  No palpitation  Gastrointestinal:  No melena  Genitourinary:  No hematuria  Musculoskeletal:  No muscle pain  Neurological:  No change in  Department of Anesthesiology  Preprocedure Note       Name:  Layla Skill   Age:  71 y.o.  :  1950                                          MRN:  [id number]         Date:  2022      Surgeon: Ziyad Voss):  Keyur Wilson MD    Procedure: Procedure(s):  EXCISION OF RIGHT AXILLARY MASS    Medications prior to admission:   Prior to Admission medications    Medication Sig Start Date End Date Taking? Authorizing Provider   acetaminophen (TYLENOL) 500 MG tablet Take 1 tablet by mouth 4 times daily as needed for Pain 22   Early Fabry, MD   amLODIPine (NORVASC) 5 MG tablet take 1 tablet by mouth once daily 22   Historical Provider, MD   vitamin B-12 (CYANOCOBALAMIN) 500 MCG tablet take 1 tablet by mouth daily 5/3/21 10/31/22  Carlota Crown, DO   tiotropium (SPIRIVA HANDIHALER) 18 MCG inhalation capsule Inhale 1 capsule into the lungs daily 2/26/18 10/31/22  Carlota Crown, DO   lisinopril (PRINIVIL;ZESTRIL) 40 MG tablet take 1 tablet by mouth once daily 18   Carlota Crown, DO   Multiple Vitamins-Minerals (THERAPEUTIC MULTIVITAMIN-MINERALS) tablet Take 1 tablet by mouth daily 17   Chris Buckley MD   [facility] [place].  Devices KIT BP monitoring KIT 4/15/16   Bill Jacobsen MD       Current medications:    Current Facility-Administered Medications   Medication Dose Route Frequency Provider Last Rate Last Admin    ipratropium-albuterol (DUONEB) nebulizer solution 1 ampule  1 ampule Inhalation 4x daily Carlota Gaston, DO   1 ampule at 12/10/22 194    medicated lip balm (BLISTEX/CARMEX) stick   Topical PRN Carlota Crown, DO   Given at 22 8421    ceFAZolin (ANCEF) 2,000 mg in sterile water 20 mL IV syringe  2,000 mg IntraVENous See Admin Instructions Osei Argueta MD        oxyCODONE (ROXICODONE) immediate release tablet 5 mg  5 mg Oral Q4H PRN VERITO Ramirez - CNP   5 mg at 12/10/22 7427    Or    oxyCODONE HCl (OXY-IR) immediate release tablet 10 mg  10 mg Oral Q4H PRN Esther Waldrop, APRN - CNP   10 mg at 12/10/22 1933    sennosides-docusate sodium (SENOKOT-S) 8.6-50 MG tablet 1 tablet  1 tablet Oral BID Esther Waldrop, APRN - CNP   1 tablet at 12/11/22 0718    mirtazapine (REMERON) tablet 7.5 mg  7.5 mg Oral Nightly Esther Waldrop, APRN - CNP   7.5 mg at 12/10/22 2052    0.9 % sodium chloride infusion   IntraVENous Continuous Nicky Zhao  mL/hr at 12/10/22 2050 New Bag at 12/10/22 2050    amLODIPine (NORVASC) tablet 5 mg  5 mg Oral Daily Shasta Beartyler Malmer, DO   5 mg at 12/11/22 0358    sodium chloride flush 0.9 % injection 5-40 mL  5-40 mL IntraVENous 2 times per day Shasta Benny Malmer, DO   10 mL at 12/10/22 2052    sodium chloride flush 0.9 % injection 5-40 mL  5-40 mL IntraVENous PRN Shasta Benny Malmer, DO        0.9 % sodium chloride infusion   IntraVENous PRN Shasta Beartyler Malmer, DO        enoxaparin Sodium (LOVENOX) injection 30 mg  30 mg SubCUTAneous Daily Shasta Beartyler Malmer, DO   30 mg at 12/08/22 3759    polyethylene glycol (GLYCOLAX) packet 17 g  17 g Oral Daily PRN Shasta Beards Malmer, DO        acetaminophen (TYLENOL) tablet 650 mg  650 mg Oral Q6H PRN Shasta Beartyler Malmer, DO        Or    acetaminophen (TYLENOL) suppository 650 mg  650 mg Rectal Q6H PRN Shasta Beards Malmer, DO        morphine (PF) injection 2 mg  2 mg IntraVENous Q4H PRN Shasta Beards Malmer, DO   2 mg at 12/08/22 9259    trimethobenzamide (TIGAN) injection 200 mg  200 mg IntraMUSCular Q6H PRN Shasta Beards Malmer, DO         Facility-Administered Medications Ordered in Other Encounters   Medication Dose Route Frequency Provider Last Rate Last Admin    0.9 % sodium chloride infusion   IntraVENous Continuous PRN Dillard Bernheim, RN   New Bag at 12/11/22 0736    pantoprazole (PROTONIX) tablet 40 mg  40 mg Oral QAM AC Carlos Lynch MD           Allergies:     Allergies   Allergen Reactions    Ibuprofen Palpitations and Rash       Problem List:    Patient Active Problem List speech  Behavioral/Psych:  No change in mood  Skin : no itching   =============================================================================    Objective  =========  Vitals:    03/20/24 0910   BP: 125/71   BP Location: LUE - Left upper extremity   Patient Position: Sitting   Cuff Size: Regular   Pulse: 73   Temp: 97.7 °F (36.5 °C)   TempSrc: Oral   SpO2: 100%   Weight: 85.6 kg (188 lb 11.4 oz)   Height: 5' 10.5\" (1.791 m)   PainSc:  0        Physical Exam:  =============  General: Alert, cooperative, no distress.  Head: No obvious abnormality  Eyes: Normal, no jaundice.  Throat: Lips, mucosa, moist and normal.  Neck: Supple.  Back: Symmetric.  Lungs: Clear, no wheezing, no rhonchi and no rales.  Heart: Regular rate and rhythm, S1, S2 normal.  Abdomen: Soft, non-tender.  Extremities: No edema.  Skin: Normal texture  Neurologic: No clear deficit.    =============================================================================    Data  =====  Recent Labs   Lab 12/02/23  0804   Cholesterol 146   HDL 31*   Triglycerides 286*   CALCLDL 58   Non-HDL Cholesterol 115       Recent Labs   Lab 12/02/23  0804 07/22/23  0758 04/15/23  0752   Sodium 138   < > 136   Chloride 106   < > 108   BUN 18   < > 23*   Potassium 4.5   < > 4.4   Glucose 149*   < > 130*   Creatinine 1.40*   < > 1.30*   Calcium 9.5   < > 9.3   TSH  --   --  1.180    < > = values in this interval not displayed.          Hemoglobin A1C (%)   Date Value   12/02/2023 9.0 (H)   .    Recent Labs   Lab 12/02/23  0804   WBC 7.3   RBC 5.49   HGB 14.2   HCT 45.4   MCV 82.7   MCHC 31.3*   RDW-CV 13.2            Coronary angiogram and intervention April 2022  -----------------------------------------------------------  Mid LAD lesion with 30% stenosis.  Prox Cx to Mid Cx lesion with 99% stenosis.  Mid Cx lesion with 90% stenosis.  1st Mrg lesion with 90% stenosis.  RPDA lesion with 60% stenosis.  Mid RCA lesion with 90% stenosis.  Prox RCA lesion with 50%  Diagnosis Code    Hepatitis C B19.20    Abnormal Pap smear [id number]    Dyslipidemia E78.5    Insomnia G47.00    Ex-smoker Z87.891    PVD (peripheral vascular disease) (HCC) I73.9    Uterine fibroid D25.9    Gall stone K80.20    Kidney stone N20.0    Left groin pain R10.32    Need for Tdap vaccination Z23    Abnormal chest x-ray with multiple lung nodules R91.8    Panlobular emphysema (HCC) J43.1    Chronic hepatitis C virus infection ([facility] B18.2    Malignant neoplasm of upper lobe of right lung (HCC) C34.11    Malignant neoplasm of lung (HCC) C34.90    Leukocytosis D72.829    Anemia D64.9    Essential hypertension I10    Thrombocytopenia (HCC) D69.6    Malignant neoplasm of upper lobe of left lung (HCC) C34.12    History of lobectomy of lung Z90.2    Adenocarcinoma, lung (HCC) C34.90    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) D61.9    Hep C w/o coma, chronic (HCC) B18.2    Hyperlipidemia E78.5    Warfarin anticoagulation Z79.01    Bilateral lung cancer (HCC) C34.91, C34.92    Pulmonary embolism without acute cor pulmonale (HCC) I26.99    Port-A-Cath in place Z95.828    Acute cholecystitis K81.0    History of lung cancer Z85. 80    RBBB I45.10    Adenomatous polyp of descending colon D12.4    Abdominal pain R10.9    Ascites of liver R18.8    Severe protein-calorie malnutrition (HCC) E43    Intractable pain R52    Goals of care, counseling/discussion Z71.89    Palliative care encounter Z51.5    Mass of right axilla R22.31    Subareolar mass of right breast N63.41    Pleural effusion, right J90    Pleural effusion J90       Past Medical History:        Diagnosis Date    Abnormal chest x-ray with multiple lung nodules 9/8/2015    Abnormal Pap smear 1/3/2011    Alpha-1 negative    Adrenal adenoma     7 mm    Bilateral lung cancer ([facility] 5/12/2016    surgically removed - 2015     Cholelithiasis 6/6/2011    Encounter for screening colonoscopy     for OR 3-28-19     stenosis.     EKG    ====  Normal sinus rhythm   Right bundle branch block  Left axis deviation/left anterior hemiblock  LVH    Stress echo 06/2023  ===================    1. Staged echo conclusions: There is no evidence for stress-induced ischemia.  2. Baseline: There is Mild hypokinesis of the mid inferior and apical inferior     segments on rest , with augmentation in contractility at peak exercise ,     indicating the absence of ischemia  3. Left ventricle: The ejection fraction was measured by visual estimation.     The ejection fraction is 60%.  4. Stress data: Stress testing did not produce any symptoms.  5. Stress ECG conclusions: [facility] scoring: exercise time of 7 min; maximum ST     deviation of 0mm; no angina; resulting score is 7. This score predicts a     low risk of cardiac events.    ==============================================================================     Assessment :  ============    Coronary artery disease of native artery of native heart with stable angina pectoris (CMD)  (primary encounter diagnosis)  Mixed hyperlipidemia  Hypertension, benign  S/P drug eluting coronary stent placement  Type 2 diabetes mellitus with hyperglycemia, with long-term current use of insulin (CMD)    Plan:  =====         Coronary artery disease  --------------------------  No angina  Aspirin    Plavix  Beta-blocker  Statin     Hypertension  ---------------  well controlled  Low-salt diet   losartan  Beta-blocker      Hyperlipidemia  -------------------  Low-cholesterol diet   Crestor 20   fenofibrate  Last LDL was 66 at goal July 2023    Diabetes. With mild CKD  ------------  Low-calorie diet  Low carbohydrate diet   insulin  Jardiance  Mounjaro  prandin  Repeat A1c  9.    Sees endocrinologist           Current Outpatient Medications   Medication Sig Dispense Refill    metoPROLOL succinate (TOPROL-XL) 50 MG 24 hr tablet TAKE 1 TABLET BY MOUTH EVERY DAY 90 tablet 3    tirzepatide (MOUNJARO) 5 MG/0.5ML Solution  Fibroids     Hemangioma of spleen     Hepatitis C 01/03/2011    treated and cured, no current issues     Hyperlipidemia     no medications     Hypertension 6/6/2011    Insomnia     Lung nodule     ROMELIA     Lymphadenopathy     Cervical (-) ENT    Malignant neoplasm of upper lobe of right lung ([address] 11/18/2015    Osteoarthritis     Panlobular emphysema ([address] 11/12/2015    controlled with inhalers     PPD negative 1/18/12    Pulmonary embolism without acute cor pulmonale ([address] 5/12/2016    PVD (peripheral vascular disease) ([facility] 5/6/2014    Scoliosis     Uterine fibroid 5/6/2014       Past Surgical History:        Procedure Laterality Date    APPENDECTOMY  1965    BREAST BIOPSY Left     AGE 31'S LEFT NIPPLE REMOVED    BREAST LUMPECTOMY  4/28/1999    left, benign, Dr. Fallon Soares, [address]  2/1/2012    Chandler Cho, Dr. Toshia Lynch, [facility] [address]  10/15/15    with EBUS - DR Melissa Nassar    CHOLECYSTECTOMY      COLONOSCOPY  12/21/2009    partial to distal ascending colon normal (completion BE same day normal), Dr. Fallon Soares, [facility] COLONOSCOPY  8/22/2014    done under fluoro with sigmoid straightening device so was able to get to cecum, very poor prep, moderate proctitis, repeat 5 years,  Dr. Fallon Soares, [address] [facility] COLONOSCOPY N/A 3/28/2019    COLONOSCOPY POLYPECTOMY SNARE/COLD BIOPSY performed by Sera Alarcon DO at [address] [facility] COLONOSCOPY N/A 5/13/2022    COLONOSCOPY POLYPECTOMY SNARE/COLD BIOPSY performed by Ronak Correia MD at [address]  9/20/2022    CT NEEDLE BIOPSY LUNG PERCUTANEOUS 9/20/2022 DEANGELO Lilly MD [facility]    ENDOMETRIAL BIOPSY      IR INSERT TUNNELED PLEURA CATH W CUFF  12/9/2022    IR INSERT TUNNELED PLEURA CATH W CUFF 12/9/2022 Rosaline Pleitez MD [facility] SPECIAL PROCEDURES    LIVER RESECTION Right 7/6/2022    LAPAROSCOPIC ROBOTIC PERITONEAL MASS RESECTION performed by Nikki Thakkar MD at [address] [facility] Pen-injector Inject 5 mg into the skin every 7 days. Indications: Type 2 Diabetes 6 mL 4    repaglinide (PRANDIN) 2 MG tablet Take 1 tablet by mouth in the morning and 1 tablet at noon and 1 tablet in the evening. Take before meals. 270 tablet 3    Insulin Pen Needle 32G X 4 MM Misc Use to inject insulin 1 time daily. Remove needle cover(s) to expose needle before injecting. 100 each 3    insulin degludec (Tresiba FlexTouch) 100 UNIT/ML pen-injector Inject 50 Units into the skin at bedtime. 45 mL 3    Jardiance 25 MG tablet TAKE 1 TABLET BY MOUTH EVERY DAY BEFORE BREAKFAST 90 tablet 3    Vitamin D, Ergocalciferol, 1.25 mg (50,000 units) capsule TAKE 1 CAPSULE BY MOUTH 1 DAY A WEEK. 12 capsule 1    clopidogrel (PLAVIX) 75 MG tablet Take 1 tablet by mouth daily. 90 tablet 3    losartan (COZAAR) 25 MG tablet Take 1 tablet by mouth daily. 90 tablet 3    rosuvastatin (Crestor) 40 MG tablet Take 1 tablet by mouth daily. 90 tablet 3    famotidine (PEPCID) 20 MG tablet TAKE 1 TABLET BY MOUTH IN THE MORNING AND IN THE EVENING 180 tablet 1    aspirin (Aspirin Low Dose) 81 MG EC tablet Take 1 tablet by mouth daily. 90 tablet 3    fenofibrate (TRICOR) 54 MG tablet Take 1 tablet by mouth daily. 90 tablet 3    Accu-Chek Guide test strip TEST BLOOD SUGAR 3 TIMES DAILY. DIAGNOSIS: E11.9 300 strip 3    Probiotic Product (PROBIOTIC DAILY PO) Take 1 capsule by mouth daily. Sánchez Probiotic      VITAFUSION FIBER WELL GUMMIES 2.5 g Chew Tab Chew 10 mg by mouth daily. Each gummy is 5 mg: takes 2 gummies daily      Accu-Chek FastClix Lancets Misc Test blood sugar 3 times daily.  Diagnosis: E11.9       No current facility-administered medications for this visit.          Electronically signed by:  Anai Mojica MD, 3/20/2024    3/29/2016    VATS WEDGE RESECTION UPPER LOBECTOMY    OTHER SURGICAL HISTORY Right 2016    REMOVAL MEDI-PORT - DR Steve Baeza    CO LAP,CHOLECYSTECTOMY/GRAPH N/A 2018    CHOLECYSTECTOMY LAPAROSCOPIC, POSSIBLE OPEN,POSSIBLE GRAM ( OC 2) performed by Marion Ruffin MD at [address] Right 2015    VATS, Shelley Euler UPPER LOBECTOMY; NODE DISECTION    TUNNELED VENOUS PORT PLACEMENT Right 2015    right chest, and removed     UPPER GASTROINTESTINAL ENDOSCOPY N/A 2022    EGD POLYP HOT FORCEP/CAUTERY performed by Rhonda Lawson MD at [facility] History:    Social History     Tobacco Use    Smoking status: Former     Packs/day: 0.00     Years: 30.00     Pack years: 0.00     Types: Cigarettes     Quit date: 2015     Years since quittin.5    Smokeless tobacco: Never   Substance Use Topics    Alcohol use: Not Currently     Alcohol/week: 2.0 standard drinks     Types: 2 Glasses of wine per week     Comment: x2 week                                Counseling given: Not Answered      Vital Signs (Current):   Vitals:    12/10/22 1933 12/10/22 2053 22 0022 22 0429   BP:  132/64 130/67 (!) 142/72   Pulse:  (!) 112 (!) 107 99   Resp: 16 14 15 13   Temp:  97.6 °F (36.4 °C) 97.9 °F (36.6 °C) 98.2 °F (36.8 °C)   TempSrc:  Temporal Temporal Temporal   SpO2:  92% 96% 98%   Weight:       Height:                                                  BP Readings from Last 3 Encounters:   22 (!) 142/72   22 (!) 146/68   22 136/63       NPO Status:                                                                                 BMI:   Wt Readings from Last 3 Encounters:   22 103 lb 8 oz (46.9 kg)   22 108 lb 6.4 oz (49.2 kg)   22 130 lb (59 kg)     Body mass index is 19.56 kg/m².     CBC:   Lab Results   Component Value Date/Time    WBC 9.9 2022 06:10 AM    RBC 2.75 2022 06:10 AM    HGB 7.4 2022 06:10 AM    HCT 24.1 12/11/2022 06:10 AM    MCV 87.6 12/11/2022 06:10 AM    RDW 16.4 12/11/2022 06:10 AM     12/11/2022 06:10 AM       CMP:   Lab Results   Component Value Date/Time     12/10/2022 06:26 AM    K 4.8 12/10/2022 06:26 AM     12/10/2022 06:26 AM    CO2 19 12/10/2022 06:26 AM    BUN 30 12/10/2022 06:26 AM    CREATININE 1.4 12/10/2022 06:26 AM    GFRAA 49 09/14/2022 10:12 AM    LABGLOM 40 12/10/2022 06:26 AM    GLUCOSE 78 12/10/2022 06:26 AM    GLUCOSE 85 04/10/2012 12:28 PM    PROT 6.9 12/10/2022 06:26 AM    CALCIUM 8.7 12/10/2022 06:26 AM    BILITOT 0.3 12/10/2022 06:26 AM    ALKPHOS 517 12/10/2022 06:26 AM    AST 60 12/10/2022 06:26 AM    ALT 25 12/10/2022 06:26 AM       POC Tests: No results for input(s): POCGLU, POCNA, POCK, POCCL, POCBUN, POCHEMO, POCHCT in the last 72 hours.     Coags:   Lab Results   Component Value Date/Time    PROTIME 13.1 12/10/2022 06:26 AM    PROTIME 22.6 07/08/2016 01:35 PM    INR 1.2 12/10/2022 06:26 AM    APTT 29.3 06/28/2018 08:09 PM       HCG (If Applicable): No results found for: PREGTESTUR, PREGSERUM, HCG, HCGQUANT     ABGs: No results found for: PHART, PO2ART, QFG3WDS, CPT6ARM, BEART, G7SLQLFX     Type & Screen (If Applicable):  No results found for: LABABO, LABRH    Drug/Infectious Status (If Applicable):  No results found for: HIV, HEPCAB    COVID-19 Screening (If Applicable):   Lab Results   Component Value Date/Time    COVID19 NOT DETECTED 06/19/2020 12:00 AM           Anesthesia Evaluation  Patient summary reviewed and Nursing notes reviewed no history of anesthetic complications:   Airway: Mallampati: II  TM distance: >3 FB   Neck ROM: full  Mouth opening: > = 3 FB   Dental:    (+) edentulous      Pulmonary:normal exam    (+) COPD:      (-) not a current smoker (former smoker)                           Cardiovascular:  Exercise tolerance: poor (<4 METS),   (+) hypertension:, hyperlipidemia                  Neuro/Psych:   Negative Neuro/Psych ROS GI/Hepatic/Renal:   (+) hepatitis: C, liver disease (ascites):,           Endo/Other:    (+) blood dyscrasia: anemia, anticoagulation therapy and thrombocytopenia:., malignancy/cancer (lung). Abdominal:             Vascular:   + PVD, aortic or cerebral, . Other Findings:           Anesthesia Plan      general     ASA 4       Induction: intravenous. MIPS: Postoperative opioids intended and Prophylactic antiemetics administered. Anesthetic plan and risks discussed with patient. Plan discussed with CRNA. Patient wishes to suspend DNR mariaelena-operatively.   Appropriate paperwork signed,      Deep Sol MD   12/11/2022

## 2025-01-15 NOTE — PATIENT CARE CONFERENCE
P Quality Flow/Interdisciplinary Rounds Progress Note        Quality Flow Rounds held on December 9, 2022    Disciplines Attending:  Bedside Nurse, , , and Nursing Unit Leadership    Nikita Guevara was admitted on 12/7/2022 12:01 PM    Anticipated Discharge Date:       Disposition:    Ishan Score:  Ishan Scale Score: 16    Readmission Risk              Risk of Unplanned Readmission:  27           Discussed patient goal for the day, patient clinical progression, and barriers to discharge.   The following Goal(s) of the Day/Commitment(s) have been identified:  report labs/diagnostics       Shelly Kamara RN  December 9, 2022 30

## 2025-06-22 NOTE — ED PROVIDER NOTES
ED Attending shared visit  CC: Susie [id number]  Department of Emergency Medicine   ED  Encounter Note  Admit Date/RoomTime: 2022  9:17 AM  ED Room:     NAME: Yasmani Hayes  : 1950  MRN: [id number]     Chief Complaint:  Back Pain (lower back pain, radiating into R leg, recently got cortisone shot in back, not helping)    History of Present Illness       Yasmani Hayes is a 80 y.o. old female with a prior history of recurrent intermittent self limited episodes of low back pain, presents to the emergency department by private vehicle, for non-traumatic acute-on-chronic, aching and sharp right sided, midline thoracic and lower lumbar back pain to the right hip, for 1 week(s) prior to arrival.  There has been no recent injury as it relates to today's visit. Since onset the symptoms have been stable and is mild-to-moderate in severity. She has associated signs & symptoms of nothing additional.   She denies any bladder or bowel incontinence , new weakness, tingling or paresthesias, recent back injection, recent spinal surgery, history of IVDA, fever, abdominal pain, bladder incontinence, bowel incontinence, bladder retention, bladder urgency, bowel urgency, saddle paresthesias  or sacral numbness. She states she saw Dr. Ruth Ann Devlin several days ago for this pain and he gave her an injection however she is unable to pinpoint the area that she had the injection in. The pain is aggraveated by nothing in particular and relieved by nothing in particular. She is not currently enrolled in an pain management program or managed by PCP or back specialist.        ROS   Pertinent positives and negatives are stated within HPI, all other systems reviewed and are negative.     Past Medical History:  has a past medical history of Abnormal chest x-ray with multiple lung nodules, Abnormal Pap smear, Adrenal adenoma, Bilateral lung cancer ([address] Cholelithiasis, Encounter for screening colonoscopy, Fibroids, Hemangioma of spleen, Hepatitis C, Hyperlipidemia, Hypertension, Insomnia, Lung nodule, Lymphadenopathy, Malignant neoplasm of upper lobe of right lung ([address] Osteoarthritis, Panlobular emphysema ([address] PPD negative, Pulmonary embolism without acute cor pulmonale ([address] PVD (peripheral vascular disease) ([address] Scoliosis, and Uterine fibroid. Surgical History:  has a past surgical history that includes Appendectomy (1965); Endometrial biopsy; Colonoscopy (12/21/2009); bronchoscopy (2/1/2012); Colonoscopy (8/22/2014); bronchoscopy (10/15/15); Thoracoscopy (Right, 11/11/2015); Tunneled venous port placement (Right, 12/07/2015); lobectomy (Left, 3/29/2016); other surgical history (Right, 11/04/2016); pr lap,cholecystectomy/graph (N/A, 6/29/2018); Cholecystectomy; Breast lumpectomy (4/28/1999); Colonoscopy (N/A, 3/28/2019); and Breast biopsy (Left). Social History:  reports that she quit smoking about 6 years ago. She smoked 0.00 packs per day for 30.00 years. She has never used smokeless tobacco. She reports current alcohol use of about 2.0 standard drinks of alcohol per week. She reports that she does not use drugs. Family History: family history includes Cancer in her mother; Diabetes in her brother and sister; Heart Disease in her father and mother; High Blood Pressure in her father and mother; Kidney Disease in her father. Allergies: Ibuprofen    Physical Exam   Oxygen Saturation Interpretation: Normal.        ED Triage Vitals   BP Temp Temp src Pulse Resp SpO2 Height Weight   02/20/22 0915 02/20/22 0913 -- 02/20/22 0915 02/20/22 0913 02/20/22 0913 02/20/22 0913 02/20/22 0913   (!) 190/84 98.1 °F (36.7 °C)  75 18 98 % 5' 1\" (1.549 m) 128 lb (58.1 kg)         Constitutional:  Alert, development consistent with age. HEENT:  NC/NT. Airway patent. Neck:  Normal ROM. Supple.   Respiratory:  Clear to auscultation and breath sounds equal.  CV:  Regular rate and rhythm, normal heart sounds, without pathological murmurs, ectopy, gallops, or rubs. GI:  Abdomen Soft, nontender, good bowel sounds. No firm or pulsatile mass. Back: Midline middle thoracic as well as right lateral lower lumbar spine            Tenderness: Moderate. Swelling: no.              Range of Motion: full range of motion. CVA Tenderness: No CVA tenderness. Straight leg raising:  Bilateral negative. Skin:  no wounds, erythema, or swelling, no puncture wounds. Distal Function:              Motor deficit: none. Sensory deficit: none. Pulse deficit: none. Calf Tenderness:  No Bilateral.               Edema:  none Both lower extremity(s). Deep Tendon Reflexes (DTR's) to bilateral knee's and ankle's are normo-reflexive   Gait:  normal.  Integument:  Normal turgor. Warm, dry, without visible rash. Lymphatics: No lymphangitis or adenopathy noted. Neurological:  Oriented. Motor functions intact.     Lab / Imaging Results   (All laboratory and radiology results have been personally reviewed by myself)  Labs:  Results for orders placed or performed during the hospital encounter of 02/20/22   CBC with Auto Differential   Result Value Ref Range    WBC 5.8 4.5 - 11.5 E9/L    RBC 3.40 (L) 3.50 - 5.50 E12/L    Hemoglobin 9.7 (L) 11.5 - 15.5 g/dL    Hematocrit 30.3 (L) 34.0 - 48.0 %    MCV 89.1 80.0 - 99.9 fL    MCH 28.5 26.0 - 35.0 pg    MCHC 32.0 32.0 - 34.5 %    RDW 13.8 11.5 - 15.0 fL    Platelets 891 361 - 288 E9/L    MPV 9.9 7.0 - 12.0 fL    Neutrophils % 73.1 43.0 - 80.0 %    Immature Granulocytes % 1.4 0.0 - 5.0 %    Lymphocytes % 15.2 (L) 20.0 - 42.0 %    Monocytes % 9.3 2.0 - 12.0 %    Eosinophils % 0.5 0.0 - 6.0 %    Basophils % 0.5 0.0 - 2.0 %    Neutrophils Absolute 4.23 1.80 - 7.30 E9/L    Immature Granulocytes # 0.08 E9/L    Lymphocytes Absolute 0.88 (L) 1.50 - 4.00 E9/L    Monocytes Absolute 0.54 0.10 - 0.95 E9/L    Eosinophils Absolute 0.03 (L) 0.05 - 0.50 E9/L    Basophils Absolute 0.03 0.00 - 0.20 E9/L   Comprehensive Metabolic Panel   Result Value Ref Range    Sodium 144 132 - 146 mmol/L    Potassium 3.9 3.5 - 5.0 mmol/L    Chloride 110 (H) 98 - 107 mmol/L    CO2 25 22 - 29 mmol/L    Anion Gap 9 7 - 16 mmol/L    Glucose 134 (H) 74 - 99 mg/dL    BUN 27 (H) 6 - 23 mg/dL    CREATININE 1.1 (H) 0.5 - 1.0 mg/dL    GFR Non-African American 59 >=60 mL/min/1.73    GFR African American 59     Calcium 9.5 8.6 - 10.2 mg/dL    Total Protein 8.2 6.4 - 8.3 g/dL    Albumin 3.4 (L) 3.5 - 5.2 g/dL    Total Bilirubin <0.2 0.0 - 1.2 mg/dL    Alkaline Phosphatase 80 35 - 104 U/L    ALT 41 (H) 0 - 32 U/L    AST 50 (H) 0 - 31 U/L   Lactate, Sepsis   Result Value Ref Range    Lactic Acid, Sepsis 0.8 0.5 - 1.9 mmol/L   Sedimentation Rate   Result Value Ref Range    Sed Rate 70 (H) 0 - 20 mm/Hr   C-Reactive Protein   Result Value Ref Range    CRP 0.3 0.0 - 0.4 mg/dL   Urinalysis   Result Value Ref Range    Color, UA Yellow Straw/Yellow    Clarity, UA Clear Clear    Glucose, Ur Negative Negative mg/dL    Bilirubin Urine Negative Negative    Ketones, Urine Negative Negative mg/dL    Specific Gravity, UA 1.025 1.005 - 1.030    Blood, Urine SMALL (A) Negative    pH, UA 7.0 5.0 - 9.0    Protein, UA >=300 (A) Negative mg/dL    Urobilinogen, Urine 0.2 <2.0 E.U./dL    Nitrite, Urine Negative Negative    Leukocyte Esterase, Urine Negative Negative   Microscopic Urinalysis   Result Value Ref Range    WBC, UA 0-1 0 - 5 /HPF    RBC, UA 2-5 0 - 2 /HPF    Epithelial Cells, UA RARE /HPF    Bacteria, UA RARE (A) None Seen /HPF       Imaging: All Radiology results interpreted by Radiologist unless otherwise noted. CT THORACIC SPINE WO CONTRAST   Final Result   Multilevel degenerative changes seen throughout the thoracic spine. No   fracture or dislocation.       Extensive vascular calcifications seen within the thoracic aorta and upper   abdominal aorta with incidental 6 mm noncalcified nodule seen medially within   the right lower lobe. RECOMMENDATIONS:   6 mm noncalcified nodule right lower lobe - recommend a noncontrasted CT at   6-12 months. If the patient is high risk for malignancy, recommend an   additional noncontrasted chest CT at 18-24 months; if patient is low risk for   malignancy a noncontrasted chest CT at 18-24 months is optional.         CT LUMBAR SPINE WO CONTRAST   Final Result   Multilevel degenerative changes seen within the lumbar spine with no evidence   of fracture or dislocation with large broad-based disc bulge in combination   with posterior facet hypertrophy and thickening of the posterior ligamentum   flavum creating severe central spinal canal stenosis at the L3/L4 level. Nerve root contact and compromise cannot be excluded. Clinical correlation   is needed. CT ABDOMEN PELVIS WO CONTRAST Additional Contrast? None   Preliminary Result   No obvious etiology for the patient's abdominal pain. Multiple peritoneal cystic masses which have been present to one degree or   another dating back to 2018. The previously described jejunal mesenteric   mass is no longer visualized. However, there are increased number and size   of multiple peritoneal masses adjacent to the distal ascending colon. These   measure up to 2.7 cm. These are indeterminate. Cystic metastatic disease is   felt to be unlikely but not excluded. Consider correlation with PET-CT. 6 mm right lower lobe pulmonary nodule, new from 2018. See below   recommendations for imaging follow-up. RECOMMENDATIONS:   Fleischner Society guidelines for follow-up and management of incidentally   detected pulmonary nodules:      Single Solid Nodule:      Nodule size less than 6 mm   In a low-risk patient, no routine follow-up. In a high-risk patient, optional CT at 12 months. Nodule size equals 6-8 mm   In a low-risk patient, CT at 6-12 months, then consider CT at 18-24 months.    In a high-risk patient, CT at 6-12 months, then CT at 18-24 months. Nodule size greater than 8 mm         In a low-risk patient, consider CT at 3 months, PET/CT, or tissue sampling. In a high-risk patient, consider CT at 3 months, PET/CT, or tissue sampling. Multiple Solid Nodules:      Nodule size less than 6 mm   In a low-risk patient, no routine follow-up. In a high-risk patient, optional CT at 12 months. Nodule size equals 6-8 mm   In a low-risk patient, CT at 3-6 months, then consider CT at 18-24 months. In a high-risk patient, CT at 3-6 months, then CT at 18-24 months. Nodule size greater than 8 mm   In a low-risk patient, CT at 3-6 months, then consider CT at 18-24 months. In a high-risk patient, CT at 3-6 months, then CT at 18-24 months. - Low risk patients include individuals with minimal or absent history of   smoking and other known risk factors. - High risk patients include individuals with a history or smoking or known   risk factors. Radiology 2017 http://pubs. rsna.org/doi/full/10.1148/radiol. 7180410908             ED Course / Medical Decision Making     Medications   HYDROcodone-acetaminophen (NORCO) 5-325 MG per tablet 1 tablet (1 tablet Oral Given 2/20/22 1050)   ketorolac (TORADOL) injection 15 mg (15 mg IntraVENous Given 2/20/22 1132)   dexamethasone (DECADRON) injection 10 mg (10 mg IntraVENous Given 2/20/22 1145)       Consult(s):   None      Medical Decision Making: Patient is well-appearing, afebrile. Presents with back pain ongoing for approximately 1 week prior to arrival.  On exam patient has lower thoracic midline tenderness as well as right lateral lower lumbar tenderness. No erythema warmth or swelling to suggest infectious process. No acute neurological deficits. Patient reports she did have a possible steroid injection at her PCP office several days ago for the pain.   Labs obtained reviewed patient does have anemia however per chart review this hemoglobin of 9.7 appears to be baseline. CMP creatinine 1.1 appears to be also at baseline. Lactate negative. Sed rate 0.3.  UA trace blood and proteinuria. Sed rate slightly elevated at 70. CT scans of the thoracic and lumbar spine as well as abdomen pelvis were obtained indicative of lung nodules, vascular calcifications in the thoracic aorta as well as multiple peritoneal masses adjacent to the ascending colon. Patient has no chest pain no shortness of breath or abdominal pain. She does have a history of malignancy, follows with oncology. She was made aware of these findings and importance of outpatient follow-up with her PCP as well as her oncologist.  Patient given pain control in ED plan will be for symptom control for home as well as outpatient follow-up as described above. She and her sister were educated on signs symptoms which require emergent reevaluation. Plan of Care/Counseling:  VERITO Mills CNP reviewed today's visit with the patient in addition to providing specific details for the plan of care and counseling regarding the diagnosis and prognosis. Questions are answered at this time and are agreeable with the plan. Assessment      1. Acute right-sided low back pain with right-sided sciatica    2. Abnormal CT scan    3. Asymptomatic proteinuria      Plan   Discharged home. Patient condition is good    New Medications     Discharge Medication List as of 2/20/2022 12:02 PM      START taking these medications    Details   HYDROcodone-acetaminophen (NORCO) 5-325 MG per tablet Take 1 tablet by mouth every 8 hours as needed for Pain for up to 3 days. Intended supply: 3 days. Take lowest dose possible to manage pain, Disp-12 tablet, R-0Print           Electronically signed by VERITO Mills CNP   DD: 2/20/22  **This report was transcribed using voice recognition software.  Every effort was made to ensure accuracy; however, inadvertent computerized transcription errors may be present.   END OF ED PROVIDER NOTE      VERITO Aguilar - MANNY  02/20/22 8120 PAST SURGICAL HISTORY:  History of cholecystectomy     History of lithotripsy

## (undated) DEVICE — GLOVE ORANGE PI 7 1/2   MSG9075

## (undated) DEVICE — DEFENDO AIR WATER SUCTION AND BIOPSY VALVE KIT FOR  OLYMPUS: Brand: DEFENDO AIR/WATER/SUCTION AND BIOPSY VALVE

## (undated) DEVICE — INTENDED FOR TISSUE SEPARATION, AND OTHER PROCEDURES THAT REQUIRE A SHARP SURGICAL BLADE TO PUNCTURE OR CUT.: Brand: BARD-PARKER ® STAINLESS STEEL BLADES

## (undated) DEVICE — DRAPE,REIN 53X77,STERILE: Brand: MEDLINE

## (undated) DEVICE — APPLIER CLP M L L11.4IN DIA10MM ENDOSCP ROT MULT FOR LIG

## (undated) DEVICE — GLOVE ORANGE PI 8   MSG9080

## (undated) DEVICE — CAMERA STRYKER 1488 HD GEN

## (undated) DEVICE — REDUCER: Brand: ENDOWRIST

## (undated) DEVICE — SPONGE GZ W4XL4IN RAYON POLY FILL CVR W/ NONWOVEN FAB

## (undated) DEVICE — GOWN,SIRUS,FABRNF,L,20/CS: Brand: MEDLINE

## (undated) DEVICE — VESSEL SEALER: Brand: ENDOWRIST

## (undated) DEVICE — TROCAR ENDOSCP L100MM DIA5MM BLDELSS STBL SL OBT RADLUC

## (undated) DEVICE — MAX-CORE® DISPOSABLE CORE BIOPSY INSTRUMENT, 18G X 25CM: Brand: MAX-CORE

## (undated) DEVICE — Z DISCONTINUED NO SUB IDED TUBING ETCO2 AD L6.5FT NSL ORAL CVD PRNG NONFLARED TIP OVR

## (undated) DEVICE — BITEBLOCK 54FR W/ DENT RIM BLOX

## (undated) DEVICE — GARMENT COMPR STD FOR 17IN CALF UNIF THER FLOTRN

## (undated) DEVICE — 1.5L THIN WALL CAN: Brand: CRD

## (undated) DEVICE — INSUFFLATION TUBING SET WITH FILTER, FUNNEL CONNECTOR AND LUER LOCK: Brand: JOSNOE MEDICAL INC

## (undated) DEVICE — VESSEL SEALER EXTEND: Brand: ENDOWRIST

## (undated) DEVICE — LAPAROSCOPIC SCISSORS: Brand: EPIX LAPAROSCOPIC SCISSORS

## (undated) DEVICE — SNARE POLYP SM AD W13MMXL240CM SHTH DIA2.4MM HEX STIFF

## (undated) DEVICE — SUCTION IRRIGATOR: Brand: ENDOWRIST

## (undated) DEVICE — DRAIN CHN 19FR L0.25MM DIA6.3MM SIL RND HUBLESS FULL FLUT

## (undated) DEVICE — TRAP POLYP ETRAP

## (undated) DEVICE — NON COATED ELECTROSURGICAL NEEDLE ELECTRODE, 2.75 INCH (7 CM): Brand: MEGADYNE

## (undated) DEVICE — Z INACTIVE USE 2660664 SOLUTION IRRIG 3000ML 0.9% SOD CHL USP UROMATIC PLAS CONT

## (undated) DEVICE — CURITY FLEXIBLE ADHESIVE BANDAGE: Brand: CURITY

## (undated) DEVICE — TIP COVER ACCESSORY

## (undated) DEVICE — SYRINGE 20ML LL S/C 50

## (undated) DEVICE — FLUORESCENCE IMAGING PROCEDURE KIT: Brand: FIREFLY

## (undated) DEVICE — TIBURON GENERAL ENDOSCOPY DRAPE: Brand: CONVERTORS

## (undated) DEVICE — GLOVE SURG SZ 75 L12IN FNGR THK94MIL TRNSLUC YEL LTX

## (undated) DEVICE — SNARE ENDOSCP L240CM LOOP W13MM SHTH DIA2.4MM SM OVL FLX

## (undated) DEVICE — INSUFFLATION NEEDLE TO ESTABLISH PNEUMOPERITONEUM.: Brand: INSUFFLATION NEEDLE

## (undated) DEVICE — PUMP SUC IRR TBNG L10FT W/ HNDPC ASSEMB STRYKEFLOW 2

## (undated) DEVICE — SET ENDO INSTR LAPAROSCOPIC STGENLAP

## (undated) DEVICE — SOLUTION IV IRRIG WATER 1000ML POUR BRL 2F7114

## (undated) DEVICE — TROCAR ENDOSCP L100MM DIA12MM DIL TIP STBL SL ENDOPATH XCEL

## (undated) DEVICE — SET INSTRUMENT LAP I

## (undated) DEVICE — TROCAR ENDOSCP SHFT L100MM DIA5MM DIL TIP ENDOPATH XCEL

## (undated) DEVICE — CLIP INT L POLYMER LOK LIG HEM O LOK

## (undated) DEVICE — SEAL

## (undated) DEVICE — Z INACTIVE USE 2855094 SPONGE SURG W3 8IN WHT COT RND PNUT DISECT W COUNT CRD RADPQ

## (undated) DEVICE — PACK,UNIVERSAL,NO GOWNS: Brand: MEDLINE

## (undated) DEVICE — SOLUTION IV IRRIG POUR BRL 0.9% SODIUM CHL 2F7124

## (undated) DEVICE — BLADELESS OBTURATOR: Brand: WECK VISTA

## (undated) DEVICE — CANNULA SEAL

## (undated) DEVICE — SINGLE-USE BIOPSY FORCEPS: Brand: RADIAL JAW 4

## (undated) DEVICE — TTL1LYR 16FR10ML 100%SIL TMPST TR: Brand: MEDLINE

## (undated) DEVICE — TISSUE RETRIEVAL SYSTEM: Brand: INZII RETRIEVAL SYSTEM

## (undated) DEVICE — PAD,NON-ADHERENT,2X3,STERILE,LF,1/PK: Brand: MEDLINE

## (undated) DEVICE — ROBOTIC: Brand: MEDLINE INDUSTRIES, INC.

## (undated) DEVICE — PATIENT RETURN ELECTRODE, SINGLE-USE, CONTACT QUALITY MONITORING, ADULT, WITH 9FT CORD, FOR PATIENTS WEIGING OVER 33LBS. (15KG): Brand: MEGADYNE

## (undated) DEVICE — TOWEL,OR,DSP,ST,BLUE,STD,6/PK,12PK/CS: Brand: MEDLINE

## (undated) DEVICE — STAPLER SHEATH: Brand: ENDOWRIST

## (undated) DEVICE — Device

## (undated) DEVICE — UNIVERSAL DRAPE: Brand: MEDLINE INDUSTRIES, INC.

## (undated) DEVICE — [HIGH FLOW INSUFFLATOR,  DO NOT USE IF PACKAGE IS DAMAGED,  KEEP DRY,  KEEP AWAY FROM SUNLIGHT,  PROTECT FROM HEAT AND RADIOACTIVE SOURCES.]: Brand: PNEUMOSURE

## (undated) DEVICE — COLUMN DRAPE

## (undated) DEVICE — MEDIUM-LARGE CLIP APPLIER: Brand: ENDOWRIST

## (undated) DEVICE — KIT,ANTI FOG,W/SPONGE & FLUID,SOFT PACK: Brand: MEDLINE

## (undated) DEVICE — FORCEPS BX L240CM JAW DIA2.2MM RAD JAW 4 HOT DISP

## (undated) DEVICE — TRAP,MUCUS SPECIMEN,40CC: Brand: MEDLINE

## (undated) DEVICE — ELECTRODE PT RET AD L9FT HI MOIST COND ADH HYDRGEL CORDED

## (undated) DEVICE — NEEDLE INSUF L120MM DIA2MM DISP FOR PNEUMOPERI ENDOPATH

## (undated) DEVICE — LOOP VES W25MM THK1MM MAXI RED SIL FLD REPELLENT 100 PER

## (undated) DEVICE — SET INST DAVINCI ACCESSORIES

## (undated) DEVICE — CANNULA STAPLER ENDOWRIST 12MM

## (undated) DEVICE — SCOPE DAVINCI XI 30 DEG W/CORD

## (undated) DEVICE — ARM DRAPE

## (undated) DEVICE — TROCAR ENDOSCP L100MM DIA12MM UNIV SL OBT RADLUC STBL SL

## (undated) DEVICE — DRAIN SURG 15FR L3/16IN DIA4.7MM SIL CHN RND FULL FLUT TRCR

## (undated) DEVICE — SURGICAL PROCEDURE PACK BASIC

## (undated) DEVICE — GRADUATE TRIANG MEASURE 1000ML BLK PRNT

## (undated) DEVICE — PAD PT POS 36 IN SURGYPAD DISP

## (undated) DEVICE — CONTRAST AGENT KIT SYR 23 GA 10 CC TWIN PK KT W/ INTERJECT BX/10

## (undated) DEVICE — CLIP INT M L POLYMER LOK LIG HEM O LOK

## (undated) DEVICE — TROCAR: Brand: KII FIOS FIRST ENTRY

## (undated) DEVICE — LOOP LIG SUT SZ 0 L18IN ABSRB POLYDIOXANONE MFIL PDS II

## (undated) DEVICE — SYRINGE MED 20ML STD CLR PLAS LUERLOCK TIP N CTRL DISP

## (undated) DEVICE — CONNECTOR IRRIGATION AUXILIARY H2O JET W/ PRT MTL THRD HYDR

## (undated) DEVICE — CONTAINER SPEC 60ML PH 7NEUTRAL BUFF FRMLN RDY TO USE

## (undated) DEVICE — GOWN,PREVENTION PLUS,XLN/2XL,ST,22/CS: Brand: MEDLINE

## (undated) DEVICE — SET INST DAVINCI LAP

## (undated) DEVICE — CHLORAPREP 26ML ORANGE